# Patient Record
Sex: FEMALE | Race: WHITE | NOT HISPANIC OR LATINO
[De-identification: names, ages, dates, MRNs, and addresses within clinical notes are randomized per-mention and may not be internally consistent; named-entity substitution may affect disease eponyms.]

---

## 2015-12-22 RX ORDER — INSULIN LISPRO 100/ML
40 VIAL (ML) SUBCUTANEOUS
Qty: 0 | Refills: 0 | DISCHARGE
Start: 2015-12-22

## 2015-12-22 RX ORDER — INSULIN LISPRO 100/ML
60 VIAL (ML) SUBCUTANEOUS
Qty: 0 | Refills: 0 | DISCHARGE
Start: 2015-12-22

## 2015-12-22 RX ORDER — INSULIN LISPRO 100/ML
46 VIAL (ML) SUBCUTANEOUS
Qty: 0 | Refills: 0 | COMMUNITY
Start: 2015-12-22

## 2015-12-22 RX ORDER — ATORVASTATIN CALCIUM 80 MG/1
1 TABLET, FILM COATED ORAL
Qty: 0 | Refills: 0 | DISCHARGE
Start: 2015-12-22

## 2015-12-22 RX ORDER — INSULIN LISPRO 100/ML
30 VIAL (ML) SUBCUTANEOUS
Qty: 0 | Refills: 0 | DISCHARGE
Start: 2015-12-22

## 2015-12-22 RX ORDER — INSULIN LISPRO 100/ML
35 VIAL (ML) SUBCUTANEOUS
Qty: 0 | Refills: 0 | DISCHARGE
Start: 2015-12-22

## 2015-12-22 RX ORDER — FUROSEMIDE 40 MG
3 TABLET ORAL
Qty: 0 | Refills: 0 | DISCHARGE
Start: 2015-12-22

## 2017-01-31 ENCOUNTER — APPOINTMENT (OUTPATIENT)
Dept: SURGERY | Facility: CLINIC | Age: 62
End: 2017-01-31

## 2017-01-31 DIAGNOSIS — R19.8 OTHER SPECIFIED SYMPTOMS AND SIGNS INVOLVING THE DIGESTIVE SYSTEM AND ABDOMEN: ICD-10-CM

## 2017-11-03 ENCOUNTER — OUTPATIENT (OUTPATIENT)
Dept: OUTPATIENT SERVICES | Facility: HOSPITAL | Age: 62
LOS: 1 days | End: 2017-11-03
Payer: MEDICARE

## 2017-11-03 VITALS
SYSTOLIC BLOOD PRESSURE: 156 MMHG | WEIGHT: 293 LBS | OXYGEN SATURATION: 96 % | RESPIRATION RATE: 20 BRPM | DIASTOLIC BLOOD PRESSURE: 67 MMHG | TEMPERATURE: 99 F | HEART RATE: 76 BPM | HEIGHT: 63 IN

## 2017-11-03 DIAGNOSIS — I35.0 NONRHEUMATIC AORTIC (VALVE) STENOSIS: ICD-10-CM

## 2017-11-03 DIAGNOSIS — Z95.2 PRESENCE OF PROSTHETIC HEART VALVE: Chronic | ICD-10-CM

## 2017-11-03 LAB
ALBUMIN SERPL ELPH-MCNC: 4.1 G/DL — SIGNIFICANT CHANGE UP (ref 3.3–5)
ALP SERPL-CCNC: 133 U/L — HIGH (ref 40–120)
ALT FLD-CCNC: 18 U/L RC — SIGNIFICANT CHANGE UP (ref 10–45)
ANION GAP SERPL CALC-SCNC: 14 MMOL/L — SIGNIFICANT CHANGE UP (ref 5–17)
AST SERPL-CCNC: 22 U/L — SIGNIFICANT CHANGE UP (ref 10–40)
BILIRUB SERPL-MCNC: 0.4 MG/DL — SIGNIFICANT CHANGE UP (ref 0.2–1.2)
BUN SERPL-MCNC: 15 MG/DL — SIGNIFICANT CHANGE UP (ref 7–23)
CALCIUM SERPL-MCNC: 9.8 MG/DL — SIGNIFICANT CHANGE UP (ref 8.4–10.5)
CHLORIDE SERPL-SCNC: 101 MMOL/L — SIGNIFICANT CHANGE UP (ref 96–108)
CO2 SERPL-SCNC: 25 MMOL/L — SIGNIFICANT CHANGE UP (ref 22–31)
CREAT SERPL-MCNC: 0.79 MG/DL — SIGNIFICANT CHANGE UP (ref 0.5–1.3)
GLUCOSE BLDC GLUCOMTR-MCNC: 162 MG/DL — HIGH (ref 70–99)
GLUCOSE BLDC GLUCOMTR-MCNC: 197 MG/DL — HIGH (ref 70–99)
GLUCOSE SERPL-MCNC: 177 MG/DL — HIGH (ref 70–99)
HCT VFR BLD CALC: 41.6 % — SIGNIFICANT CHANGE UP (ref 34.5–45)
HGB BLD-MCNC: 13.4 G/DL — SIGNIFICANT CHANGE UP (ref 11.5–15.5)
MCHC RBC-ENTMCNC: 28.9 PG — SIGNIFICANT CHANGE UP (ref 27–34)
MCHC RBC-ENTMCNC: 32.2 GM/DL — SIGNIFICANT CHANGE UP (ref 32–36)
MCV RBC AUTO: 89.7 FL — SIGNIFICANT CHANGE UP (ref 80–100)
PLATELET # BLD AUTO: 270 K/UL — SIGNIFICANT CHANGE UP (ref 150–400)
POTASSIUM SERPL-MCNC: 4.5 MMOL/L — SIGNIFICANT CHANGE UP (ref 3.5–5.3)
POTASSIUM SERPL-SCNC: 4.5 MMOL/L — SIGNIFICANT CHANGE UP (ref 3.5–5.3)
PROT SERPL-MCNC: 7.5 G/DL — SIGNIFICANT CHANGE UP (ref 6–8.3)
RBC # BLD: 4.64 M/UL — SIGNIFICANT CHANGE UP (ref 3.8–5.2)
RBC # FLD: 13.2 % — SIGNIFICANT CHANGE UP (ref 10.3–14.5)
SODIUM SERPL-SCNC: 140 MMOL/L — SIGNIFICANT CHANGE UP (ref 135–145)
WBC # BLD: 12 K/UL — HIGH (ref 3.8–10.5)
WBC # FLD AUTO: 12 K/UL — HIGH (ref 3.8–10.5)

## 2017-11-03 PROCEDURE — 92978 ENDOLUMINL IVUS OCT C 1ST: CPT | Mod: LM

## 2017-11-03 PROCEDURE — 85027 COMPLETE CBC AUTOMATED: CPT

## 2017-11-03 PROCEDURE — C1769: CPT

## 2017-11-03 PROCEDURE — C1894: CPT

## 2017-11-03 PROCEDURE — 82962 GLUCOSE BLOOD TEST: CPT

## 2017-11-03 PROCEDURE — 93460 R&L HRT ART/VENTRICLE ANGIO: CPT

## 2017-11-03 PROCEDURE — 99152 MOD SED SAME PHYS/QHP 5/>YRS: CPT

## 2017-11-03 PROCEDURE — 99153 MOD SED SAME PHYS/QHP EA: CPT

## 2017-11-03 PROCEDURE — 93567 NJX CAR CTH SPRVLV AORTGRPHY: CPT

## 2017-11-03 PROCEDURE — 93010 ELECTROCARDIOGRAM REPORT: CPT

## 2017-11-03 PROCEDURE — 93005 ELECTROCARDIOGRAM TRACING: CPT

## 2017-11-03 PROCEDURE — C1753: CPT

## 2017-11-03 PROCEDURE — 80053 COMPREHEN METABOLIC PANEL: CPT

## 2017-11-03 PROCEDURE — 92978 ENDOLUMINL IVUS OCT C 1ST: CPT | Mod: 26,LM

## 2017-11-03 PROCEDURE — C1887: CPT

## 2017-11-03 PROCEDURE — 93460 R&L HRT ART/VENTRICLE ANGIO: CPT | Mod: 26

## 2017-11-03 RX ORDER — SODIUM CHLORIDE 9 MG/ML
3 INJECTION INTRAMUSCULAR; INTRAVENOUS; SUBCUTANEOUS EVERY 8 HOURS
Qty: 0 | Refills: 0 | Status: DISCONTINUED | OUTPATIENT
Start: 2017-11-03 | End: 2017-11-18

## 2017-11-03 NOTE — H&P CARDIOLOGY - PSH
Breast anomaly  Biopsy of both breast benign lesions  Cataract  B/L cataracts  Cervix abnormality  Ablation of cervix  right eye torn retina    S/P bariatric surgery  lap band surgery two times due to erosion  S/P D&C (status post dilation and curettage)  several D&C's  S/P laminectomy    S/P TAVR (transcatheter aortic valve replacement)    S/P tonsillectomy and adenoidectomy

## 2017-11-03 NOTE — H&P CARDIOLOGY - PMH
Cholesterol serum elevated    Depression    Diabetes type 2, uncontrolled    Gastroparesis due to DM    GERD (gastroesophageal reflux disease)    Herniated disc    Hypertension    Hypothyroid  not on medication  IBS (irritable bowel syndrome)    Murmur, cardiac  Has a "leaky valve" does not know which one  MVP (mitral valve prolapse)    Neuropathy  Feet and hands  Obesity    Spinal stenosis  chronic back pain

## 2019-02-20 ENCOUNTER — EMERGENCY (EMERGENCY)
Facility: HOSPITAL | Age: 64
LOS: 1 days | Discharge: HOME | End: 2019-02-20
Attending: EMERGENCY MEDICINE
Payer: MEDICARE

## 2019-02-20 VITALS
DIASTOLIC BLOOD PRESSURE: 102 MMHG | SYSTOLIC BLOOD PRESSURE: 192 MMHG | HEART RATE: 115 BPM | RESPIRATION RATE: 20 BRPM | OXYGEN SATURATION: 97 % | HEIGHT: 63 IN | TEMPERATURE: 98 F | WEIGHT: 293 LBS

## 2019-02-20 DIAGNOSIS — I10 ESSENTIAL (PRIMARY) HYPERTENSION: ICD-10-CM

## 2019-02-20 DIAGNOSIS — E11.65 TYPE 2 DIABETES MELLITUS WITH HYPERGLYCEMIA: ICD-10-CM

## 2019-02-20 DIAGNOSIS — E87.2 ACIDOSIS: ICD-10-CM

## 2019-02-20 DIAGNOSIS — Z95.2 PRESENCE OF PROSTHETIC HEART VALVE: Chronic | ICD-10-CM

## 2019-02-20 DIAGNOSIS — E03.9 HYPOTHYROIDISM, UNSPECIFIED: ICD-10-CM

## 2019-02-20 LAB
ALBUMIN SERPL ELPH-MCNC: 4.1 G/DL — SIGNIFICANT CHANGE UP (ref 3.3–5)
ALBUMIN SERPL ELPH-MCNC: 4.5 G/DL — SIGNIFICANT CHANGE UP (ref 3.3–5)
ALP SERPL-CCNC: 146 U/L — HIGH (ref 40–120)
ALP SERPL-CCNC: 148 U/L — HIGH (ref 40–120)
ALT FLD-CCNC: 14 U/L — SIGNIFICANT CHANGE UP (ref 10–45)
ALT FLD-CCNC: 16 U/L — SIGNIFICANT CHANGE UP (ref 10–45)
ANION GAP SERPL CALC-SCNC: 14 MMOL/L — SIGNIFICANT CHANGE UP (ref 5–17)
ANION GAP SERPL CALC-SCNC: 19 MMOL/L — HIGH (ref 5–17)
APPEARANCE UR: CLEAR — SIGNIFICANT CHANGE UP
AST SERPL-CCNC: 18 U/L — SIGNIFICANT CHANGE UP (ref 10–40)
AST SERPL-CCNC: 25 U/L — SIGNIFICANT CHANGE UP (ref 10–40)
BASE EXCESS BLDV CALC-SCNC: 2.2 MMOL/L — HIGH (ref -2–2)
BASE EXCESS BLDV CALC-SCNC: 3 MMOL/L — HIGH (ref -2–2)
BASE EXCESS BLDV CALC-SCNC: 3.1 MMOL/L — HIGH (ref -2–2)
BASOPHILS # BLD AUTO: 0.1 K/UL — SIGNIFICANT CHANGE UP (ref 0–0.2)
BASOPHILS NFR BLD AUTO: 0.4 % — SIGNIFICANT CHANGE UP (ref 0–2)
BILIRUB SERPL-MCNC: 0.3 MG/DL — SIGNIFICANT CHANGE UP (ref 0.2–1.2)
BILIRUB SERPL-MCNC: 0.4 MG/DL — SIGNIFICANT CHANGE UP (ref 0.2–1.2)
BILIRUB UR-MCNC: NEGATIVE — SIGNIFICANT CHANGE UP
BUN SERPL-MCNC: 12 MG/DL — SIGNIFICANT CHANGE UP (ref 7–23)
BUN SERPL-MCNC: 12 MG/DL — SIGNIFICANT CHANGE UP (ref 7–23)
CA-I SERPL-SCNC: 1.17 MMOL/L — SIGNIFICANT CHANGE UP (ref 1.12–1.3)
CA-I SERPL-SCNC: 1.2 MMOL/L — SIGNIFICANT CHANGE UP (ref 1.12–1.3)
CA-I SERPL-SCNC: 1.2 MMOL/L — SIGNIFICANT CHANGE UP (ref 1.12–1.3)
CALCIUM SERPL-MCNC: 10.5 MG/DL — SIGNIFICANT CHANGE UP (ref 8.4–10.5)
CALCIUM SERPL-MCNC: 9.8 MG/DL — SIGNIFICANT CHANGE UP (ref 8.4–10.5)
CHLORIDE BLDV-SCNC: 107 MMOL/L — SIGNIFICANT CHANGE UP (ref 96–108)
CHLORIDE BLDV-SCNC: 108 MMOL/L — SIGNIFICANT CHANGE UP (ref 96–108)
CHLORIDE BLDV-SCNC: 109 MMOL/L — HIGH (ref 96–108)
CHLORIDE SERPL-SCNC: 100 MMOL/L — SIGNIFICANT CHANGE UP (ref 96–108)
CHLORIDE SERPL-SCNC: 103 MMOL/L — SIGNIFICANT CHANGE UP (ref 96–108)
CO2 BLDV-SCNC: 27 MMOL/L — SIGNIFICANT CHANGE UP (ref 22–30)
CO2 BLDV-SCNC: 30 MMOL/L — SIGNIFICANT CHANGE UP (ref 22–30)
CO2 BLDV-SCNC: 31 MMOL/L — HIGH (ref 22–30)
CO2 SERPL-SCNC: 21 MMOL/L — LOW (ref 22–31)
CO2 SERPL-SCNC: 22 MMOL/L — SIGNIFICANT CHANGE UP (ref 22–31)
COLOR SPEC: COLORLESS — SIGNIFICANT CHANGE UP
CREAT SERPL-MCNC: 0.63 MG/DL — SIGNIFICANT CHANGE UP (ref 0.5–1.3)
CREAT SERPL-MCNC: 0.63 MG/DL — SIGNIFICANT CHANGE UP (ref 0.5–1.3)
DIFF PNL FLD: NEGATIVE — SIGNIFICANT CHANGE UP
EOSINOPHIL # BLD AUTO: 0.2 K/UL — SIGNIFICANT CHANGE UP (ref 0–0.5)
EOSINOPHIL NFR BLD AUTO: 1.3 % — SIGNIFICANT CHANGE UP (ref 0–6)
GAS PNL BLDV: 133 MMOL/L — LOW (ref 136–145)
GAS PNL BLDV: 137 MMOL/L — SIGNIFICANT CHANGE UP (ref 136–145)
GAS PNL BLDV: 137 MMOL/L — SIGNIFICANT CHANGE UP (ref 136–145)
GAS PNL BLDV: SIGNIFICANT CHANGE UP
GLUCOSE BLDC GLUCOMTR-MCNC: 202 MG/DL — HIGH (ref 70–99)
GLUCOSE BLDC GLUCOMTR-MCNC: 217 MG/DL — HIGH (ref 70–99)
GLUCOSE BLDC GLUCOMTR-MCNC: 241 MG/DL — HIGH (ref 70–99)
GLUCOSE BLDV-MCNC: 186 MG/DL — HIGH (ref 70–99)
GLUCOSE BLDV-MCNC: 190 MG/DL — HIGH (ref 70–99)
GLUCOSE BLDV-MCNC: 213 MG/DL — HIGH (ref 70–99)
GLUCOSE SERPL-MCNC: 184 MG/DL — HIGH (ref 70–99)
GLUCOSE SERPL-MCNC: 189 MG/DL — HIGH (ref 70–99)
GLUCOSE UR QL: NEGATIVE — SIGNIFICANT CHANGE UP
HCO3 BLDV-SCNC: 26 MMOL/L — SIGNIFICANT CHANGE UP (ref 21–29)
HCO3 BLDV-SCNC: 28 MMOL/L — SIGNIFICANT CHANGE UP (ref 21–29)
HCO3 BLDV-SCNC: 29 MMOL/L — SIGNIFICANT CHANGE UP (ref 21–29)
HCT VFR BLD CALC: 42.8 % — SIGNIFICANT CHANGE UP (ref 34.5–45)
HCT VFR BLDA CALC: 41 % — SIGNIFICANT CHANGE UP (ref 39–50)
HCT VFR BLDA CALC: 45 % — SIGNIFICANT CHANGE UP (ref 39–50)
HCT VFR BLDA CALC: 46 % — SIGNIFICANT CHANGE UP (ref 39–50)
HGB BLD CALC-MCNC: 13.3 G/DL — SIGNIFICANT CHANGE UP (ref 11.5–15.5)
HGB BLD CALC-MCNC: 14.7 G/DL — SIGNIFICANT CHANGE UP (ref 11.5–15.5)
HGB BLD CALC-MCNC: 14.9 G/DL — SIGNIFICANT CHANGE UP (ref 11.5–15.5)
HGB BLD-MCNC: 14.3 G/DL — SIGNIFICANT CHANGE UP (ref 11.5–15.5)
KETONES UR-MCNC: NEGATIVE — SIGNIFICANT CHANGE UP
LACTATE BLDV-MCNC: 2.1 MMOL/L — HIGH (ref 0.7–2)
LACTATE BLDV-MCNC: 2.1 MMOL/L — HIGH (ref 0.7–2)
LACTATE BLDV-MCNC: 2.7 MMOL/L — HIGH (ref 0.7–2)
LEUKOCYTE ESTERASE UR-ACNC: NEGATIVE — SIGNIFICANT CHANGE UP
LYMPHOCYTES # BLD AUTO: 17.3 % — SIGNIFICANT CHANGE UP (ref 13–44)
LYMPHOCYTES # BLD AUTO: 2.3 K/UL — SIGNIFICANT CHANGE UP (ref 1–3.3)
MCHC RBC-ENTMCNC: 28.5 PG — SIGNIFICANT CHANGE UP (ref 27–34)
MCHC RBC-ENTMCNC: 33.5 GM/DL — SIGNIFICANT CHANGE UP (ref 32–36)
MCV RBC AUTO: 85.2 FL — SIGNIFICANT CHANGE UP (ref 80–100)
MONOCYTES # BLD AUTO: 0.7 K/UL — SIGNIFICANT CHANGE UP (ref 0–0.9)
MONOCYTES NFR BLD AUTO: 5.5 % — SIGNIFICANT CHANGE UP (ref 2–14)
NEUTROPHILS # BLD AUTO: 10 K/UL — HIGH (ref 1.8–7.4)
NEUTROPHILS NFR BLD AUTO: 75.5 % — SIGNIFICANT CHANGE UP (ref 43–77)
NITRITE UR-MCNC: NEGATIVE — SIGNIFICANT CHANGE UP
OTHER CELLS CSF MANUAL: 10 ML/DL — LOW (ref 18–22)
OTHER CELLS CSF MANUAL: 11 ML/DL — LOW (ref 18–22)
OTHER CELLS CSF MANUAL: 15 ML/DL — LOW (ref 18–22)
PCO2 BLDV: 40 MMHG — SIGNIFICANT CHANGE UP (ref 35–50)
PCO2 BLDV: 48 MMHG — SIGNIFICANT CHANGE UP (ref 35–50)
PCO2 BLDV: 54 MMHG — HIGH (ref 35–50)
PH BLDV: 7.36 — SIGNIFICANT CHANGE UP (ref 7.35–7.45)
PH BLDV: 7.39 — SIGNIFICANT CHANGE UP (ref 7.35–7.45)
PH BLDV: 7.43 — SIGNIFICANT CHANGE UP (ref 7.35–7.45)
PH UR: 8 — SIGNIFICANT CHANGE UP (ref 5–8)
PLATELET # BLD AUTO: 241 K/UL — SIGNIFICANT CHANGE UP (ref 150–400)
PO2 BLDV: 30 MMHG — SIGNIFICANT CHANGE UP (ref 25–45)
PO2 BLDV: 32 MMHG — SIGNIFICANT CHANGE UP (ref 25–45)
PO2 BLDV: 48 MMHG — HIGH (ref 25–45)
POTASSIUM BLDV-SCNC: 4 MMOL/L — SIGNIFICANT CHANGE UP (ref 3.5–5.3)
POTASSIUM BLDV-SCNC: 4.1 MMOL/L — SIGNIFICANT CHANGE UP (ref 3.5–5.3)
POTASSIUM BLDV-SCNC: 4.3 MMOL/L — SIGNIFICANT CHANGE UP (ref 3.5–5.3)
POTASSIUM SERPL-MCNC: 4.3 MMOL/L — SIGNIFICANT CHANGE UP (ref 3.5–5.3)
POTASSIUM SERPL-MCNC: 4.4 MMOL/L — SIGNIFICANT CHANGE UP (ref 3.5–5.3)
POTASSIUM SERPL-SCNC: 4.3 MMOL/L — SIGNIFICANT CHANGE UP (ref 3.5–5.3)
POTASSIUM SERPL-SCNC: 4.4 MMOL/L — SIGNIFICANT CHANGE UP (ref 3.5–5.3)
PROT SERPL-MCNC: 7.5 G/DL — SIGNIFICANT CHANGE UP (ref 6–8.3)
PROT SERPL-MCNC: 7.8 G/DL — SIGNIFICANT CHANGE UP (ref 6–8.3)
PROT UR-MCNC: ABNORMAL
RAPID RVP RESULT: SIGNIFICANT CHANGE UP
RBC # BLD: 5.03 M/UL — SIGNIFICANT CHANGE UP (ref 3.8–5.2)
RBC # FLD: 13.6 % — SIGNIFICANT CHANGE UP (ref 10.3–14.5)
SAO2 % BLDV: 47 % — LOW (ref 67–88)
SAO2 % BLDV: 55 % — LOW (ref 67–88)
SAO2 % BLDV: 83 % — SIGNIFICANT CHANGE UP (ref 67–88)
SODIUM SERPL-SCNC: 139 MMOL/L — SIGNIFICANT CHANGE UP (ref 135–145)
SODIUM SERPL-SCNC: 140 MMOL/L — SIGNIFICANT CHANGE UP (ref 135–145)
SP GR SPEC: 1.01 — LOW (ref 1.01–1.02)
UROBILINOGEN FLD QL: NEGATIVE — SIGNIFICANT CHANGE UP
WBC # BLD: 13.2 K/UL — HIGH (ref 3.8–10.5)
WBC # FLD AUTO: 13.2 K/UL — HIGH (ref 3.8–10.5)

## 2019-02-20 PROCEDURE — 93010 ELECTROCARDIOGRAM REPORT: CPT

## 2019-02-20 PROCEDURE — 99285 EMERGENCY DEPT VISIT HI MDM: CPT

## 2019-02-20 PROCEDURE — 71046 X-RAY EXAM CHEST 2 VIEWS: CPT | Mod: 26

## 2019-02-20 PROCEDURE — 99218: CPT | Mod: GC

## 2019-02-20 RX ORDER — DEXTROSE 50 % IN WATER 50 %
25 SYRINGE (ML) INTRAVENOUS ONCE
Qty: 0 | Refills: 0 | Status: DISCONTINUED | OUTPATIENT
Start: 2019-02-20 | End: 2019-02-24

## 2019-02-20 RX ORDER — SODIUM CHLORIDE 9 MG/ML
1000 INJECTION, SOLUTION INTRAVENOUS
Qty: 0 | Refills: 0 | Status: DISCONTINUED | OUTPATIENT
Start: 2019-02-20 | End: 2019-02-24

## 2019-02-20 RX ORDER — PANTOPRAZOLE SODIUM 20 MG/1
40 TABLET, DELAYED RELEASE ORAL
Qty: 0 | Refills: 0 | Status: DISCONTINUED | OUTPATIENT
Start: 2019-02-20 | End: 2019-02-24

## 2019-02-20 RX ORDER — BUPROPION HYDROCHLORIDE 150 MG/1
200 TABLET, EXTENDED RELEASE ORAL DAILY
Qty: 0 | Refills: 0 | Status: DISCONTINUED | OUTPATIENT
Start: 2019-02-20 | End: 2019-02-24

## 2019-02-20 RX ORDER — LOSARTAN POTASSIUM 100 MG/1
25 TABLET, FILM COATED ORAL DAILY
Qty: 0 | Refills: 0 | Status: DISCONTINUED | OUTPATIENT
Start: 2019-02-20 | End: 2019-02-24

## 2019-02-20 RX ORDER — ACETAMINOPHEN 500 MG
650 TABLET ORAL ONCE
Qty: 0 | Refills: 0 | Status: COMPLETED | OUTPATIENT
Start: 2019-02-20 | End: 2019-02-20

## 2019-02-20 RX ORDER — DEXTROSE 50 % IN WATER 50 %
15 SYRINGE (ML) INTRAVENOUS ONCE
Qty: 0 | Refills: 0 | Status: DISCONTINUED | OUTPATIENT
Start: 2019-02-20 | End: 2019-02-24

## 2019-02-20 RX ORDER — DILTIAZEM HCL 120 MG
180 CAPSULE, EXT RELEASE 24 HR ORAL DAILY
Qty: 0 | Refills: 0 | Status: DISCONTINUED | OUTPATIENT
Start: 2019-02-20 | End: 2019-02-24

## 2019-02-20 RX ORDER — FLUOXETINE HCL 10 MG
20 CAPSULE ORAL DAILY
Qty: 0 | Refills: 0 | Status: DISCONTINUED | OUTPATIENT
Start: 2019-02-20 | End: 2019-02-24

## 2019-02-20 RX ORDER — CARVEDILOL PHOSPHATE 80 MG/1
25 CAPSULE, EXTENDED RELEASE ORAL EVERY 12 HOURS
Qty: 0 | Refills: 0 | Status: DISCONTINUED | OUTPATIENT
Start: 2019-02-20 | End: 2019-02-24

## 2019-02-20 RX ORDER — ATORVASTATIN CALCIUM 80 MG/1
80 TABLET, FILM COATED ORAL AT BEDTIME
Qty: 0 | Refills: 0 | Status: DISCONTINUED | OUTPATIENT
Start: 2019-02-20 | End: 2019-02-24

## 2019-02-20 RX ORDER — INSULIN LISPRO 100/ML
VIAL (ML) SUBCUTANEOUS AT BEDTIME
Qty: 0 | Refills: 0 | Status: DISCONTINUED | OUTPATIENT
Start: 2019-02-20 | End: 2019-02-24

## 2019-02-20 RX ORDER — ASPIRIN/CALCIUM CARB/MAGNESIUM 324 MG
325 TABLET ORAL DAILY
Qty: 0 | Refills: 0 | Status: DISCONTINUED | OUTPATIENT
Start: 2019-02-20 | End: 2019-02-24

## 2019-02-20 RX ORDER — NALTREXONE HYDROCHLORIDE 50 MG/1
25 TABLET, FILM COATED ORAL DAILY
Qty: 0 | Refills: 0 | Status: DISCONTINUED | OUTPATIENT
Start: 2019-02-20 | End: 2019-02-24

## 2019-02-20 RX ORDER — GLUCAGON INJECTION, SOLUTION 0.5 MG/.1ML
1 INJECTION, SOLUTION SUBCUTANEOUS ONCE
Qty: 0 | Refills: 0 | Status: DISCONTINUED | OUTPATIENT
Start: 2019-02-20 | End: 2019-02-24

## 2019-02-20 RX ORDER — SODIUM CHLORIDE 9 MG/ML
1000 INJECTION INTRAMUSCULAR; INTRAVENOUS; SUBCUTANEOUS
Qty: 0 | Refills: 0 | Status: DISCONTINUED | OUTPATIENT
Start: 2019-02-20 | End: 2019-02-24

## 2019-02-20 RX ORDER — SODIUM CHLORIDE 9 MG/ML
2000 INJECTION INTRAMUSCULAR; INTRAVENOUS; SUBCUTANEOUS ONCE
Qty: 0 | Refills: 0 | Status: COMPLETED | OUTPATIENT
Start: 2019-02-20 | End: 2019-02-20

## 2019-02-20 RX ORDER — DEXTROSE 50 % IN WATER 50 %
12.5 SYRINGE (ML) INTRAVENOUS ONCE
Qty: 0 | Refills: 0 | Status: DISCONTINUED | OUTPATIENT
Start: 2019-02-20 | End: 2019-02-24

## 2019-02-20 RX ORDER — INSULIN LISPRO 100/ML
VIAL (ML) SUBCUTANEOUS
Qty: 0 | Refills: 0 | Status: DISCONTINUED | OUTPATIENT
Start: 2019-02-20 | End: 2019-02-24

## 2019-02-20 RX ORDER — FUROSEMIDE 40 MG
40 TABLET ORAL DAILY
Qty: 0 | Refills: 0 | Status: DISCONTINUED | OUTPATIENT
Start: 2019-02-20 | End: 2019-02-24

## 2019-02-20 RX ADMIN — PANTOPRAZOLE SODIUM 40 MILLIGRAM(S): 20 TABLET, DELAYED RELEASE ORAL at 18:43

## 2019-02-20 RX ADMIN — Medication 650 MILLIGRAM(S): at 08:41

## 2019-02-20 RX ADMIN — SODIUM CHLORIDE 100 MILLILITER(S): 9 INJECTION INTRAMUSCULAR; INTRAVENOUS; SUBCUTANEOUS at 16:33

## 2019-02-20 RX ADMIN — Medication 325 MILLIGRAM(S): at 20:19

## 2019-02-20 RX ADMIN — SODIUM CHLORIDE 4000 MILLILITER(S): 9 INJECTION INTRAMUSCULAR; INTRAVENOUS; SUBCUTANEOUS at 12:08

## 2019-02-20 RX ADMIN — Medication 2: at 18:43

## 2019-02-20 RX ADMIN — CARVEDILOL PHOSPHATE 25 MILLIGRAM(S): 80 CAPSULE, EXTENDED RELEASE ORAL at 20:19

## 2019-02-20 NOTE — CONSULT NOTE ADULT - ASSESSMENT
63F w/PMH obese female s/p two failed lap bands, type 2 DM (erratic and high dose insulin regimen), phtn, CAD AV dz s/p TAVR, IBS, GERD, hypothyroid? (not on medicine), JOSR not on CPAP, hld, depression, htn, neuropathy p/w labile blood glucose at home from 500s to 100s and sweats, found to have elevated lactate that has now normalized and elevated wbc count.  Pt states she does not know what to to do with her blood glucose readings and needed to see a doctor sooner than April at which time she has a outpt visit with her endocrinologist.    We do not have a A1C and blood glucoses are in range currently without any insulin  she is on a high dose home insulin regimen   Toujeo 50 BID   Humalog 60 before meals   and metformin   she did not tolerate GLP-1 outpt     she is also on wellbutrin and naloxone for cravings

## 2019-02-20 NOTE — ED PROVIDER NOTE - CONSTITUTIONAL, MLM
normal... Well appearing, well nourished, awake, alert, oriented to person, place, time/situation and in no apparent distress. +obesity

## 2019-02-20 NOTE — ED CDU PROVIDER INITIAL DAY NOTE - ATTENDING CONTRIBUTION TO CARE
I have personally performed a face to face diagnostic evaluation on this patient.  I have reviewed the ACP note and agree with the history, exam, and plan of care, except as noted.  History and Exam by me shows  See ED provider note  Jason Bhagat MD, Facep

## 2019-02-20 NOTE — ED ADULT NURSE NOTE - OBJECTIVE STATEMENT
64y/o female presetned to the ED from home with complaint of hypoglycemia. A&Ox3, ambulatory with cane. PMH-DM2. Patient states that he blood sugars have been irratic the past 2 wweks,. Patient states that her normal blood sugar is in the 200's even with taking inuslin, howver the last few days she has had readings in the 100's. Patient states that during the day her sugar has been high and has been taking an extra 30 units of insulin on her own. 64y/o female presented to the ED from home with complaint of hypoglycemia. A&Ox3, ambulatory with cane. PMH-DM2. Patient states that he blood sugars have been erratic the past 2 weeks associated with chills,. Patient states that her normal blood sugar is in the 200's even with taking insulin, however the last few days she has had readings in the 100's. Patient states that during the day her sugar has been high and has been taking an extra 30 units of insulin on her own. Denies fever, chills, N/V/D, chest pain, SOB, palpations, numbness, tingling.

## 2019-02-20 NOTE — ED ADULT NURSE REASSESSMENT NOTE - NS ED NURSE REASSESS COMMENT FT1
17.00  Received the pt from main ED  For the management of Hypoglycemia & high lactic Acid Pt is oriented to the unit Comfort care & safety measures initiated  Pt denies N/V/D fever chills CP SOB Dizziness  now   comfort care & safety  measures initiated  Pt is advised to call for help call bell with in the reach  Iv site looks clean & dry   No signs of infiltration noted  Continue to monitor Due IV fluids started
Advised by Dr. Patel awaiting CDU admission.
Meal provided to pt after FS completed. Awaiting for RVP results.
Pt reports "I feel fine and if I don't have the flu then I would like to go home." Leola POE and Amanda POE made aware of the pt's comment. MD's think the pt should be admitted, repeat labs will be sent on this pt.
Report received from Priti العلي RN. Pt AAOx4, NAD, resp nonlabored, skin warm/dry, resting comfortably in bed. Pt states improvement in pain and symptoms s/p drinking apple juice. Pt denies headache, dizziness, chest pain, palpitations, SOB, abd pain, n/v/d, urinary symptoms, fevers, chills, weakness at this time. Pt awaiting MD evaluation and assessment. Safety maintained with call bell within reach.
Pt received from KATHY Li. Pt oriented to CDU & plan of care was discussed. Pt denies any symptoms at the moment. Pt states prior to arrival she was feeling lethargic, tired and sweaty from fluctuating FS but states it has resolved. Pt aware of S&S of hypo/hyperglycemia and will notify RN or PA of any symptoms. Safety & comfort measures maintained. Call bell in reach. Will continue to monitor.

## 2019-02-20 NOTE — ED CDU PROVIDER INITIAL DAY NOTE - PROGRESS NOTE DETAILS
CDU NOTE MADELEINE LUI: Pt resting comfortably, feeling well without complaint. NAD, VSS. . will continue monitoring.

## 2019-02-20 NOTE — ED PROVIDER NOTE - OBJECTIVE STATEMENT
63F w/PMH IDDM (noncompliant w/insulin dosing), phtn, CAD (on 325mg ASA), AV dz s/p TAVR, IBS, GERD, hypothyroid, JOSR not on CPAP, hld, depression, htn, neuropathy p/w intermittent hyperglycemia x2w. Patient's home readings have been intermittenly reading "high". Occurred 3x last night, took 25U or 30U humalog at each time, last at 2-3AM. Missed lantus dosing last night. Supposed to be on 60U humalog TID but has reportedly been taking BID for many months 2/2 insulin pricing, her endocrinologist is reportedly aware. When the readings are "high" she feels diaphoretic, generally weak and nausea. +mild gradual onset headache similar to prior headaches that has been occurring intermittently. +mild prod cough. Denies f/c, URI sx, neck pain/stiffness, cp/sob, abd pain, v/d/c, dysuria, frequency, hematuria, sick contacts/hospitalization, rash, travel. Remote former smoker. No ETOH/drug use. 3w ago zoloft changed to bupropion but no other recent med changes.    Endocrine: Dr. Pugh [phone]  Cards/PCP: Dr. Espinal [phone]

## 2019-02-20 NOTE — CONSULT NOTE ADULT - PROBLEM SELECTOR RECOMMENDATION 9
check A1C, based on A1C we can dose the lantus as currently blood glucose well controlled OFF any insulin and bc pt is not taking much PO intake   low dose ISS premeals and bedtime for now until we have further data with [facility]     if A1C elevated, and once pt starts to consistently eat and hyperglycemia >180 ensues can start lower than weight based insulin for now   Lantus 20 units at night (check 3am glucose for trend and to monitor for lows)  humalog 6-6-6 units before meals   low dose ISS before meals and bedtime     nutrition consult     DC: please contact pts endocrinologist prior to dc to communcate inpt admission and plan for dispo and need for close followup  Dc regimen to be determined, however as explained to the pt, her insulin regimen needs to be titrated by her doctor every two weeks based on her trends and PO intake  please assess if pt is eligible for home VNS to help with DM management

## 2019-02-20 NOTE — ED ADULT TRIAGE NOTE - CHIEF COMPLAINT QUOTE
hyperglycemia hyperglycemia at home, took extra units of insulin. FSBS 112mg/dl at triage. c/o diaphoresis. normal...

## 2019-02-20 NOTE — CONSULT NOTE ADULT - PROBLEM SELECTOR RECOMMENDATION 3
hold metformin at dc   can be assessed outpt if lactate remains normal    given wbc count, sweats at home, and feeling unwell, recommend infectious workup to rule out infectious etiology to elevated lactate

## 2019-02-20 NOTE — ED CDU PROVIDER INITIAL DAY NOTE - OBJECTIVE STATEMENT
63F w/PMH IDDM (noncompliant w/insulin dosing), phtn, CAD (on 325mg ASA), AV dz s/p TAVR, IBS, GERD, hypothyroid, JOSR not on CPAP, hld, depression, htn, neuropathy p/w intermittent hyperglycemia x2-3weeks. Patient's home readings have been intermittently reading "high". around 400-500.  Pt takes different doses of short and long acting insulin daily, cant see endocrinologist until april.  Pt reports when her glucose is elevated she feels fatigued, diaphoresis and nausea.  Pt also reports intermittant mild headaches.  Pt deneis any active symptoms. Denies f/c, URI sx, neck pain/stiffness, cp/sob, abd pain, v/d, dysuria, frequency, hematuria, sick contacts/hospitalization, rash, travel. Remote former smoker. No ETOH/drug use. 3w ago zoloft changed to bupropion but no other recent med changes.    Endocrine: Dr. Pugh [phone]  Cards/PCP: Dr. Espinal [phone]

## 2019-02-20 NOTE — ED PROVIDER NOTE - PROGRESS NOTE DETAILS
63y female with complains of "Not feeling well for past several week while attempting to adjust insulin dosing. PT glu was "High" at home but wnl in ed. Only abnormality was elevated lactate that is improving. Pt offered cdu. for obs/repeate lactate and endocrine consult  Jason Bhagat MD, Facep Endorsed to Dr Wilfredo Bhagat MD, Facep

## 2019-02-20 NOTE — ED PROVIDER NOTE - ATTENDING CONTRIBUTION TO CARE
PMD Stefany Aguirre  63FPMD HTN,AS SP TAVR, Pulmonary HN,DMT2, JOSR (noncomplaint on cpap "don't like it") IBS,Gerd,HLD, Depression. PT comes to ed co My blood testing machine in telling me "high" off on for past two weeks. Pt usually runs 200's. Pt has been treating same with coverage. Was "High" all night with sweats and nausea. In traige was normal. Still feels a little shakey. Referred to ed for eval of posssible infection. No fever, cough,nv,dysuria,hematuria,cough. PE Obese feamle nad ncat chest clear ap abd soft +bs no mass guardign. neuro no focal defects. cv no rmg  Jason Bhagat MD, Facep

## 2019-02-20 NOTE — CONSULT NOTE ADULT - SUBJECTIVE AND OBJECTIVE BOX
Reason For Consult: DM    HPI:    63F w/PMH IDDM (noncompliant w/insulin dosing), phtn, CAD (on 325mg ASA), AV dz s/p TAVR, IBS, GERD, hypothyroid, JOSR not on CPAP, hld, depression, htn, neuropathy p/w intermittent hyperglycemia x2w. Patient's home readings have been intermittenly reading "high". Occurred 3x last night, took 25U or 30U humalog at each time, last at 2-3AM. Missed lantus dosing last night. Supposed to be on 60U humalog TID but has reportedly been taking BID for many months 2/2 insulin pricing, her endocrinologist is reportedly aware. When the readings are "high" she feels diaphoretic, generally weak and nausea. +mild gradual onset headache similar to prior headaches that has been occurring intermittently. +mild prod cough. Denies f/c, URI sx, neck pain/stiffness, cp/sob, abd pain, v/d/c, dysuria, frequency, hematuria, sick contacts/hospitalization, rash, travel. Remote former smoker. No ETOH/drug use. 3w ago zoloft changed to bupropion but no other recent med changes.      endocrine called for further assistance    Currently Blood glcuose in control without insulin. No A1C.    Pt states she has a long standing hx of DM. She is morbidly obese, s/p 2 failed Lap bands  Currently follows with Dr. Childs at [place], [phone]  regimen: toujeo 50 or Lantus BID (uses samples of she has so interchanges)   humalog 60 units before BF and Dinner 60 before lunch only if high >300  also metformin     pt states she has been having high and low blood glcuose at home with sweats at home.  Pt states 160 is low for her.  She states she gets scared with the fluctuations in blood glucose and variations and does not know what to do.  She will take extra insulin if she is high and than gets low and will eat to compensate for the lows.  She states she has called her endocrine MD office for recommendations and was told to increase insulin and if still with high glucose to go to the ED for evaluation.  pt states she also presented to the ED bc she didnt want to wait till April for a followup with her doctor.    She reports retinopathy and neuropathy. She denies any fungal infections and follows with opthomogly and podiatry.  Pt reports high carb diet at home and reports eating cake and sweets. She states she is also on wellbutrin/nalonxe for cravings.      Labs here notable for increased AG than has normalized and found to have a elevated lactate that has normalized  Also found to have a elevated WBC.    PAST MEDICAL & SURGICAL HISTORY:  Diabetes type 2, uncontrolled  Murmur, cardiac: Has a &quot;leaky valve&quot; does not know which one  GERD (gastroesophageal reflux disease)  Neuropathy: Feet and hands  IBS (irritable bowel syndrome)  Obesity  Hypothyroid: not on medication  MVP (mitral valve prolapse)  Cholesterol serum elevated  Spinal stenosis: chronic back pain  Herniated disc  Depression  Gastroparesis due to DM  Hypertension  S/P TAVR (transcatheter aortic valve replacement)  right eye torn retina  Breast anomaly: Biopsy of both breast benign lesions  Cervix abnormality: Ablation of cervix  S/P D&C (status post dilation and curettage): several D&amp;C&#x27;s  S/P laminectomy  Cataract: B/L cataracts  S/P tonsillectomy and adenoidectomy  S/P bariatric surgery: lap band surgery two times due to erosion      FAMILY HISTORY:  Family history of aortic stenosis (Father)      Social History:  no illicits   lives by self     Outpatient Medications:  · 	atorvastatin 10 mg oral tablet: 1 tab(s) orally once a day (at bedtime)  · 	aspirin 325 mg oral delayed release tablet: 1 tab(s) orally once a day  · 	furosemide 40 mg oral tablet: 1 tab(s) orally once a day  · 	insulin lispro (concentrated) 200 units/mL subcutaneous solution: 60 unit(s) subcutaneous 2 times a day  · 	omeprazole: 20 milligram(s) orally 2 times a day  · 	Vitamin B-12: 1 tab(s) orally once a day  · 	Cozaar 25 mg oral tablet: 1 tab(s) orally once a day  · 	Iron Chews: 65 milligram(s) orally once a day  · 	Cartia  mg/24 hours oral capsule, extended release: 1 cap(s) orally once a day  · 	Aspirin Enteric Coated 81 mg oral delayed release tablet: 1 tab(s) orally once a day  · 	Coreg 25 mg oral tablet: 1 tab(s) orally 2 times a day  · 	Toujeo SoloStar 300 units/mL subcutaneous solution: 50 unit(s) subcutaneous 2 times a day  wellbutrin/naloxone         MEDICATIONS  (STANDING):  aspirin enteric coated 325 milliGRAM(s) Oral daily  atorvastatin 80 milliGRAM(s) Oral at bedtime  buPROPion  milliGRAM(s) Oral daily  carvedilol 25 milliGRAM(s) Oral every 12 hours  dextrose 5%. 1000 milliLiter(s) (50 mL/Hr) IV Continuous <Continuous>  dextrose 50% Injectable 12.5 Gram(s) IV Push once  dextrose 50% Injectable 25 Gram(s) IV Push once  dextrose 50% Injectable 25 Gram(s) IV Push once  diltiazem    milliGRAM(s) Oral daily  FLUoxetine 20 milliGRAM(s) Oral daily  furosemide    Tablet 40 milliGRAM(s) Oral daily  insulin lispro (HumaLOG) corrective regimen sliding scale   SubCutaneous three times a day before meals  insulin lispro (HumaLOG) corrective regimen sliding scale   SubCutaneous at bedtime  losartan 25 milliGRAM(s) Oral daily  naltrexone 25 milliGRAM(s) Oral daily  pantoprazole    Tablet 40 milliGRAM(s) Oral two times a day  sodium chloride 0.9%. 1000 milliLiter(s) (100 mL/Hr) IV Continuous <Continuous>    MEDICATIONS  (PRN):  dextrose 40% Gel 15 Gram(s) Oral once PRN Blood Glucose LESS THAN 70 milliGRAM(s)/deciliter  glucagon  Injectable 1 milliGRAM(s) IntraMuscular once PRN Glucose LESS THAN 70 milligrams/deciliter      Allergies    codeine (Vomiting)  fish (Vomiting)    Intolerances      Review of Systems:  Constitutional: No fever  Eyes: No blurry vision  Neuro: No tremors  HEENT: No pain  Cardiovascular: No chest pain, palpitations  Respiratory: No SOB, no cough  GI: No nausea, vomiting, abdominal pain  : No dysuria  Skin: no rash  Psych: no depression  Endocrine: no polyuria, polydipsia  Hem/lymph: no swelling    ALL OTHER SYSTEMS REVIEWED AND NEGATIVE        PHYSICAL EXAM:  VITALS: T(C): 37.2 (02-20-19 @ 16:51)  T(F): 99 (02-20-19 @ 16:51), Max: 99 (02-20-19 @ 16:51)  HR: 98 (02-20-19 @ 16:51) (97 - 115)  BP: 157/73 (02-20-19 @ 16:51) (141/67 - 192/102)  RR:  (18 - 24)  SpO2:  (97% - 100%)  Wt(kg): --  GENERAL: NAD, sitting in chair, obese   EYES: No proptosis, no lid lag, anicteric  HEENT:  Atraumatic, Normocephalic, moist mucous membranes  RESPIRATORY: Clear to auscultation bilaterally; No rales, rhonchi, wheezing, or rubs  CARDIOVASCULAR: Regular rate and rhythm; No murmurs; no peripheral edema  GI: Soft, nontender, non distended, normal bowel sounds  SKIN: Dry, intact,  NEURO: sensation intact, extraocular movements intact, no tremor, normal reflexes  PSYCH: Alert and oriented x 3, normal affect, normal mood  CUSHING'S SIGNS: no purple striae     POCT Blood Glucose.: 160 mg/dL (02-20-19 @ 12:30)  POCT Blood Glucose.: 161 mg/dL (02-20-19 @ 06:33)  POCT Blood Glucose.: 117 mg/dL (02-20-19 @ 05:44)  POCT Blood Glucose.: 112 mg/dL (02-20-19 @ 05:30)                            14.3   13.2  )-----------( 241      ( 20 Feb 2019 08:01 )             42.8       02-20    139  |  103  |  12  ----------------------------<  184<H>  4.3   |  22  |  0.63    EGFR if : 110  EGFR if non : 95    Ca    9.8      02-20    TPro  7.5  /  Alb  4.1  /  TBili  0.4  /  DBili  x   /  AST  18  /  ALT  14  /  AlkPhos  146<H>  02-20      Thyroid Function Tests:              Radiology:

## 2019-02-20 NOTE — ED PROVIDER NOTE - CLINICAL SUMMARY MEDICAL DECISION MAKING FREE TEXT BOX
Amanda: 63F w/PMH IDDM, htn, hld, CAD, s/p TAVR, JOSR, depression, phtn p/w hyperglycemia at home. Normoglycemic here. Likely in setting of insulin dosing noncompliance but will assess labs, urine, CXR for infx/metabolic dz.

## 2019-02-20 NOTE — ED CDU PROVIDER INITIAL DAY NOTE - DETAILS
63 y.o female c.o hyperglycemia    IVF, DM education, insulin management, endo consult  I D/W ED attending  Plan VSS symptomatic improvement, improved labs

## 2019-02-20 NOTE — CONSULT NOTE ADULT - ATTENDING COMMENTS
Leana Steward MD  Pager [phone] ([facility])/ [phone] ([facility]) [please provide 10 digit call back number]  Nights and weekends: [phone]  Please note that this patient may be followed by a different provider tomorrow. If no answer or after hours, please contact [phone].  For final dc reccomendations, please call [phone] or page the endocrine fellow on call.

## 2019-02-20 NOTE — ED CDU PROVIDER INITIAL DAY NOTE - MEDICAL DECISION MAKING DETAILS
Adult female with  dm pw complains of poorly controlled hyperglycemia and fatigue/not feeling well. In hyperglycemia not appreciated. With elevated but improving Lactic acidosis. Will admit to cdu to trend lactate to resolution and consult endocrine for dm management and diffenrental of acidosis  Jason Bhagat MD, Facep

## 2019-02-21 VITALS
RESPIRATION RATE: 18 BRPM | DIASTOLIC BLOOD PRESSURE: 75 MMHG | HEART RATE: 75 BPM | OXYGEN SATURATION: 98 % | TEMPERATURE: 98 F | SYSTOLIC BLOOD PRESSURE: 181 MMHG

## 2019-02-21 LAB
BASE EXCESS BLDV CALC-SCNC: 1.1 MMOL/L — SIGNIFICANT CHANGE UP (ref -2–2)
CA-I SERPL-SCNC: 1.18 MMOL/L — SIGNIFICANT CHANGE UP (ref 1.12–1.3)
CHLORIDE BLDV-SCNC: 106 MMOL/L — SIGNIFICANT CHANGE UP (ref 96–108)
CO2 BLDV-SCNC: 26 MMOL/L — SIGNIFICANT CHANGE UP (ref 22–30)
CULTURE RESULTS: SIGNIFICANT CHANGE UP
CULTURE RESULTS: SIGNIFICANT CHANGE UP
GAS PNL BLDV: 134 MMOL/L — LOW (ref 136–145)
GAS PNL BLDV: SIGNIFICANT CHANGE UP
GAS PNL BLDV: SIGNIFICANT CHANGE UP
GLUCOSE BLDC GLUCOMTR-MCNC: 231 MG/DL — HIGH (ref 70–99)
GLUCOSE BLDC GLUCOMTR-MCNC: 243 MG/DL — HIGH (ref 70–99)
GLUCOSE BLDC GLUCOMTR-MCNC: 269 MG/DL — HIGH (ref 70–99)
GLUCOSE BLDV-MCNC: 250 MG/DL — HIGH (ref 70–99)
HCO3 BLDV-SCNC: 25 MMOL/L — SIGNIFICANT CHANGE UP (ref 21–29)
HCT VFR BLDA CALC: 36 % — LOW (ref 39–50)
HGB BLD CALC-MCNC: 11.5 G/DL — SIGNIFICANT CHANGE UP (ref 11.5–15.5)
LACTATE BLDV-MCNC: 1.6 MMOL/L — SIGNIFICANT CHANGE UP (ref 0.7–2)
OTHER CELLS CSF MANUAL: 14 ML/DL — LOW (ref 18–22)
PCO2 BLDV: 40 MMHG — SIGNIFICANT CHANGE UP (ref 35–50)
PH BLDV: 7.41 — SIGNIFICANT CHANGE UP (ref 7.35–7.45)
PO2 BLDV: 62 MMHG — HIGH (ref 25–45)
POTASSIUM BLDV-SCNC: 4 MMOL/L — SIGNIFICANT CHANGE UP (ref 3.5–5.3)
SAO2 % BLDV: 90 % — HIGH (ref 67–88)
SPECIMEN SOURCE: SIGNIFICANT CHANGE UP
T3 SERPL-MCNC: 135 NG/DL — SIGNIFICANT CHANGE UP (ref 80–200)
T4 AB SER-ACNC: 6 UG/DL — SIGNIFICANT CHANGE UP (ref 4.6–12)
TSH SERPL-MCNC: 1.22 UIU/ML — SIGNIFICANT CHANGE UP (ref 0.27–4.2)

## 2019-02-21 PROCEDURE — G0378: CPT

## 2019-02-21 PROCEDURE — 85027 COMPLETE CBC AUTOMATED: CPT

## 2019-02-21 PROCEDURE — 82330 ASSAY OF CALCIUM: CPT

## 2019-02-21 PROCEDURE — 85014 HEMATOCRIT: CPT

## 2019-02-21 PROCEDURE — 96372 THER/PROPH/DIAG INJ SC/IM: CPT

## 2019-02-21 PROCEDURE — 81001 URINALYSIS AUTO W/SCOPE: CPT

## 2019-02-21 PROCEDURE — 80053 COMPREHEN METABOLIC PANEL: CPT

## 2019-02-21 PROCEDURE — 82947 ASSAY GLUCOSE BLOOD QUANT: CPT

## 2019-02-21 PROCEDURE — 82435 ASSAY OF BLOOD CHLORIDE: CPT

## 2019-02-21 PROCEDURE — 87086 URINE CULTURE/COLONY COUNT: CPT

## 2019-02-21 PROCEDURE — 99217: CPT

## 2019-02-21 PROCEDURE — 83036 HEMOGLOBIN GLYCOSYLATED A1C: CPT

## 2019-02-21 PROCEDURE — 87581 M.PNEUMON DNA AMP PROBE: CPT

## 2019-02-21 PROCEDURE — 84480 ASSAY TRIIODOTHYRONINE (T3): CPT

## 2019-02-21 PROCEDURE — 84295 ASSAY OF SERUM SODIUM: CPT

## 2019-02-21 PROCEDURE — 93005 ELECTROCARDIOGRAM TRACING: CPT

## 2019-02-21 PROCEDURE — 99283 EMERGENCY DEPT VISIT LOW MDM: CPT | Mod: 25

## 2019-02-21 PROCEDURE — 83605 ASSAY OF LACTIC ACID: CPT

## 2019-02-21 PROCEDURE — 87633 RESP VIRUS 12-25 TARGETS: CPT

## 2019-02-21 PROCEDURE — 71046 X-RAY EXAM CHEST 2 VIEWS: CPT

## 2019-02-21 PROCEDURE — 87486 CHLMYD PNEUM DNA AMP PROBE: CPT

## 2019-02-21 PROCEDURE — 84132 ASSAY OF SERUM POTASSIUM: CPT

## 2019-02-21 PROCEDURE — 84436 ASSAY OF TOTAL THYROXINE: CPT

## 2019-02-21 PROCEDURE — 87798 DETECT AGENT NOS DNA AMP: CPT

## 2019-02-21 PROCEDURE — 82803 BLOOD GASES ANY COMBINATION: CPT

## 2019-02-21 PROCEDURE — 84443 ASSAY THYROID STIM HORMONE: CPT

## 2019-02-21 PROCEDURE — 82962 GLUCOSE BLOOD TEST: CPT

## 2019-02-21 PROCEDURE — 82565 ASSAY OF CREATININE: CPT

## 2019-02-21 RX ORDER — DULAGLUTIDE 4.5 MG/.5ML
0 INJECTION, SOLUTION SUBCUTANEOUS
Qty: 0 | Refills: 0 | COMMUNITY

## 2019-02-21 RX ORDER — INSULIN LISPRO 100/ML
6 VIAL (ML) SUBCUTANEOUS
Qty: 0 | Refills: 0 | Status: DISCONTINUED | OUTPATIENT
Start: 2019-02-21 | End: 2019-02-24

## 2019-02-21 RX ORDER — INSULIN GLARGINE 100 [IU]/ML
50 INJECTION, SOLUTION SUBCUTANEOUS
Qty: 0 | Refills: 0 | COMMUNITY

## 2019-02-21 RX ORDER — DILTIAZEM HCL 120 MG
1 CAPSULE, EXT RELEASE 24 HR ORAL
Qty: 0 | Refills: 0 | COMMUNITY

## 2019-02-21 RX ORDER — IRON,CARBONYL 45 MG
65 TABLET ORAL
Qty: 0 | Refills: 0 | COMMUNITY

## 2019-02-21 RX ORDER — INSULIN GLARGINE 100 [IU]/ML
20 INJECTION, SOLUTION SUBCUTANEOUS ONCE
Qty: 0 | Refills: 0 | Status: COMPLETED | OUTPATIENT
Start: 2019-02-21 | End: 2019-02-21

## 2019-02-21 RX ORDER — ASPIRIN/CALCIUM CARB/MAGNESIUM 324 MG
1 TABLET ORAL
Qty: 0 | Refills: 0 | COMMUNITY

## 2019-02-21 RX ADMIN — CARVEDILOL PHOSPHATE 25 MILLIGRAM(S): 80 CAPSULE, EXTENDED RELEASE ORAL at 12:42

## 2019-02-21 RX ADMIN — Medication 2: at 09:22

## 2019-02-21 RX ADMIN — Medication 180 MILLIGRAM(S): at 13:10

## 2019-02-21 RX ADMIN — Medication 3: at 14:00

## 2019-02-21 RX ADMIN — PANTOPRAZOLE SODIUM 40 MILLIGRAM(S): 20 TABLET, DELAYED RELEASE ORAL at 08:32

## 2019-02-21 RX ADMIN — SODIUM CHLORIDE 100 MILLILITER(S): 9 INJECTION INTRAMUSCULAR; INTRAVENOUS; SUBCUTANEOUS at 04:02

## 2019-02-21 RX ADMIN — INSULIN GLARGINE 20 UNIT(S): 100 INJECTION, SOLUTION SUBCUTANEOUS at 10:58

## 2019-02-21 RX ADMIN — Medication 6 UNIT(S): at 14:00

## 2019-02-21 RX ADMIN — Medication 325 MILLIGRAM(S): at 12:43

## 2019-02-21 NOTE — ED CDU PROVIDER DISPOSITION NOTE - NSFOLLOWUPINSTRUCTIONS_ED_ALL_ED_FT
1. Stay hydrated. encourage low carb/low sugar diet and exercise.   2. Check finger sticks glucose levels before meals and at bedtime. Stop current insulin regimen and instead start: Lantus 20 units at bedtime, Humalog 6 units with meals and low dose sliding scale. You will need to follow a low carb/sugar diet in order for your sugars to improve.   3. Follow up with your PCP in 1-2days. Follow up with your Endocrinologist Dr. Pugh in 3-5 days. Bring Printed Results.  4. Return if symptoms worsen, fever, weakness, dizziness, and all other concerns.

## 2019-02-21 NOTE — PROGRESS NOTE ADULT - ATTENDING COMMENTS
Leana Steward MD  Pager [phone] ([facility])/ [phone] ([facility]) [please provide 10 digit call back number]  Nights and weekends: [phone]  Please note that this patient may be followed by a different provider tomorrow. If no answer or after hours, please contact [phone].  For final dc reccomendations, please call [phone] or page the endocrine fellow on call.

## 2019-02-21 NOTE — PROGRESS NOTE ADULT - PROBLEM SELECTOR PLAN 1
check [facility]SBG TID qac and hs  carb consistent diet   - Lantus  20u in the am   -Humalog 6-6-6 with meals   -low dose SS TIDAC  I have explained to the pt that it is not medically safe for me to empirically discharge her on her prior high dose home insulin regimen as she has not had enough data points inpt in response to insulin and only received Lantus and premeal insulin today and that no number has been over 300  she can be sent home in this regimen with plan to stay in close contact with her endocrinologist for medication titration as she monitors her diet.  pt states she will attempt to eat 45-60grams per meal.  She knows red flags of when to call MD.

## 2019-02-21 NOTE — PROGRESS NOTE ADULT - ASSESSMENT
63F w/PMH obese female s/p two failed lap bands, type 2 DM (erratic and high dose insulin regimen), phtn, CAD AV dz s/p TAVR, IBS, GERD, hypothyroid? (not on medicine), JOSR not on CPAP, hld, depression, htn, neuropathy p/w labile blood glucose at home from 500s to 100s and sweats, found to have elevated lactate that has now normalized and elevated wbc count.  Pt states she does not know what to to do with her blood glucose readings and needed to see a doctor sooner than April at which time she has a outpt visit with her endocrinologist.    she is on a high dose home insulin regimen   Toujeo 50 BID   Humalog 60 before meals   and metformin   she did not tolerate GLP-1 outpt     she is also on wellbutrin and naloxone for cravings       CDU discussion with primary endocrine revealed that pt in non compliant with diet and pt was recently seen in Jan.  pt has been seen by therapist as well at [place].    Dc plan d/w primary endocrine with concern that pt has needed far less insulin while inpt and given low insulin requirements will dc on current doses.  It is hard to get exact requirements for the pt as she only received lantus this morning and premeal lispro with breakfast.  Without lantus she woke up in the 200s today.    I had a long discussion with the pt to montior her diet as she is likely on such high requirements due to her high card and diets high in sugar and sweets.  Explained to her inpt her numbers were relatively well controlled compared to her home glucose of 500s per report.  In addition, pt with erratic and high insulin use at home likely with hypoglycemia as she endorses sweats and tremors at times at home.  she knows to call her MD if glucose >400 or <70 or consistently in the 200s. She knows that if her am fasting glucose is >200 she will increase Lantus to 25, and that it can be titrated daily to reach goal fasting glucose.

## 2019-02-21 NOTE — ED CDU PROVIDER DISPOSITION NOTE - PLAN OF CARE
1. Hydrate. Start low carb/glucose diet.  2. Take ___________ Continue your other home medications as prescribed.  3. Follow up with your PMD within 2-3 days. Follow up with your endocrinologist or our endocrine clinic [phone] within 1 week.   4. If your symptoms worsen or do not resolve or if you have any other concerning symptoms return to the ED. 1. Stay hydrated. encourage low carb/low sugar diet and exercise.   2. Check finger sticks glucose levels before meals and at bedtime. Stop current insulin regimen and instead start: Lantus 20 units at bedtime, Humalog 6 units with meals and low dose sliding scale. You will need to follow a low carb/sugar diet in order for your sugars to improve.   3. Follow up with your PCP in 1-2days. Follow up with your Endocrinologist Dr. Pugh in 3-5 days. Bring Printed Results.  4. Return if symptoms worsen, fever, weakness, dizziness, and all other concerns.

## 2019-02-21 NOTE — ED CDU PROVIDER DISPOSITION NOTE - CLINICAL COURSE
63F w/PMH IDDM, htn, CAD, AV dz s/p TAVR, IBS, GERD, hypothyroid, JOSR not on CPAP, hld, depression, htn, neuropathy p/w intermittent hyperglycemia x2-3weeks. Patient's home readings have been intermittently reading "high". around 400-500.  Pt takes different doses of short and long acting insulin daily, cant see endocrinologist until april.  Pt reports when her glucose is elevated she feels fatigued, diaphoretic, and nauseous.  Pt also reports intermittant mild headaches.  Pt deneis any active symptoms. Denies f/c, URI sx, neck pain/stiffness, cp/sob, abd pain, v/d, dysuria, frequency, hematuria, sick contacts/hospitalization, rash, travel. Remote former smoker. No active ETOH/drug use. 3w ago zoloft changed to bupropion but no other recent med changes.  In ED, WBC 13.2, lactate 5.1, other labs uremarkable, CXR negative, RVP negative, UA negative. pt sent to CDU for FS monitoring, endo c/s, IVFs and repeat labs.   In CDU, endo saw pt, recommended low dose sliding scale. __________ 63F w/PMH IDDM, htn, CAD, AV dz s/p TAVR, IBS, GERD, hypothyroid, JOSR not on CPAP, hld, depression, htn, neuropathy p/w intermittent hyperglycemia x2-3weeks. Patient's home readings have been intermittently reading "high". around 400-500.  Pt takes different doses of short and long acting insulin daily, cant see endocrinologist until april.  Pt reports when her glucose is elevated she feels fatigued, diaphoretic, and nauseous.  Pt also reports intermittant mild headaches.  Pt deneis any active symptoms. Denies f/c, URI sx, neck pain/stiffness, cp/sob, abd pain, v/d, dysuria, frequency, hematuria, sick contacts/hospitalization, rash, travel. Remote former smoker. No active ETOH/drug use. 3w ago zoloft changed to bupropion but no other recent med changes.  In ED, WBC 13.2, lactate 5.1, other labs uremarkable, CXR negative, RVP negative, UA negative. pt sent to CDU for FS monitoring, endo c/s, IVFs and repeat labs.   In CDU, endo saw pt, recommended low dose sliding scale. then basal and humalog ordered based on Endo recs. pt FS 200s. spoke to pt's private endocrinologist- pt is closely followed outpt and can f/up in office. recommend based off pt's FS in hospital while on diabetic diet, pt to go home on lantus 20 units bedtime and humalog 6 units with meals. pt's private endo recommends a low dose sliding scale as well. pt stable for d/c to f/up outpt

## 2019-02-21 NOTE — PROGRESS NOTE ADULT - SUBJECTIVE AND OBJECTIVE BOX
Chief Complaint/Follow-up on: DM    Subjective:  feels well  ready to go home   Yesterday no Lantus given and no premeal aspart with breakfast   in the 200s today   called primary endocrine (at [facility]) and left message as well  CDU team d/w case with primary endocrine as well as well as plan for dc     MEDICATIONS  (STANDING):  aspirin enteric coated 325 milliGRAM(s) Oral daily  atorvastatin 80 milliGRAM(s) Oral at bedtime  buPROPion  milliGRAM(s) Oral daily  carvedilol 25 milliGRAM(s) Oral every 12 hours  dextrose 5%. 1000 milliLiter(s) (50 mL/Hr) IV Continuous <Continuous>  dextrose 50% Injectable 12.5 Gram(s) IV Push once  dextrose 50% Injectable 25 Gram(s) IV Push once  dextrose 50% Injectable 25 Gram(s) IV Push once  diltiazem    milliGRAM(s) Oral daily  FLUoxetine 20 milliGRAM(s) Oral daily  furosemide    Tablet 40 milliGRAM(s) Oral daily  insulin lispro (HumaLOG) corrective regimen sliding scale   SubCutaneous three times a day before meals  insulin lispro (HumaLOG) corrective regimen sliding scale   SubCutaneous at bedtime  insulin lispro Injectable (HumaLOG) 6 Unit(s) SubCutaneous three times a day before meals  losartan 25 milliGRAM(s) Oral daily  naltrexone 25 milliGRAM(s) Oral daily  pantoprazole    Tablet 40 milliGRAM(s) Oral two times a day  sodium chloride 0.9%. 1000 milliLiter(s) (100 mL/Hr) IV Continuous <Continuous>    MEDICATIONS  (PRN):  dextrose 40% Gel 15 Gram(s) Oral once PRN Blood Glucose LESS THAN 70 milliGRAM(s)/deciliter  glucagon  Injectable 1 milliGRAM(s) IntraMuscular once PRN Glucose LESS THAN 70 milligrams/deciliter      PHYSICAL EXAM:  VITALS: T(C): 36.6 (02-21-19 @ 11:42)  T(F): 97.9 (02-21-19 @ 11:42), Max: 99 (02-20-19 @ 16:51)  HR: 75 (02-21-19 @ 11:42) (75 - 100)  BP: 181/75 (02-21-19 @ 11:42) (134/83 - 181/75)  RR:  (18 - 18)  SpO2:  (95% - 98%)  Wt(kg): --  GENERAL: NAD, well-groomed, well-developed  EYES: No proptosis, no injection  HEENT:  Atraumatic, Normocephalic, moist mucous membranes  normal gait   standing in NAD  seen ambulating in the CDU     POCT Blood Glucose.: 269 mg/dL (02-21-19 @ 13:07)  POCT Blood Glucose.: 243 mg/dL (02-21-19 @ 08:47)  POCT Blood Glucose.: 231 mg/dL (02-21-19 @ 04:12)  POCT Blood Glucose.: 217 mg/dL (02-20-19 @ 23:37)  POCT Blood Glucose.: 241 mg/dL (02-20-19 @ 20:05)  POCT Blood Glucose.: 202 mg/dL (02-20-19 @ 18:30)  POCT Blood Glucose.: 160 mg/dL (02-20-19 @ 12:30)  POCT Blood Glucose.: 161 mg/dL (02-20-19 @ 06:33)  POCT Blood Glucose.: 117 mg/dL (02-20-19 @ 05:44)  POCT Blood Glucose.: 112 mg/dL (02-20-19 @ 05:30)    02-20    139  |  103  |  12  ----------------------------<  184<H>  4.3   |  22  |  0.63    EGFR if : 110  EGFR if non : 95    Ca    9.8      02-20    TPro  7.5  /  Alb  4.1  /  TBili  0.4  /  DBili  x   /  AST  18  /  ALT  14  /  AlkPhos  146<H>  02-20          Thyroid Function Tests:      Hemoglobin A1C, Whole Blood: 10.7 % <H> [4.0 - 5.6] (02-21-19 @ 03:04)

## 2019-02-21 NOTE — ED CDU PROVIDER SUBSEQUENT DAY NOTE - PROGRESS NOTE DETAILS
CDU NOTE MADELEINE LUI: Pt resting comfortably, feeling well without complaint. NAD, VSS. FS: 231. HbA1c 10.7. pt states A1c is always around 10. will continue monitoring and f/u with endo in AM. CDU NOTE MADELEINE Mi: pt resting comfortably, feels well without complaint. NAD recent VSS. will discuss case with her Endocrinologist and our Endocrinologist. CDU NOTE MADELEINE Mi: as per our Endocrinologist, pt to get lantus 20 units and humalog 6 units with meals and to touch base at lunch time. CDU NOTE MADELEINE Mi: spoke to pt's Endocrinologist Dr. Pugh. pt was seen in her office 1 month ago and is closely followed by the office. pt over-eats and there has been difficulty in controlling her sugars because of her poor diet. here in hospital her levels are 200s on proper diabetic diet and for that reason, pt is encouraged to follow diabetic diet and to keep her on the insulin regimen weight based at lantus 20 units daily and 6 units with meals as well as sliding scale per our conversation. pt follows with a therapist in the office for help with her over-eating/eating habits. Discussed this with our Endocrinologist as well, who agrees that it would be inappropriate to increase patient's insulin, in the setting that when her diet is controlled then her sugars are better.   will see patient's FS at lunch and discuss with Endo. CDU NOTE MADELEINE Mi: pt resting comfortably, feels well without complaint. NAD recent VSS.   paged Endocrine. . pt covered with Insulin. CDU NOTE MADELEINE Mi: as per conversation with Endo earlier, will send pt home on Lantus 20 units at bedtime and Humalog 6 units with meals. additionally with low dose sliding scale per conversation with pt's private Endocrinologist.   as per Dr. Hernandez, pt stable for d/c. CDU NOTE MADELEINE iM: as per conversation with Endo earlier, will send pt home on Lantus 20 units at bedtime and Humalog 6 units with meals. additionally with low dose sliding scale per conversation with pt's private Endocrinologist. pt knows to stop Metformin and to discuss further with her Endocrinologist.   as per Dr. Hernandez, pt stable for d/c.

## 2019-02-21 NOTE — ED CDU PROVIDER SUBSEQUENT DAY NOTE - HISTORY
No interval changes since initial CDU provider note. Pt feels well without complaint. NAD VSS. . endocrine consulted and saw pt. pt on low dose sliding scale insulin. HbA1c pending. will continue monitoring. Genaro Teixeira

## 2019-04-10 ENCOUNTER — EMERGENCY (EMERGENCY)
Facility: HOSPITAL | Age: 64
LOS: 1 days | Discharge: HOME | End: 2019-04-10
Admitting: EMERGENCY MEDICINE
Payer: MEDICARE

## 2019-04-10 VITALS
OXYGEN SATURATION: 98 % | RESPIRATION RATE: 18 BRPM | SYSTOLIC BLOOD PRESSURE: 159 MMHG | DIASTOLIC BLOOD PRESSURE: 77 MMHG | TEMPERATURE: 98 F | HEART RATE: 74 BPM

## 2019-04-10 DIAGNOSIS — Z95.2 PRESENCE OF PROSTHETIC HEART VALVE: Chronic | ICD-10-CM

## 2019-04-10 DIAGNOSIS — H92.01 OTALGIA, RIGHT EAR: ICD-10-CM

## 2019-04-10 DIAGNOSIS — Z79.4 LONG TERM (CURRENT) USE OF INSULIN: ICD-10-CM

## 2019-04-10 DIAGNOSIS — H93.11 TINNITUS, RIGHT EAR: ICD-10-CM

## 2019-04-10 DIAGNOSIS — Z79.82 LONG TERM (CURRENT) USE OF ASPIRIN: ICD-10-CM

## 2019-04-10 DIAGNOSIS — Z91.013 ALLERGY TO SEAFOOD: ICD-10-CM

## 2019-04-10 DIAGNOSIS — E11.9 TYPE 2 DIABETES MELLITUS WITHOUT COMPLICATIONS: ICD-10-CM

## 2019-04-10 DIAGNOSIS — I10 ESSENTIAL (PRIMARY) HYPERTENSION: ICD-10-CM

## 2019-04-10 DIAGNOSIS — J45.909 UNSPECIFIED ASTHMA, UNCOMPLICATED: ICD-10-CM

## 2019-04-10 DIAGNOSIS — Z79.899 OTHER LONG TERM (CURRENT) DRUG THERAPY: ICD-10-CM

## 2019-04-10 DIAGNOSIS — Z88.5 ALLERGY STATUS TO NARCOTIC AGENT: ICD-10-CM

## 2019-04-10 PROCEDURE — 99284 EMERGENCY DEPT VISIT MOD MDM: CPT

## 2019-04-10 PROCEDURE — 70450 CT HEAD/BRAIN W/O DYE: CPT

## 2019-04-10 PROCEDURE — 99284 EMERGENCY DEPT VISIT MOD MDM: CPT | Mod: 25

## 2019-04-10 PROCEDURE — 70450 CT HEAD/BRAIN W/O DYE: CPT | Mod: 26

## 2019-04-10 RX ORDER — ALPRAZOLAM 0.25 MG
1 TABLET ORAL
Qty: 6 | Refills: 0
Start: 2019-04-10 | End: 2019-04-11

## 2019-04-10 NOTE — ED PROVIDER NOTE - CARE PROVIDERS DIRECT ADDRESSES
,[email or web address]

## 2019-04-10 NOTE — ED ADULT NURSE NOTE - NSIMPLEMENTINTERV_GEN_ALL_ED
Implemented All Universal Safety Interventions:  [place] to call system. Call bell, personal items and telephone within reach. Instruct patient to call for assistance. Room bathroom lighting operational. Non-slip footwear when patient is off stretcher. Physically safe environment: no spills, clutter or unnecessary equipment. Stretcher in lowest position, wheels locked, appropriate side rails in place.

## 2019-04-10 NOTE — ED PROVIDER NOTE - OBJECTIVE STATEMENT
64 yo F with pmh of vertigo, asthma, htn, dm, herniated discs, anxiety, depression, anemia, s/p TAVR 2015, s/p lap band x 2, breast ca s/p partial R mastectomy, cataract surgery 2 weeks ago c/o tinnitus x 3 years worse over the past 1.5 weeks. Pt went to her ENT yesterday who examined her and told her everything looked good. Pt also c/o double vision/blurry vision. Denies fever, chills, ha, numbness, tingling, neck pain, focal weakness, slurred speech, trauma. 64 yo F with pmh of vertigo, asthma, htn, dm, herniated discs, anxiety, depression, anemia, s/p TAVR 2015, s/p lap band x 2, breast ca s/p partial R mastectomy, cataract surgery 2 weeks ago c/o R ear tinnitus x 3 years worse over the past 1.5 weeks. Pt went to her ENT yesterday who examined her and told her everything looked good. Pt also c/o double vision intermittently for a few weeks. Pt states she notices it sometimes while staring  at the tv, lasts for a few seconds. Pt had laser surgery on her R eye for a cataract 2 weeks ago. Pt reports intermittent vertigo for years. Has a prescription for meclizine but does not take it because it makes her drowsy. Pt states the tinnitus has become unbearable.  Denies sudden hearing loss, fever, chills, ha, numbness, tingling, neck pain, focal weakness, slurred speech, trauma. New meds she started was wellbutrin 1.5 months ago.  Takes daily asa and NSAIDs as needed for back pain but unchanged for years. No overdose of asa or NSAIDs.

## 2019-04-10 NOTE — ED PROVIDER NOTE - NSFOLLOWUPINSTRUCTIONS_ED_ALL_ED_FT
Tinnitus    WHAT YOU NEED TO KNOW:    Tinnitus is when you hear ringing, clicking, buzzing, or hissing in one or both ears. You may also hear whistling, chirping, or pulsing. It may be soft or loud, and at a low or high pitch. Tinnitus that lasts for longer than 6 months is considered chronic.    DISCHARGE INSTRUCTIONS:    Call 911 if:     You feel like hurting yourself or others because of the constant noise.        Contact your healthcare provider if:     You have headaches.      You are tired and have trouble concentrating or remembering things.      You have more anxiety or stress than usual.      You have deep sadness or depression.      You have trouble falling asleep or staying asleep.      Your symptoms do not go away or they get worse.      You have questions or concerns about your condition or care.    Manage tinnitus:     Counseling can help you learn ways to relax, decrease stress, and make your tinnitus less noticeable.       Cognitive behavioral therapy helps you understand your condition. Your therapist will help you learn to cope with tinnitus. You may also learn new ways to relax and retrain your behavior to decrease your symptoms.      Sound therapy, such as white noise machines, may help cover your tinnitus with a pleasant sound. Sound therapy devices can help you fall asleep or help you relax. These devices can be worn in your ear or placed next to your bed at night.      Hearing aids or cochlear implants may help if you have hearing loss.      Do not smoke. Nicotine decreases blood flow to your ear and can make your tinnitus worse. Do not use e-cigarettes or smokeless tobacco in place of cigarettes or to help you quit. They still contain nicotine. Ask your healthcare provider for information if you currently smoke and need help quitting.      Decrease how much alcohol and caffeine you drink. Alcohol and caffeine can make your tinnitus worse.    Prevent tinnitus:     Avoid exposure to loud noise, such as loud music or power tools. Occasional exposure can still cause tinnitus. Move away from the noise or turn down the volume.      Wear ear protection when you are exposed to loud noises. Good ear protection includes ear plugs or headphones that reduce noise.    Follow up with your healthcare provider in 1 to 2 months: Your healthcare provider may refer you to an otolaryngologist, audiologist, or neurologist. You may need to return for regular follow-up visits. Write your questions down so you remember to ask them during your visits.       © Copyright [employer] 2019 All illustrations and images included in CareNotes are the copyrighted property of [employer] or [employer].      back to top                      © Copyright [employer] 2019

## 2019-04-10 NOTE — ED ADULT NURSE NOTE - CHPI ED NUR SYMPTOMS NEG
no bleeding gums/no numbness/no vomiting/no chills/no loss of consciousness/no nausea/no syncope/no weakness/no fever

## 2019-04-10 NOTE — ED ADULT TRIAGE NOTE - CHIEF COMPLAINT QUOTE
pt c/o ringing in bilateral ears for years, now having worsening "ringing and clicking sounds" in ears. denies fever, throat pain, dizziness, blurred vision.

## 2019-04-10 NOTE — ED PROVIDER NOTE - CLINICAL SUMMARY MEDICAL DECISION MAKING FREE TEXT BOX
64 yo F with pmh of vertigo, asthma, htn, dm, herniated discs, anxiety, depression, anemia, s/p TAVR 2015, s/p lap band x 2, breast ca s/p partial R mastectomy, cataract surgery 2 weeks ago c/o tinnitus x 3 years worse over the past 1.5 weeks. Pt went to her ENT yesterday who examined her and told her everything looked good. Pt also c/o double vision/blurry vision. 64 yo F with pmh of vertigo, asthma, htn, dm, herniated discs, anxiety, depression, anemia, s/p TAVR 2015, s/p lap band x 2, breast ca s/p partial R mastectomy, cataract surgery 2 weeks ago c/o tinnitus x 3 years worse over the past 1.5 weeks. Pt went to her ENT yesterday who examined her and told her everything looked good. Intermittent double vision and vertigo. New meds she started was wellbutrin 1.5 months ago. No f/c, sudden hearing loss, trauma, HA. VSS. Well appearing, obese female. TM pearly grey b/l, canals clear. PERRLA, EOMI. Neurologically intact. ?side effect from wellbutrin. Spoke with ENT resident, no acute treatment of intervention needed. Pt should have MRI as outpatient.

## 2019-04-10 NOTE — ED ADULT NURSE NOTE - OBJECTIVE STATEMENT
Patient presents AOX4 c/o of "ringing" and 'whoosh" noises from R ear--reports hx of tinnitus, but "it sounds much worse". No neuro deficits noted. Reports hx of vertigo--able to ambulate independently with steady gait. Denies injury/trauma.

## 2019-04-11 ENCOUNTER — INBOUND DOCUMENT (OUTPATIENT)
Age: 64
End: 2019-04-11

## 2019-04-22 NOTE — ED ADULT NURSE NOTE - CHIEF COMPLAINT QUOTE
pt c/o ringing in bilateral ears for years, now having worsening "ringing and clicking sounds" in ears. denies fever, throat pain, dizziness, blurred vision. Hospitalist- Osmani Hernandez MD  Pager: [phone]  If no response or off-hours, page [phone]  -------------------------------------  Patient is a 77y old  Male who presents with a chief complaint of dyspnea, abdominal swelling (22 Apr 2019 15:25)  Subjective: No acute events overnight. today, feels more energetic, eager to continue his care. Breathing comfortably, no cp/palpitations.    14 point ros otherwise negative    VITAL SIGNS:  Vital Signs Last 24 Hrs  T(C): 36.5 (22 Apr 2019 16:18), Max: 36.7 (22 Apr 2019 00:25)  T(F): 97.7 (22 Apr 2019 16:18), Max: 98 (22 Apr 2019 00:25)  HR: 92 (22 Apr 2019 16:18) (74 - 92)  BP: 114/75 (22 Apr 2019 13:03) (105/68 - 138/90)  BP(mean): --  RR: 18 (22 Apr 2019 16:18) (17 - 18)  SpO2: 99% (22 Apr 2019 16:18) (98% - 100%)    PHYSICAL EXAM:     GENERAL: no acute distress  HEENT: PERRLA, EOMI, moist oropharynx   RESPIRATORY: CTAB, no w/c   CARDIOVASCULAR: RRR, no murmurs, gallops, rubs  ABDOMINAL: soft, non-tender, non-distended, positive bowel sounds   EXTREMITIES: no clubbing, cyanosis, or edema  NEUROLOGICAL: alert and oriented x 3, non-focal  SKIN: no rashes or lesions   MUSCULOSKELETAL: no gross joint deformity                          12.6   9.79  )-----------( 251      ( 22 Apr 2019 08:28 )             38.0     04-22    125<L>  |  83<L>  |  92<H>  ----------------------------<  120<H>  4.0   |  25  |  2.01<H>    Ca    9.2      22 Apr 2019 07:06  Mg     2.6     04-22        CAPILLARY BLOOD GLUCOSE          MEDICATIONS  (STANDING):  apixaban 5 milliGRAM(s) Oral every 12 hours  buMETAnide 4 milliGRAM(s) Oral two times a day  docusate sodium 100 milliGRAM(s) Oral three times a day  ergocalciferol 06762 Unit(s) Oral every week  famotidine    Tablet 20 milliGRAM(s) Oral daily  hydrALAZINE 100 milliGRAM(s) Oral every 8 hours  isosorbide   dinitrate Tablet (ISORDIL) 20 milliGRAM(s) Oral three times a day  metoprolol tartrate 12.5 milliGRAM(s) Oral two times a day  multivitamin 1 Tablet(s) Oral daily  polyethylene glycol 3350 17 Gram(s) Oral daily  senna 2 Tablet(s) Oral at bedtime  spironolactone 25 milliGRAM(s) Oral daily    MEDICATIONS  (PRN):  acetaminophen   Tablet .. 650 milliGRAM(s) Oral every 6 hours PRN Mild Pain (1 - 3)  calcium carbonate    500 mG (Tums) Chewable 2 Tablet(s) Chew three times a day PRN Heartburn  ondansetron Injectable 4 milliGRAM(s) IV Push every 8 hours PRN Nausea and/or Vomiting  simethicone 80 milliGRAM(s) Chew three times a day PRN Dyspepsia

## 2019-12-15 ENCOUNTER — EMERGENCY (EMERGENCY)
Facility: HOSPITAL | Age: 64
LOS: 1 days | Discharge: HOME | End: 2019-12-15
Attending: EMERGENCY MEDICINE
Payer: MEDICARE

## 2019-12-15 VITALS
SYSTOLIC BLOOD PRESSURE: 111 MMHG | DIASTOLIC BLOOD PRESSURE: 78 MMHG | TEMPERATURE: 98 F | HEART RATE: 72 BPM | OXYGEN SATURATION: 99 % | RESPIRATION RATE: 18 BRPM

## 2019-12-15 VITALS
SYSTOLIC BLOOD PRESSURE: 126 MMHG | WEIGHT: 291.89 LBS | OXYGEN SATURATION: 98 % | RESPIRATION RATE: 18 BRPM | HEART RATE: 77 BPM | TEMPERATURE: 98 F | DIASTOLIC BLOOD PRESSURE: 59 MMHG | HEIGHT: 63 IN

## 2019-12-15 DIAGNOSIS — Z95.2 PRESENCE OF PROSTHETIC HEART VALVE: Chronic | ICD-10-CM

## 2019-12-15 LAB
ALBUMIN SERPL ELPH-MCNC: 3.9 G/DL — SIGNIFICANT CHANGE UP (ref 3.3–5)
ALP SERPL-CCNC: 350 U/L — HIGH (ref 40–120)
ALT FLD-CCNC: 47 U/L — HIGH (ref 10–45)
ANION GAP SERPL CALC-SCNC: 13 MMOL/L — SIGNIFICANT CHANGE UP (ref 5–17)
APTT BLD: 32.6 SEC — SIGNIFICANT CHANGE UP (ref 27.5–36.3)
AST SERPL-CCNC: 38 U/L — SIGNIFICANT CHANGE UP (ref 10–40)
BASOPHILS # BLD AUTO: 0.03 K/UL — SIGNIFICANT CHANGE UP (ref 0–0.2)
BASOPHILS NFR BLD AUTO: 0.2 % — SIGNIFICANT CHANGE UP (ref 0–2)
BILIRUB SERPL-MCNC: 0.9 MG/DL — SIGNIFICANT CHANGE UP (ref 0.2–1.2)
BUN SERPL-MCNC: 13 MG/DL — SIGNIFICANT CHANGE UP (ref 7–23)
CALCIUM SERPL-MCNC: 10.4 MG/DL — SIGNIFICANT CHANGE UP (ref 8.4–10.5)
CHLORIDE SERPL-SCNC: 94 MMOL/L — LOW (ref 96–108)
CO2 SERPL-SCNC: 26 MMOL/L — SIGNIFICANT CHANGE UP (ref 22–31)
CREAT SERPL-MCNC: 0.8 MG/DL — SIGNIFICANT CHANGE UP (ref 0.5–1.3)
EOSINOPHIL # BLD AUTO: 0.07 K/UL — SIGNIFICANT CHANGE UP (ref 0–0.5)
EOSINOPHIL NFR BLD AUTO: 0.5 % — SIGNIFICANT CHANGE UP (ref 0–6)
GLUCOSE SERPL-MCNC: 265 MG/DL — HIGH (ref 70–99)
HCT VFR BLD CALC: 42.5 % — SIGNIFICANT CHANGE UP (ref 34.5–45)
HGB BLD-MCNC: 13.5 G/DL — SIGNIFICANT CHANGE UP (ref 11.5–15.5)
IMM GRANULOCYTES NFR BLD AUTO: 0.4 % — SIGNIFICANT CHANGE UP (ref 0–1.5)
INR BLD: 1.08 RATIO — SIGNIFICANT CHANGE UP (ref 0.88–1.16)
LIDOCAIN IGE QN: 10 U/L — SIGNIFICANT CHANGE UP (ref 7–60)
LYMPHOCYTES # BLD AUTO: 19.6 % — SIGNIFICANT CHANGE UP (ref 13–44)
LYMPHOCYTES # BLD AUTO: 2.51 K/UL — SIGNIFICANT CHANGE UP (ref 1–3.3)
MCHC RBC-ENTMCNC: 27.3 PG — SIGNIFICANT CHANGE UP (ref 27–34)
MCHC RBC-ENTMCNC: 31.8 GM/DL — LOW (ref 32–36)
MCV RBC AUTO: 85.9 FL — SIGNIFICANT CHANGE UP (ref 80–100)
MONOCYTES # BLD AUTO: 0.93 K/UL — HIGH (ref 0–0.9)
MONOCYTES NFR BLD AUTO: 7.3 % — SIGNIFICANT CHANGE UP (ref 2–14)
NEUTROPHILS # BLD AUTO: 9.22 K/UL — HIGH (ref 1.8–7.4)
NEUTROPHILS NFR BLD AUTO: 72 % — SIGNIFICANT CHANGE UP (ref 43–77)
NRBC # BLD: 0 /100 WBCS — SIGNIFICANT CHANGE UP (ref 0–0)
PLATELET # BLD AUTO: 394 K/UL — SIGNIFICANT CHANGE UP (ref 150–400)
POTASSIUM SERPL-MCNC: 4.4 MMOL/L — SIGNIFICANT CHANGE UP (ref 3.5–5.3)
POTASSIUM SERPL-SCNC: 4.4 MMOL/L — SIGNIFICANT CHANGE UP (ref 3.5–5.3)
PROT SERPL-MCNC: 8.1 G/DL — SIGNIFICANT CHANGE UP (ref 6–8.3)
PROTHROM AB SERPL-ACNC: 12.4 SEC — SIGNIFICANT CHANGE UP (ref 10–12.9)
RBC # BLD: 4.95 M/UL — SIGNIFICANT CHANGE UP (ref 3.8–5.2)
RBC # FLD: 14.3 % — SIGNIFICANT CHANGE UP (ref 10.3–14.5)
SODIUM SERPL-SCNC: 133 MMOL/L — LOW (ref 135–145)
WBC # BLD: 12.81 K/UL — HIGH (ref 3.8–10.5)
WBC # FLD AUTO: 12.81 K/UL — HIGH (ref 3.8–10.5)

## 2019-12-15 PROCEDURE — 85027 COMPLETE CBC AUTOMATED: CPT

## 2019-12-15 PROCEDURE — 76705 ECHO EXAM OF ABDOMEN: CPT

## 2019-12-15 PROCEDURE — 85610 PROTHROMBIN TIME: CPT

## 2019-12-15 PROCEDURE — 85730 THROMBOPLASTIN TIME PARTIAL: CPT

## 2019-12-15 PROCEDURE — 99284 EMERGENCY DEPT VISIT MOD MDM: CPT | Mod: GC

## 2019-12-15 PROCEDURE — 83690 ASSAY OF LIPASE: CPT

## 2019-12-15 PROCEDURE — 76705 ECHO EXAM OF ABDOMEN: CPT | Mod: 26,RT

## 2019-12-15 PROCEDURE — 99284 EMERGENCY DEPT VISIT MOD MDM: CPT | Mod: 25

## 2019-12-15 PROCEDURE — 80053 COMPREHEN METABOLIC PANEL: CPT

## 2019-12-15 RX ORDER — MULTIVIT WITH MIN/MFOLATE/K2 340-15/3 G
1 POWDER (GRAM) ORAL ONCE
Refills: 0 | Status: COMPLETED | OUTPATIENT
Start: 2019-12-15 | End: 2019-12-15

## 2019-12-15 RX ORDER — MINERAL OIL
133 OIL (ML) MISCELLANEOUS ONCE
Refills: 0 | Status: COMPLETED | OUTPATIENT
Start: 2019-12-15 | End: 2019-12-15

## 2019-12-15 RX ADMIN — Medication 1 BOTTLE: at 17:12

## 2019-12-15 RX ADMIN — Medication 1 BOTTLE: at 23:43

## 2019-12-15 RX ADMIN — Medication 1 ENEMA: at 23:42

## 2019-12-15 RX ADMIN — Medication 133 MILLILITER(S): at 18:37

## 2019-12-15 NOTE — ED ADULT NURSE REASSESSMENT NOTE - NS ED NURSE REASSESS COMMENT FT1
patient resting comfortably in wheelchair. pending enema. patient denies pain/discomfort at this time. comode at bedside. patient and family aware of plan of care.

## 2019-12-15 NOTE — ED ADULT NURSE REASSESSMENT NOTE - NS ED NURSE REASSESS COMMENT FT1
patient currently on comode after enema. will continue to assess. patient comfort and safety maintained.

## 2019-12-15 NOTE — ED PROVIDER NOTE - OBJECTIVE STATEMENT
63yF h/o HTN, IBS, gastroparesis 2/2 DM, herniated discs, anemia, s/p TAVR 2015, s/p lap band x 2, breast ca s/p partial R mastectomy, 63yF h/o HTN, IBS, gastroparesis 2/2 DM, herniated discs, anemia, s/p TAVR 2015, s/p lap band x 2, breast ca s/p partial R mastectomy, presents with constipation of 6 days and RUQ pain of a few days. Evaluated by outpatient CT abd/pelvis non con on 12/13/19 demonstrated gallstones without acute choley. Able to pass flatus and denies chest pain, n/v, urinary irregularities. 63yF h/o HTN, IBS, gastroparesis 2/2 DM, herniated discs, anemia, s/p TAVR 2015, s/p lap band x 2 and removal of band at [facility] (2017), breast ca s/p partial R mastectomy, presents with constipation of 6 days and RUQ pain of a few days. Evaluated by outpatient CT abd/pelvis non con on 12/13/19 demonstrated gallstones without acute choley. Able to pass flatus and denies chest pain, n/v, urinary irregularities. 63yF h/o HTN, IBS, gastroparesis 2/2 DM, herniated discs, anemia, s/p TAVR 2015, s/p lap band x 2 and removal of band at [facility] (2017), breast ca s/p partial R mastectomy, presents with constipation of 6 days and RUQ pain of a few days. Evaluated by outpatient CT abd/pelvis non con on 12/13/19 demonstrated gallstones without acute choley. Able to pass flatus and denies chest pain, n/v, urinary irregularities.    Attending note (Carlos): 64 y/o F with h/o HTN IBS gastroparesis (2/2 DM) reports she has been having constipation for the past 6 days; evaluated with CT AP on 12/13/19 which showed no obstruction; has been using ducolax with only minimal improvement (having liquid stools but feels like she has to have a solid bowel movement).  Tried miralax, mineral oil (po), senna, ducolax; but no improvement.

## 2019-12-15 NOTE — ED PROVIDER NOTE - PATIENT PORTAL LINK FT
You can access the FollowMyHealth Patient Portal offered by [facility] by registering at the following website: [email or web address]. By joining [employer]’s FollowMyHealth portal, you will also be able to view your health information using other applications (apps) compatible with our system.

## 2019-12-15 NOTE — ED PROVIDER NOTE - PROGRESS NOTE DETAILS
Attending note (Carlos): US shows gallstones but no evidence of cholecystitis; labs with alk phos 350 but no elevation in bili; labs otherwise unremarkable; wbc not elevated; afebrile; on my exam, abdomen remains nontender (though patient states some pain in the right side of abdomen, indicates entire abdomen, but no reaction to palpation).  CT from 12/13 reviewed, distal CBD not dilated on that imaging study (unable to visualize on radiology US here but unlikely this represents biliary colic or choledocholithiasis with this current clinical presentation).  No relation of pain to food. Alfred Dos Santos MD, PGY2: Patient received at resident sign out. Pt feels better, now safe to go back to out pt follow up. I have given the pt strict return and follow up precautions. The patient has been provided with a copy of all pertinent results. The patient has been informed of all concerning signs and symptoms to return to Emergency Department, the necessity to follow up with PMD/Clinic/follow up provided within 2-3 days was explained, and the patient reports understanding of above with capacity and insight.

## 2019-12-15 NOTE — ED ADULT NURSE NOTE - OBJECTIVE STATEMENT
patient is a 64 year old female with a PMH of GERD and IBS who presents to the ED from home complaining of constipation. patient states she has been unable to pass a formed stool in the past 6 days. patient has seen her PMD who gave her miralax and mineral oil but patient has had no relief. patient states "I have sludge that comes out but no formed BM" patient states she is passing gas on occasion. patient RUQ is tender upon palpation. patient got a CT on Friday. patient is also stating "I have dark urine in the morning but then it normalizes throughout the day" patient denies any urinary symptoms, hematuria, dysuria. patient is aaox3, lungs CTA bilaterally, abd soft, nondistended, tender upon palpation, cap refill <3, radial pulses +2 bilaterally. patient denies chest pain, HA, dizziness, N/V/D, cough, fever, chills, weakness, numbness or tingling, abd pain, back pain, changes in bladder. patient safety and comfort maintained. will continue to monitor.

## 2019-12-15 NOTE — ED PROVIDER NOTE - PHYSICAL EXAMINATION
Gen: AAOx3, non-toxic  Head: NCAT  HEENT: EOMI, oral mucosa moist, normal conjunctiva  Lung: CTAB, no respiratory distress, speaking in full sentences  CV: RRR, no murmurs, rubs or gallops  Abd: Obese, RUQ, ttp  no guarding, no CVA tenderness  MSK: no visible deformities  Neuro: No focal sensory or motor deficits  Skin: Warm, well perfused, no rash  Psych: normal affect.   ~Tony Ruffin M.D. Resident

## 2019-12-15 NOTE — ED ADULT NURSE REASSESSMENT NOTE - NS ED NURSE REASSESS COMMENT FT1
report received from KATHY Leavitt. Patient a&ox3, no acute distress, resp nonlabored, resting comfortably in stretcher. Denies headache, dizziness, chest pain, palpitations, SOB, abd pain, N/V/D, urinary symptoms, fevers, chills, weakness at this time. Patient awaiting BM and than DC. Pt placed in position of comfort. Pt educated on call bell system and provided call bell. Non-skid socks on, bed in lowest position, wheels locked, appropriate side rails raised. Pt denies needs at this time.

## 2019-12-15 NOTE — ED PROVIDER NOTE - NS ED ROS FT
GENERAL: No fever or chills, EYES: no change in vision, HEENT: no trouble swallowing or speaking, CARDIAC: no chest pain, PULMONARY: no cough or SOB, GI: +abdominal pain, no nausea, no vomiting, no diarrhea, +constipation, : No changes in urination, SKIN: no rashes, NEURO: no headache,  MSK: No joint pain ~Tony Ruffin M.D. Resident

## 2019-12-15 NOTE — ED PROVIDER NOTE - NSFOLLOWUPINSTRUCTIONS_ED_ALL_ED_FT
You had a thorough evaluation including an exam, labs and imaging.  1. You were seen for Constipation. A copy of your resulted labs, imaging, and findings have been provided to you.  2. Read Follow-up Instructions that you have given.  3. Take Tylenol 500 mg (1 or 2 every 6 hours) and/or naproxen 500 mg (1 every 12 hours) for pain.   4. Treatment varies but may include dietary modifications (more fiber-rich foods such as fruits, vegetables, and whole grains), lifestyle modifications (regular exercise, water intake), and possible medications. Add a fiber supplement like Psyllium (aka Metamucil, Konsyl) three times per day. Can double the amount taken three times a day, if needed. You can try a stimulant like Senna (aka Senexon, Senekot) as needed if all the above haven't worked but don't take the stimulant regularly.  5. You should also establish care with Gastroenterology by calling  [phone] to find a doctor affiliated with [facility] in your neighborhood & network. You have given the information of affiliate doctors. Return if symptoms worse, particularly if vomiting or severe pain. Follow up with your primary care doctor within 48 hours. Please call [phone] to make an appointment or with any questions you may have.  or call [phone] to make an appointment with the clinic.  6. Return immediately to the emergency department for new, persistent, or worsening symptoms or signs. Return immediately to the emergency department if you have chest pain, shortness of breath, loss of consciousness, or any other new concerns.

## 2019-12-15 NOTE — ED PROVIDER NOTE - NSFOLLOWUPCLINICS_GEN_ALL_ED_FT
[facility]  Gastroenterology  [address] [place] [id number]  Phone: [phone]  Fax:   Follow Up Time: 7-10 Days

## 2019-12-15 NOTE — ED PROVIDER NOTE - CLINICAL SUMMARY MEDICAL DECISION MAKING FREE TEXT BOX
63yF with multiple co morbidities presents with constipation of 6 days and RUQ pain of a few days with recent demonstrating cholelithiasis without cholecystitis. Low index for sbo. WIll evaluate with labs, lipase, RUQ sono, mgcitrate and reassess

## 2019-12-15 NOTE — ED PROVIDER NOTE - ATTENDING CONTRIBUTION TO CARE
64 y/o F with h/o HTN IBS gastroparesis (2/2 DM) reports she has been having constipation for the past 6 days; evaluated with CT AP on 12/13/19 which showed no obstruction; has been using ducolax with only minimal improvement (having liquid stools but feels like she has to have a solid bowel movement).  Tried miralax, mineral oil (po), senna, ducolax; but no improvement.    On Physical Exam:  General: well appearing, in NAD, speaking clearly in full sentences and without difficulty; cooperative with exam  HEENT: PERRL, MMM  Neck: no neck tenderness, no nuchal rigidity  Cardiac: normal s1, s2; RRR; no MGR  Lungs: CTABL  Abdomen: soft nontender/nondistended (no significant tenderness on my exam)  : no bladder tenderness or distension  Skin: intact, no rash  Extremities: no peripheral edema, no gross deformities  Neuro: no gross neurologic deficits     AP: 63F c/o constipation for past 6 days; having mild intermittent right sided abdominal pain (CT performed for these symptoms 2 days ago shows no obstruction or other pathology except for gallstones), pain not associated with food.  Likely functional constipation; obtain US to eval for cholecystitis given right sided pain and gallstones (though history / clinical presentation less consistent with this).  Mg citrate po and enema, and if no acute findings on us, and improvement with medications, can be discharged with bowel regimen to treat functional constipation.

## 2020-03-04 ENCOUNTER — APPOINTMENT (OUTPATIENT)
Dept: GASTROENTEROLOGY | Facility: CLINIC | Age: 65
End: 2020-03-04

## 2020-06-09 ENCOUNTER — OUTPATIENT (OUTPATIENT)
Dept: OUTPATIENT SERVICES | Facility: HOSPITAL | Age: 65
LOS: 1 days | Discharge: HOME | End: 2020-06-09
Payer: MEDICARE

## 2020-06-09 ENCOUNTER — RESULT REVIEW (OUTPATIENT)
Age: 65
End: 2020-06-09

## 2020-06-09 ENCOUNTER — APPOINTMENT (OUTPATIENT)
Dept: WOUND CARE | Facility: HOSPITAL | Age: 65
End: 2020-06-09
Payer: MEDICARE

## 2020-06-09 VITALS
WEIGHT: 293 LBS | DIASTOLIC BLOOD PRESSURE: 57 MMHG | TEMPERATURE: 97.3 F | SYSTOLIC BLOOD PRESSURE: 113 MMHG | RESPIRATION RATE: 20 BRPM | OXYGEN SATURATION: 98 % | BODY MASS INDEX: 51.91 KG/M2 | HEIGHT: 63 IN | HEART RATE: 71 BPM

## 2020-06-09 DIAGNOSIS — Z79.82 LONG TERM (CURRENT) USE OF ASPIRIN: ICD-10-CM

## 2020-06-09 DIAGNOSIS — Z79.4 LONG TERM (CURRENT) USE OF INSULIN: ICD-10-CM

## 2020-06-09 DIAGNOSIS — I11.9 HYPERTENSIVE HEART DISEASE WITHOUT HEART FAILURE: ICD-10-CM

## 2020-06-09 DIAGNOSIS — Z95.4 PRESENCE OF OTHER HEART-VALVE REPLACEMENT: ICD-10-CM

## 2020-06-09 DIAGNOSIS — Z79.899 OTHER LONG TERM (CURRENT) DRUG THERAPY: ICD-10-CM

## 2020-06-09 DIAGNOSIS — Z82.49 FAMILY HISTORY OF ISCHEMIC HEART DISEASE AND OTHER DISEASES OF THE CIRCULATORY SYSTEM: ICD-10-CM

## 2020-06-09 DIAGNOSIS — I83.228 VARICOSE VEINS OF LEFT LOWER EXTREMITY WITH BOTH ULCER OF OTHER PART OF LOWER EXTREMITY AND INFLAMMATION: ICD-10-CM

## 2020-06-09 DIAGNOSIS — E66.01 MORBID (SEVERE) OBESITY DUE TO EXCESS CALORIES: ICD-10-CM

## 2020-06-09 DIAGNOSIS — Z95.2 PRESENCE OF PROSTHETIC HEART VALVE: Chronic | ICD-10-CM

## 2020-06-09 DIAGNOSIS — G47.33 OBSTRUCTIVE SLEEP APNEA (ADULT) (PEDIATRIC): ICD-10-CM

## 2020-06-09 DIAGNOSIS — Z88.5 ALLERGY STATUS TO NARCOTIC AGENT: ICD-10-CM

## 2020-06-09 DIAGNOSIS — M47.817 SPONDYLOSIS WITHOUT MYELOPATHY OR RADICULOPATHY, LUMBOSACRAL REGION: ICD-10-CM

## 2020-06-09 DIAGNOSIS — G47.10 HYPERSOMNIA, UNSPECIFIED: ICD-10-CM

## 2020-06-09 DIAGNOSIS — Z98.84 BARIATRIC SURGERY STATUS: ICD-10-CM

## 2020-06-09 DIAGNOSIS — I34.1 NONRHEUMATIC MITRAL (VALVE) PROLAPSE: ICD-10-CM

## 2020-06-09 DIAGNOSIS — E11.59 TYPE 2 DIABETES MELLITUS WITH OTHER CIRCULATORY COMPLICATIONS: ICD-10-CM

## 2020-06-09 DIAGNOSIS — E05.90 THYROTOXICOSIS, UNSPECIFIED WITHOUT THYROTOXIC CRISIS OR STORM: ICD-10-CM

## 2020-06-09 DIAGNOSIS — Z83.49 FAMILY HISTORY OF OTHER ENDOCRINE, NUTRITIONAL AND METABOLIC DISEASES: ICD-10-CM

## 2020-06-09 DIAGNOSIS — I83.218 VARICOSE VEINS OF RIGHT LOWER EXTREMITY WITH BOTH ULCER OF OTHER PART OF LOWER EXTREMITY AND INFLAMMATION: ICD-10-CM

## 2020-06-09 DIAGNOSIS — M43.16 SPONDYLOLISTHESIS, LUMBAR REGION: ICD-10-CM

## 2020-06-09 DIAGNOSIS — Z87.81 PERSONAL HISTORY OF (HEALED) TRAUMATIC FRACTURE: ICD-10-CM

## 2020-06-09 DIAGNOSIS — L97.812 NON-PRESSURE CHRONIC ULCER OF OTHER PART OF RIGHT LOWER LEG WITH FAT LAYER EXPOSED: ICD-10-CM

## 2020-06-09 DIAGNOSIS — E55.9 VITAMIN D DEFICIENCY, UNSPECIFIED: ICD-10-CM

## 2020-06-09 DIAGNOSIS — I47.1 SUPRAVENTRICULAR TACHYCARDIA: ICD-10-CM

## 2020-06-09 DIAGNOSIS — Z87.01 PERSONAL HISTORY OF PNEUMONIA (RECURRENT): ICD-10-CM

## 2020-06-09 DIAGNOSIS — Z87.891 PERSONAL HISTORY OF NICOTINE DEPENDENCE: ICD-10-CM

## 2020-06-09 DIAGNOSIS — M48.07 SPINAL STENOSIS, LUMBOSACRAL REGION: ICD-10-CM

## 2020-06-09 DIAGNOSIS — L97.801 NON-PRESSURE CHRONIC ULCER OF OTHER PART OF UNSPECIFIED LOWER LEG LIMITED TO BREAKDOWN OF SKIN: ICD-10-CM

## 2020-06-09 DIAGNOSIS — L97.822 NON-PRESSURE CHRONIC ULCER OF OTHER PART OF LEFT LOWER LEG WITH FAT LAYER EXPOSED: ICD-10-CM

## 2020-06-09 PROCEDURE — 73590 X-RAY EXAM OF LOWER LEG: CPT | Mod: 26,50

## 2020-06-09 PROCEDURE — 99203 OFFICE O/P NEW LOW 30 MIN: CPT

## 2020-06-09 PROCEDURE — 73590 X-RAY EXAM OF LOWER LEG: CPT

## 2020-06-09 PROCEDURE — G0463: CPT

## 2020-06-09 RX ORDER — IBUPROFEN 800 MG
800 TABLET ORAL 3 TIMES DAILY
Refills: 0 | Status: ACTIVE | COMMUNITY

## 2020-06-09 RX ORDER — GUAIFENESIN 600 MG/1
600 TABLET, EXTENDED RELEASE ORAL DAILY
Refills: 0 | Status: ACTIVE | COMMUNITY

## 2020-06-09 RX ORDER — MONTELUKAST 10 MG/1
10 TABLET, FILM COATED ORAL DAILY
Refills: 0 | Status: ACTIVE | COMMUNITY

## 2020-06-09 RX ORDER — INSULIN DEGLUDEC INJECTION 100 U/ML
100 INJECTION, SOLUTION SUBCUTANEOUS DAILY
Refills: 0 | Status: ACTIVE | COMMUNITY

## 2020-06-09 RX ORDER — ATORVASTATIN CALCIUM 80 MG/1
80 TABLET, FILM COATED ORAL DAILY
Refills: 0 | Status: ACTIVE | COMMUNITY

## 2020-06-09 RX ORDER — BACILLUS COAGULANS/INULIN 1B-250 MG
CAPSULE ORAL DAILY
Refills: 0 | Status: ACTIVE | COMMUNITY

## 2020-06-09 RX ORDER — POTASSIUM CHLORIDE 750 MG/1
10 CAPSULE, EXTENDED RELEASE ORAL TWICE DAILY
Refills: 0 | Status: ACTIVE | COMMUNITY

## 2020-06-09 RX ORDER — OMEGA-3/DHA/EPA/FISH OIL 300-1000MG
CAPSULE ORAL DAILY
Refills: 0 | Status: ACTIVE | COMMUNITY

## 2020-06-09 RX ORDER — ALBUTEROL SULFATE 2.5 MG/3ML
VIAL, NEBULIZER (ML) INHALATION
Refills: 0 | Status: ACTIVE | COMMUNITY

## 2020-06-09 RX ORDER — COLD-HOT PACK
125 MCG EACH MISCELLANEOUS DAILY
Refills: 0 | Status: ACTIVE | COMMUNITY

## 2020-06-09 NOTE — PHYSICAL EXAM
[4 x 4] : 4 x 4  [JVD] : no jugular venous distention  [0] : left 0 [Ankle Swelling (On Exam)] : present [Ankle Swelling Bilaterally] : bilaterally  [Varicose Veins Of Lower Extremities] : bilaterally [Ankle Swelling On The Left] : moderate [] : present [Skin Ulcer] : ulcer [de-identified] : calm [de-identified] : 4/5 strength in all quadrants bilaterally [de-identified] : venous stasis wounds are located on bilateral lower legs down to skin and subcutaneous tissue and fat with stasis dermatitis [FreeTextEntry2] : 7.5 [FreeTextEntry3] : 3.5 [FreeTextEntry4] : 0.1 - 0.2 [de-identified] : serosanguineous [de-identified] : superior intact blister [de-identified] : silver alginate [de-identified] : <20% [FreeTextEntry8] : 0.7 [FreeTextEntry7] : Left anterior leg -broken blister [de-identified] : 0.1 [FreeTextEntry9] : 0.9 [de-identified] : serosanguineous [de-identified] : intact [de-identified] : silver alginate [de-identified] : NSC [de-identified] : Left medial lower leg [de-identified] : 3.2 [de-identified] : 4.2 [de-identified] : 0.1 [de-identified] : inferior intact blister [de-identified] : serosanguineous [de-identified] : NSC [de-identified] : silver alginate [FreeTextEntry1] : Left plantar hallux - callus [de-identified] : DPM removed callus [de-identified] : NSC [TWNoteComboBox4] : Small [de-identified] : >75% [de-identified] : Ace wraps [de-identified] : Yes [de-identified] : 3x Weekly [de-identified] : 100% [de-identified] : Small [de-identified] : Ace wraps [de-identified] : 3x Weekly [de-identified] : Ace wraps [de-identified] : Small [de-identified] : 100% [de-identified] : 3x Weekly

## 2020-06-09 NOTE — REVIEW OF SYSTEMS
[Fever] : no fever [Eye Pain] : no eye pain [Earache] : no earache [Chest Pain] : no chest pain [Shortness Of Breath] : no shortness of breath [Abdominal Pain] : no abdominal pain [Skin Wound] : skin wound [de-identified] : venous stasis wounds are located on bilateral lower legs down to skin and subcutaneous tissue and fat with stasis dermatitis

## 2020-06-09 NOTE — ASSESSMENT
[Verbal] : Verbal [Demo] : Demo [Patient] : Patient [Handout] : Handout [Good - alert, interested, motivated] : Good - alert, interested, motivated [Dressing changes] : dressing changes [Verbalizes knowledge/Understanding] : Verbalizes knowledge/understanding [Skin Care] : skin care [Foot Care] : foot care [Signs and symptoms of infection] : sign and symptoms of infection [Venous Disease] : venous disease [How and When to Call] : how and when to call [Labs and Tests] : labs and tests [Off-loading] : off-loading [Compression Therapy] : compression therapy [Glycemic Control] : glycemic control [Patient responsibility to plan of care] : patient responsibility to plan of care [] : Yes [Stable] : stable [Home] : Home [Cane] : Cane [Not Applicable - Long Term Care/Home Health Agency] : Long Term Care/Home Health Agency: Not Applicable [FreeTextEntry2] : Promote optimal skin integrity, offloading, infection prevention, edema control\par  [FreeTextEntry4] : Circulation\par \par Dorsalis Pedis:  bilateral edema unable to palpate\par Posterior Tibialis:  bilateral edema unable to palpate\par Doppler Pulses:  bilateral  present \par Extremity Color:  bilateral pink \par Extremity Temperature:  bilateral warm \par Capillary Refill:  bilateral <3 sec \par MARQUIS:  Non-invasive vascular studies ordered - submitted for authorization\par \par Xray of bilateral lower legs done today at [facility]\par \par Patient to f/u Fri and Mon for dressing changes and 1 week for an assessment\par \par

## 2020-06-09 NOTE — PLAN
[FreeTextEntry1] : Patient examined and evaluated at this time.\par Continue local wound care and offloading.\par Will order radiographs of the right and left tibfib.\par Will auth for vascular studies arterial and vascular.\par Pt to return in 1 week for follow up.

## 2020-06-12 ENCOUNTER — APPOINTMENT (OUTPATIENT)
Dept: WOUND CARE | Facility: HOSPITAL | Age: 65
End: 2020-06-12
Payer: MEDICARE

## 2020-06-12 ENCOUNTER — OUTPATIENT (OUTPATIENT)
Dept: OUTPATIENT SERVICES | Facility: HOSPITAL | Age: 65
LOS: 1 days | Discharge: HOME | End: 2020-06-12
Payer: MEDICARE

## 2020-06-12 VITALS
DIASTOLIC BLOOD PRESSURE: 65 MMHG | OXYGEN SATURATION: 98 % | BODY MASS INDEX: 51.91 KG/M2 | WEIGHT: 293 LBS | HEIGHT: 63 IN | RESPIRATION RATE: 22 BRPM | HEART RATE: 72 BPM | SYSTOLIC BLOOD PRESSURE: 105 MMHG | TEMPERATURE: 98 F

## 2020-06-12 DIAGNOSIS — Z95.2 PRESENCE OF PROSTHETIC HEART VALVE: Chronic | ICD-10-CM

## 2020-06-12 DIAGNOSIS — I83.218 VARICOSE VEINS OF RIGHT LOWER EXTREMITY WITH BOTH ULCER OF OTHER PART OF LOWER EXTREMITY AND INFLAMMATION: ICD-10-CM

## 2020-06-12 PROCEDURE — G0463: CPT

## 2020-06-12 PROCEDURE — ZZZZZ: CPT

## 2020-06-16 ENCOUNTER — NON-APPOINTMENT (OUTPATIENT)
Age: 65
End: 2020-06-16

## 2020-06-16 ENCOUNTER — OUTPATIENT (OUTPATIENT)
Dept: OUTPATIENT SERVICES | Facility: HOSPITAL | Age: 65
LOS: 1 days | Discharge: HOME | End: 2020-06-16
Payer: MEDICARE

## 2020-06-16 ENCOUNTER — APPOINTMENT (OUTPATIENT)
Dept: WOUND CARE | Facility: HOSPITAL | Age: 65
End: 2020-06-16
Payer: MEDICARE

## 2020-06-16 VITALS
WEIGHT: 293 LBS | HEIGHT: 63 IN | RESPIRATION RATE: 20 BRPM | HEART RATE: 91 BPM | SYSTOLIC BLOOD PRESSURE: 122 MMHG | BODY MASS INDEX: 51.91 KG/M2 | DIASTOLIC BLOOD PRESSURE: 77 MMHG | TEMPERATURE: 98.6 F | OXYGEN SATURATION: 97 %

## 2020-06-16 DIAGNOSIS — I83.218 VARICOSE VEINS OF RIGHT LOWER EXTREMITY WITH BOTH ULCER OF OTHER PART OF LOWER EXTREMITY AND INFLAMMATION: ICD-10-CM

## 2020-06-16 DIAGNOSIS — Z95.2 PRESENCE OF PROSTHETIC HEART VALVE: Chronic | ICD-10-CM

## 2020-06-16 PROCEDURE — 99213 OFFICE O/P EST LOW 20 MIN: CPT

## 2020-06-16 PROCEDURE — G0463: CPT

## 2020-06-16 NOTE — REVIEW OF SYSTEMS
[Fever] : no fever [Eye Pain] : no eye pain [Shortness Of Breath] : no shortness of breath [Earache] : no earache [Chest Pain] : no chest pain [Abdominal Pain] : no abdominal pain [de-identified] : venous stasis wounds are located on bilateral lower legs down to skin and subcutaneous tissue and fat with stasis dermatitis [Skin Wound] : skin wound

## 2020-06-16 NOTE — PLAN
[FreeTextEntry1] : Patient examined and evaluated at this time.\par Continue local wound care and offloading.\par Pt to obtain vascular studies arterial and vascular.\par Pt to return in 1 week for follow up.

## 2020-06-16 NOTE — ASSESSMENT
[Verbal] : Verbal [Handout] : Handout [Demo] : Demo [Patient] : Patient [Verbalizes knowledge/Understanding] : Verbalizes knowledge/understanding [Good - alert, interested, motivated] : Good - alert, interested, motivated [Dressing changes] : dressing changes [Foot Care] : foot care [Signs and symptoms of infection] : sign and symptoms of infection [Skin Care] : skin care [Venous Disease] : venous disease [Labs and Tests] : labs and tests [How and When to Call] : how and when to call [Off-loading] : off-loading [Compression Therapy] : compression therapy [Glycemic Control] : glycemic control [Patient responsibility to plan of care] : patient responsibility to plan of care [Stable] : stable [Home] : Home [Cane] : Cane [] : Yes [FreeTextEntry2] : Restore Optimal Skin Integrity \par Localized Wound Care \par Infection Prevention \par  [FreeTextEntry4] : Pt has appointment Vasular Studies & Ultrasound performed in [place] on Thursday 6/18/20.\par Patient to f/u Fri and Mon for dressing changes and 1 week for an assessment\par \par

## 2020-06-16 NOTE — HISTORY OF PRESENT ILLNESS
[FreeTextEntry1] : Patient seen for venous stasis wounds bilaterally to lower legs down to skin and subcutaneous tissue and fat with stasis dermatitis.

## 2020-06-16 NOTE — PHYSICAL EXAM
[Please See PDF for [employer]] : Please See PDF for [employer]. [JVD] : no jugular venous distention  [0] : right 0 [Ankle Swelling Bilaterally] : bilaterally  [Ankle Swelling (On Exam)] : present [Varicose Veins Of Lower Extremities] : present [Skin Ulcer] : ulcer [] : bilaterally [Ankle Swelling On The Left] : moderate [de-identified] : calm [de-identified] : venous stasis wounds are located on bilateral lower legs down to skin and subcutaneous tissue and fat with stasis dermatitis [de-identified] : 4/5 strength in all quadrants bilaterally

## 2020-06-18 ENCOUNTER — RESULT REVIEW (OUTPATIENT)
Age: 65
End: 2020-06-18

## 2020-06-18 ENCOUNTER — OUTPATIENT (OUTPATIENT)
Dept: OUTPATIENT SERVICES | Facility: HOSPITAL | Age: 65
LOS: 1 days | End: 2020-06-18
Payer: MEDICARE

## 2020-06-18 DIAGNOSIS — Z95.2 PRESENCE OF PROSTHETIC HEART VALVE: Chronic | ICD-10-CM

## 2020-06-18 DIAGNOSIS — I83.218 VARICOSE VEINS OF RIGHT LOWER EXTREMITY WITH BOTH ULCER OF OTHER PART OF LOWER EXTREMITY AND INFLAMMATION: ICD-10-CM

## 2020-06-18 PROCEDURE — 93971 EXTREMITY STUDY: CPT | Mod: 26

## 2020-06-18 PROCEDURE — 93923 UPR/LXTR ART STDY 3+ LVLS: CPT

## 2020-06-18 PROCEDURE — 93970 EXTREMITY STUDY: CPT

## 2020-06-18 PROCEDURE — 93922 UPR/L XTREMITY ART 2 LEVELS: CPT | Mod: 26

## 2020-06-19 ENCOUNTER — APPOINTMENT (OUTPATIENT)
Dept: SURGERY | Facility: HOSPITAL | Age: 65
End: 2020-06-19
Payer: MEDICARE

## 2020-06-19 ENCOUNTER — OUTPATIENT (OUTPATIENT)
Dept: OUTPATIENT SERVICES | Facility: HOSPITAL | Age: 65
LOS: 1 days | Discharge: HOME | End: 2020-06-19
Payer: MEDICARE

## 2020-06-19 VITALS
HEIGHT: 63 IN | WEIGHT: 293 LBS | RESPIRATION RATE: 20 BRPM | HEART RATE: 89 BPM | DIASTOLIC BLOOD PRESSURE: 54 MMHG | BODY MASS INDEX: 51.91 KG/M2 | TEMPERATURE: 97.6 F | SYSTOLIC BLOOD PRESSURE: 107 MMHG | OXYGEN SATURATION: 95 %

## 2020-06-19 DIAGNOSIS — I83.218 VARICOSE VEINS OF RIGHT LOWER EXTREMITY WITH BOTH ULCER OF OTHER PART OF LOWER EXTREMITY AND INFLAMMATION: ICD-10-CM

## 2020-06-19 DIAGNOSIS — I83.228 VARICOSE VEINS OF LEFT LOWER EXTREMITY WITH BOTH ULCER OF OTHER PART OF LOWER EXTREMITY AND INFLAMMATION: ICD-10-CM

## 2020-06-19 DIAGNOSIS — Z95.2 PRESENCE OF PROSTHETIC HEART VALVE: Chronic | ICD-10-CM

## 2020-06-19 DIAGNOSIS — L97.822 NON-PRESSURE CHRONIC ULCER OF OTHER PART OF LEFT LOWER LEG WITH FAT LAYER EXPOSED: ICD-10-CM

## 2020-06-19 DIAGNOSIS — L97.812 NON-PRESSURE CHRONIC ULCER OF OTHER PART OF RIGHT LOWER LEG WITH FAT LAYER EXPOSED: ICD-10-CM

## 2020-06-19 PROCEDURE — ZZZZZ: CPT

## 2020-06-19 PROCEDURE — G0463: CPT

## 2020-06-23 ENCOUNTER — APPOINTMENT (OUTPATIENT)
Dept: OBGYN | Facility: HOSPITAL | Age: 65
End: 2020-06-23
Payer: MEDICARE

## 2020-06-23 ENCOUNTER — NON-APPOINTMENT (OUTPATIENT)
Age: 65
End: 2020-06-23

## 2020-06-23 ENCOUNTER — OUTPATIENT (OUTPATIENT)
Dept: OUTPATIENT SERVICES | Facility: HOSPITAL | Age: 65
LOS: 1 days | Discharge: HOME | End: 2020-06-23
Payer: MEDICARE

## 2020-06-23 ENCOUNTER — APPOINTMENT (OUTPATIENT)
Dept: WOUND CARE | Facility: HOSPITAL | Age: 65
End: 2020-06-23

## 2020-06-23 VITALS
BODY MASS INDEX: 51.91 KG/M2 | DIASTOLIC BLOOD PRESSURE: 50 MMHG | OXYGEN SATURATION: 96 % | SYSTOLIC BLOOD PRESSURE: 113 MMHG | HEART RATE: 71 BPM | HEIGHT: 63 IN | WEIGHT: 293 LBS | TEMPERATURE: 98.6 F | RESPIRATION RATE: 20 BRPM

## 2020-06-23 DIAGNOSIS — I83.218 VARICOSE VEINS OF RIGHT LOWER EXTREMITY WITH BOTH ULCER OF OTHER PART OF LOWER EXTREMITY AND INFLAMMATION: ICD-10-CM

## 2020-06-23 DIAGNOSIS — Z95.2 PRESENCE OF PROSTHETIC HEART VALVE: Chronic | ICD-10-CM

## 2020-06-23 PROCEDURE — 29581 APPL MULTLAYER CMPRN SYS LEG: CPT | Mod: 50

## 2020-06-23 PROCEDURE — 99213 OFFICE O/P EST LOW 20 MIN: CPT

## 2020-06-23 NOTE — HISTORY OF PRESENT ILLNESS
[FreeTextEntry1] : 65 yo WF, here for f/u of chronic VSU involving her Right anterior LE. A recent vascular study showed bilateral ABIs of 1.2+. The LLE VSU appears healed.

## 2020-06-23 NOTE — REVIEW OF SYSTEMS
[Skin Wound] : skin wound [Fever] : no fever [Chest Pain] : no chest pain [Eye Pain] : no eye pain [Earache] : no earache [Shortness Of Breath] : no shortness of breath [de-identified] : venous stasis wounds are located on bilateral lower legs down to skin and subcutaneous tissue and fat with stasis dermatitis [Abdominal Pain] : no abdominal pain

## 2020-06-23 NOTE — PHYSICAL EXAM
[Please See PDF for [employer]] : Please See PDF for [employer]. [Normal Thyroid] : the thyroid was normal [Normal Breath Sounds] : Normal breath sounds [Ankle Swelling (On Exam)] : present [Ankle Swelling Bilaterally] : bilaterally  [Normal Rate and Rhythm] : normal rate and rhythm [Normal Heart Sounds] : normal heart sounds [] : of the right leg [Ankle Swelling On The Left] : moderate [Oriented to Person] : oriented to person [Oriented to Place] : oriented to place [Alert] : alert [Oriented to Time] : oriented to time [Calm] : calm [JVD] : no jugular venous distention  [Tender] : nontender [Abdomen Tenderness] : ~T ~M No abdominal tenderness [Abdomen Masses] : No abdominal massess [Enlarged] : not enlarged [de-identified] : adult WF, NAD, WD, WN, alert, Ox3.

## 2020-06-25 DIAGNOSIS — L97.812 NON-PRESSURE CHRONIC ULCER OF OTHER PART OF RIGHT LOWER LEG WITH FAT LAYER EXPOSED: ICD-10-CM

## 2020-06-25 DIAGNOSIS — Z79.4 LONG TERM (CURRENT) USE OF INSULIN: ICD-10-CM

## 2020-06-25 DIAGNOSIS — I83.218 VARICOSE VEINS OF RIGHT LOWER EXTREMITY WITH BOTH ULCER OF OTHER PART OF LOWER EXTREMITY AND INFLAMMATION: ICD-10-CM

## 2020-06-25 DIAGNOSIS — Z88.5 ALLERGY STATUS TO NARCOTIC AGENT: ICD-10-CM

## 2020-06-25 DIAGNOSIS — G47.10 HYPERSOMNIA, UNSPECIFIED: ICD-10-CM

## 2020-06-25 DIAGNOSIS — Z87.81 PERSONAL HISTORY OF (HEALED) TRAUMATIC FRACTURE: ICD-10-CM

## 2020-06-25 DIAGNOSIS — Z83.49 FAMILY HISTORY OF OTHER ENDOCRINE, NUTRITIONAL AND METABOLIC DISEASES: ICD-10-CM

## 2020-06-25 DIAGNOSIS — I47.1 SUPRAVENTRICULAR TACHYCARDIA: ICD-10-CM

## 2020-06-25 DIAGNOSIS — I11.9 HYPERTENSIVE HEART DISEASE WITHOUT HEART FAILURE: ICD-10-CM

## 2020-06-25 DIAGNOSIS — Z87.01 PERSONAL HISTORY OF PNEUMONIA (RECURRENT): ICD-10-CM

## 2020-06-25 DIAGNOSIS — M47.817 SPONDYLOSIS WITHOUT MYELOPATHY OR RADICULOPATHY, LUMBOSACRAL REGION: ICD-10-CM

## 2020-06-25 DIAGNOSIS — I83.228 VARICOSE VEINS OF LEFT LOWER EXTREMITY WITH BOTH ULCER OF OTHER PART OF LOWER EXTREMITY AND INFLAMMATION: ICD-10-CM

## 2020-06-25 DIAGNOSIS — Z79.82 LONG TERM (CURRENT) USE OF ASPIRIN: ICD-10-CM

## 2020-06-25 DIAGNOSIS — E05.90 THYROTOXICOSIS, UNSPECIFIED WITHOUT THYROTOXIC CRISIS OR STORM: ICD-10-CM

## 2020-06-25 DIAGNOSIS — E11.59 TYPE 2 DIABETES MELLITUS WITH OTHER CIRCULATORY COMPLICATIONS: ICD-10-CM

## 2020-06-25 DIAGNOSIS — Z82.49 FAMILY HISTORY OF ISCHEMIC HEART DISEASE AND OTHER DISEASES OF THE CIRCULATORY SYSTEM: ICD-10-CM

## 2020-06-25 DIAGNOSIS — I34.1 NONRHEUMATIC MITRAL (VALVE) PROLAPSE: ICD-10-CM

## 2020-06-25 DIAGNOSIS — Z95.4 PRESENCE OF OTHER HEART-VALVE REPLACEMENT: ICD-10-CM

## 2020-06-25 DIAGNOSIS — Z98.84 BARIATRIC SURGERY STATUS: ICD-10-CM

## 2020-06-25 DIAGNOSIS — Z87.891 PERSONAL HISTORY OF NICOTINE DEPENDENCE: ICD-10-CM

## 2020-06-25 DIAGNOSIS — G47.33 OBSTRUCTIVE SLEEP APNEA (ADULT) (PEDIATRIC): ICD-10-CM

## 2020-06-25 DIAGNOSIS — E55.9 VITAMIN D DEFICIENCY, UNSPECIFIED: ICD-10-CM

## 2020-06-25 DIAGNOSIS — Z79.899 OTHER LONG TERM (CURRENT) DRUG THERAPY: ICD-10-CM

## 2020-06-25 DIAGNOSIS — M48.07 SPINAL STENOSIS, LUMBOSACRAL REGION: ICD-10-CM

## 2020-06-25 DIAGNOSIS — E66.01 MORBID (SEVERE) OBESITY DUE TO EXCESS CALORIES: ICD-10-CM

## 2020-06-25 DIAGNOSIS — M43.16 SPONDYLOLISTHESIS, LUMBAR REGION: ICD-10-CM

## 2020-06-25 DIAGNOSIS — L97.822 NON-PRESSURE CHRONIC ULCER OF OTHER PART OF LEFT LOWER LEG WITH FAT LAYER EXPOSED: ICD-10-CM

## 2020-06-26 ENCOUNTER — APPOINTMENT (OUTPATIENT)
Dept: WOUND CARE | Facility: HOSPITAL | Age: 65
End: 2020-06-26

## 2020-06-30 ENCOUNTER — OUTPATIENT (OUTPATIENT)
Dept: OUTPATIENT SERVICES | Facility: HOSPITAL | Age: 65
LOS: 1 days | Discharge: HOME | End: 2020-06-30
Payer: MEDICARE

## 2020-06-30 ENCOUNTER — APPOINTMENT (OUTPATIENT)
Dept: OBGYN | Facility: HOSPITAL | Age: 65
End: 2020-06-30
Payer: MEDICARE

## 2020-06-30 VITALS
HEIGHT: 63 IN | TEMPERATURE: 98.1 F | HEART RATE: 66 BPM | BODY MASS INDEX: 51.91 KG/M2 | WEIGHT: 293 LBS | SYSTOLIC BLOOD PRESSURE: 110 MMHG | OXYGEN SATURATION: 97 % | DIASTOLIC BLOOD PRESSURE: 61 MMHG | RESPIRATION RATE: 20 BRPM

## 2020-06-30 DIAGNOSIS — Z79.899 OTHER LONG TERM (CURRENT) DRUG THERAPY: ICD-10-CM

## 2020-06-30 DIAGNOSIS — G47.10 HYPERSOMNIA, UNSPECIFIED: ICD-10-CM

## 2020-06-30 DIAGNOSIS — M54.16 RADICULOPATHY, LUMBAR REGION: ICD-10-CM

## 2020-06-30 DIAGNOSIS — Z87.81 PERSONAL HISTORY OF (HEALED) TRAUMATIC FRACTURE: ICD-10-CM

## 2020-06-30 DIAGNOSIS — I47.1 SUPRAVENTRICULAR TACHYCARDIA: ICD-10-CM

## 2020-06-30 DIAGNOSIS — Z83.49 FAMILY HISTORY OF OTHER ENDOCRINE, NUTRITIONAL AND METABOLIC DISEASES: ICD-10-CM

## 2020-06-30 DIAGNOSIS — Z82.49 FAMILY HISTORY OF ISCHEMIC HEART DISEASE AND OTHER DISEASES OF THE CIRCULATORY SYSTEM: ICD-10-CM

## 2020-06-30 DIAGNOSIS — Z87.01 PERSONAL HISTORY OF PNEUMONIA (RECURRENT): ICD-10-CM

## 2020-06-30 DIAGNOSIS — Z88.5 ALLERGY STATUS TO NARCOTIC AGENT: ICD-10-CM

## 2020-06-30 DIAGNOSIS — E11.59 TYPE 2 DIABETES MELLITUS WITH OTHER CIRCULATORY COMPLICATIONS: ICD-10-CM

## 2020-06-30 DIAGNOSIS — Z95.2 PRESENCE OF PROSTHETIC HEART VALVE: Chronic | ICD-10-CM

## 2020-06-30 DIAGNOSIS — I83.228 VARICOSE VEINS OF LEFT LOWER EXTREMITY WITH BOTH ULCER OF OTHER PART OF LOWER EXTREMITY AND INFLAMMATION: ICD-10-CM

## 2020-06-30 DIAGNOSIS — Z98.84 BARIATRIC SURGERY STATUS: ICD-10-CM

## 2020-06-30 DIAGNOSIS — I34.1 NONRHEUMATIC MITRAL (VALVE) PROLAPSE: ICD-10-CM

## 2020-06-30 DIAGNOSIS — Z79.82 LONG TERM (CURRENT) USE OF ASPIRIN: ICD-10-CM

## 2020-06-30 DIAGNOSIS — G47.33 OBSTRUCTIVE SLEEP APNEA (ADULT) (PEDIATRIC): ICD-10-CM

## 2020-06-30 DIAGNOSIS — L97.812 NON-PRESSURE CHRONIC ULCER OF OTHER PART OF RIGHT LOWER LEG WITH FAT LAYER EXPOSED: ICD-10-CM

## 2020-06-30 DIAGNOSIS — Z87.891 PERSONAL HISTORY OF NICOTINE DEPENDENCE: ICD-10-CM

## 2020-06-30 DIAGNOSIS — M48.07 SPINAL STENOSIS, LUMBOSACRAL REGION: ICD-10-CM

## 2020-06-30 DIAGNOSIS — I11.9 HYPERTENSIVE HEART DISEASE WITHOUT HEART FAILURE: ICD-10-CM

## 2020-06-30 DIAGNOSIS — Z79.4 LONG TERM (CURRENT) USE OF INSULIN: ICD-10-CM

## 2020-06-30 DIAGNOSIS — E05.90 THYROTOXICOSIS, UNSPECIFIED WITHOUT THYROTOXIC CRISIS OR STORM: ICD-10-CM

## 2020-06-30 DIAGNOSIS — L97.822 NON-PRESSURE CHRONIC ULCER OF OTHER PART OF LEFT LOWER LEG WITH FAT LAYER EXPOSED: ICD-10-CM

## 2020-06-30 DIAGNOSIS — E55.9 VITAMIN D DEFICIENCY, UNSPECIFIED: ICD-10-CM

## 2020-06-30 DIAGNOSIS — M48.17: ICD-10-CM

## 2020-06-30 DIAGNOSIS — I83.218 VARICOSE VEINS OF RIGHT LOWER EXTREMITY WITH BOTH ULCER OF OTHER PART OF LOWER EXTREMITY AND INFLAMMATION: ICD-10-CM

## 2020-06-30 DIAGNOSIS — Z95.4 PRESENCE OF OTHER HEART-VALVE REPLACEMENT: ICD-10-CM

## 2020-06-30 DIAGNOSIS — E11.621 TYPE 2 DIABETES MELLITUS WITH FOOT ULCER: ICD-10-CM

## 2020-06-30 PROCEDURE — 99213 OFFICE O/P EST LOW 20 MIN: CPT

## 2020-06-30 PROCEDURE — G0463: CPT

## 2020-06-30 NOTE — ASSESSMENT
[Verbal] : Verbal [Handout] : Handout [Demo] : Demo [Patient] : Patient [Good - alert, interested, motivated] : Good - alert, interested, motivated [Dressing changes] : dressing changes [Foot Care] : foot care [Verbalizes knowledge/Understanding] : Verbalizes knowledge/understanding [Skin Care] : skin care [Signs and symptoms of infection] : sign and symptoms of infection [Venous Disease] : venous disease [Labs and Tests] : labs and tests [How and When to Call] : how and when to call [Compression Therapy] : compression therapy [Off-loading] : off-loading [Patient responsibility to plan of care] : patient responsibility to plan of care [] : Yes [Glycemic Control] : glycemic control [Stable] : stable [Home] : Home [Not Applicable - Long Term Care/Home Health Agency] : Long Term Care/Home Health Agency: Not Applicable [Cane] : Cane [FreeTextEntry2] : Promote optimal skin integrity, offloading, infection prevention, edema control\par  [FreeTextEntry4] : Circulation\par \par Dorsalis Pedis:  bilateral edema unable to palpate\par Posterior Tibialis:  bilateral edema unable to palpate\par Doppler Pulses:  bilateral  present \par Extremity Color:  bilateral pink \par Extremity Temperature:  bilateral warm \par Capillary Refill:  bilateral <3 sec \par MARQUIS:  Non-invasive vascular studies ordered - submitted for authorization\par \par Xray of bilateral lower legs done today at [facility]\par \par Patient to f/u Fri and Mon for dressing changes and 1 week for an assessment\par \par

## 2020-06-30 NOTE — REVIEW OF SYSTEMS
[Skin Wound] : skin wound [Fever] : no fever [Earache] : no earache [Eye Pain] : no eye pain [Chest Pain] : no chest pain [Shortness Of Breath] : no shortness of breath [de-identified] : venous stasis wounds are located on bilateral lower legs down to skin and subcutaneous tissue and fat with stasis dermatitis [Abdominal Pain] : no abdominal pain

## 2020-06-30 NOTE — PHYSICAL EXAM
[4 x 4] : 4 x 4  [Normal Thyroid] : the thyroid was normal [Normal Breath Sounds] : Normal breath sounds [Normal Heart Sounds] : normal heart sounds [Normal Rate and Rhythm] : normal rate and rhythm [Ankle Swelling (On Exam)] : present [Ankle Swelling Bilaterally] : bilaterally  [] : present [Ankle Swelling On The Right] : mild [Oriented to Person] : oriented to person [Oriented to Place] : oriented to place [Calm] : calm [Oriented to Time] : oriented to time [JVD] : no jugular venous distention  [Abdomen Masses] : No abdominal massess [Abdomen Tenderness] : ~T ~M No abdominal tenderness [Tender] : nontender [Enlarged] : not enlarged [Alert] : not alert [de-identified] : adult obese ,WF, alert, Ox 3. [FreeTextEntry7] : Left anterior leg -Closed [de-identified] : Cleansed with Normal Saline.  [de-identified] : No Treatment  [de-identified] : Left medial lower leg- Closed [de-identified] : Cleansed with Normal Saline.  [de-identified] : No Treatment  [de-identified] : DPM removed callus [FreeTextEntry1] : Right Medial Leg [FreeTextEntry2] : 1.0 [FreeTextEntry3] : 0.6 [FreeTextEntry4] : 0.1 [de-identified] : Scant Serosanguineous  [de-identified] : Intact  [de-identified] : Silver Alginate  [de-identified] : Cleansed with Normal Saline.  [TWNoteComboBox4] : Small [de-identified] : 100% [de-identified] : None [de-identified] : 2x Weekly [de-identified] : 100% [de-identified] : Ace wraps [de-identified] : None [de-identified] : 3x Weekly [de-identified] : None [de-identified] : 100% [de-identified] : 3x Weekly [de-identified] : Ace wraps [de-identified] : None [de-identified] : 100% [de-identified] : None [de-identified] : No [de-identified] : 2x Weekly [de-identified] : Ace wraps [de-identified] : No

## 2020-07-01 DIAGNOSIS — E66.01 MORBID (SEVERE) OBESITY DUE TO EXCESS CALORIES: ICD-10-CM

## 2020-07-01 DIAGNOSIS — Z79.4 LONG TERM (CURRENT) USE OF INSULIN: ICD-10-CM

## 2020-07-01 DIAGNOSIS — Z87.81 PERSONAL HISTORY OF (HEALED) TRAUMATIC FRACTURE: ICD-10-CM

## 2020-07-01 DIAGNOSIS — Z79.899 OTHER LONG TERM (CURRENT) DRUG THERAPY: ICD-10-CM

## 2020-07-01 DIAGNOSIS — I34.1 NONRHEUMATIC MITRAL (VALVE) PROLAPSE: ICD-10-CM

## 2020-07-01 DIAGNOSIS — E05.90 THYROTOXICOSIS, UNSPECIFIED WITHOUT THYROTOXIC CRISIS OR STORM: ICD-10-CM

## 2020-07-01 DIAGNOSIS — M47.817 SPONDYLOSIS WITHOUT MYELOPATHY OR RADICULOPATHY, LUMBOSACRAL REGION: ICD-10-CM

## 2020-07-01 DIAGNOSIS — M43.16 SPONDYLOLISTHESIS, LUMBAR REGION: ICD-10-CM

## 2020-07-01 DIAGNOSIS — Z95.4 PRESENCE OF OTHER HEART-VALVE REPLACEMENT: ICD-10-CM

## 2020-07-01 DIAGNOSIS — Z88.5 ALLERGY STATUS TO NARCOTIC AGENT: ICD-10-CM

## 2020-07-01 DIAGNOSIS — L97.812 NON-PRESSURE CHRONIC ULCER OF OTHER PART OF RIGHT LOWER LEG WITH FAT LAYER EXPOSED: ICD-10-CM

## 2020-07-01 DIAGNOSIS — L97.822 NON-PRESSURE CHRONIC ULCER OF OTHER PART OF LEFT LOWER LEG WITH FAT LAYER EXPOSED: ICD-10-CM

## 2020-07-01 DIAGNOSIS — I47.1 SUPRAVENTRICULAR TACHYCARDIA: ICD-10-CM

## 2020-07-01 DIAGNOSIS — I83.218 VARICOSE VEINS OF RIGHT LOWER EXTREMITY WITH BOTH ULCER OF OTHER PART OF LOWER EXTREMITY AND INFLAMMATION: ICD-10-CM

## 2020-07-01 DIAGNOSIS — G47.33 OBSTRUCTIVE SLEEP APNEA (ADULT) (PEDIATRIC): ICD-10-CM

## 2020-07-01 DIAGNOSIS — Z87.891 PERSONAL HISTORY OF NICOTINE DEPENDENCE: ICD-10-CM

## 2020-07-01 DIAGNOSIS — I83.228 VARICOSE VEINS OF LEFT LOWER EXTREMITY WITH BOTH ULCER OF OTHER PART OF LOWER EXTREMITY AND INFLAMMATION: ICD-10-CM

## 2020-07-01 DIAGNOSIS — Z79.82 LONG TERM (CURRENT) USE OF ASPIRIN: ICD-10-CM

## 2020-07-01 DIAGNOSIS — Z82.49 FAMILY HISTORY OF ISCHEMIC HEART DISEASE AND OTHER DISEASES OF THE CIRCULATORY SYSTEM: ICD-10-CM

## 2020-07-01 DIAGNOSIS — E55.9 VITAMIN D DEFICIENCY, UNSPECIFIED: ICD-10-CM

## 2020-07-01 DIAGNOSIS — Z98.84 BARIATRIC SURGERY STATUS: ICD-10-CM

## 2020-07-01 DIAGNOSIS — E11.59 TYPE 2 DIABETES MELLITUS WITH OTHER CIRCULATORY COMPLICATIONS: ICD-10-CM

## 2020-07-01 DIAGNOSIS — I11.9 HYPERTENSIVE HEART DISEASE WITHOUT HEART FAILURE: ICD-10-CM

## 2020-07-01 DIAGNOSIS — G47.10 HYPERSOMNIA, UNSPECIFIED: ICD-10-CM

## 2020-07-01 DIAGNOSIS — M48.07 SPINAL STENOSIS, LUMBOSACRAL REGION: ICD-10-CM

## 2020-07-01 DIAGNOSIS — Z83.49 FAMILY HISTORY OF OTHER ENDOCRINE, NUTRITIONAL AND METABOLIC DISEASES: ICD-10-CM

## 2020-07-01 DIAGNOSIS — Z87.01 PERSONAL HISTORY OF PNEUMONIA (RECURRENT): ICD-10-CM

## 2020-07-03 ENCOUNTER — OUTPATIENT (OUTPATIENT)
Dept: OUTPATIENT SERVICES | Facility: HOSPITAL | Age: 65
LOS: 1 days | Discharge: HOME | End: 2020-07-03
Payer: MEDICARE

## 2020-07-03 ENCOUNTER — APPOINTMENT (OUTPATIENT)
Dept: WOUND CARE | Facility: HOSPITAL | Age: 65
End: 2020-07-03
Payer: MEDICARE

## 2020-07-03 VITALS
RESPIRATION RATE: 20 BRPM | DIASTOLIC BLOOD PRESSURE: 63 MMHG | SYSTOLIC BLOOD PRESSURE: 105 MMHG | TEMPERATURE: 97.1 F | OXYGEN SATURATION: 95 % | WEIGHT: 293 LBS | HEART RATE: 93 BPM | BODY MASS INDEX: 51.91 KG/M2 | HEIGHT: 63 IN

## 2020-07-03 DIAGNOSIS — I83.218 VARICOSE VEINS OF RIGHT LOWER EXTREMITY WITH BOTH ULCER OF OTHER PART OF LOWER EXTREMITY AND INFLAMMATION: ICD-10-CM

## 2020-07-03 DIAGNOSIS — E11.621 TYPE 2 DIABETES MELLITUS WITH FOOT ULCER: ICD-10-CM

## 2020-07-03 DIAGNOSIS — L97.812 NON-PRESSURE CHRONIC ULCER OF OTHER PART OF RIGHT LOWER LEG WITH FAT LAYER EXPOSED: ICD-10-CM

## 2020-07-03 DIAGNOSIS — Z95.2 PRESENCE OF PROSTHETIC HEART VALVE: Chronic | ICD-10-CM

## 2020-07-03 PROCEDURE — ZZZZZ: CPT

## 2020-07-03 PROCEDURE — G0463: CPT

## 2020-07-07 ENCOUNTER — OUTPATIENT (OUTPATIENT)
Dept: OUTPATIENT SERVICES | Facility: HOSPITAL | Age: 65
LOS: 1 days | Discharge: HOME | End: 2020-07-07
Payer: MEDICARE

## 2020-07-07 ENCOUNTER — APPOINTMENT (OUTPATIENT)
Dept: WOUND CARE | Facility: HOSPITAL | Age: 65
End: 2020-07-07
Payer: MEDICARE

## 2020-07-07 VITALS
TEMPERATURE: 97.5 F | DIASTOLIC BLOOD PRESSURE: 56 MMHG | OXYGEN SATURATION: 96 % | SYSTOLIC BLOOD PRESSURE: 134 MMHG | BODY MASS INDEX: 51.91 KG/M2 | HEART RATE: 71 BPM | HEIGHT: 63 IN | RESPIRATION RATE: 20 BRPM | WEIGHT: 293 LBS

## 2020-07-07 DIAGNOSIS — Z95.2 PRESENCE OF PROSTHETIC HEART VALVE: Chronic | ICD-10-CM

## 2020-07-07 DIAGNOSIS — E11.621 TYPE 2 DIABETES MELLITUS WITH FOOT ULCER: ICD-10-CM

## 2020-07-07 PROCEDURE — 99213 OFFICE O/P EST LOW 20 MIN: CPT

## 2020-07-07 PROCEDURE — G0463: CPT

## 2020-07-07 NOTE — REVIEW OF SYSTEMS
[Fever] : no fever [Earache] : no earache [Eye Pain] : no eye pain [Chest Pain] : no chest pain [Shortness Of Breath] : no shortness of breath [Abdominal Pain] : no abdominal pain [Skin Wound] : skin wound [de-identified] : venous stasis wounds are located on right lower leg down to skin and subcutaneous tissue and fat, left is healed. bilateral stasis dermatitis

## 2020-07-07 NOTE — PHYSICAL EXAM
[4 x 4] : 4 x 4  [JVD] : no jugular venous distention  [0] : left 0 [Ankle Swelling (On Exam)] : present [Ankle Swelling Bilaterally] : bilaterally  [Varicose Veins Of Lower Extremities] : bilaterally [Skin Ulcer] : ulcer [Ankle Swelling On The Left] : moderate [de-identified] : calm [] : bilaterally [de-identified] : 4/5 strength in all quadrants bilaterally [de-identified] : venous stasis wounds are located on right lower leg down to skin and subcutaneous tissue and fat, left is healed. bilateral stasis dermatitis [de-identified] : Left medial lower leg- Closed [de-identified] : Cleansed with Normal Saline.  [FreeTextEntry7] : Left anterior leg - Closed [de-identified] : No Treatment  [de-identified] : Cleansed with Normal Saline.  [FreeTextEntry1] : Right Medial Leg [FreeTextEntry3] : 0.6 [FreeTextEntry2] : 1.0 [de-identified] : DPM removed callus [de-identified] : Scant Serosanguineous  [de-identified] : Intact  [FreeTextEntry4] : 0.1 [de-identified] : Cleansed with Normal Saline.  [de-identified] : Silver Alginate  [TWNoteComboBox4] : Small [de-identified] : 100% [de-identified] : None [de-identified] : 2x Weekly [de-identified] : None [de-identified] : 100% [de-identified] : None [de-identified] : Ace wraps [de-identified] : 2x Weekly [de-identified] : 100% [de-identified] : Ace wraps [de-identified] : 3x Weekly [de-identified] : None [de-identified] : No [de-identified] : None [de-identified] : Ace wraps [de-identified] : 100% [de-identified] : No [de-identified] : 2x Weekly

## 2020-07-07 NOTE — ASSESSMENT
[Verbal] : Verbal [Demo] : Demo [Handout] : Handout [Patient] : Patient [Good - alert, interested, motivated] : Good - alert, interested, motivated [Verbalizes knowledge/Understanding] : Verbalizes knowledge/understanding [Dressing changes] : dressing changes [Foot Care] : foot care [Skin Care] : skin care [Signs and symptoms of infection] : sign and symptoms of infection [Venous Disease] : venous disease [How and When to Call] : how and when to call [Labs and Tests] : labs and tests [Off-loading] : off-loading [Compression Therapy] : compression therapy [Patient responsibility to plan of care] : patient responsibility to plan of care [Glycemic Control] : glycemic control [Stable] : stable [Home] : Home [Cane] : Cane [Not Applicable - Long Term Care/Home Health Agency] : Long Term Care/Home Health Agency: Not Applicable [] : Yes [FreeTextEntry2] : Restore Optimal Skin Integrity\par Compression Therapy \par Edema Control  [FreeTextEntry4] : Patient to f/u Friday for dressing changes and 1 week for an assessment\par \par

## 2020-07-07 NOTE — PLAN
[FreeTextEntry1] : Patient examined and evaluated at this time.\par Continue local wound care and offloading.\par Pt to return in 1 week for follow up.

## 2020-07-08 DIAGNOSIS — Z79.899 OTHER LONG TERM (CURRENT) DRUG THERAPY: ICD-10-CM

## 2020-07-08 DIAGNOSIS — Z87.81 PERSONAL HISTORY OF (HEALED) TRAUMATIC FRACTURE: ICD-10-CM

## 2020-07-08 DIAGNOSIS — Z87.01 PERSONAL HISTORY OF PNEUMONIA (RECURRENT): ICD-10-CM

## 2020-07-08 DIAGNOSIS — G47.10 HYPERSOMNIA, UNSPECIFIED: ICD-10-CM

## 2020-07-08 DIAGNOSIS — L97.812 NON-PRESSURE CHRONIC ULCER OF OTHER PART OF RIGHT LOWER LEG WITH FAT LAYER EXPOSED: ICD-10-CM

## 2020-07-08 DIAGNOSIS — I11.9 HYPERTENSIVE HEART DISEASE WITHOUT HEART FAILURE: ICD-10-CM

## 2020-07-08 DIAGNOSIS — L84 CORNS AND CALLOSITIES: ICD-10-CM

## 2020-07-08 DIAGNOSIS — Z95.4 PRESENCE OF OTHER HEART-VALVE REPLACEMENT: ICD-10-CM

## 2020-07-08 DIAGNOSIS — G47.33 OBSTRUCTIVE SLEEP APNEA (ADULT) (PEDIATRIC): ICD-10-CM

## 2020-07-08 DIAGNOSIS — Z87.891 PERSONAL HISTORY OF NICOTINE DEPENDENCE: ICD-10-CM

## 2020-07-08 DIAGNOSIS — I83.218 VARICOSE VEINS OF RIGHT LOWER EXTREMITY WITH BOTH ULCER OF OTHER PART OF LOWER EXTREMITY AND INFLAMMATION: ICD-10-CM

## 2020-07-08 DIAGNOSIS — I34.1 NONRHEUMATIC MITRAL (VALVE) PROLAPSE: ICD-10-CM

## 2020-07-08 DIAGNOSIS — Z98.84 BARIATRIC SURGERY STATUS: ICD-10-CM

## 2020-07-08 DIAGNOSIS — M48.07 SPINAL STENOSIS, LUMBOSACRAL REGION: ICD-10-CM

## 2020-07-08 DIAGNOSIS — E05.90 THYROTOXICOSIS, UNSPECIFIED WITHOUT THYROTOXIC CRISIS OR STORM: ICD-10-CM

## 2020-07-08 DIAGNOSIS — Z83.49 FAMILY HISTORY OF OTHER ENDOCRINE, NUTRITIONAL AND METABOLIC DISEASES: ICD-10-CM

## 2020-07-08 DIAGNOSIS — Z79.82 LONG TERM (CURRENT) USE OF ASPIRIN: ICD-10-CM

## 2020-07-08 DIAGNOSIS — Z82.49 FAMILY HISTORY OF ISCHEMIC HEART DISEASE AND OTHER DISEASES OF THE CIRCULATORY SYSTEM: ICD-10-CM

## 2020-07-08 DIAGNOSIS — E55.9 VITAMIN D DEFICIENCY, UNSPECIFIED: ICD-10-CM

## 2020-07-08 DIAGNOSIS — E11.59 TYPE 2 DIABETES MELLITUS WITH OTHER CIRCULATORY COMPLICATIONS: ICD-10-CM

## 2020-07-08 DIAGNOSIS — Z79.4 LONG TERM (CURRENT) USE OF INSULIN: ICD-10-CM

## 2020-07-08 DIAGNOSIS — M43.16 SPONDYLOLISTHESIS, LUMBAR REGION: ICD-10-CM

## 2020-07-08 DIAGNOSIS — L97.822 NON-PRESSURE CHRONIC ULCER OF OTHER PART OF LEFT LOWER LEG WITH FAT LAYER EXPOSED: ICD-10-CM

## 2020-07-08 DIAGNOSIS — E66.01 MORBID (SEVERE) OBESITY DUE TO EXCESS CALORIES: ICD-10-CM

## 2020-07-08 DIAGNOSIS — I47.1 SUPRAVENTRICULAR TACHYCARDIA: ICD-10-CM

## 2020-07-08 DIAGNOSIS — Z88.5 ALLERGY STATUS TO NARCOTIC AGENT: ICD-10-CM

## 2020-07-08 DIAGNOSIS — I83.228 VARICOSE VEINS OF LEFT LOWER EXTREMITY WITH BOTH ULCER OF OTHER PART OF LOWER EXTREMITY AND INFLAMMATION: ICD-10-CM

## 2020-07-08 DIAGNOSIS — M47.817 SPONDYLOSIS WITHOUT MYELOPATHY OR RADICULOPATHY, LUMBOSACRAL REGION: ICD-10-CM

## 2020-07-10 ENCOUNTER — OUTPATIENT (OUTPATIENT)
Dept: OUTPATIENT SERVICES | Facility: HOSPITAL | Age: 65
LOS: 1 days | Discharge: HOME | End: 2020-07-10
Payer: MEDICARE

## 2020-07-10 ENCOUNTER — APPOINTMENT (OUTPATIENT)
Dept: OBGYN | Facility: HOSPITAL | Age: 65
End: 2020-07-10
Payer: MEDICARE

## 2020-07-10 VITALS
BODY MASS INDEX: 51.91 KG/M2 | OXYGEN SATURATION: 98 % | SYSTOLIC BLOOD PRESSURE: 120 MMHG | HEART RATE: 65 BPM | HEIGHT: 63 IN | DIASTOLIC BLOOD PRESSURE: 61 MMHG | TEMPERATURE: 97 F | WEIGHT: 293 LBS | RESPIRATION RATE: 20 BRPM

## 2020-07-10 DIAGNOSIS — E11.621 TYPE 2 DIABETES MELLITUS WITH FOOT ULCER: ICD-10-CM

## 2020-07-10 DIAGNOSIS — I83.218 VARICOSE VEINS OF RIGHT LOWER EXTREMITY WITH BOTH ULCER OF OTHER PART OF LOWER EXTREMITY AND INFLAMMATION: ICD-10-CM

## 2020-07-10 DIAGNOSIS — I83.228 VARICOSE VEINS OF LEFT LOWER EXTREMITY WITH BOTH ULCER OF OTHER PART OF LOWER EXTREMITY AND INFLAMMATION: ICD-10-CM

## 2020-07-10 DIAGNOSIS — Z95.2 PRESENCE OF PROSTHETIC HEART VALVE: Chronic | ICD-10-CM

## 2020-07-10 DIAGNOSIS — L97.812 NON-PRESSURE CHRONIC ULCER OF OTHER PART OF RIGHT LOWER LEG WITH FAT LAYER EXPOSED: ICD-10-CM

## 2020-07-10 DIAGNOSIS — L97.822 NON-PRESSURE CHRONIC ULCER OF OTHER PART OF LEFT LOWER LEG WITH FAT LAYER EXPOSED: ICD-10-CM

## 2020-07-10 PROCEDURE — G0463: CPT

## 2020-07-10 PROCEDURE — ZZZZZ: CPT

## 2020-07-13 ENCOUNTER — APPOINTMENT (OUTPATIENT)
Dept: WOUND CARE | Facility: HOSPITAL | Age: 65
End: 2020-07-13
Payer: MEDICARE

## 2020-07-13 ENCOUNTER — OUTPATIENT (OUTPATIENT)
Dept: OUTPATIENT SERVICES | Facility: HOSPITAL | Age: 65
LOS: 1 days | Discharge: HOME | End: 2020-07-13
Payer: MEDICARE

## 2020-07-13 VITALS
TEMPERATURE: 97.1 F | SYSTOLIC BLOOD PRESSURE: 127 MMHG | HEART RATE: 66 BPM | WEIGHT: 293 LBS | DIASTOLIC BLOOD PRESSURE: 63 MMHG | OXYGEN SATURATION: 96 % | BODY MASS INDEX: 51.91 KG/M2 | HEIGHT: 63 IN | RESPIRATION RATE: 20 BRPM

## 2020-07-13 DIAGNOSIS — I83.218 VARICOSE VEINS OF RIGHT LOWER EXTREMITY WITH BOTH ULCER OF OTHER PART OF LOWER EXTREMITY AND INFLAMMATION: ICD-10-CM

## 2020-07-13 DIAGNOSIS — Z95.2 PRESENCE OF PROSTHETIC HEART VALVE: Chronic | ICD-10-CM

## 2020-07-13 PROCEDURE — 99213 OFFICE O/P EST LOW 20 MIN: CPT

## 2020-07-13 PROCEDURE — G0463: CPT

## 2020-07-13 NOTE — HISTORY OF PRESENT ILLNESS
[FreeTextEntry1] : Patient seen for venous stasis wounds bilaterally to lower legs down to skin and subcutaneous tissue and fat with stasis dermatitis epithelialized at this time.

## 2020-07-13 NOTE — REVIEW OF SYSTEMS
[Eye Pain] : no eye pain [Earache] : no earache [Fever] : no fever [Shortness Of Breath] : no shortness of breath [Chest Pain] : no chest pain [Abdominal Pain] : no abdominal pain [Skin Wound] : skin wound [de-identified] : venous stasis wounds are located on left and right lower leg down to skin and subcutaneous tissue and fat with stasis dermatitis, healed

## 2020-07-13 NOTE — ASSESSMENT
[Demo] : Demo [Verbal] : Verbal [Written] : Written [Patient] : Patient [Verbalizes knowledge/Understanding] : Verbalizes knowledge/understanding [Skin Care] : skin care [Good - alert, interested, motivated] : Good - alert, interested, motivated [Signs and symptoms of infection] : sign and symptoms of infection [Venous Disease] : venous disease [Nutrition] : nutrition [Pain Management] : pain management [How and When to Call] : how and when to call [Patient responsibility to plan of care] : patient responsibility to plan of care [Compression Therapy] : compression therapy [Stable] : stable [Home] : Home [Cane] : Cane [Not Applicable - Long Term Care/Home Health Agency] : Long Term Care/Home Health Agency: Not Applicable [] : No [FreeTextEntry4] : Patient instructed to purchase over the counter compression stockings\par Follow up in 2 weeks  [FreeTextEntry2] : Infection prevention\par Localized wound care \par Compression therapy

## 2020-07-13 NOTE — PHYSICAL EXAM
Subjective:       Patient ID:  Ida Santana is a 76 y.o. female who presents for   Chief Complaint   Patient presents with    Rash     Rash  - Initial  Affected locations: left fingers and neck  Duration: 1 week (finger x 1 day)  Signs / symptoms: redness and itching  Relieving factors/Treatments tried: OTC moisturizer  Improvement on treatment: mild        Review of Systems   Skin: Positive for itching and rash.   Hematologic/Lymphatic: Does not bruise/bleed easily.   Allergic/Immunologic: Positive for environmental allergies.        Objective:    Physical Exam   Constitutional: She appears well-developed and well-nourished. No distress.   Neurological: She is alert and oriented to person, place, and time. She is not disoriented.   Psychiatric: She has a normal mood and affect.   Skin:   Areas Examined (abnormalities noted in diagram):   Neck Inspection Performed  Chest / Axilla Inspection Performed  Nails and Digits Inspection Performed                  Diagram Legend     Erythematous scaling macule/papule c/w actinic keratosis       Vascular papule c/w angioma      Pigmented verrucoid papule/plaque c/w seborrheic keratosis      Yellow umbilicated papule c/w sebaceous hyperplasia      Irregularly shaped tan macule c/w lentigo     1-2 mm smooth white papules consistent with Milia      Movable subcutaneous cyst with punctum c/w epidermal inclusion cyst      Subcutaneous movable cyst c/w pilar cyst      Firm pink to brown papule c/w dermatofibroma      Pedunculated fleshy papule(s) c/w skin tag(s)      Evenly pigmented macule c/w junctional nevus     Mildly variegated pigmented, slightly irregular-bordered macule c/w mildly atypical nevus      Flesh colored to evenly pigmented papule c/w intradermal nevus       Pink pearly papule/plaque c/w basal cell carcinoma      Erythematous hyperkeratotic cursted plaque c/w SCC      Surgical scar with no sign of skin cancer recurrence      Open and closed comedones       Inflammatory papules and pustules      Verrucoid papule consistent consistent with wart     Erythematous eczematous patches and plaques     Dystrophic onycholytic nail with subungual debris c/w onychomycosis     Umbilicated papule    Erythematous-base heme-crusted tan verrucoid plaque consistent with inflamed seborrheic keratosis     Erythematous Silvery Scaling Plaque c/w Psoriasis     See annotation      Assessment / Plan:        Arthropod bite, initial encounter  Mostly resolved  -     betamethasone dipropionate (DIPROLENE) 0.05 % cream; AAA bid  Dispense: 60 g; Refill: 0             Follow up if symptoms worsen or fail to improve.   [JVD] : no jugular venous distention  [Ankle Swelling (On Exam)] : present [0] : left 0 [Ankle Swelling Bilaterally] : bilaterally  [Varicose Veins Of Lower Extremities] : bilaterally [] : bilaterally [Ankle Swelling On The Left] : moderate [de-identified] : calm [Skin Ulcer] : ulcer [de-identified] : 4/5 strength in all quadrants bilaterally [de-identified] : venous stasis wounds are located on left and right lower leg down to skin and subcutaneous tissue and fat with stasis dermatitis, healed [FreeTextEntry1] : Right anterior leg - No open wounds.  [de-identified] : Cleansed with NS [de-identified] : Expressed comfort post ACE application. [de-identified] : Compression [de-identified] : Daily [de-identified] : Ace wraps [de-identified] : Compression [de-identified] : Daily

## 2020-07-13 NOTE — PLAN
[FreeTextEntry1] : Patient examined and evaluated at this time.\par Continue local wound care and offloading.\par Pt to return in 1 week for follow up.\par Pt to transition to compression socks.

## 2020-07-15 ENCOUNTER — APPOINTMENT (OUTPATIENT)
Dept: WOUND CARE | Facility: HOSPITAL | Age: 65
End: 2020-07-15

## 2020-07-17 ENCOUNTER — APPOINTMENT (OUTPATIENT)
Dept: PLASTIC SURGERY | Facility: HOSPITAL | Age: 65
End: 2020-07-17

## 2020-07-23 ENCOUNTER — APPOINTMENT (OUTPATIENT)
Dept: WOUND CARE | Facility: HOSPITAL | Age: 65
End: 2020-07-23
Payer: MEDICARE

## 2020-07-23 ENCOUNTER — OUTPATIENT (OUTPATIENT)
Dept: OUTPATIENT SERVICES | Facility: HOSPITAL | Age: 65
LOS: 1 days | Discharge: HOME | End: 2020-07-23
Payer: MEDICARE

## 2020-07-23 DIAGNOSIS — Z95.2 PRESENCE OF PROSTHETIC HEART VALVE: Chronic | ICD-10-CM

## 2020-07-23 DIAGNOSIS — I83.218 VARICOSE VEINS OF RIGHT LOWER EXTREMITY WITH BOTH ULCER OF OTHER PART OF LOWER EXTREMITY AND INFLAMMATION: ICD-10-CM

## 2020-07-23 PROCEDURE — G0463: CPT

## 2020-07-23 PROCEDURE — 99213 OFFICE O/P EST LOW 20 MIN: CPT

## 2020-07-25 DIAGNOSIS — Z79.899 OTHER LONG TERM (CURRENT) DRUG THERAPY: ICD-10-CM

## 2020-07-25 DIAGNOSIS — L84 CORNS AND CALLOSITIES: ICD-10-CM

## 2020-07-25 DIAGNOSIS — L97.812 NON-PRESSURE CHRONIC ULCER OF OTHER PART OF RIGHT LOWER LEG WITH FAT LAYER EXPOSED: ICD-10-CM

## 2020-07-25 DIAGNOSIS — Z95.4 PRESENCE OF OTHER HEART-VALVE REPLACEMENT: ICD-10-CM

## 2020-07-25 DIAGNOSIS — I34.1 NONRHEUMATIC MITRAL (VALVE) PROLAPSE: ICD-10-CM

## 2020-07-25 DIAGNOSIS — M48.07 SPINAL STENOSIS, LUMBOSACRAL REGION: ICD-10-CM

## 2020-07-25 DIAGNOSIS — G47.33 OBSTRUCTIVE SLEEP APNEA (ADULT) (PEDIATRIC): ICD-10-CM

## 2020-07-25 DIAGNOSIS — Z87.81 PERSONAL HISTORY OF (HEALED) TRAUMATIC FRACTURE: ICD-10-CM

## 2020-07-25 DIAGNOSIS — I47.1 SUPRAVENTRICULAR TACHYCARDIA: ICD-10-CM

## 2020-07-25 DIAGNOSIS — Z98.84 BARIATRIC SURGERY STATUS: ICD-10-CM

## 2020-07-25 DIAGNOSIS — E66.01 MORBID (SEVERE) OBESITY DUE TO EXCESS CALORIES: ICD-10-CM

## 2020-07-25 DIAGNOSIS — I83.893 VARICOSE VEINS OF BILATERAL LOWER EXTREMITIES WITH OTHER COMPLICATIONS: ICD-10-CM

## 2020-07-25 DIAGNOSIS — I83.218 VARICOSE VEINS OF RIGHT LOWER EXTREMITY WITH BOTH ULCER OF OTHER PART OF LOWER EXTREMITY AND INFLAMMATION: ICD-10-CM

## 2020-07-25 DIAGNOSIS — Z87.01 PERSONAL HISTORY OF PNEUMONIA (RECURRENT): ICD-10-CM

## 2020-07-25 DIAGNOSIS — Z79.4 LONG TERM (CURRENT) USE OF INSULIN: ICD-10-CM

## 2020-07-25 DIAGNOSIS — Z82.49 FAMILY HISTORY OF ISCHEMIC HEART DISEASE AND OTHER DISEASES OF THE CIRCULATORY SYSTEM: ICD-10-CM

## 2020-07-25 DIAGNOSIS — E11.59 TYPE 2 DIABETES MELLITUS WITH OTHER CIRCULATORY COMPLICATIONS: ICD-10-CM

## 2020-07-25 DIAGNOSIS — Z88.5 ALLERGY STATUS TO NARCOTIC AGENT: ICD-10-CM

## 2020-07-25 DIAGNOSIS — E05.90 THYROTOXICOSIS, UNSPECIFIED WITHOUT THYROTOXIC CRISIS OR STORM: ICD-10-CM

## 2020-07-25 DIAGNOSIS — Z79.82 LONG TERM (CURRENT) USE OF ASPIRIN: ICD-10-CM

## 2020-07-25 DIAGNOSIS — Z83.49 FAMILY HISTORY OF OTHER ENDOCRINE, NUTRITIONAL AND METABOLIC DISEASES: ICD-10-CM

## 2020-07-25 DIAGNOSIS — E55.9 VITAMIN D DEFICIENCY, UNSPECIFIED: ICD-10-CM

## 2020-07-25 DIAGNOSIS — G47.10 HYPERSOMNIA, UNSPECIFIED: ICD-10-CM

## 2020-07-25 DIAGNOSIS — I11.9 HYPERTENSIVE HEART DISEASE WITHOUT HEART FAILURE: ICD-10-CM

## 2020-07-25 DIAGNOSIS — M47.817 SPONDYLOSIS WITHOUT MYELOPATHY OR RADICULOPATHY, LUMBOSACRAL REGION: ICD-10-CM

## 2020-07-25 DIAGNOSIS — M43.16 SPONDYLOLISTHESIS, LUMBAR REGION: ICD-10-CM

## 2020-07-28 ENCOUNTER — APPOINTMENT (OUTPATIENT)
Dept: SURGERY | Facility: HOSPITAL | Age: 65
End: 2020-07-28

## 2020-07-28 VITALS
HEART RATE: 74 BPM | RESPIRATION RATE: 20 BRPM | OXYGEN SATURATION: 96 % | TEMPERATURE: 97.9 F | HEIGHT: 63 IN | WEIGHT: 293 LBS | BODY MASS INDEX: 51.91 KG/M2 | SYSTOLIC BLOOD PRESSURE: 130 MMHG | DIASTOLIC BLOOD PRESSURE: 70 MMHG

## 2020-07-28 NOTE — PLAN
[FreeTextEntry1] : Patient examined and evaluated at this time.\par Continue local wound care and offloading.\par Pt to return in 1 week for follow up.\par Orthotist consult for removable compression wraps.

## 2020-07-28 NOTE — HISTORY OF PRESENT ILLNESS
[FreeTextEntry1] : Patient seen for venous stasis wounds to right lower leg down to skin and subcutaneous tissue and fat with stasis dermatitis.

## 2020-07-28 NOTE — REVIEW OF SYSTEMS
[Skin Wound] : skin wound [Fever] : no fever [Eye Pain] : no eye pain [Earache] : no earache [Chest Pain] : no chest pain [Shortness Of Breath] : no shortness of breath [Abdominal Pain] : no abdominal pain [de-identified] : venous stasis wounds are located on right lower leg down to skin and subcutaneous tissue and fat, left is healed. bilateral stasis dermatitis

## 2020-07-28 NOTE — ASSESSMENT
[Verbal] : Verbal [Written] : Written [Patient] : Patient [Demo] : Demo [Good - alert, interested, motivated] : Good - alert, interested, motivated [Verbalizes knowledge/Understanding] : Verbalizes knowledge/understanding [Skin Care] : skin care [Signs and symptoms of infection] : sign and symptoms of infection [Venous Disease] : venous disease [Nutrition] : nutrition [Pain Management] : pain management [How and When to Call] : how and when to call [Compression Therapy] : compression therapy [Patient responsibility to plan of care] : patient responsibility to plan of care [Stable] : stable [Home] : Home [Cane] : Cane [Not Applicable - Long Term Care/Home Health Agency] : Long Term Care/Home Health Agency: Not Applicable [] : No [FreeTextEntry2] : Infection prevention\par Localized wound care \par Compression therapy  [FreeTextEntry4] : Patient instructed to purchase over the counter compression stockings, Pt purchased Compression Stockings & was unable to take them off. Pt had to cut 1 stocking off. \par Submitted for Alberto MILLER) to consult with Pt for a Circaid at Pt's next visit.  \par Follow up in 1 week & Dressing Changes.

## 2020-07-28 NOTE — PHYSICAL EXAM
[0] : left 0 [Ankle Swelling (On Exam)] : present [Ankle Swelling Bilaterally] : bilaterally  [Varicose Veins Of Lower Extremities] : bilaterally [] : bilaterally [Ankle Swelling On The Left] : moderate [Skin Ulcer] : ulcer [JVD] : no jugular venous distention  [de-identified] : calm [de-identified] : 4/5 strength in all quadrants bilaterally [de-identified] : venous stasis wounds are located on right lower leg down to skin and subcutaneous tissue and fat, left is healed. bilateral stasis dermatitis [FreeTextEntry2] : 1.6 [FreeTextEntry3] : 2.5 [FreeTextEntry4] : 0.1 [de-identified] : Serosanguineous  [de-identified] : Intact  [de-identified] : Silver Alginate  [de-identified] : Cleansed with Normal Saline.  [FreeTextEntry1] : Right medial leg - Small areas of scattered eschar - No open wound.  [de-identified] : Expressed comfort post ACE application. [TWNoteComboBox4] : Small [de-identified] : other [de-identified] : None [de-identified] : No [de-identified] : None [de-identified] : 100% [de-identified] : 3x Weekly [de-identified] : Compression [de-identified] : Ace wraps [de-identified] : Daily [de-identified] : Compression

## 2020-07-30 ENCOUNTER — APPOINTMENT (OUTPATIENT)
Dept: WOUND CARE | Facility: HOSPITAL | Age: 65
End: 2020-07-30
Payer: MEDICARE

## 2020-07-30 ENCOUNTER — OUTPATIENT (OUTPATIENT)
Dept: OUTPATIENT SERVICES | Facility: HOSPITAL | Age: 65
LOS: 1 days | Discharge: HOME | End: 2020-07-30
Payer: MEDICARE

## 2020-07-30 VITALS
RESPIRATION RATE: 20 BRPM | WEIGHT: 293 LBS | BODY MASS INDEX: 51.91 KG/M2 | DIASTOLIC BLOOD PRESSURE: 53 MMHG | OXYGEN SATURATION: 95 % | HEIGHT: 63 IN | SYSTOLIC BLOOD PRESSURE: 129 MMHG | TEMPERATURE: 97.3 F | HEART RATE: 74 BPM

## 2020-07-30 DIAGNOSIS — I83.218 VARICOSE VEINS OF RIGHT LOWER EXTREMITY WITH BOTH ULCER OF OTHER PART OF LOWER EXTREMITY AND INFLAMMATION: ICD-10-CM

## 2020-07-30 DIAGNOSIS — Z95.2 PRESENCE OF PROSTHETIC HEART VALVE: Chronic | ICD-10-CM

## 2020-07-30 PROCEDURE — G0463: CPT

## 2020-07-30 PROCEDURE — 99214 OFFICE O/P EST MOD 30 MIN: CPT

## 2020-07-31 DIAGNOSIS — Z98.84 BARIATRIC SURGERY STATUS: ICD-10-CM

## 2020-07-31 DIAGNOSIS — Z87.891 PERSONAL HISTORY OF NICOTINE DEPENDENCE: ICD-10-CM

## 2020-07-31 DIAGNOSIS — L97.812 NON-PRESSURE CHRONIC ULCER OF OTHER PART OF RIGHT LOWER LEG WITH FAT LAYER EXPOSED: ICD-10-CM

## 2020-07-31 DIAGNOSIS — M48.07 SPINAL STENOSIS, LUMBOSACRAL REGION: ICD-10-CM

## 2020-07-31 DIAGNOSIS — M47.817 SPONDYLOSIS WITHOUT MYELOPATHY OR RADICULOPATHY, LUMBOSACRAL REGION: ICD-10-CM

## 2020-07-31 DIAGNOSIS — E66.01 MORBID (SEVERE) OBESITY DUE TO EXCESS CALORIES: ICD-10-CM

## 2020-07-31 DIAGNOSIS — I83.893 VARICOSE VEINS OF BILATERAL LOWER EXTREMITIES WITH OTHER COMPLICATIONS: ICD-10-CM

## 2020-07-31 DIAGNOSIS — I34.1 NONRHEUMATIC MITRAL (VALVE) PROLAPSE: ICD-10-CM

## 2020-07-31 DIAGNOSIS — I47.1 SUPRAVENTRICULAR TACHYCARDIA: ICD-10-CM

## 2020-07-31 DIAGNOSIS — Z79.82 LONG TERM (CURRENT) USE OF ASPIRIN: ICD-10-CM

## 2020-07-31 DIAGNOSIS — M43.16 SPONDYLOLISTHESIS, LUMBAR REGION: ICD-10-CM

## 2020-07-31 DIAGNOSIS — Z79.899 OTHER LONG TERM (CURRENT) DRUG THERAPY: ICD-10-CM

## 2020-07-31 DIAGNOSIS — E55.9 VITAMIN D DEFICIENCY, UNSPECIFIED: ICD-10-CM

## 2020-07-31 DIAGNOSIS — E05.90 THYROTOXICOSIS, UNSPECIFIED WITHOUT THYROTOXIC CRISIS OR STORM: ICD-10-CM

## 2020-07-31 DIAGNOSIS — Z95.4 PRESENCE OF OTHER HEART-VALVE REPLACEMENT: ICD-10-CM

## 2020-07-31 DIAGNOSIS — E11.59 TYPE 2 DIABETES MELLITUS WITH OTHER CIRCULATORY COMPLICATIONS: ICD-10-CM

## 2020-07-31 DIAGNOSIS — G47.33 OBSTRUCTIVE SLEEP APNEA (ADULT) (PEDIATRIC): ICD-10-CM

## 2020-07-31 DIAGNOSIS — Z87.01 PERSONAL HISTORY OF PNEUMONIA (RECURRENT): ICD-10-CM

## 2020-07-31 DIAGNOSIS — I83.218 VARICOSE VEINS OF RIGHT LOWER EXTREMITY WITH BOTH ULCER OF OTHER PART OF LOWER EXTREMITY AND INFLAMMATION: ICD-10-CM

## 2020-07-31 DIAGNOSIS — Z87.81 PERSONAL HISTORY OF (HEALED) TRAUMATIC FRACTURE: ICD-10-CM

## 2020-07-31 DIAGNOSIS — Z82.49 FAMILY HISTORY OF ISCHEMIC HEART DISEASE AND OTHER DISEASES OF THE CIRCULATORY SYSTEM: ICD-10-CM

## 2020-07-31 DIAGNOSIS — G47.10 HYPERSOMNIA, UNSPECIFIED: ICD-10-CM

## 2020-07-31 DIAGNOSIS — Z83.49 FAMILY HISTORY OF OTHER ENDOCRINE, NUTRITIONAL AND METABOLIC DISEASES: ICD-10-CM

## 2020-07-31 DIAGNOSIS — Z88.5 ALLERGY STATUS TO NARCOTIC AGENT: ICD-10-CM

## 2020-07-31 DIAGNOSIS — I11.9 HYPERTENSIVE HEART DISEASE WITHOUT HEART FAILURE: ICD-10-CM

## 2020-07-31 DIAGNOSIS — Z79.4 LONG TERM (CURRENT) USE OF INSULIN: ICD-10-CM

## 2020-07-31 NOTE — REVIEW OF SYSTEMS
[Skin Wound] : skin wound [Fever] : no fever [Eye Pain] : no eye pain [Earache] : no earache [Chest Pain] : no chest pain [Shortness Of Breath] : no shortness of breath [Abdominal Pain] : no abdominal pain [de-identified] : venous stasis wounds are located on right lower leg down to skin and subcutaneous tissue and fat. bilateral stasis dermatitis. new left hallux diabetic foot ulcer down to skin, subcutaneous tissue, and fat.

## 2020-07-31 NOTE — HISTORY OF PRESENT ILLNESS
[FreeTextEntry1] : Patient seen for venous stasis wounds to right lower leg down to skin and subcutaneous tissue and fat with stasis dermatitis. Pt also seen for new left hallux diabetic foot ulcer down to skin, subcutaneous tissue, fat which patient does not recall how she ulcerated. Pt relates that she noticed the new ulceration when her dog began to lick her foot.

## 2020-07-31 NOTE — ASSESSMENT
[Verbal] : Verbal [Patient] : Patient [Demo] : Demo [Written] : Written [Verbalizes knowledge/Understanding] : Verbalizes knowledge/understanding [Good - alert, interested, motivated] : Good - alert, interested, motivated [Foot Care] : foot care [Dressing changes] : dressing changes [Skin Care] : skin care [How and When to Call] : how and when to call [Signs and symptoms of infection] : sign and symptoms of infection [Venous Disease] : venous disease [Compression Therapy] : compression therapy [Patient responsibility to plan of care] : patient responsibility to plan of care [Home] : Home [Stable] : stable [Cane] : Cane [Not Applicable - Long Term Care/Home Health Agency] : Long Term Care/Home Health Agency: Not Applicable [] : No [FreeTextEntry2] : Infection prevention\par Localized wound care \par Goal of remaining pain free regarding wounds.\par Offloading\par Compression therapy  [FreeTextEntry4] : Alberto to meet patient to have Circaid next visit. Asked patient to make earlier appointment. \par Follow up in 1 week  [FreeTextEntry3] : New wound

## 2020-07-31 NOTE — PHYSICAL EXAM
[0] : right 0 [Ankle Swelling (On Exam)] : present [Ankle Swelling Bilaterally] : bilaterally  [Varicose Veins Of Lower Extremities] : bilaterally [] : bilaterally [Ankle Swelling On The Left] : moderate [Skin Ulcer] : ulcer [JVD] : no jugular venous distention  [de-identified] : calm [de-identified] : 4/5 strength in all quadrants bilaterally [de-identified] : venous stasis wounds are located on right lower leg down to skin and subcutaneous tissue and fat. bilateral stasis dermatitis. new left hallux diabetic foot ulcer down to skin, subcutaneous tissue, and fat. [FreeTextEntry3] : 1 [FreeTextEntry2] : 0.8 [de-identified] : Mild  [de-identified] : Scant Serous/sanguinous [FreeTextEntry4] : 0.1 [FreeTextEntry1] : Right medial leg - Small areas of scattered eschar  [de-identified] : Silver alginate [de-identified] : Cleansed with NS\par No other treatment  [FreeTextEntry7] : Left medial hallux (New)  [de-identified] : Expressed comfort post ACE application. [de-identified] : 0.1 [de-identified] : Scant Serous/sanguinous [FreeTextEntry9] : 0.8 [FreeTextEntry8] : 0.4 [de-identified] : Expressed comfort post ACE application. [de-identified] : Silver alginate [de-identified] : Cleansed with NS\par Toe sock\par Cloth tape  [de-identified] : Erythema [de-identified] : None [de-identified] : 100% [de-identified] : None [de-identified] : No [de-identified] : 3x Weekly [de-identified] : Primary Dressing [de-identified] : Ace wraps [de-identified] : Compression [de-identified] : None [de-identified] : Normal [de-identified] : None [de-identified] : Ace wraps [de-identified] : 100% [de-identified] : No [de-identified] : 3x Weekly [de-identified] : Primary Dressing

## 2020-08-04 ENCOUNTER — APPOINTMENT (OUTPATIENT)
Dept: WOUND CARE | Facility: HOSPITAL | Age: 65
End: 2020-08-04

## 2020-08-06 ENCOUNTER — APPOINTMENT (OUTPATIENT)
Dept: WOUND CARE | Facility: HOSPITAL | Age: 65
End: 2020-08-06

## 2020-08-12 ENCOUNTER — OUTPATIENT (OUTPATIENT)
Dept: OUTPATIENT SERVICES | Facility: HOSPITAL | Age: 65
LOS: 1 days | Discharge: HOME | End: 2020-08-12
Payer: MEDICARE

## 2020-08-12 ENCOUNTER — APPOINTMENT (OUTPATIENT)
Dept: WOUND CARE | Facility: HOSPITAL | Age: 65
End: 2020-08-12
Payer: MEDICARE

## 2020-08-12 VITALS
BODY MASS INDEX: 51.91 KG/M2 | RESPIRATION RATE: 20 BRPM | WEIGHT: 293 LBS | HEIGHT: 63 IN | OXYGEN SATURATION: 97 % | DIASTOLIC BLOOD PRESSURE: 62 MMHG | SYSTOLIC BLOOD PRESSURE: 130 MMHG | HEART RATE: 67 BPM | TEMPERATURE: 97.9 F

## 2020-08-12 DIAGNOSIS — I83.218 VARICOSE VEINS OF RIGHT LOWER EXTREMITY WITH BOTH ULCER OF OTHER PART OF LOWER EXTREMITY AND INFLAMMATION: ICD-10-CM

## 2020-08-12 DIAGNOSIS — Z95.2 PRESENCE OF PROSTHETIC HEART VALVE: Chronic | ICD-10-CM

## 2020-08-12 PROCEDURE — 29581 APPL MULTLAYER CMPRN SYS LEG: CPT | Mod: 50

## 2020-08-12 PROCEDURE — 99214 OFFICE O/P EST MOD 30 MIN: CPT

## 2020-08-12 NOTE — PHYSICAL EXAM
[4 x 4] : 4 x 4  [JVD] : no jugular venous distention  [0] : left 0 [Ankle Swelling (On Exam)] : present [Ankle Swelling Bilaterally] : bilaterally  [Varicose Veins Of Lower Extremities] : bilaterally [] : bilaterally [Ankle Swelling On The Left] : moderate [Skin Ulcer] : ulcer [de-identified] : calm [de-identified] : 4/5 strength in all quadrants bilaterally [de-identified] : venous stasis wounds are located on right and left lower leg down to skin and subcutaneous tissue and fat. bilateral stasis dermatitis. left hallux diabetic foot ulcer down to skin, subcutaneous tissue, and fat epithelialized [de-identified] : No neurovascular deficit noted  [FreeTextEntry8] : 0.7 [FreeTextEntry9] : 1.5 [de-identified] : 0.1 [de-identified] : Serosanguineous  [de-identified] : Pt denies any pain or discomfort post Coban application  [de-identified] : Ag Alginate  [de-identified] : Cleansed with Normal Saline \par Kerlix [de-identified] : No neurovascular deficit noted  [FreeTextEntry1] : Medial Leg - Scattered open areas within Measurement  [FreeTextEntry2] : 4.5 [FreeTextEntry3] : 2.3 [FreeTextEntry4] : 0.1 [de-identified] : Serosanguineous [de-identified] : Pt denies any pain or discomfort post Coban application  [de-identified] : Ag Alginate  [de-identified] : Cleansed with Normal Saline \par Kerlix [FreeTextEntry7] : Medial Hallux - CLOSED  [de-identified] : Foot Digit #2 - NEW [de-identified] : 0.2 [de-identified] : 0.1 [de-identified] : 0.1 [de-identified] : Scant Serosanguineous  [de-identified] : Ag Alginate [de-identified] : Cleansed with Normal Saline\par Bandaid   [de-identified] : Foot Digit #4 - NEW [de-identified] : 0.3 [de-identified] : 0.2 [de-identified] : 0.1 [de-identified] : Scant Serosanguineous  [de-identified] : Ag Alginate  [de-identified] : Cleansed with Normal Saline \par Bandaid  [de-identified] : No [de-identified] : Small [de-identified] : No [de-identified] : No [de-identified] : Normal [de-identified] : None [de-identified] : No [de-identified] : Multilayer other compression wrap [de-identified] : 2x Weekly [TWNoteComboBox1] : Right [TWNoteComboBox4] : Small [TWNoteComboBox5] : No [de-identified] : No [de-identified] : Normal [de-identified] : None [de-identified] : None [de-identified] : 100% [de-identified] : No [de-identified] : Multilayer other compression wrap [de-identified] : 2x Daily [TWNoteComboBox9] : Left [de-identified] : Right [de-identified] : No [de-identified] : Traumatic [de-identified] : No [de-identified] : Normal [de-identified] : None [de-identified] : None [de-identified] : 100% [de-identified] : No [de-identified] : 3x Weekly [de-identified] : Left [de-identified] : No [de-identified] : No [de-identified] : Normal [de-identified] : None [de-identified] : None [de-identified] : 100% [de-identified] : No [de-identified] : 3x Weekly

## 2020-08-12 NOTE — HISTORY OF PRESENT ILLNESS
[FreeTextEntry1] : Patient seen for venous stasis wounds to right lower leg down to skin and subcutaneous tissue and fat with stasis dermatitis. Pt also seen for left hallux diabetic foot ulcer down to skin, subcutaneous tissue. Patient seen for new venous stasis wounds to left lower leg down to skin and subcutaneous tissue and fat with stasis dermatitis.

## 2020-08-12 NOTE — REVIEW OF SYSTEMS
[Fever] : no fever [Eye Pain] : no eye pain [Earache] : no earache [Chest Pain] : no chest pain [Shortness Of Breath] : no shortness of breath [Abdominal Pain] : no abdominal pain [Skin Wound] : skin wound [de-identified] : venous stasis wounds are located on right and left lower leg down to skin and subcutaneous tissue and fat. bilateral stasis dermatitis. left hallux diabetic foot ulcer down to skin, subcutaneous tissue, and fat epithelialized

## 2020-08-12 NOTE — ASSESSMENT
[Verbal] : Verbal [Written] : Written [Demo] : Demo [Patient] : Patient [Verbalizes knowledge/Understanding] : Verbalizes knowledge/understanding [Good - alert, interested, motivated] : Good - alert, interested, motivated [Foot Care] : foot care [Dressing changes] : dressing changes [Skin Care] : skin care [Pressure relief] : pressure relief [Signs and symptoms of infection] : sign and symptoms of infection [How and When to Call] : how and when to call [Off-loading] : off-loading [Compression Therapy] : compression therapy [Patient responsibility to plan of care] : patient responsibility to plan of care [Home] : Home [Stable] : stable [Not Applicable - Long Term Care/Home Health Agency] : Long Term Care/Home Health Agency: Not Applicable [Walker] : Walker [] : No [FreeTextEntry2] : Infection Prevention \par Localized wound care \par Compression Therapy \par Promote optimal skin integrity  [FreeTextEntry4] : Pt to return to [facility] for bandage change\par F/U for assessment in one week

## 2020-08-13 DIAGNOSIS — Z83.49 FAMILY HISTORY OF OTHER ENDOCRINE, NUTRITIONAL AND METABOLIC DISEASES: ICD-10-CM

## 2020-08-13 DIAGNOSIS — M43.16 SPONDYLOLISTHESIS, LUMBAR REGION: ICD-10-CM

## 2020-08-13 DIAGNOSIS — M47.817 SPONDYLOSIS WITHOUT MYELOPATHY OR RADICULOPATHY, LUMBOSACRAL REGION: ICD-10-CM

## 2020-08-13 DIAGNOSIS — L97.812 NON-PRESSURE CHRONIC ULCER OF OTHER PART OF RIGHT LOWER LEG WITH FAT LAYER EXPOSED: ICD-10-CM

## 2020-08-13 DIAGNOSIS — M48.07 SPINAL STENOSIS, LUMBOSACRAL REGION: ICD-10-CM

## 2020-08-13 DIAGNOSIS — I11.0 HYPERTENSIVE HEART DISEASE WITH HEART FAILURE: ICD-10-CM

## 2020-08-13 DIAGNOSIS — Z87.81 PERSONAL HISTORY OF (HEALED) TRAUMATIC FRACTURE: ICD-10-CM

## 2020-08-13 DIAGNOSIS — Z95.4 PRESENCE OF OTHER HEART-VALVE REPLACEMENT: ICD-10-CM

## 2020-08-13 DIAGNOSIS — Z87.01 PERSONAL HISTORY OF PNEUMONIA (RECURRENT): ICD-10-CM

## 2020-08-13 DIAGNOSIS — Z79.899 OTHER LONG TERM (CURRENT) DRUG THERAPY: ICD-10-CM

## 2020-08-13 DIAGNOSIS — G47.10 HYPERSOMNIA, UNSPECIFIED: ICD-10-CM

## 2020-08-13 DIAGNOSIS — G47.33 OBSTRUCTIVE SLEEP APNEA (ADULT) (PEDIATRIC): ICD-10-CM

## 2020-08-13 DIAGNOSIS — E11.59 TYPE 2 DIABETES MELLITUS WITH OTHER CIRCULATORY COMPLICATIONS: ICD-10-CM

## 2020-08-13 DIAGNOSIS — Z79.4 LONG TERM (CURRENT) USE OF INSULIN: ICD-10-CM

## 2020-08-13 DIAGNOSIS — Z88.5 ALLERGY STATUS TO NARCOTIC AGENT: ICD-10-CM

## 2020-08-13 DIAGNOSIS — Z79.82 LONG TERM (CURRENT) USE OF ASPIRIN: ICD-10-CM

## 2020-08-13 DIAGNOSIS — E55.9 VITAMIN D DEFICIENCY, UNSPECIFIED: ICD-10-CM

## 2020-08-13 DIAGNOSIS — Z98.84 BARIATRIC SURGERY STATUS: ICD-10-CM

## 2020-08-13 DIAGNOSIS — I47.1 SUPRAVENTRICULAR TACHYCARDIA: ICD-10-CM

## 2020-08-13 DIAGNOSIS — E05.90 THYROTOXICOSIS, UNSPECIFIED WITHOUT THYROTOXIC CRISIS OR STORM: ICD-10-CM

## 2020-08-13 DIAGNOSIS — L84 CORNS AND CALLOSITIES: ICD-10-CM

## 2020-08-13 DIAGNOSIS — I83.218 VARICOSE VEINS OF RIGHT LOWER EXTREMITY WITH BOTH ULCER OF OTHER PART OF LOWER EXTREMITY AND INFLAMMATION: ICD-10-CM

## 2020-08-13 DIAGNOSIS — E66.01 MORBID (SEVERE) OBESITY DUE TO EXCESS CALORIES: ICD-10-CM

## 2020-08-13 DIAGNOSIS — I83.893 VARICOSE VEINS OF BILATERAL LOWER EXTREMITIES WITH OTHER COMPLICATIONS: ICD-10-CM

## 2020-08-13 DIAGNOSIS — Z82.49 FAMILY HISTORY OF ISCHEMIC HEART DISEASE AND OTHER DISEASES OF THE CIRCULATORY SYSTEM: ICD-10-CM

## 2020-08-13 DIAGNOSIS — I34.1 NONRHEUMATIC MITRAL (VALVE) PROLAPSE: ICD-10-CM

## 2020-08-13 DIAGNOSIS — Z87.891 PERSONAL HISTORY OF NICOTINE DEPENDENCE: ICD-10-CM

## 2020-08-17 ENCOUNTER — APPOINTMENT (OUTPATIENT)
Dept: WOUND CARE | Facility: HOSPITAL | Age: 65
End: 2020-08-17

## 2020-08-20 ENCOUNTER — OUTPATIENT (OUTPATIENT)
Dept: OUTPATIENT SERVICES | Facility: HOSPITAL | Age: 65
LOS: 1 days | Discharge: HOME | End: 2020-08-20
Payer: MEDICARE

## 2020-08-20 ENCOUNTER — APPOINTMENT (OUTPATIENT)
Dept: WOUND CARE | Facility: HOSPITAL | Age: 65
End: 2020-08-20
Payer: MEDICARE

## 2020-08-20 VITALS
RESPIRATION RATE: 20 BRPM | DIASTOLIC BLOOD PRESSURE: 82 MMHG | OXYGEN SATURATION: 95 % | BODY MASS INDEX: 51.91 KG/M2 | WEIGHT: 293 LBS | HEIGHT: 63 IN | SYSTOLIC BLOOD PRESSURE: 140 MMHG | TEMPERATURE: 98.3 F | HEART RATE: 67 BPM

## 2020-08-20 DIAGNOSIS — Z95.2 PRESENCE OF PROSTHETIC HEART VALVE: Chronic | ICD-10-CM

## 2020-08-20 DIAGNOSIS — I83.218 VARICOSE VEINS OF RIGHT LOWER EXTREMITY WITH BOTH ULCER OF OTHER PART OF LOWER EXTREMITY AND INFLAMMATION: ICD-10-CM

## 2020-08-20 PROCEDURE — 29581 APPL MULTLAYER CMPRN SYS LEG: CPT | Mod: 50

## 2020-08-20 PROCEDURE — 99213 OFFICE O/P EST LOW 20 MIN: CPT

## 2020-08-20 NOTE — PLAN
[FreeTextEntry1] : Patient examined and evaluated at this time.\par Continue local wound care and offloading.\par Pt to obtain compression sleeves for lower legs.\par Pt to return in 1 week for follow up.

## 2020-08-20 NOTE — HISTORY OF PRESENT ILLNESS
[FreeTextEntry1] : Patient seen for venous stasis wounds to right and left lower leg down to skin and subcutaneous tissue and fat with stasis dermatitis. Denies any other complaints at this time.

## 2020-08-20 NOTE — ASSESSMENT
[Verbal] : Verbal [Patient] : Patient [Written] : Written [Demo] : Demo [Good - alert, interested, motivated] : Good - alert, interested, motivated [Dressing changes] : dressing changes [Verbalizes knowledge/Understanding] : Verbalizes knowledge/understanding [Foot Care] : foot care [Skin Care] : skin care [Pressure relief] : pressure relief [Signs and symptoms of infection] : sign and symptoms of infection [How and When to Call] : how and when to call [Compression Therapy] : compression therapy [Off-loading] : off-loading [Patient responsibility to plan of care] : patient responsibility to plan of care [Stable] : stable [Home] : Home [Not Applicable - Long Term Care/Home Health Agency] : Long Term Care/Home Health Agency: Not Applicable [Cane] : Cane [] : No [FreeTextEntry2] : Infection Prevention \par Localized wound care \par Compression Therapy \par Promote optimal skin integrity  [FreeTextEntry4] : Alberto ([facility]) measured Pt for Circaids at today's visit to [facility].\par Pt to F/U to [facility] in 1 Week

## 2020-08-20 NOTE — REVIEW OF SYSTEMS
[Skin Wound] : skin wound [Fever] : no fever [Eye Pain] : no eye pain [Chest Pain] : no chest pain [Earache] : no earache [Shortness Of Breath] : no shortness of breath [Abdominal Pain] : no abdominal pain [de-identified] : venous stasis wounds are located on right and left lower leg down to skin and subcutaneous tissue and fat. bilateral stasis dermatitis.

## 2020-08-20 NOTE — PHYSICAL EXAM
[4 x 4] : 4 x 4  [Ankle Swelling (On Exam)] : present [0] : right 0 [Ankle Swelling Bilaterally] : bilaterally  [Varicose Veins Of Lower Extremities] : bilaterally [] : bilaterally [Ankle Swelling On The Left] : moderate [Skin Ulcer] : ulcer [JVD] : no jugular venous distention  [de-identified] : calm [de-identified] : 4/5 strength in all quadrants bilaterally [de-identified] : venous stasis wounds are located on right and left lower leg down to skin and subcutaneous tissue and fat. bilateral stasis dermatitis. [FreeTextEntry9] : 1.2 [FreeTextEntry8] : 0.7 [de-identified] : 0.1 [de-identified] : Scant Serosanguineous  [de-identified] : Pt denies any pain or discomfort post Coban application  [de-identified] : Silver Alginate  [de-identified] : Cleansed with Normal Saline \par Kerlix [FreeTextEntry1] : Medial Leg - Scattered open areas within Measurement  [FreeTextEntry2] : 4.9 [FreeTextEntry3] : 2.6 [de-identified] : Pt denies any pain or discomfort post Coban application  [de-identified] : Serosanguineous [FreeTextEntry4] : 0.1 [de-identified] : Silver Alginate  [de-identified] : Cleansed with Normal Saline \par Kerlix [FreeTextEntry7] : Medial Hallux - CLOSED  [de-identified] : Foot 2nd Digit- Resolved [de-identified] : Cleansed with Normal Saline\par  [de-identified] : Foot 4th Digit - Resolved [de-identified] : Cleansed with Normal Saline \par  [de-identified] : No [de-identified] : Small [de-identified] : No [de-identified] : No [de-identified] : Normal [de-identified] : None [de-identified] : No [de-identified] : Ace wraps [de-identified] : 3x Weekly [TWNoteComboBox1] : Right [TWNoteComboBox4] : Small [TWNoteComboBox5] : No [de-identified] : No [de-identified] : Normal [de-identified] : None [de-identified] : None [de-identified] : 100% [de-identified] : No [de-identified] : Multilayer other compression wrap [de-identified] : 3x Weekly [TWNoteComboBox9] : Left [de-identified] : Right [de-identified] : No [de-identified] : Traumatic [de-identified] : No [de-identified] : Normal [de-identified] : None [de-identified] : None [de-identified] : 100% [de-identified] : No [de-identified] : 3x Weekly [de-identified] : No [de-identified] : Left [de-identified] : No [de-identified] : Normal [de-identified] : None [de-identified] : None [de-identified] : 100% [de-identified] : No [de-identified] : 3x Weekly

## 2020-08-23 DIAGNOSIS — Z82.49 FAMILY HISTORY OF ISCHEMIC HEART DISEASE AND OTHER DISEASES OF THE CIRCULATORY SYSTEM: ICD-10-CM

## 2020-08-23 DIAGNOSIS — Z79.4 LONG TERM (CURRENT) USE OF INSULIN: ICD-10-CM

## 2020-08-23 DIAGNOSIS — L97.812 NON-PRESSURE CHRONIC ULCER OF OTHER PART OF RIGHT LOWER LEG WITH FAT LAYER EXPOSED: ICD-10-CM

## 2020-08-23 DIAGNOSIS — Z79.899 OTHER LONG TERM (CURRENT) DRUG THERAPY: ICD-10-CM

## 2020-08-23 DIAGNOSIS — I47.1 SUPRAVENTRICULAR TACHYCARDIA: ICD-10-CM

## 2020-08-23 DIAGNOSIS — Z95.4 PRESENCE OF OTHER HEART-VALVE REPLACEMENT: ICD-10-CM

## 2020-08-23 DIAGNOSIS — G47.33 OBSTRUCTIVE SLEEP APNEA (ADULT) (PEDIATRIC): ICD-10-CM

## 2020-08-23 DIAGNOSIS — E11.59 TYPE 2 DIABETES MELLITUS WITH OTHER CIRCULATORY COMPLICATIONS: ICD-10-CM

## 2020-08-23 DIAGNOSIS — Z98.84 BARIATRIC SURGERY STATUS: ICD-10-CM

## 2020-08-23 DIAGNOSIS — I83.893 VARICOSE VEINS OF BILATERAL LOWER EXTREMITIES WITH OTHER COMPLICATIONS: ICD-10-CM

## 2020-08-23 DIAGNOSIS — Z87.891 PERSONAL HISTORY OF NICOTINE DEPENDENCE: ICD-10-CM

## 2020-08-23 DIAGNOSIS — I11.9 HYPERTENSIVE HEART DISEASE WITHOUT HEART FAILURE: ICD-10-CM

## 2020-08-23 DIAGNOSIS — E66.01 MORBID (SEVERE) OBESITY DUE TO EXCESS CALORIES: ICD-10-CM

## 2020-08-23 DIAGNOSIS — Z87.01 PERSONAL HISTORY OF PNEUMONIA (RECURRENT): ICD-10-CM

## 2020-08-23 DIAGNOSIS — Z79.82 LONG TERM (CURRENT) USE OF ASPIRIN: ICD-10-CM

## 2020-08-23 DIAGNOSIS — M43.16 SPONDYLOLISTHESIS, LUMBAR REGION: ICD-10-CM

## 2020-08-23 DIAGNOSIS — G47.10 HYPERSOMNIA, UNSPECIFIED: ICD-10-CM

## 2020-08-23 DIAGNOSIS — Z87.81 PERSONAL HISTORY OF (HEALED) TRAUMATIC FRACTURE: ICD-10-CM

## 2020-08-23 DIAGNOSIS — L97.822 NON-PRESSURE CHRONIC ULCER OF OTHER PART OF LEFT LOWER LEG WITH FAT LAYER EXPOSED: ICD-10-CM

## 2020-08-23 DIAGNOSIS — E05.90 THYROTOXICOSIS, UNSPECIFIED WITHOUT THYROTOXIC CRISIS OR STORM: ICD-10-CM

## 2020-08-23 DIAGNOSIS — M48.07 SPINAL STENOSIS, LUMBOSACRAL REGION: ICD-10-CM

## 2020-08-23 DIAGNOSIS — M47.817 SPONDYLOSIS WITHOUT MYELOPATHY OR RADICULOPATHY, LUMBOSACRAL REGION: ICD-10-CM

## 2020-08-23 DIAGNOSIS — L84 CORNS AND CALLOSITIES: ICD-10-CM

## 2020-08-23 DIAGNOSIS — I83.228 VARICOSE VEINS OF LEFT LOWER EXTREMITY WITH BOTH ULCER OF OTHER PART OF LOWER EXTREMITY AND INFLAMMATION: ICD-10-CM

## 2020-08-23 DIAGNOSIS — I34.1 NONRHEUMATIC MITRAL (VALVE) PROLAPSE: ICD-10-CM

## 2020-08-23 DIAGNOSIS — Z83.49 FAMILY HISTORY OF OTHER ENDOCRINE, NUTRITIONAL AND METABOLIC DISEASES: ICD-10-CM

## 2020-08-23 DIAGNOSIS — Z88.5 ALLERGY STATUS TO NARCOTIC AGENT: ICD-10-CM

## 2020-08-23 DIAGNOSIS — E55.9 VITAMIN D DEFICIENCY, UNSPECIFIED: ICD-10-CM

## 2020-08-23 DIAGNOSIS — I83.218 VARICOSE VEINS OF RIGHT LOWER EXTREMITY WITH BOTH ULCER OF OTHER PART OF LOWER EXTREMITY AND INFLAMMATION: ICD-10-CM

## 2020-08-24 ENCOUNTER — APPOINTMENT (OUTPATIENT)
Dept: WOUND CARE | Facility: HOSPITAL | Age: 65
End: 2020-08-24

## 2020-08-27 ENCOUNTER — INPATIENT (INPATIENT)
Facility: HOSPITAL | Age: 65
LOS: 4 days | Discharge: HOME | DRG: 176 | End: 2020-09-01
Attending: HOSPITALIST | Admitting: HOSPITALIST
Payer: MEDICARE

## 2020-08-27 VITALS
RESPIRATION RATE: 22 BRPM | SYSTOLIC BLOOD PRESSURE: 114 MMHG | TEMPERATURE: 98 F | HEART RATE: 84 BPM | WEIGHT: 289.91 LBS | HEIGHT: 63 IN | DIASTOLIC BLOOD PRESSURE: 55 MMHG | OXYGEN SATURATION: 93 %

## 2020-08-27 DIAGNOSIS — I26.99 OTHER PULMONARY EMBOLISM WITHOUT ACUTE COR PULMONALE: ICD-10-CM

## 2020-08-27 DIAGNOSIS — Z95.2 PRESENCE OF PROSTHETIC HEART VALVE: Chronic | ICD-10-CM

## 2020-08-27 LAB
ALBUMIN SERPL ELPH-MCNC: 4.1 G/DL — SIGNIFICANT CHANGE UP (ref 3.3–5)
ALP SERPL-CCNC: 188 U/L — HIGH (ref 40–120)
ALT FLD-CCNC: 19 U/L — SIGNIFICANT CHANGE UP (ref 10–45)
ANION GAP SERPL CALC-SCNC: 15 MMOL/L — SIGNIFICANT CHANGE UP (ref 5–17)
AST SERPL-CCNC: 19 U/L — SIGNIFICANT CHANGE UP (ref 10–40)
BASOPHILS # BLD AUTO: 0.02 K/UL — SIGNIFICANT CHANGE UP (ref 0–0.2)
BASOPHILS NFR BLD AUTO: 0.2 % — SIGNIFICANT CHANGE UP (ref 0–2)
BILIRUB SERPL-MCNC: 0.7 MG/DL — SIGNIFICANT CHANGE UP (ref 0.2–1.2)
BUN SERPL-MCNC: 18 MG/DL — SIGNIFICANT CHANGE UP (ref 7–23)
CALCIUM SERPL-MCNC: 10.5 MG/DL — SIGNIFICANT CHANGE UP (ref 8.4–10.5)
CHLORIDE SERPL-SCNC: 98 MMOL/L — SIGNIFICANT CHANGE UP (ref 96–108)
CO2 SERPL-SCNC: 26 MMOL/L — SIGNIFICANT CHANGE UP (ref 22–31)
CREAT SERPL-MCNC: 0.8 MG/DL — SIGNIFICANT CHANGE UP (ref 0.5–1.3)
D DIMER BLD IA.RAPID-MCNC: 1186 NG/ML DDU — HIGH
EOSINOPHIL # BLD AUTO: 0.08 K/UL — SIGNIFICANT CHANGE UP (ref 0–0.5)
EOSINOPHIL NFR BLD AUTO: 0.7 % — SIGNIFICANT CHANGE UP (ref 0–6)
GAS PNL BLDV: SIGNIFICANT CHANGE UP
GLUCOSE SERPL-MCNC: 231 MG/DL — HIGH (ref 70–99)
HCT VFR BLD CALC: 41.5 % — SIGNIFICANT CHANGE UP (ref 34.5–45)
HGB BLD-MCNC: 13.4 G/DL — SIGNIFICANT CHANGE UP (ref 11.5–15.5)
IMM GRANULOCYTES NFR BLD AUTO: 0.4 % — SIGNIFICANT CHANGE UP (ref 0–1.5)
LYMPHOCYTES # BLD AUTO: 1.59 K/UL — SIGNIFICANT CHANGE UP (ref 1–3.3)
LYMPHOCYTES # BLD AUTO: 13.5 % — SIGNIFICANT CHANGE UP (ref 13–44)
MCHC RBC-ENTMCNC: 27.2 PG — SIGNIFICANT CHANGE UP (ref 27–34)
MCHC RBC-ENTMCNC: 32.3 GM/DL — SIGNIFICANT CHANGE UP (ref 32–36)
MCV RBC AUTO: 84.3 FL — SIGNIFICANT CHANGE UP (ref 80–100)
MONOCYTES # BLD AUTO: 0.76 K/UL — SIGNIFICANT CHANGE UP (ref 0–0.9)
MONOCYTES NFR BLD AUTO: 6.5 % — SIGNIFICANT CHANGE UP (ref 2–14)
NEUTROPHILS # BLD AUTO: 9.27 K/UL — HIGH (ref 1.8–7.4)
NEUTROPHILS NFR BLD AUTO: 78.7 % — HIGH (ref 43–77)
NRBC # BLD: 0 /100 WBCS — SIGNIFICANT CHANGE UP (ref 0–0)
NT-PROBNP SERPL-SCNC: 382 PG/ML — HIGH (ref 0–300)
PLATELET # BLD AUTO: 294 K/UL — SIGNIFICANT CHANGE UP (ref 150–400)
POTASSIUM SERPL-MCNC: 3.5 MMOL/L — SIGNIFICANT CHANGE UP (ref 3.5–5.3)
POTASSIUM SERPL-SCNC: 3.5 MMOL/L — SIGNIFICANT CHANGE UP (ref 3.5–5.3)
PROT SERPL-MCNC: 7.8 G/DL — SIGNIFICANT CHANGE UP (ref 6–8.3)
RBC # BLD: 4.92 M/UL — SIGNIFICANT CHANGE UP (ref 3.8–5.2)
RBC # FLD: 16 % — HIGH (ref 10.3–14.5)
SARS-COV-2 RNA SPEC QL NAA+PROBE: SIGNIFICANT CHANGE UP
SODIUM SERPL-SCNC: 139 MMOL/L — SIGNIFICANT CHANGE UP (ref 135–145)
TROPONIN T, HIGH SENSITIVITY RESULT: 14 NG/L — SIGNIFICANT CHANGE UP (ref 0–51)
TROPONIN T, HIGH SENSITIVITY RESULT: 15 NG/L — SIGNIFICANT CHANGE UP (ref 0–51)
WBC # BLD: 11.77 K/UL — HIGH (ref 3.8–10.5)
WBC # FLD AUTO: 11.77 K/UL — HIGH (ref 3.8–10.5)

## 2020-08-27 PROCEDURE — 93010 ELECTROCARDIOGRAM REPORT: CPT | Mod: GC

## 2020-08-27 PROCEDURE — 99223 1ST HOSP IP/OBS HIGH 75: CPT | Mod: GC

## 2020-08-27 PROCEDURE — 93308 TTE F-UP OR LMTD: CPT | Mod: 26

## 2020-08-27 PROCEDURE — 99223 1ST HOSP IP/OBS HIGH 75: CPT

## 2020-08-27 PROCEDURE — 71045 X-RAY EXAM CHEST 1 VIEW: CPT | Mod: 26

## 2020-08-27 PROCEDURE — 99291 CRITICAL CARE FIRST HOUR: CPT | Mod: CS,GC

## 2020-08-27 PROCEDURE — 71275 CT ANGIOGRAPHY CHEST: CPT | Mod: 26

## 2020-08-27 RX ORDER — HEPARIN SODIUM 5000 [USP'U]/ML
5000 INJECTION INTRAVENOUS; SUBCUTANEOUS EVERY 6 HOURS
Refills: 0 | Status: DISCONTINUED | OUTPATIENT
Start: 2020-08-27 | End: 2020-08-31

## 2020-08-27 RX ORDER — HEPARIN SODIUM 5000 [USP'U]/ML
INJECTION INTRAVENOUS; SUBCUTANEOUS
Qty: 25000 | Refills: 0 | Status: DISCONTINUED | OUTPATIENT
Start: 2020-08-27 | End: 2020-08-31

## 2020-08-27 RX ORDER — INSULIN LISPRO 100/ML
VIAL (ML) SUBCUTANEOUS AT BEDTIME
Refills: 0 | Status: DISCONTINUED | OUTPATIENT
Start: 2020-08-27 | End: 2020-08-28

## 2020-08-27 RX ORDER — DEXTROSE 50 % IN WATER 50 %
25 SYRINGE (ML) INTRAVENOUS ONCE
Refills: 0 | Status: DISCONTINUED | OUTPATIENT
Start: 2020-08-27 | End: 2020-08-28

## 2020-08-27 RX ORDER — DEXTROSE 50 % IN WATER 50 %
15 SYRINGE (ML) INTRAVENOUS ONCE
Refills: 0 | Status: DISCONTINUED | OUTPATIENT
Start: 2020-08-27 | End: 2020-08-28

## 2020-08-27 RX ORDER — HEPARIN SODIUM 5000 [USP'U]/ML
10000 INJECTION INTRAVENOUS; SUBCUTANEOUS EVERY 6 HOURS
Refills: 0 | Status: DISCONTINUED | OUTPATIENT
Start: 2020-08-27 | End: 2020-08-31

## 2020-08-27 RX ORDER — INSULIN LISPRO 100/ML
VIAL (ML) SUBCUTANEOUS
Refills: 0 | Status: DISCONTINUED | OUTPATIENT
Start: 2020-08-27 | End: 2020-08-28

## 2020-08-27 RX ORDER — DEXTROSE 50 % IN WATER 50 %
12.5 SYRINGE (ML) INTRAVENOUS ONCE
Refills: 0 | Status: DISCONTINUED | OUTPATIENT
Start: 2020-08-27 | End: 2020-08-28

## 2020-08-27 RX ORDER — SODIUM CHLORIDE 9 MG/ML
1000 INJECTION, SOLUTION INTRAVENOUS
Refills: 0 | Status: DISCONTINUED | OUTPATIENT
Start: 2020-08-27 | End: 2020-08-28

## 2020-08-27 RX ORDER — GLUCAGON INJECTION, SOLUTION 0.5 MG/.1ML
1 INJECTION, SOLUTION SUBCUTANEOUS ONCE
Refills: 0 | Status: DISCONTINUED | OUTPATIENT
Start: 2020-08-27 | End: 2020-08-28

## 2020-08-27 RX ORDER — HEPARIN SODIUM 5000 [USP'U]/ML
10000 INJECTION INTRAVENOUS; SUBCUTANEOUS ONCE
Refills: 0 | Status: COMPLETED | OUTPATIENT
Start: 2020-08-27 | End: 2020-08-27

## 2020-08-27 RX ADMIN — HEPARIN SODIUM 2400 UNIT(S)/HR: 5000 INJECTION INTRAVENOUS; SUBCUTANEOUS at 17:57

## 2020-08-27 RX ADMIN — HEPARIN SODIUM 10000 UNIT(S): 5000 INJECTION INTRAVENOUS; SUBCUTANEOUS at 17:55

## 2020-08-27 RX ADMIN — Medication 1: at 22:25

## 2020-08-27 NOTE — H&P ADULT - PROBLEM SELECTOR PLAN 2
extensive bilateral interlobar and lobar pulmonary embolism.  Plan:  - on heparin drip  - f/u outpt echo s/p TAVR in 2015, cardiology note states echo suggestive of prosthesis stenosis (peak grad 3.8 mm/s through AV)  - f/u TTE - ordered  - f/u echo with Dr. Chow   - if AS, f/u with structural cardiologists to see about TAVR

## 2020-08-27 NOTE — H&P ADULT - ATTENDING COMMENTS
Pt seen and examined, here with worsening RASMUSSEN/SOB likely multifactorial including numerous and extensive PEs as well as likely worsened HOCM gradients due to increased diuresis. Patient also with worsening Aortic Valve function on outpatient TTE. Will decrease lasix dose. Patient also with relatively low BPs this AM so losartan held and coreg dose now 25mg bid (had recently been uptitrated to 50mg bid x 2 weeks).  Will attempt to resume losartan if BPs ok given worsened aortic gradients

## 2020-08-27 NOTE — H&P ADULT - NSICDXPASTMEDICALHX_GEN_ALL_CORE_FT
PAST MEDICAL HISTORY:  Cholesterol serum elevated     Depression     Diabetes type 2, uncontrolled     Gastroparesis due to DM     GERD (gastroesophageal reflux disease)     Herniated disc     Hypertension     Hypothyroid not on medication    IBS (irritable bowel syndrome)     Murmur, cardiac Has a "leaky valve" does not know which one    MVP (mitral valve prolapse)     Neuropathy Feet and hands    Obesity     Spinal stenosis chronic back pain PAST MEDICAL HISTORY:  Cholesterol serum elevated     Depression     Diabetes type 2, uncontrolled     Gastroparesis due to DM     GERD (gastroesophageal reflux disease)     Herniated disc     History of diabetic retinopathy     Hypertension     Hypothyroid not on medication    IBS (irritable bowel syndrome)     MVP (mitral valve prolapse)     Obesity     Spinal stenosis chronic back pain

## 2020-08-27 NOTE — H&P ADULT - ASSESSMENT
64yo woman with PMHx AS s/p TAVR 2015, HOCM, pAFib, JOSR (does not tolerate CPAP, on 2L O2 at home), ?asthma, anxiety, morbid obesity, p/w 6 months of progressively worsening SOB on exertion, found to have extensive bilateral interlobar and lobar pulmonary embolism. Now stable on tele floor.

## 2020-08-27 NOTE — ED ADULT NURSE REASSESSMENT NOTE - NS ED NURSE REASSESS COMMENT FT1
food served, pt eating, no sings of acute distress noted at this moment, will continue to monitor closely.

## 2020-08-27 NOTE — ED PROVIDER NOTE - OBJECTIVE STATEMENT
65F HTN, IBS, gastroparesis 2/2 DM, herniated discs, anemia, s/p TAVR 2015, s/p lap band x 2 and removal of band at [facility] (2017), breast ca s/p partial R mastectomy p/w sob x 5 days, worse with exertion. Also notes midsternal chest tightness on exertion, not presently having this pain. has not had recent sick contacts, cough, abd pain, dysuria, diarrhea or n/v 65F HTN, IBS, gastroparesis 2/2 DM, herniated discs, anemia, s/p TAVR 2015, s/p lap band x 2 and removal of band at [facility] (2017), breast ca s/p partial R mastectomy p/w sob x 5 days, worse with exertion. Had an echo done yesterday by her primary cardiologist Dr Espinal was told her aortic valve is "narrow" and may need new TAVR. Also notes midsternal chest tightness on exertion, not presently having this pain. has not had recent sick contacts, cough, abd pain, dysuria, diarrhea or n/v

## 2020-08-27 NOTE — CONSULT NOTE ADULT - ASSESSMENT
64 yo female PMHx of AS s/p TAVR 2015, HOCM, pAfib not on AC at home, JOSR not on CPAP at home, and morbid obesity who presented to the [facility] ED with 1 week hx of progressively worsening dyspnea, found to have b/l PE, hemodynamically stable and without RV strain noted on the POCUS done in the ED    #B/l PE  -- Hemodynamically stable and not hypoxemic, will continue with oxygenating the patient and monitor the patient's hemodynamics closely on telemetry floor  -- Start anticoagulation   -- Discussed with Dr. Vlilar, no urgent indication for thrombolysis or embolectomy for the patient    Rest of management per general cardiology consult recommendations    Attending of record: Dr. Abdi Villar    Signed by:  Sandeep Garsia MD   PGY4 Cardiology

## 2020-08-27 NOTE — H&P ADULT - HISTORY OF PRESENT ILLNESS
64yo woman with PMHx AS s/p TAVR 2015, HOCM, pAFib, JOSR (does not tolerate CPAP, on 2L O2 at home), ?asthma, anxiety, morbid obesity, p/w 6 months of progressively worsening SOB on exertion. Per patient, last time she was able to walk to the bathroom and back without dyspnea was 6 months ago and she has progressively become more sedentary. +orthopnea, she states she has been sleeping upright for >6 months, and before that was using multiple pillow. per patient, hx of chronic venous stasis, her legs were swollen with "water blisters" 2 months ago, Doppler U/S of the legs was negative for DVT at the time.     Per cardiology note, she was diagnosed with HOCM in Feb by Dr. Chow. TRACEE was more apical, with significant SHAILESH, rest LVOT gradient 14mmHg and provoked LVOT gradient 34mmHg. She was tried on BB and varying doses of diuretics. In the past month, she came to dose of Lasix 120mg PO daily, with good diuresis. However, the RASMUSSEN persisted. Therefore, she came to ED for further evaluation. 66yo woman with PMHx AS s/p TAVR 2015, HOCM, pAFib, JOSR (does not tolerate CPAP, on 2L O2 at home), ?asthma, anxiety, morbid obesity, p/w 6 months of progressively worsening SOB on exertion. Per patient, last time she was able to walk to the bathroom and back without dyspnea was 6 months ago and she has progressively become more sedentary. +orthopnea, she states she has been sleeping upright for >6 months, and before that was using multiple pillow. per patient, hx of chronic venous stasis, her legs were swollen with "water blisters" 2 months ago, Doppler U/S of the legs was negative for DVT at the time.     Per cardiology note, she was diagnosed with HOCM in Feb by Dr. Chow. TRACEE was more apical, with significant SHAILESH, rest LVOT gradient 14mmHg and provoked LVOT gradient 34mmHg. She was tried on BB and varying doses of diuretics. In the past month, she came to dose of Lasix 120mg PO daily, with good diuresis. However, the RASMUSSEN persisted. Therefore, she came to ED for further evaluation.    ED course: mildly tachypneic to 22, satted well on 5L NC, normotensive, normal HR, got heparin 64yo woman with PMHx AS s/p TAVR 2015, HOCM, pAFib, JOSR (does not tolerate CPAP, on 2L O2 at home), ?asthma, anxiety, morbid obesity, p/w 6 months of progressively worsening SOB on exertion. Per patient, last time she was able to walk to the bathroom and back without dyspnea was 6 months ago and she has progressively become more sedentary. +orthopnea, she states she has been sleeping upright for >6 months, and before that was using multiple pillow. Per patient, hx of chronic venous stasis, her legs were swollen with "water blisters" 2 months ago, now improved, Doppler U/S of the legs was negative for DVT at the time. Her diuretic dose was slowly increased to Lasix 120mg PO daily, with good diuresis and resolution of the LE edema. However, the SOB on exertion persisted. Therefore, she came to ED for further evaluation.    Per cardiology note, she was diagnosed with HOCM in Feb by Dr. Chow with echo was performed. rest LVOT gradient 14mmhg and provoked LVOT gradient 34 mmHg.       ED course: mildly tachypneic to 22, satted well on 5L NC, normotensive, normal HR. 64yo woman with PMHx AS s/p TAVR 2015, HOCM, pAFib, JOSR (does not tolerate CPAP, on 2L O2 at home), ?asthma, anxiety, morbid obesity, p/w 6 months of progressively worsening SOB on exertion. Per patient, last time she was able to walk to the bathroom and back without dyspnea was 6 months ago and she has progressively become more sedentary. +orthopnea, she states she has been sleeping upright for >6 months, and before that was using multiple pillows. Per patient, hx of chronic venous stasis, her legs were swollen with water blisters 2 months ago, now improved, Doppler U/S of the legs was negative for DVT at the time. Her diuretic dose was slowly increased to Lasix 120mg PO daily, with good diuresis and resolution of the LE edema. However, the SOB on exertion persisted. Therefore, she came to ED for further evaluation.    Per cardiology note, she was diagnosed with HOCM in Feb by Dr. Chow with echo last month and another in Feb 2020. Per chart, rest LVOT gradient was 14mmhg and provoked LVOT gradient was 34 mmHg.       ED course: mildly tachypneic to 22, satted well on 5L NC, normotensive, normal HR. 66yo woman with PMHx AS s/p TAVR 2015, HOCM, pAFib, JOSR (does not tolerate CPAP, on 2L O2 at home), ?asthma, anxiety, morbid obesity, p/w 6 months of progressively worsening SOB on exertion. Per patient, last time she was able to walk to the bathroom and back without dyspnea was 6 months ago and she has progressively become more sedentary. +orthopnea, she states she has been sleeping upright for >6 months, and before that was using multiple pillows. Per patient, hx of chronic venous stasis, her legs were swollen with water blisters 2 months ago, now improved, Doppler U/S of the legs was negative for DVT at the time. Her diuretic dose was slowly increased to Lasix 120mg PO daily, with good diuresis and resolution of the LE edema. However, the SOB on exertion persisted. Therefore, she came to ED for further evaluation.     Per cardiology note, she was diagnosed with HOCM in Feb by Dr. Chow with echo last month and another in Feb 2020. Per chart, rest LVOT gradient was 14mmhg and provoked LVOT gradient was 34 mmHg.       ED course: mildly tachypneic to 22, satted well on 5L NC, normotensive, normal HR.

## 2020-08-27 NOTE — ED ADULT NURSE NOTE - HOW OFTEN DO YOU HAVE SIX OR MORE DRINKS ON ONE OCCASION?
TRANSFER - OUT REPORT:    Verbal report given to Juan C Martinez RN(name) on Se Reza.  being transferred to 5th floor(unit) for routine progression of care       Report consisted of patients Situation, Background, Assessment and   Recommendations(SBAR). Information from the following report(s) SBAR, Kardex, ED Summary, [facility] and Recent Results was reviewed with the receiving nurse. Lines:   Peripheral IV 01/06/17 Left Antecubital (Active)   Site Assessment Clean, dry, & intact 1/6/2017  5:07 PM   Phlebitis Assessment 0 1/6/2017  5:07 PM   Infiltration Assessment 0 1/6/2017  5:07 PM   Dressing Status Clean, dry, & intact 1/6/2017  5:07 PM   Dressing Type Transparent 1/6/2017  5:07 PM   Hub Color/Line Status Flushed;Patent 1/6/2017  5:07 PM   Alcohol Cap Used Yes 1/6/2017  5:07 PM        Opportunity for questions and clarification was provided. All of pt's belongings, valuables, and medications were sent with patient. Patient transported with:   Monitor  Tech      RN will wait for hospitalist to see pt and put in orders before taking pt to the floor. Never

## 2020-08-27 NOTE — H&P ADULT - NSICDXPASTSURGICALHX_GEN_ALL_CORE_FT
PAST SURGICAL HISTORY:  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    Cataract B/L cataracts    Cervix abnormality Ablation of cervix    right eye torn retina     S/P bariatric surgery lap band surgery two times due to erosion    S/P D&C (status post dilation and curettage) several D&C's    S/P laminectomy     S/P TAVR (transcatheter aortic valve replacement)     S/P tonsillectomy and adenoidectomy PAST SURGICAL HISTORY:  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    Cataract B/L cataracts    Cervix abnormality Ablation of cervix    right eye torn retina     S/P bariatric surgery lap band surgery two times due to erosion, now no more    S/P D&C (status post dilation and curettage) D&C when she was 18    S/P laminectomy Pt denied this?    S/P TAVR (transcatheter aortic valve replacement)     S/P tonsillectomy and adenoidectomy

## 2020-08-27 NOTE — H&P ADULT - NSHPREVIEWOFSYSTEMS_GEN_ALL_CORE
CONSTITUTIONAL: No fever, no chills, no weight loss, no night sweats  EYES: No eye pain, reports hx of diabetic retinopathy, sees ophthalmologist  Mouth: no pain in mouth, no cuts/ulcers/thrush  RESPIRATORY: No cough, + sob  CARDIOVASCULAR: No CP, no palpitations  GASTROINTESTINAL: no abdominal pain, no n/v/d  GENITOURINARY: No dysuria, no hematuria  NEUROLOGICAL: No headaches, no weakness  SKIN: No itching, +reports rash under breasts that she is using nystatin powder for.  MUSCULOSKELETAL: No joint pain, no joint swelling  PSYCHIATRY: no insomnia, no irritability CONSTITUTIONAL: No fever, no chills, no weight loss, no night sweats  EYES: No eye pain, reports hx of diabetic retinopathy, sees ophthalmologist  Mouth: no pain in mouth, no cuts/ulcers/thrush  RESPIRATORY: No cough, +hx of inc. secretions, reports resolved with mucinex, + sob on exertion  CARDIOVASCULAR: No CP, occasional palpitations  GASTROINTESTINAL: no abdominal pain, no n/v/d, occasional constipation, not currently  GENITOURINARY: No dysuria, no hematuria  NEUROLOGICAL: No headaches, no weakness  SKIN: No itching, +reports rash under breasts that she is using nystatin powder for.  MUSCULOSKELETAL: No joint pain, no joint swelling. +chronic back pain 2/2 herniated disc with associated sciatica in both legs.  PSYCHIATRY: +anxiety 2/2 SOB CONSTITUTIONAL: No fever, no chills, no weight loss, no night sweats  EYES: No eye pain, reports hx of diabetic retinopathy, sees ophthalmologist  Mouth: no pain in mouth, no cuts/ulcers/thrush  RESPIRATORY: No cough, +hx of inc. secretions, reports resolved with mucinex, + sob on exertion  CARDIOVASCULAR: No CP, occasional palpitations  GASTROINTESTINAL: no abdominal pain, no n/v/d, occasional constipation, not currently  GENITOURINARY: No dysuria, no hematuria  NEUROLOGICAL: No headaches, no weakness  SKIN: No itching, +reports rash under breasts that she is using nystatin powder for.  MUSCULOSKELETAL: No joint pain, no joint swelling. +chronic back pain 2/2 herniated disc with associated occasional sciatica in both legs.  PSYCHIATRY: +anxiety 2/2 SOB

## 2020-08-27 NOTE — ED PROVIDER NOTE - CLINICAL SUMMARY MEDICAL DECISION MAKING FREE TEXT BOX
65 F multiple medical problems presenting w/ worsening SoB a/w exertion, on exam appears dyspneic but clear lungs. C/f worsening of AS vs PE vs less likely infectious. Plan: labs, ekg, cxr, cards c/s, likely admission.

## 2020-08-27 NOTE — ED ADULT NURSE NOTE - OBJECTIVE STATEMENT
64 yo female complaining of SOB "please I do not want to lay on stretcher, I refuse, I have been sick for a long time and feeling shortness of breath for weeks, I am a bad diabetic, I have ulcers on my feet, I am a cardiac patient and I never thought I would need to do another TARV, I did not think someone could have it twice but my doctor told me I might need it" Pt alerted and oriented x3, obese, presenting with SOB coming from home, lungs clear, heart murmur herd. Pt has dressing on R and L Foot, states she will not uncover it because she is afraid of infection and she goes for checks on wound care on [place]. IV placed on right hand 20 G, blood drawn, sent to lab. MD at the bedside, pt placed on cardiac and respiratory monitoring, Pulse ox 96%. Pt walks with assistance and refuses to leave whellchair. Will continue to monitor closely.

## 2020-08-27 NOTE — ED ADULT NURSE REASSESSMENT NOTE - NS ED NURSE REASSESS COMMENT FT1
patient resting on wheelchair in room at this time. heparin infusing as per MD order. patient denies pain/discomfort at this time. patient admitted to telemetry at this time. patient comfort and safety provided.

## 2020-08-27 NOTE — CONSULT NOTE ADULT - SUBJECTIVE AND OBJECTIVE BOX
Patient seen and evaluated at bedside    Chief Complaint: SOB    HPI:    64 yo female PMHx of AS s/p TAVR 2015, HOCM, pAfib not on AC at home, JOSR not on CPAP at home, and morbid obesity who presented to the [facility] ED with 1 week hx of progressively worsening dyspnea. The patient reports that since 1 week ago, she has noticed significant dyspnea on exertion (prior to 1 week ago, she was able to ambulate few blocks outside with the assistance of cane but now she gets dyspneic with just few steps at home). She was also getting dyspneic at rest, prompting her to come to the ED.    She follows with Dr. Chow, her outpatient cardiologist. Of note, the patient also has recently diagnosed HOCM (Feb/2020 TTE with significant SHAILESH, rest LVOT gradient 14 mmHg, and provoked LVOT gradient 34 mmHg). Her home medications are: ASA 81mg daily, lasix 120mg daily, coreg 25 BID, verapamil 240mg daily, losartan 25 mg daily, and lipitor 80mg daily. She denies being on AC for pAfib.    In the ED, initial vitals - 98.2F, HR 70, /71, and SpO2 99% on 2L NC. ED proceeded with PE workup as well - b/l LE duplex negative but CT PE with right upper and interlobar PE extending into R upper lobe segmental and R middle and lower lobar and segmental pulmonary arteries. Left upper lobar and lower lobar PE extending into the segmental branches as well. Vascular cardiology was consulted.     PMHx:   Diabetes type 2, uncontrolled  Murmur, cardiac  GERD (gastroesophageal reflux disease)  Neuropathy  IBS (irritable bowel syndrome)  Obesity  Hypothyroid  MVP (mitral valve prolapse)  Cholesterol serum elevated  Spinal stenosis  Herniated disc  Depression  Gastroparesis due to DM  Hypertension  Diabetes mellitus      PSHx:   S/P TAVR (transcatheter aortic valve replacement)  right eye torn retina  Breast anomaly  Cervix abnormality  S/P D&C (status post dilation and curettage)  S/P laminectomy  Cataract  S/P tonsillectomy and adenoidectomy  S/P bariatric surgery      Allergies:  codeine (Vomiting)  fish (Vomiting)      Home Meds:    Current Medications:   heparin   Injectable 55894 Unit(s) IV Push every 6 hours PRN  heparin   Injectable 5000 Unit(s) IV Push every 6 hours PRN  heparin  Infusion.  Unit(s)/Hr IV Continuous <Continuous>      FAMILY HISTORY:  Family history of aortic stenosis      Social History:  No smoking, alcohol, or illegal drug use  Lives at home, independent with ADLs, uses cane    REVIEW OF SYSTEMS:  CONSTITUTIONAL: No weakness, fevers or chills  EYES/ENT: No visual changes;  No dysphagia  NECK: No pain or stiffness  RESPIRATORY: No cough, wheezing, hemoptysis; No shortness of breath  CARDIOVASCULAR: No chest pain or palpitations; No lower extremity edema  GASTROINTESTINAL: No abdominal or epigastric pain. No nausea, vomiting, or hematemesis; No diarrhea or constipation. No melena or hematochezia.  BACK: No back pain  GENITOURINARY: No dysuria, frequency or hematuria  NEUROLOGICAL: No numbness or weakness  SKIN: No itching, burning, rashes, or lesions   All other review of systems is negative unless indicated above.    Physical Exam:  T(F): 98.4 (08-27), Max: 98.5 (08-27)  HR: 73 (08-27) (70 - 85)  BP: 130/57 (08-27) (107/70 - 130/57)  RR: 19 (08-27)  SpO2: 100% (08-27)  GENERAL: Morbidly obese, resting on chair, with no acute distress  HEAD:  Atraumatic, Normocephalic  ENT: EOMI, PERRLA, conjunctiva and sclera clear, Neck supple, large neck girth (unable to assess JVP)  CHEST/LUNG: Clear to auscultation bilaterally; No wheeze, equal breath sounds bilaterally   HEAT: Regular rate and rhythm, S1, S2 grade 3/6 holosystolic murmur at RUSB, no gallops or rubs  ABDOMEN: Soft, Nontender, Nondistended; Bowel sounds present  EXTREMITIES:  No clubbing, cyanosis, or edema  PSYCH: Nl behavior, nl affect  NEUROLOGY: AAOx3, non-focal, cranial nerves intact  SKIN: Normal color, No rashes or lesions    Cardiovascular Diagnostic Testing:    ECG: Personally reviewed    Echo: Personally reviewed    Imaging: reviewed                        13.4 11.77 )-----------( 294      ( 27 Aug 2020 13:35 )             41.5     08-27    139  |  98  |  18  ----------------------------<  231<H>  3.5   |  26  |  0.80    Ca    10.5      27 Aug 2020 13:35    TPro  7.8  /  Alb  4.1  /  TBili  0.7  /  DBili  x   /  AST  19  /  ALT  19  /  AlkPhos  188<H>  08-27    PT/INR - ( 27 Aug 2020 13:35 )   PT: 12.9 sec;   INR: 1.09 ratio         PTT - ( 27 Aug 2020 13:35 )  PTT:29.1 sec    CARDIAC MARKERS ( 27 Aug 2020 15:40 )  14 ng/L / x     / x     / x     / x     / x      CARDIAC MARKERS ( 27 Aug 2020 13:35 )  15 ng/L / x     / x     / x     / x     / x            Serum Pro-Brain Natriuretic Peptide: 382 pg/mL (08-27 @ 13:35)

## 2020-08-27 NOTE — H&P ADULT - NSICDXFAMILYHX_GEN_ALL_CORE_FT
FAMILY HISTORY:  Family history of aortic stenosis FAMILY HISTORY:  Family history of aortic stenosis, Family hx of structural heart dx

## 2020-08-27 NOTE — ED PROVIDER NOTE - PROGRESS NOTE DETAILS
Per pt's cardiologist Dr Espinal pt had recent echo showing possible restonesis of aortic valve, peak veolicty 58m/sec, outflow tract 1.8cm, and notes pt has been difficult to control symptom-wise due to difficulty tolerating diuretics, then started CCBs causing LE edema, so had to restart diuretics, and has pHTN but has not tolerated cpap at night. pert team consulted for b/l extensive PE; pt is HD stable at this time in NAD. Attending MD Recinos: heparin gtt ordered for central PE, PERT team to evaluate patient given clot burden. cards fellow as PERT team has evaluated pt; will talk to attg, and requesting comprehensive echo at this time before disposition. will call echo tech to expedite. received sign out from Dr Recinos. 65yr morbidly obese w RASMUSSEN, now diagnosed with PE on CT as well as evidence of Rt side hypertension. plan for heparin drip, PERT consult and admission. VS otherwise stable DJ D/w cardiology fellow/PERT team, pt cleared to go to floor on tele. Have called echo lab multiple times  and no response. Will continue to try to reach echo lab.

## 2020-08-27 NOTE — ED PROVIDER NOTE - ATTENDING CONTRIBUTION TO CARE
Attending MD Recinos:  I personally have seen and examined this patient.  Resident note reviewed and agree on plan of care and except where noted.  See HPI, PE, and MDM for details.    65F with pulm HTN, morbid obesity, AS sp TAVR, DM presenting wit progressive RASMUSSEN. Patient is quite dyspneic at rest, hypoxic to low 90s. Ddx includes CHF, progressive aortic valve failure as patient reportedly had TTE 1 day prior with "worsened aortic stenosis", PE given morbid obesity and sedentary lifestyle, ACS. Plan for ECG, CXR, labs, d-dimer, d/w cardiology, admission

## 2020-08-27 NOTE — H&P ADULT - PROBLEM SELECTOR PLAN 8
JOSR not on CPAP, on home O2 2L at baseline. Occasional palpitations, not currently.  Plan:  c/w heparin  c/w Carvedilol - 50? JOSR not on CPAP, on home O2 2L at baseline. Occasional palpitations, not currently.  Plan:  c/w heparin

## 2020-08-27 NOTE — H&P ADULT - PROBLEM SELECTOR PLAN 1
multifactorial d/t AS, HOCM, possible asthma, and the pulmonary embolism.   Plan:  - f/u outpatient echo  - c/w home lasix 120  - on heparin drip extensive bilateral interlobar and lobar pulmonary embolism.  Plan:  - on heparin drip  - f/u TTE (ordered already)  - check b/l LE U/S  - consider reducing lasix as RASMUSSEN less likely to be from HF, can f/u with cardiology to see if it can be downtitrated\  - monitor on tele extensive bilateral interlobar and lobar pulmonary embolism.  Plan:  - on heparin drip  - f/u TTE (ordered already)  - check b/l LE U/S  - lasix reduced - 120->80mg RASMUSSEN less likely to be from HF, can f/u with cardiology to see if it can be downtitrated  - monitor on tele  Cardiology recs appreciated

## 2020-08-27 NOTE — ED ADULT NURSE NOTE - NSIMPLEMENTINTERV_GEN_ALL_ED
Implemented All Fall with Harm Risk Interventions:  [place] to call system. Call bell, personal items and telephone within reach. Instruct patient to call for assistance. Room bathroom lighting operational. Non-slip footwear when patient is off stretcher. Physically safe environment: no spills, clutter or unnecessary equipment. Stretcher in lowest position, wheels locked, appropriate side rails in place. Provide visual cue, wrist band, yellow gown, etc. Monitor gait and stability. Monitor for mental status changes and reorient to person, place, and time. Review medications for side effects contributing to fall risk. Reinforce activity limits and safety measures with patient and family. Provide visual clues: red socks.

## 2020-08-27 NOTE — H&P ADULT - PROBLEM SELECTOR PLAN 9
- c/w home verapamil 240mg PO daily  - c/w home Carvedilol????? 50mg PO BID. see above b/l LE erythema with flaking - no warmth  Plan:  continue to monitor, evaluate for cellulitis

## 2020-08-27 NOTE — ED ADULT TRIAGE NOTE - WEIGHT IN KG
131.5 PA NOTE: PT made aware of results will be given CT angio, will revaluate after, PT verbalizes he understands all results.

## 2020-08-27 NOTE — H&P ADULT - NSHPPHYSICALEXAM_GEN_ALL_CORE
Vital Signs Last 24 Hrs  T(C): 36.9 (27 Aug 2020 20:00), Max: 36.9 (27 Aug 2020 12:55)  T(F): 98.4 (27 Aug 2020 20:00), Max: 98.5 (27 Aug 2020 12:55)  HR: 73 (27 Aug 2020 20:00) (70 - 85)  BP: 130/57 (27 Aug 2020 20:00) (107/70 - 130/57)  BP(mean): --  RR: 19 (27 Aug 2020 20:00) (19 - 22)  SpO2: 100% (27 Aug 2020 20:00) (93% - 100%)    PHYSICAL EXAM:  GENERAL: NAD, lying in bed comfortably  HEAD:  Atraumatic, Normocephalic  ENT: Moist mucous membranes, no cuts or ulcers  NECK: Supple, No JVD  CHEST/LUNG: Clear to auscultation bilaterally; No rales, rhonchi, wheezing, or rubs. Unlabored respirations  HEART: Regular rate and rhythm; murmur+ no rubs, or gallops  ABDOMEN: Bowel sounds present; Soft, Nontender, Nondistended. Obese.  EXTREMITIES:  well-perfused brisk capillary refill. No clubbing, +redness and venous stasis of b/l LE, +nonpitting edema below the calf.  NERVOUS SYSTEM:  Alert & Oriented X3, speech clear. No deficits   MSK: moving all extremities  SKIN: +red round rash below breasts. Vital Signs Last 24 Hrs  T(C): 36.9 (27 Aug 2020 20:00), Max: 36.9 (27 Aug 2020 12:55)  T(F): 98.4 (27 Aug 2020 20:00), Max: 98.5 (27 Aug 2020 12:55)  HR: 73 (27 Aug 2020 20:00) (70 - 85)  BP: 130/57 (27 Aug 2020 20:00) (107/70 - 130/57)  BP(mean): --  RR: 19 (27 Aug 2020 20:00) (19 - 22)  SpO2: 100% (27 Aug 2020 20:00) (93% - 100%)    PHYSICAL EXAM:  GENERAL: NAD, lying in bed comfortably  HEAD:  Atraumatic, Normocephalic  ENT: Moist mucous membranes, no cuts or ulcers  NECK: Supple, No JVD  CHEST/LUNG: Clear to auscultation bilaterally; No rales, rhonchi, wheezing, or rubs. Unlabored respirations. 4-5LNC  HEART: Regular rate and rhythm; murmur+ no rubs, or gallops  ABDOMEN: Bowel sounds present; Soft, Nontender, Nondistended. Obese.  EXTREMITIES:  well-perfused brisk capillary refill. No clubbing, +redness and venous stasis of b/l LE, +nonpitting edema below the calf.  NERVOUS SYSTEM:  Alert & Oriented X3, speech clear. No deficits   MSK: moving all extremities  SKIN: +red round rash below breasts.

## 2020-08-27 NOTE — H&P ADULT - PROBLEM SELECTOR PLAN 5
Cardiology recs appreciated, Echo 2/2020 and last month showed LVOT obstruction.  Plan:  - c/w home verapamil 240mg PO daily  - c/w home Coreg????? 50mg PO BID.  - F/u echo with Dr. Chow multifactorial d/t AS, HOCM, possible asthma, and the pulmonary embolism.   Plan:  - f/u outpatient echo  - c/w home lasix 120  - on heparin drip multifactorial d/t AS, HOCM, possible asthma, and the pulmonary embolism.   Plan:  - f/u outpatient echo  - reduce home lasix 120->80mg  - on heparin drip

## 2020-08-27 NOTE — H&P ADULT - PROBLEM SELECTOR PLAN 4
Patient uses a sliding scale for premeal at home. Due to low PO intake in the past few days she has been taking 40 BID Togeo Solostar and Humulin 30 tid premeal.  Plan:  Lantus 35 BID  Humalog 30? TID Cardiology recs appreciated, Echo 2/2020 and last month showed LVOT obstruction.  Plan:  - c/w home verapamil 240mg PO daily  - reduce home Coreg 50mg PO BID to 25mg PO BID.  - F/u TTE - ordered  - f/u echo and find out more about the HOCM diagnosis from Dr. Chow Cardiology recs appreciated, Per chart, Patient diagnosed with Hypertrophic Cardiomyopathy in 2/20, Echo 2/2020 and last month showed LVOT obstruction.  Plan:  - c/w home verapamil 240mg PO daily  - reduce home Coreg 50mg PO BID to 25mg PO BID.  - F/u TTE - ordered  - f/u echo and find out more about the HOCM diagnosis from Dr. Chow

## 2020-08-27 NOTE — ED ADULT NURSE REASSESSMENT NOTE - NS ED NURSE REASSESS COMMENT FT1
Second line placed, heparin started at 1800. Vitals WNL, pt alerted and oriented x3, no signs of acute distress noted at this moment,. will continue to monitor closely.

## 2020-08-27 NOTE — H&P ADULT - PROBLEM SELECTOR PLAN 3
s/p TAVR in 2015, cardiology note states echo suggestive of prosthesis stenosis (peak grad 3.8 mm/s through AV)  - c/w Lasix 120mg PO daily  - f/u echo with Dr. Chow   - if AS, f/u with structural cardiologists to see about TAVR Patient uses a sliding scale for premeal at home. Due to low PO intake in the past few days she has been taking 40 BID Togeo Solostar and Humulin 30 tid premeal.  Plan:  Lantus 32 BID  Humalog 24 TID  MDSS also

## 2020-08-27 NOTE — H&P ADULT - NSHPLABSRESULTS_GEN_ALL_CORE
Personally reviewed available labs, imaging and ekg     Labs  CBC Full  -  ( 28 Aug 2020 00:05 )  WBC Count : 15.24 K/uL  RBC Count : 4.27 M/uL  Hemoglobin : 11.5 g/dL  Hematocrit : 36.2 %  Platelet Count - Automated : 274 K/uL  Mean Cell Volume : 84.8 fl  Mean Cell Hemoglobin : 26.9 pg  Mean Cell Hemoglobin Concentration : 31.8 gm/dL    08-27    139  |  98  |  18  ----------------------------<  231<H>  3.5   |  26  |  0.80    Ca    10.5      27 Aug 2020 13:35    TPro  7.8  /  Alb  4.1  /  TBili  0.7  /  DBili  x   /  AST  19  /  ALT  19  /  AlkPhos  188<H>  08-27    PT/INR - ( 28 Aug 2020 00:05 )   PT: 15.4 sec;   INR: 1.31 ratio         PTT - ( 28 Aug 2020 00:05 )  PTT:>200.0 sec    CAPILLARY BLOOD GLUCOSE      POCT Blood Glucose.: 265 mg/dL (27 Aug 2020 22:18)  POCT Blood Glucose.: 211 mg/dL (27 Aug 2020 13:16)    Imaging    < from: CT Angio Chest w/ IV Cont (08.27.20 @ 17:30) >    FINDINGS:    LUNGS AND AIRWAYS: Patent central airways.  5 mm right upper lobe nodule series 5 image 35, unchanged. 4 mm left lower lobe nodule series 5 image 102, unchanged.. 4 mm left upper lobe nodule series 5 image 30, new.  PLEURA: No pleural effusion.  MEDIASTINUM AND MIKE: No lymphadenopathy.  VESSELS: Right upper and interlobar pulmonary embolism extending into right upper lobe segmental and right middle and lower lobar and segmental pulmonary arteries. Left upper lobar and lower lobar pulmonary embolism extending into the segmental branches.  HEART: Heart size is normal. No pericardial effusion. Status post transcatheter aortic valve replacement. Coronary artery calcifications and/or stents.  CHEST WALL AND LOWER NECK: Right thyroid calcification, unchanged from 9/3/2015  VISUALIZED UPPER ABDOMEN: Within normal limits.  BONES: Degenerative changes.    IMPRESSION:  Extensive bilateral interlobar and lobar pulmonary embolism, as above.    < end of copied text >    EKG

## 2020-08-27 NOTE — H&P ADULT - NSHPSOCIALHISTORY_GEN_ALL_CORE
Nonsmoker, social drinker, 7 pack year smoking hx (quit in ~80s), lives at home with pets, needs assistance due to increased SOB, spends a lot of time in recliner.

## 2020-08-27 NOTE — CONSULT NOTE ADULT - SUBJECTIVE AND OBJECTIVE BOX
In-House Cardiology Consult Note  ---------------------------------------------    HPI:  64yo woman with PMHx AS s/p TAVR 2015, HOCM, pAFib, JOSR (does not tolerate CPAP), morbid obesity who presents with 1 month of progressively worsening RASMUSSEN. Patient has experienced significant functional limitations as a result of this progressive RASMUSSEN, and she is now unable to walk a few feet without endorsing symptoms. Denies palpitations, CP or lightheadedness. She follows with Dr. Chow, outpatient cardiologist.     She seen recently by Dr. Chow, who discovered her HOCM in 2/2020. TRACEE was more apical, with significant SHAILESH, rest LVOT gradient 14mmHg and provoked LVOT gradient 34mmHg. She was tried on BB and varying doses of diuretics. In the past month, she came to dose of Lasix 120mg PO daily, with good diuresis. However, the RASMUSSEN persisted. Therefore, she came to ED for further evaluation.    Upon ED presentation, HDS. EKG revealed NSR with no ischemic features. Cardiology consulted for further recommendations.    Telemetry:  -NSR    PMHx:   IBS (irritable bowel syndrome)  Obesity  Hypothyroid  MVP (mitral valve prolapse)  Depression  Gastroparesis due to DM  Hypertension  Diabetes mellitus    PSHx:   S/P TAVR (transcatheter aortic valve replacement)  S/P D&C (status post dilation and curettage)  S/P laminectomy  S/P tonsillectomy and adenoidectomy  S/P bariatric surgery    Allergies:  codeine (Vomiting)  fish (Vomiting)    Home Meds:  Coreg 25mg PO BID  Verapamil 240mg PO daily  Lasix 120mg PO daily  Losartan 25mg PO daily  Lipitor 80mg PO daily  ASA 81mg PO daily  Fluoxetine    FAMILY HISTORY:  Family history of aortic stenosis (Father)    Social History:  Smoking History: Quit in 1985  Alcohol Use: Denies  Drug Use: Denies    REVIEW OF SYSTEMS:  CONSTITUTIONAL: No weakness, fevers or chills  EYES/ENT: No visual changes;  No dysphagia  NECK: No pain or stiffness  RESPIRATORY: SOB  CARDIOVASCULAR: No chest pain or palpitations; No lower extremity edema  GASTROINTESTINAL: No abdominal or epigastric pain. No nausea, vomiting, or hematemesis; No diarrhea or constipation. No melena or hematochezia.  BACK: No back pain  GENITOURINARY: No dysuria, frequency or hematuria  NEUROLOGICAL: No numbness or weakness  SKIN: No itching, burning, rashes, or lesions   All other review of systems is negative unless indicated above.    Physical Exam:  T(F): 98.2 (08-27), Max: 98.5 (08-27)  HR: 85 (08-27) (84 - 85)  BP: 110/52 (08-27) (110/52 - 114/55)  RR: 22 (08-27)  SpO2: 96% (08-27)  GENERAL: No acute distress, well-developed, morbidly obese woman  HEAD:  Atraumatic, Normocephalic  ENT: EOMI, PERRLA, conjunctiva and sclera clear, Neck supple, large neck girth (unable to assess JVP)  CHEST/LUNG: Clear to auscultation bilaterally; No wheeze, equal breath sounds bilaterally   HEAT: Regular rate and rhythm, S1, S2 grade 3/6 holosystolic murmur at RUSB with radiation to carotids, no gallops or rubs  ABDOMEN: Soft, Nontender, Nondistended; Bowel sounds present  EXTREMITIES:  No clubbing, cyanosis, or edema  PSYCH: Nl behavior, nl affect  NEUROLOGY: AAOx3, non-focal, cranial nerves intact  SKIN: Normal color, No rashes or lesions    CXR: Personally reviewed    Labs: Personally reviewed                        13.4   11.77 )-----------( 294      ( 27 Aug 2020 13:35 )             41.5     08-27    139  |  98  |  18  ----------------------------<  231<H>  3.5   |  26  |  0.80    Ca    10.5      27 Aug 2020 13:35    TPro  7.8  /  Alb  4.1  /  TBili  0.7  /  DBili  x   /  AST  19  /  ALT  19  /  AlkPhos  188<H>  08-27    CARDIAC MARKERS ( 27 Aug 2020 13:35 )  15 ng/L / x     / x     / x     / x     / x        Serum Pro-Brain Natriuretic Peptide: 382 pg/mL (08-27 @ 13:35)

## 2020-08-27 NOTE — PATIENT PROFILE ADULT - NSPROPASSIVESMOKEEXPOSURE_GEN_A_NUR
September 14, 2017    WORK      RE:   No John  [address]  [place] [address]    This is to certify that No John is under my medical care and should be off work 9/8/2017 through 9/18/2017. She may return to work 9/19/2017.          SIGNATURE: __________________________________________                                           DO LETA [place] [address] [place] [address] [phone]  
No

## 2020-08-28 ENCOUNTER — APPOINTMENT (OUTPATIENT)
Dept: WOUND CARE | Facility: HOSPITAL | Age: 65
End: 2020-08-28

## 2020-08-28 DIAGNOSIS — R09.89 OTHER SPECIFIED SYMPTOMS AND SIGNS INVOLVING THE CIRCULATORY AND RESPIRATORY SYSTEMS: ICD-10-CM

## 2020-08-28 DIAGNOSIS — R06.00 DYSPNEA, UNSPECIFIED: ICD-10-CM

## 2020-08-28 DIAGNOSIS — I48.91 UNSPECIFIED ATRIAL FIBRILLATION: ICD-10-CM

## 2020-08-28 DIAGNOSIS — E11.65 TYPE 2 DIABETES MELLITUS WITH HYPERGLYCEMIA: ICD-10-CM

## 2020-08-28 DIAGNOSIS — I26.99 OTHER PULMONARY EMBOLISM WITHOUT ACUTE COR PULMONALE: ICD-10-CM

## 2020-08-28 DIAGNOSIS — Z29.9 ENCOUNTER FOR PROPHYLACTIC MEASURES, UNSPECIFIED: ICD-10-CM

## 2020-08-28 DIAGNOSIS — F41.9 ANXIETY DISORDER, UNSPECIFIED: ICD-10-CM

## 2020-08-28 DIAGNOSIS — I35.0 NONRHEUMATIC AORTIC (VALVE) STENOSIS: ICD-10-CM

## 2020-08-28 DIAGNOSIS — I42.1 OBSTRUCTIVE HYPERTROPHIC CARDIOMYOPATHY: ICD-10-CM

## 2020-08-28 DIAGNOSIS — L53.9 ERYTHEMATOUS CONDITION, UNSPECIFIED: ICD-10-CM

## 2020-08-28 DIAGNOSIS — I25.10 ATHEROSCLEROTIC HEART DISEASE OF NATIVE CORONARY ARTERY WITHOUT ANGINA PECTORIS: ICD-10-CM

## 2020-08-28 DIAGNOSIS — I10 ESSENTIAL (PRIMARY) HYPERTENSION: ICD-10-CM

## 2020-08-28 LAB
A1C WITH ESTIMATED AVERAGE GLUCOSE RESULT: 8.8 % — HIGH (ref 4–5.6)
ANION GAP SERPL CALC-SCNC: 11 MMOL/L — SIGNIFICANT CHANGE UP (ref 5–17)
APTT BLD: 160 SEC — CRITICAL HIGH (ref 27.5–35.5)
APTT BLD: 89.4 SEC — HIGH (ref 27.5–35.5)
APTT BLD: >200 SEC — CRITICAL HIGH (ref 27.5–35.5)
BUN SERPL-MCNC: 18 MG/DL — SIGNIFICANT CHANGE UP (ref 7–23)
CALCIUM SERPL-MCNC: 9.8 MG/DL — SIGNIFICANT CHANGE UP (ref 8.4–10.5)
CHLORIDE SERPL-SCNC: 96 MMOL/L — SIGNIFICANT CHANGE UP (ref 96–108)
CO2 SERPL-SCNC: 30 MMOL/L — SIGNIFICANT CHANGE UP (ref 22–31)
CREAT SERPL-MCNC: 0.77 MG/DL — SIGNIFICANT CHANGE UP (ref 0.5–1.3)
ESTIMATED AVERAGE GLUCOSE: 206 MG/DL — HIGH (ref 68–114)
GLUCOSE SERPL-MCNC: 269 MG/DL — HIGH (ref 70–99)
HCT VFR BLD CALC: 36.2 % — SIGNIFICANT CHANGE UP (ref 34.5–45)
HCT VFR BLD CALC: 37.4 % — SIGNIFICANT CHANGE UP (ref 34.5–45)
HGB BLD-MCNC: 11.5 G/DL — SIGNIFICANT CHANGE UP (ref 11.5–15.5)
HGB BLD-MCNC: 11.8 G/DL — SIGNIFICANT CHANGE UP (ref 11.5–15.5)
INR BLD: 1.31 RATIO — HIGH (ref 0.88–1.16)
MAGNESIUM SERPL-MCNC: 2 MG/DL — SIGNIFICANT CHANGE UP (ref 1.6–2.6)
MCHC RBC-ENTMCNC: 26.9 PG — LOW (ref 27–34)
MCHC RBC-ENTMCNC: 26.9 PG — LOW (ref 27–34)
MCHC RBC-ENTMCNC: 31.6 GM/DL — LOW (ref 32–36)
MCHC RBC-ENTMCNC: 31.8 GM/DL — LOW (ref 32–36)
MCV RBC AUTO: 84.8 FL — SIGNIFICANT CHANGE UP (ref 80–100)
MCV RBC AUTO: 85.4 FL — SIGNIFICANT CHANGE UP (ref 80–100)
NRBC # BLD: 0 /100 WBCS — SIGNIFICANT CHANGE UP (ref 0–0)
NRBC # BLD: 0 /100 WBCS — SIGNIFICANT CHANGE UP (ref 0–0)
PHOSPHATE SERPL-MCNC: 3.4 MG/DL — SIGNIFICANT CHANGE UP (ref 2.5–4.5)
PLATELET # BLD AUTO: 270 K/UL — SIGNIFICANT CHANGE UP (ref 150–400)
PLATELET # BLD AUTO: 274 K/UL — SIGNIFICANT CHANGE UP (ref 150–400)
POTASSIUM SERPL-MCNC: 3.5 MMOL/L — SIGNIFICANT CHANGE UP (ref 3.5–5.3)
POTASSIUM SERPL-SCNC: 3.5 MMOL/L — SIGNIFICANT CHANGE UP (ref 3.5–5.3)
PROTHROM AB SERPL-ACNC: 15.4 SEC — HIGH (ref 10.6–13.6)
RBC # BLD: 4.27 M/UL — SIGNIFICANT CHANGE UP (ref 3.8–5.2)
RBC # BLD: 4.38 M/UL — SIGNIFICANT CHANGE UP (ref 3.8–5.2)
RBC # FLD: 16 % — HIGH (ref 10.3–14.5)
RBC # FLD: 16.2 % — HIGH (ref 10.3–14.5)
SARS-COV-2 IGG SERPL QL IA: NEGATIVE — SIGNIFICANT CHANGE UP
SARS-COV-2 IGM SERPL IA-ACNC: <0.2 RATIO — SIGNIFICANT CHANGE UP
SODIUM SERPL-SCNC: 137 MMOL/L — SIGNIFICANT CHANGE UP (ref 135–145)
WBC # BLD: 11.51 K/UL — HIGH (ref 3.8–10.5)
WBC # BLD: 15.24 K/UL — HIGH (ref 3.8–10.5)
WBC # FLD AUTO: 11.51 K/UL — HIGH (ref 3.8–10.5)
WBC # FLD AUTO: 15.24 K/UL — HIGH (ref 3.8–10.5)

## 2020-08-28 PROCEDURE — 93970 EXTREMITY STUDY: CPT | Mod: 26

## 2020-08-28 PROCEDURE — 99233 SBSQ HOSP IP/OBS HIGH 50: CPT | Mod: GC

## 2020-08-28 PROCEDURE — 99233 SBSQ HOSP IP/OBS HIGH 50: CPT

## 2020-08-28 PROCEDURE — 93306 TTE W/DOPPLER COMPLETE: CPT | Mod: 26

## 2020-08-28 RX ORDER — ATORVASTATIN CALCIUM 80 MG/1
80 TABLET, FILM COATED ORAL AT BEDTIME
Refills: 0 | Status: DISCONTINUED | OUTPATIENT
Start: 2020-08-28 | End: 2020-09-01

## 2020-08-28 RX ORDER — DEXTROSE 50 % IN WATER 50 %
12.5 SYRINGE (ML) INTRAVENOUS ONCE
Refills: 0 | Status: DISCONTINUED | OUTPATIENT
Start: 2020-08-28 | End: 2020-09-01

## 2020-08-28 RX ORDER — INSULIN LISPRO 100/ML
24 VIAL (ML) SUBCUTANEOUS
Refills: 0 | Status: DISCONTINUED | OUTPATIENT
Start: 2020-08-28 | End: 2020-08-30

## 2020-08-28 RX ORDER — DEXTROSE 50 % IN WATER 50 %
25 SYRINGE (ML) INTRAVENOUS ONCE
Refills: 0 | Status: DISCONTINUED | OUTPATIENT
Start: 2020-08-28 | End: 2020-09-01

## 2020-08-28 RX ORDER — POTASSIUM CHLORIDE 20 MEQ
40 PACKET (EA) ORAL ONCE
Refills: 0 | Status: COMPLETED | OUTPATIENT
Start: 2020-08-28 | End: 2020-08-28

## 2020-08-28 RX ORDER — DILTIAZEM HCL 120 MG
1 CAPSULE, EXT RELEASE 24 HR ORAL
Qty: 0 | Refills: 0 | DISCHARGE

## 2020-08-28 RX ORDER — CARVEDILOL PHOSPHATE 80 MG/1
25 CAPSULE, EXTENDED RELEASE ORAL EVERY 12 HOURS
Refills: 0 | Status: DISCONTINUED | OUTPATIENT
Start: 2020-08-28 | End: 2020-08-31

## 2020-08-28 RX ORDER — NALTREXONE HYDROCHLORIDE 50 MG/1
1 TABLET, FILM COATED ORAL
Qty: 0 | Refills: 0 | DISCHARGE

## 2020-08-28 RX ORDER — LOSARTAN POTASSIUM 100 MG/1
25 TABLET, FILM COATED ORAL DAILY
Refills: 0 | Status: DISCONTINUED | OUTPATIENT
Start: 2020-08-28 | End: 2020-09-01

## 2020-08-28 RX ORDER — FUROSEMIDE 40 MG
120 TABLET ORAL DAILY
Refills: 0 | Status: DISCONTINUED | OUTPATIENT
Start: 2020-08-28 | End: 2020-08-28

## 2020-08-28 RX ORDER — SODIUM CHLORIDE 9 MG/ML
1000 INJECTION, SOLUTION INTRAVENOUS
Refills: 0 | Status: DISCONTINUED | OUTPATIENT
Start: 2020-08-28 | End: 2020-09-01

## 2020-08-28 RX ORDER — PANTOPRAZOLE SODIUM 20 MG/1
40 TABLET, DELAYED RELEASE ORAL
Refills: 0 | Status: DISCONTINUED | OUTPATIENT
Start: 2020-08-28 | End: 2020-09-01

## 2020-08-28 RX ORDER — FUROSEMIDE 40 MG
80 TABLET ORAL DAILY
Refills: 0 | Status: DISCONTINUED | OUTPATIENT
Start: 2020-08-28 | End: 2020-09-01

## 2020-08-28 RX ORDER — GLUCAGON INJECTION, SOLUTION 0.5 MG/.1ML
1 INJECTION, SOLUTION SUBCUTANEOUS ONCE
Refills: 0 | Status: DISCONTINUED | OUTPATIENT
Start: 2020-08-28 | End: 2020-09-01

## 2020-08-28 RX ORDER — INSULIN LISPRO 100/ML
VIAL (ML) SUBCUTANEOUS
Refills: 0 | Status: DISCONTINUED | OUTPATIENT
Start: 2020-08-28 | End: 2020-09-01

## 2020-08-28 RX ORDER — CHOLECALCIFEROL (VITAMIN D3) 125 MCG
1000 CAPSULE ORAL DAILY
Refills: 0 | Status: DISCONTINUED | OUTPATIENT
Start: 2020-08-28 | End: 2020-09-01

## 2020-08-28 RX ORDER — INSULIN GLARGINE 100 [IU]/ML
54 INJECTION, SOLUTION SUBCUTANEOUS
Qty: 0 | Refills: 0 | DISCHARGE

## 2020-08-28 RX ORDER — CARVEDILOL PHOSPHATE 80 MG/1
1 CAPSULE, EXTENDED RELEASE ORAL
Qty: 0 | Refills: 0 | DISCHARGE

## 2020-08-28 RX ORDER — INSULIN GLARGINE 100 [IU]/ML
32 INJECTION, SOLUTION SUBCUTANEOUS EVERY MORNING
Refills: 0 | Status: DISCONTINUED | OUTPATIENT
Start: 2020-08-28 | End: 2020-08-30

## 2020-08-28 RX ORDER — ALBUTEROL 90 UG/1
0 AEROSOL, METERED ORAL
Qty: 0 | Refills: 0 | DISCHARGE

## 2020-08-28 RX ORDER — DEXTROSE 50 % IN WATER 50 %
15 SYRINGE (ML) INTRAVENOUS ONCE
Refills: 0 | Status: DISCONTINUED | OUTPATIENT
Start: 2020-08-28 | End: 2020-09-01

## 2020-08-28 RX ORDER — CARVEDILOL PHOSPHATE 80 MG/1
2 CAPSULE, EXTENDED RELEASE ORAL
Qty: 0 | Refills: 0 | DISCHARGE

## 2020-08-28 RX ORDER — PHENYLEPHRINE HCL 0.25 %
1 AEROSOL, SPRAY WITH PUMP (ML) NASAL
Refills: 0 | Status: DISCONTINUED | OUTPATIENT
Start: 2020-08-28 | End: 2020-09-01

## 2020-08-28 RX ORDER — INSULIN GLARGINE 100 [IU]/ML
32 INJECTION, SOLUTION SUBCUTANEOUS AT BEDTIME
Refills: 0 | Status: DISCONTINUED | OUTPATIENT
Start: 2020-08-28 | End: 2020-08-30

## 2020-08-28 RX ORDER — MONTELUKAST 4 MG/1
10 TABLET, CHEWABLE ORAL DAILY
Refills: 0 | Status: DISCONTINUED | OUTPATIENT
Start: 2020-08-28 | End: 2020-09-01

## 2020-08-28 RX ADMIN — MONTELUKAST 10 MILLIGRAM(S): 4 TABLET, CHEWABLE ORAL at 13:16

## 2020-08-28 RX ADMIN — Medication 6: at 09:56

## 2020-08-28 RX ADMIN — Medication 2: at 17:50

## 2020-08-28 RX ADMIN — Medication 40 MILLIEQUIVALENT(S): at 13:14

## 2020-08-28 RX ADMIN — HEPARIN SODIUM 0 UNIT(S)/HR: 5000 INJECTION INTRAVENOUS; SUBCUTANEOUS at 09:55

## 2020-08-28 RX ADMIN — PANTOPRAZOLE SODIUM 40 MILLIGRAM(S): 20 TABLET, DELAYED RELEASE ORAL at 05:30

## 2020-08-28 RX ADMIN — HEPARIN SODIUM 1600 UNIT(S)/HR: 5000 INJECTION INTRAVENOUS; SUBCUTANEOUS at 10:54

## 2020-08-28 RX ADMIN — INSULIN GLARGINE 32 UNIT(S): 100 INJECTION, SOLUTION SUBCUTANEOUS at 21:53

## 2020-08-28 RX ADMIN — Medication 2: at 13:15

## 2020-08-28 RX ADMIN — ATORVASTATIN CALCIUM 80 MILLIGRAM(S): 80 TABLET, FILM COATED ORAL at 21:48

## 2020-08-28 RX ADMIN — HEPARIN SODIUM 2000 UNIT(S)/HR: 5000 INJECTION INTRAVENOUS; SUBCUTANEOUS at 01:48

## 2020-08-28 RX ADMIN — INSULIN GLARGINE 32 UNIT(S): 100 INJECTION, SOLUTION SUBCUTANEOUS at 09:56

## 2020-08-28 RX ADMIN — HEPARIN SODIUM 0 UNIT(S)/HR: 5000 INJECTION INTRAVENOUS; SUBCUTANEOUS at 00:44

## 2020-08-28 RX ADMIN — CARVEDILOL PHOSPHATE 25 MILLIGRAM(S): 80 CAPSULE, EXTENDED RELEASE ORAL at 17:50

## 2020-08-28 RX ADMIN — Medication 24 UNIT(S): at 13:15

## 2020-08-28 RX ADMIN — Medication 80 MILLIGRAM(S): at 05:30

## 2020-08-28 RX ADMIN — HEPARIN SODIUM 1600 UNIT(S)/HR: 5000 INJECTION INTRAVENOUS; SUBCUTANEOUS at 17:52

## 2020-08-28 RX ADMIN — Medication 1000 UNIT(S): at 13:15

## 2020-08-28 RX ADMIN — Medication 24 UNIT(S): at 17:51

## 2020-08-28 RX ADMIN — CARVEDILOL PHOSPHATE 25 MILLIGRAM(S): 80 CAPSULE, EXTENDED RELEASE ORAL at 05:30

## 2020-08-28 RX ADMIN — Medication 24 UNIT(S): at 09:57

## 2020-08-28 NOTE — PROGRESS NOTE ADULT - PROBLEM SELECTOR PLAN 1
extensive bilateral interlobar and lobar pulmonary embolism.  Plan:  - on heparin drip  - f/u TTE   - LE U/S significant for DVT R gastrocnemius and soleal veins   - monitor on tele

## 2020-08-28 NOTE — PROGRESS NOTE ADULT - SUBJECTIVE AND OBJECTIVE BOX
Daily In-House Cardiology Progress Note  -------------------------------------------------------    24-Hour Events/Subjective:      -SOB improved significantly.    Telemetry:  -NSR 70-80s, PVCs in bigeminy.    ROS:   Constitutional: No Fatigue, weakness, fever, chills  HEENT: No sore throat, dryness, hoarseness, congestion  GI: No nausea, vomiting, diarrhea, poor PO tolerance, dyspepsia, dysphagia  Musculoskeletal: No myalgias, arthralgias, joint swelling, back pain  Extremities: No swelling, coldness  Skin: No rashes, lesions, pruritis   Neuro: No weakness, confusion, blurry vision  Psych: No anxiety, depression    Current Meds:  atorvastatin 80 milliGRAM(s) Oral at bedtime  carvedilol 25 milliGRAM(s) Oral every 12 hours  cholecalciferol 1000 Unit(s) Oral daily  dextrose 40% Gel 15 Gram(s) Oral once PRN  dextrose 5%. 1000 milliLiter(s) IV Continuous <Continuous>  dextrose 50% Injectable 12.5 Gram(s) IV Push once  dextrose 50% Injectable 25 Gram(s) IV Push once  dextrose 50% Injectable 25 Gram(s) IV Push once  furosemide    Tablet 80 milliGRAM(s) Oral daily  glucagon  Injectable 1 milliGRAM(s) IntraMuscular once PRN  heparin   Injectable 36783 Unit(s) IV Push every 6 hours PRN  heparin   Injectable 5000 Unit(s) IV Push every 6 hours PRN  heparin  Infusion.  Unit(s)/Hr IV Continuous <Continuous>  insulin glargine Injectable (LANTUS) 32 Unit(s) SubCutaneous every morning  insulin glargine Injectable (LANTUS) 32 Unit(s) SubCutaneous at bedtime  insulin lispro (HumaLOG) corrective regimen sliding scale   SubCutaneous three times a day before meals  insulin lispro Injectable (HumaLOG) 24 Unit(s) SubCutaneous three times a day before meals  losartan 25 milliGRAM(s) Oral daily  montelukast 10 milliGRAM(s) Oral daily  pantoprazole    Tablet 40 milliGRAM(s) Oral before breakfast  phenylephrine 0.5% Nasal 1 Spray(s) Nasal two times a day PRN    Vitals:  T(F): 98.7 (08-28), Max: 98.7 (08-28)  HR: 70 (08-28) (70 - 85)  BP: 111/70 (08-28) (107/70 - 130/57)  RR: 18 (08-28)  SpO2: 95% (08-28)    Physical Exam:  GENERAL: No acute distress, well-developed, morbidly obese woman  HEAD:  Atraumatic, Normocephalic  ENT: EOMI, PERRLA, conjunctiva and sclera clear, Neck supple, large neck girth (unable to assess JVP)  CHEST/LUNG: Clear to auscultation bilaterally; No wheeze, equal breath sounds bilaterally   HEAT: Regular rate and rhythm, S1, S2 grade 3/6 holosystolic murmur at RUSB with radiation to carotids, no gallops or rubs  ABDOMEN: Soft, Nontender, Nondistended; Bowel sounds present  EXTREMITIES:  No clubbing, cyanosis, or edema  PSYCH: Nl behavior, nl affect  NEUROLOGY: AAOx3, non-focal, cranial nerves intact  SKIN: Normal color, No rashes or lesions               11.8   11.51 )-----------( 270      ( 28 Aug 2020 08:55 )             37.4     08-28    137  |  96  |  18  ----------------------------<  269<H>  3.5   |  30  |  0.77    Ca    9.8      28 Aug 2020 08:55  Phos  3.4     08-28  Mg     2.0     08-28    TPro  7.8  /  Alb  4.1  /  TBili  0.7  /  DBili  x   /  AST  19  /  ALT  19  /  AlkPhos  188<H>  08-27    PT/INR - ( 28 Aug 2020 00:05 )   PT: 15.4 sec;   INR: 1.31 ratio         PTT - ( 28 Aug 2020 08:55 )  PTT:160.0 sec  CARDIAC MARKERS ( 27 Aug 2020 15:40 )  14 ng/L / x     / x     / x     / x     / x      CARDIAC MARKERS ( 27 Aug 2020 13:35 )  15 ng/L / x     / x     / x     / x     / x          Serum Pro-Brain Natriuretic Peptide: 382 pg/mL (08-27 @ 13:35)

## 2020-08-28 NOTE — PROGRESS NOTE ADULT - PROBLEM SELECTOR PLAN 4
Cardiology recs appreciated, Per chart, Patient diagnosed with Hypertrophic Cardiomyopathy in 2/20, Echo 2/2020 and last month showed LVOT obstruction.  Plan:  - c/w home verapamil 240mg PO daily  - reduce home Coreg 50mg PO BID to 25mg PO BID.  - F/u TTE

## 2020-08-28 NOTE — PROGRESS NOTE ADULT - SUBJECTIVE AND OBJECTIVE BOX
Patient is a 65y old  Female who presents with a chief complaint of Dyspnea on exertion (28 Aug 2020 09:50)    SUBJECTIVE / OVERNIGHT EVENTS: no acute events overnight, pt still endorsing dyspnea     MEDICATIONS  (STANDING):  atorvastatin 80 milliGRAM(s) Oral at bedtime  carvedilol 25 milliGRAM(s) Oral every 12 hours  cholecalciferol 1000 Unit(s) Oral daily  dextrose 5%. 1000 milliLiter(s) (50 mL/Hr) IV Continuous <Continuous>  dextrose 50% Injectable 12.5 Gram(s) IV Push once  dextrose 50% Injectable 25 Gram(s) IV Push once  dextrose 50% Injectable 25 Gram(s) IV Push once  furosemide    Tablet 80 milliGRAM(s) Oral daily  heparin  Infusion.  Unit(s)/Hr (24 mL/Hr) IV Continuous <Continuous>  insulin glargine Injectable (LANTUS) 32 Unit(s) SubCutaneous every morning  insulin glargine Injectable (LANTUS) 32 Unit(s) SubCutaneous at bedtime  insulin lispro (HumaLOG) corrective regimen sliding scale   SubCutaneous three times a day before meals  insulin lispro Injectable (HumaLOG) 24 Unit(s) SubCutaneous three times a day before meals  losartan 25 milliGRAM(s) Oral daily  montelukast 10 milliGRAM(s) Oral daily  pantoprazole    Tablet 40 milliGRAM(s) Oral before breakfast    MEDICATIONS  (PRN):  dextrose 40% Gel 15 Gram(s) Oral once PRN Blood Glucose LESS THAN 70 milliGRAM(s)/deciliter  glucagon  Injectable 1 milliGRAM(s) IntraMuscular once PRN Glucose LESS THAN 70 milligrams/deciliter  heparin   Injectable 30994 Unit(s) IV Push every 6 hours PRN For aPTT less than 40  heparin   Injectable 5000 Unit(s) IV Push every 6 hours PRN For aPTT between 40 - 57  phenylephrine 0.5% Nasal 1 Spray(s) Nasal two times a day PRN Congestion      Vital Signs Last 24 Hrs  T(C): 36.4 (28 Aug 2020 08:30), Max: 37.1 (28 Aug 2020 04:41)  T(F): 97.6 (28 Aug 2020 08:30), Max: 98.7 (28 Aug 2020 04:41)  HR: 61 (28 Aug 2020 08:30) (61 - 73)  BP: 138/83 (28 Aug 2020 08:30) (107/70 - 138/83)  BP(mean): --  RR: 18 (28 Aug 2020 08:30) (18 - 20)  SpO2: 98% (28 Aug 2020 08:30) (95% - 100%)  CAPILLARY BLOOD GLUCOSE      POCT Blood Glucose.: 183 mg/dL (28 Aug 2020 12:52)  POCT Blood Glucose.: 290 mg/dL (28 Aug 2020 09:51)  POCT Blood Glucose.: 265 mg/dL (27 Aug 2020 22:18)  POCT Blood Glucose.: 302 mg/dL (27 Aug 2020 21:28)    I&O's Summary    28 Aug 2020 07:01  -  28 Aug 2020 17:23  --------------------------------------------------------  IN: 240 mL / OUT: 0 mL / NET: 240 mL        PHYSICAL EXAM:  GENERAL: NAD  EYES: conjunctiva and sclera clear  CHEST/LUNG: Clear to auscultation bilaterally; No wheeze  HEART: +s1/S2, reg   ABDOMEN: Soft, Nontender, Nondistended  EXTREMITIES: +LE edema   PSYCH: AAOx3      LABS:                        11.8   11.51 )-----------( 270      ( 28 Aug 2020 08:55 )             37.4     08-28    137  |  96  |  18  ----------------------------<  269<H>  3.5   |  30  |  0.77    Ca    9.8      28 Aug 2020 08:55  Phos  3.4     08-28  Mg     2.0     08-28    TPro  7.8  /  Alb  4.1  /  TBili  0.7  /  DBili  x   /  AST  19  /  ALT  19  /  AlkPhos  188<H>  08-27    PT/INR - ( 28 Aug 2020 00:05 )   PT: 15.4 sec;   INR: 1.31 ratio         PTT - ( 28 Aug 2020 08:55 )  PTT:160.0 sec

## 2020-08-28 NOTE — CHART NOTE - NSCHARTNOTEFT_GEN_A_CORE
Notified by vascular lab of R leg gastrocnemius and soleal DVT. Pt currently on heparin drip for PE. Discussed finding with attending.

## 2020-08-28 NOTE — PROGRESS NOTE ADULT - PROBLEM SELECTOR PLAN 5
multifactorial d/t AS, HOCM, possible asthma, and the pulmonary embolism.   Plan:  - reduce home lasix 120->80mg  - on heparin drip  -echo and workup as above

## 2020-08-28 NOTE — PROVIDER CONTACT NOTE (CRITICAL VALUE NOTIFICATION) - ASSESSMENT
Pt on Heparin Gtt @ 24ml/hr as per nomogram stop for 1 hour, restart @ 20ml/hr after hour
Heparin gtts .continued, no s/s of bleeding noted.

## 2020-08-28 NOTE — CONSULT NOTE ADULT - SUBJECTIVE AND OBJECTIVE BOX
Vascular Cardiology Consult Note    SERVICE CONSULT: [phone]              EMAIL [email or web address]   OFFICE [phone]    CC:  RASMUSSEN    HPI:  66 y/o F w/ PMHX AS s/p TAVR 2015, HOCM, h/o Paroxysmal Afib (reports last episode before 2015, no longer on anticoagulation), HTN, Venous Insufficiency h/o B/L LE Venous Ulcerations for 2 months (follows with wound care), JOSR (does not tolerate CPAP, on 2L O2 at home), possible Asthma/COPD, who presents with RASMUSSEN for the past 2 months, as outpatient Lasix was increased with improved of LE edema. CTA chest PA showed RUL and R interlobar PE extending into RUL segmental and RML, RLL and segmental pulmonary arteries. DAKSHA and LLL pulmonary embolism extending into the segmental branches. LE Venous Doppler showed R gastrocnemius and soleal vein DVT. BP and HR stable. O2 98% on 4 L NC. Troponin negative. . Vascular Cardiology called for consultation.     Allergies  codeine (Vomiting)  shellfish (Stomach Upset)    Intolerances  fish (Stomach Upset)  	  MEDICATIONS:  carvedilol 25 milliGRAM(s) Oral every 12 hours  furosemide    Tablet 80 milliGRAM(s) Oral daily  heparin   Injectable 95503 Unit(s) IV Push every 6 hours PRN  heparin   Injectable 5000 Unit(s) IV Push every 6 hours PRN  heparin  Infusion.  Unit(s)/Hr IV Continuous <Continuous>  losartan 25 milliGRAM(s) Oral daily  montelukast 10 milliGRAM(s) Oral daily  pantoprazole    Tablet 40 milliGRAM(s) Oral before breakfast  atorvastatin 80 milliGRAM(s) Oral at bedtime  glucagon  Injectable 1 milliGRAM(s) IntraMuscular once PRN  insulin glargine Injectable (LANTUS) 32 Unit(s) SubCutaneous every morning  insulin glargine Injectable (LANTUS) 32 Unit(s) SubCutaneous at bedtime  insulin lispro (HumaLOG) corrective regimen sliding scale   SubCutaneous three times a day before meals  insulin lispro Injectable (HumaLOG) 24 Unit(s) SubCutaneous three times a day before meals  cholecalciferol 1000 Unit(s) Oral daily  dextrose 5%. 1000 milliLiter(s) IV Continuous <Continuous>  phenylephrine 0.5% Nasal 1 Spray(s) Nasal two times a day PRN    PAST MEDICAL & SURGICAL HISTORY:  History of diabetic retinopathy  Diabetes type 2, uncontrolled  GERD (gastroesophageal reflux disease)  IBS (irritable bowel syndrome)  Obesity  Hypothyroid: not on medication  MVP (mitral valve prolapse)  Cholesterol serum elevated  Spinal stenosis: chronic back pain  Herniated disc  Depression  Gastroparesis due to DM  Hypertension  S/P TAVR (transcatheter aortic valve replacement)  right eye torn retina  Breast anomaly: Biopsy of both breast benign lesions  Cervix abnormality: Ablation of cervix  S/P D&C (status post dilation and curettage): D&amp;C when she was 18  S/P laminectomy: Pt denied this?  Cataract: B/L cataracts  S/P tonsillectomy and adenoidectomy  S/P bariatric surgery: lap band surgery two times due to erosion, now no more    FAMILY HISTORY:  Family history of aortic stenosis: Family hx of structural heart dx    SOCIAL HISTORY:  unchanged    REVIEW OF SYSTEMS:  CONSTITUTIONAL: No fever  EYES: No eye pain,  ENT:  No sinu pain  NECK: No pain  RESPIRATORY: No SOB  CARDIOVASCULAR: No C/P  GASTROINTESTINAL: No abdominal pain. No melena or hematochezia.  GENITOURINARY: No hematuria  NEUROLOGICAL: No headaches, memory loss  SKIN: LE Venous Ulcers  LYMPH Nodes: No enlarged glands noted  ENDOCRINE: No heat or cold intolerance noted  MUSCULOSKELETAL: LE swelling  PSYCHIATRIC: No depression, anxiety noted  HEME/LYMPH: No  bleeding gums  ALLERGY AND IMMUNOLOGIC: No hives	    [ x] All others negative	    PHYSICAL EXAM:  T(C): 36.4 (08-28-20 @ 08:30), Max: 37.1 (08-28-20 @ 04:41)  HR: 61 (08-28-20 @ 08:30) (61 - 73)  BP: 138/83 (08-28-20 @ 08:30) (107/70 - 138/83)  RR: 18 (08-28-20 @ 08:30) (18 - 20)  SpO2: 98% (08-28-20 @ 08:30) (95% - 100%)  I&O's Summary    28 Aug 2020 07:01  -  28 Aug 2020 17:32  --------------------------------------------------------  IN: 240 mL / OUT: 0 mL / NET: 240 mL    Appearance: NAD 	  HEENT: NC/AT  Cardiovascular: RRR, S1 and S2    Respiratory: CTA B/L 	  Psychiatry:  AAO x 3  Gastrointestinal:  Soft, Non-tender, + BS	  Skin: No rashes, No cyanosis	  Neurologic: no focal deficit noted   Extremities:  B/L trace to mild LE edema.  Lateral and shin venous ulcer R LE  Shin ulcer L LE    Vascular Pulse Exam:  Right DP: palpable Left DP: palpable   Right PT: audible   Left PT:  audible       LABS:	 	    CBC Full  -  ( 28 Aug 2020 08:55 )  WBC Count : 11.51 K/uL  Hemoglobin : 11.8 g/dL  Hematocrit : 37.4 %  Platelet Count - Automated : 270 K/uL  Mean Cell Volume : 85.4 fl  Mean Cell Hemoglobin : 26.9 pg  Mean Cell Hemoglobin Concentration : 31.6 gm/dL  Auto Neutrophil # : x  Auto Lymphocyte # : x  Auto Monocyte # : x  Auto Eosinophil # : x  Auto Basophil # : x  Auto Neutrophil % : x  Auto Lymphocyte % : x  Auto Monocyte % : x  Auto Eosinophil % : x  Auto Basophil % : x    08-28    137  |  96  |  18  ----------------------------<  269<H>  3.5   |  30  |  0.77  08-27    139  |  98  |  18  ----------------------------<  231<H>  3.5   |  26  |  0.80    Ca    9.8      28 Aug 2020 08:55  Ca    10.5      27 Aug 2020 13:35  Phos  3.4     08-28  Mg     2.0     08-28    TPro  7.8  /  Alb  4.1  /  TBili  0.7  /  DBili  x   /  AST  19  /  ALT  19  /  AlkPhos  188<H>  08-27    HPI:  66 y/o F w/ PMHX AS s/p TAVR 2015, HOCM, h/o Paroxysmal Afib (reports last episode before 2015, no longer on anticoagulation), HTNVenous Insufficiency h/o B/L LE Venous Ulcerations for 2 months (follows with wound care), JOSR (does not tolerate CPAP, on 2L O2 at home), possible Asthma/COPD, who presents with RASMUSSEN for the past 2 months, as outpatient Lasix was increased with improved of LE edema. CTA chest PA showed RUL and R interlobar PE extending into RUL segmental and RML, RLL and segmental pulmonary arteries. DAKSHA and LLL pulmonary embolism extending into the segmental branches. LE Venous Doppler showed R gastrocnemius and soleal vein DVT. BP and HR stable. O2 98% on 4 L NC. Troponin negative. . Vascular Cardiology called for consultation.     Assessment:  1. PE      O2 98% on 4 L NC      Troponin negative      BNP elevated   2. R Below Knee DVT  4. HTN  5. Venous Insufficiency / LE Venous Ulcerations  6. HOCM     Plan:  1. Continue heparin gtt while undergoing evaluation by structural heart team.  2. Official Echo pending.  3. When structural heart evaluation complete,      will give further recommendations regarding transition to oral anticoagulant.  4. Continue wound care for LE venous ulcerations.  5. Check Factor V Leiden and Prothrombin Gene Mutation.     Thank you  MADELEINE Jordan, MPAS  Vascular Cardiology Service    Please call with any questions:   Service Line: [phone]  Office [phone]  email:  [email or web address]

## 2020-08-28 NOTE — PROGRESS NOTE ADULT - ASSESSMENT
66yo woman with PMHx AS s/p TAVR 2015, HOCM, pAFib, JOSR (does not tolerate CPAP), morbid obesity who presents with 1 month of progressively worsening RASMUSSEN.    #B/l PE   -Continue heparin gtt.  -Vascular cardiology following.    #AS s/p TAVR 2015  -Most recent echo with Dr. Chow suggestive of prosthesis stenosis, with peak gradient of 3.8mm/s through AV; physical exam also suspicious with AS murmur.  -Could be etiology for SOB. Continue Lasix 120mg PO daily.  -F/u formal complete echo for review; if confirmed, will need to involve structural cardiology for SYLVIA TAVR evaluation.     #HOCM  -Echo 2/2020 with ?apical HCM with TRACEE, SHAILESH and rest LVOT gradient 14mmHg and provoked LVOT gradient 34mmHg.  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   -Continue verapamil 240mg PO daily and Coreg 25mg PO BID.  -F/u echo    #HTN  -Verapamil and Coreg.  -Continue losartan 25mg PO daily.    #pAFib  -Has JOSR.  -Continue Coreg for rate control strategy.  -NOAC for stroke PPx.  -Continue telemetry monitoring.    #ASCVD risk reduction  -Continue ASA 81mg PO daily.  -Continue Lipitor 80mg PO daily.    Case discussed with Dr. Owens.    Carla Perry MD PGY-5  Cardiology Fellow  All Cardiology service information can be found 24/7 on [email or web address], password: [email or web address]  Note is preliminary until signed by the attending.

## 2020-08-28 NOTE — PROGRESS NOTE ADULT - PROBLEM SELECTOR PLAN 8
JOSR not on CPAP, on home O2 2L at baseline. Occasional palpitations, not currently.  Plan:  c/w heparin

## 2020-08-28 NOTE — PROGRESS NOTE ADULT - PROBLEM SELECTOR PLAN 2
s/p TAVR in 2015, cardiology note states echo suggestive of prosthesis stenosis (peak grad 3.8 mm/s through AV)  - f/u TTE - ordered  - if AS, f/u with structural cardiologists to see about TAVR  -continuing with lasix 120mg PO daily

## 2020-08-28 NOTE — PROGRESS NOTE ADULT - PROBLEM SELECTOR PLAN 3
Patient uses a sliding scale for premeal at home. Due to low PO intake in the past few days she has been taking 40 BID Togeo Solostar and Humulin 30 tid premeal.  Plan:  Lantus 32 BID  Humalog 24 TID  MDSS also

## 2020-08-29 LAB
ANION GAP SERPL CALC-SCNC: 13 MMOL/L — SIGNIFICANT CHANGE UP (ref 5–17)
APTT BLD: 111.9 SEC — HIGH (ref 27.5–35.5)
APTT BLD: 83.1 SEC — HIGH (ref 27.5–35.5)
BUN SERPL-MCNC: 18 MG/DL — SIGNIFICANT CHANGE UP (ref 7–23)
CALCIUM SERPL-MCNC: 9.9 MG/DL — SIGNIFICANT CHANGE UP (ref 8.4–10.5)
CHLORIDE SERPL-SCNC: 100 MMOL/L — SIGNIFICANT CHANGE UP (ref 96–108)
CO2 SERPL-SCNC: 29 MMOL/L — SIGNIFICANT CHANGE UP (ref 22–31)
CREAT SERPL-MCNC: 0.79 MG/DL — SIGNIFICANT CHANGE UP (ref 0.5–1.3)
GLUCOSE SERPL-MCNC: 208 MG/DL — HIGH (ref 70–99)
HCT VFR BLD CALC: 37.9 % — SIGNIFICANT CHANGE UP (ref 34.5–45)
HCV AB S/CO SERPL IA: 0.11 S/CO — SIGNIFICANT CHANGE UP (ref 0–0.99)
HCV AB SERPL-IMP: SIGNIFICANT CHANGE UP
HGB BLD-MCNC: 11.6 G/DL — SIGNIFICANT CHANGE UP (ref 11.5–15.5)
MCHC RBC-ENTMCNC: 26.5 PG — LOW (ref 27–34)
MCHC RBC-ENTMCNC: 30.6 GM/DL — LOW (ref 32–36)
MCV RBC AUTO: 86.7 FL — SIGNIFICANT CHANGE UP (ref 80–100)
NRBC # BLD: 0 /100 WBCS — SIGNIFICANT CHANGE UP (ref 0–0)
PLATELET # BLD AUTO: 266 K/UL — SIGNIFICANT CHANGE UP (ref 150–400)
POTASSIUM SERPL-MCNC: 3.5 MMOL/L — SIGNIFICANT CHANGE UP (ref 3.5–5.3)
POTASSIUM SERPL-SCNC: 3.5 MMOL/L — SIGNIFICANT CHANGE UP (ref 3.5–5.3)
RBC # BLD: 4.37 M/UL — SIGNIFICANT CHANGE UP (ref 3.8–5.2)
RBC # FLD: 16 % — HIGH (ref 10.3–14.5)
SODIUM SERPL-SCNC: 142 MMOL/L — SIGNIFICANT CHANGE UP (ref 135–145)
WBC # BLD: 11.33 K/UL — HIGH (ref 3.8–10.5)
WBC # FLD AUTO: 11.33 K/UL — HIGH (ref 3.8–10.5)

## 2020-08-29 PROCEDURE — 99233 SBSQ HOSP IP/OBS HIGH 50: CPT | Mod: GC

## 2020-08-29 PROCEDURE — 99233 SBSQ HOSP IP/OBS HIGH 50: CPT

## 2020-08-29 PROCEDURE — G0452: CPT | Mod: 26

## 2020-08-29 RX ADMIN — Medication 24 UNIT(S): at 17:38

## 2020-08-29 RX ADMIN — INSULIN GLARGINE 32 UNIT(S): 100 INJECTION, SOLUTION SUBCUTANEOUS at 08:57

## 2020-08-29 RX ADMIN — MONTELUKAST 10 MILLIGRAM(S): 4 TABLET, CHEWABLE ORAL at 12:56

## 2020-08-29 RX ADMIN — Medication 80 MILLIGRAM(S): at 06:32

## 2020-08-29 RX ADMIN — Medication 24 UNIT(S): at 08:58

## 2020-08-29 RX ADMIN — HEPARIN SODIUM 1300 UNIT(S)/HR: 5000 INJECTION INTRAVENOUS; SUBCUTANEOUS at 19:17

## 2020-08-29 RX ADMIN — HEPARIN SODIUM 1600 UNIT(S)/HR: 5000 INJECTION INTRAVENOUS; SUBCUTANEOUS at 00:49

## 2020-08-29 RX ADMIN — ATORVASTATIN CALCIUM 80 MILLIGRAM(S): 80 TABLET, FILM COATED ORAL at 21:36

## 2020-08-29 RX ADMIN — INSULIN GLARGINE 32 UNIT(S): 100 INJECTION, SOLUTION SUBCUTANEOUS at 22:41

## 2020-08-29 RX ADMIN — Medication 24 UNIT(S): at 12:56

## 2020-08-29 RX ADMIN — PANTOPRAZOLE SODIUM 40 MILLIGRAM(S): 20 TABLET, DELAYED RELEASE ORAL at 06:32

## 2020-08-29 RX ADMIN — Medication 4: at 17:38

## 2020-08-29 RX ADMIN — Medication 1000 UNIT(S): at 12:55

## 2020-08-29 RX ADMIN — LOSARTAN POTASSIUM 25 MILLIGRAM(S): 100 TABLET, FILM COATED ORAL at 06:32

## 2020-08-29 RX ADMIN — Medication 2: at 12:55

## 2020-08-29 RX ADMIN — CARVEDILOL PHOSPHATE 25 MILLIGRAM(S): 80 CAPSULE, EXTENDED RELEASE ORAL at 17:38

## 2020-08-29 RX ADMIN — Medication 4: at 08:58

## 2020-08-29 RX ADMIN — CARVEDILOL PHOSPHATE 25 MILLIGRAM(S): 80 CAPSULE, EXTENDED RELEASE ORAL at 06:32

## 2020-08-29 NOTE — PROGRESS NOTE ADULT - ASSESSMENT
Assessment and Recommendations:  66yo woman with PMHx AS s/p TAVR 2015, HOCM, pAFib, JOSR (does not tolerate CPAP), morbid obesity who presents with 1 month of progressively worsening RASMUSSEN.    #B/l PE   -Continue heparin gtt.  -f/u vascular cardiology recs    #AS s/p TAVR 2015  -Most recent echo with Dr. Chow suggestive of prosthesis stenosis, with peak gradient of 3.8mm/s through AV; physical exam also suspicious with AS murmur.  -on lasix 80 PO daily  -f/u structural heart team recs    #HOCM  -Echo 2/2020 with ?apical HCM with TRACEE, SHAILESH and rest LVOT gradient 14mmHg and provoked LVOT gradient 34mmHg.  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   -Continue Coreg 25mg PO BID.  -pt has been off verapamil 240 PO daily    #HTN  -Coreg  -off verapamil  -Continue losartan 25mg PO daily.    #pAFib  -Has JOSR.  -Continue Coreg for rate control strategy.  -NOAC for stroke PPx.  -Continue telemetry monitoring.    #ASCVD risk reduction  -Continue ASA 81mg PO daily.  -Continue Lipitor 80mg PO daily.    Duarte Millan MD  Cardiology Fellow  All Cardiology service information can be found 24/7 on [email or web address], password: [email or web address]    Patient seen and examined at bedside. Note is preliminary until signed by attending. Assessment and Recommendations:  66yo woman with PMHx AS s/p TAVR 2015, HOCM, pAFib, JOSR (does not tolerate CPAP), morbid obesity who presents with 1 month of progressively worsening RASMUSSEN.    #B/l PE   -Continue heparin gtt.  -f/u vascular cardiology recs    #AS s/p TAVR 2015  -Most recent echo with Dr. Chow suggestive of prosthesis stenosis, with peak gradient of 3.8mm/s through AV; physical exam also suspicious with AS murmur.  -on lasix 80 PO daily  -f/u structural heart team recs    #HOCM  -Echo 2/2020 with ?apical HCM with TRACEE, SHAILESH and rest LVOT gradient 14mmHg and provoked LVOT gradient 34mmHg.  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   -Continue Coreg 25mg PO BID.  -pt has been off verapamil 240 PO daily    #HTN  -Coreg  -off verapamil  -Continue losartan 25mg PO daily.    #pAFib  -Has JOSR.  -Continue Coreg for rate control strategy.  -On heparin drip.  -Continue telemetry monitoring.    #ASCVD risk reduction  -Continue ASA 81mg PO daily.  -Continue Lipitor 80mg PO daily.    Duarte Millan MD  Cardiology Fellow  All Cardiology service information can be found 24/7 on [email or web address], password: [email or web address]    Patient seen and examined at bedside. Note is preliminary until signed by attending.

## 2020-08-29 NOTE — PROGRESS NOTE ADULT - PROBLEM SELECTOR PLAN 9
-no warmth, likely chronic skin changes due to LE edema  -low suspicion for cellulitis   -elevate LE   -wound care

## 2020-08-29 NOTE — PROGRESS NOTE ADULT - SUBJECTIVE AND OBJECTIVE BOX
Overnight Events: NAEON, feels better    Telemetry:NSR 70s, PVCs    Review Of Systems: No chest pain, shortness of breath, or palpitations  CONSTITUTIONAL: no fevers or chills  EYES/ENT: No visual changes;  No vertigo or throat pain   NECK: No pain or stiffness  RESPIRATORY: No cough, wheezing. No shortness of breath  CARDIOVASCULAR: No chest pain or palpitations  GASTROINTESTINAL: No abdominal or epigastric pain. No nausea, vomiting. No diarrhea. No melena.  GENITOURINARY: No dysuria, frequency or hematuria  NEUROLOGICAL: No numbness or weakness  SKIN: No itching, burning, rashes, or lesions   All other review of systems is negative unless indicated above.     Current Meds:  atorvastatin 80 milliGRAM(s) Oral at bedtime  carvedilol 25 milliGRAM(s) Oral every 12 hours  cholecalciferol 1000 Unit(s) Oral daily  dextrose 40% Gel 15 Gram(s) Oral once PRN  dextrose 5%. 1000 milliLiter(s) IV Continuous <Continuous>  dextrose 50% Injectable 12.5 Gram(s) IV Push once  dextrose 50% Injectable 25 Gram(s) IV Push once  dextrose 50% Injectable 25 Gram(s) IV Push once  furosemide    Tablet 80 milliGRAM(s) Oral daily  glucagon  Injectable 1 milliGRAM(s) IntraMuscular once PRN  heparin   Injectable 06549 Unit(s) IV Push every 6 hours PRN  heparin   Injectable 5000 Unit(s) IV Push every 6 hours PRN  heparin  Infusion.  Unit(s)/Hr IV Continuous <Continuous>  insulin glargine Injectable (LANTUS) 32 Unit(s) SubCutaneous every morning  insulin glargine Injectable (LANTUS) 32 Unit(s) SubCutaneous at bedtime  insulin lispro (HumaLOG) corrective regimen sliding scale   SubCutaneous three times a day before meals  insulin lispro Injectable (HumaLOG) 24 Unit(s) SubCutaneous three times a day before meals  losartan 25 milliGRAM(s) Oral daily  montelukast 10 milliGRAM(s) Oral daily  pantoprazole    Tablet 40 milliGRAM(s) Oral before breakfast  phenylephrine 0.5% Nasal 1 Spray(s) Nasal two times a day PRN      Vitals:  T(F): 98 (08-29), Max: 98.6 (08-28)  HR: 71 (08-29) (71 - 77)  BP: 110/62 (08-29) (110/62 - 122/50)  RR: 18 (08-29)  SpO2: 99% (08-29)  I&O's Summary    28 Aug 2020 07:01  -  29 Aug 2020 07:00  --------------------------------------------------------  IN: 666 mL / OUT: 200 mL / NET: 466 mL        Physical Exam:  Appearance: Obese woman in NAD  Cardiovascular: Regular rate and rhythm, S1, S2 grade 3/6 holosystolic murmur at RUSB with radiation to carotids, no gallops or rubs. Hard to appreciate JVD  Respiratory: Clear to auscultation bilaterally  Gastrointestinal: soft, non-tender, non-distended with normal bowel sounds  Extremities: trace edema  Musculoskeletal: No clubbing; no joint deformity   Neurologic: Non-focal  Lymphatic: No lymphadenopathy  Psychiatry: AAOx3, mood & affect appropriate  Skin: No rashes, ecchymoses, or cyanosis                          11.8   11.51 )-----------( 270      ( 28 Aug 2020 08:55 )             37.4     08-28    137  |  96  |  18  ----------------------------<  269<H>  3.5   |  30  |  0.77    Ca    9.8      28 Aug 2020 08:55  Phos  3.4     08-28  Mg     2.0     08-28    TPro  7.8  /  Alb  4.1  /  TBili  0.7  /  DBili  x   /  AST  19  /  ALT  19  /  AlkPhos  188<H>  08-27    PT/INR - ( 28 Aug 2020 00:05 )   PT: 15.4 sec;   INR: 1.31 ratio         PTT - ( 28 Aug 2020 23:58 )  PTT:83.1 sec  CARDIAC MARKERS ( 27 Aug 2020 15:40 )  14 ng/L / x     / x     / x     / x     / x      CARDIAC MARKERS ( 27 Aug 2020 13:35 )  15 ng/L / x     / x     / x     / x     / x          Serum Pro-Brain Natriuretic Peptide: 382 pg/mL (08-27 @ 13:35)    Echo: Conclusions:  1. Severe posterior mitral annular calcification, otherwise  normal mitral valve. Mild mitral regurgitation. Mean  transmitral valve gradient equals 6-7 mm Hg. (HRabout  90s-100 bpm)  2. Transcatheter aortic valve replacement is not well  visualized. Peak transaortic valve velocity equals 4.0  m/sec, mean transaortic valve gradient equals 39 mm Hg,  which is signficantly elevated even in the presence of a  transcatheter aortic valve replacement. DVI=0.37.  Calculated EOA=0.9 cmsq. Acceleration timeabout 110 msec.  Findings suggest prosthetic valve dysfunction. No aortic  valve regurgitation seen.  3. Hyperdynamic left ventricular systolic function.  Intracavitary gradient with valsalva maneuver approximately  59 mmHg.  4. Normal right ventricular size and function.  Consider additional imaging of the aortic valve such as  with LAMBERT if clinically indicated.  *** Compared with echocardiogram of 1/27/2016, AV gradients  are higher on today's study. TAVR gradients were similarly  elevated on TTE 12/14/2015.  ------------------------------------------------------------------------ Overnight Events: NAEON, feels better    Telemetry:NSR 70s, PVCs    Review Of Systems: No chest pain, shortness of breath, or palpitations  CONSTITUTIONAL: no fevers or chills  EYES/ENT: No visual changes;  No vertigo or throat pain   NECK: No pain or stiffness  RESPIRATORY: No cough, wheezing. No shortness of breath  CARDIOVASCULAR: No chest pain or palpitations  GASTROINTESTINAL: No abdominal or epigastric pain. No nausea, vomiting. No diarrhea. No melena.  GENITOURINARY: No dysuria, frequency or hematuria  NEUROLOGICAL: No numbness or weakness  SKIN: No itching, burning, rashes, or lesions   All other review of systems is negative unless indicated above.     Current Meds:  atorvastatin 80 milliGRAM(s) Oral at bedtime  carvedilol 25 milliGRAM(s) Oral every 12 hours  cholecalciferol 1000 Unit(s) Oral daily  dextrose 40% Gel 15 Gram(s) Oral once PRN  dextrose 5%. 1000 milliLiter(s) IV Continuous <Continuous>  dextrose 50% Injectable 12.5 Gram(s) IV Push once  dextrose 50% Injectable 25 Gram(s) IV Push once  dextrose 50% Injectable 25 Gram(s) IV Push once  furosemide    Tablet 80 milliGRAM(s) Oral daily  glucagon  Injectable 1 milliGRAM(s) IntraMuscular once PRN  heparin   Injectable 52982 Unit(s) IV Push every 6 hours PRN  heparin   Injectable 5000 Unit(s) IV Push every 6 hours PRN  heparin  Infusion.  Unit(s)/Hr IV Continuous <Continuous>  insulin glargine Injectable (LANTUS) 32 Unit(s) SubCutaneous every morning  insulin glargine Injectable (LANTUS) 32 Unit(s) SubCutaneous at bedtime  insulin lispro (HumaLOG) corrective regimen sliding scale   SubCutaneous three times a day before meals  insulin lispro Injectable (HumaLOG) 24 Unit(s) SubCutaneous three times a day before meals  losartan 25 milliGRAM(s) Oral daily  montelukast 10 milliGRAM(s) Oral daily  pantoprazole    Tablet 40 milliGRAM(s) Oral before breakfast  phenylephrine 0.5% Nasal 1 Spray(s) Nasal two times a day PRN      Vitals:  T(F): 98 (08-29), Max: 98.6 (08-28)  HR: 71 (08-29) (71 - 77)  BP: 110/62 (08-29) (110/62 - 122/50)  RR: 18 (08-29)  SpO2: 99% (08-29)  I&O's Summary    28 Aug 2020 07:01  -  29 Aug 2020 07:00  --------------------------------------------------------  IN: 666 mL / OUT: 200 mL / NET: 466 mL        Physical Exam:  Appearance: Obese woman in NAD  Cardiovascular: Regular rate and rhythm, S1, S2 grade 3/6 holosystolic murmur at RUSB with radiation to carotids, no gallops or rubs. Hard to appreciate JVD  Respiratory: Clear to auscultation bilaterally  Gastrointestinal: soft, non-tender, non-distended with normal bowel sounds  Extremities: trace edema  Musculoskeletal: No clubbing; no joint deformity   Neurologic: Non-focal  Lymphatic: No lymphadenopathy  Psychiatry: AAOx3, mood & affect appropriate  Skin: No rashes, ecchymoses, or cyanosis                          11.8   11.51 )-----------( 270      ( 28 Aug 2020 08:55 )             37.4     08-28    137  |  96  |  18  ----------------------------<  269<H>  3.5   |  30  |  0.77    Ca    9.8      28 Aug 2020 08:55  Phos  3.4     08-28  Mg     2.0     08-28    TPro  7.8  /  Alb  4.1  /  TBili  0.7  /  DBili  x   /  AST  19  /  ALT  19  /  AlkPhos  188<H>  08-27    PT/INR - ( 28 Aug 2020 00:05 )   PT: 15.4 sec;   INR: 1.31 ratio         PTT - ( 28 Aug 2020 23:58 )  PTT:83.1 sec  CARDIAC MARKERS ( 27 Aug 2020 15:40 )  14 ng/L / x     / x     / x     / x     / x      CARDIAC MARKERS ( 27 Aug 2020 13:35 )  15 ng/L / x     / x     / x     / x     / x          Serum Pro-Brain Natriuretic Peptide: 382 pg/mL (08-27 @ 13:35)    Echo: Conclusions:  1. Severe posterior mitral annular calcification, otherwise  normal mitral valve. Mild mitral regurgitation. Mean  transmitral valve gradient equals 6-7 mm Hg. (HRabout  90s-100 bpm)  2. Transcatheter aortic valve replacement is not well  visualized. Peak transaortic valve velocity equals 4.0  m/sec, mean transaortic valve gradient equals 39 mm Hg,  which is signficantly elevated even in the presence of a  transcatheter aortic valve replacement. DVI=0.37.  Calculated EOA=0.9 cmsq. Acceleration timeabout 110 msec.  Findings suggest prosthetic valve dysfunction. No aortic  valve regurgitation seen.  3. Hyperdynamic left ventricular systolic function.  Intracavitary gradient with valsalva maneuver approximately  59 mmHg.  4. Normal right ventricular size and function.  Consider additional imaging of the aortic valve such as  with LAMBERT if clinically indicated.  *** Compared with echocardiogram of 1/27/2016, AV gradients  are higher on today's study. TAVR gradients were similarly  elevated on TTE 12/14/2015.  ------------------------------------------------------------------------

## 2020-08-29 NOTE — PROGRESS NOTE ADULT - SUBJECTIVE AND OBJECTIVE BOX
Patient is a 65y old  Female who presents with a chief complaint of Dyspnea on exertion (29 Aug 2020 09:17)    SUBJECTIVE / OVERNIGHT EVENTS: no acute events overnight     MEDICATIONS  (STANDING):  atorvastatin 80 milliGRAM(s) Oral at bedtime  carvedilol 25 milliGRAM(s) Oral every 12 hours  cholecalciferol 1000 Unit(s) Oral daily  dextrose 5%. 1000 milliLiter(s) (50 mL/Hr) IV Continuous <Continuous>  dextrose 50% Injectable 12.5 Gram(s) IV Push once  dextrose 50% Injectable 25 Gram(s) IV Push once  dextrose 50% Injectable 25 Gram(s) IV Push once  furosemide    Tablet 80 milliGRAM(s) Oral daily  heparin  Infusion.  Unit(s)/Hr (24 mL/Hr) IV Continuous <Continuous>  insulin glargine Injectable (LANTUS) 32 Unit(s) SubCutaneous every morning  insulin glargine Injectable (LANTUS) 32 Unit(s) SubCutaneous at bedtime  insulin lispro (HumaLOG) corrective regimen sliding scale   SubCutaneous three times a day before meals  insulin lispro Injectable (HumaLOG) 24 Unit(s) SubCutaneous three times a day before meals  losartan 25 milliGRAM(s) Oral daily  montelukast 10 milliGRAM(s) Oral daily  pantoprazole    Tablet 40 milliGRAM(s) Oral before breakfast    MEDICATIONS  (PRN):  dextrose 40% Gel 15 Gram(s) Oral once PRN Blood Glucose LESS THAN 70 milliGRAM(s)/deciliter  glucagon  Injectable 1 milliGRAM(s) IntraMuscular once PRN Glucose LESS THAN 70 milligrams/deciliter  heparin   Injectable 02500 Unit(s) IV Push every 6 hours PRN For aPTT less than 40  heparin   Injectable 5000 Unit(s) IV Push every 6 hours PRN For aPTT between 40 - 57  phenylephrine 0.5% Nasal 1 Spray(s) Nasal two times a day PRN Congestion      Vital Signs Last 24 Hrs  T(C): 36.7 (29 Aug 2020 12:28), Max: 37 (28 Aug 2020 21:38)  T(F): 98 (29 Aug 2020 12:28), Max: 98.6 (28 Aug 2020 21:38)  HR: 72 (29 Aug 2020 12:28) (71 - 77)  BP: 110/66 (29 Aug 2020 12:28) (106/68 - 122/50)  BP(mean): --  RR: 19 (29 Aug 2020 12:28) (18 - 19)  SpO2: 97% (29 Aug 2020 12:28) (95% - 99%)  CAPILLARY BLOOD GLUCOSE      POCT Blood Glucose.: 179 mg/dL (29 Aug 2020 12:45)  POCT Blood Glucose.: 214 mg/dL (29 Aug 2020 08:46)  POCT Blood Glucose.: 158 mg/dL (28 Aug 2020 21:52)  POCT Blood Glucose.: 165 mg/dL (28 Aug 2020 17:38)    I&O's Summary    28 Aug 2020 07:01  -  29 Aug 2020 07:00  --------------------------------------------------------  IN: 666 mL / OUT: 200 mL / NET: 466 mL    29 Aug 2020 07:01  -  29 Aug 2020 15:33  --------------------------------------------------------  IN: 480 mL / OUT: 300 mL / NET: 180 mL    PHYSICAL EXAM:  GENERAL: NAD  EYES:  conjunctiva and sclera clear  CHEST/LUNG: diminished lung sounds, no wheezing   HEART: +S1/S2, reg   ABDOMEN: Soft, Nontender, Nondistended  EXTREMITIES: +LE edema   PSYCH: AAOx3      LABS:                        11.6   11.33 )-----------( 266      ( 29 Aug 2020 11:10 )             37.9     08-29    142  |  100  |  18  ----------------------------<  208<H>  3.5   |  29  |  0.79    Ca    9.9      29 Aug 2020 11:10  Phos  3.4     08-28  Mg     2.0     08-28      PT/INR - ( 28 Aug 2020 00:05 )   PT: 15.4 sec;   INR: 1.31 ratio         PTT - ( 29 Aug 2020 11:10 )  PTT:111.9 sec

## 2020-08-29 NOTE — PROGRESS NOTE ADULT - PROBLEM SELECTOR PLAN 5
multifactorial d/t AS, HOCM, possible asthma, and the pulmonary embolism.   Plan:  - reduce home lasix 120->80mg  - on heparin drip  - echo and workup as above

## 2020-08-29 NOTE — PROGRESS NOTE ADULT - PROBLEM SELECTOR PLAN 1
extensive bilateral interlobar and lobar pulmonary embolism.  Plan:  - on heparin drip, will continue for now as pt is awaiting structural heart eval   - LE U/S significant for DVT R gastrocnemius and soleal veins  -wound care for LE ulcers  -f/u FV leiden and prothrombin gene mutation studies

## 2020-08-29 NOTE — PROGRESS NOTE ADULT - PROBLEM SELECTOR PLAN 2
s/p TAVR in 2015, cardiology note states echo suggestive of prosthesis stenosis (peak grad 3.8 mm/s through AV)  -echo this admission shows high gradient, and higher gradient compared to previous echo   -will get structural heart evaluation (may have to wait until Monday as team may not be in house over the weekend)  -may ultimately need LAMBERT   -continuing with lasix 80mg PO daily

## 2020-08-29 NOTE — PROGRESS NOTE ADULT - PROBLEM SELECTOR PLAN 4
Cardiology recs appreciated, Per chart, Patient diagnosed with Hypertrophic Cardiomyopathy in 2/20, Echo 2/2020 and last month showed LVOT obstruction.  Plan:  -continue with coreg 25mg PO BID

## 2020-08-30 LAB
APTT BLD: 58.2 SEC — HIGH (ref 27.5–35.5)
APTT BLD: 59.4 SEC — HIGH (ref 27.5–35.5)
HCT VFR BLD CALC: 37.9 % — SIGNIFICANT CHANGE UP (ref 34.5–45)
HGB BLD-MCNC: 12 G/DL — SIGNIFICANT CHANGE UP (ref 11.5–15.5)
MCHC RBC-ENTMCNC: 27.3 PG — SIGNIFICANT CHANGE UP (ref 27–34)
MCHC RBC-ENTMCNC: 31.7 GM/DL — LOW (ref 32–36)
MCV RBC AUTO: 86.3 FL — SIGNIFICANT CHANGE UP (ref 80–100)
NRBC # BLD: 0 /100 WBCS — SIGNIFICANT CHANGE UP (ref 0–0)
PLATELET # BLD AUTO: 256 K/UL — SIGNIFICANT CHANGE UP (ref 150–400)
RBC # BLD: 4.39 M/UL — SIGNIFICANT CHANGE UP (ref 3.8–5.2)
RBC # FLD: 15.9 % — HIGH (ref 10.3–14.5)
WBC # BLD: 12.23 K/UL — HIGH (ref 3.8–10.5)
WBC # FLD AUTO: 12.23 K/UL — HIGH (ref 3.8–10.5)

## 2020-08-30 PROCEDURE — 99233 SBSQ HOSP IP/OBS HIGH 50: CPT

## 2020-08-30 RX ORDER — NYSTATIN CREAM 100000 [USP'U]/G
1 CREAM TOPICAL
Refills: 0 | Status: DISCONTINUED | OUTPATIENT
Start: 2020-08-30 | End: 2020-09-01

## 2020-08-30 RX ORDER — INSULIN GLARGINE 100 [IU]/ML
35 INJECTION, SOLUTION SUBCUTANEOUS AT BEDTIME
Refills: 0 | Status: DISCONTINUED | OUTPATIENT
Start: 2020-08-30 | End: 2020-08-31

## 2020-08-30 RX ORDER — INSULIN GLARGINE 100 [IU]/ML
35 INJECTION, SOLUTION SUBCUTANEOUS EVERY MORNING
Refills: 0 | Status: DISCONTINUED | OUTPATIENT
Start: 2020-08-30 | End: 2020-08-31

## 2020-08-30 RX ORDER — INSULIN LISPRO 100/ML
27 VIAL (ML) SUBCUTANEOUS
Refills: 0 | Status: DISCONTINUED | OUTPATIENT
Start: 2020-08-30 | End: 2020-08-31

## 2020-08-30 RX ADMIN — CARVEDILOL PHOSPHATE 25 MILLIGRAM(S): 80 CAPSULE, EXTENDED RELEASE ORAL at 17:43

## 2020-08-30 RX ADMIN — NYSTATIN CREAM 1 APPLICATION(S): 100000 CREAM TOPICAL at 21:16

## 2020-08-30 RX ADMIN — CARVEDILOL PHOSPHATE 25 MILLIGRAM(S): 80 CAPSULE, EXTENDED RELEASE ORAL at 05:33

## 2020-08-30 RX ADMIN — Medication 24 UNIT(S): at 12:46

## 2020-08-30 RX ADMIN — Medication 1000 UNIT(S): at 12:47

## 2020-08-30 RX ADMIN — INSULIN GLARGINE 35 UNIT(S): 100 INJECTION, SOLUTION SUBCUTANEOUS at 22:04

## 2020-08-30 RX ADMIN — Medication 80 MILLIGRAM(S): at 05:33

## 2020-08-30 RX ADMIN — MONTELUKAST 10 MILLIGRAM(S): 4 TABLET, CHEWABLE ORAL at 12:47

## 2020-08-30 RX ADMIN — ATORVASTATIN CALCIUM 80 MILLIGRAM(S): 80 TABLET, FILM COATED ORAL at 21:16

## 2020-08-30 RX ADMIN — Medication 4: at 12:46

## 2020-08-30 RX ADMIN — HEPARIN SODIUM 1300 UNIT(S)/HR: 5000 INJECTION INTRAVENOUS; SUBCUTANEOUS at 09:02

## 2020-08-30 RX ADMIN — Medication 27 UNIT(S): at 17:42

## 2020-08-30 RX ADMIN — NYSTATIN CREAM 1 APPLICATION(S): 100000 CREAM TOPICAL at 09:03

## 2020-08-30 RX ADMIN — INSULIN GLARGINE 32 UNIT(S): 100 INJECTION, SOLUTION SUBCUTANEOUS at 09:01

## 2020-08-30 RX ADMIN — Medication 24 UNIT(S): at 09:02

## 2020-08-30 RX ADMIN — Medication 4: at 09:02

## 2020-08-30 RX ADMIN — PANTOPRAZOLE SODIUM 40 MILLIGRAM(S): 20 TABLET, DELAYED RELEASE ORAL at 05:33

## 2020-08-30 RX ADMIN — HEPARIN SODIUM 1300 UNIT(S)/HR: 5000 INJECTION INTRAVENOUS; SUBCUTANEOUS at 01:22

## 2020-08-30 RX ADMIN — LOSARTAN POTASSIUM 25 MILLIGRAM(S): 100 TABLET, FILM COATED ORAL at 05:33

## 2020-08-30 NOTE — PROGRESS NOTE ADULT - PROBLEM SELECTOR PLAN 1
extensive bilateral interlobar and lobar pulmonary embolism.  Plan:  - on heparin drip, will continue for now as pt is awaiting structural heart eval   - LE U/S significant for DVT R gastrocnemius and soleal veins  - f/u FV leiden and prothrombin gene mutation studies

## 2020-08-30 NOTE — PROGRESS NOTE ADULT - SUBJECTIVE AND OBJECTIVE BOX
Patient is a 65y old  Female who presents with a chief complaint of Dyspnea on exertion (29 Aug 2020 15:31)    SUBJECTIVE / OVERNIGHT EVENTS: patient reports on and off dyspnea, especially when ambulating to bathroom. No dyspnea at rest.     MEDICATIONS  (STANDING):  atorvastatin 80 milliGRAM(s) Oral at bedtime  carvedilol 25 milliGRAM(s) Oral every 12 hours  cholecalciferol 1000 Unit(s) Oral daily  dextrose 5%. 1000 milliLiter(s) (50 mL/Hr) IV Continuous <Continuous>  dextrose 50% Injectable 12.5 Gram(s) IV Push once  dextrose 50% Injectable 25 Gram(s) IV Push once  dextrose 50% Injectable 25 Gram(s) IV Push once  furosemide    Tablet 80 milliGRAM(s) Oral daily  heparin  Infusion.  Unit(s)/Hr (24 mL/Hr) IV Continuous <Continuous>  insulin glargine Injectable (LANTUS) 32 Unit(s) SubCutaneous every morning  insulin glargine Injectable (LANTUS) 32 Unit(s) SubCutaneous at bedtime  insulin lispro (HumaLOG) corrective regimen sliding scale   SubCutaneous three times a day before meals  insulin lispro Injectable (HumaLOG) 24 Unit(s) SubCutaneous three times a day before meals  losartan 25 milliGRAM(s) Oral daily  montelukast 10 milliGRAM(s) Oral daily  nystatin Powder 1 Application(s) Topical two times a day  pantoprazole    Tablet 40 milliGRAM(s) Oral before breakfast    MEDICATIONS  (PRN):  dextrose 40% Gel 15 Gram(s) Oral once PRN Blood Glucose LESS THAN 70 milliGRAM(s)/deciliter  glucagon  Injectable 1 milliGRAM(s) IntraMuscular once PRN Glucose LESS THAN 70 milligrams/deciliter  heparin   Injectable 93863 Unit(s) IV Push every 6 hours PRN For aPTT less than 40  heparin   Injectable 5000 Unit(s) IV Push every 6 hours PRN For aPTT between 40 - 57  phenylephrine 0.5% Nasal 1 Spray(s) Nasal two times a day PRN Congestion      Vital Signs Last 24 Hrs  T(C): 37 (30 Aug 2020 12:18), Max: 37 (30 Aug 2020 12:18)  T(F): 98.6 (30 Aug 2020 12:18), Max: 98.6 (30 Aug 2020 12:18)  HR: 74 (30 Aug 2020 12:18) (74 - 78)  BP: 117/66 (30 Aug 2020 12:18) (105/56 - 123/65)  BP(mean): --  RR: 19 (30 Aug 2020 12:18) (18 - 19)  SpO2: 97% (30 Aug 2020 12:18) (97% - 99%)  CAPILLARY BLOOD GLUCOSE      POCT Blood Glucose.: 201 mg/dL (30 Aug 2020 12:43)  POCT Blood Glucose.: 231 mg/dL (30 Aug 2020 08:56)  POCT Blood Glucose.: 122 mg/dL (29 Aug 2020 22:39)  POCT Blood Glucose.: 101 mg/dL (29 Aug 2020 21:39)  POCT Blood Glucose.: 228 mg/dL (29 Aug 2020 17:30)    I&O's Summary    29 Aug 2020 07:01  -  30 Aug 2020 07:00  --------------------------------------------------------  IN: 1308 mL / OUT: 300 mL / NET: 1008 mL        PHYSICAL EXAM:  GENERAL: NAD  EYES:  conjunctiva and sclera clear  CHEST/LUNG: clear b/l, no wheezing   HEART: +S1/S2, reg   ABDOMEN: Soft, Nontender, Nondistended  EXTREMITIES: +BL LE edema with wounds, mildly tender   PSYCH: AAOx3      LABS:                        12.0   12.23 )-----------( 256      ( 30 Aug 2020 06:35 )             37.9     08-29    142  |  100  |  18  ----------------------------<  208<H>  3.5   |  29  |  0.79    Ca    9.9      29 Aug 2020 11:10      PTT - ( 30 Aug 2020 06:35 )  PTT:59.4 sec

## 2020-08-30 NOTE — PROGRESS NOTE ADULT - PROBLEM SELECTOR PLAN 3
-wounds that are healing with crusting present on the LE b/l   -elevate LE   -wound care  -Uptrending WBC, pt remains afebrile. Would monitor off antibiotics for now. Low threshold to start abx if clinical exam changes or if pt develops fever.

## 2020-08-30 NOTE — PROGRESS NOTE ADULT - ASSESSMENT
64yo woman with PMHx AS s/p TAVR 2015, HOCM, pAFib, JOSR (does not tolerate CPAP, on 2L O2 at home), ?asthma, anxiety, morbid obesity, p/w 6 months of progressively worsening SOB on exertion, found to have extensive bilateral interlobar and lobar pulmonary embolism, now awaiting structural heart eval:

## 2020-08-31 DIAGNOSIS — I35.0 NONRHEUMATIC AORTIC (VALVE) STENOSIS: ICD-10-CM

## 2020-08-31 LAB
ANION GAP SERPL CALC-SCNC: 12 MMOL/L — SIGNIFICANT CHANGE UP (ref 5–17)
APTT BLD: 59.1 SEC — HIGH (ref 27.5–35.5)
BASOPHILS # BLD AUTO: 0.02 K/UL — SIGNIFICANT CHANGE UP (ref 0–0.2)
BASOPHILS NFR BLD AUTO: 0.2 % — SIGNIFICANT CHANGE UP (ref 0–2)
BUN SERPL-MCNC: 24 MG/DL — HIGH (ref 7–23)
CALCIUM SERPL-MCNC: 9.9 MG/DL — SIGNIFICANT CHANGE UP (ref 8.4–10.5)
CHLORIDE SERPL-SCNC: 100 MMOL/L — SIGNIFICANT CHANGE UP (ref 96–108)
CO2 SERPL-SCNC: 26 MMOL/L — SIGNIFICANT CHANGE UP (ref 22–31)
CREAT SERPL-MCNC: 0.88 MG/DL — SIGNIFICANT CHANGE UP (ref 0.5–1.3)
EOSINOPHIL # BLD AUTO: 0.29 K/UL — SIGNIFICANT CHANGE UP (ref 0–0.5)
EOSINOPHIL NFR BLD AUTO: 2.5 % — SIGNIFICANT CHANGE UP (ref 0–6)
GLUCOSE BLDC GLUCOMTR-MCNC: 132 MG/DL — HIGH (ref 70–99)
GLUCOSE BLDC GLUCOMTR-MCNC: 182 MG/DL — HIGH (ref 70–99)
GLUCOSE SERPL-MCNC: 232 MG/DL — HIGH (ref 70–99)
HCT VFR BLD CALC: 38.2 % — SIGNIFICANT CHANGE UP (ref 34.5–45)
HGB BLD-MCNC: 11.8 G/DL — SIGNIFICANT CHANGE UP (ref 11.5–15.5)
IMM GRANULOCYTES NFR BLD AUTO: 0.5 % — SIGNIFICANT CHANGE UP (ref 0–1.5)
LYMPHOCYTES # BLD AUTO: 2.6 K/UL — SIGNIFICANT CHANGE UP (ref 1–3.3)
LYMPHOCYTES # BLD AUTO: 22.5 % — SIGNIFICANT CHANGE UP (ref 13–44)
MCHC RBC-ENTMCNC: 26.9 PG — LOW (ref 27–34)
MCHC RBC-ENTMCNC: 30.9 GM/DL — LOW (ref 32–36)
MCV RBC AUTO: 87 FL — SIGNIFICANT CHANGE UP (ref 80–100)
MONOCYTES # BLD AUTO: 0.81 K/UL — SIGNIFICANT CHANGE UP (ref 0–0.9)
MONOCYTES NFR BLD AUTO: 7 % — SIGNIFICANT CHANGE UP (ref 2–14)
NEUTROPHILS # BLD AUTO: 7.78 K/UL — HIGH (ref 1.8–7.4)
NEUTROPHILS NFR BLD AUTO: 67.3 % — SIGNIFICANT CHANGE UP (ref 43–77)
NRBC # BLD: 0 /100 WBCS — SIGNIFICANT CHANGE UP (ref 0–0)
PLATELET # BLD AUTO: 284 K/UL — SIGNIFICANT CHANGE UP (ref 150–400)
POTASSIUM SERPL-MCNC: 3.2 MMOL/L — LOW (ref 3.5–5.3)
POTASSIUM SERPL-SCNC: 3.2 MMOL/L — LOW (ref 3.5–5.3)
RBC # BLD: 4.39 M/UL — SIGNIFICANT CHANGE UP (ref 3.8–5.2)
RBC # FLD: 15.9 % — HIGH (ref 10.3–14.5)
SODIUM SERPL-SCNC: 138 MMOL/L — SIGNIFICANT CHANGE UP (ref 135–145)
WBC # BLD: 11.56 K/UL — HIGH (ref 3.8–10.5)
WBC # FLD AUTO: 11.56 K/UL — HIGH (ref 3.8–10.5)

## 2020-08-31 PROCEDURE — 99233 SBSQ HOSP IP/OBS HIGH 50: CPT

## 2020-08-31 PROCEDURE — 99232 SBSQ HOSP IP/OBS MODERATE 35: CPT

## 2020-08-31 PROCEDURE — 99223 1ST HOSP IP/OBS HIGH 75: CPT

## 2020-08-31 RX ORDER — FUROSEMIDE 40 MG
80 TABLET ORAL ONCE
Refills: 0 | Status: COMPLETED | OUTPATIENT
Start: 2020-08-31 | End: 2020-08-31

## 2020-08-31 RX ORDER — INSULIN LISPRO 100/ML
27 VIAL (ML) SUBCUTANEOUS
Refills: 0 | Status: DISCONTINUED | OUTPATIENT
Start: 2020-08-31 | End: 2020-09-01

## 2020-08-31 RX ORDER — PETROLATUM,WHITE
1 JELLY (GRAM) TOPICAL
Refills: 0 | Status: DISCONTINUED | OUTPATIENT
Start: 2020-08-31 | End: 2020-09-01

## 2020-08-31 RX ORDER — RIVAROXABAN 15 MG-20MG
15 KIT ORAL
Refills: 0 | Status: DISCONTINUED | OUTPATIENT
Start: 2020-08-31 | End: 2020-09-01

## 2020-08-31 RX ORDER — INSULIN GLARGINE 100 [IU]/ML
40 INJECTION, SOLUTION SUBCUTANEOUS AT BEDTIME
Refills: 0 | Status: DISCONTINUED | OUTPATIENT
Start: 2020-08-31 | End: 2020-09-01

## 2020-08-31 RX ORDER — METOPROLOL TARTRATE 50 MG
50 TABLET ORAL
Refills: 0 | Status: DISCONTINUED | OUTPATIENT
Start: 2020-08-31 | End: 2020-09-01

## 2020-08-31 RX ADMIN — RIVAROXABAN 15 MILLIGRAM(S): KIT at 19:48

## 2020-08-31 RX ADMIN — Medication 80 MILLIGRAM(S): at 05:41

## 2020-08-31 RX ADMIN — Medication 50 MILLIGRAM(S): at 18:14

## 2020-08-31 RX ADMIN — PANTOPRAZOLE SODIUM 40 MILLIGRAM(S): 20 TABLET, DELAYED RELEASE ORAL at 05:41

## 2020-08-31 RX ADMIN — Medication 2: at 18:04

## 2020-08-31 RX ADMIN — Medication 27 UNIT(S): at 13:15

## 2020-08-31 RX ADMIN — Medication 6: at 13:15

## 2020-08-31 RX ADMIN — LOSARTAN POTASSIUM 25 MILLIGRAM(S): 100 TABLET, FILM COATED ORAL at 05:41

## 2020-08-31 RX ADMIN — ATORVASTATIN CALCIUM 80 MILLIGRAM(S): 80 TABLET, FILM COATED ORAL at 21:23

## 2020-08-31 RX ADMIN — MONTELUKAST 10 MILLIGRAM(S): 4 TABLET, CHEWABLE ORAL at 13:15

## 2020-08-31 RX ADMIN — Medication 27 UNIT(S): at 09:04

## 2020-08-31 RX ADMIN — INSULIN GLARGINE 40 UNIT(S): 100 INJECTION, SOLUTION SUBCUTANEOUS at 21:45

## 2020-08-31 RX ADMIN — Medication 4: at 09:03

## 2020-08-31 RX ADMIN — Medication 1 APPLICATION(S): at 21:25

## 2020-08-31 RX ADMIN — CARVEDILOL PHOSPHATE 25 MILLIGRAM(S): 80 CAPSULE, EXTENDED RELEASE ORAL at 05:41

## 2020-08-31 RX ADMIN — NYSTATIN CREAM 1 APPLICATION(S): 100000 CREAM TOPICAL at 10:10

## 2020-08-31 RX ADMIN — HEPARIN SODIUM 1300 UNIT(S)/HR: 5000 INJECTION INTRAVENOUS; SUBCUTANEOUS at 06:29

## 2020-08-31 RX ADMIN — NYSTATIN CREAM 1 APPLICATION(S): 100000 CREAM TOPICAL at 21:24

## 2020-08-31 RX ADMIN — Medication 1000 UNIT(S): at 13:14

## 2020-08-31 RX ADMIN — Medication 27 UNIT(S): at 18:04

## 2020-08-31 RX ADMIN — Medication 80 MILLIGRAM(S): at 18:14

## 2020-08-31 NOTE — CONSULT NOTE ADULT - SUBJECTIVE AND OBJECTIVE BOX
Structural Heart Team    HPI:  64yo woman with PMHx AS s/p TAVR 2015, HOCM, pAFib, JOSR (does not tolerate CPAP, on 2L O2 at home), ?asthma, anxiety, morbid obesity, p/w 6 months of progressively worsening SOB on exertion. Per patient, last time she was able to walk to the bathroom and back without dyspnea was 6 months ago and she has progressively become more sedentary. +orthopnea, she states she has been sleeping upright for >6 months, and before that was using multiple pillows. Per patient, hx of chronic venous stasis, her legs were swollen with water blisters 2 months ago, now improved, Doppler U/S of the legs was negative for DVT at the time. Her diuretic dose was slowly increased to Lasix 120mg PO daily, with good diuresis and resolution of the LE edema. However, the SOB on exertion persisted. Therefore, she came to ED for further evaluation.       Ms Olivo is a 66y/o female known to the Structural Heart Team as she underwent TAVR with a #26 S3 bioprosthetic valve in 2015.  She is morbidly obese and very anxious.  Her PMHx is listed in the HPI above.  She reports to us that she has experienced worsening SOB over the past 6 months as well as orthopnea such that she sleeps in a recliner.  In regards to her obesity she reports that she has tried dieting as well as lap banding (which was removed bc of vomiting and device erosion into her stomach).  On admission a CT angio was done and revealed extensive bilateral pulmonary emboli for which she was started on heparin.  TTE was done and Structural Heart Team was consulted to evaluate bioprosthetic AS.  On exam, she is sitting up in a chair comfortably.  She reports that she cannot sleep in the bed.  She has ambulated only as far as the portable commode (3-4ft from her chair).  She says she did attempt to use the bathroom proper but that she become diaphoretic and sob.       Allergies    codeine (Vomiting)  shellfish (Stomach Upset)    Intolerances    fish (Stomach Upset)    PAST MEDICAL & SURGICAL HISTORY:  History of diabetic retinopathy  Diabetes type 2, uncontrolled  GERD (gastroesophageal reflux disease)  IBS (irritable bowel syndrome)  Obesity  Hypothyroid: not on medication  MVP (mitral valve prolapse)  Cholesterol serum elevated  Spinal stenosis: chronic back pain  Herniated disc  Depression  Gastroparesis due to DM  Hypertension  S/P TAVR (transcatheter aortic valve replacement)  right eye torn retina  Breast anomaly: Biopsy of both breast benign lesions  Cervix abnormality: Ablation of cervix  S/P D&C (status post dilation and curettage): D&amp;C when she was 18  S/P laminectomy: Pt denied this?  Cataract: B/L cataracts  S/P tonsillectomy and adenoidectomy  S/P bariatric surgery: lap band surgery two times due to erosion, now no more    Family History:  Mother and Father= heart disease  Brother= pHTN    MEDICATIONS  (STANDING):  atorvastatin 80 milliGRAM(s) Oral at bedtime  carvedilol 25 milliGRAM(s) Oral every 12 hours  cholecalciferol 1000 Unit(s) Oral daily  dextrose 5%. 1000 milliLiter(s) (50 mL/Hr) IV Continuous <Continuous>  dextrose 50% Injectable 12.5 Gram(s) IV Push once  dextrose 50% Injectable 25 Gram(s) IV Push once  dextrose 50% Injectable 25 Gram(s) IV Push once  furosemide    Tablet 80 milliGRAM(s) Oral daily  heparin  Infusion.  Unit(s)/Hr (24 mL/Hr) IV Continuous <Continuous>  insulin glargine Injectable (LANTUS) 35 Unit(s) SubCutaneous at bedtime  insulin glargine Injectable (LANTUS) 35 Unit(s) SubCutaneous every morning  insulin lispro (HumaLOG) corrective regimen sliding scale   SubCutaneous three times a day before meals  insulin lispro Injectable (HumaLOG) 27 Unit(s) SubCutaneous three times a day before meals  losartan 25 milliGRAM(s) Oral daily  montelukast 10 milliGRAM(s) Oral daily  nystatin Powder 1 Application(s) Topical two times a day  pantoprazole    Tablet 40 milliGRAM(s) Oral before breakfast      REVIEW OF SYSTEMS:    CONSTITUTIONAL: No weakness, fevers or chills  EYES/ENT: No visual changes;  No vertigo or throat pain   NECK: No pain or stiffness  RESPIRATORY: No cough, wheezing, hemoptysis; No shortness of breath  CARDIOVASCULAR: No chest pain or palpitations  GASTROINTESTINAL: No abdominal or epigastric pain. No nausea, vomiting, or hematemesis; No diarrhea or constipation. No melena or hematochezia.  GENITOURINARY: No dysuria, frequency or hematuria  NEUROLOGICAL: No numbness or weakness  SKIN: No itching, rashes      Vital Signs Last 24 Hrs  T(C): 36.7 (31 Aug 2020 08:32), Max: 37 (30 Aug 2020 12:18)  T(F): 98.1 (31 Aug 2020 08:32), Max: 98.6 (30 Aug 2020 12:18)  HR: 72 (31 Aug 2020 08:32) (69 - 88)  BP: 107/57 (31 Aug 2020 08:32) (99/64 - 143/73)  BP(mean): --  RR: 18 (31 Aug 2020 08:32) (18 - 19)  SpO2: 98% (31 Aug 2020 08:32) (94% - 99%)                          11.8   11.56 )-----------( 284      ( 31 Aug 2020 06:05 )             38.2   08-31    138  |  100  |  24<H>  ----------------------------<  232<H>  3.2<L>   |  26  |  0.88    Ca    9.9      31 Aug 2020 06:05      PTT - ( 31 Aug 2020 06:05 )  PTT:59.1 sec    I&O's Summary    30 Aug 2020 07:01  -  31 Aug 2020 07:00  --------------------------------------------------------  IN: 1032 mL / OUT: 400 mL / NET: 632 mL    31 Aug 2020 07:01  -  31 Aug 2020 12:10  --------------------------------------------------------  IN: 240 mL / OUT: 300 mL / NET: -60 mL          Physical Exam  General: obese, NAD  Cardiac: s1s2, RRR,  II-III/VI systolic murmur  Pulmonary: CTA b/l, no wheezes, rales, or rhonchi  Gastrointestinal: soft abdomen, nontender, nondistended, + bowel sounds throughout  Extremities: 2+ pitting edema  Neuro: A&Ox3, nonfocal, moves all extremities  EKG: SR      < from: CT Angio Chest w/ IV Cont (08.27.20 @ 17:30) >  PROCEDURE:  CT Angiography of the Chest.  90 ml of Omnipaque 350 was injected intravenously. 10 ml were discarded.  Sagittal and coronal reformats were performed as well as 3D (MIP) reconstructions.    FINDINGS:    LUNGS AND AIRWAYS: Patent central airways.  5 mm right upper lobe nodule series 5 image 35, unchanged. 4 mm left lower lobe nodule series 5 image 102, unchanged.. 4 mm left upper lobe nodule series 5 image 30, new.  PLEURA: No pleural effusion.  MEDIASTINUM AND MIKE: No lymphadenopathy.  VESSELS: Right upper and interlobar pulmonary embolism extending into right upper lobe segmental and right middle and lower lobar and segmental pulmonary arteries. Left upper lobar and lower lobar pulmonary embolism extending into the segmental branches.  HEART: Heart size is normal. No pericardial effusion. Status post transcatheter aortic valve replacement. Coronary artery calcifications and/or stents.  CHEST WALL AND LOWER NECK: Right thyroid calcification, unchanged from 9/3/2015  VISUALIZED UPPER ABDOMEN: Within normal limits.  BONES: Degenerative changes.    IMPRESSION:  Extensive bilateral interlobar and lobar pulmonary embolism, as above.    < end of copied text >    < from: Transthoracic Echocardiogram (08.28.20 @ 09:24) >  Dimensions:    Normal Values:  LA:     5.0    2.0 - 4.0 cm  Ao:            2.0 - 3.8 cm  SEPTUM: 1.3    0.6 - 1.2 cm  PWT:    1.2    0.6 - 1.1 cm  LVIDd:  4.3    3.0 - 5.6 cm  LVIDs:  2.6    1.8 - 4.0 cm  Derived variables:  LVMI: 86 g/m2  RWT: 0.55  Fractional short: 40 %  EF (Visual Estimate): >75 %  Doppler Peak Velocity (m/sec): AoV=4.0  ------------------------------------------------------------------------  Observations:  Mitral Valve: Severe posterior mitral annular  calcification, otherwise normal mitral valve. Mild mitral  regurgitation. Mean transmitral valve gradient equals 6-7  mm Hg. (HRabout 90s-100 bpm)  Aortic Valve/Aorta: Transcatheter aortic valve replacement  is not well visualized. Peak transaortic valve velocity  equals 4.0 m/sec, mean transaortic valve gradient equals 39  mm Hg, which is signficantly elevated even in the presence  of a transcatheter aortic valve replacement. DVI=0.37.  Calculated EOA=0.9 cmsq. Acceleration timeabout 110 msec.  Findings suggest prosthetic valve dysfunction. No aortic  valve regurgitation seen. Peak left ventricular outflow  tract gradient equals 7 mm Hg, mean gradient is equal to 4  mm Hg, LVOT velocity time integral equals 30 cm.  LVOT diameter: 1.8 cm.  Left Atrium: Normal left atrium.  LA volume index = 34  cc/m2.  Left Ventricle: Hyperdynamic left ventricular systolic  function. Intracavitary gradient with valsalva maneuver  approximately 59 mmHg. Mild concentric left ventricular  hypertrophy.  Right Heart: Normal right atrium. Normal right ventricular  size and function. Normal tricuspid valve. Mild tricuspid  regurgitation. Normal pulmonic valve. Minimal pulmonic  regurgitation.  Pericardium/Pleura: Normal pericardium with no pericardial  effusion.  Hemodynamic: Estimated right atrial pressure is 8 mm Hg.  Estimated right ventricular systolic pressure equals 46 mm  Hg, assuming right atrial pressure equals 8 mm Hg,  consistent with mild pulmonary hypertension.  ------------------------------------------------------------------------  Conclusions:  1. Severe posterior mitral annular calcification, otherwise  normal mitral valve. Mild mitral regurgitation. Mean  transmitral valve gradient equals 6-7 mm Hg. (HRabout  90s-100 bpm)  2. Transcatheter aortic valve replacement is not well  visualized. Peak transaortic valve velocity equals 4.0  m/sec, mean transaortic valve gradient equals 39 mm Hg,  which is signficantly elevated even in the presence of a  transcatheter aortic valve replacement. DVI=0.37.  Calculated EOA=0.9 cmsq. Acceleration timeabout 110 msec.  Findings suggest prosthetic valve dysfunction. No aortic  valve regurgitation seen.  3. Hyperdynamic left ventricular systolic function.  Intracavitary gradient with valsalva maneuver approximately  59 mmHg.  4. Normal right ventricular size and function.  Consider additional imaging of the aortic valve such as  with LAMBERT if clinically indicated.  *** Compared with echocardiogram of 1/27/2016, AV gradients  are higher on today's study. TAVR gradients were similarly  elevated on TTE 12/14/2015.    < end of copied text >

## 2020-08-31 NOTE — PROGRESS NOTE ADULT - PROBLEM SELECTOR PLAN 9
-in the setting of JOSR  -continue with coreg for rate control  -on heparin gtt recently for AC DVT Prophylaxis: starting xarelto

## 2020-08-31 NOTE — PROGRESS NOTE ADULT - PROBLEM SELECTOR PLAN 2
s/p TAVR in 2015, cardiology note states echo suggestive of prosthesis stenosis (peak grad 3.8 mm/s through AV)  -echo this admission shows high gradient, and higher gradient compared to previous echo   -will get structural heart evaluation (may have to wait until Monday as team may not be in house over the weekend)  -may ultimately need LAMBERT   -continuing with lasix 80mg PO daily s/p TAVR in 2015, cardiology note states echo suggestive of prosthesis stenosis (peak grad 3.8 mm/s through AV)  -echo this admission shows high gradient, and higher gradient compared to previous echo   structural heart disease consult appreciated - no acute intervention planned - f/u TTE in ?6 weeks   cards recommending additional lasix 80mg iv x1

## 2020-08-31 NOTE — PROGRESS NOTE ADULT - SUBJECTIVE AND OBJECTIVE BOX
Patient is a 65y old  Female who presents with a chief complaint of Dyspnea on exertion (31 Aug 2020 15:45)        SUBJECTIVE / OVERNIGHT EVENTS:      MEDICATIONS  (STANDING):  atorvastatin 80 milliGRAM(s) Oral at bedtime  carvedilol 25 milliGRAM(s) Oral every 12 hours  cholecalciferol 1000 Unit(s) Oral daily  dextrose 5%. 1000 milliLiter(s) (50 mL/Hr) IV Continuous <Continuous>  dextrose 50% Injectable 12.5 Gram(s) IV Push once  dextrose 50% Injectable 25 Gram(s) IV Push once  dextrose 50% Injectable 25 Gram(s) IV Push once  furosemide    Tablet 80 milliGRAM(s) Oral daily  heparin  Infusion.  Unit(s)/Hr (24 mL/Hr) IV Continuous <Continuous>  insulin glargine Injectable (LANTUS) 40 Unit(s) SubCutaneous at bedtime  insulin lispro (HumaLOG) corrective regimen sliding scale   SubCutaneous three times a day before meals  insulin lispro Injectable (HumaLOG) 27 Unit(s) SubCutaneous three times a day before meals  losartan 25 milliGRAM(s) Oral daily  montelukast 10 milliGRAM(s) Oral daily  nystatin Powder 1 Application(s) Topical two times a day  pantoprazole    Tablet 40 milliGRAM(s) Oral before breakfast    MEDICATIONS  (PRN):  dextrose 40% Gel 15 Gram(s) Oral once PRN Blood Glucose LESS THAN 70 milliGRAM(s)/deciliter  glucagon  Injectable 1 milliGRAM(s) IntraMuscular once PRN Glucose LESS THAN 70 milligrams/deciliter  heparin   Injectable 70374 Unit(s) IV Push every 6 hours PRN For aPTT less than 40  heparin   Injectable 5000 Unit(s) IV Push every 6 hours PRN For aPTT between 40 - 57  phenylephrine 0.5% Nasal 1 Spray(s) Nasal two times a day PRN Congestion      Vital Signs Last 24 Hrs  T(C): 36.7 (31 Aug 2020 13:02), Max: 36.8 (30 Aug 2020 21:30)  T(F): 98 (31 Aug 2020 13:02), Max: 98.2 (30 Aug 2020 21:30)  HR: 74 (31 Aug 2020 13:02) (69 - 88)  BP: 122/71 (31 Aug 2020 13:02) (99/64 - 143/73)  BP(mean): --  RR: 18 (31 Aug 2020 13:02) (18 - 18)  SpO2: 95% (31 Aug 2020 13:02) (94% - 99%)  CAPILLARY BLOOD GLUCOSE      POCT Blood Glucose.: 264 mg/dL (31 Aug 2020 12:55)  POCT Blood Glucose.: 242 mg/dL (31 Aug 2020 08:59)  POCT Blood Glucose.: 111 mg/dL (30 Aug 2020 22:02)  POCT Blood Glucose.: 124 mg/dL (30 Aug 2020 16:53)    I&O's Summary    30 Aug 2020 07:01  -  31 Aug 2020 07:00  --------------------------------------------------------  IN: 1032 mL / OUT: 400 mL / NET: 632 mL    31 Aug 2020 07:01  -  31 Aug 2020 16:26  --------------------------------------------------------  IN: 480 mL / OUT: 300 mL / NET: 180 mL        PHYSICAL EXAM:  GENERAL: NAD, breathing normal  HEAD:  Atraumatic, Normocephalic  EYES: conjunctiva and sclera clear  NECK: supple, No JVD  CHEST/LUNG: CTA b/l  HEART: S1 S2 RRR  ABDOMEN: +BS Soft, NT/ND  EXTREMITIES:  2+ DP Pulses, No c/c/e  NEUROLOGY: AAOx3, no facial droop, no focal deficits   SKIN: No rashes or lesions    LABS:                        11.8   11.56 )-----------( 284      ( 31 Aug 2020 06:05 )             38.2     08-31    138  |  100  |  24<H>  ----------------------------<  232<H>  3.2<L>   |  26  |  0.88    Ca    9.9      31 Aug 2020 06:05      PTT - ( 31 Aug 2020 06:05 )  PTT:59.1 sec          RADIOLOGY & ADDITIONAL TESTS:    Imaging Personally Reviewed:  Consultant(s) Notes Reviewed:    Care Discussed with Consultants/Other Providers: Patient is a 65y old  Female who presents with a chief complaint of Dyspnea on exertion (31 Aug 2020 15:45)        SUBJECTIVE / OVERNIGHT EVENTS: no acute complaints - states breathing slightly improved       MEDICATIONS  (STANDING):  atorvastatin 80 milliGRAM(s) Oral at bedtime  carvedilol 25 milliGRAM(s) Oral every 12 hours  cholecalciferol 1000 Unit(s) Oral daily  dextrose 5%. 1000 milliLiter(s) (50 mL/Hr) IV Continuous <Continuous>  dextrose 50% Injectable 12.5 Gram(s) IV Push once  dextrose 50% Injectable 25 Gram(s) IV Push once  dextrose 50% Injectable 25 Gram(s) IV Push once  furosemide    Tablet 80 milliGRAM(s) Oral daily  heparin  Infusion.  Unit(s)/Hr (24 mL/Hr) IV Continuous <Continuous>  insulin glargine Injectable (LANTUS) 40 Unit(s) SubCutaneous at bedtime  insulin lispro (HumaLOG) corrective regimen sliding scale   SubCutaneous three times a day before meals  insulin lispro Injectable (HumaLOG) 27 Unit(s) SubCutaneous three times a day before meals  losartan 25 milliGRAM(s) Oral daily  montelukast 10 milliGRAM(s) Oral daily  nystatin Powder 1 Application(s) Topical two times a day  pantoprazole    Tablet 40 milliGRAM(s) Oral before breakfast    MEDICATIONS  (PRN):  dextrose 40% Gel 15 Gram(s) Oral once PRN Blood Glucose LESS THAN 70 milliGRAM(s)/deciliter  glucagon  Injectable 1 milliGRAM(s) IntraMuscular once PRN Glucose LESS THAN 70 milligrams/deciliter  heparin   Injectable 29428 Unit(s) IV Push every 6 hours PRN For aPTT less than 40  heparin   Injectable 5000 Unit(s) IV Push every 6 hours PRN For aPTT between 40 - 57  phenylephrine 0.5% Nasal 1 Spray(s) Nasal two times a day PRN Congestion      Vital Signs Last 24 Hrs  T(C): 36.7 (31 Aug 2020 13:02), Max: 36.8 (30 Aug 2020 21:30)  T(F): 98 (31 Aug 2020 13:02), Max: 98.2 (30 Aug 2020 21:30)  HR: 74 (31 Aug 2020 13:02) (69 - 88)  BP: 122/71 (31 Aug 2020 13:02) (99/64 - 143/73)  BP(mean): --  RR: 18 (31 Aug 2020 13:02) (18 - 18)  SpO2: 95% (31 Aug 2020 13:02) (94% - 99%)  CAPILLARY BLOOD GLUCOSE      POCT Blood Glucose.: 264 mg/dL (31 Aug 2020 12:55)  POCT Blood Glucose.: 242 mg/dL (31 Aug 2020 08:59)  POCT Blood Glucose.: 111 mg/dL (30 Aug 2020 22:02)  POCT Blood Glucose.: 124 mg/dL (30 Aug 2020 16:53)    I&O's Summary    30 Aug 2020 07:01  -  31 Aug 2020 07:00  --------------------------------------------------------  IN: 1032 mL / OUT: 400 mL / NET: 632 mL    31 Aug 2020 07:01  -  31 Aug 2020 16:26  --------------------------------------------------------  IN: 480 mL / OUT: 300 mL / NET: 180 mL        PHYSICAL EXAM:  GENERAL: NAD, breathing normal  HEAD:  Atraumatic, Normocephalic  EYES: conjunctiva and sclera clear  NECK: supple, No JVD  CHEST/LUNG: CTA b/l  HEART: S1 S2 RRR  ABDOMEN: +BS Soft, NT/ND  EXTREMITIES:  2+ DP Pulses, No c/c. trace to 1+b/l LE edema,  NEUROLOGY: AAOx3, no facial droop, no focal deficits   SKIN: multiple LE healing ulcers     LABS:                        11.8   11.56 )-----------( 284      ( 31 Aug 2020 06:05 )             38.2     08-31    138  |  100  |  24<H>  ----------------------------<  232<H>  3.2<L>   |  26  |  0.88    Ca    9.9      31 Aug 2020 06:05      PTT - ( 31 Aug 2020 06:05 )  PTT:59.1 sec          RADIOLOGY & ADDITIONAL TESTS:    Imaging Personally Reviewed:  Consultant(s) Notes Reviewed:    Care Discussed with Consultants/Other Providers:

## 2020-08-31 NOTE — CONSULT NOTE ADULT - SUBJECTIVE AND OBJECTIVE BOX
Wound Surgery Consult Note:    HPI:  66yo woman with PMHx AS s/p TAVR 2015, HOCM, pAFib, JOSR (does not tolerate CPAP, on 2L O2 at home), ?asthma, anxiety, morbid obesity, p/w 6 months of progressively worsening SOB on exertion. Per patient, last time she was able to walk to the bathroom and back without dyspnea was 6 months ago and she has progressively become more sedentary. +orthopnea, she states she has been sleeping upright for >6 months, and before that was using multiple pillows. Per patient, hx of chronic venous stasis, her legs were swollen with water blisters 2 months ago, now improved, Doppler U/S of the legs was negative for DVT at the time. Her diuretic dose was slowly increased to Lasix 120mg PO daily, with good diuresis and resolution of the LE edema. However, the SOB on exertion persisted. Therefore, she came to ED for further evaluation.     Per cardiology note, she was diagnosed with HOCM in Feb by Dr. Chow with echo last month and another in Feb 2020. Per chart, rest LVOT gradient was 14mmhg and provoked LVOT gradient was 34 mmHg.       ED course: mildly tachypneic to 22, satted well on 5L NC, normotensive, normal HR. (27 Aug 2020 23:44)    Request for wound care consult for BLE wounds received. Patient is followed by a wound care specialist at a wound care center in [place]. She was encountered sitting up in a recliner chair. She was seen with Dr. Francisco.    PAST MEDICAL & SURGICAL HISTORY:  History of diabetic retinopathy  Diabetes type 2, uncontrolled  GERD (gastroesophageal reflux disease)  IBS (irritable bowel syndrome)  Obesity  Hypothyroid: not on medication  MVP (mitral valve prolapse)  Cholesterol serum elevated  Spinal stenosis: chronic back pain  Herniated disc  Depression  Gastroparesis due to DM  Hypertension  S/P TAVR (transcatheter aortic valve replacement)  right eye torn retina  Breast anomaly: Biopsy of both breast benign lesions  Cervix abnormality: Ablation of cervix  S/P D&C (status post dilation and curettage): D&amp;C when she was 18  S/P laminectomy: Pt denied this?  Cataract: B/L cataracts  S/P tonsillectomy and adenoidectomy  S/P bariatric surgery: lap band surgery two times due to erosion, now no more    REVIEW OF SYSTEMS  General:	no fever or chills  Respiratory and Thorax: increasing RASMUSSEN  Cardiovascular:	no CP  Gastrointestinal:	no n/v, h/o 2 bariatric surgeries  Genitourinary:	no dysuria  Musculoskeletal:	 ambulatory  Neurological:	no lOC  Vascular: BLE edema - chronic	  Endocrine:	DM  Skin: BLE wounds improving    MEDICATIONS  (STANDING):  atorvastatin 80 milliGRAM(s) Oral at bedtime  carvedilol 25 milliGRAM(s) Oral every 12 hours  cholecalciferol 1000 Unit(s) Oral daily  dextrose 5%. 1000 milliLiter(s) (50 mL/Hr) IV Continuous <Continuous>  dextrose 50% Injectable 12.5 Gram(s) IV Push once  dextrose 50% Injectable 25 Gram(s) IV Push once  dextrose 50% Injectable 25 Gram(s) IV Push once  furosemide    Tablet 80 milliGRAM(s) Oral daily  heparin  Infusion.  Unit(s)/Hr (24 mL/Hr) IV Continuous <Continuous>  insulin glargine Injectable (LANTUS) 35 Unit(s) SubCutaneous at bedtime  insulin lispro (HumaLOG) corrective regimen sliding scale   SubCutaneous three times a day before meals  insulin lispro Injectable (HumaLOG) 27 Unit(s) SubCutaneous three times a day before meals  losartan 25 milliGRAM(s) Oral daily  montelukast 10 milliGRAM(s) Oral daily  nystatin Powder 1 Application(s) Topical two times a day  pantoprazole    Tablet 40 milliGRAM(s) Oral before breakfast    MEDICATIONS  (PRN):  dextrose 40% Gel 15 Gram(s) Oral once PRN Blood Glucose LESS THAN 70 milliGRAM(s)/deciliter  glucagon  Injectable 1 milliGRAM(s) IntraMuscular once PRN Glucose LESS THAN 70 milligrams/deciliter  heparin   Injectable 91418 Unit(s) IV Push every 6 hours PRN For aPTT less than 40  heparin   Injectable 5000 Unit(s) IV Push every 6 hours PRN For aPTT between 40 - 57  phenylephrine 0.5% Nasal 1 Spray(s) Nasal two times a day PRN Congestion    Allergies    codeine (Vomiting)  shellfish (Stomach Upset)    Intolerances    fish (Stomach Upset)    SOCIAL HISTORY:  Former smoker, Denies smoking, ETOH, drugs    FAMILY HISTORY:  Family history of aortic stenosis: Family hx of structural heart dx    Vital Signs Last 24 Hrs  T(C): 36.7 (31 Aug 2020 13:02), Max: 36.8 (30 Aug 2020 21:30)  T(F): 98 (31 Aug 2020 13:02), Max: 98.2 (30 Aug 2020 21:30)  HR: 74 (31 Aug 2020 13:02) (69 - 88)  BP: 122/71 (31 Aug 2020 13:02) (99/64 - 143/73)  BP(mean): --  RR: 18 (31 Aug 2020 13:02) (18 - 18)  SpO2: 95% (31 Aug 2020 13:02) (94% - 99%)    Physical Exam:  General: A&Ox3, MO  Respiratory: no SOB on room air  Gastrointestinal: soft NT/ND   Neurology: weakened strength & sensation grossly intact  Musculoskeletal: no contractures or deformities, ROM limited by body habitus  Vascular: BLE edema equal, DP/PT pulses palpable, BLE equally warm  Skin:  RIght lateral lower leg wound - L 2.5cm x W 2.5cm x D unknown - dry, firm, fixed eschar, no drainage,   Right anterior lower leg wound - L0.5cm W 0.5cm x D unknown - dry, fish dressing in place, not removed  No odor, erythema, increased warmth, tenderness, induration, fluctuance associated for any of the above    LABS:  08-31    138  |  100  |  24<H>  ----------------------------<  232<H>  3.2<L>   |  26  |  0.88    Ca    9.9      31 Aug 2020 06:05                            11.8   11.56 )-----------( 284      ( 31 Aug 2020 06:05 )             38.2     PTT - ( 31 Aug 2020 06:05 )  PTT:59.1 sec      RADIOLOGY & ADDITIONAL STUDIES:      EXAM:  DUPLEX SCAN EXT VEINS LOWER BI                        PROCEDURE DATE:  08/28/2020    INTERPRETATION:  CLINICAL INFORMATION: Pulmonary embolism, lower extremity swelling, limited mobility, calf tenderness.  COMPARISON: Bilateral lower extremity venous duplex 6/18/2020.  TECHNIQUE: Duplex sonography of the BILATERAL LOWER extremity veins with color and spectral Doppler, with and without compression.  FINDINGS:  The examination is limited secondary to patient's body habitus.    There is normal compressibility of the bilateral common femoral, femoral and popliteal veins.  Doppler examination shows normal spontaneous and phasic flow.    The right gastrocnemius and soleal veins are not compressible and demonstrate hypoechoic thrombus.    The visualized segments of the left calf veins are patent..    IMPRESSION:  Acute deep venous thrombosis in the right gastrocnemius and soleal veins, below the knee.    No evidence of deep venous thrombosis in the visualized venous segments of the left lower extremity.

## 2020-08-31 NOTE — CONSULT NOTE ADULT - PROBLEM SELECTOR RECOMMENDATION 9
- bioprosthetic AS  - in 2015, 4 days post op, her AoV was 3.8 with a mGr of 35 and her heart was hyperdynamic  -- AS measurements could be due to patient prosthetic mismatch vs. subvalvular thrombus   - issue at hand is extensive b/l pulmonary emboli for which she should continue to be treated with anticoagulation which, incidentally, would be treatment for subvalvular thrombus  - no intervention from structural team at this time, would follow up as outpatient in 6 weeks  - ?Ca+ channel blocker for HCM

## 2020-08-31 NOTE — PROGRESS NOTE ADULT - PROBLEM SELECTOR PLAN 5
Cardiology recs appreciated, Per chart, Patient diagnosed with Hypertrophic Cardiomyopathy in 2/20, Echo 2/2020 and last month showed LVOT obstruction.  Plan:  -continue with coreg 25mg PO BID Cardiology recs appreciated, Per chart, Patient diagnosed with Hypertrophic Cardiomyopathy in 2/20, Echo 2/2020 and last month showed LVOT obstruction.  stop coreg and start metoprolol 50mg bid per cardiology   monitor bp - possibly start CCB

## 2020-08-31 NOTE — PROGRESS NOTE ADULT - ASSESSMENT
66yo woman with PMHx AS s/p TAVR 2015, HOCM, pAFib, JOSR (does not tolerate CPAP), morbid obesity who presents with 1 month of progressively worsening RASMUSSEN.    #B/l PE   -Continue heparin gtt.  -f/u vascular cardiology recs    #AS s/p TAVR 2015  -Most recent echo with Dr. Chow suggestive of prosthesis stenosis, with peak gradient of 3.8mm/s through AV; physical exam also suspicious with AS murmur.  -on lasix 80 PO daily  -f/u structural heart team recs    #HOCM  -Echo 2/2020 with ?apical HCM with TRACEE, SHAILESH and rest LVOT gradient 14mmHg and provoked LVOT gradient 34mmHg.  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   -Continue Coreg 25mg PO BID.  -pt has been off verapamil 240 PO daily    #HTN  -Coreg  -off verapamil  -Continue losartan 25mg PO daily.    #pAFib  -Has JOSR.  -Continue Coreg for rate control strategy.  -On heparin drip.  -Continue telemetry monitoring.    #ASCVD risk reduction  -Continue ASA 81mg PO daily.  -Continue Lipitor 80mg PO daily.    For all cardiology related questions, please call the current cardiology fellow on service at this time.  ** Please go to [email or web address] ; login: [email or web address] (case-sensitive) **    Huan Serna MD  Department of Cardiovascular Disease 64yo woman with PMHx AS s/p TAVR 2015, HOCM, pAFib, JOSR (does not tolerate CPAP), morbid obesity who presents with 1 month of progressively worsening RASMUSSEN.    #B/l PE   -Continue heparin gtt.  -f/u vascular cardiology recs    #AS s/p TAVR 2015  -Most recent echo with Dr. Chow suggestive of prosthesis stenosis, with peak gradient of 3.8mm/s through AV; physical exam also suspicious with AS murmur.  -Please give 80 IV lasix x1 t karey.  -f/u structural heart team recs (Prelim: likely no intervention at this time on AoV and MR, optimize medically)    #HOCM  -Echo 2/2020 with ?apical HCM with TRACEE, SHAILESH and rest LVOT gradient 14mmHg and provoked LVOT gradient 34mmHg.  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   -Continue Coreg 25mg PO BID.   -pt has been off verapamil 240 PO daily    #HTN  -Coreg  -off verapamil  -Continue losartan 25mg PO daily.    #pAFib  -Has JOSR.  -Continue Coreg for rate control strategy.  -On heparin drip.  -Continue telemetry monitoring.    #ASCVD risk reduction  -Continue ASA 81mg PO daily.  -Continue Lipitor 80mg PO daily.    For all cardiology related questions, please call the current cardiology fellow on service at this time.  ** Please go to [email or web address] ; login: [email or web address] (case-sensitive) **    Huan Serna MD  Department of Cardiovascular Disease

## 2020-08-31 NOTE — PROGRESS NOTE ADULT - PROBLEM SELECTOR PROBLEM 9
Outpatient Progress Note      CHIEF COMPLAINT:    Chief Complaint   Patient presents with   • Depression        HISTORY OF PRESENT ILLNESS:  The patient is a 34 year old female who presents for follow-up GELY.     GELY: Onset was chronic. Patient is currently taking Ativan (lorazepam) 1 mg two times daily as needed and Alprazolam 2 mg three times daily as needed. Patient reports good compliance with prescribed regimen. Denies any medication side effects. Experiencing symptoms of significantly worsened anxiety and depressed mood over the past several weeks surrounding her job and recent relationship issues. Patient denies Alcohol/Drug abuse. No current suicidal/homicidal ideations.    Past medical history/other concerns to discuss at this visit: ADHD     Attention deficit disorder: Onset was chronic. Currently taking Adderall 30 mg twice daily. Patient reports compliance with directed regimen. Negative for side effects with medication regimen. Currently is asymptomatic. Patient has no other concerns at this time.            Past Medical History:   Past Medical History:   Diagnosis Date   • Adult ADHD (attention deficit hyperactivity disorder)    • Anxiety 10/11/2012   • Condyloma acuminatum 2002    genital warts   • Depression    • Drug addiction (CMS/[facility])     Clean since March 2017    • Dysplasia of cervix (uteri) 7/25/03    Dysplasia - LSIL   • GERD (gastroesophageal reflux disease)    • Kidney stone     passed on own   • Polycystic ovaries        Past Surgical History:   Past Surgical History:   Procedure Laterality Date   • Hysterectomy     • Lipoma resection Bilateral 10/17/2017    Excision of multiple lower extremity lipomas (Dr. Mc)       Current Medications:    Current Outpatient Medications   Medication Sig Dispense Refill   • amphetamine-dextroamphetamine (ADDERALL) 30 MG tablet Take 1 tablet by mouth 2 times daily. ICD-10: F90.0 60 tablet 0   • ALPRAZolam (XANAX) 1 MG tablet Take 2 tablets by mouth 3  times daily as needed for Anxiety. 180 tablet 1     No current facility-administered medications for this visit.        Allergies:    ALLERGIES:   Allergen Reactions   • Codeine HIVES   • Effexor [Venlafaxine] NAUSEA and Other (See Comments)     Racing heart   • Penicillins Dermatitis     Pt states she is allergic       SOCIAL HISTORY:  Social History     Tobacco Use   • Smoking status: Current Every Day Smoker     Packs/day: 0.50     Years: 6.00     Pack years: 3.00     Types: Cigarettes   • Smokeless tobacco: Never Used   • Tobacco comment: smoked a few cigarettes in school   Substance Use Topics   • Alcohol use: No     Frequency: Never     Drinks per session: 1 or 2     Binge frequency: Never     Comment: for 9 months    • Drug use: No     Comment: marijuana and cocaine last year. none for 9 months          Drug Use:    No                 Comment: marijuana and cocaine last year. none for                9 months       FAMILY HISTORY:  Family History   Problem Relation Age of Onset   • Dementia/Alzheimers Paternal Grandmother    • Genitourinary Neg Hx         neg gu cancer       REVIEW OF SYSTEMS:   CONSTITUTIONAL: No Fevers/Changes in oral intake/Unintended weight loss.  EYES: No Visual Changes/Eye redness/Eye discharge.  HEAD/EARS/NOSE/THROAT/MOUTH: No Headaches/Ear Pain/Ear discharge/Nasal congestion/Nasal discharge/Sore Throat/Sinus Pain.  NECK: No Swelling/Masses.  RESPIRATORY: No Cough/Sputum/Wheezing/Shortness of breath.  CARDIOVASCULAR: No Chest pain/Palpitations/Syncope.  GASTROINTESTINAL: No Abdominal Pain/Nausea/Vomiting/Diarrhea/Constipation.  INTEGUMENTARY: No Rashes/Pruritus/Lumps.  ENDOCRINE: No Heat or Cold intolerance/Hair or Nail changes.  HEMATOLOGIC/LYMPHATIC: No Enlarged lymph nodes.  ALLERGY/IMMUNOLOGIC: No Asthma/Eczema/Hives/Rhinitis.  MUSCULOSKELETAL: No Joint pain/Joint swelling.  NEUROLOGICAL: No Numbness/Tingling/Seizures.  PSYCHIATRIC: See above for history.      PHYSICAL EXAM:   Visit  Vitals  /80 (BP Location: LUE - Left upper extremity, Patient Position: Sitting, Cuff Size: Large Adult)   Pulse 90   Wt 84.7 kg   LMP 11/15/2009   SpO2 97%   BMI 31.07 kg/m²       GENERAL APPEARANCE: Appears stated age, is in no apparent distress, is well developed and well nourished.  SKIN: Normal color for ethnicity, normal texture, good turgor, no skin rashes, no atypical-appearing skin lesions, no bruises.  LYMPH NODES: No cervical, supraclavicular adenopathy.   HEAD: Normocephalic.  EYES: Pupils are equal and reactive to light and accommodation, extraocular movements are full, sclerae and conjunctivae are normal, lids and lashes are normal.  NECK: Neck is supple, no thyromegaly, trachea is midline.  CHEST: Configuration normal, respiratory effort is not labored.  EXTREMITIES: No clubbing, no cyanosis, no edema, normal muscle tone and development bilaterally.  NEUROLOGIC:  Alert, appropriate, oriented x 3.  Speech fluent. Motor strength intact and symmetric, no apraxia or ataxia observed, no tremor noted.  PSYCHIATRIC:  Affect anxious, insight fair, mood good. Patient has frequent tearful and angry throughout encounter, especially when discussing the fluctuating trends of her probationary period. The patient is well-nourished and well-groomed. Able to articulate well with normal speech/language, rate, volume and coherence. Fund of knowledge is appropriate. Associations are intact. Judgment and insight are appropriate concerning matters relevant to self.      ASSESSMENT AND PLAN:   34 year old female presents for worsening anxiety.    1. Anxiety/MDD: Poorly controlled. Will start Zoloft 50 mg once daily.  Patient provided counseling on modifying lifestyle factors to improve outcomes.  Discussed concerning signs/symptoms requiring urgent evaluation and indications for ER visit.  Patient expressed understanding of plan. Low threshold for increase in Zoloft dose if ineffective in 4 weeks. Follow-up : 6 weeks.      2. ADHD: Well-controlled. Will continue current regimen without changes.  Patient provided counseling on modifying lifestyle factors to improve outcomes.  Discussed concerning signs/symptoms requiring urgent evaluation and indications for ER visit.  Patient expressed understanding of plan. Follow-up : 3 months.       More than 50% of the 25 min office visit was spend face-to-face today counseling regarding: the above course of action. All questions answered.       Aaron Bueno MD  02/05/20     Atrial fibrillation Prophylactic measure

## 2020-08-31 NOTE — PROGRESS NOTE ADULT - PROBLEM SELECTOR PLAN 1
extensive bilateral interlobar and lobar pulmonary embolism.  Plan:  - on heparin drip, will continue for now as pt is awaiting structural heart eval   - LE U/S significant for DVT R gastrocnemius and soleal veins  - f/u [facility] leiden and prothrombin gene mutation studies extensive bilateral interlobar and lobar pulmonary embolism.  - on heparin drip change to xarelto per Vascular cardiology- no planned intervention per structural heart disease   - LE U/S significant for DVT R gastrocnemius and soleal veins   - f/u [facility] leiden and prothrombin gene mutation studies

## 2020-08-31 NOTE — PROGRESS NOTE ADULT - PROBLEM SELECTOR PLAN 4
-Insulin regimen uptitrated   Lantus 35 BID  Humalog 27 TID  MDSS also a1c 8.8  increased Lantus 40 BID  c/w Humalog 27 TID  monitor Fs - ISS for coverage

## 2020-08-31 NOTE — PROGRESS NOTE ADULT - PROBLEM SELECTOR PLAN 8
c/w home statin and baby aspirin -in the setting of JOSR  -change coreg to metoprolol per cardiology   -c/w a/c as above

## 2020-08-31 NOTE — PROGRESS NOTE ADULT - SUBJECTIVE AND OBJECTIVE BOX
Patient seen and examined at bedside.    Overnight Events:   No major overnight events  Feels well    REVIEW OF SYSTEMS:  CONSTITUTIONAL: No weakness, fevers or chills  EYES/ENT: No visual changes;  No dysphagia  NECK: No pain or stiffness  RESPIRATORY: No cough, wheezing, hemoptysis; No shortness of breath  CARDIOVASCULAR: No chest pain or palpitations; No lower extremity edema  GASTROINTESTINAL: No abdominal or epigastric pain. No nausea, vomiting, or hematemesis; No diarrhea or constipation. No melena or hematochezia.  BACK: No back pain  GENITOURINARY: No dysuria, frequency or hematuria  NEUROLOGICAL: No numbness or weakness  SKIN: No itching, burning, rashes, or lesions   All other review of systems is negative unless indicated above.            Current Meds:  atorvastatin 80 milliGRAM(s) Oral at bedtime  carvedilol 25 milliGRAM(s) Oral every 12 hours  cholecalciferol 1000 Unit(s) Oral daily  dextrose 40% Gel 15 Gram(s) Oral once PRN  dextrose 5%. 1000 milliLiter(s) IV Continuous <Continuous>  dextrose 50% Injectable 12.5 Gram(s) IV Push once  dextrose 50% Injectable 25 Gram(s) IV Push once  dextrose 50% Injectable 25 Gram(s) IV Push once  furosemide    Tablet 80 milliGRAM(s) Oral daily  glucagon  Injectable 1 milliGRAM(s) IntraMuscular once PRN  heparin   Injectable 09242 Unit(s) IV Push every 6 hours PRN  heparin   Injectable 5000 Unit(s) IV Push every 6 hours PRN  heparin  Infusion.  Unit(s)/Hr IV Continuous <Continuous>  insulin glargine Injectable (LANTUS) 35 Unit(s) SubCutaneous at bedtime  insulin glargine Injectable (LANTUS) 35 Unit(s) SubCutaneous every morning  insulin lispro (HumaLOG) corrective regimen sliding scale   SubCutaneous three times a day before meals  insulin lispro Injectable (HumaLOG) 27 Unit(s) SubCutaneous three times a day before meals  losartan 25 milliGRAM(s) Oral daily  montelukast 10 milliGRAM(s) Oral daily  nystatin Powder 1 Application(s) Topical two times a day  pantoprazole    Tablet 40 milliGRAM(s) Oral before breakfast  phenylephrine 0.5% Nasal 1 Spray(s) Nasal two times a day PRN      Vitals:  T(F): 98.1 (08-31), Max: 98.6 (08-30)  HR: 72 (08-31) (69 - 88)  BP: 107/57 (08-31) (99/64 - 143/73)  RR: 18 (08-31)  SpO2: 98% (08-31)  I&O's Summary    30 Aug 2020 07:01  -  31 Aug 2020 07:00  --------------------------------------------------------  IN: 1032 mL / OUT: 400 mL / NET: 632 mL        Physical Exam:  Appearance: Obese woman in NAD  Cardiovascular: Regular rate and rhythm, S1, S2 grade 3/6 holosystolic murmur at RUSB with radiation to carotids, no gallops or rubs. Hard to appreciate JVD  Respiratory: Clear to auscultation bilaterally  Gastrointestinal: soft, non-tender, non-distended with normal bowel sounds  Extremities: trace edema  Musculoskeletal: No clubbing; no joint deformity   Neurologic: Non-focal  Lymphatic: No lymphadenopathy  Psychiatry: AAOx3, mood & affect appropriate  Skin: No rashes, ecchymoses, or cyanosis                        11.8   11.56 )-----------( 284      ( 31 Aug 2020 06:05 )             38.2     08-31    138  |  100  |  24<H>  ----------------------------<  232<H>  3.2<L>   |  26  |  0.88    Ca    9.9      31 Aug 2020 06:05      PTT - ( 31 Aug 2020 06:05 )  PTT:59.1 sec      Serum Pro-Brain Natriuretic Peptide: 382 pg/mL (08-27 @ 13:35)

## 2020-08-31 NOTE — PROGRESS NOTE ADULT - SUBJECTIVE AND OBJECTIVE BOX
Vascular Cardiology Consult Note    SERVICE CONSULT: [phone]              EMAIL [email or web address]   OFFICE [phone]    CC:  RASMUSSEN    Interval Events:  RASMUSSEN improving.   No C/P or discomfort this AM.   TTE noted with increased intracavitary gradient.  Remains on Heparin gtt.     Allergies  codeine (Vomiting)  shellfish (Stomach Upset)    Intolerances  fish (Stomach Upset)  	  MEDICATIONS  (STANDING):  atorvastatin 80 milliGRAM(s) Oral at bedtime  carvedilol 25 milliGRAM(s) Oral every 12 hours  cholecalciferol 1000 Unit(s) Oral daily  furosemide    Tablet 80 milliGRAM(s) Oral daily  heparin  Infusion.  Unit(s)/Hr (24 mL/Hr) IV Continuous <Continuous>  insulin glargine Injectable (LANTUS) 35 Unit(s) SubCutaneous at bedtime  insulin glargine Injectable (LANTUS) 35 Unit(s) SubCutaneous every morning  insulin lispro (HumaLOG) corrective regimen sliding scale   SubCutaneous three times a day before meals  insulin lispro Injectable (HumaLOG) 27 Unit(s) SubCutaneous three times a day before meals  losartan 25 milliGRAM(s) Oral daily  montelukast 10 milliGRAM(s) Oral daily  nystatin Powder 1 Application(s) Topical two times a day  pantoprazole    Tablet 40 milliGRAM(s) Oral before breakfast    PAST MEDICAL & SURGICAL HISTORY:  History of diabetic retinopathy  Diabetes type 2, uncontrolled  GERD (gastroesophageal reflux disease)  IBS (irritable bowel syndrome)  Obesity  Hypothyroid: not on medication  MVP (mitral valve prolapse)  Cholesterol serum elevated  Spinal stenosis: chronic back pain  Herniated disc  Depression  Gastroparesis due to DM  Hypertension  S/P TAVR (transcatheter aortic valve replacement)  right eye torn retina  Breast anomaly: Biopsy of both breast benign lesions  Cervix abnormality: Ablation of cervix  S/P D&C (status post dilation and curettage): D&amp;C when she was 18  S/P laminectomy: Pt denied this?  Cataract: B/L cataracts  S/P tonsillectomy and adenoidectomy  S/P bariatric surgery: lap band surgery two times due to erosion, now no more    FAMILY HISTORY:  Family history of aortic stenosis: Family hx of structural heart dx    SOCIAL HISTORY:  unchanged    REVIEW OF SYSTEMS:  CONSTITUTIONAL: No fever  EYES: No eye pain,  ENT:  No sinu pain  NECK: No pain  RESPIRATORY: No SOB  CARDIOVASCULAR: No C/P  GASTROINTESTINAL: No abdominal pain. No melena or hematochezia.  GENITOURINARY: No hematuria  NEUROLOGICAL: No headaches, memory loss  SKIN: LE Venous Ulcers  LYMPH Nodes: No enlarged glands noted  ENDOCRINE: No heat or cold intolerance noted  MUSCULOSKELETAL: LE swelling  PSYCHIATRIC: No depression, anxiety noted  HEME/LYMPH: No  bleeding gums  ALLERGY AND IMMUNOLOGIC: No hives	    [ x] All others negative	    PHYSICAL EXAM:  Vital Signs Last 24 Hrs  T(C): 36.7 (31 Aug 2020 08:32), Max: 37 (30 Aug 2020 12:18)  T(F): 98.1 (31 Aug 2020 08:32), Max: 98.6 (30 Aug 2020 12:18)  HR: 72 (31 Aug 2020 08:32) (69 - 88)  BP: 107/57 (31 Aug 2020 08:32) (99/64 - 143/73)  RR: 18 (31 Aug 2020 08:32) (18 - 19)  SpO2: 98% (31 Aug 2020 08:32) (94% - 99%)    Appearance: NAD 	  HEENT: NC/AT  Cardiovascular: RRR, S1 and S2    Respiratory: CTA B/L 	  Psychiatry:  AAO x 3  Gastrointestinal:  Soft, Non-tender, + BS	  Skin: No rashes, No cyanosis	  Neurologic: no focal deficit noted   Extremities:  B/L trace LE edema.  Lateral and shin venous ulcer R LE  Shin ulcer L LE    Vascular Pulse Exam:  Right DP: palpable Left DP: palpable   Right PT: audible   Left PT:  audible       LABS:	 	                        11.8   11.56 )-----------( 284      ( 31 Aug 2020 06:05 )             38.2     08-31    138  |  100  |  24<H>  ----------------------------<  232<H>  3.2<L>   |  26  |  0.88    Ca    9.9      31 Aug 2020 06:05    PTT - ( 31 Aug 2020 06:05 )  PTT:59.1 sec      Assessment:  1. PE      O2 98% on 4 L NC      Troponin negative      BNP elevated   2. R Below Knee DVT  4. HTN  5. Venous Insufficiency / LE Venous Ulcerations  6. HOCM   7. Non-obstructive CAD      Distal LM 40-45% by cath in 2017    Plan:  1. Continue heparin gtt while undergoing evaluation by structural heart team.  2. When structural heart evaluation complete,      will give further recommendations regarding transition to oral anticoagulant.  3. Continue wound care for LE venous ulcerations.  4. Follow-up Factor V Leiden and Prothrombin Gene Mutation.  5. Patient requesting PT.      Thank you  MADELEINE Jordan, MPAS  Vascular Cardiology Service    Please call with any questions:   Service Line: [phone]  Office [phone]  email:  [email or web address] Vascular Cardiology Consult Note    SERVICE CONSULT: [phone]              EMAIL [email or web address]   OFFICE [phone]    CC:  RASMUSSEN    Interval Events:  RASMUSSEN improving.   No C/P or discomfort this AM.   TTE noted with increased intracavitary gradient.  Remains on Heparin gtt.   O2 requirements at baseline, 98% on 2L NC.     Allergies  codeine (Vomiting)  shellfish (Stomach Upset)    Intolerances  fish (Stomach Upset)  	  MEDICATIONS  (STANDING):  atorvastatin 80 milliGRAM(s) Oral at bedtime  carvedilol 25 milliGRAM(s) Oral every 12 hours  cholecalciferol 1000 Unit(s) Oral daily  furosemide    Tablet 80 milliGRAM(s) Oral daily  heparin  Infusion.  Unit(s)/Hr (24 mL/Hr) IV Continuous <Continuous>  insulin glargine Injectable (LANTUS) 35 Unit(s) SubCutaneous at bedtime  insulin glargine Injectable (LANTUS) 35 Unit(s) SubCutaneous every morning  insulin lispro (HumaLOG) corrective regimen sliding scale   SubCutaneous three times a day before meals  insulin lispro Injectable (HumaLOG) 27 Unit(s) SubCutaneous three times a day before meals  losartan 25 milliGRAM(s) Oral daily  montelukast 10 milliGRAM(s) Oral daily  nystatin Powder 1 Application(s) Topical two times a day  pantoprazole    Tablet 40 milliGRAM(s) Oral before breakfast    PAST MEDICAL & SURGICAL HISTORY:  History of diabetic retinopathy  Diabetes type 2, uncontrolled  GERD (gastroesophageal reflux disease)  IBS (irritable bowel syndrome)  Obesity  Hypothyroid: not on medication  MVP (mitral valve prolapse)  Cholesterol serum elevated  Spinal stenosis: chronic back pain  Herniated disc  Depression  Gastroparesis due to DM  Hypertension  S/P TAVR (transcatheter aortic valve replacement)  right eye torn retina  Breast anomaly: Biopsy of both breast benign lesions  Cervix abnormality: Ablation of cervix  S/P D&C (status post dilation and curettage): D&amp;C when she was 18  S/P laminectomy: Pt denied this?  Cataract: B/L cataracts  S/P tonsillectomy and adenoidectomy  S/P bariatric surgery: lap band surgery two times due to erosion, now no more    FAMILY HISTORY:  Family history of aortic stenosis: Family hx of structural heart dx    SOCIAL HISTORY:  unchanged    REVIEW OF SYSTEMS:  CONSTITUTIONAL: No fever  EYES: No eye pain,  ENT:  No sinu pain  NECK: No pain  RESPIRATORY: No SOB  CARDIOVASCULAR: No C/P  GASTROINTESTINAL: No abdominal pain. No melena or hematochezia.  GENITOURINARY: No hematuria  NEUROLOGICAL: No headaches, memory loss  SKIN: LE Venous Ulcers  LYMPH Nodes: No enlarged glands noted  ENDOCRINE: No heat or cold intolerance noted  MUSCULOSKELETAL: LE swelling  PSYCHIATRIC: No depression, anxiety noted  HEME/LYMPH: No  bleeding gums  ALLERGY AND IMMUNOLOGIC: No hives	    [ x] All others negative	    PHYSICAL EXAM:  Vital Signs Last 24 Hrs  T(C): 36.7 (31 Aug 2020 08:32), Max: 37 (30 Aug 2020 12:18)  T(F): 98.1 (31 Aug 2020 08:32), Max: 98.6 (30 Aug 2020 12:18)  HR: 72 (31 Aug 2020 08:32) (69 - 88)  BP: 107/57 (31 Aug 2020 08:32) (99/64 - 143/73)  RR: 18 (31 Aug 2020 08:32) (18 - 19)  SpO2: 98% (31 Aug 2020 08:32) (94% - 99%)    Appearance: NAD 	  HEENT: NC/AT  Cardiovascular: RRR, S1 and S2    Respiratory: CTA B/L 	  Psychiatry:  AAO x 3  Gastrointestinal:  Soft, Non-tender, + BS	  Skin: No rashes, No cyanosis	  Neurologic: no focal deficit noted   Extremities:  B/L trace LE edema.  Lateral and shin venous ulcer R LE  Shin ulcer L LE    Vascular Pulse Exam:  Right DP: palpable Left DP: palpable   Right PT: audible   Left PT:  audible       LABS:	 	                        11.8   11.56 )-----------( 284      ( 31 Aug 2020 06:05 )             38.2     08-31    138  |  100  |  24<H>  ----------------------------<  232<H>  3.2<L>   |  26  |  0.88    Ca    9.9      31 Aug 2020 06:05    PTT - ( 31 Aug 2020 06:05 )  PTT:59.1 sec      Assessment:  1. PE      O2 98% on 4 L NC      Troponin negative      BNP elevated   2. R Below Knee DVT  4. HTN  5. Venous Insufficiency / LE Venous Ulcerations  6. HOCM   7. Non-obstructive CAD      Distal LM 40-45% by cath in 2017    Plan:  1. Continue heparin gtt while undergoing evaluation by structural heart team.  2. When structural heart evaluation complete,      will give further recommendations regarding transition to oral anticoagulant.  3. Continue wound care for LE venous ulcerations.  4. Follow-up Factor V Leiden and Prothrombin Gene Mutation.  5. Patient requesting PT.      Thank you  MADELEINE Jordan, MPAS  Vascular Cardiology Service    Please call with any questions:   Service Line: [phone]  Office [phone]  email:  [email or web address] Vascular Cardiology Consult Note    SERVICE CONSULT: [phone]              EMAIL [email or web address]   OFFICE [phone]    CC:  RASMUSSEN    Interval Events:  RASMUSSEN improving.   No C/P or discomfort this AM.   TTE noted with increased intracavitary gradient.  Remains on Heparin gtt.   O2 requirements at baseline, 98% on 2L NC.     Allergies  codeine (Vomiting)  shellfish (Stomach Upset)    Intolerances  fish (Stomach Upset)  	  MEDICATIONS  (STANDING):  atorvastatin 80 milliGRAM(s) Oral at bedtime  carvedilol 25 milliGRAM(s) Oral every 12 hours  cholecalciferol 1000 Unit(s) Oral daily  furosemide    Tablet 80 milliGRAM(s) Oral daily  heparin  Infusion.  Unit(s)/Hr (24 mL/Hr) IV Continuous <Continuous>  insulin glargine Injectable (LANTUS) 35 Unit(s) SubCutaneous at bedtime  insulin glargine Injectable (LANTUS) 35 Unit(s) SubCutaneous every morning  insulin lispro (HumaLOG) corrective regimen sliding scale   SubCutaneous three times a day before meals  insulin lispro Injectable (HumaLOG) 27 Unit(s) SubCutaneous three times a day before meals  losartan 25 milliGRAM(s) Oral daily  montelukast 10 milliGRAM(s) Oral daily  nystatin Powder 1 Application(s) Topical two times a day  pantoprazole    Tablet 40 milliGRAM(s) Oral before breakfast    PAST MEDICAL & SURGICAL HISTORY:  History of diabetic retinopathy  Diabetes type 2, uncontrolled  GERD (gastroesophageal reflux disease)  IBS (irritable bowel syndrome)  Obesity  Hypothyroid: not on medication  MVP (mitral valve prolapse)  Cholesterol serum elevated  Spinal stenosis: chronic back pain  Herniated disc  Depression  Gastroparesis due to DM  Hypertension  S/P TAVR (transcatheter aortic valve replacement)  right eye torn retina  Breast anomaly: Biopsy of both breast benign lesions  Cervix abnormality: Ablation of cervix  S/P D&C (status post dilation and curettage): D&amp;C when she was 18  S/P laminectomy: Pt denied this?  Cataract: B/L cataracts  S/P tonsillectomy and adenoidectomy  S/P bariatric surgery: lap band surgery two times due to erosion, now no more    FAMILY HISTORY:  Family history of aortic stenosis: Family hx of structural heart dx    SOCIAL HISTORY:  unchanged    REVIEW OF SYSTEMS:  CONSTITUTIONAL: No fever  EYES: No eye pain,  ENT:  No sinu pain  NECK: No pain  RESPIRATORY: No SOB  CARDIOVASCULAR: No C/P  GASTROINTESTINAL: No abdominal pain. No melena or hematochezia.  GENITOURINARY: No hematuria  NEUROLOGICAL: No headaches, memory loss  SKIN: LE Venous Ulcers  LYMPH Nodes: No enlarged glands noted  ENDOCRINE: No heat or cold intolerance noted  MUSCULOSKELETAL: LE swelling  PSYCHIATRIC: No depression, anxiety noted  HEME/LYMPH: No  bleeding gums  ALLERGY AND IMMUNOLOGIC: No hives	    [ x] All others negative	    PHYSICAL EXAM:  Vital Signs Last 24 Hrs  T(C): 36.7 (31 Aug 2020 08:32), Max: 37 (30 Aug 2020 12:18)  T(F): 98.1 (31 Aug 2020 08:32), Max: 98.6 (30 Aug 2020 12:18)  HR: 72 (31 Aug 2020 08:32) (69 - 88)  BP: 107/57 (31 Aug 2020 08:32) (99/64 - 143/73)  RR: 18 (31 Aug 2020 08:32) (18 - 19)  SpO2: 98% (31 Aug 2020 08:32) (94% - 99%)    Appearance: NAD 	  HEENT: NC/AT  Cardiovascular: RRR, S1 and S2    Respiratory: CTA B/L 	  Psychiatry:  AAO x 3  Gastrointestinal:  Soft, Non-tender, + BS	  Skin: No rashes, No cyanosis	  Neurologic: no focal deficit noted   Extremities:  B/L trace LE edema.  Lateral and shin venous ulcer R LE  Shin ulcer L LE    Vascular Pulse Exam:  Right DP: palpable Left DP: palpable   Right PT: audible   Left PT:  audible       LABS:	 	                        11.8   11.56 )-----------( 284      ( 31 Aug 2020 06:05 )             38.2     08-31    138  |  100  |  24<H>  ----------------------------<  232<H>  3.2<L>   |  26  |  0.88    Ca    9.9      31 Aug 2020 06:05    PTT - ( 31 Aug 2020 06:05 )  PTT:59.1 sec      Assessment:  1. PE      O2 98% on 2 L NC      Troponin negative      BNP elevated   2. R Below Knee DVT  4. HTN  5. Venous Insufficiency / LE Venous Ulcerations  6. HOCM   7. Non-obstructive CAD      Distal LM 40-45% by cath in 2017    Plan:  1. Currently on heparin gtt.  2. Spoke to Structural Heart Team, no further work-up.      Recommend transition to Xarelto 15mg BID,      then Xarelto 20mg daily with evening meal.  3. Continue wound care for LE venous ulcerations.  4. Follow-up Factor V Leiden and Prothrombin Gene Mutation.  5. Patient requesting PT.   6. Monitor on Xarelto for 24 hours.     Thank you  MADELEINE Jordan, MPAS  Vascular Cardiology Service    Please call with any questions:   Service Line: [phone]  Office [phone]  email:  [email or web address] Vascular Cardiology Consult Note    SERVICE CONSULT: [phone]              EMAIL [email or web address]   OFFICE [phone]    CC:  RASMUSSEN    Interval Events:  RASMUSSEN improving.   No C/P or discomfort this AM.   TTE noted with increased intracavitary gradient.  Remains on Heparin gtt.   O2 requirements at baseline, 98% on 2L NC.     Allergies  codeine (Vomiting)  shellfish (Stomach Upset)    Intolerances  fish (Stomach Upset)  	  MEDICATIONS  (STANDING):  atorvastatin 80 milliGRAM(s) Oral at bedtime  carvedilol 25 milliGRAM(s) Oral every 12 hours  cholecalciferol 1000 Unit(s) Oral daily  furosemide    Tablet 80 milliGRAM(s) Oral daily  heparin  Infusion.  Unit(s)/Hr (24 mL/Hr) IV Continuous <Continuous>  insulin glargine Injectable (LANTUS) 35 Unit(s) SubCutaneous at bedtime  insulin glargine Injectable (LANTUS) 35 Unit(s) SubCutaneous every morning  insulin lispro (HumaLOG) corrective regimen sliding scale   SubCutaneous three times a day before meals  insulin lispro Injectable (HumaLOG) 27 Unit(s) SubCutaneous three times a day before meals  losartan 25 milliGRAM(s) Oral daily  montelukast 10 milliGRAM(s) Oral daily  nystatin Powder 1 Application(s) Topical two times a day  pantoprazole    Tablet 40 milliGRAM(s) Oral before breakfast    PAST MEDICAL & SURGICAL HISTORY:  History of diabetic retinopathy  Diabetes type 2, uncontrolled  GERD (gastroesophageal reflux disease)  IBS (irritable bowel syndrome)  Obesity  Hypothyroid: not on medication  MVP (mitral valve prolapse)  Cholesterol serum elevated  Spinal stenosis: chronic back pain  Herniated disc  Depression  Gastroparesis due to DM  Hypertension  S/P TAVR (transcatheter aortic valve replacement)  right eye torn retina  Breast anomaly: Biopsy of both breast benign lesions  Cervix abnormality: Ablation of cervix  S/P D&C (status post dilation and curettage): D&amp;C when she was 18  S/P laminectomy: Pt denied this?  Cataract: B/L cataracts  S/P tonsillectomy and adenoidectomy  S/P bariatric surgery: lap band surgery two times due to erosion, now no more    FAMILY HISTORY:  Family history of aortic stenosis: Family hx of structural heart dx    SOCIAL HISTORY:  unchanged    REVIEW OF SYSTEMS:  CONSTITUTIONAL: No fever  EYES: No eye pain,  ENT:  No sinu pain  NECK: No pain  RESPIRATORY: No SOB  CARDIOVASCULAR: No C/P  GASTROINTESTINAL: No abdominal pain. No melena or hematochezia.  GENITOURINARY: No hematuria  NEUROLOGICAL: No headaches, memory loss  SKIN: LE Venous Ulcers  LYMPH Nodes: No enlarged glands noted  ENDOCRINE: No heat or cold intolerance noted  MUSCULOSKELETAL: LE swelling  PSYCHIATRIC: No depression, anxiety noted  HEME/LYMPH: No  bleeding gums  ALLERGY AND IMMUNOLOGIC: No hives	    [ x] All others negative	    PHYSICAL EXAM:  Vital Signs Last 24 Hrs  T(C): 36.7 (31 Aug 2020 08:32), Max: 37 (30 Aug 2020 12:18)  T(F): 98.1 (31 Aug 2020 08:32), Max: 98.6 (30 Aug 2020 12:18)  HR: 72 (31 Aug 2020 08:32) (69 - 88)  BP: 107/57 (31 Aug 2020 08:32) (99/64 - 143/73)  RR: 18 (31 Aug 2020 08:32) (18 - 19)  SpO2: 98% (31 Aug 2020 08:32) (94% - 99%)    Appearance: NAD 	  HEENT: NC/AT  Cardiovascular: RRR, S1 and S2    Respiratory: CTA B/L 	  Psychiatry:  AAO x 3  Gastrointestinal:  Soft, Non-tender, + BS	  Skin: No rashes, No cyanosis	  Neurologic: no focal deficit noted   Extremities:  B/L trace LE edema.  Lateral and shin venous ulcer R LE  Shin ulcer L LE    Vascular Pulse Exam:  Right DP: palpable Left DP: palpable   Right PT: audible   Left PT:  audible       LABS:	 	                        11.8   11.56 )-----------( 284      ( 31 Aug 2020 06:05 )             38.2     08-31    138  |  100  |  24<H>  ----------------------------<  232<H>  3.2<L>   |  26  |  0.88    Ca    9.9      31 Aug 2020 06:05    PTT - ( 31 Aug 2020 06:05 )  PTT:59.1 sec      Assessment:  1. PE      O2 98% on 2 L NC      Troponin negative      BNP elevated   2. R Below Knee DVT  4. HTN  5. Venous Insufficiency / LE Venous Ulcerations  6. HOCM   7. Non-obstructive CAD      Distal LM 40-45% by cath in 2017    Plan:  1. Currently on heparin gtt.  2. Spoke to Structural Heart Team, no further work-up.      Recommend transition to Xarelto 15mg BID,      then Xarelto 20mg daily with evening meal.  3. Continue wound care for LE venous ulcerations.  4. Follow-up Factor V Leiden and Prothrombin Gene Mutation.  5. Patient requesting PT.   6. Monitor on Xarelto for 24 hours.  7. Recommend ASA 81mg daily for h/o CAD.     Thank you  MADELEINE Jordan, MPAS  Vascular Cardiology Service    Please call with any questions:   Service Line: [phone]  Office [phone]  email:  [email or web address] Vascular Cardiology Consult Note    SERVICE CONSULT: [phone]              EMAIL [email or web address]   OFFICE [phone]    CC:  RASMUSSEN    Interval Events:  RASMUSSEN improving.   No C/P or discomfort this AM.   TTE noted with increased intracavitary gradient.  Remains on Heparin gtt.   O2 requirements at baseline, 98% on 2L NC.     Allergies  codeine (Vomiting)  shellfish (Stomach Upset)    Intolerances  fish (Stomach Upset)  	  MEDICATIONS  (STANDING):  atorvastatin 80 milliGRAM(s) Oral at bedtime  carvedilol 25 milliGRAM(s) Oral every 12 hours  cholecalciferol 1000 Unit(s) Oral daily  furosemide    Tablet 80 milliGRAM(s) Oral daily  heparin  Infusion.  Unit(s)/Hr (24 mL/Hr) IV Continuous <Continuous>  insulin glargine Injectable (LANTUS) 35 Unit(s) SubCutaneous at bedtime  insulin glargine Injectable (LANTUS) 35 Unit(s) SubCutaneous every morning  insulin lispro (HumaLOG) corrective regimen sliding scale   SubCutaneous three times a day before meals  insulin lispro Injectable (HumaLOG) 27 Unit(s) SubCutaneous three times a day before meals  losartan 25 milliGRAM(s) Oral daily  montelukast 10 milliGRAM(s) Oral daily  nystatin Powder 1 Application(s) Topical two times a day  pantoprazole    Tablet 40 milliGRAM(s) Oral before breakfast    PAST MEDICAL & SURGICAL HISTORY:  History of diabetic retinopathy  Diabetes type 2, uncontrolled  GERD (gastroesophageal reflux disease)  IBS (irritable bowel syndrome)  Obesity  Hypothyroid: not on medication  MVP (mitral valve prolapse)  Cholesterol serum elevated  Spinal stenosis: chronic back pain  Herniated disc  Depression  Gastroparesis due to DM  Hypertension  S/P TAVR (transcatheter aortic valve replacement)  right eye torn retina  Breast anomaly: Biopsy of both breast benign lesions  Cervix abnormality: Ablation of cervix  S/P D&C (status post dilation and curettage): D&amp;C when she was 18  S/P laminectomy: Pt denied this?  Cataract: B/L cataracts  S/P tonsillectomy and adenoidectomy  S/P bariatric surgery: lap band surgery two times due to erosion, now no more    FAMILY HISTORY:  Family history of aortic stenosis: Family hx of structural heart dx    SOCIAL HISTORY:  unchanged    REVIEW OF SYSTEMS:  CONSTITUTIONAL: No fever  EYES: No eye pain,  ENT:  No sinu pain  NECK: No pain  RESPIRATORY: No SOB  CARDIOVASCULAR: No C/P  GASTROINTESTINAL: No abdominal pain. No melena or hematochezia.  GENITOURINARY: No hematuria  NEUROLOGICAL: No headaches, memory loss  SKIN: LE Venous Ulcers  LYMPH Nodes: No enlarged glands noted  ENDOCRINE: No heat or cold intolerance noted  MUSCULOSKELETAL: LE swelling  PSYCHIATRIC: No depression, anxiety noted  HEME/LYMPH: No  bleeding gums  ALLERGY AND IMMUNOLOGIC: No hives	    [ x] All others negative	    PHYSICAL EXAM:  Vital Signs Last 24 Hrs  T(C): 36.7 (31 Aug 2020 08:32), Max: 37 (30 Aug 2020 12:18)  T(F): 98.1 (31 Aug 2020 08:32), Max: 98.6 (30 Aug 2020 12:18)  HR: 72 (31 Aug 2020 08:32) (69 - 88)  BP: 107/57 (31 Aug 2020 08:32) (99/64 - 143/73)  RR: 18 (31 Aug 2020 08:32) (18 - 19)  SpO2: 98% (31 Aug 2020 08:32) (94% - 99%)    Appearance: NAD 	  HEENT: NC/AT  Cardiovascular: RRR, S1 and S2    Respiratory: CTA B/L 	  Psychiatry:  AAO x 3  Gastrointestinal:  Soft, Non-tender, + BS	  Skin: No rashes, No cyanosis	  Neurologic: no focal deficit noted   Extremities:  B/L trace LE edema.  Lateral and shin venous ulcer R LE  Shin ulcer L LE    Vascular Pulse Exam:  Right DP: palpable Left DP: palpable   Right PT: audible   Left PT:  audible       LABS:	 	                        11.8   11.56 )-----------( 284      ( 31 Aug 2020 06:05 )             38.2     08-31    138  |  100  |  24<H>  ----------------------------<  232<H>  3.2<L>   |  26  |  0.88    Ca    9.9      31 Aug 2020 06:05    PTT - ( 31 Aug 2020 06:05 )  PTT:59.1 sec      Assessment:  1. PE      O2 98% on 2 L NC      Troponin negative      BNP elevated   2. R Below Knee DVT  4. HTN  5. Venous Insufficiency / LE Venous Ulcerations  6. HOCM   7. Non-obstructive CAD      Distal LM 40-45% by cath in 2017    Plan:  1. Currently on heparin gtt.  2. Spoke to Structural Heart Team, no further work-up this admission.      Recommend transition to Xarelto 15mg BID,      then Xarelto 20mg daily with evening meal.  3. Continue wound care for LE venous ulcerations.  4. Follow-up Factor V Leiden and Prothrombin Gene Mutation.  5. Patient requesting PT.   6. Monitor on Xarelto for 24 hours.  7. Recommend ASA 81mg daily for h/o CAD.     Thank you  MADELEINE Jordan, MPAS  Vascular Cardiology Service    Please call with any questions:   Service Line: [phone]  Office [phone]  email:  [email or web address] Vascular Cardiology Consult Note    SERVICE CONSULT: [phone]              EMAIL [email or web address]   OFFICE [phone]    CC:  RASMUSSEN    Interval Events:  RASMUSSEN improving.   No C/P or discomfort this AM.   TTE noted with increased intracavitary gradient.  Remains on Heparin gtt.   O2 requirements at baseline, 98% on 2L NC.     Allergies  codeine (Vomiting)  shellfish (Stomach Upset)    Intolerances  fish (Stomach Upset)  	  MEDICATIONS  (STANDING):  atorvastatin 80 milliGRAM(s) Oral at bedtime  carvedilol 25 milliGRAM(s) Oral every 12 hours  cholecalciferol 1000 Unit(s) Oral daily  furosemide    Tablet 80 milliGRAM(s) Oral daily  heparin  Infusion.  Unit(s)/Hr (24 mL/Hr) IV Continuous <Continuous>  insulin glargine Injectable (LANTUS) 35 Unit(s) SubCutaneous at bedtime  insulin glargine Injectable (LANTUS) 35 Unit(s) SubCutaneous every morning  insulin lispro (HumaLOG) corrective regimen sliding scale   SubCutaneous three times a day before meals  insulin lispro Injectable (HumaLOG) 27 Unit(s) SubCutaneous three times a day before meals  losartan 25 milliGRAM(s) Oral daily  montelukast 10 milliGRAM(s) Oral daily  nystatin Powder 1 Application(s) Topical two times a day  pantoprazole    Tablet 40 milliGRAM(s) Oral before breakfast    PAST MEDICAL & SURGICAL HISTORY:  History of diabetic retinopathy  Diabetes type 2, uncontrolled  GERD (gastroesophageal reflux disease)  IBS (irritable bowel syndrome)  Obesity  Hypothyroid: not on medication  MVP (mitral valve prolapse)  Cholesterol serum elevated  Spinal stenosis: chronic back pain  Herniated disc  Depression  Gastroparesis due to DM  Hypertension  S/P TAVR (transcatheter aortic valve replacement)  right eye torn retina  Breast anomaly: Biopsy of both breast benign lesions  Cervix abnormality: Ablation of cervix  S/P D&C (status post dilation and curettage): D&amp;C when she was 18  S/P laminectomy: Pt denied this?  Cataract: B/L cataracts  S/P tonsillectomy and adenoidectomy  S/P bariatric surgery: lap band surgery two times due to erosion, now no more    FAMILY HISTORY:  Family history of aortic stenosis: Family hx of structural heart dx    SOCIAL HISTORY:  unchanged    REVIEW OF SYSTEMS:  CONSTITUTIONAL: No fever  EYES: No eye pain,  ENT:  No sinu pain  NECK: No pain  RESPIRATORY: RASMUSSEN  CARDIOVASCULAR: No C/P  GASTROINTESTINAL: No abdominal pain. No melena or hematochezia.  GENITOURINARY: No hematuria  NEUROLOGICAL: No headaches, memory loss  SKIN: LE Venous Ulcers  LYMPH Nodes: No enlarged glands noted  ENDOCRINE: No heat or cold intolerance noted  MUSCULOSKELETAL: LE swelling  PSYCHIATRIC: No depression, anxiety noted  HEME/LYMPH: No  bleeding gums  ALLERGY AND IMMUNOLOGIC: No hives	    [ x] All others negative	    PHYSICAL EXAM:  Vital Signs Last 24 Hrs  T(C): 36.7 (31 Aug 2020 08:32), Max: 37 (30 Aug 2020 12:18)  T(F): 98.1 (31 Aug 2020 08:32), Max: 98.6 (30 Aug 2020 12:18)  HR: 72 (31 Aug 2020 08:32) (69 - 88)  BP: 107/57 (31 Aug 2020 08:32) (99/64 - 143/73)  RR: 18 (31 Aug 2020 08:32) (18 - 19)  SpO2: 98% (31 Aug 2020 08:32) (94% - 99%)    Appearance: NAD 	  HEENT: NC/AT  Cardiovascular: RRR, S1 and S2    Respiratory: CTA B/L 	  Psychiatry:  AAO x 3  Gastrointestinal:  Soft, Non-tender, + BS	  Skin: No rashes, No cyanosis	  Neurologic: no focal deficit noted   Extremities:  B/L trace LE edema.  Lateral and shin venous ulcer R LE  Shin ulcer L LE    Vascular Pulse Exam:  Right DP: palpable Left DP: palpable   Right PT: audible   Left PT:  audible       LABS:	 	                        11.8   11.56 )-----------( 284      ( 31 Aug 2020 06:05 )             38.2     08-31    138  |  100  |  24<H>  ----------------------------<  232<H>  3.2<L>   |  26  |  0.88    Ca    9.9      31 Aug 2020 06:05    PTT - ( 31 Aug 2020 06:05 )  PTT:59.1 sec      Assessment:  1. PE      O2 98% on 2 L NC      Troponin negative      BNP elevated   2. R Below Knee DVT  4. HTN  5. Venous Insufficiency / LE Venous Ulcerations  6. HOCM   7. Non-obstructive CAD      Distal LM 40-45% by cath in 2017    Plan:  1. Currently on heparin gtt.  2. Spoke to Structural Heart Team, no further work-up this admission.      Recommend transition to Xarelto 15mg BID x 21 days,      then Xarelto 20mg daily with evening meal.  3. Continue wound care for LE venous ulcerations.  4. Follow-up Factor V Leiden and Prothrombin Gene Mutation.  5. Patient requesting PT.   6. Monitor on Xarelto for 24 hours.  7. Recommend ASA 81mg daily for h/o CAD.     Thank you  MADELEINE Jordan, MPAS  Vascular Cardiology Service    Please call with any questions:   Service Line: [phone]  Office [phone]  email:  [email or web address]

## 2020-08-31 NOTE — PROGRESS NOTE ADULT - PROBLEM SELECTOR PLAN 3
-wounds that are healing with crusting present on the LE b/l   -elevate LE   -wound care  -Uptrending WBC, pt remains afebrile. Would monitor off antibiotics for now. Low threshold to start abx if clinical exam changes or if pt develops fever. -wounds that are healing with crusting present on the LE b/l - does not appear infected - leukocytosis likely reactive  -elevate LE   -wound care consult appreciated  -Uptrending WBC, pt remains afebrile. Would monitor off antibiotics for now. Low threshold to start abx if clinical exam changes or if pt develops fever.

## 2020-08-31 NOTE — CONSULT NOTE ADULT - ASSESSMENT
Ms Olivo is a 64y/o morbidly obese female who had a TAVR in 2015 and is now admitted with progressive sob and orthopnea.  She was found to have extensive b/l pulmonary emboli, for which she is on heparin, as well as bioprosthetic AS.      NYHA III  STS risk for AVR: Ms Olivo is a 64y/o morbidly obese female who had a TAVR in 2015 and is now admitted with progressive sob and orthopnea.  She was found to have extensive b/l pulmonary emboli, for which she is on heparin, as well as bioprosthetic AS.      NYHA III  STS risk for AVR: 5%

## 2020-08-31 NOTE — PROGRESS NOTE ADULT - PROBLEM SELECTOR PLAN 6
multifactorial d/t AS, HOCM, possible asthma, and the pulmonary embolism.   Plan:  - reduce home lasix 120->80mg  - on heparin drip  - echo and workup as above Reports that anxiety is 2/2 SOB  c/w home fluoxetine 40 daily.

## 2020-08-31 NOTE — CONSULT NOTE ADULT - ATTENDING COMMENTS
65 year old woman with multiple co-morbidities including morbid obesity, hypertrophic cardiomyopathy and 5 years ago TAVR. Recently progressive shortness of breath again and echo in outpatient office today demonstrates high gradient again TAVR suggesting prosthetic valve dysfunction. Plan to diurese in effort to improve shortness of breath and have Structural Heart evaluation.
The patient presents with worsening shortness of breath, dyspnea upon exertion, fatigue and bilateral lower extremity swelling.  She was found to have elevated gradients across her aortic valve with DVI of 0.37.  IN 2015, 4 days post TAVR, her AoV was 3.8 with a mGr of 35 and her heart was hyperdynamic.  During her current hospitalization she was found to have bilateral pulmonary emboli and deep venous thromboses.    --The patient in December 2015 had a Ramon 3 23mm valve implanted (#[id number], confirmed with [facility]).  She was noted to have elevated gradients following the procedure.  This may be secondary to patient prosthesis mismatch, hyperdynamic left ventricle (EF >70%) and hypertrophic cardiomyopathy.  It is not clear that the patient has subclinical leaflet thrombosis ?HALT  --Discussion was had with the patent concerning the multiple conditions that may be contributing to her shortness of breath which includes her morbid obesity, pulmonary embolism, patient prosthesis mismatch of TAVR valve and hypertrophic cardiomyopathy.    --At this time recommend that the patient be transitioned from IV heparin to coumadin.  In 2-3 months recommend repeat TTE to review gradients across the aortic valve.  --The patient was noted to hypertrophic cardiomyopathy prior to and at time of her TAVR.  Would recommend transition to metoprolol for improved heart rate control and consideration of starting the patient on a non-dihydropyridine calclium channel blocker.  At time of repeat TTE the midcavitary gradient can be reassessed.  --Based upon how the patient does over the ensuing months consideration could be made for performing a LAMBERT or CTA to assess for HALT.  If she does have HALT there is concern that it can alter the performance of the valve/early valve deterioration and in some patients can lead to clinical events.    All questions and concerns of the patient were addressed.    EKG, laboratory studies and imaging studies were personally reviewed.

## 2020-08-31 NOTE — PROGRESS NOTE ADULT - PROBLEM SELECTOR PROBLEM 5
Dyspnea on exertion
Dyspnea on exertion
HOCM (hypertrophic obstructive cardiomyopathy)
HOCM (hypertrophic obstructive cardiomyopathy)

## 2020-08-31 NOTE — CONSULT NOTE ADULT - ASSESSMENT
Impression:    Bilateral lower extremity wounds    Recommend:  1.) BLE elevation  2.) patient has compression stockings on order, she should follow up with these  3.) BLE wounds - keep clean and dry, follow up with [place] wound care MD  4.) Nutrition optimization    Care as per medicine. Will not follow. Please recall for new issues.  Seen with Dr. Francisco and discussed with clinical nurse  Thank you for this consult  Dominique Hernandez, NP-C, CWOCN [id number]

## 2020-08-31 NOTE — PROGRESS NOTE ADULT - ASSESSMENT
64yo woman with PMHx AS s/p TAVR 2015, HOCM, pAFib, JOSR (does not tolerate CPAP, on 2L O2 at home), ?asthma, anxiety, morbid obesity, p/w 6 months of progressively worsening SOB on exertion, found to have extensive bilateral interlobar and lobar pulmonary embolism, now awaiting structural heart eval: 66yo woman with PMHx AS s/p TAVR 2015, HOCM, pAFib, JOSR (does not tolerate CPAP, on 2L O2 at home), ?asthma, anxiety, morbid obesity, p/w 6 months of progressively worsening SOB on exertion, found to have extensive bilateral interlobar and lobar pulmonary embolism.

## 2020-08-31 NOTE — CONSULT NOTE ADULT - NSHPATTENDINGPLANDISCUSS_GEN_ALL_CORE
Call Cardiology Fellow or Attending as listed on [email or web address] password: [email or web address].
cardiology team/Dr. Addison, vascular surgery team/Dr. Villar and structural heart team

## 2020-09-01 ENCOUNTER — TRANSCRIPTION ENCOUNTER (OUTPATIENT)
Age: 65
End: 2020-09-01

## 2020-09-01 VITALS
SYSTOLIC BLOOD PRESSURE: 126 MMHG | TEMPERATURE: 98 F | RESPIRATION RATE: 18 BRPM | DIASTOLIC BLOOD PRESSURE: 70 MMHG | HEART RATE: 62 BPM | OXYGEN SATURATION: 96 %

## 2020-09-01 LAB
ANION GAP SERPL CALC-SCNC: 13 MMOL/L — SIGNIFICANT CHANGE UP (ref 5–17)
APTT BLD: 35.5 SEC — SIGNIFICANT CHANGE UP (ref 27.5–35.5)
BLD GP AB SCN SERPL QL: NEGATIVE — SIGNIFICANT CHANGE UP
BUN SERPL-MCNC: 24 MG/DL — HIGH (ref 7–23)
CALCIUM SERPL-MCNC: 9.7 MG/DL — SIGNIFICANT CHANGE UP (ref 8.4–10.5)
CHLORIDE SERPL-SCNC: 100 MMOL/L — SIGNIFICANT CHANGE UP (ref 96–108)
CO2 SERPL-SCNC: 26 MMOL/L — SIGNIFICANT CHANGE UP (ref 22–31)
CREAT SERPL-MCNC: 0.9 MG/DL — SIGNIFICANT CHANGE UP (ref 0.5–1.3)
GLUCOSE BLDC GLUCOMTR-MCNC: 190 MG/DL — HIGH (ref 70–99)
GLUCOSE BLDC GLUCOMTR-MCNC: 233 MG/DL — HIGH (ref 70–99)
GLUCOSE BLDC GLUCOMTR-MCNC: 240 MG/DL — HIGH (ref 70–99)
GLUCOSE SERPL-MCNC: 201 MG/DL — HIGH (ref 70–99)
HCT VFR BLD CALC: 39.3 % — SIGNIFICANT CHANGE UP (ref 34.5–45)
HGB BLD-MCNC: 12.3 G/DL — SIGNIFICANT CHANGE UP (ref 11.5–15.5)
MAGNESIUM SERPL-MCNC: 1.9 MG/DL — SIGNIFICANT CHANGE UP (ref 1.6–2.6)
MCHC RBC-ENTMCNC: 27.2 PG — SIGNIFICANT CHANGE UP (ref 27–34)
MCHC RBC-ENTMCNC: 31.3 GM/DL — LOW (ref 32–36)
MCV RBC AUTO: 86.9 FL — SIGNIFICANT CHANGE UP (ref 80–100)
NRBC # BLD: 0 /100 WBCS — SIGNIFICANT CHANGE UP (ref 0–0)
PLATELET # BLD AUTO: 260 K/UL — SIGNIFICANT CHANGE UP (ref 150–400)
POTASSIUM SERPL-MCNC: 3.2 MMOL/L — LOW (ref 3.5–5.3)
POTASSIUM SERPL-SCNC: 3.2 MMOL/L — LOW (ref 3.5–5.3)
RBC # BLD: 4.52 M/UL — SIGNIFICANT CHANGE UP (ref 3.8–5.2)
RBC # FLD: 15.9 % — HIGH (ref 10.3–14.5)
RH IG SCN BLD-IMP: POSITIVE — SIGNIFICANT CHANGE UP
SODIUM SERPL-SCNC: 139 MMOL/L — SIGNIFICANT CHANGE UP (ref 135–145)
WBC # BLD: 11.85 K/UL — HIGH (ref 3.8–10.5)
WBC # FLD AUTO: 11.85 K/UL — HIGH (ref 3.8–10.5)

## 2020-09-01 PROCEDURE — 99239 HOSP IP/OBS DSCHRG MGMT >30: CPT

## 2020-09-01 PROCEDURE — 99232 SBSQ HOSP IP/OBS MODERATE 35: CPT

## 2020-09-01 PROCEDURE — 99233 SBSQ HOSP IP/OBS HIGH 50: CPT

## 2020-09-01 RX ORDER — PETROLATUM,WHITE
1 JELLY (GRAM) TOPICAL
Qty: 0 | Refills: 0 | DISCHARGE
Start: 2020-09-01

## 2020-09-01 RX ORDER — ATORVASTATIN CALCIUM 80 MG/1
1 TABLET, FILM COATED ORAL
Qty: 0 | Refills: 0 | DISCHARGE
Start: 2020-09-01

## 2020-09-01 RX ORDER — METOPROLOL TARTRATE 50 MG
1 TABLET ORAL
Qty: 60 | Refills: 0
Start: 2020-09-01 | End: 2020-09-30

## 2020-09-01 RX ORDER — NYSTATIN CREAM 100000 [USP'U]/G
1 CREAM TOPICAL
Qty: 1 | Refills: 0
Start: 2020-09-01 | End: 2020-09-07

## 2020-09-01 RX ORDER — ASPIRIN/CALCIUM CARB/MAGNESIUM 324 MG
81 TABLET ORAL DAILY
Refills: 0 | Status: DISCONTINUED | OUTPATIENT
Start: 2020-09-01 | End: 2020-09-01

## 2020-09-01 RX ORDER — POTASSIUM CHLORIDE 20 MEQ
40 PACKET (EA) ORAL ONCE
Refills: 0 | Status: COMPLETED | OUTPATIENT
Start: 2020-09-01 | End: 2020-09-01

## 2020-09-01 RX ORDER — RIVAROXABAN 15 MG-20MG
1 KIT ORAL
Qty: 42 | Refills: 0
Start: 2020-09-01 | End: 2020-09-21

## 2020-09-01 RX ORDER — FUROSEMIDE 40 MG
1 TABLET ORAL
Qty: 30 | Refills: 0
Start: 2020-09-01 | End: 2020-09-30

## 2020-09-01 RX ADMIN — MONTELUKAST 10 MILLIGRAM(S): 4 TABLET, CHEWABLE ORAL at 13:08

## 2020-09-01 RX ADMIN — Medication 27 UNIT(S): at 13:07

## 2020-09-01 RX ADMIN — PANTOPRAZOLE SODIUM 40 MILLIGRAM(S): 20 TABLET, DELAYED RELEASE ORAL at 05:22

## 2020-09-01 RX ADMIN — Medication 40 MILLIEQUIVALENT(S): at 17:50

## 2020-09-01 RX ADMIN — Medication 27 UNIT(S): at 17:51

## 2020-09-01 RX ADMIN — LOSARTAN POTASSIUM 25 MILLIGRAM(S): 100 TABLET, FILM COATED ORAL at 05:22

## 2020-09-01 RX ADMIN — Medication 1000 UNIT(S): at 13:07

## 2020-09-01 RX ADMIN — Medication 80 MILLIGRAM(S): at 05:22

## 2020-09-01 RX ADMIN — RIVAROXABAN 15 MILLIGRAM(S): KIT at 09:08

## 2020-09-01 RX ADMIN — Medication 50 MILLIGRAM(S): at 05:22

## 2020-09-01 RX ADMIN — Medication 4: at 09:08

## 2020-09-01 RX ADMIN — Medication 1 APPLICATION(S): at 05:22

## 2020-09-01 RX ADMIN — Medication 1 APPLICATION(S): at 17:51

## 2020-09-01 RX ADMIN — Medication 2: at 17:51

## 2020-09-01 RX ADMIN — Medication 27 UNIT(S): at 09:08

## 2020-09-01 RX ADMIN — Medication 4: at 13:06

## 2020-09-01 RX ADMIN — RIVAROXABAN 15 MILLIGRAM(S): KIT at 17:51

## 2020-09-01 RX ADMIN — Medication 50 MILLIGRAM(S): at 17:51

## 2020-09-01 RX ADMIN — NYSTATIN CREAM 1 APPLICATION(S): 100000 CREAM TOPICAL at 09:09

## 2020-09-01 NOTE — DISCHARGE NOTE PROVIDER - HOSPITAL COURSE
64yo woman with PMHx AS s/p TAVR 2015, HOCM, pAFib, JOSR (does not tolerate CPAP, on 2L O2 at home), ?asthma, anxiety, morbid obesity, p/w 6 months of progressively worsening SOB on exertion, found to have extensive bilateral interlobar and lobar pulmonary embolism. 66yo woman with PMHx AS s/p TAVR 2015, HOCM, pAFib, JOSR (does not tolerate CPAP, on 2L O2 at home), ?asthma, anxiety, morbid obesity, p/w 6 months of progressively worsening SOB on exertion, found to have extensive bilateral interlobar and lobar pulmonary embolism.  and RLE DVT  echo with increased  AV gradients Compared with echocardiogram of 1/27/2016, structural heart  consulted recommended to  change Beta-blockers to Metoprolol and repeating ECHO in  2months     Pt remained HD stable asymptomatic , PT recommended  Home PT however Pt refused wants OP PT

## 2020-09-01 NOTE — DIETITIAN INITIAL EVALUATION ADULT. - PROBLEM SELECTOR PLAN 4
Cardiology recs appreciated, Per chart, Patient diagnosed with Hypertrophic Cardiomyopathy in 2/20, Echo 2/2020 and last month showed LVOT obstruction.  Plan:  - c/w home verapamil 240mg PO daily  - reduce home Coreg 50mg PO BID to 25mg PO BID.  - F/u TTE - ordered  - f/u echo and find out more about the HOCM diagnosis from Dr. Chow

## 2020-09-01 NOTE — DISCHARGE NOTE NURSING/CASE MANAGEMENT/SOCIAL WORK - PATIENT PORTAL LINK FT
You can access the FollowMyHealth Patient Portal offered by [facility] by registering at the following website: [email or web address]. By joining [facility]’s FollowMyHealth portal, you will also be able to view your health information using other applications (apps) compatible with our system.

## 2020-09-01 NOTE — PHYSICAL THERAPY INITIAL EVALUATION ADULT - ADDITIONAL COMMENTS
Pt lives in rental home, no steps to negotiate. Lives alone. Prior to admission pt was independent with all functional mobility, used a straight cane to ambulate.

## 2020-09-01 NOTE — PROGRESS NOTE ADULT - REASON FOR ADMISSION
Dyspnea on exertion

## 2020-09-01 NOTE — DISCHARGE NOTE PROVIDER - NSDCMRMEDTOKEN_GEN_ALL_CORE_FT
albuterol: dose(s) inhaled , As Needed  aspirin 81 mg oral delayed release tablet: 1 tab(s) orally once a day  atorvastatin 80 mg oral tablet: 1 tab(s) orally once a day  Coreg 25 mg oral tablet: 2 tab(s) orally 2 times a day  Cozaar 25 mg oral tablet: 1 tab(s) orally once a day  furosemide 40 mg oral tablet: 3 tab(s) orally once a day  insulin lispro (concentrated) 200 units/mL subcutaneous solution: 30 unit(s) subcutaneous 3 times a day (before meals)  omeprazole: 40 milligram(s) orally 2 times a day  phenylephrine 1% nasal spray: 1 spray(s) nasal 2 times a day  potassium chloride 10 mEq oral tablet, extended release: 2 tab(s) orally once a day  Singulair 10 mg oral tablet: 1 tab(s) orally once a day  Toujeo SoloStar 300 units/mL subcutaneous solution: 40 unit(s) subcutaneous 2 times a day  Vitamin D3: 5000 international unit(s) orally once a day albuterol: dose(s) inhaled , As Needed  aspirin 81 mg oral delayed release tablet: 1 tab(s) orally once a day  atorvastatin 80 mg oral tablet: 1 tab(s) orally once a day (at bedtime)  Cozaar 25 mg oral tablet: 1 tab(s) orally once a day  furosemide 80 mg oral tablet: 1 tab(s) orally once a day  insulin lispro (concentrated) 200 units/mL subcutaneous solution: 30 unit(s) subcutaneous 3 times a day (before meals)  metoprolol tartrate 50 mg oral tablet: 1 tab(s) orally 2 times a day  nystatin 100,000 units/g topical powder: 1 application topically 2 times a day  omeprazole: 40 milligram(s) orally 2 times a day  petrolatum topical ointment: 1 application topically 2 times a day  phenylephrine 1% nasal spray: 1 spray(s) nasal 2 times a day  potassium chloride 10 mEq oral tablet, extended release: 2 tab(s) orally once a day  rivaroxaban 15 mg oral tablet: 1 tab(s) orally 2 times a day (with meals)  Singulair 10 mg oral tablet: 1 tab(s) orally once a day  Toujeo SoloStar 300 units/mL subcutaneous solution: 40 unit(s) subcutaneous 2 times a day  Vitamin D3: 5000 international unit(s) orally once a day albuterol: dose(s) inhaled , As Needed  aspirin 81 mg oral delayed release tablet: 1 tab(s) orally once a day  atorvastatin 80 mg oral tablet: 1 tab(s) orally once a day (at bedtime)  Cozaar 25 mg oral tablet: 1 tab(s) orally once a day  furosemide 80 mg oral tablet: 1 tab(s) orally once a day  insulin lispro (concentrated) 200 units/mL subcutaneous solution: 30 unit(s) subcutaneous 3 times a day (before meals)  metoprolol tartrate 50 mg oral tablet: 1 tab(s) orally 2 times a day  nystatin 100,000 units/g topical powder: 1 application topically 2 times a day  omeprazole: 40 milligram(s) orally 2 times a day  petrolatum topical ointment: 1 application topically 2 times a day  phenylephrine 1% nasal spray: 1 spray(s) nasal 2 times a day  physical therapy eval and treat :   potassium chloride 10 mEq oral tablet, extended release: 2 tab(s) orally once a day  rivaroxaban 15 mg oral tablet: 1 tab(s) orally 2 times a day (with meals)  Singulair 10 mg oral tablet: 1 tab(s) orally once a day  Toujeo SoloStar 300 units/mL subcutaneous solution: 40 unit(s) subcutaneous 2 times a day  Vitamin D3: 5000 international unit(s) orally once a day

## 2020-09-01 NOTE — PROGRESS NOTE ADULT - SUBJECTIVE AND OBJECTIVE BOX
Vascular Cardiology Consult Note    SERVICE CONSULT: [phone]              EMAIL [email or web address]   OFFICE [phone]    CC:  RASMUSSEN    Interval Events:  Comfortable.  RASMUSSEN continues to improve.   No C/P.  Tolerating Xarelto 15mg BID.  O2 requirements at baseline. 95% on RA.     Allergies  codeine (Vomiting)  shellfish (Stomach Upset)    Intolerances  fish (Stomach Upset)  	  MEDICATIONS  (STANDING):  AQUAPHOR (petrolatum Ointment) 1 Application(s) Topical two times a day  atorvastatin 80 milliGRAM(s) Oral at bedtime  cholecalciferol 1000 Unit(s) Oral daily  furosemide    Tablet 80 milliGRAM(s) Oral daily  insulin glargine Injectable (LANTUS) 40 Unit(s) SubCutaneous at bedtime  insulin lispro (HumaLOG) corrective regimen sliding scale   SubCutaneous three times a day before meals  insulin lispro Injectable (HumaLOG) 27 Unit(s) SubCutaneous three times a day before meals  losartan 25 milliGRAM(s) Oral daily  metoprolol tartrate 50 milliGRAM(s) Oral two times a day  montelukast 10 milliGRAM(s) Oral daily  nystatin Powder 1 Application(s) Topical two times a day  pantoprazole    Tablet 40 milliGRAM(s) Oral before breakfast  rivaroxaban 15 milliGRAM(s) Oral two times a day with meals    PAST MEDICAL & SURGICAL HISTORY:  History of diabetic retinopathy  Diabetes type 2, uncontrolled  GERD (gastroesophageal reflux disease)  IBS (irritable bowel syndrome)  Obesity  Hypothyroid: not on medication  MVP (mitral valve prolapse)  Cholesterol serum elevated  Spinal stenosis: chronic back pain  Herniated disc  Depression  Gastroparesis due to DM  Hypertension  S/P TAVR (transcatheter aortic valve replacement)  right eye torn retina  Breast anomaly: Biopsy of both breast benign lesions  Cervix abnormality: Ablation of cervix  S/P D&C (status post dilation and curettage): D& C when she was 18  S/P laminectomy: Pt denied this?  Cataract: B/L cataracts  S/P tonsillectomy and adenoidectomy  S/P bariatric surgery: lap band surgery two times due to erosion, now no more    FAMILY HISTORY:  Family history of aortic stenosis: Family hx of structural heart dx    SOCIAL HISTORY:  unchanged    REVIEW OF SYSTEMS:  CONSTITUTIONAL: No fever  EYES: No eye pain,  ENT:  No sinus pain  NECK: No pain  RESPIRATORY: RASMUSSEN improving   CARDIOVASCULAR: No C/P  GASTROINTESTINAL: No abdominal pain. No melena or hematochezia.  GENITOURINARY: No hematuria  NEUROLOGICAL: No headaches, memory loss  SKIN: LE Venous Ulcers  LYMPH Nodes: No enlarged glands noted  ENDOCRINE: No heat or cold intolerance noted  MUSCULOSKELETAL: LE swelling  PSYCHIATRIC: No depression, anxiety noted  HEME/LYMPH: No  bleeding gums  ALLERGY AND IMMUNOLOGIC: No hives	    [ x] All others negative	    PHYSICAL EXAM:  Vital Signs Last 24 Hrs  T(C): 36.8 (01 Sep 2020 03:48), Max: 36.8 (31 Aug 2020 21:25)  T(F): 98.3 (01 Sep 2020 03:48), Max: 98.3 (01 Sep 2020 03:48)  HR: 65 (01 Sep 2020 03:48) (65 - 74)  BP: 109/67 (01 Sep 2020 03:48) (109/67 - 122/71)  RR: 18 (01 Sep 2020 03:48) (18 - 18)  SpO2: 95% (01 Sep 2020 03:48) (95% - 96%)    Appearance: NAD 	  HEENT: NC/AT  Cardiovascular: RRR, S1 and S2    Respiratory: CTA B/L 	  Psychiatry:  AAO x 3  Gastrointestinal:  Soft, Non-tender, + BS	  Skin: No rashes, No cyanosis	  Neurologic: no focal deficit noted   Extremities:  B/L trace LE edema.  Lateral and shin venous ulcer R LE  Shin ulcer L LE    Vascular Pulse Exam:  Right DP: palpable Left DP: palpable   Right PT: audible   Left PT:  audible       LABS:	 	                        12.3   11.85 )-----------( 260      ( 01 Sep 2020 07:05 )             39.3     09-01    139  |  100  |  24<H>  ----------------------------<  201<H>  3.2<L>   |  26  |  0.90    Ca    9.7      01 Sep 2020 07:05  Mg     1.9     09-01    PTT - ( 01 Sep 2020 07:05 )  PTT:35.5 sec    Assessment:  1. PE      O2 98% on 2 L NC / 95% on RA      Troponin negative      BNP elevated   2. R Below Knee DVT  4. HTN  5. Venous Insufficiency / LE Venous Ulcerations  6. HOCM   7. Non-obstructive CAD      Distal LM 40-45% by cath in 2017    Plan:  1. Continue Xarelto 15mg BID x 21 days,      then Xarelto 20mg daily with evening meal.  3. Continue wound care for LE venous ulcerations.  4. Follow-up Factor V Leiden and Prothrombin Gene Mutation.  5. Continue PT.   6. Recommend ASA 81mg daily for h/o CAD.  7. Follow-up with Dr. Villar on discharge.   8. Discussed with Primary Team.      Thank you  MADELEINE Jordan, MPAS  Vascular Cardiology Service    Please call with any questions:   Service Line: [phone]  Office [phone]  email:  [email or web address]

## 2020-09-01 NOTE — PROGRESS NOTE ADULT - SUBJECTIVE AND OBJECTIVE BOX
Structural Heart Team      Ms Olivo was seen and examined sitting up in a chair at the bedside.  She was in good spirits and had no complaints.  She denied chest pain and pressure as well as sob.  She reported  that she was able to ambulate as far as the nurses station today.  There were no acute events overnight.      REVIEW OF SYSTEMS:    CONSTITUTIONAL: No weakness, fevers or chills  EYES/ENT: No visual changes;  No vertigo or throat pain   NECK: No pain or stiffness  RESPIRATORY: No cough, wheezing, hemoptysis; No shortness of breath  CARDIOVASCULAR: No chest pain or palpitations  GASTROINTESTINAL: No abdominal or epigastric pain. No nausea, vomiting, or hematemesis; No diarrhea or constipation. No melena or hematochezia.  GENITOURINARY: No dysuria, frequency or hematuria  NEUROLOGICAL: No numbness or weakness  SKIN: No itching, rashes      Allergies    codeine (Vomiting)  shellfish (Stomach Upset)    Intolerances    fish (Stomach Upset)    Vital Signs Last 24 Hrs  T(C): 36.9 (01 Sep 2020 12:44), Max: 36.9 (01 Sep 2020 12:44)  T(F): 98.5 (01 Sep 2020 12:44), Max: 98.5 (01 Sep 2020 12:44)  HR: 62 (01 Sep 2020 12:44) (62 - 74)  BP: 126/70 (01 Sep 2020 12:44) (109/67 - 126/70)  BP(mean): --  RR: 18 (01 Sep 2020 12:44) (18 - 18)  SpO2: 96% (01 Sep 2020 12:44) (95% - 96%)    MEDICATIONS  (STANDING):  AQUAPHOR (petrolatum Ointment) 1 Application(s) Topical two times a day  aspirin  chewable 81 milliGRAM(s) Oral daily  atorvastatin 80 milliGRAM(s) Oral at bedtime  cholecalciferol 1000 Unit(s) Oral daily  dextrose 5%. 1000 milliLiter(s) (50 mL/Hr) IV Continuous <Continuous>  dextrose 50% Injectable 12.5 Gram(s) IV Push once  dextrose 50% Injectable 25 Gram(s) IV Push once  dextrose 50% Injectable 25 Gram(s) IV Push once  furosemide    Tablet 80 milliGRAM(s) Oral daily  insulin glargine Injectable (LANTUS) 40 Unit(s) SubCutaneous at bedtime  insulin lispro (HumaLOG) corrective regimen sliding scale   SubCutaneous three times a day before meals  insulin lispro Injectable (HumaLOG) 27 Unit(s) SubCutaneous three times a day before meals  losartan 25 milliGRAM(s) Oral daily  metoprolol tartrate 50 milliGRAM(s) Oral two times a day  montelukast 10 milliGRAM(s) Oral daily  nystatin Powder 1 Application(s) Topical two times a day  pantoprazole    Tablet 40 milliGRAM(s) Oral before breakfast  rivaroxaban 15 milliGRAM(s) Oral two times a day with meals      Exam-  General: obese, NAD  Cardiac: s1s2, RRR,  II-III/VI systolic murmur  Pulmonary: CTA b/l, no wheezes, rales, or rhonchi  Gastrointestinal: soft abdomen, nontender, nondistended, + bowel sounds throughout  Extremities: 2+ pitting edema  Neuro: A&Ox3, nonfocal, moves all extremities  EKG: SR                          12.3   11.85 )-----------( 260      ( 01 Sep 2020 07:05 )             39.3   09-01    139  |  100  |  24<H>  ----------------------------<  201<H>  3.2<L>   |  26  |  0.90    Ca    9.7      01 Sep 2020 07:05  Mg     1.9     09-01    PTT - ( 01 Sep 2020 07:05 )  PTT:35.5 sec  I&O's Summary    31 Aug 2020 07:01  -  01 Sep 2020 07:00  --------------------------------------------------------  IN: 913 mL / OUT: 1000 mL / NET: -87 mL    01 Sep 2020 07:01  -  01 Sep 2020 15:01  --------------------------------------------------------  IN: 600 mL / OUT: 900 mL / NET: -300 mL              Assessment/Plan:  Ms Olivo is a 64y/o morbidly obese female who had a TAVR in 2015 and is now admitted with progressive sob and orthopnea.  She was found to have extensive b/l pulmonary emboli, for which she is on heparin, as well as bioprosthetic AS.  - transitioned to Xarelto  - plan to follow up with Dr. Meraz and repeat echo in valve clinic in about 2 months  -- will reassess symptoms and AV gradients  - discussed plan with Ms Olivo and she understood and agreed       MADELEINE Banks  [phone]

## 2020-09-01 NOTE — DIETITIAN INITIAL EVALUATION ADULT. - PROBLEM SELECTOR PLAN 2
s/p TAVR in 2015, cardiology note states echo suggestive of prosthesis stenosis (peak grad 3.8 mm/s through AV)  - f/u TTE - ordered  - f/u echo with Dr. Chow   - if AS, f/u with structural cardiologists to see about TAVR

## 2020-09-01 NOTE — DIETITIAN INITIAL EVALUATION ADULT. - PROBLEM SELECTOR PLAN 1
extensive bilateral interlobar and lobar pulmonary embolism.  Plan:  - on heparin drip  - f/u TTE (ordered already)  - check b/l LE U/S  - lasix reduced - 120->80mg RASMUSSEN less likely to be from HF, can f/u with cardiology to see if it can be downtitrated  - monitor on tele  Cardiology recs appreciated

## 2020-09-01 NOTE — DISCHARGE NOTE PROVIDER - PROVIDER TOKENS
PROVIDER:[TOKEN:[[id number]]],FREE:[LAST:[DR Pierce],FIRST:[Jamal],PHONE:[(   )    -],FAX:[(   )    -],ADDRESS:[pcp]],PROVIDER:[TOKEN:[[id number]]] PROVIDER:[TOKEN:[[id number]]],PROVIDER:[TOKEN:[[id number]]],FREE:[LAST:[DR Pierce],FIRST:[Jamal],PHONE:[(   )    -],FAX:[(   )    -],ADDRESS:[pcp]],PROVIDER:[TOKEN:[[id number]]]

## 2020-09-01 NOTE — DIETITIAN INITIAL EVALUATION ADULT. - ADD RECOMMEND
continue Consistent Carbohydrate with snack, DASH. provided diet ed -monitor need for reinforcement. continue Consistent Carbohydrate with snack, DASH. provided diet ed -monitor need for reinforcement. BMI>40 sticker placed.

## 2020-09-01 NOTE — CHART NOTE - TREATMENT: THE FOLLOWING DIET HAS BEEN RECOMMENDED
continue Consistent Carbohydrate with snack, DASH. provided diet ed on Heart Healthy Consistent Carbohydrate Nutrition Therapy and Weight Loss Tips -monitor need for reinforcement.

## 2020-09-01 NOTE — PROGRESS NOTE ADULT - ATTENDING COMMENTS
65 year old woman with multiple co-morbidities including morbid obesity, hypertrophic cardiomyopathy and 5 years ago TAVR. Recently progressive shortness of breath again and echo in outpatient office today demonstrates high gradient again TAVR suggesting prosthetic valve dysfunction. however, CT angio demonstrates pulmonary emboli explaining acute symptoms and on heparin infusion. Will also repeat echo regarding HOCM and prosthetic valve and consider merits of adjusting management for hypertrophic cardiomyopathy and merits of Structural Heart evaluation.
Agree with Cardiology Fellow assessment and plan.  --Patient feels improved from breathing perspective.  --Multiple possible contributors to dyspnea including extensive bilateral interlobar/lobar pulmonary emboli (patient on heparin now) and elevated gradients across AoV (TAVR 2015) and HOCM with intracavitary gradient.  --TTE yesterday revealed peak transaortic valve velocity equals 4.0 with MG of 39 mm Hg which is significantly elevated (EOA~0.9)--need Strucural Heart Team evaluation; may benefit from eventual LAMBERT to better evaluate valve.  --TTE also with with a valsalva intracavitary gradient of 59 mmHg--on Coreg with HR generally 60s-70s.  --Vascular Cardiology follow-up for PE.  --Structural Heart Team to evaluate for TAVR management.  --Monitor closely on Telemetry.  --Will follow.
Agree with plan as outlined above.
Agree with plan as outlined above.
The patient is clinically doing much better.  Notes less dyspnea upon exertion and fatigue.  Able to walk to the nurses station.  Otherwise she denies any chest pain/tightness/discomfort, fevers, chills, sweats, light-headed sensation, dizziness or palpitations.  No events on telemetry.    The patient presents with worsening shortness of breath, dyspnea upon exertion, fatigue and bilateral lower extremity swelling.  She was found to have elevated gradients across her aortic valve with DVI of 0.37.  IN 2015, 4 days post TAVR, her AoV was 3.8 with a mGr of 35 and her heart was hyperdynamic.  During her current hospitalization she was found to have bilateral pulmonary emboli and deep venous thromboses.    --Discussion was had with the patent concerning the multiple conditions that may be contributing to her shortness of breath which includes her morbid obesity, pulmonary embolism, patient prosthesis mismatch of TAVR valve and hypertrophic cardiomyopathy.    --The patient in December 2015 had a Ramon 3 23mm valve implanted (#[id number], confirmed with [facility]).  She was noted to have elevated gradients following the procedure.  This may be secondary to patient prosthesis mismatch, hyperdynamic left ventricle (EF >70%) and hypertrophic cardiomyopathy.  It is not clear that the patient has subclinical leaflet thrombosis ?HALT  --At this time recommend that the patient has been transitioned to Xarelto.  In 2-3 months recommend repeat TTE to review gradients across the aortic valve.  --The patient was noted to hypertrophic cardiomyopathy prior to and at time of her TAVR.  She has been transitioned to metoprolol for improved heart rate control and consideration of starting the patient on a non-dihydropyridine calcium channel blocker.  Uptitrate as tolerated.  At time of repeat TTE the midcavitary gradient can be reassessed.  --Based upon how the patient does over the ensuing months consideration could be made for performing a LAMBERT or CTA to assess for HALT.  If she does have HALT there is concern that it can alter the performance of the valve/early valve deterioration and in some patients can lead to clinical events.    All questions and concerns of the patient were addressed.
Dr. REX GaleanaAdams County Regional Medical Centerist  [phone]

## 2020-09-01 NOTE — DIETITIAN INITIAL EVALUATION ADULT. - PROBLEM SELECTOR PLAN 5
multifactorial d/t AS, HOCM, possible asthma, and the pulmonary embolism.   Plan:  - f/u outpatient echo  - reduce home lasix 120->80mg  - on heparin drip

## 2020-09-01 NOTE — DIETITIAN INITIAL EVALUATION ADULT. - PERTINENT MEDS FT
AQUAPHOR (petrolatum Ointment) 1  atorvastatin 80  cholecalciferol 1000  dextrose 40% Gel 15 PRN  dextrose 5%. 1000  dextrose 50% Injectable 12.5  dextrose 50% Injectable 25  dextrose 50% Injectable 25  furosemide    Tablet 80  glucagon  Injectable 1 PRN  insulin glargine Injectable (LANTUS) 40  insulin lispro (HumaLOG) corrective regimen sliding scale   insulin lispro Injectable (HumaLOG) 27  losartan 25  metoprolol tartrate 50  montelukast 10  nystatin Powder 1  pantoprazole    Tablet 40  phenylephrine 0.5% Nasal 1 PRN  rivaroxaban 15

## 2020-09-01 NOTE — DIETITIAN INITIAL EVALUATION ADULT. - PERTINENT LABORATORY DATA
Na 139, K+ 3.2, BUN 24, Cr 0.90, , Mg 1.9, Ca 9.7,     CAPILLARY BLOOD GLUCOSE  POCT Blood Glucose.: 233 mg/dL (01 Sep 2020 09:00)  POCT Blood Glucose.: 132 mg/dL (31 Aug 2020 21:43)  POCT Blood Glucose.: 182 mg/dL (31 Aug 2020 17:50)  POCT Blood Glucose.: 264 mg/dL (31 Aug 2020 12:55)

## 2020-09-01 NOTE — DISCHARGE NOTE PROVIDER - CARE PROVIDER_API CALL
Juan Meraz  CARDIOLOGY  [address] [place] [address]  Phone: [phone]  Fax: [phone]  Follow Up Time:     Jamal Lamar  pcp  Phone: (   )    -  Fax: (   )    -  Follow Up Time:     Abdi Villar  CARDIOVASCULAR DISEASE  [address] [place] [address]  Phone: [phone]  Fax: [phone]  Follow Up Time: Juan Meraz  CARDIOLOGY  [address] [place] [address]  Phone: [phone]  Fax: [phone]  Follow Up Time:     Abdi Villar  CARDIOVASCULAR DISEASE  [address] [place] [address]  Phone: [phone]  Fax: [phone]  Follow Up Time:     Jamal Lamar  pcp  Phone: (   )    -  Fax: (   )    -  Follow Up Time:     Christian Addison  CARDIOVASCULAR DISEASE  [address] [facility], [address] [place] [id number]  Phone: [phone]  Fax: [phone]  Follow Up Time:

## 2020-09-01 NOTE — DISCHARGE NOTE PROVIDER - NSDCCPCAREPLAN_GEN_ALL_CORE_FT
PRINCIPAL DISCHARGE DIAGNOSIS  Diagnosis: Acute pulmonary embolism  Assessment and Plan of Treatment: Take your anticoagulation (lovenox, coumadin) as directed.  Follow up with your health care provider within one week. Call for appointment.  If you develop shortness of breath or if your shortness of breath worsens call your Health Care Provider or go to the Emergency Department.        SECONDARY DISCHARGE DIAGNOSES  Diagnosis: DVT, lower extremity  Assessment and Plan of Treatment: Take your "blood thinners" as prescribed.  Walking is encouraged, increase activity as tolerated.  If you develop new leg pain, swelling, and/or redness contact your healthcare provider.  If you develop new chest pain with difficulty breathing, a rapid heart rate and/or a feeling of passing out call emergency medical services [phone].      Diagnosis: Atrial fibrillation  Assessment and Plan of Treatment: Atrial fibrillation is the most common heart rhythm problem & has the risk of stroke & heart attack  It helps if you control your blood pressure, not drink more than 1-2 alcohol drinks per day, cut down on caffeine, getting treatment for over active thyroid gland, & getting exercise  Call your doctor if you feel your heart racing or beating unusually, chest tightness or pain, lightheaded, faint, shortness of breath especially with exercise  It is important to take your heart medication as prescribed  You may be on anticoagulation which is very important to take as directed       Diagnosis: Diabetes type 2, uncontrolled  Assessment and Plan of Treatment: HgA1C this admission 8.8  Make sure you get your HgA1c checked every three months.  If you take oral diabetes medications, check your blood glucose two times a day.  If you take insulin, check your blood glucose before meals and at bedtime.  It's important not to skip any meals.  Keep a log of your blood glucose results and always take it with you to your doctor appointments.  Keep a list of your current medications including injectables and over the counter medications and bring this medication list with you to all your doctor appointments.  If you have not seen your opthalmologist this year call for appointment.  Check your feet daily for redness, sores, or openings. Do not self treat. If no improvement in two days call your primary care physician for an appointment.  Low blood sugar (hypoglycemia) is a blood sugar below 70mg/dl. Check your blood sugar if you feel signs/symptoms of hypoglycemia. If your blood sugar is below 70 take 15 grams of carbohydrates (ex 4 oz of apple juice, 3-4 glucosr tablets, or 4-6 oz of regular soda) wait 15 minutes and repeat blood sugar to make sure it comes up above 70.  If your blood sugar is above 70 and you are due for a meal, have a meal.  If you are not due for a meal have a snack.  This snack helps keeps your blood sugar at a safe range.      Diagnosis: Hypertension  Assessment and Plan of Treatment: Follow up with your medical doctor to establish long term blood pressure treatment goals.

## 2020-09-01 NOTE — PHYSICAL THERAPY INITIAL EVALUATION ADULT - PRECAUTIONS/LIMITATIONS, REHAB EVAL
oxygen therapy device and L/min/CONT FROM ABOVE: Per pt, hx of chronic venous stasis, her legs were swollen with water blisters 2 months ago, now improved, Doppler U/S of the legs was negative for DVT at the time. Her diuretic dose was slowly increased to Lasix 120mg PO daily, with good diuresis and resolution of the LE edema. However, the SOB on exertion persisted. Therefore, she came to ED for further evaluation. CTAngio: Extensive bilateral interlobar and lobar pulmonary embolism. CXR: (-) VADuplexBLE: Acute deep venous thrombosis in the right gastrocnemius and soleal veins, below the knee. No evidence of deep venous thrombosis in the visualized venous segments of the left lower extremity./cardiac precautions

## 2020-09-01 NOTE — DIETITIAN INITIAL EVALUATION ADULT. - OTHER INFO
Reason for admission : Dyspnea on exertion  Diet PTA : egg for breakfast, frozen veg, salad, Mrs. DASH, oil, vinegar for lunch, steak, chicken, eggs, veg stew for dinner. she snacks on diet jello, a cookie. she lives alone and prepare her own meals.  Intake : >75%  Denies nausea/vomit :  Denies difficulty chewing /swallow :  Denies diarrhea/constipation:  Last BM : yesterday  NKFA  IBW +/- 10%= 115pounds  Ht: 63"  Ht taken from pt  Usual Weight PTA: 300pounds  BMI: 50.8  BMI calculated using wt from flow sheet  BMI calculated using ht from pt  Education Provided : Heart Healthy Consistent Carbohydrate Nutrition Therapy, Weight Loss TIps  pressure injury: none  edema: +3 left leg, right leg, left ankle, right ankle

## 2020-09-01 NOTE — CHART NOTE - NSCHARTNOTEFT_GEN_A_CORE
patient seen and examined. no acute complaints - breathing improved.     extensive b/l PE - c/w xarelto  d/w cardiology - patient to f/u outpatient patient seen and examined. no acute complaints - breathing improved.     Lungs CTA b/l   LE wounds healing     extensive b/l PE - c/w xarelto  leukocytosis improving - likely reactive   supplement potassium  seen by nutrition  outpatient PT     patient to follow up with pmd and cardiology - if patient prefers, number given for follow up with Dr. Addison.   discharge time - 36 minutes  Dr. M. Luke  Medicine Hospitalist  [phone]

## 2020-09-01 NOTE — PROGRESS NOTE ADULT - SUBJECTIVE AND OBJECTIVE BOX
Patient seen and evaluated at bedside    Chief Complaint: RASMUSSEN    Interval Events: Pt sitting out of bed in chair, remains on 2 L NC overnight however states she feels well on room air. Denies chest pain. Endorses improved LE swelling.       PMHx:   History of diabetic retinopathy  Diabetes type 2, uncontrolled  Murmur, cardiac  GERD (gastroesophageal reflux disease)  Neuropathy  IBS (irritable bowel syndrome)  Obesity  Hypothyroid  MVP (mitral valve prolapse)  Cholesterol serum elevated  Spinal stenosis  Herniated disc  Depression  Gastroparesis due to DM  Hypertension  Diabetes mellitus      PSHx:   S/P TAVR (transcatheter aortic valve replacement)  right eye torn retina  Breast anomaly  Cervix abnormality  S/P D&C (status post dilation and curettage)  S/P laminectomy  Cataract  S/P tonsillectomy and adenoidectomy  S/P bariatric surgery      Allergies:  codeine (Vomiting)  fish (Stomach Upset)  shellfish (Stomach Upset)    Current Medications:   AQUAPHOR (petrolatum Ointment) 1 Application(s) Topical two times a day  atorvastatin 80 milliGRAM(s) Oral at bedtime  cholecalciferol 1000 Unit(s) Oral daily  dextrose 40% Gel 15 Gram(s) Oral once PRN  dextrose 5%. 1000 milliLiter(s) IV Continuous <Continuous>  dextrose 50% Injectable 12.5 Gram(s) IV Push once  dextrose 50% Injectable 25 Gram(s) IV Push once  dextrose 50% Injectable 25 Gram(s) IV Push once  furosemide    Tablet 80 milliGRAM(s) Oral daily  glucagon  Injectable 1 milliGRAM(s) IntraMuscular once PRN  insulin glargine Injectable (LANTUS) 40 Unit(s) SubCutaneous at bedtime  insulin lispro (HumaLOG) corrective regimen sliding scale   SubCutaneous three times a day before meals  insulin lispro Injectable (HumaLOG) 27 Unit(s) SubCutaneous three times a day before meals  losartan 25 milliGRAM(s) Oral daily  metoprolol tartrate 50 milliGRAM(s) Oral two times a day  montelukast 10 milliGRAM(s) Oral daily  nystatin Powder 1 Application(s) Topical two times a day  pantoprazole    Tablet 40 milliGRAM(s) Oral before breakfast  phenylephrine 0.5% Nasal 1 Spray(s) Nasal two times a day PRN  rivaroxaban 15 milliGRAM(s) Oral two times a day with meals      FAMILY HISTORY:  Family history of aortic stenosis: Family hx of structural heart dx      Review of Systems:  All other review of systems is negative unless indicated above.    Physical Exam:  T(F): 98.3 (09-01), Max: 98.3 (09-01)  HR: 65 (09-01) (65 - 74)  BP: 109/67 (09-01) (107/57 - 122/71)  RR: 18 (09-01)  SpO2: 95% (09-01)    GENERAL: Obese woman in NAD   ENT: Neck supple, Unable to assess JVD given girth of neck   CHEST/LUNG: Clear to auscultation bilaterally; No wheeze, equal breath sounds bilaterally   HEART: Regular rate and rhythm; No murmurs, rubs, or gallops  ABDOMEN: Soft, Nontender  EXTREMITIES:  Edema to mid-shin b/l  PSYCH: Nl behavior, nl affect  NEUROLOGY: AAOx3, non-focal, cranial nerves intact        Cardiovascular Diagnostic Testing:    ECG: Personally reviewed:    Echo: Personally reviewed:    Stress Testing:    Cath:    Imaging:    CXR: Personally reviewed    Labs: Personally reviewed                        12.3   11.85 )-----------( 260      ( 01 Sep 2020 07:05 )             39.3     09-01    139  |  100  |  24<H>  ----------------------------<  201<H>  3.2<L>   |  26  |  0.90    Ca    9.7      01 Sep 2020 07:05  Mg     1.9     09-01      PTT - ( 01 Sep 2020 07:05 )  PTT:35.5 sec      Serum Pro-Brain Natriuretic Peptide: 382 pg/mL (08-27 @ 13:35) Patient seen and evaluated at bedside    Chief Complaint: RASMUSSEN    Interval Events: Pt sitting out of bed in chair, remains on 2 L NC overnight however states she feels well on room air. Denies chest pain. Endorses improved LE swelling.       PMHx:   History of diabetic retinopathy  Diabetes type 2, uncontrolled  Murmur, cardiac  GERD (gastroesophageal reflux disease)  Neuropathy  IBS (irritable bowel syndrome)  Obesity  Hypothyroid  MVP (mitral valve prolapse)  Cholesterol serum elevated  Spinal stenosis  Herniated disc  Depression  Gastroparesis due to DM  Hypertension  Diabetes mellitus      PSHx:   S/P TAVR (transcatheter aortic valve replacement)  right eye torn retina  Breast anomaly  Cervix abnormality  S/P D&C (status post dilation and curettage)  S/P laminectomy  Cataract  S/P tonsillectomy and adenoidectomy  S/P bariatric surgery      Allergies:  codeine (Vomiting)  fish (Stomach Upset)  shellfish (Stomach Upset)    Current Medications:   AQUAPHOR (petrolatum Ointment) 1 Application(s) Topical two times a day  atorvastatin 80 milliGRAM(s) Oral at bedtime  cholecalciferol 1000 Unit(s) Oral daily  dextrose 40% Gel 15 Gram(s) Oral once PRN  dextrose 5%. 1000 milliLiter(s) IV Continuous <Continuous>  dextrose 50% Injectable 12.5 Gram(s) IV Push once  dextrose 50% Injectable 25 Gram(s) IV Push once  dextrose 50% Injectable 25 Gram(s) IV Push once  furosemide    Tablet 80 milliGRAM(s) Oral daily  glucagon  Injectable 1 milliGRAM(s) IntraMuscular once PRN  insulin glargine Injectable (LANTUS) 40 Unit(s) SubCutaneous at bedtime  insulin lispro (HumaLOG) corrective regimen sliding scale   SubCutaneous three times a day before meals  insulin lispro Injectable (HumaLOG) 27 Unit(s) SubCutaneous three times a day before meals  losartan 25 milliGRAM(s) Oral daily  metoprolol tartrate 50 milliGRAM(s) Oral two times a day  montelukast 10 milliGRAM(s) Oral daily  nystatin Powder 1 Application(s) Topical two times a day  pantoprazole    Tablet 40 milliGRAM(s) Oral before breakfast  phenylephrine 0.5% Nasal 1 Spray(s) Nasal two times a day PRN  rivaroxaban 15 milliGRAM(s) Oral two times a day with meals      FAMILY HISTORY:  Family history of aortic stenosis: Family hx of structural heart dx      Review of Systems:  All other review of systems is negative unless indicated above.    Physical Exam:  T(F): 98.3 (09-01), Max: 98.3 (09-01)  HR: 65 (09-01) (65 - 74)  BP: 109/67 (09-01) (107/57 - 122/71)  RR: 18 (09-01)  SpO2: 95% (09-01)    GENERAL: Obese woman in NAD   ENT: Neck supple, Unable to assess JVD given girth of neck   CHEST/LUNG: Clear to auscultation bilaterally; No wheeze, equal breath sounds bilaterally   HEART: Regular rate and rhythm; No murmurs, rubs, or gallops  ABDOMEN: Soft, Nontender  EXTREMITIES:  Edema to mid-shin b/l  PSYCH: Nl behavior, nl affect  NEUROLOGY: AAOx3, non-focal, cranial nerves intact        Cardiovascular Diagnostic Testing:    ECG: Personally reviewed    Echo:  < from: Transthoracic Echocardiogram (08.28.20 @ 09:24) >  Dimensions:    Normal Values:  LA:     5.0    2.0 - 4.0 cm  Ao:            2.0 - 3.8 cm  SEPTUM: 1.3    0.6 - 1.2 cm  PWT:    1.2    0.6 - 1.1 cm  LVIDd:  4.3    3.0 - 5.6 cm  LVIDs:  2.6    1.8 - 4.0 cm  Derived variables:  LVMI: 86 g/m2  RWT: 0.55  Fractional short: 40 %  EF (Visual Estimate): >75 %  Doppler Peak Velocity (m/sec): AoV=4.0  ------------------------------------------------------------------------  Observations:  Mitral Valve: Severe posterior mitral annular  calcification, otherwise normal mitral valve. Mild mitral  regurgitation. Mean transmitral valve gradient equals 6-7  mm Hg. (HRabout 90s-100 bpm)  Aortic Valve/Aorta: Transcatheter aortic valve replacement  is not well visualized. Peak transaortic valve velocity  equals 4.0 m/sec, mean transaortic valve gradient equals 39  mm Hg, which is signficantly elevated even in the presence  of a transcatheter aortic valve replacement. DVI=0.37.  Calculated EOA=0.9 cmsq. Acceleration timeabout 110 msec.  Findings suggest prosthetic valve dysfunction. No aortic  valve regurgitation seen. Peak left ventricular outflow  tract gradient equals 7 mm Hg, mean gradient is equal to 4  mm Hg, LVOT velocity time integral equals 30 cm.  LVOT diameter: 1.8 cm.  Left Atrium: Normal left atrium.  LA volume index = 34  cc/m2.  Left Ventricle: Hyperdynamic left ventricular systolic  function. Intracavitary gradient with valsalva maneuver  approximately 59 mmHg. Mild concentric left ventricular  hypertrophy.  Right Heart: Normal right atrium. Normal right ventricular  size and function. Normal tricuspid valve. Mild tricuspid  regurgitation. Normal pulmonic valve. Minimal pulmonic  regurgitation.  Pericardium/Pleura: Normal pericardium with no pericardial  effusion.  Hemodynamic: Estimated right atrial pressure is 8 mm Hg.  Estimated right ventricular systolic pressure equals 46 mm  Hg, assuming right atrial pressure equals 8 mm Hg,  consistent with mild pulmonary hypertension.  ------------------------------------------------------------------------  Conclusions:  1. Severe posterior mitral annular calcification, otherwise  normal mitral valve. Mild mitral regurgitation. Mean  transmitral valve gradient equals 6-7 mm Hg. (HRabout  90s-100 bpm)  2. Transcatheter aortic valve replacement is not well  visualized. Peak transaortic valve velocity equals 4.0  m/sec, mean transaortic valve gradient equals 39 mm Hg,  which is signficantly elevated even in the presence of a  transcatheter aortic valve replacement. DVI=0.37.  Calculated EOA=0.9 cmsq. Acceleration timeabout 110 msec.  Findings suggest prosthetic valve dysfunction. No aortic  valve regurgitation seen.  3. Hyperdynamic left ventricular systolic function.  Intracavitary gradient with valsalva maneuver approximately  59 mmHg.  4. Normal right ventricular size and function.  Consider additional imaging of the aortic valve such as  with LAMBERT if clinically indicated.  *** Compared with echocardiogram of 1/27/2016, AV gradients  are higher on today's study. TAVR gradients were similarly  elevated on TTE 12/14/2015.    < end of copied text >          Imaging:    CXR: Personally reviewed    Labs: Personally reviewed                        12.3   11.85 )-----------( 260      ( 01 Sep 2020 07:05 )             39.3     09-01    139  |  100  |  24<H>  ----------------------------<  201<H>  3.2<L>   |  26  |  0.90    Ca    9.7      01 Sep 2020 07:05  Mg     1.9     09-01      PTT - ( 01 Sep 2020 07:05 )  PTT:35.5 sec      Serum Pro-Brain Natriuretic Peptide: 382 pg/mL (08-27 @ 13:35)

## 2020-09-01 NOTE — DISCHARGE NOTE PROVIDER - NSDCFUADDAPPT_GEN_ALL_CORE_FT
please follow up with DR Meraz for repeat ECHO in 2 months please follow up with DR Meraz for repeat ECHO in 2 months.   follow up with your cardiologist for continued management. if prefer, can follow up with Dr. Addison who saw you in the hospital. please follow up with DR Meraz for repeat ECHO in 2 months.   follow up with your cardiologist for continued management. if prefer, can follow up with Dr. Addison who saw you in the hospital.  please have your PCP/Cardiologist give you scripts for Xarelto 20 mg daily starting form 09/22/2020 please follow up with DR Meraz for repeat ECHO in 2 months.   follow up with your cardiologist for continued management. if prefer, can follow up with Dr. Addison who saw you in the hospital.  please have your PCP/Cardiologist give you scripts for Xarelto 20 mg daily starting from 09/22/2020

## 2020-09-01 NOTE — DISCHARGE NOTE PROVIDER - CARE PROVIDERS DIRECT ADDRESSES
,DirectAddress_Unknown,DirectAddress_Unknown,[email or web address] ,DirectAddress_Unknown,[email or web address],[email or web address],[email or web address]

## 2020-09-01 NOTE — PROGRESS NOTE ADULT - ASSESSMENT
64yo woman with PMHx AS s/p TAVR 2015, HOCM, pAFib, JOSR (does not tolerate CPAP), morbid obesity who presents with 1 month of progressively worsening RASMUSSEN.    #B/l PE   -Started on Xarelto   -f/u vascular cardiology recs    #AS s/p TAVR 2015  -Most recent echo with Dr. Chow suggestive of prosthesis stenosis, with peak gradient of 3.8mm/s through AV; physical exam also suspicious with AS murmur.  -Cont Lasix 80 mg qd   -f/u structural heart team recs (No intervention at this time on AoV and MR, optimize medically)    #HOCM  -Echo 2/2020 with ?apical HCM with TRACEE, SHAILESH and rest LVOT gradient 14mmHg and provoked LVOT gradient 34mmHg.  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   -Transitioned to Metoprolol 50 mg BID, Cont      #HTN  Cont Metoprolol   -Continue losartan 25mg PO daily.    #pAFib  -Has JOSR.  -Continue Metoprolol for rate control strategy.  -On  Xarelto  -Continue telemetry monitoring.    #ASCVD risk reduction  -Continue ASA 81mg PO daily.  -Continue Lipitor 80mg PO daily.    Plan to be discussed with Attending     Chester Gill MD  Cardiology Fellow  [phone]    Please check [email or web address]: [email or web address] for cardiology service schedule and contact information.

## 2020-09-01 NOTE — PHYSICAL THERAPY INITIAL EVALUATION ADULT - PERTINENT HX OF CURRENT PROBLEM, REHAB EVAL
66yo F with PMHx AS s/p TAVR 2015, HOCM, pAFib, JOSR (does not tolerate CPAP, on 2L O2 at home), ?asthma, anxiety, morbid obesity, p/w 6 months of progressively worsening SOB on exertion. Per pt, last time she was able to walk to the bathroom and back without dyspnea was 6 months ago and she has progressively become more sedentary. +orthopnea, she states she has been sleeping upright for >6 months, and before that was using multiple pillows. CONT BELOW

## 2020-09-04 LAB — PTR INTERPRETATION: SIGNIFICANT CHANGE UP

## 2020-09-04 PROCEDURE — 82962 GLUCOSE BLOOD TEST: CPT

## 2020-09-04 PROCEDURE — 84100 ASSAY OF PHOSPHORUS: CPT

## 2020-09-04 PROCEDURE — 85730 THROMBOPLASTIN TIME PARTIAL: CPT

## 2020-09-04 PROCEDURE — 85610 PROTHROMBIN TIME: CPT

## 2020-09-04 PROCEDURE — 85014 HEMATOCRIT: CPT

## 2020-09-04 PROCEDURE — 80053 COMPREHEN METABOLIC PANEL: CPT

## 2020-09-04 PROCEDURE — 93970 EXTREMITY STUDY: CPT

## 2020-09-04 PROCEDURE — 99285 EMERGENCY DEPT VISIT HI MDM: CPT | Mod: 25

## 2020-09-04 PROCEDURE — 85379 FIBRIN DEGRADATION QUANT: CPT

## 2020-09-04 PROCEDURE — 84132 ASSAY OF SERUM POTASSIUM: CPT

## 2020-09-04 PROCEDURE — 97162 PT EVAL MOD COMPLEX 30 MIN: CPT

## 2020-09-04 PROCEDURE — 81240 F2 GENE: CPT

## 2020-09-04 PROCEDURE — 86850 RBC ANTIBODY SCREEN: CPT

## 2020-09-04 PROCEDURE — 82435 ASSAY OF BLOOD CHLORIDE: CPT

## 2020-09-04 PROCEDURE — 82803 BLOOD GASES ANY COMBINATION: CPT

## 2020-09-04 PROCEDURE — 96374 THER/PROPH/DIAG INJ IV PUSH: CPT | Mod: XU

## 2020-09-04 PROCEDURE — 83605 ASSAY OF LACTIC ACID: CPT

## 2020-09-04 PROCEDURE — 93308 TTE F-UP OR LMTD: CPT

## 2020-09-04 PROCEDURE — 86901 BLOOD TYPING SEROLOGIC RH(D): CPT

## 2020-09-04 PROCEDURE — 71045 X-RAY EXAM CHEST 1 VIEW: CPT

## 2020-09-04 PROCEDURE — 84484 ASSAY OF TROPONIN QUANT: CPT

## 2020-09-04 PROCEDURE — 93005 ELECTROCARDIOGRAM TRACING: CPT

## 2020-09-04 PROCEDURE — 83880 ASSAY OF NATRIURETIC PEPTIDE: CPT

## 2020-09-04 PROCEDURE — 86900 BLOOD TYPING SEROLOGIC ABO: CPT

## 2020-09-04 PROCEDURE — 82947 ASSAY GLUCOSE BLOOD QUANT: CPT

## 2020-09-04 PROCEDURE — 85027 COMPLETE CBC AUTOMATED: CPT

## 2020-09-04 PROCEDURE — 86769 SARS-COV-2 COVID-19 ANTIBODY: CPT

## 2020-09-04 PROCEDURE — 83036 HEMOGLOBIN GLYCOSYLATED A1C: CPT

## 2020-09-04 PROCEDURE — 81241 F5 GENE: CPT

## 2020-09-04 PROCEDURE — G0452: CPT | Mod: 26

## 2020-09-04 PROCEDURE — 80048 BASIC METABOLIC PNL TOTAL CA: CPT

## 2020-09-04 PROCEDURE — U0003: CPT

## 2020-09-04 PROCEDURE — 82330 ASSAY OF CALCIUM: CPT

## 2020-09-04 PROCEDURE — 86803 HEPATITIS C AB TEST: CPT

## 2020-09-04 PROCEDURE — 84295 ASSAY OF SERUM SODIUM: CPT

## 2020-09-04 PROCEDURE — 71275 CT ANGIOGRAPHY CHEST: CPT

## 2020-09-04 PROCEDURE — 83735 ASSAY OF MAGNESIUM: CPT

## 2020-09-04 PROCEDURE — 85018 HEMOGLOBIN: CPT

## 2020-09-04 PROCEDURE — 93306 TTE W/DOPPLER COMPLETE: CPT

## 2020-09-08 ENCOUNTER — OUTPATIENT (OUTPATIENT)
Dept: OUTPATIENT SERVICES | Facility: HOSPITAL | Age: 65
LOS: 1 days | Discharge: HOME | End: 2020-09-08
Payer: MEDICARE

## 2020-09-08 ENCOUNTER — APPOINTMENT (OUTPATIENT)
Dept: WOUND CARE | Facility: HOSPITAL | Age: 65
End: 2020-09-08
Payer: MEDICARE

## 2020-09-08 ENCOUNTER — RESULT REVIEW (OUTPATIENT)
Age: 65
End: 2020-09-08

## 2020-09-08 VITALS
WEIGHT: 293 LBS | SYSTOLIC BLOOD PRESSURE: 115 MMHG | HEIGHT: 63 IN | HEART RATE: 67 BPM | TEMPERATURE: 97.7 F | RESPIRATION RATE: 20 BRPM | BODY MASS INDEX: 51.91 KG/M2 | DIASTOLIC BLOOD PRESSURE: 55 MMHG | OXYGEN SATURATION: 96 %

## 2020-09-08 DIAGNOSIS — I83.218 VARICOSE VEINS OF RIGHT LOWER EXTREMITY WITH BOTH ULCER OF OTHER PART OF LOWER EXTREMITY AND INFLAMMATION: ICD-10-CM

## 2020-09-08 DIAGNOSIS — Z95.2 PRESENCE OF PROSTHETIC HEART VALVE: Chronic | ICD-10-CM

## 2020-09-08 DIAGNOSIS — S80.822A BLISTER (NONTHERMAL), LEFT LOWER LEG, INITIAL ENCOUNTER: ICD-10-CM

## 2020-09-08 PROBLEM — Z86.39 PERSONAL HISTORY OF OTHER ENDOCRINE, NUTRITIONAL AND METABOLIC DISEASE: Chronic | Status: ACTIVE | Noted: 2020-08-28

## 2020-09-08 PROCEDURE — 88304 TISSUE EXAM BY PATHOLOGIST: CPT

## 2020-09-08 PROCEDURE — 10140 I&D HMTMA SEROMA/FLUID COLLJ: CPT

## 2020-09-08 PROCEDURE — G0463: CPT | Mod: 25

## 2020-09-08 PROCEDURE — 88312 SPECIAL STAINS GROUP 1: CPT

## 2020-09-08 PROCEDURE — 99214 OFFICE O/P EST MOD 30 MIN: CPT | Mod: 25

## 2020-09-09 PROBLEM — S80.822A BLISTER OF LEFT LEG WITHOUT INFECTION, INITIAL ENCOUNTER: Status: ACTIVE | Noted: 2020-09-09

## 2020-09-09 NOTE — REASON FOR VISIT
[FreeTextEntry4] : Patient seen for venous stasis wounds to right and left lower leg down to skin and subcutaneous tissue and fat with stasis dermatitis. Pt previously discharged from hospital for pulmonary embolism. Pt currently on blood thinners. Pt relates she now has a new left lower leg blister.

## 2020-09-09 NOTE — PHYSICAL EXAM
[4 x 4] : 4 x 4  [0] : left 0 [Ankle Swelling (On Exam)] : present [Ankle Swelling Bilaterally] : bilaterally  [Varicose Veins Of Lower Extremities] : bilaterally [] : bilaterally [Ankle Swelling On The Left] : moderate [Skin Ulcer] : ulcer [de-identified] : 4/5 strength in all quadrants bilaterally [JVD] : no jugular venous distention  [de-identified] : calm [de-identified] : new left lower leg blister. venous stasis wounds are located on right and left lower leg down to skin and subcutaneous tissue and fat. bilateral stasis dermatitis. [de-identified] : 50% [de-identified] : Scant Serosanguineous  [de-identified] : Erythema [de-identified] : 50% [FreeTextEntry1] : Medial Leg [FreeTextEntry2] : 0.6 [FreeTextEntry3] : 2.0 [FreeTextEntry4] : 0.1 [de-identified] : Serosanguineous [de-identified] : Silver Alginate  [de-identified] : Cleansed with Normal Saline \par Kerlix [de-identified] : DPM performed I&D on blister. Post D&I measurements 3.5 x 4.2 x 0.1. Patient tolerated procedure well. [FreeTextEntry8] : 3.5 [FreeTextEntry7] : Left Anterior Lower Leg - NEW [FreeTextEntry9] : 3.5 [de-identified] : Blister [de-identified] : Silver Alginate [de-identified] : \par  [de-identified] : NSC [de-identified] : No [de-identified] : None [de-identified] : 50% [de-identified] : 2x Weekly [de-identified] : No [de-identified] : Small [de-identified] : Normal [de-identified] : No [de-identified] : Yes [de-identified] : 50% [de-identified] : None [de-identified] : None [de-identified] : 2x Weekly [TWNoteComboBox1] : Right [de-identified] : False [TWNoteComboBox4] : Small [de-identified] : No [TWNoteComboBox5] : No [de-identified] : Normal [de-identified] : No [de-identified] : 100% [de-identified] : None [de-identified] : None [de-identified] : 2x Weekly [de-identified] : Ace wraps [de-identified] : Ace wraps [TWNoteComboBox9] : Left [de-identified] : 2x Weekly

## 2020-09-09 NOTE — ASSESSMENT
[Demo] : Demo [Written] : Written [Verbal] : Verbal [Patient] : Patient [Verbalizes knowledge/Understanding] : Verbalizes knowledge/understanding [Good - alert, interested, motivated] : Good - alert, interested, motivated [Dressing changes] : dressing changes [Skin Care] : skin care [Foot Care] : foot care [Signs and symptoms of infection] : sign and symptoms of infection [Pressure relief] : pressure relief [How and When to Call] : how and when to call [Patient responsibility to plan of care] : patient responsibility to plan of care [Compression Therapy] : compression therapy [Off-loading] : off-loading [] : Yes [Home] : Home [Stable] : stable [Not Applicable - Long Term Care/Home Health Agency] : Long Term Care/Home Health Agency: Not Applicable [Cane] : Cane [FreeTextEntry2] : Infection Prevention \par Localized wound care \par Compression Therapy \par Promote optimal skin integrity  [FreeTextEntry4] : Photos Taken\par Patient went to [facility] for 6 days starting 8/27/20. Patient had a DVT in right leg and a pulmonary embolism. Patient is now taking Xarelto 15mg BID for 15 days, and then will be taking Xarelto 15mg daily.\par DPM performed I&D on Right Anterior Leg Blister\par Pathology obtained and sent to lab.\par Pt to F/U to [facility] Friday 9/11/20 for dressing change and 1 week for assessment

## 2020-09-09 NOTE — PROCEDURE
[FreeTextEntry9] : 1820 [de-identified] : ora [de-identified] : left lower leg blister [FreeTextEntry7] : left lower leg blister [FreeTextEntry6] : left lower leg blister [de-identified] : 0 [de-identified] : hegarty [de-identified] : skin

## 2020-09-09 NOTE — PROCEDURE
[de-identified] : left lower leg blister [de-identified] : ora [FreeTextEntry9] : 1820 [de-identified] : hegarty [de-identified] : 0 [FreeTextEntry6] : left lower leg blister [FreeTextEntry7] : left lower leg blister [de-identified] : skin

## 2020-09-09 NOTE — ASSESSMENT
[Written] : Written [Demo] : Demo [Verbal] : Verbal [Patient] : Patient [Good - alert, interested, motivated] : Good - alert, interested, motivated [Verbalizes knowledge/Understanding] : Verbalizes knowledge/understanding [Foot Care] : foot care [Skin Care] : skin care [Dressing changes] : dressing changes [Signs and symptoms of infection] : sign and symptoms of infection [How and When to Call] : how and when to call [Pressure relief] : pressure relief [Patient responsibility to plan of care] : patient responsibility to plan of care [Off-loading] : off-loading [Compression Therapy] : compression therapy [] : Yes [Stable] : stable [Home] : Home [Not Applicable - Long Term Care/Home Health Agency] : Long Term Care/Home Health Agency: Not Applicable [Cane] : Cane [FreeTextEntry2] : Infection Prevention \par Localized wound care \par Compression Therapy \par Promote optimal skin integrity  [FreeTextEntry4] : Photos Taken\par Patient went to [facility] for 6 days starting 8/27/20. Patient had a DVT in right leg and a pulmonary embolism. Patient is now taking Xarelto 15mg BID for 15 days, and then will be taking Xarelto 15mg daily.\par DPM performed I&D on Right Anterior Leg Blister\par Pathology obtained and sent to lab.\par Pt to F/U to [facility] Friday 9/11/20 for dressing change and 1 week for assessment

## 2020-09-09 NOTE — REVIEW OF SYSTEMS
[Skin Wound] : skin wound [FreeTextEntry1] : 0298 [Fever] : no fever [Eye Pain] : no eye pain [Shortness Of Breath] : no shortness of breath [Earache] : no earache [Chest Pain] : no chest pain [de-identified] : new left lower leg blister. venous stasis wounds are located on right and left lower leg down to skin and subcutaneous tissue and fat. bilateral stasis dermatitis. [Abdominal Pain] : no abdominal pain

## 2020-09-09 NOTE — PHYSICAL EXAM
[4 x 4] : 4 x 4  [0] : left 0 [Ankle Swelling Bilaterally] : bilaterally  [Ankle Swelling (On Exam)] : present [Varicose Veins Of Lower Extremities] : bilaterally [] : bilaterally [Ankle Swelling On The Left] : moderate [Skin Ulcer] : ulcer [JVD] : no jugular venous distention  [de-identified] : 4/5 strength in all quadrants bilaterally [de-identified] : calm [de-identified] : new left lower leg blister. venous stasis wounds are located on right and left lower leg down to skin and subcutaneous tissue and fat. bilateral stasis dermatitis. [de-identified] : 50% [de-identified] : Scant Serosanguineous  [de-identified] : Erythema [de-identified] : 50% [FreeTextEntry1] : Medial Leg [FreeTextEntry2] : 0.6 [FreeTextEntry4] : 0.1 [FreeTextEntry3] : 2.0 [de-identified] : Serosanguineous [de-identified] : Silver Alginate  [de-identified] : Cleansed with Normal Saline \par Kerlix [de-identified] : DPM performed I&D on blister. Post D&I measurements 3.5 x 4.2 x 0.1. Patient tolerated procedure well. [FreeTextEntry7] : Left Anterior Lower Leg - NEW [FreeTextEntry8] : 3.5 [FreeTextEntry9] : 3.5 [de-identified] : Silver Alginate [de-identified] : Blister [de-identified] : \par  [de-identified] : NSC [de-identified] : No [de-identified] : 50% [de-identified] : None [de-identified] : 2x Weekly [de-identified] : No [de-identified] : No [de-identified] : Normal [de-identified] : Small [de-identified] : None [de-identified] : 50% [de-identified] : Yes [de-identified] : None [de-identified] : 2x Weekly [TWNoteComboBox1] : Right [de-identified] : False [TWNoteComboBox4] : Small [de-identified] : Normal [TWNoteComboBox5] : No [de-identified] : No [de-identified] : 100% [de-identified] : No [de-identified] : None [de-identified] : None [de-identified] : Ace wraps [de-identified] : 2x Weekly [de-identified] : Ace wraps [TWNoteComboBox9] : Left [de-identified] : 2x Weekly

## 2020-09-09 NOTE — VITALS
[FreeTextEntry5] : Xarelto 15mg [de-identified] : Pain is 0/10. Patient denies any pain or discomfort at this time. [] : No

## 2020-09-11 ENCOUNTER — APPOINTMENT (OUTPATIENT)
Dept: WOUND CARE | Facility: HOSPITAL | Age: 65
End: 2020-09-11
Payer: MEDICARE

## 2020-09-11 ENCOUNTER — OUTPATIENT (OUTPATIENT)
Dept: OUTPATIENT SERVICES | Facility: HOSPITAL | Age: 65
LOS: 1 days | Discharge: HOME | End: 2020-09-11
Payer: MEDICARE

## 2020-09-11 VITALS
RESPIRATION RATE: 20 BRPM | HEART RATE: 87 BPM | SYSTOLIC BLOOD PRESSURE: 120 MMHG | OXYGEN SATURATION: 97 % | HEIGHT: 63 IN | WEIGHT: 293 LBS | DIASTOLIC BLOOD PRESSURE: 72 MMHG | BODY MASS INDEX: 51.91 KG/M2 | TEMPERATURE: 97.9 F

## 2020-09-11 DIAGNOSIS — Z95.2 PRESENCE OF PROSTHETIC HEART VALVE: Chronic | ICD-10-CM

## 2020-09-11 DIAGNOSIS — I83.218 VARICOSE VEINS OF RIGHT LOWER EXTREMITY WITH BOTH ULCER OF OTHER PART OF LOWER EXTREMITY AND INFLAMMATION: ICD-10-CM

## 2020-09-11 PROCEDURE — ZZZZZ: CPT

## 2020-09-11 PROCEDURE — G0463: CPT

## 2020-09-12 DIAGNOSIS — I83.228 VARICOSE VEINS OF LEFT LOWER EXTREMITY WITH BOTH ULCER OF OTHER PART OF LOWER EXTREMITY AND INFLAMMATION: ICD-10-CM

## 2020-09-12 DIAGNOSIS — L97.822 NON-PRESSURE CHRONIC ULCER OF OTHER PART OF LEFT LOWER LEG WITH FAT LAYER EXPOSED: ICD-10-CM

## 2020-09-12 DIAGNOSIS — I83.218 VARICOSE VEINS OF RIGHT LOWER EXTREMITY WITH BOTH ULCER OF OTHER PART OF LOWER EXTREMITY AND INFLAMMATION: ICD-10-CM

## 2020-09-12 DIAGNOSIS — L97.812 NON-PRESSURE CHRONIC ULCER OF OTHER PART OF RIGHT LOWER LEG WITH FAT LAYER EXPOSED: ICD-10-CM

## 2020-09-14 ENCOUNTER — APPOINTMENT (OUTPATIENT)
Dept: WOUND CARE | Facility: HOSPITAL | Age: 65
End: 2020-09-14
Payer: MEDICARE

## 2020-09-14 ENCOUNTER — OUTPATIENT (OUTPATIENT)
Dept: OUTPATIENT SERVICES | Facility: HOSPITAL | Age: 65
LOS: 1 days | Discharge: HOME | End: 2020-09-14
Payer: MEDICARE

## 2020-09-14 VITALS
OXYGEN SATURATION: 99 % | HEART RATE: 68 BPM | WEIGHT: 293 LBS | TEMPERATURE: 98.2 F | RESPIRATION RATE: 20 BRPM | BODY MASS INDEX: 51.91 KG/M2 | SYSTOLIC BLOOD PRESSURE: 117 MMHG | HEIGHT: 63 IN | DIASTOLIC BLOOD PRESSURE: 54 MMHG

## 2020-09-14 DIAGNOSIS — I11.9 HYPERTENSIVE HEART DISEASE WITHOUT HEART FAILURE: ICD-10-CM

## 2020-09-14 DIAGNOSIS — G47.10 HYPERSOMNIA, UNSPECIFIED: ICD-10-CM

## 2020-09-14 DIAGNOSIS — Z82.49 FAMILY HISTORY OF ISCHEMIC HEART DISEASE AND OTHER DISEASES OF THE CIRCULATORY SYSTEM: ICD-10-CM

## 2020-09-14 DIAGNOSIS — I83.228 VARICOSE VEINS OF LEFT LOWER EXTREMITY WITH BOTH ULCER OF OTHER PART OF LOWER EXTREMITY AND INFLAMMATION: ICD-10-CM

## 2020-09-14 DIAGNOSIS — Z95.4 PRESENCE OF OTHER HEART-VALVE REPLACEMENT: ICD-10-CM

## 2020-09-14 DIAGNOSIS — L84 CORNS AND CALLOSITIES: ICD-10-CM

## 2020-09-14 DIAGNOSIS — E55.9 VITAMIN D DEFICIENCY, UNSPECIFIED: ICD-10-CM

## 2020-09-14 DIAGNOSIS — M48.07 SPINAL STENOSIS, LUMBOSACRAL REGION: ICD-10-CM

## 2020-09-14 DIAGNOSIS — Z98.84 BARIATRIC SURGERY STATUS: ICD-10-CM

## 2020-09-14 DIAGNOSIS — S80.822A BLISTER (NONTHERMAL), LEFT LOWER LEG, INITIAL ENCOUNTER: ICD-10-CM

## 2020-09-14 DIAGNOSIS — M43.16 SPONDYLOLISTHESIS, LUMBAR REGION: ICD-10-CM

## 2020-09-14 DIAGNOSIS — Z88.5 ALLERGY STATUS TO NARCOTIC AGENT: ICD-10-CM
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
## 2020-09-14 DIAGNOSIS — M47.817 SPONDYLOSIS WITHOUT MYELOPATHY OR RADICULOPATHY, LUMBOSACRAL REGION: ICD-10-CM

## 2020-09-14 DIAGNOSIS — L97.812 NON-PRESSURE CHRONIC ULCER OF OTHER PART OF RIGHT LOWER LEG WITH FAT LAYER EXPOSED: ICD-10-CM

## 2020-09-14 DIAGNOSIS — E66.01 MORBID (SEVERE) OBESITY DUE TO EXCESS CALORIES: ICD-10-CM

## 2020-09-14 DIAGNOSIS — S80.822D: ICD-10-CM
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
## 2020-09-14 DIAGNOSIS — Z87.01 PERSONAL HISTORY OF PNEUMONIA (RECURRENT): ICD-10-CM

## 2020-09-14 DIAGNOSIS — G47.33 OBSTRUCTIVE SLEEP APNEA (ADULT) (PEDIATRIC): ICD-10-CM

## 2020-09-14 DIAGNOSIS — Z79.82 LONG TERM (CURRENT) USE OF ASPIRIN: ICD-10-CM

## 2020-09-14 DIAGNOSIS — Z95.2 PRESENCE OF PROSTHETIC HEART VALVE: Chronic | ICD-10-CM

## 2020-09-14 DIAGNOSIS — Z87.891 PERSONAL HISTORY OF NICOTINE DEPENDENCE: ICD-10-CM

## 2020-09-14 DIAGNOSIS — I83.218 VARICOSE VEINS OF RIGHT LOWER EXTREMITY WITH BOTH ULCER OF OTHER PART OF LOWER EXTREMITY AND INFLAMMATION: ICD-10-CM

## 2020-09-14 DIAGNOSIS — Z87.81 PERSONAL HISTORY OF (HEALED) TRAUMATIC FRACTURE: ICD-10-CM

## 2020-09-14 DIAGNOSIS — I47.1 SUPRAVENTRICULAR TACHYCARDIA: ICD-10-CM
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
## 2020-09-14 DIAGNOSIS — Z79.899 OTHER LONG TERM (CURRENT) DRUG THERAPY: ICD-10-CM

## 2020-09-14 DIAGNOSIS — Z79.4 LONG TERM (CURRENT) USE OF INSULIN: ICD-10-CM

## 2020-09-14 DIAGNOSIS — I34.1 NONRHEUMATIC MITRAL (VALVE) PROLAPSE: ICD-10-CM

## 2020-09-14 DIAGNOSIS — Z83.49 FAMILY HISTORY OF OTHER ENDOCRINE, NUTRITIONAL AND METABOLIC DISEASES: ICD-10-CM

## 2020-09-14 DIAGNOSIS — L97.822 NON-PRESSURE CHRONIC ULCER OF OTHER PART OF LEFT LOWER LEG WITH FAT LAYER EXPOSED: ICD-10-CM

## 2020-09-14 DIAGNOSIS — I83.893 VARICOSE VEINS OF BILATERAL LOWER EXTREMITIES WITH OTHER COMPLICATIONS: ICD-10-CM
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
## 2020-09-14 DIAGNOSIS — L03.116 CELLULITIS OF LEFT LOWER LIMB: ICD-10-CM

## 2020-09-14 DIAGNOSIS — E05.90 THYROTOXICOSIS, UNSPECIFIED WITHOUT THYROTOXIC CRISIS OR STORM: ICD-10-CM

## 2020-09-14 LAB — DNA PLOIDY SPEC FC-IMP: SIGNIFICANT CHANGE UP

## 2020-09-14 PROCEDURE — 99214 OFFICE O/P EST MOD 30 MIN: CPT

## 2020-09-14 PROCEDURE — G0463: CPT

## 2020-09-14 RX ORDER — CEPHALEXIN 250 MG/1
250 CAPSULE ORAL
Qty: 14 | Refills: 0 | Status: COMPLETED | COMMUNITY
Start: 2020-09-14 | End: 2020-09-21

## 2020-09-14 NOTE — REVIEW OF SYSTEMS
[Skin Wound] : skin wound [Fever] : no fever [Chest Pain] : no chest pain [Earache] : no earache [Eye Pain] : no eye pain [Shortness Of Breath] : no shortness of breath [Abdominal Pain] : no abdominal pain [de-identified] : venous stasis wounds are located on right and left lower leg down to skin and subcutaneous tissue and fat. bilateral stasis dermatitis.

## 2020-09-14 NOTE — PHYSICAL EXAM
[4 x 4] : 4 x 4  [Abdominal Pad] : Abdominal Pad [Ankle Swelling (On Exam)] : present [Ankle Swelling Bilaterally] : bilaterally  [0] : left 0 [Varicose Veins Of Lower Extremities] : present [Skin Ulcer] : ulcer [Ankle Swelling On The Left] : moderate [] : bilaterally [de-identified] : calm [JVD] : no jugular venous distention  [de-identified] : 4/5 strength in all quadrants bilaterally [de-identified] : venous stasis wounds are located on right and left lower leg down to skin and subcutaneous tissue and fat. bilateral stasis dermatitis. Mild erythema noted to periwounds bilaterally [de-identified] : Erythema [de-identified] : Scant Serosanguineous  [FreeTextEntry2] : 1.0 [FreeTextEntry1] : Lateral Leg [de-identified] : Serosanguineous [FreeTextEntry3] : 2.0 [FreeTextEntry4] : 0.1 [de-identified] : Cleansed with Normal Saline \par Kerlix [de-identified] : Hydrofera Blue  [FreeTextEntry7] : Left Anterior Lower Leg  [FreeTextEntry9] : 3.5 [de-identified] : 0.1 [FreeTextEntry8] : 3.5 [de-identified] : >75% [de-identified] : Mild erythema [de-identified] : Hydrofera Blue [de-identified] : <25% [de-identified] : \par  [de-identified] : Cleansed with Normal Saline\par Kerlix [de-identified] : No [de-identified] : 100% [de-identified] : None [de-identified] : 3x Weekly [de-identified] : Small [de-identified] : No [de-identified] : No [de-identified] : Normal [de-identified] : None [de-identified] : 100% [de-identified] : 3x Weekly [TWNoteComboBox1] : Right [TWNoteComboBox4] : Small [de-identified] : No [TWNoteComboBox5] : No [de-identified] : Normal [de-identified] : Mild [de-identified] : None [de-identified] : 100% [de-identified] : Ace wraps [de-identified] : No [de-identified] : 3x Weekly [de-identified] : Mild [TWNoteComboBox9] : Left [de-identified] : >75% [de-identified] : None [de-identified] : Yes [de-identified] : 3x Weekly [de-identified] : Ace wraps

## 2020-09-14 NOTE — ASSESSMENT
[Verbal] : Verbal [Written] : Written [Demo] : Demo [Patient] : Patient [Good - alert, interested, motivated] : Good - alert, interested, motivated [Verbalizes knowledge/Understanding] : Verbalizes knowledge/understanding [Dressing changes] : dressing changes [Foot Care] : foot care [Skin Care] : skin care [Pressure relief] : pressure relief [Signs and symptoms of infection] : sign and symptoms of infection [Off-loading] : off-loading [How and When to Call] : how and when to call [Patient responsibility to plan of care] : patient responsibility to plan of care [Compression Therapy] : compression therapy [] : Yes [Stable] : stable [Cane] : Cane [Home] : Home [Not Applicable - Long Term Care/Home Health Agency] : Long Term Care/Home Health Agency: Not Applicable [FreeTextEntry4] : Keflex escribed\par Pt to F/U to [facility] Wednesday and Friday for dressing changes and 1 week for assessment. [FreeTextEntry2] : Infection Prevention \par Localized wound care \par Compression Therapy \par Promote optimal skin integrity

## 2020-09-15 DIAGNOSIS — M47.817 SPONDYLOSIS WITHOUT MYELOPATHY OR RADICULOPATHY, LUMBOSACRAL REGION: ICD-10-CM

## 2020-09-15 DIAGNOSIS — M48.07 SPINAL STENOSIS, LUMBOSACRAL REGION: ICD-10-CM

## 2020-09-15 DIAGNOSIS — L97.812 NON-PRESSURE CHRONIC ULCER OF OTHER PART OF RIGHT LOWER LEG WITH FAT LAYER EXPOSED: ICD-10-CM

## 2020-09-15 DIAGNOSIS — Z87.01 PERSONAL HISTORY OF PNEUMONIA (RECURRENT): ICD-10-CM

## 2020-09-15 DIAGNOSIS — L03.115 CELLULITIS OF RIGHT LOWER LIMB: ICD-10-CM

## 2020-09-15 DIAGNOSIS — I83.228 VARICOSE VEINS OF LEFT LOWER EXTREMITY WITH BOTH ULCER OF OTHER PART OF LOWER EXTREMITY AND INFLAMMATION: ICD-10-CM

## 2020-09-15 DIAGNOSIS — G47.33 OBSTRUCTIVE SLEEP APNEA (ADULT) (PEDIATRIC): ICD-10-CM

## 2020-09-15 DIAGNOSIS — I11.9 HYPERTENSIVE HEART DISEASE WITHOUT HEART FAILURE: ICD-10-CM

## 2020-09-15 DIAGNOSIS — I34.1 NONRHEUMATIC MITRAL (VALVE) PROLAPSE: ICD-10-CM

## 2020-09-15 DIAGNOSIS — Z95.4 PRESENCE OF OTHER HEART-VALVE REPLACEMENT: ICD-10-CM

## 2020-09-15 DIAGNOSIS — Z79.899 OTHER LONG TERM (CURRENT) DRUG THERAPY: ICD-10-CM

## 2020-09-15 DIAGNOSIS — E66.01 MORBID (SEVERE) OBESITY DUE TO EXCESS CALORIES: ICD-10-CM

## 2020-09-15 DIAGNOSIS — Z87.81 PERSONAL HISTORY OF (HEALED) TRAUMATIC FRACTURE: ICD-10-CM

## 2020-09-15 DIAGNOSIS — M43.16 SPONDYLOLISTHESIS, LUMBAR REGION: ICD-10-CM
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
## 2020-09-15 DIAGNOSIS — Z87.891 PERSONAL HISTORY OF NICOTINE DEPENDENCE: ICD-10-CM

## 2020-09-15 DIAGNOSIS — Z88.5 ALLERGY STATUS TO NARCOTIC AGENT: ICD-10-CM

## 2020-09-15 DIAGNOSIS — Z83.49 FAMILY HISTORY OF OTHER ENDOCRINE, NUTRITIONAL AND METABOLIC DISEASES: ICD-10-CM

## 2020-09-15 DIAGNOSIS — Z98.84 BARIATRIC SURGERY STATUS: ICD-10-CM
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
## 2020-09-15 DIAGNOSIS — I47.1 SUPRAVENTRICULAR TACHYCARDIA: ICD-10-CM

## 2020-09-15 DIAGNOSIS — E55.9 VITAMIN D DEFICIENCY, UNSPECIFIED: ICD-10-CM

## 2020-09-15 DIAGNOSIS — Z79.4 LONG TERM (CURRENT) USE OF INSULIN: ICD-10-CM

## 2020-09-15 DIAGNOSIS — L03.116 CELLULITIS OF LEFT LOWER LIMB: ICD-10-CM

## 2020-09-15 DIAGNOSIS — Z82.49 FAMILY HISTORY OF ISCHEMIC HEART DISEASE AND OTHER DISEASES OF THE CIRCULATORY SYSTEM: ICD-10-CM
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
## 2020-09-15 DIAGNOSIS — L97.822 NON-PRESSURE CHRONIC ULCER OF OTHER PART OF LEFT LOWER LEG WITH FAT LAYER EXPOSED: ICD-10-CM

## 2020-09-15 DIAGNOSIS — S80.822D BLISTER (NONTHERMAL), LEFT LOWER LEG, SUBSEQUENT ENCOUNTER: ICD-10-CM

## 2020-09-15 DIAGNOSIS — Z79.82 LONG TERM (CURRENT) USE OF ASPIRIN: ICD-10-CM

## 2020-09-15 DIAGNOSIS — I83.893 VARICOSE VEINS OF BILATERAL LOWER EXTREMITIES WITH OTHER COMPLICATIONS: ICD-10-CM

## 2020-09-15 DIAGNOSIS — E05.90 THYROTOXICOSIS, UNSPECIFIED WITHOUT THYROTOXIC CRISIS OR STORM: ICD-10-CM

## 2020-09-15 DIAGNOSIS — G47.10 HYPERSOMNIA, UNSPECIFIED: ICD-10-CM
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
## 2020-09-15 DIAGNOSIS — E11.59 TYPE 2 DIABETES MELLITUS WITH OTHER CIRCULATORY COMPLICATIONS: ICD-10-CM

## 2020-09-15 DIAGNOSIS — I83.218 VARICOSE VEINS OF RIGHT LOWER EXTREMITY WITH BOTH ULCER OF OTHER PART OF LOWER EXTREMITY AND INFLAMMATION: ICD-10-CM

## 2020-09-16 ENCOUNTER — OUTPATIENT (OUTPATIENT)
Dept: OUTPATIENT SERVICES | Facility: HOSPITAL | Age: 65
LOS: 1 days | Discharge: HOME | End: 2020-09-16
Payer: MEDICARE

## 2020-09-16 ENCOUNTER — APPOINTMENT (OUTPATIENT)
Dept: WOUND CARE | Facility: HOSPITAL | Age: 65
End: 2020-09-16
Payer: MEDICARE

## 2020-09-16 VITALS
TEMPERATURE: 97.6 F | OXYGEN SATURATION: 98 % | HEIGHT: 63 IN | BODY MASS INDEX: 51.91 KG/M2 | DIASTOLIC BLOOD PRESSURE: 70 MMHG | WEIGHT: 293 LBS | RESPIRATION RATE: 20 BRPM | HEART RATE: 68 BPM | SYSTOLIC BLOOD PRESSURE: 126 MMHG

## 2020-09-16 DIAGNOSIS — I83.228 VARICOSE VEINS OF LEFT LOWER EXTREMITY WITH BOTH ULCER OF OTHER PART OF LOWER EXTREMITY AND INFLAMMATION: ICD-10-CM

## 2020-09-16 DIAGNOSIS — L97.822 NON-PRESSURE CHRONIC ULCER OF OTHER PART OF LEFT LOWER LEG WITH FAT LAYER EXPOSED: ICD-10-CM

## 2020-09-16 DIAGNOSIS — I83.218 VARICOSE VEINS OF RIGHT LOWER EXTREMITY WITH BOTH ULCER OF OTHER PART OF LOWER EXTREMITY AND INFLAMMATION: ICD-10-CM

## 2020-09-16 DIAGNOSIS — L97.812 NON-PRESSURE CHRONIC ULCER OF OTHER PART OF RIGHT LOWER LEG WITH FAT LAYER EXPOSED: ICD-10-CM

## 2020-09-16 DIAGNOSIS — Z95.2 PRESENCE OF PROSTHETIC HEART VALVE: Chronic | ICD-10-CM

## 2020-09-16 PROCEDURE — ZZZZZ: CPT

## 2020-09-16 PROCEDURE — G0463: CPT

## 2020-09-18 ENCOUNTER — OUTPATIENT (OUTPATIENT)
Dept: OUTPATIENT SERVICES | Facility: HOSPITAL | Age: 65
LOS: 1 days | Discharge: HOME | End: 2020-09-18
Payer: MEDICARE

## 2020-09-18 ENCOUNTER — APPOINTMENT (OUTPATIENT)
Dept: WOUND CARE | Facility: HOSPITAL | Age: 65
End: 2020-09-18
Payer: MEDICARE

## 2020-09-18 VITALS
RESPIRATION RATE: 20 BRPM | DIASTOLIC BLOOD PRESSURE: 55 MMHG | WEIGHT: 293 LBS | HEART RATE: 66 BPM | TEMPERATURE: 97.7 F | HEIGHT: 63 IN | SYSTOLIC BLOOD PRESSURE: 112 MMHG | BODY MASS INDEX: 51.91 KG/M2 | OXYGEN SATURATION: 97 %

## 2020-09-18 DIAGNOSIS — I83.218 VARICOSE VEINS OF RIGHT LOWER EXTREMITY WITH BOTH ULCER OF OTHER PART OF LOWER EXTREMITY AND INFLAMMATION: ICD-10-CM

## 2020-09-18 DIAGNOSIS — Z95.2 PRESENCE OF PROSTHETIC HEART VALVE: Chronic | ICD-10-CM

## 2020-09-18 PROCEDURE — ZZZZZ: CPT

## 2020-09-18 PROCEDURE — G0463: CPT

## 2020-09-19 DIAGNOSIS — L97.822 NON-PRESSURE CHRONIC ULCER OF OTHER PART OF LEFT LOWER LEG WITH FAT LAYER EXPOSED: ICD-10-CM

## 2020-09-19 DIAGNOSIS — I83.228 VARICOSE VEINS OF LEFT LOWER EXTREMITY WITH BOTH ULCER OF OTHER PART OF LOWER EXTREMITY AND INFLAMMATION: ICD-10-CM

## 2020-09-19 DIAGNOSIS — I83.218 VARICOSE VEINS OF RIGHT LOWER EXTREMITY WITH BOTH ULCER OF OTHER PART OF LOWER EXTREMITY AND INFLAMMATION: ICD-10-CM

## 2020-09-19 DIAGNOSIS — L97.812 NON-PRESSURE CHRONIC ULCER OF OTHER PART OF RIGHT LOWER LEG WITH FAT LAYER EXPOSED: ICD-10-CM

## 2020-09-21 ENCOUNTER — OUTPATIENT (OUTPATIENT)
Dept: OUTPATIENT SERVICES | Facility: HOSPITAL | Age: 65
LOS: 1 days | Discharge: HOME | End: 2020-09-21
Payer: MEDICARE

## 2020-09-21 ENCOUNTER — APPOINTMENT (OUTPATIENT)
Dept: WOUND CARE | Facility: HOSPITAL | Age: 65
End: 2020-09-21
Payer: MEDICARE

## 2020-09-21 VITALS
HEART RATE: 64 BPM | OXYGEN SATURATION: 99 % | TEMPERATURE: 97.4 F | DIASTOLIC BLOOD PRESSURE: 58 MMHG | RESPIRATION RATE: 20 BRPM | SYSTOLIC BLOOD PRESSURE: 126 MMHG

## 2020-09-21 DIAGNOSIS — I83.218 VARICOSE VEINS OF RIGHT LOWER EXTREMITY WITH BOTH ULCER OF OTHER PART OF LOWER EXTREMITY AND INFLAMMATION: ICD-10-CM

## 2020-09-21 DIAGNOSIS — Z95.2 PRESENCE OF PROSTHETIC HEART VALVE: Chronic | ICD-10-CM

## 2020-09-21 PROCEDURE — G0463: CPT

## 2020-09-21 PROCEDURE — 99213 OFFICE O/P EST LOW 20 MIN: CPT

## 2020-09-22 DIAGNOSIS — I83.228 VARICOSE VEINS OF LEFT LOWER EXTREMITY WITH BOTH ULCER OF OTHER PART OF LOWER EXTREMITY AND INFLAMMATION: ICD-10-CM

## 2020-09-22 DIAGNOSIS — Z98.84 BARIATRIC SURGERY STATUS: ICD-10-CM

## 2020-09-22 DIAGNOSIS — E11.59 TYPE 2 DIABETES MELLITUS WITH OTHER CIRCULATORY COMPLICATIONS: ICD-10-CM

## 2020-09-22 DIAGNOSIS — G47.33 OBSTRUCTIVE SLEEP APNEA (ADULT) (PEDIATRIC): ICD-10-CM

## 2020-09-22 DIAGNOSIS — L97.822 NON-PRESSURE CHRONIC ULCER OF OTHER PART OF LEFT LOWER LEG WITH FAT LAYER EXPOSED: ICD-10-CM

## 2020-09-22 DIAGNOSIS — M47.817 SPONDYLOSIS WITHOUT MYELOPATHY OR RADICULOPATHY, LUMBOSACRAL REGION: ICD-10-CM

## 2020-09-22 DIAGNOSIS — Z79.4 LONG TERM (CURRENT) USE OF INSULIN: ICD-10-CM

## 2020-09-22 DIAGNOSIS — M48.07 SPINAL STENOSIS, LUMBOSACRAL REGION: ICD-10-CM

## 2020-09-22 DIAGNOSIS — Z87.81 PERSONAL HISTORY OF (HEALED) TRAUMATIC FRACTURE: ICD-10-CM

## 2020-09-22 DIAGNOSIS — I47.1 SUPRAVENTRICULAR TACHYCARDIA: ICD-10-CM

## 2020-09-22 DIAGNOSIS — E55.9 VITAMIN D DEFICIENCY, UNSPECIFIED: ICD-10-CM

## 2020-09-22 DIAGNOSIS — Z79.82 LONG TERM (CURRENT) USE OF ASPIRIN: ICD-10-CM

## 2020-09-22 DIAGNOSIS — L97.812 NON-PRESSURE CHRONIC ULCER OF OTHER PART OF RIGHT LOWER LEG WITH FAT LAYER EXPOSED: ICD-10-CM

## 2020-09-22 DIAGNOSIS — M43.16 SPONDYLOLISTHESIS, LUMBAR REGION: ICD-10-CM

## 2020-09-22 DIAGNOSIS — G47.10 HYPERSOMNIA, UNSPECIFIED: ICD-10-CM

## 2020-09-22 DIAGNOSIS — Z87.891 PERSONAL HISTORY OF NICOTINE DEPENDENCE: ICD-10-CM

## 2020-09-22 DIAGNOSIS — E05.90 THYROTOXICOSIS, UNSPECIFIED WITHOUT THYROTOXIC CRISIS OR STORM: ICD-10-CM

## 2020-09-22 DIAGNOSIS — Z88.5 ALLERGY STATUS TO NARCOTIC AGENT: ICD-10-CM

## 2020-09-22 DIAGNOSIS — I83.893 VARICOSE VEINS OF BILATERAL LOWER EXTREMITIES WITH OTHER COMPLICATIONS: ICD-10-CM

## 2020-09-22 DIAGNOSIS — Z95.4 PRESENCE OF OTHER HEART-VALVE REPLACEMENT: ICD-10-CM

## 2020-09-22 DIAGNOSIS — Z79.899 OTHER LONG TERM (CURRENT) DRUG THERAPY: ICD-10-CM

## 2020-09-22 DIAGNOSIS — I34.1 NONRHEUMATIC MITRAL (VALVE) PROLAPSE: ICD-10-CM

## 2020-09-22 DIAGNOSIS — I11.9 HYPERTENSIVE HEART DISEASE WITHOUT HEART FAILURE: ICD-10-CM

## 2020-09-22 DIAGNOSIS — Z83.49 FAMILY HISTORY OF OTHER ENDOCRINE, NUTRITIONAL AND METABOLIC DISEASES: ICD-10-CM

## 2020-09-22 DIAGNOSIS — Z82.49 FAMILY HISTORY OF ISCHEMIC HEART DISEASE AND OTHER DISEASES OF THE CIRCULATORY SYSTEM: ICD-10-CM

## 2020-09-22 DIAGNOSIS — I83.218 VARICOSE VEINS OF RIGHT LOWER EXTREMITY WITH BOTH ULCER OF OTHER PART OF LOWER EXTREMITY AND INFLAMMATION: ICD-10-CM

## 2020-09-22 DIAGNOSIS — Z87.01 PERSONAL HISTORY OF PNEUMONIA (RECURRENT): ICD-10-CM

## 2020-09-22 NOTE — REVIEW OF SYSTEMS
[Skin Wound] : skin wound [Fever] : no fever [Eye Pain] : no eye pain [Earache] : no earache [Chest Pain] : no chest pain [Shortness Of Breath] : no shortness of breath [Abdominal Pain] : no abdominal pain [de-identified] : venous stasis wound on right lower leg down to skin and subcutaneous tissue and fat epithelialized. venous stasis wounds on left lower leg down to skin and subcutaneous tissue and fat. bilateral stasis dermatitis.

## 2020-09-22 NOTE — PHYSICAL EXAM
[0] : left 0 [Ankle Swelling (On Exam)] : present [Ankle Swelling Bilaterally] : bilaterally  [Varicose Veins Of Lower Extremities] : bilaterally [] : bilaterally [Ankle Swelling On The Left] : moderate [Skin Ulcer] : ulcer [4 x 4] : 4 x 4  [JVD] : no jugular venous distention  [de-identified] : calm [de-identified] : 4/5 strength in all quadrants bilaterally [de-identified] : venous stasis wound on right lower leg down to skin and subcutaneous tissue and fat epithelialized. venous stasis wounds on left lower leg down to skin and subcutaneous tissue and fat. bilateral stasis dermatitis. [de-identified] : No treatment  [FreeTextEntry1] : Right lateral leg  [FreeTextEntry2] : 1.4 [FreeTextEntry3] : 1.2 [FreeTextEntry4] : 0.1 [de-identified] : Serous/sanguinous [de-identified] : Expressed comfort post ACE application. [de-identified] : Silver alginate [de-identified] : Cleansed with NS\par Kerlix  [FreeTextEntry7] : Left anterior lower leg - Closed - No open wound. [de-identified] : Expressed comfort post ACE application. [TWNoteComboBox4] : Small [de-identified] : Normal [de-identified] : None [de-identified] : None [de-identified] : 100% [de-identified] : No [de-identified] : Ace wraps [de-identified] : 3x Weekly [de-identified] : Primary Dressing [de-identified] : Ace wraps [de-identified] : Daily [de-identified] : Compression

## 2020-09-22 NOTE — ASSESSMENT
[Verbal] : Verbal [Written] : Written [Demo] : Demo [Patient] : Patient [Good - alert, interested, motivated] : Good - alert, interested, motivated [Verbalizes knowledge/Understanding] : Verbalizes knowledge/understanding [Dressing changes] : dressing changes [Skin Care] : skin care [Signs and symptoms of infection] : sign and symptoms of infection [Venous Disease] : venous disease [How and When to Call] : how and when to call [Pain Management] : pain management [Compression Therapy] : compression therapy [Patient responsibility to plan of care] : patient responsibility to plan of care [] : Yes [Stable] : stable [Home] : Home [Cane] : Cane [Not Applicable - Long Term Care/Home Health Agency] : Long Term Care/Home Health Agency: Not Applicable [FreeTextEntry2] : Infection prevention\par Localized wound care \par Goal of remaining pain free regarding wounds.\par Compression therapy \par  [FreeTextEntry4] : Dressing changes 9/23/20, 9/25/20. \par Follow up in 1 week

## 2020-09-22 NOTE — HISTORY OF PRESENT ILLNESS
[FreeTextEntry1] : Patient seen for venous stasis wounds to right and left lower leg down to skin and subcutaneous tissue and fat with stasis dermatitis.

## 2020-09-23 ENCOUNTER — OUTPATIENT (OUTPATIENT)
Dept: OUTPATIENT SERVICES | Facility: HOSPITAL | Age: 65
LOS: 1 days | Discharge: HOME | End: 2020-09-23
Payer: MEDICARE

## 2020-09-23 ENCOUNTER — APPOINTMENT (OUTPATIENT)
Dept: WOUND CARE | Facility: HOSPITAL | Age: 65
End: 2020-09-23
Payer: MEDICARE

## 2020-09-23 VITALS
SYSTOLIC BLOOD PRESSURE: 120 MMHG | RESPIRATION RATE: 20 BRPM | HEIGHT: 63 IN | DIASTOLIC BLOOD PRESSURE: 59 MMHG | BODY MASS INDEX: 51.91 KG/M2 | WEIGHT: 293 LBS | OXYGEN SATURATION: 96 % | TEMPERATURE: 97.8 F | HEART RATE: 67 BPM

## 2020-09-23 DIAGNOSIS — I83.228 VARICOSE VEINS OF LEFT LOWER EXTREMITY WITH BOTH ULCER OF OTHER PART OF LOWER EXTREMITY AND INFLAMMATION: ICD-10-CM

## 2020-09-23 DIAGNOSIS — L97.822 NON-PRESSURE CHRONIC ULCER OF OTHER PART OF LEFT LOWER LEG WITH FAT LAYER EXPOSED: ICD-10-CM

## 2020-09-23 DIAGNOSIS — I83.218 VARICOSE VEINS OF RIGHT LOWER EXTREMITY WITH BOTH ULCER OF OTHER PART OF LOWER EXTREMITY AND INFLAMMATION: ICD-10-CM

## 2020-09-23 DIAGNOSIS — Z95.2 PRESENCE OF PROSTHETIC HEART VALVE: Chronic | ICD-10-CM

## 2020-09-23 DIAGNOSIS — L97.812 NON-PRESSURE CHRONIC ULCER OF OTHER PART OF RIGHT LOWER LEG WITH FAT LAYER EXPOSED: ICD-10-CM

## 2020-09-23 PROCEDURE — G0463: CPT

## 2020-09-23 PROCEDURE — ZZZZZ: CPT

## 2020-09-25 ENCOUNTER — APPOINTMENT (OUTPATIENT)
Dept: WOUND CARE | Facility: HOSPITAL | Age: 65
End: 2020-09-25
Payer: MEDICARE

## 2020-09-25 ENCOUNTER — OUTPATIENT (OUTPATIENT)
Dept: OUTPATIENT SERVICES | Facility: HOSPITAL | Age: 65
LOS: 1 days | Discharge: HOME | End: 2020-09-25
Payer: MEDICARE

## 2020-09-25 VITALS
BODY MASS INDEX: 51.91 KG/M2 | HEIGHT: 63 IN | WEIGHT: 293 LBS | TEMPERATURE: 97.8 F | SYSTOLIC BLOOD PRESSURE: 120 MMHG | RESPIRATION RATE: 20 BRPM | HEART RATE: 67 BPM | OXYGEN SATURATION: 96 % | DIASTOLIC BLOOD PRESSURE: 59 MMHG

## 2020-09-25 DIAGNOSIS — Z95.2 PRESENCE OF PROSTHETIC HEART VALVE: Chronic | ICD-10-CM

## 2020-09-25 DIAGNOSIS — I83.218 VARICOSE VEINS OF RIGHT LOWER EXTREMITY WITH BOTH ULCER OF OTHER PART OF LOWER EXTREMITY AND INFLAMMATION: ICD-10-CM

## 2020-09-25 PROCEDURE — G0463: CPT

## 2020-09-25 PROCEDURE — ZZZZZ: CPT

## 2020-09-26 DIAGNOSIS — I83.228 VARICOSE VEINS OF LEFT LOWER EXTREMITY WITH BOTH ULCER OF OTHER PART OF LOWER EXTREMITY AND INFLAMMATION: ICD-10-CM

## 2020-09-26 DIAGNOSIS — L97.822 NON-PRESSURE CHRONIC ULCER OF OTHER PART OF LEFT LOWER LEG WITH FAT LAYER EXPOSED: ICD-10-CM

## 2020-09-26 DIAGNOSIS — I83.218 VARICOSE VEINS OF RIGHT LOWER EXTREMITY WITH BOTH ULCER OF OTHER PART OF LOWER EXTREMITY AND INFLAMMATION: ICD-10-CM

## 2020-09-26 DIAGNOSIS — L97.812 NON-PRESSURE CHRONIC ULCER OF OTHER PART OF RIGHT LOWER LEG WITH FAT LAYER EXPOSED: ICD-10-CM

## 2020-09-28 ENCOUNTER — APPOINTMENT (OUTPATIENT)
Dept: WOUND CARE | Facility: HOSPITAL | Age: 65
End: 2020-09-28
Payer: MEDICARE

## 2020-09-28 ENCOUNTER — OUTPATIENT (OUTPATIENT)
Dept: OUTPATIENT SERVICES | Facility: HOSPITAL | Age: 65
LOS: 1 days | Discharge: HOME | End: 2020-09-28
Payer: MEDICARE

## 2020-09-28 VITALS
RESPIRATION RATE: 20 BRPM | BODY MASS INDEX: 51.91 KG/M2 | WEIGHT: 293 LBS | TEMPERATURE: 97.6 F | HEIGHT: 63 IN | HEART RATE: 94 BPM | SYSTOLIC BLOOD PRESSURE: 110 MMHG | OXYGEN SATURATION: 96 % | DIASTOLIC BLOOD PRESSURE: 73 MMHG

## 2020-09-28 DIAGNOSIS — Z95.2 PRESENCE OF PROSTHETIC HEART VALVE: Chronic | ICD-10-CM

## 2020-09-28 DIAGNOSIS — I83.218 VARICOSE VEINS OF RIGHT LOWER EXTREMITY WITH BOTH ULCER OF OTHER PART OF LOWER EXTREMITY AND INFLAMMATION: ICD-10-CM

## 2020-09-28 PROCEDURE — 99213 OFFICE O/P EST LOW 20 MIN: CPT

## 2020-09-28 PROCEDURE — G0463: CPT

## 2020-09-28 NOTE — REVIEW OF SYSTEMS
Detail Level: Detailed [Fever] : no fever [Eye Pain] : no eye pain [Earache] : no earache [Chest Pain] : no chest pain [Shortness Of Breath] : no shortness of breath [Abdominal Pain] : no abdominal pain [Skin Wound] : skin wound [de-identified] : venous stasis wound on right lower leg down to skin and subcutaneous tissue and fat epithelialized. venous stasis wounds on left lower leg down to skin and subcutaneous tissue and fat. bilateral stasis dermatitis.

## 2020-09-28 NOTE — PHYSICAL EXAM
[4 x 4] : 4 x 4  [JVD] : no jugular venous distention  [0] : left 0 [Ankle Swelling (On Exam)] : present [Ankle Swelling Bilaterally] : bilaterally  [Varicose Veins Of Lower Extremities] : bilaterally [] : bilaterally [Ankle Swelling On The Left] : moderate [Skin Ulcer] : ulcer [de-identified] : calm [de-identified] : 4/5 strength in all quadrants bilaterally [de-identified] : venous stasis wound on right lower leg down to skin and subcutaneous tissue and fat epithelialized. venous stasis wounds on left lower leg down to skin and subcutaneous tissue and fat. bilateral stasis dermatitis. [de-identified] : No treatment  [FreeTextEntry1] : Right lateral leg  [FreeTextEntry2] : 3.0 [FreeTextEntry3] : 3.0 [FreeTextEntry4] : 0.1 [de-identified] : serosanguineous [de-identified] : Wound Veil, Silver Alginate [de-identified] : Cleansed with NS\par Kerlix  [FreeTextEntry7] : Left anterior lower leg - Closed - No open wound. [TWNoteComboBox4] : Small [de-identified] : Normal [de-identified] : None [de-identified] : None [de-identified] : 100% [de-identified] : No [de-identified] : Ace wraps [de-identified] : 3x Weekly [de-identified] : Ace wraps [de-identified] : Daily [de-identified] : Compression

## 2020-09-28 NOTE — ASSESSMENT
[Verbal] : Verbal [Written] : Written [Demo] : Demo [Patient] : Patient [Good - alert, interested, motivated] : Good - alert, interested, motivated [Verbalizes knowledge/Understanding] : Verbalizes knowledge/understanding [Dressing changes] : dressing changes [Skin Care] : skin care [Signs and symptoms of infection] : sign and symptoms of infection [Venous Disease] : venous disease [How and When to Call] : how and when to call [Pain Management] : pain management [Compression Therapy] : compression therapy [Patient responsibility to plan of care] : patient responsibility to plan of care [Stable] : stable [Home] : Home [Cane] : Cane [Not Applicable - Long Term Care/Home Health Agency] : Long Term Care/Home Health Agency: Not Applicable [] : No [FreeTextEntry2] : Infection prevention\par Localized wound care \par Goal of remaining pain free regarding wounds.\par Compression therapy \par  [FreeTextEntry4] : Dressing changes Wednesday and Friday\par Follow up in 1 week

## 2020-09-28 NOTE — VITALS
[Burning] : burning [] : No [de-identified] : Patient reports pain is 5/10 [FreeTextEntry3] : Right Leg [FreeTextEntry1] : Elevation  [FreeTextEntry2] : Too much activity [FreeTextEntry4] : Elevation and rest at home

## 2020-09-29 DIAGNOSIS — Z98.84 BARIATRIC SURGERY STATUS: ICD-10-CM

## 2020-09-29 DIAGNOSIS — M48.07 SPINAL STENOSIS, LUMBOSACRAL REGION: ICD-10-CM

## 2020-09-29 DIAGNOSIS — Z83.49 FAMILY HISTORY OF OTHER ENDOCRINE, NUTRITIONAL AND METABOLIC DISEASES: ICD-10-CM

## 2020-09-29 DIAGNOSIS — E55.9 VITAMIN D DEFICIENCY, UNSPECIFIED: ICD-10-CM

## 2020-09-29 DIAGNOSIS — Z79.899 OTHER LONG TERM (CURRENT) DRUG THERAPY: ICD-10-CM

## 2020-09-29 DIAGNOSIS — E05.90 THYROTOXICOSIS, UNSPECIFIED WITHOUT THYROTOXIC CRISIS OR STORM: ICD-10-CM

## 2020-09-29 DIAGNOSIS — Z79.4 LONG TERM (CURRENT) USE OF INSULIN: ICD-10-CM

## 2020-09-29 DIAGNOSIS — Z82.49 FAMILY HISTORY OF ISCHEMIC HEART DISEASE AND OTHER DISEASES OF THE CIRCULATORY SYSTEM: ICD-10-CM

## 2020-09-29 DIAGNOSIS — I83.893 VARICOSE VEINS OF BILATERAL LOWER EXTREMITIES WITH OTHER COMPLICATIONS: ICD-10-CM

## 2020-09-29 DIAGNOSIS — I11.9 HYPERTENSIVE HEART DISEASE WITHOUT HEART FAILURE: ICD-10-CM

## 2020-09-29 DIAGNOSIS — Z87.891 PERSONAL HISTORY OF NICOTINE DEPENDENCE: ICD-10-CM

## 2020-09-29 DIAGNOSIS — I83.228 VARICOSE VEINS OF LEFT LOWER EXTREMITY WITH BOTH ULCER OF OTHER PART OF LOWER EXTREMITY AND INFLAMMATION: ICD-10-CM

## 2020-09-29 DIAGNOSIS — Z87.01 PERSONAL HISTORY OF PNEUMONIA (RECURRENT): ICD-10-CM

## 2020-09-29 DIAGNOSIS — M47.817 SPONDYLOSIS WITHOUT MYELOPATHY OR RADICULOPATHY, LUMBOSACRAL REGION: ICD-10-CM

## 2020-09-29 DIAGNOSIS — L97.822 NON-PRESSURE CHRONIC ULCER OF OTHER PART OF LEFT LOWER LEG WITH FAT LAYER EXPOSED: ICD-10-CM

## 2020-09-29 DIAGNOSIS — L97.812 NON-PRESSURE CHRONIC ULCER OF OTHER PART OF RIGHT LOWER LEG WITH FAT LAYER EXPOSED: ICD-10-CM

## 2020-09-29 DIAGNOSIS — Z95.4 PRESENCE OF OTHER HEART-VALVE REPLACEMENT: ICD-10-CM

## 2020-09-29 DIAGNOSIS — E11.59 TYPE 2 DIABETES MELLITUS WITH OTHER CIRCULATORY COMPLICATIONS: ICD-10-CM

## 2020-09-29 DIAGNOSIS — I34.1 NONRHEUMATIC MITRAL (VALVE) PROLAPSE: ICD-10-CM

## 2020-09-29 DIAGNOSIS — G47.10 HYPERSOMNIA, UNSPECIFIED: ICD-10-CM

## 2020-09-29 DIAGNOSIS — Z88.5 ALLERGY STATUS TO NARCOTIC AGENT: ICD-10-CM

## 2020-09-29 DIAGNOSIS — I83.218 VARICOSE VEINS OF RIGHT LOWER EXTREMITY WITH BOTH ULCER OF OTHER PART OF LOWER EXTREMITY AND INFLAMMATION: ICD-10-CM

## 2020-09-29 DIAGNOSIS — Z87.81 PERSONAL HISTORY OF (HEALED) TRAUMATIC FRACTURE: ICD-10-CM

## 2020-09-29 DIAGNOSIS — G47.33 OBSTRUCTIVE SLEEP APNEA (ADULT) (PEDIATRIC): ICD-10-CM

## 2020-09-29 DIAGNOSIS — Z79.82 LONG TERM (CURRENT) USE OF ASPIRIN: ICD-10-CM

## 2020-09-29 DIAGNOSIS — I47.1 SUPRAVENTRICULAR TACHYCARDIA: ICD-10-CM

## 2020-09-30 ENCOUNTER — APPOINTMENT (OUTPATIENT)
Dept: WOUND CARE | Facility: HOSPITAL | Age: 65
End: 2020-09-30
Payer: MEDICARE

## 2020-09-30 ENCOUNTER — OUTPATIENT (OUTPATIENT)
Dept: OUTPATIENT SERVICES | Facility: HOSPITAL | Age: 65
LOS: 1 days | Discharge: HOME | End: 2020-09-30
Payer: MEDICARE

## 2020-09-30 VITALS
BODY MASS INDEX: 46.95 KG/M2 | SYSTOLIC BLOOD PRESSURE: 136 MMHG | HEART RATE: 90 BPM | HEIGHT: 63 IN | OXYGEN SATURATION: 99 % | DIASTOLIC BLOOD PRESSURE: 65 MMHG | WEIGHT: 265 LBS | TEMPERATURE: 98 F | RESPIRATION RATE: 16 BRPM

## 2020-09-30 DIAGNOSIS — Z95.2 PRESENCE OF PROSTHETIC HEART VALVE: Chronic | ICD-10-CM

## 2020-09-30 DIAGNOSIS — I83.218 VARICOSE VEINS OF RIGHT LOWER EXTREMITY WITH BOTH ULCER OF OTHER PART OF LOWER EXTREMITY AND INFLAMMATION: ICD-10-CM

## 2020-09-30 PROCEDURE — G0463: CPT

## 2020-09-30 PROCEDURE — ZZZZZ: CPT

## 2020-10-02 ENCOUNTER — APPOINTMENT (OUTPATIENT)
Dept: OBGYN | Facility: HOSPITAL | Age: 65
End: 2020-10-02
Payer: MEDICARE

## 2020-10-02 ENCOUNTER — APPOINTMENT (OUTPATIENT)
Dept: WOUND CARE | Facility: HOSPITAL | Age: 65
End: 2020-10-02

## 2020-10-02 ENCOUNTER — OUTPATIENT (OUTPATIENT)
Dept: OUTPATIENT SERVICES | Facility: HOSPITAL | Age: 65
LOS: 1 days | Discharge: HOME | End: 2020-10-02
Payer: MEDICARE

## 2020-10-02 VITALS
RESPIRATION RATE: 18 BRPM | DIASTOLIC BLOOD PRESSURE: 64 MMHG | OXYGEN SATURATION: 99 % | BODY MASS INDEX: 46.95 KG/M2 | WEIGHT: 265 LBS | HEART RATE: 69 BPM | SYSTOLIC BLOOD PRESSURE: 124 MMHG | HEIGHT: 63 IN | TEMPERATURE: 98.1 F

## 2020-10-02 DIAGNOSIS — Z95.2 PRESENCE OF PROSTHETIC HEART VALVE: Chronic | ICD-10-CM

## 2020-10-02 DIAGNOSIS — I83.218 VARICOSE VEINS OF RIGHT LOWER EXTREMITY WITH BOTH ULCER OF OTHER PART OF LOWER EXTREMITY AND INFLAMMATION: ICD-10-CM

## 2020-10-02 PROCEDURE — G0463: CPT

## 2020-10-02 PROCEDURE — ZZZZZ: CPT

## 2020-10-05 ENCOUNTER — OUTPATIENT (OUTPATIENT)
Dept: OUTPATIENT SERVICES | Facility: HOSPITAL | Age: 65
LOS: 1 days | Discharge: HOME | End: 2020-10-05
Payer: MEDICARE

## 2020-10-05 ENCOUNTER — APPOINTMENT (OUTPATIENT)
Dept: WOUND CARE | Facility: HOSPITAL | Age: 65
End: 2020-10-05
Payer: MEDICARE

## 2020-10-05 DIAGNOSIS — I83.218 VARICOSE VEINS OF RIGHT LOWER EXTREMITY WITH BOTH ULCER OF OTHER PART OF LOWER EXTREMITY AND INFLAMMATION: ICD-10-CM

## 2020-10-05 DIAGNOSIS — Z95.2 PRESENCE OF PROSTHETIC HEART VALVE: Chronic | ICD-10-CM

## 2020-10-05 PROCEDURE — G0463: CPT

## 2020-10-05 PROCEDURE — 99214 OFFICE O/P EST MOD 30 MIN: CPT

## 2020-10-06 DIAGNOSIS — L97.312 NON-PRESSURE CHRONIC ULCER OF RIGHT ANKLE WITH FAT LAYER EXPOSED: ICD-10-CM

## 2020-10-06 DIAGNOSIS — I34.1 NONRHEUMATIC MITRAL (VALVE) PROLAPSE: ICD-10-CM

## 2020-10-06 DIAGNOSIS — Z79.4 LONG TERM (CURRENT) USE OF INSULIN: ICD-10-CM

## 2020-10-06 DIAGNOSIS — E55.9 VITAMIN D DEFICIENCY, UNSPECIFIED: ICD-10-CM

## 2020-10-06 DIAGNOSIS — L97.822 NON-PRESSURE CHRONIC ULCER OF OTHER PART OF LEFT LOWER LEG WITH FAT LAYER EXPOSED: ICD-10-CM

## 2020-10-06 DIAGNOSIS — E11.622 TYPE 2 DIABETES MELLITUS WITH OTHER SKIN ULCER: ICD-10-CM

## 2020-10-06 DIAGNOSIS — Z79.899 OTHER LONG TERM (CURRENT) DRUG THERAPY: ICD-10-CM

## 2020-10-06 DIAGNOSIS — Z87.891 PERSONAL HISTORY OF NICOTINE DEPENDENCE: ICD-10-CM

## 2020-10-06 DIAGNOSIS — I83.218 VARICOSE VEINS OF RIGHT LOWER EXTREMITY WITH BOTH ULCER OF OTHER PART OF LOWER EXTREMITY AND INFLAMMATION: ICD-10-CM

## 2020-10-06 DIAGNOSIS — M43.16 SPONDYLOLISTHESIS, LUMBAR REGION: ICD-10-CM

## 2020-10-06 DIAGNOSIS — Z87.81 PERSONAL HISTORY OF (HEALED) TRAUMATIC FRACTURE: ICD-10-CM

## 2020-10-06 DIAGNOSIS — E11.59 TYPE 2 DIABETES MELLITUS WITH OTHER CIRCULATORY COMPLICATIONS: ICD-10-CM

## 2020-10-06 DIAGNOSIS — I83.228 VARICOSE VEINS OF LEFT LOWER EXTREMITY WITH BOTH ULCER OF OTHER PART OF LOWER EXTREMITY AND INFLAMMATION: ICD-10-CM

## 2020-10-06 DIAGNOSIS — E05.90 THYROTOXICOSIS, UNSPECIFIED WITHOUT THYROTOXIC CRISIS OR STORM: ICD-10-CM

## 2020-10-06 DIAGNOSIS — Z87.01 PERSONAL HISTORY OF PNEUMONIA (RECURRENT): ICD-10-CM

## 2020-10-06 DIAGNOSIS — Z88.5 ALLERGY STATUS TO NARCOTIC AGENT: ICD-10-CM

## 2020-10-06 DIAGNOSIS — Z79.82 LONG TERM (CURRENT) USE OF ASPIRIN: ICD-10-CM

## 2020-10-06 DIAGNOSIS — M47.817 SPONDYLOSIS WITHOUT MYELOPATHY OR RADICULOPATHY, LUMBOSACRAL REGION: ICD-10-CM

## 2020-10-06 DIAGNOSIS — L97.812 NON-PRESSURE CHRONIC ULCER OF OTHER PART OF RIGHT LOWER LEG WITH FAT LAYER EXPOSED: ICD-10-CM

## 2020-10-06 DIAGNOSIS — Z98.84 BARIATRIC SURGERY STATUS: ICD-10-CM

## 2020-10-06 DIAGNOSIS — I11.9 HYPERTENSIVE HEART DISEASE WITHOUT HEART FAILURE: ICD-10-CM

## 2020-10-06 DIAGNOSIS — G47.10 HYPERSOMNIA, UNSPECIFIED: ICD-10-CM

## 2020-10-06 DIAGNOSIS — I47.1 SUPRAVENTRICULAR TACHYCARDIA: ICD-10-CM

## 2020-10-06 DIAGNOSIS — M48.07 SPINAL STENOSIS, LUMBOSACRAL REGION: ICD-10-CM

## 2020-10-06 DIAGNOSIS — Z95.4 PRESENCE OF OTHER HEART-VALVE REPLACEMENT: ICD-10-CM

## 2020-10-06 DIAGNOSIS — G47.33 OBSTRUCTIVE SLEEP APNEA (ADULT) (PEDIATRIC): ICD-10-CM

## 2020-10-06 DIAGNOSIS — I83.893 VARICOSE VEINS OF BILATERAL LOWER EXTREMITIES WITH OTHER COMPLICATIONS: ICD-10-CM

## 2020-10-06 DIAGNOSIS — Z83.49 FAMILY HISTORY OF OTHER ENDOCRINE, NUTRITIONAL AND METABOLIC DISEASES: ICD-10-CM

## 2020-10-06 DIAGNOSIS — Z82.49 FAMILY HISTORY OF ISCHEMIC HEART DISEASE AND OTHER DISEASES OF THE CIRCULATORY SYSTEM: ICD-10-CM

## 2020-10-07 ENCOUNTER — OUTPATIENT (OUTPATIENT)
Dept: OUTPATIENT SERVICES | Facility: HOSPITAL | Age: 65
LOS: 1 days | Discharge: HOME | End: 2020-10-07
Payer: MEDICARE

## 2020-10-07 ENCOUNTER — APPOINTMENT (OUTPATIENT)
Dept: WOUND CARE | Facility: HOSPITAL | Age: 65
End: 2020-10-07
Payer: MEDICARE

## 2020-10-07 VITALS
SYSTOLIC BLOOD PRESSURE: 143 MMHG | DIASTOLIC BLOOD PRESSURE: 65 MMHG | WEIGHT: 265 LBS | OXYGEN SATURATION: 98 % | TEMPERATURE: 97.1 F | HEIGHT: 63 IN | HEART RATE: 69 BPM | RESPIRATION RATE: 20 BRPM | BODY MASS INDEX: 46.95 KG/M2

## 2020-10-07 DIAGNOSIS — L97.312 NON-PRESSURE CHRONIC ULCER OF RIGHT ANKLE WITH FAT LAYER EXPOSED: ICD-10-CM

## 2020-10-07 DIAGNOSIS — I83.228 VARICOSE VEINS OF LEFT LOWER EXTREMITY WITH BOTH ULCER OF OTHER PART OF LOWER EXTREMITY AND INFLAMMATION: ICD-10-CM

## 2020-10-07 DIAGNOSIS — I83.218 VARICOSE VEINS OF RIGHT LOWER EXTREMITY WITH BOTH ULCER OF OTHER PART OF LOWER EXTREMITY AND INFLAMMATION: ICD-10-CM

## 2020-10-07 DIAGNOSIS — E11.622 TYPE 2 DIABETES MELLITUS WITH OTHER SKIN ULCER: ICD-10-CM

## 2020-10-07 DIAGNOSIS — L97.822 NON-PRESSURE CHRONIC ULCER OF OTHER PART OF LEFT LOWER LEG WITH FAT LAYER EXPOSED: ICD-10-CM

## 2020-10-07 DIAGNOSIS — Z95.2 PRESENCE OF PROSTHETIC HEART VALVE: Chronic | ICD-10-CM

## 2020-10-07 DIAGNOSIS — L97.812 NON-PRESSURE CHRONIC ULCER OF OTHER PART OF RIGHT LOWER LEG WITH FAT LAYER EXPOSED: ICD-10-CM

## 2020-10-07 PROCEDURE — ZZZZZ: CPT

## 2020-10-07 PROCEDURE — G0463: CPT

## 2020-10-09 ENCOUNTER — OUTPATIENT (OUTPATIENT)
Dept: OUTPATIENT SERVICES | Facility: HOSPITAL | Age: 65
LOS: 1 days | Discharge: HOME | End: 2020-10-09
Payer: MEDICARE

## 2020-10-09 ENCOUNTER — APPOINTMENT (OUTPATIENT)
Dept: WOUND CARE | Facility: HOSPITAL | Age: 65
End: 2020-10-09
Payer: MEDICARE

## 2020-10-09 VITALS
HEART RATE: 67 BPM | SYSTOLIC BLOOD PRESSURE: 122 MMHG | WEIGHT: 265 LBS | BODY MASS INDEX: 46.95 KG/M2 | TEMPERATURE: 98.6 F | OXYGEN SATURATION: 99 % | HEIGHT: 63 IN | DIASTOLIC BLOOD PRESSURE: 71 MMHG | RESPIRATION RATE: 20 BRPM

## 2020-10-09 DIAGNOSIS — I83.218 VARICOSE VEINS OF RIGHT LOWER EXTREMITY WITH BOTH ULCER OF OTHER PART OF LOWER EXTREMITY AND INFLAMMATION: ICD-10-CM

## 2020-10-09 DIAGNOSIS — L97.812 NON-PRESSURE CHRONIC ULCER OF OTHER PART OF RIGHT LOWER LEG WITH FAT LAYER EXPOSED: ICD-10-CM

## 2020-10-09 DIAGNOSIS — L97.822 NON-PRESSURE CHRONIC ULCER OF OTHER PART OF LEFT LOWER LEG WITH FAT LAYER EXPOSED: ICD-10-CM

## 2020-10-09 DIAGNOSIS — E11.622 TYPE 2 DIABETES MELLITUS WITH OTHER SKIN ULCER: ICD-10-CM

## 2020-10-09 DIAGNOSIS — Z95.2 PRESENCE OF PROSTHETIC HEART VALVE: Chronic | ICD-10-CM

## 2020-10-09 DIAGNOSIS — I83.228 VARICOSE VEINS OF LEFT LOWER EXTREMITY WITH BOTH ULCER OF OTHER PART OF LOWER EXTREMITY AND INFLAMMATION: ICD-10-CM

## 2020-10-09 DIAGNOSIS — L97.312 NON-PRESSURE CHRONIC ULCER OF RIGHT ANKLE WITH FAT LAYER EXPOSED: ICD-10-CM

## 2020-10-09 PROCEDURE — ZZZZZ: CPT

## 2020-10-09 PROCEDURE — G0463: CPT

## 2020-10-12 ENCOUNTER — APPOINTMENT (OUTPATIENT)
Dept: WOUND CARE | Facility: HOSPITAL | Age: 65
End: 2020-10-12
Payer: MEDICARE

## 2020-10-12 ENCOUNTER — APPOINTMENT (OUTPATIENT)
Dept: PLASTIC SURGERY | Facility: HOSPITAL | Age: 65
End: 2020-10-12

## 2020-10-12 ENCOUNTER — OUTPATIENT (OUTPATIENT)
Dept: OUTPATIENT SERVICES | Facility: HOSPITAL | Age: 65
LOS: 1 days | Discharge: HOME | End: 2020-10-12
Payer: MEDICARE

## 2020-10-12 VITALS
RESPIRATION RATE: 20 BRPM | SYSTOLIC BLOOD PRESSURE: 127 MMHG | OXYGEN SATURATION: 98 % | BODY MASS INDEX: 46.95 KG/M2 | HEIGHT: 63 IN | DIASTOLIC BLOOD PRESSURE: 64 MMHG | TEMPERATURE: 97.2 F | HEART RATE: 77 BPM | WEIGHT: 265 LBS

## 2020-10-12 DIAGNOSIS — Z95.2 PRESENCE OF PROSTHETIC HEART VALVE: Chronic | ICD-10-CM

## 2020-10-12 DIAGNOSIS — I83.218 VARICOSE VEINS OF RIGHT LOWER EXTREMITY WITH BOTH ULCER OF OTHER PART OF LOWER EXTREMITY AND INFLAMMATION: ICD-10-CM

## 2020-10-12 PROCEDURE — 99213 OFFICE O/P EST LOW 20 MIN: CPT

## 2020-10-12 PROCEDURE — G0463: CPT

## 2020-10-12 NOTE — HISTORY OF PRESENT ILLNESS
[FreeTextEntry1] : Patient seen for venous stasis wounds to right and left lower leg down to skin and subcutaneous tissue and fat with stasis dermatitis. Pt also seen for right posterior ankle diabetic ulcer down to skin, subcutaneous tissue, and fat.

## 2020-10-12 NOTE — PHYSICAL EXAM
[JVD] : no jugular venous distention  [0] : left 0 [Ankle Swelling (On Exam)] : present [Ankle Swelling Bilaterally] : bilaterally  [Varicose Veins Of Lower Extremities] : bilaterally [] : bilaterally [Ankle Swelling On The Left] : moderate [Skin Ulcer] : ulcer [de-identified] : calm [de-identified] : 4/5 strength in all quadrants bilaterally [de-identified] : Right posterior ankle ulceration down to skin, subcutaneous tissue, and fat. venous stasis wound on right lower leg down to skin and subcutaneous tissue and fat epithelialized. venous stasis wounds on left lower leg down to skin and subcutaneous tissue and fat. bilateral stasis dermatitis. [FreeTextEntry1] : Right anterior leg - Closed no open wound. [de-identified] : No treatment required as per DPM  [FreeTextEntry7] : Left anterior leg - Closed - No open wound.  [de-identified] : No treatment  [de-identified] : Posterior Ankle [de-identified] : 1.0 [de-identified] : 1.0 [de-identified] : 0.1 [de-identified] : serosanguineous [de-identified] : 1-5% [de-identified] : Medihoney [de-identified] : NSC [de-identified] : \par  [de-identified] : 3x Weekly [TWNoteComboBox4] : Small [de-identified] : Normal [de-identified] : None [de-identified] : None [de-identified] : 100% [de-identified] : No [de-identified] : Ace wraps [de-identified] : Ace wraps [de-identified] : Daily [de-identified] : Compression [de-identified] : Right [de-identified] : Small [de-identified] : No [de-identified] : Other [de-identified] : No [de-identified] : Normal [de-identified] : None [de-identified] : None [de-identified] : None [de-identified] : Yes [de-identified] : Ace wraps [de-identified] : 3x Weekly

## 2020-10-12 NOTE — PLAN
[FreeTextEntry1] : Patient examined and evaluated at this time.\par Continue local wound care and offloading.\par Pt to obtain compression sock jocelyne as surgical supply.\par Pt to return in 1 week for follow up.

## 2020-10-12 NOTE — ASSESSMENT
[FreeTextEntry2] : Infection prevention\par Localized wound care \par Goal of remaining pain free regarding wounds.\par Compression therapy [FreeTextEntry4] : Photos Taken\par Pt to f/u to [facility] Wednesday and Friday for dressing changes and 1 week for assessment

## 2020-10-12 NOTE — REVIEW OF SYSTEMS
[Fever] : no fever [Eye Pain] : no eye pain [Earache] : no earache [Chest Pain] : no chest pain [Shortness Of Breath] : no shortness of breath [Abdominal Pain] : no abdominal pain [Skin Wound] : skin wound [de-identified] : Right posterior ankle ulceration down to skin, subcutaneous tissue, and fat. venous stasis wound on right lower leg down to skin and subcutaneous tissue and fat epithelialized. venous stasis wounds on left lower leg down to skin and subcutaneous tissue and fat epithelialized. bilateral stasis dermatitis.

## 2020-10-13 DIAGNOSIS — E05.90 THYROTOXICOSIS, UNSPECIFIED WITHOUT THYROTOXIC CRISIS OR STORM: ICD-10-CM

## 2020-10-13 DIAGNOSIS — Z87.891 PERSONAL HISTORY OF NICOTINE DEPENDENCE: ICD-10-CM

## 2020-10-13 DIAGNOSIS — I83.228 VARICOSE VEINS OF LEFT LOWER EXTREMITY WITH BOTH ULCER OF OTHER PART OF LOWER EXTREMITY AND INFLAMMATION: ICD-10-CM

## 2020-10-13 DIAGNOSIS — M48.07 SPINAL STENOSIS, LUMBOSACRAL REGION: ICD-10-CM

## 2020-10-13 DIAGNOSIS — Z98.84 BARIATRIC SURGERY STATUS: ICD-10-CM

## 2020-10-13 DIAGNOSIS — E11.622 TYPE 2 DIABETES MELLITUS WITH OTHER SKIN ULCER: ICD-10-CM

## 2020-10-13 DIAGNOSIS — G47.33 OBSTRUCTIVE SLEEP APNEA (ADULT) (PEDIATRIC): ICD-10-CM

## 2020-10-13 DIAGNOSIS — Z87.81 PERSONAL HISTORY OF (HEALED) TRAUMATIC FRACTURE: ICD-10-CM

## 2020-10-13 DIAGNOSIS — Z87.01 PERSONAL HISTORY OF PNEUMONIA (RECURRENT): ICD-10-CM

## 2020-10-13 DIAGNOSIS — I11.9 HYPERTENSIVE HEART DISEASE WITHOUT HEART FAILURE: ICD-10-CM

## 2020-10-13 DIAGNOSIS — E55.9 VITAMIN D DEFICIENCY, UNSPECIFIED: ICD-10-CM

## 2020-10-13 DIAGNOSIS — Z79.82 LONG TERM (CURRENT) USE OF ASPIRIN: ICD-10-CM

## 2020-10-13 DIAGNOSIS — Z88.5 ALLERGY STATUS TO NARCOTIC AGENT: ICD-10-CM

## 2020-10-13 DIAGNOSIS — M47.817 SPONDYLOSIS WITHOUT MYELOPATHY OR RADICULOPATHY, LUMBOSACRAL REGION: ICD-10-CM

## 2020-10-13 DIAGNOSIS — I34.1 NONRHEUMATIC MITRAL (VALVE) PROLAPSE: ICD-10-CM

## 2020-10-13 DIAGNOSIS — I83.218 VARICOSE VEINS OF RIGHT LOWER EXTREMITY WITH BOTH ULCER OF OTHER PART OF LOWER EXTREMITY AND INFLAMMATION: ICD-10-CM

## 2020-10-13 DIAGNOSIS — I83.893 VARICOSE VEINS OF BILATERAL LOWER EXTREMITIES WITH OTHER COMPLICATIONS: ICD-10-CM

## 2020-10-13 DIAGNOSIS — L97.812 NON-PRESSURE CHRONIC ULCER OF OTHER PART OF RIGHT LOWER LEG WITH FAT LAYER EXPOSED: ICD-10-CM

## 2020-10-13 DIAGNOSIS — Z79.899 OTHER LONG TERM (CURRENT) DRUG THERAPY: ICD-10-CM

## 2020-10-13 DIAGNOSIS — M43.16 SPONDYLOLISTHESIS, LUMBAR REGION: ICD-10-CM

## 2020-10-13 DIAGNOSIS — Z79.4 LONG TERM (CURRENT) USE OF INSULIN: ICD-10-CM

## 2020-10-13 DIAGNOSIS — E11.59 TYPE 2 DIABETES MELLITUS WITH OTHER CIRCULATORY COMPLICATIONS: ICD-10-CM

## 2020-10-13 DIAGNOSIS — Z83.49 FAMILY HISTORY OF OTHER ENDOCRINE, NUTRITIONAL AND METABOLIC DISEASES: ICD-10-CM

## 2020-10-13 DIAGNOSIS — L97.822 NON-PRESSURE CHRONIC ULCER OF OTHER PART OF LEFT LOWER LEG WITH FAT LAYER EXPOSED: ICD-10-CM

## 2020-10-13 DIAGNOSIS — L97.312 NON-PRESSURE CHRONIC ULCER OF RIGHT ANKLE WITH FAT LAYER EXPOSED: ICD-10-CM

## 2020-10-13 DIAGNOSIS — Z95.4 PRESENCE OF OTHER HEART-VALVE REPLACEMENT: ICD-10-CM

## 2020-10-13 DIAGNOSIS — Z82.49 FAMILY HISTORY OF ISCHEMIC HEART DISEASE AND OTHER DISEASES OF THE CIRCULATORY SYSTEM: ICD-10-CM

## 2020-10-13 DIAGNOSIS — G47.10 HYPERSOMNIA, UNSPECIFIED: ICD-10-CM

## 2020-10-13 DIAGNOSIS — I47.1 SUPRAVENTRICULAR TACHYCARDIA: ICD-10-CM

## 2020-10-13 NOTE — ASSESSMENT
[Verbal] : Verbal [Written] : Written [Demo] : Demo [Patient] : Patient [Good - alert, interested, motivated] : Good - alert, interested, motivated [Verbalizes knowledge/Understanding] : Verbalizes knowledge/understanding [Dressing changes] : dressing changes [Skin Care] : skin care [Signs and symptoms of infection] : sign and symptoms of infection [Venous Disease] : venous disease [How and When to Call] : how and when to call [Pain Management] : pain management [Compression Therapy] : compression therapy [Patient responsibility to plan of care] : patient responsibility to plan of care [Stable] : stable [Home] : Home [Cane] : Cane [Not Applicable - Long Term Care/Home Health Agency] : Long Term Care/Home Health Agency: Not Applicable [] : No [FreeTextEntry2] : Infection prevention\par Localized wound care \par Goal of remaining pain free regarding wounds.\par Compression therapy\par F/U 10/7 and 10/9 for a dressing change and in 1 week for an assessment\par  [FreeTextEntry4] : F/U 10/7 and 10/9 for a dressing change and in 1 week for an assessment\par \par **Pain scale omitted in error, patient reported no pain at the time of the visit.**

## 2020-10-13 NOTE — HISTORY OF PRESENT ILLNESS
[FreeTextEntry1] : Patient seen for venous stasis wounds to right and left lower leg down to skin and subcutaneous tissue and fat with stasis dermatitis. Pt also seen for new right posterior ankle diabetic ulcer down to skin, subcutaneous tissue, and fat. Pt does not recall any trauma or any illiciting events at this time.

## 2020-10-13 NOTE — PHYSICAL EXAM
[4 x 4] : 4 x 4  [0] : left 0 [Ankle Swelling (On Exam)] : present [Ankle Swelling Bilaterally] : bilaterally  [Varicose Veins Of Lower Extremities] : bilaterally [] : bilaterally [Ankle Swelling On The Left] : moderate [Skin Ulcer] : ulcer [JVD] : no jugular venous distention  [de-identified] : calm [de-identified] : 4/5 strength in all quadrants bilaterally [de-identified] : Right posterior ankle ulceration down to skin, subcutaneous tissue, and fat. venous stasis wound on right lower leg down to skin and subcutaneous tissue and fat epithelialized. venous stasis wounds on left lower leg down to skin and subcutaneous tissue and fat. bilateral stasis dermatitis. [FreeTextEntry1] : Right lateral leg  [FreeTextEntry2] : 3.0 [FreeTextEntry3] : 3.0 [FreeTextEntry4] : 0.1 [de-identified] : serosanguineous [de-identified] : No treatment required [de-identified] : Cleansed with NS\par  [FreeTextEntry7] : Left anterior lower leg - Closed - No open wound. [de-identified] : No treatment  [de-identified] : Dressing change performed by DPM post assessment [de-identified] : Posterior Ankle- black eschar  [de-identified] : 0.8 [de-identified] : 0.6 [de-identified] : 0.1 [de-identified] : 100% [de-identified] : Medihoney  [de-identified] : Cleansed with Normal saline\par  [TWNoteComboBox4] : Small [de-identified] : Normal [de-identified] : None [de-identified] : None [de-identified] : 100% [de-identified] : No [de-identified] : Ace wraps [de-identified] : 3x Weekly [de-identified] : Ace wraps [de-identified] : Daily [de-identified] : Compression [de-identified] : Right [de-identified] : Small [de-identified] : No [de-identified] : Other [de-identified] : No [de-identified] : Normal [de-identified] : None [de-identified] : None [de-identified] : None [de-identified] : Yes [de-identified] : Ace wraps [de-identified] : 3x Weekly [de-identified] : Primary Dressing

## 2020-10-13 NOTE — REVIEW OF SYSTEMS
[Skin Wound] : skin wound [Fever] : no fever [Eye Pain] : no eye pain [Earache] : no earache [Chest Pain] : no chest pain [Shortness Of Breath] : no shortness of breath [Abdominal Pain] : no abdominal pain [de-identified] : Right posterior ankle ulceration down to skin, subcutaneous tissue, and fat. venous stasis wound on right lower leg down to skin and subcutaneous tissue and fat epithelialized. venous stasis wounds on left lower leg down to skin and subcutaneous tissue and fat epithelialized. bilateral stasis dermatitis.

## 2020-10-14 ENCOUNTER — OUTPATIENT (OUTPATIENT)
Dept: OUTPATIENT SERVICES | Facility: HOSPITAL | Age: 65
LOS: 1 days | Discharge: HOME | End: 2020-10-14
Payer: MEDICARE

## 2020-10-14 ENCOUNTER — APPOINTMENT (OUTPATIENT)
Dept: PLASTIC SURGERY | Facility: HOSPITAL | Age: 65
End: 2020-10-14

## 2020-10-14 ENCOUNTER — APPOINTMENT (OUTPATIENT)
Dept: WOUND CARE | Facility: HOSPITAL | Age: 65
End: 2020-10-14
Payer: MEDICARE

## 2020-10-14 VITALS
RESPIRATION RATE: 20 BRPM | HEIGHT: 63 IN | DIASTOLIC BLOOD PRESSURE: 68 MMHG | WEIGHT: 265 LBS | SYSTOLIC BLOOD PRESSURE: 163 MMHG | BODY MASS INDEX: 46.95 KG/M2 | HEART RATE: 76 BPM | OXYGEN SATURATION: 99 % | TEMPERATURE: 98.7 F

## 2020-10-14 DIAGNOSIS — I83.228 VARICOSE VEINS OF LEFT LOWER EXTREMITY WITH BOTH ULCER OF OTHER PART OF LOWER EXTREMITY AND INFLAMMATION: ICD-10-CM

## 2020-10-14 DIAGNOSIS — L97.312 NON-PRESSURE CHRONIC ULCER OF RIGHT ANKLE WITH FAT LAYER EXPOSED: ICD-10-CM

## 2020-10-14 DIAGNOSIS — I83.218 VARICOSE VEINS OF RIGHT LOWER EXTREMITY WITH BOTH ULCER OF OTHER PART OF LOWER EXTREMITY AND INFLAMMATION: ICD-10-CM

## 2020-10-14 DIAGNOSIS — Z95.2 PRESENCE OF PROSTHETIC HEART VALVE: Chronic | ICD-10-CM

## 2020-10-14 DIAGNOSIS — E11.622 TYPE 2 DIABETES MELLITUS WITH OTHER SKIN ULCER: ICD-10-CM

## 2020-10-14 DIAGNOSIS — L97.822 NON-PRESSURE CHRONIC ULCER OF OTHER PART OF LEFT LOWER LEG WITH FAT LAYER EXPOSED: ICD-10-CM

## 2020-10-14 DIAGNOSIS — L97.812 NON-PRESSURE CHRONIC ULCER OF OTHER PART OF RIGHT LOWER LEG WITH FAT LAYER EXPOSED: ICD-10-CM

## 2020-10-14 PROCEDURE — G0463: CPT

## 2020-10-14 PROCEDURE — ZZZZZ: CPT

## 2020-10-16 ENCOUNTER — APPOINTMENT (OUTPATIENT)
Dept: OBGYN | Facility: HOSPITAL | Age: 65
End: 2020-10-16

## 2020-10-16 ENCOUNTER — APPOINTMENT (OUTPATIENT)
Dept: WOUND CARE | Facility: HOSPITAL | Age: 65
End: 2020-10-16

## 2020-10-19 ENCOUNTER — APPOINTMENT (OUTPATIENT)
Dept: WOUND CARE | Facility: HOSPITAL | Age: 65
End: 2020-10-19
Payer: MEDICARE

## 2020-10-19 ENCOUNTER — OUTPATIENT (OUTPATIENT)
Dept: OUTPATIENT SERVICES | Facility: HOSPITAL | Age: 65
LOS: 1 days | Discharge: HOME | End: 2020-10-19
Payer: MEDICARE

## 2020-10-19 ENCOUNTER — APPOINTMENT (OUTPATIENT)
Dept: PLASTIC SURGERY | Facility: HOSPITAL | Age: 65
End: 2020-10-19

## 2020-10-19 VITALS
DIASTOLIC BLOOD PRESSURE: 65 MMHG | HEART RATE: 68 BPM | RESPIRATION RATE: 20 BRPM | WEIGHT: 265 LBS | HEIGHT: 63 IN | OXYGEN SATURATION: 96 % | BODY MASS INDEX: 46.95 KG/M2 | SYSTOLIC BLOOD PRESSURE: 137 MMHG | TEMPERATURE: 98.1 F

## 2020-10-19 DIAGNOSIS — Z95.2 PRESENCE OF PROSTHETIC HEART VALVE: Chronic | ICD-10-CM

## 2020-10-19 DIAGNOSIS — I83.218 VARICOSE VEINS OF RIGHT LOWER EXTREMITY WITH BOTH ULCER OF OTHER PART OF LOWER EXTREMITY AND INFLAMMATION: ICD-10-CM

## 2020-10-19 DIAGNOSIS — E11.59 TYPE 2 DIABETES MELLITUS WITH OTHER CIRCULATORY COMPLICATIONS: ICD-10-CM

## 2020-10-19 PROCEDURE — G0463: CPT

## 2020-10-19 PROCEDURE — 99213 OFFICE O/P EST LOW 20 MIN: CPT

## 2020-10-19 NOTE — PHYSICAL EXAM
[4 x 4] : 4 x 4  [JVD] : no jugular venous distention  [0] : left 0 [Ankle Swelling (On Exam)] : present [Ankle Swelling Bilaterally] : bilaterally  [Varicose Veins Of Lower Extremities] : bilaterally [Skin Ulcer] : ulcer [Ankle Swelling On The Left] : moderate [] : bilaterally [de-identified] : 4/5 strength in all quadrants bilaterally [de-identified] : calm [de-identified] : Right posterior ankle ulceration down to skin, subcutaneous tissue, and fat. venous stasis wound on right lower leg down to skin and subcutaneous tissue and fat epithelialized. venous stasis wounds on left lower leg down to skin and subcutaneous tissue and fat. bilateral stasis dermatitis. [de-identified] : No treatment required as per DPM  [de-identified] : Cleansed with NS\par \par \par  [FreeTextEntry1] : Right anterior leg - Closed no open wound. [de-identified] : Posterior Ankle [FreeTextEntry7] : Left anterior leg - Closed - No open wound.  [de-identified] : No treatment  [de-identified] : 1.0 [de-identified] : 1.8 [de-identified] : 0.1 [de-identified] : serosanguineous [de-identified] : <25% [de-identified] : Medihoney [de-identified] : NSC [de-identified] : Ace wraps [de-identified] : Small [de-identified] : Ace wraps [de-identified] : Normal [de-identified] : Right [de-identified] : None [de-identified] : None [de-identified] : Yes [de-identified] : Ace wraps [de-identified] : >75% [de-identified] : 3x Weekly

## 2020-10-19 NOTE — HISTORY OF PRESENT ILLNESS
[FreeTextEntry1] : Patient seen for venous stasis wounds to right and left lower leg down to skin and subcutaneous tissue and fat with stasis dermatitis which have healed. Pt also seen for right posterior ankle diabetic ulcer down to skin, subcutaneous tissue, and fat.

## 2020-10-19 NOTE — REVIEW OF SYSTEMS
[Fever] : no fever [Eye Pain] : no eye pain [Earache] : no earache [Chest Pain] : no chest pain [Shortness Of Breath] : no shortness of breath [Abdominal Pain] : no abdominal pain [Skin Wound] : skin wound [de-identified] : Right posterior ankle ulceration down to skin, subcutaneous tissue, and fat. venous stasis wound on right lower leg down to skin and subcutaneous tissue and fat epithelialized. venous stasis wounds on left lower leg down to skin and subcutaneous tissue and fat epithelialized. bilateral stasis dermatitis.

## 2020-10-19 NOTE — ASSESSMENT
[Verbal] : Verbal [Written] : Written [Demo] : Demo [Patient] : Patient [Good - alert, interested, motivated] : Good - alert, interested, motivated [Verbalizes knowledge/Understanding] : Verbalizes knowledge/understanding [Skin Care] : skin care [Dressing changes] : dressing changes [Signs and symptoms of infection] : sign and symptoms of infection [Venous Disease] : venous disease [How and When to Call] : how and when to call [Pain Management] : pain management [Compression Therapy] : compression therapy [Patient responsibility to plan of care] : patient responsibility to plan of care [Stable] : stable [Home] : Home [Cane] : Cane [Not Applicable - Long Term Care/Home Health Agency] : Long Term Care/Home Health Agency: Not Applicable [] : No [FreeTextEntry2] : Infection prevention\par Localized wound care \par Goal of remaining pain free regarding wounds.\par Compression therapy [FreeTextEntry4] : Pt to f/u to [facility] Wednesday and Friday for dressing changes and 1 week for assessment

## 2020-10-21 ENCOUNTER — APPOINTMENT (OUTPATIENT)
Dept: OBGYN | Facility: HOSPITAL | Age: 65
End: 2020-10-21

## 2020-10-21 ENCOUNTER — OUTPATIENT (OUTPATIENT)
Dept: OUTPATIENT SERVICES | Facility: HOSPITAL | Age: 65
LOS: 1 days | Discharge: HOME | End: 2020-10-21
Payer: MEDICARE

## 2020-10-21 ENCOUNTER — APPOINTMENT (OUTPATIENT)
Dept: WOUND CARE | Facility: HOSPITAL | Age: 65
End: 2020-10-21
Payer: MEDICARE

## 2020-10-21 VITALS
RESPIRATION RATE: 20 BRPM | SYSTOLIC BLOOD PRESSURE: 126 MMHG | DIASTOLIC BLOOD PRESSURE: 63 MMHG | OXYGEN SATURATION: 95 % | HEART RATE: 90 BPM | BODY MASS INDEX: 46.95 KG/M2 | WEIGHT: 265 LBS | HEIGHT: 63 IN | TEMPERATURE: 98.8 F

## 2020-10-21 DIAGNOSIS — I83.218 VARICOSE VEINS OF RIGHT LOWER EXTREMITY WITH BOTH ULCER OF OTHER PART OF LOWER EXTREMITY AND INFLAMMATION: ICD-10-CM

## 2020-10-21 DIAGNOSIS — Z95.2 PRESENCE OF PROSTHETIC HEART VALVE: Chronic | ICD-10-CM

## 2020-10-21 PROCEDURE — G0463: CPT

## 2020-10-21 PROCEDURE — ZZZZZ: CPT

## 2020-10-23 ENCOUNTER — OUTPATIENT (OUTPATIENT)
Dept: OUTPATIENT SERVICES | Facility: HOSPITAL | Age: 65
LOS: 1 days | Discharge: HOME | End: 2020-10-23
Payer: MEDICARE

## 2020-10-23 ENCOUNTER — APPOINTMENT (OUTPATIENT)
Dept: PLASTIC SURGERY | Facility: HOSPITAL | Age: 65
End: 2020-10-23

## 2020-10-23 ENCOUNTER — APPOINTMENT (OUTPATIENT)
Dept: WOUND CARE | Facility: HOSPITAL | Age: 65
End: 2020-10-23
Payer: MEDICARE

## 2020-10-23 VITALS
BODY MASS INDEX: 46.95 KG/M2 | OXYGEN SATURATION: 96 % | RESPIRATION RATE: 20 BRPM | DIASTOLIC BLOOD PRESSURE: 47 MMHG | SYSTOLIC BLOOD PRESSURE: 114 MMHG | WEIGHT: 265 LBS | HEIGHT: 63 IN | HEART RATE: 69 BPM

## 2020-10-23 DIAGNOSIS — I83.218 VARICOSE VEINS OF RIGHT LOWER EXTREMITY WITH BOTH ULCER OF OTHER PART OF LOWER EXTREMITY AND INFLAMMATION: ICD-10-CM

## 2020-10-23 DIAGNOSIS — Z95.2 PRESENCE OF PROSTHETIC HEART VALVE: Chronic | ICD-10-CM

## 2020-10-23 PROCEDURE — G0463: CPT

## 2020-10-23 PROCEDURE — ZZZZZ: CPT

## 2020-10-24 DIAGNOSIS — L97.312 NON-PRESSURE CHRONIC ULCER OF RIGHT ANKLE WITH FAT LAYER EXPOSED: ICD-10-CM

## 2020-10-24 DIAGNOSIS — E11.622 TYPE 2 DIABETES MELLITUS WITH OTHER SKIN ULCER: ICD-10-CM

## 2020-10-26 ENCOUNTER — APPOINTMENT (OUTPATIENT)
Dept: WOUND CARE | Facility: HOSPITAL | Age: 65
End: 2020-10-26
Payer: MEDICARE

## 2020-10-26 ENCOUNTER — OUTPATIENT (OUTPATIENT)
Dept: OUTPATIENT SERVICES | Facility: HOSPITAL | Age: 65
LOS: 1 days | Discharge: HOME | End: 2020-10-26
Payer: MEDICARE

## 2020-10-26 VITALS
DIASTOLIC BLOOD PRESSURE: 64 MMHG | WEIGHT: 265 LBS | OXYGEN SATURATION: 98 % | HEART RATE: 73 BPM | RESPIRATION RATE: 20 BRPM | BODY MASS INDEX: 46.95 KG/M2 | TEMPERATURE: 97.7 F | SYSTOLIC BLOOD PRESSURE: 143 MMHG | HEIGHT: 63 IN

## 2020-10-26 DIAGNOSIS — Z95.2 PRESENCE OF PROSTHETIC HEART VALVE: Chronic | ICD-10-CM

## 2020-10-26 DIAGNOSIS — I83.218 VARICOSE VEINS OF RIGHT LOWER EXTREMITY WITH BOTH ULCER OF OTHER PART OF LOWER EXTREMITY AND INFLAMMATION: ICD-10-CM

## 2020-10-26 PROCEDURE — 99213 OFFICE O/P EST LOW 20 MIN: CPT

## 2020-10-26 PROCEDURE — G0463: CPT

## 2020-10-26 NOTE — PHYSICAL EXAM
[4 x 4] : 4 x 4  [JVD] : no jugular venous distention  [0] : left 0 [Ankle Swelling (On Exam)] : present [Ankle Swelling Bilaterally] : bilaterally  [Varicose Veins Of Lower Extremities] : bilaterally [] : bilaterally [Ankle Swelling On The Left] : moderate [Skin Ulcer] : ulcer [de-identified] : calm [de-identified] : 4/5 strength in all quadrants bilaterally [de-identified] : Right posterior ankle ulceration down to skin, subcutaneous tissue, and fat. venous stasis wound on right lower leg down to skin and subcutaneous tissue and fat epithelialized. venous stasis wounds on left lower leg down to skin and subcutaneous tissue and fat. bilateral stasis dermatitis. [FreeTextEntry1] : Right anterior leg - Closed no open wound. [de-identified] : No treatment required as per DPM  [de-identified] : Cleansed with NS\par \par \par  [FreeTextEntry7] : Left anterior leg - Closed - No open wound.  [de-identified] : No treatment  [de-identified] : Posterior Ankle [de-identified] : 1.0 [de-identified] : 1.0 [de-identified] : 0.1 [de-identified] : serosanguineous [de-identified] : Silver Alginate [de-identified] : NSC [de-identified] : Ace wraps [de-identified] : Ace wraps [de-identified] : Right [de-identified] : Small [de-identified] : Normal [de-identified] : None [de-identified] : None [de-identified] : 100% [de-identified] : No [de-identified] : Ace wraps [de-identified] : 3x Weekly

## 2020-10-26 NOTE — REVIEW OF SYSTEMS
[Fever] : no fever [Eye Pain] : no eye pain [Earache] : no earache [Chest Pain] : no chest pain [Shortness Of Breath] : no shortness of breath [Abdominal Pain] : no abdominal pain [Skin Wound] : skin wound [de-identified] : Right posterior ankle ulceration down to skin, subcutaneous tissue, and fat. venous stasis wound on right lower leg down to skin and subcutaneous tissue and fat epithelialized. venous stasis wounds on left lower leg down to skin and subcutaneous tissue and fat epithelialized. bilateral stasis dermatitis.

## 2020-10-26 NOTE — ASSESSMENT
[Verbal] : Verbal [Written] : Written [Demo] : Demo [Patient] : Patient [Good - alert, interested, motivated] : Good - alert, interested, motivated [Verbalizes knowledge/Understanding] : Verbalizes knowledge/understanding [Dressing changes] : dressing changes [Skin Care] : skin care [Signs and symptoms of infection] : sign and symptoms of infection [Venous Disease] : venous disease [How and When to Call] : how and when to call [Pain Management] : pain management [Compression Therapy] : compression therapy [Patient responsibility to plan of care] : patient responsibility to plan of care [] : Yes [Stable] : stable [Home] : Home [Cane] : Cane [Not Applicable - Long Term Care/Home Health Agency] : Long Term Care/Home Health Agency: Not Applicable [FreeTextEntry2] : Infection prevention\par Localized wound care \par Goal of remaining pain free regarding wounds.\par Compression therapy [FreeTextEntry4] : Photos Taken\par Pt to f/u to [facility] Wednesday and Friday for dressing changes and 1 week for assessment

## 2020-10-27 DIAGNOSIS — I83.218 VARICOSE VEINS OF RIGHT LOWER EXTREMITY WITH BOTH ULCER OF OTHER PART OF LOWER EXTREMITY AND INFLAMMATION: ICD-10-CM

## 2020-10-27 DIAGNOSIS — G47.33 OBSTRUCTIVE SLEEP APNEA (ADULT) (PEDIATRIC): ICD-10-CM

## 2020-10-27 DIAGNOSIS — Z79.899 OTHER LONG TERM (CURRENT) DRUG THERAPY: ICD-10-CM

## 2020-10-27 DIAGNOSIS — Z87.01 PERSONAL HISTORY OF PNEUMONIA (RECURRENT): ICD-10-CM

## 2020-10-27 DIAGNOSIS — I11.9 HYPERTENSIVE HEART DISEASE WITHOUT HEART FAILURE: ICD-10-CM

## 2020-10-27 DIAGNOSIS — Z82.49 FAMILY HISTORY OF ISCHEMIC HEART DISEASE AND OTHER DISEASES OF THE CIRCULATORY SYSTEM: ICD-10-CM

## 2020-10-27 DIAGNOSIS — Z79.4 LONG TERM (CURRENT) USE OF INSULIN: ICD-10-CM

## 2020-10-27 DIAGNOSIS — E66.01 MORBID (SEVERE) OBESITY DUE TO EXCESS CALORIES: ICD-10-CM

## 2020-10-27 DIAGNOSIS — Z83.49 FAMILY HISTORY OF OTHER ENDOCRINE, NUTRITIONAL AND METABOLIC DISEASES: ICD-10-CM

## 2020-10-27 DIAGNOSIS — E11.59 TYPE 2 DIABETES MELLITUS WITH OTHER CIRCULATORY COMPLICATIONS: ICD-10-CM

## 2020-10-27 DIAGNOSIS — Z87.81 PERSONAL HISTORY OF (HEALED) TRAUMATIC FRACTURE: ICD-10-CM

## 2020-10-27 DIAGNOSIS — E11.622 TYPE 2 DIABETES MELLITUS WITH OTHER SKIN ULCER: ICD-10-CM

## 2020-10-27 DIAGNOSIS — I47.1 SUPRAVENTRICULAR TACHYCARDIA: ICD-10-CM

## 2020-10-27 DIAGNOSIS — Z98.84 BARIATRIC SURGERY STATUS: ICD-10-CM

## 2020-10-27 DIAGNOSIS — M47.817 SPONDYLOSIS WITHOUT MYELOPATHY OR RADICULOPATHY, LUMBOSACRAL REGION: ICD-10-CM

## 2020-10-27 DIAGNOSIS — I34.1 NONRHEUMATIC MITRAL (VALVE) PROLAPSE: ICD-10-CM

## 2020-10-27 DIAGNOSIS — Z79.82 LONG TERM (CURRENT) USE OF ASPIRIN: ICD-10-CM

## 2020-10-27 DIAGNOSIS — E05.90 THYROTOXICOSIS, UNSPECIFIED WITHOUT THYROTOXIC CRISIS OR STORM: ICD-10-CM

## 2020-10-27 DIAGNOSIS — M48.07 SPINAL STENOSIS, LUMBOSACRAL REGION: ICD-10-CM

## 2020-10-27 DIAGNOSIS — M43.16 SPONDYLOLISTHESIS, LUMBAR REGION: ICD-10-CM

## 2020-10-27 DIAGNOSIS — I83.893 VARICOSE VEINS OF BILATERAL LOWER EXTREMITIES WITH OTHER COMPLICATIONS: ICD-10-CM

## 2020-10-27 DIAGNOSIS — G47.10 HYPERSOMNIA, UNSPECIFIED: ICD-10-CM

## 2020-10-27 DIAGNOSIS — Z95.4 PRESENCE OF OTHER HEART-VALVE REPLACEMENT: ICD-10-CM

## 2020-10-27 DIAGNOSIS — Z88.5 ALLERGY STATUS TO NARCOTIC AGENT: ICD-10-CM

## 2020-10-27 DIAGNOSIS — Z87.891 PERSONAL HISTORY OF NICOTINE DEPENDENCE: ICD-10-CM

## 2020-10-27 DIAGNOSIS — E55.9 VITAMIN D DEFICIENCY, UNSPECIFIED: ICD-10-CM

## 2020-10-27 DIAGNOSIS — L97.812 NON-PRESSURE CHRONIC ULCER OF OTHER PART OF RIGHT LOWER LEG WITH FAT LAYER EXPOSED: ICD-10-CM

## 2020-10-27 DIAGNOSIS — L97.312 NON-PRESSURE CHRONIC ULCER OF RIGHT ANKLE WITH FAT LAYER EXPOSED: ICD-10-CM

## 2020-10-27 DIAGNOSIS — I83.228 VARICOSE VEINS OF LEFT LOWER EXTREMITY WITH BOTH ULCER OF OTHER PART OF LOWER EXTREMITY AND INFLAMMATION: ICD-10-CM

## 2020-10-27 DIAGNOSIS — L97.822 NON-PRESSURE CHRONIC ULCER OF OTHER PART OF LEFT LOWER LEG WITH FAT LAYER EXPOSED: ICD-10-CM

## 2020-10-28 ENCOUNTER — APPOINTMENT (OUTPATIENT)
Dept: WOUND CARE | Facility: HOSPITAL | Age: 65
End: 2020-10-28
Payer: MEDICARE

## 2020-10-28 ENCOUNTER — OUTPATIENT (OUTPATIENT)
Dept: OUTPATIENT SERVICES | Facility: HOSPITAL | Age: 65
LOS: 1 days | Discharge: HOME | End: 2020-10-28
Payer: MEDICARE

## 2020-10-28 VITALS
TEMPERATURE: 98.1 F | OXYGEN SATURATION: 98 % | SYSTOLIC BLOOD PRESSURE: 114 MMHG | HEIGHT: 63 IN | HEART RATE: 70 BPM | WEIGHT: 265 LBS | BODY MASS INDEX: 46.95 KG/M2 | RESPIRATION RATE: 20 BRPM | DIASTOLIC BLOOD PRESSURE: 55 MMHG

## 2020-10-28 DIAGNOSIS — E11.622 TYPE 2 DIABETES MELLITUS WITH OTHER SKIN ULCER: ICD-10-CM

## 2020-10-28 DIAGNOSIS — L97.312 NON-PRESSURE CHRONIC ULCER OF RIGHT ANKLE WITH FAT LAYER EXPOSED: ICD-10-CM

## 2020-10-28 DIAGNOSIS — Z95.2 PRESENCE OF PROSTHETIC HEART VALVE: Chronic | ICD-10-CM

## 2020-10-28 DIAGNOSIS — I83.218 VARICOSE VEINS OF RIGHT LOWER EXTREMITY WITH BOTH ULCER OF OTHER PART OF LOWER EXTREMITY AND INFLAMMATION: ICD-10-CM

## 2020-10-28 PROCEDURE — G0463: CPT

## 2020-10-28 PROCEDURE — ZZZZZ: CPT

## 2020-10-30 ENCOUNTER — APPOINTMENT (OUTPATIENT)
Dept: WOUND CARE | Facility: HOSPITAL | Age: 65
End: 2020-10-30

## 2020-11-02 ENCOUNTER — OUTPATIENT (OUTPATIENT)
Dept: OUTPATIENT SERVICES | Facility: HOSPITAL | Age: 65
LOS: 1 days | Discharge: HOME | End: 2020-11-02
Payer: MEDICARE

## 2020-11-02 ENCOUNTER — APPOINTMENT (OUTPATIENT)
Dept: WOUND CARE | Facility: HOSPITAL | Age: 65
End: 2020-11-02
Payer: MEDICARE

## 2020-11-02 VITALS
DIASTOLIC BLOOD PRESSURE: 74 MMHG | WEIGHT: 265 LBS | SYSTOLIC BLOOD PRESSURE: 105 MMHG | TEMPERATURE: 98.6 F | HEIGHT: 63 IN | RESPIRATION RATE: 20 BRPM | OXYGEN SATURATION: 97 % | BODY MASS INDEX: 46.95 KG/M2 | HEART RATE: 96 BPM

## 2020-11-02 DIAGNOSIS — I83.218 VARICOSE VEINS OF RIGHT LOWER EXTREMITY WITH BOTH ULCER OF OTHER PART OF LOWER EXTREMITY AND INFLAMMATION: ICD-10-CM

## 2020-11-02 DIAGNOSIS — Z95.2 PRESENCE OF PROSTHETIC HEART VALVE: Chronic | ICD-10-CM

## 2020-11-02 PROCEDURE — G0463: CPT

## 2020-11-02 PROCEDURE — 99213 OFFICE O/P EST LOW 20 MIN: CPT

## 2020-11-03 DIAGNOSIS — Z87.81 PERSONAL HISTORY OF (HEALED) TRAUMATIC FRACTURE: ICD-10-CM

## 2020-11-03 DIAGNOSIS — L97.822 NON-PRESSURE CHRONIC ULCER OF OTHER PART OF LEFT LOWER LEG WITH FAT LAYER EXPOSED: ICD-10-CM

## 2020-11-03 DIAGNOSIS — I11.9 HYPERTENSIVE HEART DISEASE WITHOUT HEART FAILURE: ICD-10-CM

## 2020-11-03 DIAGNOSIS — E55.9 VITAMIN D DEFICIENCY, UNSPECIFIED: ICD-10-CM

## 2020-11-03 DIAGNOSIS — M48.07 SPINAL STENOSIS, LUMBOSACRAL REGION: ICD-10-CM

## 2020-11-03 DIAGNOSIS — Z83.49 FAMILY HISTORY OF OTHER ENDOCRINE, NUTRITIONAL AND METABOLIC DISEASES: ICD-10-CM

## 2020-11-03 DIAGNOSIS — Z95.4 PRESENCE OF OTHER HEART-VALVE REPLACEMENT: ICD-10-CM

## 2020-11-03 DIAGNOSIS — I34.1 NONRHEUMATIC MITRAL (VALVE) PROLAPSE: ICD-10-CM

## 2020-11-03 DIAGNOSIS — E11.622 TYPE 2 DIABETES MELLITUS WITH OTHER SKIN ULCER: ICD-10-CM

## 2020-11-03 DIAGNOSIS — L97.312 NON-PRESSURE CHRONIC ULCER OF RIGHT ANKLE WITH FAT LAYER EXPOSED: ICD-10-CM

## 2020-11-03 DIAGNOSIS — Z87.891 PERSONAL HISTORY OF NICOTINE DEPENDENCE: ICD-10-CM

## 2020-11-03 DIAGNOSIS — M47.817 SPONDYLOSIS WITHOUT MYELOPATHY OR RADICULOPATHY, LUMBOSACRAL REGION: ICD-10-CM

## 2020-11-03 DIAGNOSIS — E11.59 TYPE 2 DIABETES MELLITUS WITH OTHER CIRCULATORY COMPLICATIONS: ICD-10-CM

## 2020-11-03 DIAGNOSIS — E66.01 MORBID (SEVERE) OBESITY DUE TO EXCESS CALORIES: ICD-10-CM

## 2020-11-03 DIAGNOSIS — Z87.01 PERSONAL HISTORY OF PNEUMONIA (RECURRENT): ICD-10-CM

## 2020-11-03 DIAGNOSIS — I47.1 SUPRAVENTRICULAR TACHYCARDIA: ICD-10-CM

## 2020-11-03 DIAGNOSIS — G47.10 HYPERSOMNIA, UNSPECIFIED: ICD-10-CM

## 2020-11-03 DIAGNOSIS — Z79.82 LONG TERM (CURRENT) USE OF ASPIRIN: ICD-10-CM

## 2020-11-03 DIAGNOSIS — L97.812 NON-PRESSURE CHRONIC ULCER OF OTHER PART OF RIGHT LOWER LEG WITH FAT LAYER EXPOSED: ICD-10-CM

## 2020-11-03 DIAGNOSIS — Z79.899 OTHER LONG TERM (CURRENT) DRUG THERAPY: ICD-10-CM

## 2020-11-03 DIAGNOSIS — Z82.49 FAMILY HISTORY OF ISCHEMIC HEART DISEASE AND OTHER DISEASES OF THE CIRCULATORY SYSTEM: ICD-10-CM

## 2020-11-03 DIAGNOSIS — I83.228 VARICOSE VEINS OF LEFT LOWER EXTREMITY WITH BOTH ULCER OF OTHER PART OF LOWER EXTREMITY AND INFLAMMATION: ICD-10-CM

## 2020-11-03 DIAGNOSIS — G47.33 OBSTRUCTIVE SLEEP APNEA (ADULT) (PEDIATRIC): ICD-10-CM

## 2020-11-03 DIAGNOSIS — Z79.4 LONG TERM (CURRENT) USE OF INSULIN: ICD-10-CM

## 2020-11-03 DIAGNOSIS — I83.893 VARICOSE VEINS OF BILATERAL LOWER EXTREMITIES WITH OTHER COMPLICATIONS: ICD-10-CM

## 2020-11-03 DIAGNOSIS — Z98.84 BARIATRIC SURGERY STATUS: ICD-10-CM

## 2020-11-03 DIAGNOSIS — E05.90 THYROTOXICOSIS, UNSPECIFIED WITHOUT THYROTOXIC CRISIS OR STORM: ICD-10-CM

## 2020-11-03 DIAGNOSIS — M43.16 SPONDYLOLISTHESIS, LUMBAR REGION: ICD-10-CM

## 2020-11-03 DIAGNOSIS — I83.218 VARICOSE VEINS OF RIGHT LOWER EXTREMITY WITH BOTH ULCER OF OTHER PART OF LOWER EXTREMITY AND INFLAMMATION: ICD-10-CM

## 2020-11-03 DIAGNOSIS — Z88.5 ALLERGY STATUS TO NARCOTIC AGENT: ICD-10-CM

## 2020-11-04 ENCOUNTER — APPOINTMENT (OUTPATIENT)
Dept: WOUND CARE | Facility: HOSPITAL | Age: 65
End: 2020-11-04
Payer: MEDICARE

## 2020-11-04 ENCOUNTER — OUTPATIENT (OUTPATIENT)
Dept: OUTPATIENT SERVICES | Facility: HOSPITAL | Age: 65
LOS: 1 days | Discharge: HOME | End: 2020-11-04
Payer: MEDICARE

## 2020-11-04 VITALS
TEMPERATURE: 97.5 F | DIASTOLIC BLOOD PRESSURE: 61 MMHG | HEIGHT: 63 IN | OXYGEN SATURATION: 97 % | RESPIRATION RATE: 18 BRPM | SYSTOLIC BLOOD PRESSURE: 126 MMHG | BODY MASS INDEX: 46.95 KG/M2 | WEIGHT: 265 LBS | HEART RATE: 74 BPM

## 2020-11-04 DIAGNOSIS — L97.312 NON-PRESSURE CHRONIC ULCER OF RIGHT ANKLE WITH FAT LAYER EXPOSED: ICD-10-CM

## 2020-11-04 DIAGNOSIS — Z95.2 PRESENCE OF PROSTHETIC HEART VALVE: Chronic | ICD-10-CM

## 2020-11-04 DIAGNOSIS — E11.622 TYPE 2 DIABETES MELLITUS WITH OTHER SKIN ULCER: ICD-10-CM

## 2020-11-04 DIAGNOSIS — I83.218 VARICOSE VEINS OF RIGHT LOWER EXTREMITY WITH BOTH ULCER OF OTHER PART OF LOWER EXTREMITY AND INFLAMMATION: ICD-10-CM

## 2020-11-04 PROCEDURE — G0463: CPT

## 2020-11-04 PROCEDURE — ZZZZZ: CPT

## 2020-11-06 ENCOUNTER — NON-APPOINTMENT (OUTPATIENT)
Age: 65
End: 2020-11-06

## 2020-11-06 ENCOUNTER — OUTPATIENT (OUTPATIENT)
Dept: OUTPATIENT SERVICES | Facility: HOSPITAL | Age: 65
LOS: 1 days | Discharge: HOME | End: 2020-11-06
Payer: MEDICARE

## 2020-11-06 ENCOUNTER — APPOINTMENT (OUTPATIENT)
Dept: WOUND CARE | Facility: HOSPITAL | Age: 65
End: 2020-11-06
Payer: MEDICARE

## 2020-11-06 VITALS
RESPIRATION RATE: 19 BRPM | DIASTOLIC BLOOD PRESSURE: 70 MMHG | TEMPERATURE: 97.6 F | SYSTOLIC BLOOD PRESSURE: 120 MMHG | OXYGEN SATURATION: 97 % | HEART RATE: 75 BPM

## 2020-11-06 DIAGNOSIS — I83.218 VARICOSE VEINS OF RIGHT LOWER EXTREMITY WITH BOTH ULCER OF OTHER PART OF LOWER EXTREMITY AND INFLAMMATION: ICD-10-CM

## 2020-11-06 DIAGNOSIS — Z95.2 PRESENCE OF PROSTHETIC HEART VALVE: Chronic | ICD-10-CM

## 2020-11-06 PROCEDURE — ZZZZZ: CPT

## 2020-11-06 PROCEDURE — G0463: CPT

## 2020-11-06 PROCEDURE — G0277: CPT

## 2020-11-07 DIAGNOSIS — L97.312 NON-PRESSURE CHRONIC ULCER OF RIGHT ANKLE WITH FAT LAYER EXPOSED: ICD-10-CM

## 2020-11-07 DIAGNOSIS — E11.622 TYPE 2 DIABETES MELLITUS WITH OTHER SKIN ULCER: ICD-10-CM

## 2020-11-09 ENCOUNTER — OUTPATIENT (OUTPATIENT)
Dept: OUTPATIENT SERVICES | Facility: HOSPITAL | Age: 65
LOS: 1 days | Discharge: HOME | End: 2020-11-09
Payer: MEDICARE

## 2020-11-09 ENCOUNTER — APPOINTMENT (OUTPATIENT)
Dept: WOUND CARE | Facility: HOSPITAL | Age: 65
End: 2020-11-09
Payer: MEDICARE

## 2020-11-09 VITALS
HEIGHT: 63 IN | WEIGHT: 265 LBS | HEART RATE: 88 BPM | DIASTOLIC BLOOD PRESSURE: 74 MMHG | RESPIRATION RATE: 22 BRPM | OXYGEN SATURATION: 98 % | TEMPERATURE: 97.9 F | SYSTOLIC BLOOD PRESSURE: 137 MMHG | BODY MASS INDEX: 46.95 KG/M2

## 2020-11-09 DIAGNOSIS — Z95.2 PRESENCE OF PROSTHETIC HEART VALVE: Chronic | ICD-10-CM

## 2020-11-09 DIAGNOSIS — E11.622 TYPE 2 DIABETES MELLITUS WITH OTHER SKIN ULCER: ICD-10-CM

## 2020-11-09 PROCEDURE — G0463: CPT

## 2020-11-09 PROCEDURE — 99213 OFFICE O/P EST LOW 20 MIN: CPT

## 2020-11-10 DIAGNOSIS — Z87.01 PERSONAL HISTORY OF PNEUMONIA (RECURRENT): ICD-10-CM

## 2020-11-10 DIAGNOSIS — M43.16 SPONDYLOLISTHESIS, LUMBAR REGION: ICD-10-CM

## 2020-11-10 DIAGNOSIS — E66.01 MORBID (SEVERE) OBESITY DUE TO EXCESS CALORIES: ICD-10-CM

## 2020-11-10 DIAGNOSIS — L97.312 NON-PRESSURE CHRONIC ULCER OF RIGHT ANKLE WITH FAT LAYER EXPOSED: ICD-10-CM

## 2020-11-10 DIAGNOSIS — M47.817 SPONDYLOSIS WITHOUT MYELOPATHY OR RADICULOPATHY, LUMBOSACRAL REGION: ICD-10-CM

## 2020-11-10 DIAGNOSIS — L97.812 NON-PRESSURE CHRONIC ULCER OF OTHER PART OF RIGHT LOWER LEG WITH FAT LAYER EXPOSED: ICD-10-CM

## 2020-11-10 DIAGNOSIS — I83.228 VARICOSE VEINS OF LEFT LOWER EXTREMITY WITH BOTH ULCER OF OTHER PART OF LOWER EXTREMITY AND INFLAMMATION: ICD-10-CM

## 2020-11-10 DIAGNOSIS — G47.10 HYPERSOMNIA, UNSPECIFIED: ICD-10-CM

## 2020-11-10 DIAGNOSIS — I83.218 VARICOSE VEINS OF RIGHT LOWER EXTREMITY WITH BOTH ULCER OF OTHER PART OF LOWER EXTREMITY AND INFLAMMATION: ICD-10-CM

## 2020-11-10 DIAGNOSIS — E05.90 THYROTOXICOSIS, UNSPECIFIED WITHOUT THYROTOXIC CRISIS OR STORM: ICD-10-CM

## 2020-11-10 DIAGNOSIS — Z98.84 BARIATRIC SURGERY STATUS: ICD-10-CM

## 2020-11-10 DIAGNOSIS — Z95.4 PRESENCE OF OTHER HEART-VALVE REPLACEMENT: ICD-10-CM

## 2020-11-10 DIAGNOSIS — M48.07 SPINAL STENOSIS, LUMBOSACRAL REGION: ICD-10-CM

## 2020-11-10 DIAGNOSIS — E55.9 VITAMIN D DEFICIENCY, UNSPECIFIED: ICD-10-CM

## 2020-11-10 DIAGNOSIS — I11.9 HYPERTENSIVE HEART DISEASE WITHOUT HEART FAILURE: ICD-10-CM

## 2020-11-10 DIAGNOSIS — Z79.4 LONG TERM (CURRENT) USE OF INSULIN: ICD-10-CM

## 2020-11-10 DIAGNOSIS — I83.893 VARICOSE VEINS OF BILATERAL LOWER EXTREMITIES WITH OTHER COMPLICATIONS: ICD-10-CM

## 2020-11-10 DIAGNOSIS — I47.1 SUPRAVENTRICULAR TACHYCARDIA: ICD-10-CM

## 2020-11-10 DIAGNOSIS — L97.822 NON-PRESSURE CHRONIC ULCER OF OTHER PART OF LEFT LOWER LEG WITH FAT LAYER EXPOSED: ICD-10-CM

## 2020-11-10 DIAGNOSIS — Z88.5 ALLERGY STATUS TO NARCOTIC AGENT: ICD-10-CM

## 2020-11-10 DIAGNOSIS — Z79.82 LONG TERM (CURRENT) USE OF ASPIRIN: ICD-10-CM

## 2020-11-10 DIAGNOSIS — G47.33 OBSTRUCTIVE SLEEP APNEA (ADULT) (PEDIATRIC): ICD-10-CM

## 2020-11-10 DIAGNOSIS — Z83.49 FAMILY HISTORY OF OTHER ENDOCRINE, NUTRITIONAL AND METABOLIC DISEASES: ICD-10-CM

## 2020-11-10 DIAGNOSIS — E11.59 TYPE 2 DIABETES MELLITUS WITH OTHER CIRCULATORY COMPLICATIONS: ICD-10-CM

## 2020-11-10 DIAGNOSIS — Z82.49 FAMILY HISTORY OF ISCHEMIC HEART DISEASE AND OTHER DISEASES OF THE CIRCULATORY SYSTEM: ICD-10-CM

## 2020-11-10 DIAGNOSIS — Z79.899 OTHER LONG TERM (CURRENT) DRUG THERAPY: ICD-10-CM

## 2020-11-10 DIAGNOSIS — Z87.891 PERSONAL HISTORY OF NICOTINE DEPENDENCE: ICD-10-CM

## 2020-11-10 DIAGNOSIS — Z87.81 PERSONAL HISTORY OF (HEALED) TRAUMATIC FRACTURE: ICD-10-CM

## 2020-11-10 DIAGNOSIS — I34.1 NONRHEUMATIC MITRAL (VALVE) PROLAPSE: ICD-10-CM

## 2020-11-10 DIAGNOSIS — E11.622 TYPE 2 DIABETES MELLITUS WITH OTHER SKIN ULCER: ICD-10-CM

## 2020-11-10 NOTE — REVIEW OF SYSTEMS
[Fever] : no fever [Eye Pain] : no eye pain [Earache] : no earache [Chest Pain] : no chest pain [Shortness Of Breath] : no shortness of breath [Abdominal Pain] : no abdominal pain [Skin Wound] : skin wound [de-identified] : Right posterior ankle ulceration down to skin, subcutaneous tissue, and fat. venous stasis wound on right lower leg down to skin and subcutaneous tissue and fat epithelialized. venous stasis wounds on left lower leg down to skin and subcutaneous tissue and fat epithelialized. bilateral stasis dermatitis.

## 2020-11-10 NOTE — ASSESSMENT
[Verbal] : Verbal [Written] : Written [Demo] : Demo [Patient] : Patient [Good - alert, interested, motivated] : Good - alert, interested, motivated [Verbalizes knowledge/Understanding] : Verbalizes knowledge/understanding [Dressing changes] : dressing changes [Skin Care] : skin care [Signs and symptoms of infection] : sign and symptoms of infection [Venous Disease] : venous disease [How and When to Call] : how and when to call [Pain Management] : pain management [Compression Therapy] : compression therapy [Patient responsibility to plan of care] : patient responsibility to plan of care [Stable] : stable [Home] : Home [Cane] : Cane [Not Applicable - Long Term Care/Home Health Agency] : Long Term Care/Home Health Agency: Not Applicable [] : Yes [FreeTextEntry2] : Infection prevention\par Localized wound care \par Goal of remaining pain free regarding wounds.\par Compression therapy [FreeTextEntry4] : Photos Taken\par Pt to f/u to [facility] Wednesday and Friday for dressing changes and 1 week for assessment

## 2020-11-10 NOTE — PLAN
[FreeTextEntry1] : Patient examined and evaluated at this time.\par Continue local wound care and offloading.\par Pt to have venous vascular study and to see Dr. Mac for vascular consult.\par Pt to return in 1 week for follow up.

## 2020-11-10 NOTE — PHYSICAL EXAM
[4 x 4] : 4 x 4  [JVD] : no jugular venous distention  [0] : left 0 [Ankle Swelling (On Exam)] : present [Ankle Swelling Bilaterally] : bilaterally  [Varicose Veins Of Lower Extremities] : bilaterally [] : bilaterally [Ankle Swelling On The Left] : moderate [Skin Ulcer] : ulcer [de-identified] : calm [de-identified] : 4/5 strength in all quadrants bilaterally [de-identified] : Right posterior ankle ulceration down to skin, subcutaneous tissue, and fat. venous stasis wound on right lower leg down to skin and subcutaneous tissue and fat epithelialized. venous stasis wounds on left lower leg down to skin and subcutaneous tissue and fat. bilateral stasis dermatitis. [FreeTextEntry1] : Right anterior leg - Closed no open wound. [de-identified] : No treatment required as per DPM  [de-identified] : Cleansed with Normal Saline\par \par \par  [FreeTextEntry7] : Left anterior leg - Closed - No open wound.  [de-identified] : No treatment  [de-identified] : Posterior Ankle [de-identified] : 1.0 [de-identified] : 1.0 [de-identified] : 0.1 [de-identified] : serosanguineous [de-identified] : Scattered excoriations [de-identified] : Silver Alginate [de-identified] : Cleansed with Normal Saline [de-identified] : Ace wraps [de-identified] : Ace wraps [de-identified] : Right [de-identified] : Small [de-identified] : None [de-identified] : None [de-identified] : 100% [de-identified] : No [de-identified] : Ace wraps [de-identified] : 3x Weekly

## 2020-11-11 ENCOUNTER — OUTPATIENT (OUTPATIENT)
Dept: OUTPATIENT SERVICES | Facility: HOSPITAL | Age: 65
LOS: 1 days | Discharge: HOME | End: 2020-11-11
Payer: MEDICARE

## 2020-11-11 ENCOUNTER — APPOINTMENT (OUTPATIENT)
Dept: WOUND CARE | Facility: HOSPITAL | Age: 65
End: 2020-11-11
Payer: MEDICARE

## 2020-11-11 VITALS
RESPIRATION RATE: 20 BRPM | SYSTOLIC BLOOD PRESSURE: 101 MMHG | DIASTOLIC BLOOD PRESSURE: 49 MMHG | TEMPERATURE: 98.6 F | HEIGHT: 63 IN | BODY MASS INDEX: 46.95 KG/M2 | WEIGHT: 265 LBS | OXYGEN SATURATION: 98 % | HEART RATE: 70 BPM

## 2020-11-11 DIAGNOSIS — E11.622 TYPE 2 DIABETES MELLITUS WITH OTHER SKIN ULCER: ICD-10-CM

## 2020-11-11 DIAGNOSIS — Z95.2 PRESENCE OF PROSTHETIC HEART VALVE: Chronic | ICD-10-CM

## 2020-11-11 DIAGNOSIS — L97.312 NON-PRESSURE CHRONIC ULCER OF RIGHT ANKLE WITH FAT LAYER EXPOSED: ICD-10-CM

## 2020-11-11 DIAGNOSIS — E11.621 TYPE 2 DIABETES MELLITUS WITH FOOT ULCER: ICD-10-CM

## 2020-11-11 PROCEDURE — G0463: CPT

## 2020-11-11 PROCEDURE — ZZZZZ: CPT

## 2020-11-13 ENCOUNTER — RESULT REVIEW (OUTPATIENT)
Age: 65
End: 2020-11-13

## 2020-11-13 ENCOUNTER — APPOINTMENT (OUTPATIENT)
Dept: WOUND CARE | Facility: HOSPITAL | Age: 65
End: 2020-11-13
Payer: MEDICARE

## 2020-11-13 ENCOUNTER — OUTPATIENT (OUTPATIENT)
Dept: OUTPATIENT SERVICES | Facility: HOSPITAL | Age: 65
LOS: 1 days | Discharge: HOME | End: 2020-11-13
Payer: MEDICARE

## 2020-11-13 VITALS
TEMPERATURE: 97.4 F | SYSTOLIC BLOOD PRESSURE: 100 MMHG | OXYGEN SATURATION: 97 % | DIASTOLIC BLOOD PRESSURE: 53 MMHG | BODY MASS INDEX: 46.95 KG/M2 | HEIGHT: 63 IN | RESPIRATION RATE: 20 BRPM | WEIGHT: 265 LBS | HEART RATE: 74 BPM

## 2020-11-13 DIAGNOSIS — L60.1 ONYCHOLYSIS: ICD-10-CM

## 2020-11-13 DIAGNOSIS — E11.622 TYPE 2 DIABETES MELLITUS WITH OTHER SKIN ULCER: ICD-10-CM

## 2020-11-13 DIAGNOSIS — Z95.2 PRESENCE OF PROSTHETIC HEART VALVE: Chronic | ICD-10-CM

## 2020-11-13 PROCEDURE — 88311 DECALCIFY TISSUE: CPT | Mod: 26

## 2020-11-13 PROCEDURE — 11730 AVULSION NAIL PLATE SIMPLE 1: CPT | Mod: T6

## 2020-11-13 PROCEDURE — 88312 SPECIAL STAINS GROUP 1: CPT

## 2020-11-13 PROCEDURE — 99214 OFFICE O/P EST MOD 30 MIN: CPT | Mod: 25

## 2020-11-13 PROCEDURE — 88311 DECALCIFY TISSUE: CPT

## 2020-11-13 PROCEDURE — 88304 TISSUE EXAM BY PATHOLOGIST: CPT | Mod: 26

## 2020-11-13 PROCEDURE — G0463: CPT | Mod: 25

## 2020-11-13 PROCEDURE — 88312 SPECIAL STAINS GROUP 1: CPT | Mod: 26

## 2020-11-13 PROCEDURE — 88304 TISSUE EXAM BY PATHOLOGIST: CPT

## 2020-11-13 NOTE — ASSESSMENT
[Verbal] : Verbal [Written] : Written [Demo] : Demo [Patient] : Patient [Good - alert, interested, motivated] : Good - alert, interested, motivated [Verbalizes knowledge/Understanding] : Verbalizes knowledge/understanding [Dressing changes] : dressing changes [Skin Care] : skin care [Signs and symptoms of infection] : sign and symptoms of infection [Venous Disease] : venous disease [How and When to Call] : how and when to call [Pain Management] : pain management [Compression Therapy] : compression therapy [Patient responsibility to plan of care] : patient responsibility to plan of care [Stable] : stable [Home] : Home [Cane] : Cane [Not Applicable - Long Term Care/Home Health Agency] : Long Term Care/Home Health Agency: Not Applicable [] : No [FreeTextEntry2] : Infection prevention\par Localized wound care \par Goal of remaining pain free regarding wounds.\par Compression therapy [FreeTextEntry4] : Pt to f/u to [facility] Wednesday and Friday for dressing changes and 1 week for assessment

## 2020-11-13 NOTE — REVIEW OF SYSTEMS
[Skin Wound] : skin wound [Fever] : no fever [Eye Pain] : no eye pain [Earache] : no earache [Chest Pain] : no chest pain [Shortness Of Breath] : no shortness of breath [Abdominal Pain] : no abdominal pain [de-identified] : Right posterior ankle ulceration down to skin, subcutaneous tissue, and fat. venous stasis wound on right lower leg down to skin and subcutaneous tissue and fat epithelialized. venous stasis wounds on left lower leg down to skin and subcutaneous tissue and fat epithelialized. bilateral stasis dermatitis.

## 2020-11-13 NOTE — PHYSICAL EXAM
[4 x 4] : 4 x 4  [0] : left 0 [Ankle Swelling (On Exam)] : present [Ankle Swelling Bilaterally] : bilaterally  [Varicose Veins Of Lower Extremities] : bilaterally [] : bilaterally [Ankle Swelling On The Left] : moderate [Skin Ulcer] : ulcer [JVD] : no jugular venous distention  [de-identified] : calm [de-identified] : 4/5 strength in all quadrants bilaterally [de-identified] : Right posterior ankle ulceration down to skin, subcutaneous tissue, and fat. venous stasis wound on right lower leg down to skin and subcutaneous tissue and fat epithelialized. venous stasis wounds on left lower leg down to skin and subcutaneous tissue and fat. bilateral stasis dermatitis. [FreeTextEntry1] : Right anterior leg - Closed no open wound. [de-identified] : No treatment required as per DPM  [de-identified] : Cleansed with Normal Saline\par \par \par  [FreeTextEntry7] : Left anterior leg - Closed - No open wound.  [de-identified] : No treatment  [de-identified] : Posterior Ankle [de-identified] : 1.0 [de-identified] : 1.0 [de-identified] : 0.1 [de-identified] : serosanguineous [de-identified] : Scattered excoriations [de-identified] : Medihoney [de-identified] : Cleansed with Normal Saline [de-identified] : Ace wraps [de-identified] : Ace wraps [de-identified] : Right [de-identified] : Small [de-identified] : Normal [de-identified] : None [de-identified] : None [de-identified] : 100% [de-identified] : No [de-identified] : Ace wraps [de-identified] : 3x Weekly

## 2020-11-14 DIAGNOSIS — L97.312 NON-PRESSURE CHRONIC ULCER OF RIGHT ANKLE WITH FAT LAYER EXPOSED: ICD-10-CM

## 2020-11-14 DIAGNOSIS — E11.59 TYPE 2 DIABETES MELLITUS WITH OTHER CIRCULATORY COMPLICATIONS: ICD-10-CM

## 2020-11-14 DIAGNOSIS — Z82.49 FAMILY HISTORY OF ISCHEMIC HEART DISEASE AND OTHER DISEASES OF THE CIRCULATORY SYSTEM: ICD-10-CM

## 2020-11-14 DIAGNOSIS — I11.9 HYPERTENSIVE HEART DISEASE WITHOUT HEART FAILURE: ICD-10-CM

## 2020-11-14 DIAGNOSIS — L97.822 NON-PRESSURE CHRONIC ULCER OF OTHER PART OF LEFT LOWER LEG WITH FAT LAYER EXPOSED: ICD-10-CM

## 2020-11-14 DIAGNOSIS — Z83.49 FAMILY HISTORY OF OTHER ENDOCRINE, NUTRITIONAL AND METABOLIC DISEASES: ICD-10-CM

## 2020-11-14 DIAGNOSIS — Z79.4 LONG TERM (CURRENT) USE OF INSULIN: ICD-10-CM

## 2020-11-14 DIAGNOSIS — Z98.84 BARIATRIC SURGERY STATUS: ICD-10-CM

## 2020-11-14 DIAGNOSIS — G47.33 OBSTRUCTIVE SLEEP APNEA (ADULT) (PEDIATRIC): ICD-10-CM

## 2020-11-14 DIAGNOSIS — E55.9 VITAMIN D DEFICIENCY, UNSPECIFIED: ICD-10-CM

## 2020-11-14 DIAGNOSIS — Z79.82 LONG TERM (CURRENT) USE OF ASPIRIN: ICD-10-CM

## 2020-11-14 DIAGNOSIS — M48.07 SPINAL STENOSIS, LUMBOSACRAL REGION: ICD-10-CM

## 2020-11-14 DIAGNOSIS — I83.218 VARICOSE VEINS OF RIGHT LOWER EXTREMITY WITH BOTH ULCER OF OTHER PART OF LOWER EXTREMITY AND INFLAMMATION: ICD-10-CM

## 2020-11-14 DIAGNOSIS — I83.893 VARICOSE VEINS OF BILATERAL LOWER EXTREMITIES WITH OTHER COMPLICATIONS: ICD-10-CM

## 2020-11-14 DIAGNOSIS — Z95.4 PRESENCE OF OTHER HEART-VALVE REPLACEMENT: ICD-10-CM

## 2020-11-14 DIAGNOSIS — I83.228 VARICOSE VEINS OF LEFT LOWER EXTREMITY WITH BOTH ULCER OF OTHER PART OF LOWER EXTREMITY AND INFLAMMATION: ICD-10-CM

## 2020-11-14 DIAGNOSIS — G47.10 HYPERSOMNIA, UNSPECIFIED: ICD-10-CM

## 2020-11-14 DIAGNOSIS — E11.622 TYPE 2 DIABETES MELLITUS WITH OTHER SKIN ULCER: ICD-10-CM

## 2020-11-14 DIAGNOSIS — L60.1 ONYCHOLYSIS: ICD-10-CM

## 2020-11-14 DIAGNOSIS — Z79.899 OTHER LONG TERM (CURRENT) DRUG THERAPY: ICD-10-CM

## 2020-11-14 DIAGNOSIS — Z87.81 PERSONAL HISTORY OF (HEALED) TRAUMATIC FRACTURE: ICD-10-CM

## 2020-11-14 DIAGNOSIS — Z87.01 PERSONAL HISTORY OF PNEUMONIA (RECURRENT): ICD-10-CM

## 2020-11-14 DIAGNOSIS — M47.817 SPONDYLOSIS WITHOUT MYELOPATHY OR RADICULOPATHY, LUMBOSACRAL REGION: ICD-10-CM

## 2020-11-14 DIAGNOSIS — I47.1 SUPRAVENTRICULAR TACHYCARDIA: ICD-10-CM

## 2020-11-14 DIAGNOSIS — Z88.5 ALLERGY STATUS TO NARCOTIC AGENT: ICD-10-CM

## 2020-11-14 DIAGNOSIS — L97.812 NON-PRESSURE CHRONIC ULCER OF OTHER PART OF RIGHT LOWER LEG WITH FAT LAYER EXPOSED: ICD-10-CM

## 2020-11-14 DIAGNOSIS — E05.90 THYROTOXICOSIS, UNSPECIFIED WITHOUT THYROTOXIC CRISIS OR STORM: ICD-10-CM

## 2020-11-14 DIAGNOSIS — M43.16 SPONDYLOLISTHESIS, LUMBAR REGION: ICD-10-CM

## 2020-11-14 DIAGNOSIS — Z87.891 PERSONAL HISTORY OF NICOTINE DEPENDENCE: ICD-10-CM

## 2020-11-14 DIAGNOSIS — I34.1 NONRHEUMATIC MITRAL (VALVE) PROLAPSE: ICD-10-CM

## 2020-11-14 NOTE — ASSESSMENT
[Verbal] : Verbal [Patient] : Patient [Good - alert, interested, motivated] : Good - alert, interested, motivated [Verbalizes knowledge/Understanding] : Verbalizes knowledge/understanding [Dressing changes] : dressing changes [Foot Care] : foot care [Skin Care] : skin care [Pressure relief] : pressure relief [Signs and symptoms of infection] : sign and symptoms of infection [Nutrition] : nutrition [How and When to Call] : how and when to call [Off-loading] : off-loading [Compression Therapy] : compression therapy [Patient responsibility to plan of care] : patient responsibility to plan of care [Glycemic Control] : glycemic control [Stable] : stable [Home] : Home [Cane] : Cane [Not Applicable - Long Term Care/Home Health Agency] : Long Term Care/Home Health Agency: Not Applicable [] : No [FreeTextEntry2] : Infection Prevention\par Localized Wound Care\par Promote Optimal Skin Integrity\par Compression Therapy\par Daily Foot Care\par Offloading / Pressure Relief\par \par  [FreeTextEntry3] : Improvement with previous wound but toe nail of the Right foot 2nd digit was injured by unknown causes  [FreeTextEntry4] : F/U to [facility] in one week for assessment \par Pt was given script for X-ray of the right foot for foot pain \par Pathology submitted for toe nail of the right foot, 2nd digit

## 2020-11-14 NOTE — VITALS
[] : No [de-identified] : Pt rates pain 0/10.  Patient denies any pain or discomfort at the present

## 2020-11-14 NOTE — REASON FOR VISIT
[Total] : total [2nd digit] : second digit [Right] : right [FreeTextEntry4] : Patient seen for right 2nd digit nail loosening. Pt does not recall any trauma or any illiciting events to her right 2nd toenail loosening. Pt also seen for venous stasis wounds to right and left lower leg down to skin and subcutaneous tissue and fat with stasis dermatitis which have healed. Pt also seen for right posterior ankle diabetic ulcer down to skin, subcutaneous tissue, and fat.

## 2020-11-14 NOTE — REVIEW OF SYSTEMS
[Skin Wound] : skin wound [FreeTextEntry1] : 5277 [Fever] : no fever [Eye Pain] : no eye pain [Earache] : no earache [Chest Pain] : no chest pain [Shortness Of Breath] : no shortness of breath [Abdominal Pain] : no abdominal pain [de-identified] : Right posterior ankle ulceration down to skin, subcutaneous tissue, and fat. venous stasis wound on right lower leg down to skin and subcutaneous tissue and fat epithelialized. venous stasis wounds on left lower leg down to skin and subcutaneous tissue and fat epithelialized. bilateral stasis dermatitis.

## 2020-11-14 NOTE — PROCEDURE
[Right Foot] : right foot was [Total] : total nail. The nail was grasped with a hemostat and removed in total.  The exposed nail bed was cleaned and flushed with sterile saline.  The nail bed was free of any purulence, debris and/or necrotic tissue. [Right foot, 2nd digit] : right foot, second digit [Bactroban] : bactroban [FreeTextEntry9] : 6916 [de-identified] : right 2nd digit nail lifting off the nail bed [de-identified] : ora [de-identified] : christiano [FreeTextEntry6] : onycholysis [FreeTextEntry7] : onycholysis [de-identified] : 0mL [de-identified] : right 2nd digit nail plate

## 2020-11-14 NOTE — PHYSICAL EXAM
[4 x 4] : 4 x 4  [0] : left 0 [Ankle Swelling (On Exam)] : present [Ankle Swelling Bilaterally] : bilaterally  [Varicose Veins Of Lower Extremities] : bilaterally [] : bilaterally [Ankle Swelling On The Left] : moderate [Skin Ulcer] : ulcer [JVD] : no jugular venous distention  [de-identified] : calm [de-identified] : 4/5 strength in all quadrants bilaterally [de-identified] : right 2nd digit nail lifting off the nail bed, no purulence, no erythema, no malodor, no proximal streaking, no ecchymosis. Right posterior ankle ulceration down to skin, subcutaneous tissue, and fat. venous stasis wound on right lower leg down to skin and subcutaneous tissue and fat epithelialized. venous stasis wounds on left lower leg down to skin and subcutaneous tissue and fat. bilateral stasis dermatitis. [de-identified] : No neurovascular deficit noted [de-identified] : Pt denies any pain or discomfort post ACE application  [FreeTextEntry7] : Left Anterior Leg - Closed  [de-identified] : Pt denies any pain or discomfort post ACE application  [de-identified] : No neurovascular deficit noted  [de-identified] : Right Posterior Ankle  [de-identified] : 1.0 [de-identified] : 1.0 [de-identified] : 0.1 [de-identified] : Serosanguineous  [de-identified] : Pt denies any pain or discomfort post ACE application  [de-identified] : Silver Alginate  [de-identified] : Cleansed with Normal Saline\par Cloth Tape \par No neurovascular deficit noted   [FreeTextEntry1] : Right Foot 2nd Digit Nail Bed  [de-identified] : Nail Avulsion  [de-identified] : Bactroban (in office) [de-identified] : Cleansed with Normal Saline \par Band Aid [de-identified] : Ace wraps [de-identified] : Compression [de-identified] : Ace wraps [de-identified] : Compression [de-identified] : Small [de-identified] : No [de-identified] : No [de-identified] : Normal [de-identified] : None [de-identified] : None [de-identified] : 100% [de-identified] : No [de-identified] : Ace wraps [de-identified] : 3x Weekly [TWNoteComboBox4] : None [TWNoteComboBox5] : No [de-identified] : No [de-identified] : Normal [de-identified] : None [de-identified] : 100% [de-identified] : Other [de-identified] : 3x Weekly

## 2020-11-16 ENCOUNTER — APPOINTMENT (OUTPATIENT)
Dept: WOUND CARE | Facility: HOSPITAL | Age: 65
End: 2020-11-16
Payer: MEDICARE

## 2020-11-16 ENCOUNTER — OUTPATIENT (OUTPATIENT)
Dept: OUTPATIENT SERVICES | Facility: HOSPITAL | Age: 65
LOS: 1 days | Discharge: HOME | End: 2020-11-16
Payer: MEDICARE

## 2020-11-16 ENCOUNTER — APPOINTMENT (OUTPATIENT)
Dept: PLASTIC SURGERY | Facility: HOSPITAL | Age: 65
End: 2020-11-16

## 2020-11-16 VITALS
BODY MASS INDEX: 46.95 KG/M2 | TEMPERATURE: 97.7 F | SYSTOLIC BLOOD PRESSURE: 136 MMHG | HEIGHT: 63 IN | DIASTOLIC BLOOD PRESSURE: 55 MMHG | WEIGHT: 265 LBS | RESPIRATION RATE: 20 BRPM | OXYGEN SATURATION: 98 % | HEART RATE: 74 BPM

## 2020-11-16 DIAGNOSIS — I83.218 VARICOSE VEINS OF RIGHT LOWER EXTREMITY WITH BOTH ULCER OF OTHER PART OF LOWER EXTREMITY AND INFLAMMATION: ICD-10-CM

## 2020-11-16 DIAGNOSIS — Z95.2 PRESENCE OF PROSTHETIC HEART VALVE: Chronic | ICD-10-CM

## 2020-11-16 PROCEDURE — 99213 OFFICE O/P EST LOW 20 MIN: CPT

## 2020-11-16 PROCEDURE — G0463: CPT

## 2020-11-16 NOTE — ASSESSMENT
[Verbal] : Verbal [Demo] : Demo [Patient] : Patient [Fair - mild discomfort, physical impairment, low acceptance] : Fair - mild discomfort, physical impairment, low acceptance [Needs reinforcement] : needs reinforcement [Dressing changes] : dressing changes [Foot Care] : foot care [Skin Care] : skin care [Pressure relief] : pressure relief [Signs and symptoms of infection] : sign and symptoms of infection [Venous Disease] : venous disease [Nutrition] : nutrition [How and When to Call] : how and when to call [Off-loading] : off-loading [Compression Therapy] : compression therapy [Patient responsibility to plan of care] : patient responsibility to plan of care [Stable] : stable [Home] : Home [Cane] : Cane [Not Applicable - Long Term Care/Home Health Agency] : Long Term Care/Home Health Agency: Not Applicable [] : No [FreeTextEntry2] : Infection Prevention\par Promote Skin Integrity\par Localized Wound Care\par Elevation/Offloading\par Pressure Relief\par Compression Compliance\par Low Na+ Diet\par Weight Reduction Strategies \par  [FreeTextEntry4] : F/U 1 week

## 2020-11-16 NOTE — PHYSICAL EXAM
[2 x 2] : 2 x 2  [JVD] : no jugular venous distention  [0] : left 0 [Ankle Swelling (On Exam)] : present [Ankle Swelling Bilaterally] : bilaterally  [Varicose Veins Of Lower Extremities] : bilaterally [] : bilaterally [Ankle Swelling On The Left] : moderate [Skin Ulcer] : ulcer [de-identified] : calm [de-identified] : 4/5 strength in all quadrants bilaterally [de-identified] : Right 2nd digit s/p nail avulsion healing well. Right posterior ankle ulceration down to skin, subcutaneous tissue, and fat. venous stasis wound on right lower leg down to skin and subcutaneous tissue and fat epithelialized. venous stasis wounds on left lower leg down to skin and subcutaneous tissue and fat epithelialized. bilateral stasis dermatitis. [de-identified] : Circ neurovascular function WNL post Ace application. Pt expressed comfort [de-identified] : Circ neurovascular function WNL post Ace application. Pt expressed comfort [FreeTextEntry7] : Anterior Leg-closed  [de-identified] : no treatment [de-identified] : Cleansed with NS [de-identified] : Circ neurovascular function WNL post Ace application. Pt expressed comfort [de-identified] : Posterior Ankle [de-identified] : 0.7 [de-identified] : 0.8 [de-identified] : 0.1 [de-identified] : Scant serosanguineous  [de-identified] : intact  [de-identified] : Silver Alginate [de-identified] : Cleansed with NS\par Cloth Tape [FreeTextEntry1] : Foot 2nd Digit Nail Bed [de-identified] : Scant serous [de-identified] : Bactroban [de-identified] : Cleansed with NS\par Band-Aid [de-identified] : No [de-identified] : None [de-identified] : 100% [de-identified] : Ace wraps [de-identified] : 3x Weekly [de-identified] : Primary Dressing [TWNoteComboBox9] : Left [de-identified] : Small [de-identified] : No [de-identified] : False [de-identified] : No [de-identified] : Normal [de-identified] : None [de-identified] : None [de-identified] : 100% [de-identified] : No [de-identified] : Ace wraps [de-identified] : 3x Weekly [de-identified] : Primary Dressing [de-identified] : Right [de-identified] : No [de-identified] : No [de-identified] : Normal [de-identified] : None [de-identified] : None [de-identified] : 100% [de-identified] : No [de-identified] : Ace wraps [de-identified] : 3x Weekly [de-identified] : Primary Dressing [TWNoteComboBox1] : Right [TWNoteComboBox5] : No [de-identified] : No [de-identified] : Normal [de-identified] : None [de-identified] : None [de-identified] : 100% [de-identified] : No [de-identified] : 3x Weekly [de-identified] : Primary Dressing

## 2020-11-16 NOTE — REVIEW OF SYSTEMS
[Fever] : no fever [Eye Pain] : no eye pain [Earache] : no earache [Chest Pain] : no chest pain [Shortness Of Breath] : no shortness of breath [Abdominal Pain] : no abdominal pain [Skin Wound] : skin wound [de-identified] : Right 2nd digit s/p nail avulsion healing well. Right posterior ankle ulceration down to skin, subcutaneous tissue, and fat. venous stasis wound on right lower leg down to skin and subcutaneous tissue and fat epithelialized. venous stasis wounds on left lower leg down to skin and subcutaneous tissue and fat epithelialized. bilateral stasis dermatitis.

## 2020-11-17 DIAGNOSIS — E11.59 TYPE 2 DIABETES MELLITUS WITH OTHER CIRCULATORY COMPLICATIONS: ICD-10-CM

## 2020-11-17 DIAGNOSIS — Z98.84 BARIATRIC SURGERY STATUS: ICD-10-CM

## 2020-11-17 DIAGNOSIS — E05.90 THYROTOXICOSIS, UNSPECIFIED WITHOUT THYROTOXIC CRISIS OR STORM: ICD-10-CM

## 2020-11-17 DIAGNOSIS — E55.9 VITAMIN D DEFICIENCY, UNSPECIFIED: ICD-10-CM

## 2020-11-17 DIAGNOSIS — L97.812 NON-PRESSURE CHRONIC ULCER OF OTHER PART OF RIGHT LOWER LEG WITH FAT LAYER EXPOSED: ICD-10-CM

## 2020-11-17 DIAGNOSIS — Z87.891 PERSONAL HISTORY OF NICOTINE DEPENDENCE: ICD-10-CM

## 2020-11-17 DIAGNOSIS — G47.10 HYPERSOMNIA, UNSPECIFIED: ICD-10-CM

## 2020-11-17 DIAGNOSIS — I83.218 VARICOSE VEINS OF RIGHT LOWER EXTREMITY WITH BOTH ULCER OF OTHER PART OF LOWER EXTREMITY AND INFLAMMATION: ICD-10-CM

## 2020-11-17 DIAGNOSIS — M48.07 SPINAL STENOSIS, LUMBOSACRAL REGION: ICD-10-CM

## 2020-11-17 DIAGNOSIS — L97.312 NON-PRESSURE CHRONIC ULCER OF RIGHT ANKLE WITH FAT LAYER EXPOSED: ICD-10-CM

## 2020-11-17 DIAGNOSIS — Z79.899 OTHER LONG TERM (CURRENT) DRUG THERAPY: ICD-10-CM

## 2020-11-17 DIAGNOSIS — Z82.49 FAMILY HISTORY OF ISCHEMIC HEART DISEASE AND OTHER DISEASES OF THE CIRCULATORY SYSTEM: ICD-10-CM

## 2020-11-17 DIAGNOSIS — Z79.4 LONG TERM (CURRENT) USE OF INSULIN: ICD-10-CM

## 2020-11-17 DIAGNOSIS — Z88.5 ALLERGY STATUS TO NARCOTIC AGENT: ICD-10-CM

## 2020-11-17 DIAGNOSIS — Z83.49 FAMILY HISTORY OF OTHER ENDOCRINE, NUTRITIONAL AND METABOLIC DISEASES: ICD-10-CM

## 2020-11-17 DIAGNOSIS — I83.228 VARICOSE VEINS OF LEFT LOWER EXTREMITY WITH BOTH ULCER OF OTHER PART OF LOWER EXTREMITY AND INFLAMMATION: ICD-10-CM

## 2020-11-17 DIAGNOSIS — E66.01 MORBID (SEVERE) OBESITY DUE TO EXCESS CALORIES: ICD-10-CM

## 2020-11-17 DIAGNOSIS — Z87.81 PERSONAL HISTORY OF (HEALED) TRAUMATIC FRACTURE: ICD-10-CM

## 2020-11-17 DIAGNOSIS — I47.1 SUPRAVENTRICULAR TACHYCARDIA: ICD-10-CM

## 2020-11-17 DIAGNOSIS — L97.822 NON-PRESSURE CHRONIC ULCER OF OTHER PART OF LEFT LOWER LEG WITH FAT LAYER EXPOSED: ICD-10-CM

## 2020-11-17 DIAGNOSIS — I83.893 VARICOSE VEINS OF BILATERAL LOWER EXTREMITIES WITH OTHER COMPLICATIONS: ICD-10-CM

## 2020-11-17 DIAGNOSIS — Z79.82 LONG TERM (CURRENT) USE OF ASPIRIN: ICD-10-CM

## 2020-11-17 DIAGNOSIS — I11.9 HYPERTENSIVE HEART DISEASE WITHOUT HEART FAILURE: ICD-10-CM

## 2020-11-17 DIAGNOSIS — E11.622 TYPE 2 DIABETES MELLITUS WITH OTHER SKIN ULCER: ICD-10-CM

## 2020-11-17 DIAGNOSIS — Z98.890 OTHER SPECIFIED POSTPROCEDURAL STATES: ICD-10-CM

## 2020-11-17 DIAGNOSIS — Z95.4 PRESENCE OF OTHER HEART-VALVE REPLACEMENT: ICD-10-CM

## 2020-11-17 DIAGNOSIS — Z87.01 PERSONAL HISTORY OF PNEUMONIA (RECURRENT): ICD-10-CM

## 2020-11-17 DIAGNOSIS — I34.1 NONRHEUMATIC MITRAL (VALVE) PROLAPSE: ICD-10-CM

## 2020-11-17 DIAGNOSIS — M47.817 SPONDYLOSIS WITHOUT MYELOPATHY OR RADICULOPATHY, LUMBOSACRAL REGION: ICD-10-CM

## 2020-11-17 DIAGNOSIS — G47.33 OBSTRUCTIVE SLEEP APNEA (ADULT) (PEDIATRIC): ICD-10-CM

## 2020-11-17 DIAGNOSIS — M43.16 SPONDYLOLISTHESIS, LUMBAR REGION: ICD-10-CM

## 2020-11-18 ENCOUNTER — APPOINTMENT (OUTPATIENT)
Dept: WOUND CARE | Facility: HOSPITAL | Age: 65
End: 2020-11-18
Payer: MEDICARE

## 2020-11-18 ENCOUNTER — OUTPATIENT (OUTPATIENT)
Dept: OUTPATIENT SERVICES | Facility: HOSPITAL | Age: 65
LOS: 1 days | Discharge: HOME | End: 2020-11-18
Payer: MEDICARE

## 2020-11-18 ENCOUNTER — APPOINTMENT (OUTPATIENT)
Dept: OBGYN | Facility: HOSPITAL | Age: 65
End: 2020-11-18

## 2020-11-18 VITALS
RESPIRATION RATE: 20 BRPM | HEIGHT: 63 IN | DIASTOLIC BLOOD PRESSURE: 54 MMHG | BODY MASS INDEX: 46.95 KG/M2 | HEART RATE: 66 BPM | TEMPERATURE: 97.7 F | SYSTOLIC BLOOD PRESSURE: 113 MMHG | WEIGHT: 265 LBS | OXYGEN SATURATION: 96 %

## 2020-11-18 DIAGNOSIS — E11.622 TYPE 2 DIABETES MELLITUS WITH OTHER SKIN ULCER: ICD-10-CM

## 2020-11-18 DIAGNOSIS — L97.812 NON-PRESSURE CHRONIC ULCER OF OTHER PART OF RIGHT LOWER LEG WITH FAT LAYER EXPOSED: ICD-10-CM

## 2020-11-18 DIAGNOSIS — I83.218 VARICOSE VEINS OF RIGHT LOWER EXTREMITY WITH BOTH ULCER OF OTHER PART OF LOWER EXTREMITY AND INFLAMMATION: ICD-10-CM

## 2020-11-18 DIAGNOSIS — L97.312 NON-PRESSURE CHRONIC ULCER OF RIGHT ANKLE WITH FAT LAYER EXPOSED: ICD-10-CM

## 2020-11-18 DIAGNOSIS — Z95.2 PRESENCE OF PROSTHETIC HEART VALVE: Chronic | ICD-10-CM

## 2020-11-18 PROCEDURE — G0463: CPT

## 2020-11-18 PROCEDURE — ZZZZZ: CPT

## 2020-11-20 ENCOUNTER — OUTPATIENT (OUTPATIENT)
Dept: OUTPATIENT SERVICES | Facility: HOSPITAL | Age: 65
LOS: 1 days | Discharge: HOME | End: 2020-11-20
Payer: MEDICARE

## 2020-11-20 ENCOUNTER — APPOINTMENT (OUTPATIENT)
Dept: PLASTIC SURGERY | Facility: HOSPITAL | Age: 65
End: 2020-11-20

## 2020-11-20 ENCOUNTER — APPOINTMENT (OUTPATIENT)
Dept: WOUND CARE | Facility: HOSPITAL | Age: 65
End: 2020-11-20
Payer: MEDICARE

## 2020-11-20 VITALS
SYSTOLIC BLOOD PRESSURE: 107 MMHG | BODY MASS INDEX: 46.95 KG/M2 | DIASTOLIC BLOOD PRESSURE: 45 MMHG | HEART RATE: 65 BPM | TEMPERATURE: 98 F | RESPIRATION RATE: 20 BRPM | WEIGHT: 265 LBS | OXYGEN SATURATION: 98 % | HEIGHT: 63 IN

## 2020-11-20 DIAGNOSIS — I83.218 VARICOSE VEINS OF RIGHT LOWER EXTREMITY WITH BOTH ULCER OF OTHER PART OF LOWER EXTREMITY AND INFLAMMATION: ICD-10-CM

## 2020-11-20 DIAGNOSIS — Z95.2 PRESENCE OF PROSTHETIC HEART VALVE: Chronic | ICD-10-CM

## 2020-11-20 PROCEDURE — ZZZZZ: CPT

## 2020-11-20 PROCEDURE — G0463: CPT

## 2020-11-21 DIAGNOSIS — E11.622 TYPE 2 DIABETES MELLITUS WITH OTHER SKIN ULCER: ICD-10-CM

## 2020-11-21 DIAGNOSIS — I83.218 VARICOSE VEINS OF RIGHT LOWER EXTREMITY WITH BOTH ULCER OF OTHER PART OF LOWER EXTREMITY AND INFLAMMATION: ICD-10-CM

## 2020-11-21 DIAGNOSIS — L97.812 NON-PRESSURE CHRONIC ULCER OF OTHER PART OF RIGHT LOWER LEG WITH FAT LAYER EXPOSED: ICD-10-CM

## 2020-11-21 DIAGNOSIS — L97.312 NON-PRESSURE CHRONIC ULCER OF RIGHT ANKLE WITH FAT LAYER EXPOSED: ICD-10-CM

## 2020-11-23 ENCOUNTER — OUTPATIENT (OUTPATIENT)
Dept: OUTPATIENT SERVICES | Facility: HOSPITAL | Age: 65
LOS: 1 days | Discharge: HOME | End: 2020-11-23
Payer: MEDICARE

## 2020-11-23 ENCOUNTER — APPOINTMENT (OUTPATIENT)
Dept: WOUND CARE | Facility: HOSPITAL | Age: 65
End: 2020-11-23
Payer: MEDICARE

## 2020-11-23 VITALS
BODY MASS INDEX: 46.95 KG/M2 | DIASTOLIC BLOOD PRESSURE: 61 MMHG | OXYGEN SATURATION: 100 % | SYSTOLIC BLOOD PRESSURE: 97 MMHG | TEMPERATURE: 98.5 F | RESPIRATION RATE: 20 BRPM | HEIGHT: 63 IN | WEIGHT: 265 LBS | HEART RATE: 69 BPM

## 2020-11-23 DIAGNOSIS — E55.9 VITAMIN D DEFICIENCY, UNSPECIFIED: ICD-10-CM

## 2020-11-23 DIAGNOSIS — Z88.5 ALLERGY STATUS TO NARCOTIC AGENT: ICD-10-CM

## 2020-11-23 DIAGNOSIS — I83.218 VARICOSE VEINS OF RIGHT LOWER EXTREMITY WITH BOTH ULCER OF OTHER PART OF LOWER EXTREMITY AND INFLAMMATION: ICD-10-CM

## 2020-11-23 DIAGNOSIS — L97.822 NON-PRESSURE CHRONIC ULCER OF OTHER PART OF LEFT LOWER LEG WITH FAT LAYER EXPOSED: ICD-10-CM

## 2020-11-23 DIAGNOSIS — L97.812 NON-PRESSURE CHRONIC ULCER OF OTHER PART OF RIGHT LOWER LEG WITH FAT LAYER EXPOSED: ICD-10-CM

## 2020-11-23 DIAGNOSIS — Z79.4 LONG TERM (CURRENT) USE OF INSULIN: ICD-10-CM

## 2020-11-23 DIAGNOSIS — I11.9 HYPERTENSIVE HEART DISEASE WITHOUT HEART FAILURE: ICD-10-CM

## 2020-11-23 DIAGNOSIS — I34.1 NONRHEUMATIC MITRAL (VALVE) PROLAPSE: ICD-10-CM

## 2020-11-23 DIAGNOSIS — M43.16 SPONDYLOLISTHESIS, LUMBAR REGION: ICD-10-CM

## 2020-11-23 DIAGNOSIS — E05.90 THYROTOXICOSIS, UNSPECIFIED WITHOUT THYROTOXIC CRISIS OR STORM: ICD-10-CM

## 2020-11-23 DIAGNOSIS — Z87.891 PERSONAL HISTORY OF NICOTINE DEPENDENCE: ICD-10-CM

## 2020-11-23 DIAGNOSIS — Z95.2 PRESENCE OF PROSTHETIC HEART VALVE: Chronic | ICD-10-CM

## 2020-11-23 DIAGNOSIS — I83.893 VARICOSE VEINS OF BILATERAL LOWER EXTREMITIES WITH OTHER COMPLICATIONS: ICD-10-CM

## 2020-11-23 DIAGNOSIS — G47.10 HYPERSOMNIA, UNSPECIFIED: ICD-10-CM

## 2020-11-23 DIAGNOSIS — E66.01 MORBID (SEVERE) OBESITY DUE TO EXCESS CALORIES: ICD-10-CM

## 2020-11-23 DIAGNOSIS — Z83.49 FAMILY HISTORY OF OTHER ENDOCRINE, NUTRITIONAL AND METABOLIC DISEASES: ICD-10-CM

## 2020-11-23 DIAGNOSIS — M48.07 SPINAL STENOSIS, LUMBOSACRAL REGION: ICD-10-CM

## 2020-11-23 DIAGNOSIS — Z98.890 OTHER SPECIFIED POSTPROCEDURAL STATES: ICD-10-CM

## 2020-11-23 DIAGNOSIS — Z95.4 PRESENCE OF OTHER HEART-VALVE REPLACEMENT: ICD-10-CM

## 2020-11-23 DIAGNOSIS — E11.59 TYPE 2 DIABETES MELLITUS WITH OTHER CIRCULATORY COMPLICATIONS: ICD-10-CM

## 2020-11-23 DIAGNOSIS — I47.1 SUPRAVENTRICULAR TACHYCARDIA: ICD-10-CM

## 2020-11-23 DIAGNOSIS — G47.33 OBSTRUCTIVE SLEEP APNEA (ADULT) (PEDIATRIC): ICD-10-CM

## 2020-11-23 DIAGNOSIS — E11.622 TYPE 2 DIABETES MELLITUS WITH OTHER SKIN ULCER: ICD-10-CM

## 2020-11-23 DIAGNOSIS — Z87.01 PERSONAL HISTORY OF PNEUMONIA (RECURRENT): ICD-10-CM

## 2020-11-23 DIAGNOSIS — L97.312 NON-PRESSURE CHRONIC ULCER OF RIGHT ANKLE WITH FAT LAYER EXPOSED: ICD-10-CM

## 2020-11-23 DIAGNOSIS — Z82.49 FAMILY HISTORY OF ISCHEMIC HEART DISEASE AND OTHER DISEASES OF THE CIRCULATORY SYSTEM: ICD-10-CM

## 2020-11-23 DIAGNOSIS — Z98.84 BARIATRIC SURGERY STATUS: ICD-10-CM

## 2020-11-23 DIAGNOSIS — Z87.81 PERSONAL HISTORY OF (HEALED) TRAUMATIC FRACTURE: ICD-10-CM

## 2020-11-23 DIAGNOSIS — Z79.82 LONG TERM (CURRENT) USE OF ASPIRIN: ICD-10-CM

## 2020-11-23 DIAGNOSIS — M47.817 SPONDYLOSIS WITHOUT MYELOPATHY OR RADICULOPATHY, LUMBOSACRAL REGION: ICD-10-CM

## 2020-11-23 DIAGNOSIS — I83.228 VARICOSE VEINS OF LEFT LOWER EXTREMITY WITH BOTH ULCER OF OTHER PART OF LOWER EXTREMITY AND INFLAMMATION: ICD-10-CM

## 2020-11-23 DIAGNOSIS — Z79.899 OTHER LONG TERM (CURRENT) DRUG THERAPY: ICD-10-CM

## 2020-11-23 PROCEDURE — G0463: CPT

## 2020-11-23 PROCEDURE — 99213 OFFICE O/P EST LOW 20 MIN: CPT

## 2020-11-23 NOTE — ASSESSMENT
[Verbal] : Verbal [Demo] : Demo [Patient] : Patient [Fair - mild discomfort, physical impairment, low acceptance] : Fair - mild discomfort, physical impairment, low acceptance [Needs reinforcement] : needs reinforcement [Dressing changes] : dressing changes [Foot Care] : foot care [Skin Care] : skin care [Pressure relief] : pressure relief [Signs and symptoms of infection] : sign and symptoms of infection [Venous Disease] : venous disease [Nutrition] : nutrition [How and When to Call] : how and when to call [Off-loading] : off-loading [Compression Therapy] : compression therapy [Patient responsibility to plan of care] : patient responsibility to plan of care [] : Yes [Stable] : stable [Home] : Home [Cane] : Cane [Not Applicable - Long Term Care/Home Health Agency] : Long Term Care/Home Health Agency: Not Applicable [FreeTextEntry2] : Infection Prevention\par Promote Skin Integrity\par Localized Wound Care\par Elevation/Offloading\par Pressure Relief\par Compression Compliance\par Low Na+ Diet\par Weight Reduction Strategies \par  [FreeTextEntry4] : Photos Taken\par F/U Wednesday for consult with Dr. Long, Friday for dressing change and 1 week for assessment.

## 2020-11-23 NOTE — REVIEW OF SYSTEMS
[Fever] : no fever [Eye Pain] : no eye pain [Earache] : no earache [Chest Pain] : no chest pain [Shortness Of Breath] : no shortness of breath [Abdominal Pain] : no abdominal pain [Skin Wound] : skin wound [de-identified] : Right posterior ankle ulceration down to skin, subcutaneous tissue, and fat. venous stasis wound on right lower leg down to skin and subcutaneous tissue and fat epithelialized. venous stasis wounds on left lower leg down to skin and subcutaneous tissue and fat epithelialized. bilateral stasis dermatitis.

## 2020-11-23 NOTE — PHYSICAL EXAM
[2 x 2] : 2 x 2  [JVD] : no jugular venous distention  [0] : left 0 [Ankle Swelling (On Exam)] : present [Ankle Swelling Bilaterally] : bilaterally  [Varicose Veins Of Lower Extremities] : bilaterally [] : bilaterally [Ankle Swelling On The Left] : moderate [Skin Ulcer] : ulcer [de-identified] : calm [de-identified] : 4/5 strength in all quadrants bilaterally [de-identified] : Right posterior ankle ulceration down to skin, subcutaneous tissue, and fat. venous stasis wound on right lower leg down to skin and subcutaneous tissue and fat epithelialized. venous stasis wounds on left lower leg down to skin and subcutaneous tissue and fat epithelialized. bilateral stasis dermatitis. [de-identified] : Cleansed with NS [FreeTextEntry7] : Anterior Leg-closed  [de-identified] : no treatment [de-identified] : Cleansed with NS [de-identified] : Posterior Ankle [de-identified] : 0.7 [de-identified] : 0.8 [de-identified] : 0.1 [de-identified] : Scant serosanguineous  [de-identified] : Intact  [de-identified] : Alginate [de-identified] : Cleansed with NS\par Cloth Tape [FreeTextEntry1] : Foot 2nd Digit Nail Bed [de-identified] : No product necessary [de-identified] : Ace wraps [TWNoteComboBox9] : Left [de-identified] : Ace wraps [de-identified] : Right [de-identified] : No [de-identified] : No [de-identified] : Normal [de-identified] : None [de-identified] : 100% [de-identified] : None [de-identified] : No [de-identified] : Ace wraps [de-identified] : 3x Weekly [TWNoteComboBox1] : Right [TWNoteComboBox5] : No [de-identified] : No [de-identified] : Normal [de-identified] : None [de-identified] : None [de-identified] : None [de-identified] : No [de-identified] : 3x Weekly

## 2020-11-25 ENCOUNTER — APPOINTMENT (OUTPATIENT)
Dept: WOUND CARE | Facility: HOSPITAL | Age: 65
End: 2020-11-25
Payer: MEDICARE

## 2020-11-25 ENCOUNTER — OUTPATIENT (OUTPATIENT)
Dept: OUTPATIENT SERVICES | Facility: HOSPITAL | Age: 65
LOS: 1 days | Discharge: HOME | End: 2020-11-25
Payer: MEDICARE

## 2020-11-25 ENCOUNTER — APPOINTMENT (OUTPATIENT)
Dept: VASCULAR SURGERY | Facility: CLINIC | Age: 65
End: 2020-11-25
Payer: MEDICARE

## 2020-11-25 VITALS
BODY MASS INDEX: 46.95 KG/M2 | WEIGHT: 265 LBS | HEIGHT: 63 IN | OXYGEN SATURATION: 96 % | SYSTOLIC BLOOD PRESSURE: 116 MMHG | DIASTOLIC BLOOD PRESSURE: 62 MMHG | RESPIRATION RATE: 20 BRPM | HEART RATE: 77 BPM | TEMPERATURE: 97 F

## 2020-11-25 DIAGNOSIS — E78.5 HYPERLIPIDEMIA, UNSPECIFIED: ICD-10-CM

## 2020-11-25 DIAGNOSIS — Z86.718 PERSONAL HISTORY OF OTHER VENOUS THROMBOSIS AND EMBOLISM: ICD-10-CM

## 2020-11-25 DIAGNOSIS — Z95.4 PRESENCE OF OTHER HEART-VALVE REPLACEMENT: ICD-10-CM

## 2020-11-25 DIAGNOSIS — E66.01 MORBID (SEVERE) OBESITY DUE TO EXCESS CALORIES: ICD-10-CM

## 2020-11-25 DIAGNOSIS — R60.9 EDEMA, UNSPECIFIED: ICD-10-CM

## 2020-11-25 DIAGNOSIS — Z98.84 BARIATRIC SURGERY STATUS: ICD-10-CM

## 2020-11-25 DIAGNOSIS — Z86.711 PERSONAL HISTORY OF PULMONARY EMBOLISM: ICD-10-CM

## 2020-11-25 DIAGNOSIS — Z87.81 PERSONAL HISTORY OF (HEALED) TRAUMATIC FRACTURE: ICD-10-CM

## 2020-11-25 DIAGNOSIS — Z79.4 LONG TERM (CURRENT) USE OF INSULIN: ICD-10-CM

## 2020-11-25 DIAGNOSIS — Z83.49 FAMILY HISTORY OF OTHER ENDOCRINE, NUTRITIONAL AND METABOLIC DISEASES: ICD-10-CM

## 2020-11-25 DIAGNOSIS — L97.312 NON-PRESSURE CHRONIC ULCER OF RIGHT ANKLE WITH FAT LAYER EXPOSED: ICD-10-CM

## 2020-11-25 DIAGNOSIS — I48.91 UNSPECIFIED ATRIAL FIBRILLATION: ICD-10-CM

## 2020-11-25 DIAGNOSIS — Z79.899 OTHER LONG TERM (CURRENT) DRUG THERAPY: ICD-10-CM

## 2020-11-25 DIAGNOSIS — L97.522 NON-PRESSURE CHRONIC ULCER OF OTHER PART OF LEFT FOOT WITH FAT LAYER EXPOSED: ICD-10-CM

## 2020-11-25 DIAGNOSIS — E07.9 DISORDER OF THYROID, UNSPECIFIED: ICD-10-CM

## 2020-11-25 DIAGNOSIS — I35.0 NONRHEUMATIC AORTIC (VALVE) STENOSIS: ICD-10-CM

## 2020-11-25 DIAGNOSIS — I11.9 HYPERTENSIVE HEART DISEASE WITHOUT HEART FAILURE: ICD-10-CM

## 2020-11-25 DIAGNOSIS — G47.33 OBSTRUCTIVE SLEEP APNEA (ADULT) (PEDIATRIC): ICD-10-CM

## 2020-11-25 DIAGNOSIS — Z88.5 ALLERGY STATUS TO NARCOTIC AGENT: ICD-10-CM

## 2020-11-25 DIAGNOSIS — I83.228 VARICOSE VEINS OF LEFT LOWER EXTREMITY WITH BOTH ULCER OF OTHER PART OF LOWER EXTREMITY AND INFLAMMATION: ICD-10-CM

## 2020-11-25 DIAGNOSIS — Z87.891 PERSONAL HISTORY OF NICOTINE DEPENDENCE: ICD-10-CM

## 2020-11-25 DIAGNOSIS — Z82.49 FAMILY HISTORY OF ISCHEMIC HEART DISEASE AND OTHER DISEASES OF THE CIRCULATORY SYSTEM: ICD-10-CM

## 2020-11-25 DIAGNOSIS — Z95.2 PRESENCE OF PROSTHETIC HEART VALVE: Chronic | ICD-10-CM

## 2020-11-25 DIAGNOSIS — Z79.82 LONG TERM (CURRENT) USE OF ASPIRIN: ICD-10-CM

## 2020-11-25 PROCEDURE — 99203 OFFICE O/P NEW LOW 30 MIN: CPT

## 2020-11-25 PROCEDURE — ZZZZZ: CPT

## 2020-11-25 PROCEDURE — G0463: CPT

## 2020-11-25 NOTE — ASSESSMENT
[FreeTextEntry1] : 66 yo F with multiple medical problems. NO evidence of PVD or venous insuf. She developed wounds secondary to swelling and pressure from pressure while using recliner. She has not been able to use a bed in months. Her legs are usually down.  She has pulmonary HTN and large abdominal pannus that add to LE swelling.

## 2020-11-25 NOTE — PHYSICAL EXAM
[2+] : left 2+ [Ankle Swelling (On Exam)] : present [Ankle Swelling Bilaterally] : bilaterally  [Ankle Swelling On The Left] : moderate [Varicose Veins Of Lower Extremities] : not present [] : not present [Purpura] : no purpura  [Petechiae] : no petechiae [Skin Induration] : no induration [Alert] : alert [Oriented to Person] : oriented to person [Oriented to Place] : oriented to place [Oriented to Time] : oriented to time [Calm] : calm [de-identified] : obese [de-identified] : B toe cyanosis posterior ankle flat ecchymotic blister, b shin superficial-helaed blisters.

## 2020-11-25 NOTE — HISTORY OF PRESENT ILLNESS
[FreeTextEntry1] : 64 yo F with BLe swelling-blistering and B posterior heel pressure wounds. Patient is morbidly obese and has been worked up for pulmonary-cardiac issues lately. She was found to have a PE and chronic LE DVT (small). Almost at the same time she started noticing BLE swelling. Due to lung problems she started to sleep in a recliner. Uses O2 at thigh. her legs are never elevated. She has been coming to  and wounds have improved. She has been regular compressed. She has had issues not being able to remove compression stockings at home.

## 2020-11-27 ENCOUNTER — APPOINTMENT (OUTPATIENT)
Dept: WOUND CARE | Facility: HOSPITAL | Age: 65
End: 2020-11-27
Payer: MEDICARE

## 2020-11-27 ENCOUNTER — OUTPATIENT (OUTPATIENT)
Dept: OUTPATIENT SERVICES | Facility: HOSPITAL | Age: 65
LOS: 1 days | Discharge: HOME | End: 2020-11-27
Payer: MEDICARE

## 2020-11-27 VITALS
BODY MASS INDEX: 46.95 KG/M2 | DIASTOLIC BLOOD PRESSURE: 56 MMHG | RESPIRATION RATE: 20 BRPM | TEMPERATURE: 97.6 F | SYSTOLIC BLOOD PRESSURE: 114 MMHG | OXYGEN SATURATION: 98 % | WEIGHT: 265 LBS | HEIGHT: 63 IN | HEART RATE: 66 BPM

## 2020-11-27 DIAGNOSIS — I83.218 VARICOSE VEINS OF RIGHT LOWER EXTREMITY WITH BOTH ULCER OF OTHER PART OF LOWER EXTREMITY AND INFLAMMATION: ICD-10-CM

## 2020-11-27 DIAGNOSIS — Z95.2 PRESENCE OF PROSTHETIC HEART VALVE: Chronic | ICD-10-CM

## 2020-11-27 PROCEDURE — ZZZZZ: CPT

## 2020-11-27 PROCEDURE — G0463: CPT

## 2020-11-28 DIAGNOSIS — L97.312 NON-PRESSURE CHRONIC ULCER OF RIGHT ANKLE WITH FAT LAYER EXPOSED: ICD-10-CM

## 2020-11-28 DIAGNOSIS — E11.622 TYPE 2 DIABETES MELLITUS WITH OTHER SKIN ULCER: ICD-10-CM

## 2020-11-30 ENCOUNTER — OUTPATIENT (OUTPATIENT)
Dept: OUTPATIENT SERVICES | Facility: HOSPITAL | Age: 65
LOS: 1 days | Discharge: HOME | End: 2020-11-30
Payer: MEDICARE

## 2020-11-30 ENCOUNTER — APPOINTMENT (OUTPATIENT)
Dept: PLASTIC SURGERY | Facility: HOSPITAL | Age: 65
End: 2020-11-30
Payer: MEDICARE

## 2020-11-30 VITALS
RESPIRATION RATE: 20 BRPM | WEIGHT: 265 LBS | OXYGEN SATURATION: 96 % | BODY MASS INDEX: 46.95 KG/M2 | TEMPERATURE: 97.1 F | HEART RATE: 83 BPM | DIASTOLIC BLOOD PRESSURE: 68 MMHG | HEIGHT: 63 IN | SYSTOLIC BLOOD PRESSURE: 150 MMHG

## 2020-11-30 DIAGNOSIS — Z95.2 PRESENCE OF PROSTHETIC HEART VALVE: Chronic | ICD-10-CM

## 2020-11-30 DIAGNOSIS — I83.218 VARICOSE VEINS OF RIGHT LOWER EXTREMITY WITH BOTH ULCER OF OTHER PART OF LOWER EXTREMITY AND INFLAMMATION: ICD-10-CM

## 2020-11-30 PROCEDURE — 99213 OFFICE O/P EST LOW 20 MIN: CPT

## 2020-11-30 PROCEDURE — G0463: CPT

## 2020-11-30 NOTE — ASSESSMENT
[] : No [FreeTextEntry2] : Infection Prevention\par Promote Skin Integrity\par Localized Wound Care\par Elevation/Offloading\par Pressure Relief\par Compression Compliance\par Low Na+ Diet\par Weight Reduction Strategies \par  [FreeTextEntry4] : F/U Wednesday and Friday for dressing change and in 1 week for assessment

## 2020-11-30 NOTE — PHYSICAL EXAM
[0] : left 0 [Ankle Swelling (On Exam)] : present [Ankle Swelling Bilaterally] : bilaterally  [Varicose Veins Of Lower Extremities] : bilaterally [] : bilaterally [Ankle Swelling On The Left] : moderate [Skin Ulcer] : ulcer [JVD] : no jugular venous distention  [de-identified] : calm [de-identified] : 4/5 strength in all quadrants bilaterally [de-identified] : Right posterior ankle ulceration down to skin, subcutaneous tissue, and fat. venous stasis wound on right lower leg down to skin and subcutaneous tissue and fat epithelialized. venous stasis wounds on left lower leg down to skin and subcutaneous tissue and fat epithelialized. bilateral stasis dermatitis. [de-identified] : Cleansed with NS [FreeTextEntry7] : Anterior Leg-closed  [de-identified] : no treatment [de-identified] : Cleansed with NS [de-identified] : Achilles [de-identified] : 0.7 [de-identified] : 0.8 [de-identified] : 0.1 [de-identified] : Scant serosanguineous  [de-identified] : Intact  [de-identified] : Silver Alginate [de-identified] : Cleansed with NS\par Cloth Tape [FreeTextEntry1] : Foot 2nd Digit Nail Bed [de-identified] : No product necessary [de-identified] : Ace wraps [TWNoteComboBox9] : Left [de-identified] : Ace wraps [de-identified] : Right [de-identified] : No [de-identified] : No [de-identified] : Normal [de-identified] : None [de-identified] : 100% [de-identified] : None [de-identified] : No [de-identified] : Ace wraps [de-identified] : 3x Weekly [TWNoteComboBox1] : Right [TWNoteComboBox5] : No [de-identified] : No [de-identified] : Normal [de-identified] : None [de-identified] : None [de-identified] : None [de-identified] : No

## 2020-11-30 NOTE — HISTORY OF PRESENT ILLNESS
[FreeTextEntry1] : Patient seen for venous stasis wounds to right and left lower leg down to skin and subcutaneous tissue and fat with stasis dermatitis which have healed. Pt also seen for right posterior ankle diabetic ulcer down to skin, subcutaneous tissue, and fat.\par 11/30/20 area appears scabbed over with little drainage

## 2020-11-30 NOTE — REVIEW OF SYSTEMS
[Skin Wound] : skin wound [Fever] : no fever [Eye Pain] : no eye pain [Earache] : no earache [Chest Pain] : no chest pain [Shortness Of Breath] : no shortness of breath [Abdominal Pain] : no abdominal pain [de-identified] : Right posterior ankle ulceration down to skin, subcutaneous tissue, and fat. venous stasis wound on right lower leg down to skin and subcutaneous tissue and fat epithelialized. venous stasis wounds on left lower leg down to skin and subcutaneous tissue and fat epithelialized. bilateral stasis dermatitis.

## 2020-11-30 NOTE — PLAN
[FreeTextEntry1] : Patient examined and evaluated at this time.\par Continue local wound care and offloading.\par AgAlginate,DD,ace wrap 3x/week\par Pt to return in 1 week for follow up.

## 2020-12-01 ENCOUNTER — APPOINTMENT (OUTPATIENT)
Dept: OBGYN | Facility: HOSPITAL | Age: 65
End: 2020-12-01

## 2020-12-01 DIAGNOSIS — Z79.82 LONG TERM (CURRENT) USE OF ASPIRIN: ICD-10-CM

## 2020-12-01 DIAGNOSIS — L97.312 NON-PRESSURE CHRONIC ULCER OF RIGHT ANKLE WITH FAT LAYER EXPOSED: ICD-10-CM

## 2020-12-01 DIAGNOSIS — E55.9 VITAMIN D DEFICIENCY, UNSPECIFIED: ICD-10-CM

## 2020-12-01 DIAGNOSIS — Z87.891 PERSONAL HISTORY OF NICOTINE DEPENDENCE: ICD-10-CM

## 2020-12-01 DIAGNOSIS — Z87.81 PERSONAL HISTORY OF (HEALED) TRAUMATIC FRACTURE: ICD-10-CM

## 2020-12-01 DIAGNOSIS — Z98.84 BARIATRIC SURGERY STATUS: ICD-10-CM

## 2020-12-01 DIAGNOSIS — I47.1 SUPRAVENTRICULAR TACHYCARDIA: ICD-10-CM

## 2020-12-01 DIAGNOSIS — Z88.5 ALLERGY STATUS TO NARCOTIC AGENT: ICD-10-CM

## 2020-12-01 DIAGNOSIS — E11.622 TYPE 2 DIABETES MELLITUS WITH OTHER SKIN ULCER: ICD-10-CM

## 2020-12-01 DIAGNOSIS — M43.16 SPONDYLOLISTHESIS, LUMBAR REGION: ICD-10-CM

## 2020-12-01 DIAGNOSIS — Z98.890 OTHER SPECIFIED POSTPROCEDURAL STATES: ICD-10-CM

## 2020-12-01 DIAGNOSIS — Z82.49 FAMILY HISTORY OF ISCHEMIC HEART DISEASE AND OTHER DISEASES OF THE CIRCULATORY SYSTEM: ICD-10-CM

## 2020-12-01 DIAGNOSIS — G47.33 OBSTRUCTIVE SLEEP APNEA (ADULT) (PEDIATRIC): ICD-10-CM

## 2020-12-01 DIAGNOSIS — Z87.01 PERSONAL HISTORY OF PNEUMONIA (RECURRENT): ICD-10-CM

## 2020-12-01 DIAGNOSIS — Z95.4 PRESENCE OF OTHER HEART-VALVE REPLACEMENT: ICD-10-CM

## 2020-12-01 DIAGNOSIS — I83.893 VARICOSE VEINS OF BILATERAL LOWER EXTREMITIES WITH OTHER COMPLICATIONS: ICD-10-CM

## 2020-12-01 DIAGNOSIS — Z79.4 LONG TERM (CURRENT) USE OF INSULIN: ICD-10-CM

## 2020-12-01 DIAGNOSIS — M47.817 SPONDYLOSIS WITHOUT MYELOPATHY OR RADICULOPATHY, LUMBOSACRAL REGION: ICD-10-CM

## 2020-12-01 DIAGNOSIS — Z83.49 FAMILY HISTORY OF OTHER ENDOCRINE, NUTRITIONAL AND METABOLIC DISEASES: ICD-10-CM

## 2020-12-01 DIAGNOSIS — G47.10 HYPERSOMNIA, UNSPECIFIED: ICD-10-CM

## 2020-12-01 DIAGNOSIS — E05.90 THYROTOXICOSIS, UNSPECIFIED WITHOUT THYROTOXIC CRISIS OR STORM: ICD-10-CM

## 2020-12-01 DIAGNOSIS — M48.07 SPINAL STENOSIS, LUMBOSACRAL REGION: ICD-10-CM

## 2020-12-01 DIAGNOSIS — E66.01 MORBID (SEVERE) OBESITY DUE TO EXCESS CALORIES: ICD-10-CM

## 2020-12-01 DIAGNOSIS — Z79.899 OTHER LONG TERM (CURRENT) DRUG THERAPY: ICD-10-CM

## 2020-12-01 DIAGNOSIS — E11.59 TYPE 2 DIABETES MELLITUS WITH OTHER CIRCULATORY COMPLICATIONS: ICD-10-CM

## 2020-12-01 DIAGNOSIS — I11.9 HYPERTENSIVE HEART DISEASE WITHOUT HEART FAILURE: ICD-10-CM

## 2020-12-01 DIAGNOSIS — I34.1 NONRHEUMATIC MITRAL (VALVE) PROLAPSE: ICD-10-CM

## 2020-12-02 ENCOUNTER — APPOINTMENT (OUTPATIENT)
Dept: WOUND CARE | Facility: HOSPITAL | Age: 65
End: 2020-12-02

## 2020-12-03 ENCOUNTER — APPOINTMENT (OUTPATIENT)
Dept: CARDIOLOGY | Facility: CLINIC | Age: 65
End: 2020-12-03

## 2020-12-04 ENCOUNTER — OUTPATIENT (OUTPATIENT)
Dept: OUTPATIENT SERVICES | Facility: HOSPITAL | Age: 65
LOS: 1 days | Discharge: HOME | End: 2020-12-04
Payer: MEDICARE

## 2020-12-04 ENCOUNTER — APPOINTMENT (OUTPATIENT)
Dept: WOUND CARE | Facility: HOSPITAL | Age: 65
End: 2020-12-04
Payer: MEDICARE

## 2020-12-04 VITALS
HEART RATE: 90 BPM | OXYGEN SATURATION: 96 % | BODY MASS INDEX: 46.95 KG/M2 | RESPIRATION RATE: 20 BRPM | SYSTOLIC BLOOD PRESSURE: 109 MMHG | WEIGHT: 265 LBS | TEMPERATURE: 98 F | DIASTOLIC BLOOD PRESSURE: 60 MMHG | HEIGHT: 63 IN

## 2020-12-04 DIAGNOSIS — I83.893 VARICOSE VEINS OF BILATERAL LOWER EXTREMITIES WITH OTHER COMPLICATIONS: ICD-10-CM

## 2020-12-04 DIAGNOSIS — Z95.2 PRESENCE OF PROSTHETIC HEART VALVE: Chronic | ICD-10-CM

## 2020-12-04 DIAGNOSIS — E11.622 TYPE 2 DIABETES MELLITUS WITH OTHER SKIN ULCER: ICD-10-CM

## 2020-12-04 DIAGNOSIS — L97.312 NON-PRESSURE CHRONIC ULCER OF RIGHT ANKLE WITH FAT LAYER EXPOSED: ICD-10-CM

## 2020-12-04 PROCEDURE — 29581 APPL MULTLAYER CMPRN SYS LEG: CPT | Mod: 50

## 2020-12-04 PROCEDURE — ZZZZZ: CPT

## 2020-12-07 ENCOUNTER — APPOINTMENT (OUTPATIENT)
Dept: WOUND CARE | Facility: HOSPITAL | Age: 65
End: 2020-12-07
Payer: MEDICARE

## 2020-12-07 ENCOUNTER — APPOINTMENT (OUTPATIENT)
Dept: PLASTIC SURGERY | Facility: HOSPITAL | Age: 65
End: 2020-12-07

## 2020-12-07 ENCOUNTER — OUTPATIENT (OUTPATIENT)
Dept: OUTPATIENT SERVICES | Facility: HOSPITAL | Age: 65
LOS: 1 days | Discharge: HOME | End: 2020-12-07
Payer: MEDICARE

## 2020-12-07 VITALS
HEART RATE: 71 BPM | TEMPERATURE: 97.1 F | DIASTOLIC BLOOD PRESSURE: 60 MMHG | SYSTOLIC BLOOD PRESSURE: 123 MMHG | WEIGHT: 265 LBS | BODY MASS INDEX: 46.95 KG/M2 | HEIGHT: 63 IN | OXYGEN SATURATION: 98 % | RESPIRATION RATE: 20 BRPM

## 2020-12-07 DIAGNOSIS — I83.218 VARICOSE VEINS OF RIGHT LOWER EXTREMITY WITH BOTH ULCER OF OTHER PART OF LOWER EXTREMITY AND INFLAMMATION: ICD-10-CM

## 2020-12-07 DIAGNOSIS — Z95.2 PRESENCE OF PROSTHETIC HEART VALVE: Chronic | ICD-10-CM

## 2020-12-07 PROCEDURE — 99213 OFFICE O/P EST LOW 20 MIN: CPT

## 2020-12-07 PROCEDURE — G0463: CPT

## 2020-12-07 NOTE — ASSESSMENT
[Verbal] : Verbal [Demo] : Demo [Patient] : Patient [Fair - mild discomfort, physical impairment, low acceptance] : Fair - mild discomfort, physical impairment, low acceptance [Needs reinforcement] : needs reinforcement [Dressing changes] : dressing changes [Foot Care] : foot care [Skin Care] : skin care [Pressure relief] : pressure relief [Signs and symptoms of infection] : sign and symptoms of infection [Venous Disease] : venous disease [Nutrition] : nutrition [How and When to Call] : how and when to call [Off-loading] : off-loading [Compression Therapy] : compression therapy [Patient responsibility to plan of care] : patient responsibility to plan of care [Stable] : stable [Home] : Home [Cane] : Cane [Not Applicable - Long Term Care/Home Health Agency] : Long Term Care/Home Health Agency: Not Applicable [] : Yes [FreeTextEntry2] : Infection Prevention\par Promote Skin Integrity\par Localized Wound Care\par Elevation/Offloading\par Pressure Relief\par Compression Compliance\par Low Na+ Diet\par Weight Reduction Strategies \par  [FreeTextEntry4] : Photos Taken\par Submitted for Apligraf next assessment as per DPM\par Offloading techniques discussed with patient. Patient verbalized understanding.\par F/U Wednesday and Friday for dressing change and in 1 week for assessment

## 2020-12-07 NOTE — PLAN
[FreeTextEntry1] : Patient examined and evaluated at this time.\par Continue local wound care and offloading.\par Will auth for apligraf.\par Pt to return in 1 week for follow up.

## 2020-12-07 NOTE — REVIEW OF SYSTEMS
[Fever] : no fever [Eye Pain] : no eye pain [Earache] : no earache [Chest Pain] : no chest pain [Shortness Of Breath] : no shortness of breath [Abdominal Pain] : no abdominal pain [Skin Wound] : skin wound [de-identified] : Right posterior ankle ulceration down to skin, subcutaneous tissue, and fat. venous stasis wound on right lower leg down to skin and subcutaneous tissue and fat epithelialized. venous stasis wounds on left lower leg down to skin and subcutaneous tissue and fat epithelialized. bilateral stasis dermatitis.

## 2020-12-07 NOTE — PHYSICAL EXAM
[4 x 4] : 4 x 4  [Abdominal Pad] : Abdominal Pad [JVD] : no jugular venous distention  [0] : left 0 [Ankle Swelling (On Exam)] : present [Ankle Swelling Bilaterally] : bilaterally  [Varicose Veins Of Lower Extremities] : bilaterally [] : bilaterally [Ankle Swelling On The Left] : moderate [Skin Ulcer] : ulcer [de-identified] : calm [de-identified] : 4/5 strength in all quadrants bilaterally [de-identified] : Right posterior ankle ulceration down to skin, subcutaneous tissue, and fat. venous stasis wound on right lower leg down to skin and subcutaneous tissue and fat epithelialized. venous stasis wounds on left lower leg down to skin and subcutaneous tissue and fat epithelialized. bilateral stasis dermatitis. [de-identified] : Cleansed with NS [FreeTextEntry7] : Anterior Leg-closed  [de-identified] : no treatment [de-identified] : Cleansed with NS [de-identified] : Achilles [de-identified] : 1.1 [de-identified] : 1.3 [de-identified] : 0.1 [de-identified] : blood tinged  [de-identified] : Intact  [de-identified] : Thrombi-Pad [de-identified] : Cleansed with NS\par Cloth Tape\par \par Thrombi-Pad applied this visit by DPM. Silver alginate for dressings to follow if bleeding from wound has decreased. [FreeTextEntry1] : Foot 2nd Digit Nail Bed [de-identified] : No product necessary [de-identified] : Ace wraps [TWNoteComboBox9] : Left [de-identified] : Ace wraps [de-identified] : Right [de-identified] : Moderate [de-identified] : No [de-identified] : No [de-identified] : Normal [de-identified] : None [de-identified] : None [de-identified] : 100% [de-identified] : No [de-identified] : Ace wraps [de-identified] : 3x Weekly [TWNoteComboBox1] : Right [TWNoteComboBox5] : No [de-identified] : No [de-identified] : Normal [de-identified] : None [de-identified] : None [de-identified] : None [de-identified] : No

## 2020-12-08 DIAGNOSIS — Z87.891 PERSONAL HISTORY OF NICOTINE DEPENDENCE: ICD-10-CM

## 2020-12-08 DIAGNOSIS — Z79.4 LONG TERM (CURRENT) USE OF INSULIN: ICD-10-CM

## 2020-12-08 DIAGNOSIS — G47.10 HYPERSOMNIA, UNSPECIFIED: ICD-10-CM

## 2020-12-08 DIAGNOSIS — Z98.890 OTHER SPECIFIED POSTPROCEDURAL STATES: ICD-10-CM

## 2020-12-08 DIAGNOSIS — I83.228 VARICOSE VEINS OF LEFT LOWER EXTREMITY WITH BOTH ULCER OF OTHER PART OF LOWER EXTREMITY AND INFLAMMATION: ICD-10-CM

## 2020-12-08 DIAGNOSIS — Z79.82 LONG TERM (CURRENT) USE OF ASPIRIN: ICD-10-CM

## 2020-12-08 DIAGNOSIS — Z98.84 BARIATRIC SURGERY STATUS: ICD-10-CM

## 2020-12-08 DIAGNOSIS — E66.01 MORBID (SEVERE) OBESITY DUE TO EXCESS CALORIES: ICD-10-CM

## 2020-12-08 DIAGNOSIS — Z88.5 ALLERGY STATUS TO NARCOTIC AGENT: ICD-10-CM

## 2020-12-08 DIAGNOSIS — I47.1 SUPRAVENTRICULAR TACHYCARDIA: ICD-10-CM

## 2020-12-08 DIAGNOSIS — Z79.899 OTHER LONG TERM (CURRENT) DRUG THERAPY: ICD-10-CM

## 2020-12-08 DIAGNOSIS — L97.822 NON-PRESSURE CHRONIC ULCER OF OTHER PART OF LEFT LOWER LEG WITH FAT LAYER EXPOSED: ICD-10-CM

## 2020-12-08 DIAGNOSIS — M43.16 SPONDYLOLISTHESIS, LUMBAR REGION: ICD-10-CM

## 2020-12-08 DIAGNOSIS — Z87.01 PERSONAL HISTORY OF PNEUMONIA (RECURRENT): ICD-10-CM

## 2020-12-08 DIAGNOSIS — E11.622 TYPE 2 DIABETES MELLITUS WITH OTHER SKIN ULCER: ICD-10-CM

## 2020-12-08 DIAGNOSIS — E05.90 THYROTOXICOSIS, UNSPECIFIED WITHOUT THYROTOXIC CRISIS OR STORM: ICD-10-CM

## 2020-12-08 DIAGNOSIS — M47.817 SPONDYLOSIS WITHOUT MYELOPATHY OR RADICULOPATHY, LUMBOSACRAL REGION: ICD-10-CM

## 2020-12-08 DIAGNOSIS — G47.33 OBSTRUCTIVE SLEEP APNEA (ADULT) (PEDIATRIC): ICD-10-CM

## 2020-12-08 DIAGNOSIS — E11.59 TYPE 2 DIABETES MELLITUS WITH OTHER CIRCULATORY COMPLICATIONS: ICD-10-CM

## 2020-12-08 DIAGNOSIS — I34.1 NONRHEUMATIC MITRAL (VALVE) PROLAPSE: ICD-10-CM

## 2020-12-08 DIAGNOSIS — E55.9 VITAMIN D DEFICIENCY, UNSPECIFIED: ICD-10-CM

## 2020-12-08 DIAGNOSIS — Z83.49 FAMILY HISTORY OF OTHER ENDOCRINE, NUTRITIONAL AND METABOLIC DISEASES: ICD-10-CM

## 2020-12-08 DIAGNOSIS — Z95.4 PRESENCE OF OTHER HEART-VALVE REPLACEMENT: ICD-10-CM

## 2020-12-08 DIAGNOSIS — Z82.49 FAMILY HISTORY OF ISCHEMIC HEART DISEASE AND OTHER DISEASES OF THE CIRCULATORY SYSTEM: ICD-10-CM

## 2020-12-08 DIAGNOSIS — I83.893 VARICOSE VEINS OF BILATERAL LOWER EXTREMITIES WITH OTHER COMPLICATIONS: ICD-10-CM

## 2020-12-08 DIAGNOSIS — M48.07 SPINAL STENOSIS, LUMBOSACRAL REGION: ICD-10-CM

## 2020-12-08 DIAGNOSIS — L97.312 NON-PRESSURE CHRONIC ULCER OF RIGHT ANKLE WITH FAT LAYER EXPOSED: ICD-10-CM

## 2020-12-08 DIAGNOSIS — I11.9 HYPERTENSIVE HEART DISEASE WITHOUT HEART FAILURE: ICD-10-CM

## 2020-12-09 ENCOUNTER — OUTPATIENT (OUTPATIENT)
Dept: OUTPATIENT SERVICES | Facility: HOSPITAL | Age: 65
LOS: 1 days | Discharge: HOME | End: 2020-12-09
Payer: MEDICARE

## 2020-12-09 ENCOUNTER — APPOINTMENT (OUTPATIENT)
Dept: PLASTIC SURGERY | Facility: HOSPITAL | Age: 65
End: 2020-12-09
Payer: MEDICARE

## 2020-12-09 VITALS
OXYGEN SATURATION: 97 % | HEART RATE: 75 BPM | RESPIRATION RATE: 20 BRPM | BODY MASS INDEX: 46.95 KG/M2 | DIASTOLIC BLOOD PRESSURE: 51 MMHG | SYSTOLIC BLOOD PRESSURE: 107 MMHG | WEIGHT: 265 LBS | TEMPERATURE: 97.5 F | HEIGHT: 63 IN

## 2020-12-09 DIAGNOSIS — E11.622 TYPE 2 DIABETES MELLITUS WITH OTHER SKIN ULCER: ICD-10-CM

## 2020-12-09 DIAGNOSIS — Z95.2 PRESENCE OF PROSTHETIC HEART VALVE: Chronic | ICD-10-CM

## 2020-12-09 DIAGNOSIS — L97.312 NON-PRESSURE CHRONIC ULCER OF RIGHT ANKLE WITH FAT LAYER EXPOSED: ICD-10-CM

## 2020-12-09 DIAGNOSIS — I83.218 VARICOSE VEINS OF RIGHT LOWER EXTREMITY WITH BOTH ULCER OF OTHER PART OF LOWER EXTREMITY AND INFLAMMATION: ICD-10-CM

## 2020-12-09 PROCEDURE — ZZZZZ: CPT

## 2020-12-09 PROCEDURE — G0463: CPT

## 2020-12-10 ENCOUNTER — APPOINTMENT (OUTPATIENT)
Dept: CARDIOLOGY | Facility: CLINIC | Age: 65
End: 2020-12-10

## 2020-12-11 ENCOUNTER — APPOINTMENT (OUTPATIENT)
Dept: WOUND CARE | Facility: HOSPITAL | Age: 65
End: 2020-12-11
Payer: MEDICARE

## 2020-12-11 ENCOUNTER — OUTPATIENT (OUTPATIENT)
Dept: OUTPATIENT SERVICES | Facility: HOSPITAL | Age: 65
LOS: 1 days | Discharge: HOME | End: 2020-12-11
Payer: MEDICARE

## 2020-12-11 VITALS
DIASTOLIC BLOOD PRESSURE: 55 MMHG | WEIGHT: 265 LBS | SYSTOLIC BLOOD PRESSURE: 116 MMHG | RESPIRATION RATE: 20 BRPM | BODY MASS INDEX: 46.95 KG/M2 | OXYGEN SATURATION: 97 % | TEMPERATURE: 98.2 F | HEIGHT: 63 IN | HEART RATE: 71 BPM

## 2020-12-11 DIAGNOSIS — I83.218 VARICOSE VEINS OF RIGHT LOWER EXTREMITY WITH BOTH ULCER OF OTHER PART OF LOWER EXTREMITY AND INFLAMMATION: ICD-10-CM

## 2020-12-11 DIAGNOSIS — Z95.2 PRESENCE OF PROSTHETIC HEART VALVE: Chronic | ICD-10-CM

## 2020-12-11 PROCEDURE — G0463: CPT

## 2020-12-11 PROCEDURE — ZZZZZ: CPT

## 2020-12-12 DIAGNOSIS — L97.312 NON-PRESSURE CHRONIC ULCER OF RIGHT ANKLE WITH FAT LAYER EXPOSED: ICD-10-CM

## 2020-12-12 DIAGNOSIS — E11.622 TYPE 2 DIABETES MELLITUS WITH OTHER SKIN ULCER: ICD-10-CM

## 2020-12-14 ENCOUNTER — OUTPATIENT (OUTPATIENT)
Dept: OUTPATIENT SERVICES | Facility: HOSPITAL | Age: 65
LOS: 1 days | Discharge: HOME | End: 2020-12-14
Payer: MEDICARE

## 2020-12-14 ENCOUNTER — APPOINTMENT (OUTPATIENT)
Dept: WOUND CARE | Facility: HOSPITAL | Age: 65
End: 2020-12-14
Payer: MEDICARE

## 2020-12-14 VITALS
TEMPERATURE: 97.5 F | BODY MASS INDEX: 46.95 KG/M2 | DIASTOLIC BLOOD PRESSURE: 78 MMHG | HEIGHT: 63 IN | OXYGEN SATURATION: 99 % | HEART RATE: 71 BPM | RESPIRATION RATE: 20 BRPM | SYSTOLIC BLOOD PRESSURE: 114 MMHG | WEIGHT: 265 LBS

## 2020-12-14 DIAGNOSIS — E11.622 TYPE 2 DIABETES MELLITUS WITH OTHER SKIN ULCER: ICD-10-CM

## 2020-12-14 DIAGNOSIS — Z82.49 FAMILY HISTORY OF ISCHEMIC HEART DISEASE AND OTHER DISEASES OF THE CIRCULATORY SYSTEM: ICD-10-CM

## 2020-12-14 DIAGNOSIS — Z79.82 LONG TERM (CURRENT) USE OF ASPIRIN: ICD-10-CM

## 2020-12-14 DIAGNOSIS — Z95.4 PRESENCE OF OTHER HEART-VALVE REPLACEMENT: ICD-10-CM

## 2020-12-14 DIAGNOSIS — Z98.84 BARIATRIC SURGERY STATUS: ICD-10-CM

## 2020-12-14 DIAGNOSIS — Z79.899 OTHER LONG TERM (CURRENT) DRUG THERAPY: ICD-10-CM

## 2020-12-14 DIAGNOSIS — I34.1 NONRHEUMATIC MITRAL (VALVE) PROLAPSE: ICD-10-CM

## 2020-12-14 DIAGNOSIS — Z87.891 PERSONAL HISTORY OF NICOTINE DEPENDENCE: ICD-10-CM

## 2020-12-14 DIAGNOSIS — I83.218 VARICOSE VEINS OF RIGHT LOWER EXTREMITY WITH BOTH ULCER OF OTHER PART OF LOWER EXTREMITY AND INFLAMMATION: ICD-10-CM

## 2020-12-14 DIAGNOSIS — M48.07 SPINAL STENOSIS, LUMBOSACRAL REGION: ICD-10-CM

## 2020-12-14 DIAGNOSIS — Z83.49 FAMILY HISTORY OF OTHER ENDOCRINE, NUTRITIONAL AND METABOLIC DISEASES: ICD-10-CM

## 2020-12-14 DIAGNOSIS — E05.90 THYROTOXICOSIS, UNSPECIFIED WITHOUT THYROTOXIC CRISIS OR STORM: ICD-10-CM

## 2020-12-14 DIAGNOSIS — E55.9 VITAMIN D DEFICIENCY, UNSPECIFIED: ICD-10-CM

## 2020-12-14 DIAGNOSIS — G47.33 OBSTRUCTIVE SLEEP APNEA (ADULT) (PEDIATRIC): ICD-10-CM

## 2020-12-14 DIAGNOSIS — Z98.890 OTHER SPECIFIED POSTPROCEDURAL STATES: ICD-10-CM

## 2020-12-14 DIAGNOSIS — I47.1 SUPRAVENTRICULAR TACHYCARDIA: ICD-10-CM

## 2020-12-14 DIAGNOSIS — M43.16 SPONDYLOLISTHESIS, LUMBAR REGION: ICD-10-CM

## 2020-12-14 DIAGNOSIS — L97.312 NON-PRESSURE CHRONIC ULCER OF RIGHT ANKLE WITH FAT LAYER EXPOSED: ICD-10-CM

## 2020-12-14 DIAGNOSIS — I83.893 VARICOSE VEINS OF BILATERAL LOWER EXTREMITIES WITH OTHER COMPLICATIONS: ICD-10-CM

## 2020-12-14 DIAGNOSIS — L97.822 NON-PRESSURE CHRONIC ULCER OF OTHER PART OF LEFT LOWER LEG WITH FAT LAYER EXPOSED: ICD-10-CM

## 2020-12-14 DIAGNOSIS — Z87.81 PERSONAL HISTORY OF (HEALED) TRAUMATIC FRACTURE: ICD-10-CM

## 2020-12-14 DIAGNOSIS — Z87.01 PERSONAL HISTORY OF PNEUMONIA (RECURRENT): ICD-10-CM

## 2020-12-14 DIAGNOSIS — G47.10 HYPERSOMNIA, UNSPECIFIED: ICD-10-CM

## 2020-12-14 DIAGNOSIS — I83.228 VARICOSE VEINS OF LEFT LOWER EXTREMITY WITH BOTH ULCER OF OTHER PART OF LOWER EXTREMITY AND INFLAMMATION: ICD-10-CM

## 2020-12-14 DIAGNOSIS — M47.817 SPONDYLOSIS WITHOUT MYELOPATHY OR RADICULOPATHY, LUMBOSACRAL REGION: ICD-10-CM

## 2020-12-14 DIAGNOSIS — E11.59 TYPE 2 DIABETES MELLITUS WITH OTHER CIRCULATORY COMPLICATIONS: ICD-10-CM

## 2020-12-14 DIAGNOSIS — Z95.2 PRESENCE OF PROSTHETIC HEART VALVE: Chronic | ICD-10-CM

## 2020-12-14 DIAGNOSIS — E66.01 MORBID (SEVERE) OBESITY DUE TO EXCESS CALORIES: ICD-10-CM

## 2020-12-14 DIAGNOSIS — Z79.4 LONG TERM (CURRENT) USE OF INSULIN: ICD-10-CM

## 2020-12-14 DIAGNOSIS — Z88.5 ALLERGY STATUS TO NARCOTIC AGENT: ICD-10-CM

## 2020-12-14 DIAGNOSIS — I11.9 HYPERTENSIVE HEART DISEASE WITHOUT HEART FAILURE: ICD-10-CM

## 2020-12-14 PROCEDURE — 15271 SKIN SUB GRAFT TRNK/ARM/LEG: CPT

## 2020-12-14 NOTE — REVIEW OF SYSTEMS
[FreeTextEntry1] : 9969 [Fever] : no fever [Eye Pain] : no eye pain [Earache] : no earache [Chest Pain] : no chest pain [Shortness Of Breath] : no shortness of breath [Abdominal Pain] : no abdominal pain [Skin Wound] : skin wound [de-identified] : Right posterior ankle ulceration down to skin, subcutaneous tissue, and fat. venous stasis wound on right lower leg down to skin and subcutaneous tissue and fat epithelialized. venous stasis wounds on left lower leg down to skin and subcutaneous tissue and fat epithelialized. bilateral stasis dermatitis.

## 2020-12-14 NOTE — ASSESSMENT
[Verbal] : Verbal [Patient] : Patient [Good - alert, interested, motivated] : Good - alert, interested, motivated [Verbalizes knowledge/Understanding] : Verbalizes knowledge/understanding [Dressing changes] : dressing changes [Foot Care] : foot care [Skin Care] : skin care [Pressure relief] : pressure relief [Signs and symptoms of infection] : sign and symptoms of infection [Nutrition] : nutrition [How and When to Call] : how and when to call [Off-loading] : off-loading [Patient responsibility to plan of care] : patient responsibility to plan of care [Glycemic Control] : glycemic control [Stable] : stable [Home] : Home [Cane] : Cane [Not Applicable - Long Term Care/Home Health Agency] : Long Term Care/Home Health Agency: Not Applicable [] : No [FreeTextEntry2] : Infection Prevention\par Localized Wound Care\par Skin Graft\par Promote Optimal Skin Integrity\par Offloading / Pressure Relief\par  [FreeTextEntry4] : F/U to [facility] in One Week for Assessment\par Auth for Apligraf #2 submitted for next week pending pt response to today's treatment

## 2020-12-14 NOTE — PHYSICAL EXAM
[Abdominal Pad] : Abdominal Pad [4 x 4] : 4 x 4  [JVD] : no jugular venous distention  [0] : left 0 [Ankle Swelling (On Exam)] : present [Ankle Swelling Bilaterally] : bilaterally  [Varicose Veins Of Lower Extremities] : bilaterally [] : bilaterally [Ankle Swelling On The Left] : moderate [Skin Ulcer] : ulcer [de-identified] : A&Ox3, NAD [de-identified] : 4/5 strength in all quadrants bilaterally [de-identified] : Right posterior ankle ulceration down to skin, subcutaneous tissue, and fat. venous stasis wound on right lower leg down to skin and subcutaneous tissue and fat epithelialized. venous stasis wounds on left lower leg down to skin and subcutaneous tissue and fat epithelialized. bilateral stasis dermatitis. [FreeTextEntry7] : Left Achilles - Closed Fragile Epithelium  [de-identified] : Allevyn Boarder  [de-identified] : Cleansed with Normal Saline  [de-identified] : Apligraf #1 \par 1 unit of Apligraf 44 (Sqcm)\par Lot # [id number]\par Item # [id number]\par Exp: 2020-12-18\par pH: 7.3 - 7.5\par Reconstituted with NS: Lot #: [id number]           Exp: [id number]\par 25% Implanted / 75% Discarded\par Bandage applied by physician \par No neurovascular deficit noted  [de-identified] : Right Achilles  [de-identified] : 1.0 [de-identified] : 1.0 [de-identified] : 0.1 [de-identified] : Serosanguineous  [de-identified] : Pt denies any pain or discomfort post ACE application  [de-identified] : Apligraf, Adaptic Touch and Calcium Alginate  [de-identified] : Cleansed with Normal Saline \par Kerlix\par Post Debridement Measurement:\par 1.0 x 1.1 x 0.1 [de-identified] : None [de-identified] : Every other day [de-identified] : Small [de-identified] : No [de-identified] : No [de-identified] : Normal [de-identified] : None [de-identified] : None [de-identified] : 100% [de-identified] : No [de-identified] : Naseem [de-identified] : Ace wraps [de-identified] : Weekly

## 2020-12-14 NOTE — PROCEDURE
[Saline] : saline [Scalpel] : scalpel [Fenestrated] : fenestrated [Hydrated with saline] : hydrated with saline [Apligraf] : apligraf [____ % was used] : and [unfilled] % was used [____ % was discarded] : and [unfilled] % was discarded [FreeTextEntry1] : adaptic, calcium alginate, dry dressing, ACE [FreeTextEntry9] : 4056 [de-identified] : Right posterior ankle ulceration down to skin, subcutaneous tissue, and fat [de-identified] : ora [de-identified] : christiano [FreeTextEntry6] : Right posterior ankle ulceration down to skin, subcutaneous tissue, and fat [FreeTextEntry7] : Right posterior ankle ulceration down to skin, subcutaneous tissue, and fat [de-identified] : 1mL

## 2020-12-14 NOTE — VITALS
[] : No [de-identified] : Pt rates pain 0/10.  Patient denies any pain or discomfort at the present

## 2020-12-14 NOTE — REVIEW OF SYSTEMS
[FreeTextEntry1] : 5701 [Fever] : no fever [Eye Pain] : no eye pain [Earache] : no earache [Chest Pain] : no chest pain [Shortness Of Breath] : no shortness of breath [Abdominal Pain] : no abdominal pain [Skin Wound] : skin wound [de-identified] : Right posterior ankle ulceration down to skin, subcutaneous tissue, and fat. venous stasis wound on right lower leg down to skin and subcutaneous tissue and fat epithelialized. venous stasis wounds on left lower leg down to skin and subcutaneous tissue and fat epithelialized. bilateral stasis dermatitis.

## 2020-12-14 NOTE — PROCEDURE
[Saline] : saline [Scalpel] : scalpel [Fenestrated] : fenestrated [Hydrated with saline] : hydrated with saline [Apligraf] : apligraf [____ % was used] : and [unfilled] % was used [____ % was discarded] : and [unfilled] % was discarded [FreeTextEntry1] : adaptic, calcium alginate, dry dressing, ACE [FreeTextEntry9] : 0927 [de-identified] : Right posterior ankle ulceration down to skin, subcutaneous tissue, and fat [de-identified] : ora [de-identified] : christiano [FreeTextEntry6] : Right posterior ankle ulceration down to skin, subcutaneous tissue, and fat [FreeTextEntry7] : Right posterior ankle ulceration down to skin, subcutaneous tissue, and fat [de-identified] : 1mL

## 2020-12-14 NOTE — ASSESSMENT
[Verbal] : Verbal [Patient] : Patient [Good - alert, interested, motivated] : Good - alert, interested, motivated [Verbalizes knowledge/Understanding] : Verbalizes knowledge/understanding [Dressing changes] : dressing changes [Foot Care] : foot care [Skin Care] : skin care [Pressure relief] : pressure relief [Signs and symptoms of infection] : sign and symptoms of infection [Nutrition] : nutrition [How and When to Call] : how and when to call [Off-loading] : off-loading [Patient responsibility to plan of care] : patient responsibility to plan of care [Glycemic Control] : glycemic control [Stable] : stable [Home] : Home [Cane] : Cane [Not Applicable - Long Term Care/Home Health Agency] : Long Term Care/Home Health Agency: Not Applicable [] : No [FreeTextEntry2] : Infection Prevention\par Localized Wound Care\par Skin Graft\par Promote Optimal Skin Integrity\par Offloading / Pressure Relief\par  [FreeTextEntry4] : F/U to [facility] in One Week for Assessment\par Auth for Apligraf #2 submitted for next week pending pt response to today's treatment

## 2020-12-14 NOTE — VITALS
[] : No [de-identified] : Pt rates pain 0/10.  Patient denies any pain or discomfort at the present

## 2020-12-14 NOTE — PHYSICAL EXAM
[Abdominal Pad] : Abdominal Pad [4 x 4] : 4 x 4  [JVD] : no jugular venous distention  [0] : left 0 [Ankle Swelling (On Exam)] : present [Ankle Swelling Bilaterally] : bilaterally  [Varicose Veins Of Lower Extremities] : bilaterally [] : bilaterally [Ankle Swelling On The Left] : moderate [Skin Ulcer] : ulcer [de-identified] : A&Ox3, NAD [de-identified] : 4/5 strength in all quadrants bilaterally [de-identified] : Right posterior ankle ulceration down to skin, subcutaneous tissue, and fat. venous stasis wound on right lower leg down to skin and subcutaneous tissue and fat epithelialized. venous stasis wounds on left lower leg down to skin and subcutaneous tissue and fat epithelialized. bilateral stasis dermatitis. [FreeTextEntry7] : Left Achilles - Closed Fragile Epithelium  [de-identified] : Allevyn Boarder  [de-identified] : Cleansed with Normal Saline  [de-identified] : Apligraf #1 \par 1 unit of Apligraf 44 (Sqcm)\par Lot # [id number]\par Item # [id number]\par Exp: 2020-12-18\par pH: 7.3 - 7.5\par Reconstituted with NS: Lot #: [id number]           Exp: [id number]\par 25% Implanted / 75% Discarded\par Bandage applied by physician \par No neurovascular deficit noted  [de-identified] : Right Achilles  [de-identified] : 1.0 [de-identified] : 1.0 [de-identified] : 0.1 [de-identified] : Serosanguineous  [de-identified] : Pt denies any pain or discomfort post ACE application  [de-identified] : Apligraf, Adaptic Touch and Calcium Alginate  [de-identified] : Cleansed with Normal Saline \par Kerlix\par Post Debridement Measurement:\par 1.0 x 1.1 x 0.1 [de-identified] : None [de-identified] : Every other day [de-identified] : Small [de-identified] : No [de-identified] : No [de-identified] : Normal [de-identified] : None [de-identified] : None [de-identified] : 100% [de-identified] : No [de-identified] : Naseem [de-identified] : Ace wraps [de-identified] : Weekly

## 2020-12-16 ENCOUNTER — APPOINTMENT (OUTPATIENT)
Dept: WOUND CARE | Facility: HOSPITAL | Age: 65
End: 2020-12-16

## 2020-12-18 ENCOUNTER — APPOINTMENT (OUTPATIENT)
Dept: WOUND CARE | Facility: HOSPITAL | Age: 65
End: 2020-12-18

## 2020-12-21 ENCOUNTER — APPOINTMENT (OUTPATIENT)
Dept: WOUND CARE | Facility: HOSPITAL | Age: 65
End: 2020-12-21
Payer: MEDICARE

## 2020-12-21 ENCOUNTER — OUTPATIENT (OUTPATIENT)
Dept: OUTPATIENT SERVICES | Facility: HOSPITAL | Age: 65
LOS: 1 days | Discharge: HOME | End: 2020-12-21
Payer: MEDICARE

## 2020-12-21 VITALS
TEMPERATURE: 98 F | BODY MASS INDEX: 46.95 KG/M2 | WEIGHT: 265 LBS | OXYGEN SATURATION: 97 % | SYSTOLIC BLOOD PRESSURE: 149 MMHG | RESPIRATION RATE: 20 BRPM | HEIGHT: 63 IN | DIASTOLIC BLOOD PRESSURE: 68 MMHG | HEART RATE: 89 BPM

## 2020-12-21 VITALS
BODY MASS INDEX: 46.95 KG/M2 | WEIGHT: 265 LBS | OXYGEN SATURATION: 97 % | HEART RATE: 89 BPM | HEIGHT: 63 IN | RESPIRATION RATE: 20 BRPM | DIASTOLIC BLOOD PRESSURE: 68 MMHG | TEMPERATURE: 98 F | SYSTOLIC BLOOD PRESSURE: 149 MMHG

## 2020-12-21 DIAGNOSIS — I83.218 VARICOSE VEINS OF RIGHT LOWER EXTREMITY WITH BOTH ULCER OF OTHER PART OF LOWER EXTREMITY AND INFLAMMATION: ICD-10-CM

## 2020-12-21 DIAGNOSIS — Z95.2 PRESENCE OF PROSTHETIC HEART VALVE: Chronic | ICD-10-CM

## 2020-12-21 PROCEDURE — 99213 OFFICE O/P EST LOW 20 MIN: CPT

## 2020-12-21 PROCEDURE — 29581 APPL MULTLAYER CMPRN SYS LEG: CPT | Mod: RT

## 2020-12-21 NOTE — PHYSICAL EXAM
[4 x 4] : 4 x 4  [Abdominal Pad] : Abdominal Pad [0] : left 0 [Ankle Swelling (On Exam)] : present [Ankle Swelling Bilaterally] : bilaterally  [Varicose Veins Of Lower Extremities] : bilaterally [] : bilaterally [Ankle Swelling On The Left] : moderate [Skin Ulcer] : ulcer [JVD] : no jugular venous distention  [de-identified] : calm [de-identified] : 4/5 strength in all quadrants bilaterally [de-identified] : Right posterior ankle ulceration down to skin, subcutaneous tissue, and fat. venous stasis wound on right lower leg down to skin and subcutaneous tissue and fat epithelialized. venous stasis wounds on left lower leg down to skin and subcutaneous tissue and fat epithelialized. bilateral stasis dermatitis. [FreeTextEntry7] : Left Achilles - Closed [de-identified] : Allevyn Boarder  [de-identified] : Cleansed with Normal Saline  [de-identified] : Right Achilles  [de-identified] : 0.5 [de-identified] : 0.8 [de-identified] : 0.1 [de-identified] : Serosanguineous  [de-identified] : Coban compression [de-identified] : Calcium Alginate  [de-identified] : Cleansed with Normal Saline \par Kerlix\par  [de-identified] : None [de-identified] : 3x Weekly [de-identified] : Small [de-identified] : No [de-identified] : No [de-identified] : Normal [de-identified] : None [de-identified] : None [de-identified] : 100% [de-identified] : No [de-identified] : Naseem [de-identified] : Multilayer other compression wrap [de-identified] : 3x Weekly

## 2020-12-21 NOTE — ASSESSMENT
[Verbal] : Verbal [Patient] : Patient [Good - alert, interested, motivated] : Good - alert, interested, motivated [Verbalizes knowledge/Understanding] : Verbalizes knowledge/understanding [Dressing changes] : dressing changes [Foot Care] : foot care [Skin Care] : skin care [Pressure relief] : pressure relief [Signs and symptoms of infection] : sign and symptoms of infection [Nutrition] : nutrition [How and When to Call] : how and when to call [Off-loading] : off-loading [Patient responsibility to plan of care] : patient responsibility to plan of care [Glycemic Control] : glycemic control [] : Yes [Stable] : stable [Home] : Home [Cane] : Cane [Not Applicable - Long Term Care/Home Health Agency] : Long Term Care/Home Health Agency: Not Applicable [FreeTextEntry2] : Infection Prevention\par Localized Wound Care\par Skin Graft\par Promote Optimal Skin Integrity\par Offloading / Pressure Relief\par  [FreeTextEntry4] : Photos Taken\par Pt not to have additional Apligraf application due to the wound being too small.\par F/U to [facility] in One Week for Assessment

## 2020-12-21 NOTE — PLAN
[FreeTextEntry1] : Patient examined and evaluated at this time.\par Continue local wound care and offloading.\par Coban multilayer compression for the right lower leg, compression socks for the left lower leg.\par Pt to return in 1 week for follow up.

## 2020-12-22 DIAGNOSIS — E55.9 VITAMIN D DEFICIENCY, UNSPECIFIED: ICD-10-CM

## 2020-12-22 DIAGNOSIS — M43.16 SPONDYLOLISTHESIS, LUMBAR REGION: ICD-10-CM

## 2020-12-22 DIAGNOSIS — E11.59 TYPE 2 DIABETES MELLITUS WITH OTHER CIRCULATORY COMPLICATIONS: ICD-10-CM

## 2020-12-22 DIAGNOSIS — Z95.4 PRESENCE OF OTHER HEART-VALVE REPLACEMENT: ICD-10-CM

## 2020-12-22 DIAGNOSIS — I47.1 SUPRAVENTRICULAR TACHYCARDIA: ICD-10-CM

## 2020-12-22 DIAGNOSIS — I34.1 NONRHEUMATIC MITRAL (VALVE) PROLAPSE: ICD-10-CM

## 2020-12-22 DIAGNOSIS — I83.893 VARICOSE VEINS OF BILATERAL LOWER EXTREMITIES WITH OTHER COMPLICATIONS: ICD-10-CM

## 2020-12-22 DIAGNOSIS — G47.33 OBSTRUCTIVE SLEEP APNEA (ADULT) (PEDIATRIC): ICD-10-CM

## 2020-12-22 DIAGNOSIS — Z98.890 OTHER SPECIFIED POSTPROCEDURAL STATES: ICD-10-CM

## 2020-12-22 DIAGNOSIS — M47.817 SPONDYLOSIS WITHOUT MYELOPATHY OR RADICULOPATHY, LUMBOSACRAL REGION: ICD-10-CM

## 2020-12-22 DIAGNOSIS — Z87.891 PERSONAL HISTORY OF NICOTINE DEPENDENCE: ICD-10-CM

## 2020-12-22 DIAGNOSIS — G47.10 HYPERSOMNIA, UNSPECIFIED: ICD-10-CM

## 2020-12-22 DIAGNOSIS — E11.622 TYPE 2 DIABETES MELLITUS WITH OTHER SKIN ULCER: ICD-10-CM

## 2020-12-22 DIAGNOSIS — Z82.49 FAMILY HISTORY OF ISCHEMIC HEART DISEASE AND OTHER DISEASES OF THE CIRCULATORY SYSTEM: ICD-10-CM

## 2020-12-22 DIAGNOSIS — M48.07 SPINAL STENOSIS, LUMBOSACRAL REGION: ICD-10-CM

## 2020-12-22 DIAGNOSIS — Z87.81 PERSONAL HISTORY OF (HEALED) TRAUMATIC FRACTURE: ICD-10-CM

## 2020-12-22 DIAGNOSIS — Z79.4 LONG TERM (CURRENT) USE OF INSULIN: ICD-10-CM

## 2020-12-22 DIAGNOSIS — E66.01 MORBID (SEVERE) OBESITY DUE TO EXCESS CALORIES: ICD-10-CM

## 2020-12-22 DIAGNOSIS — I11.9 HYPERTENSIVE HEART DISEASE WITHOUT HEART FAILURE: ICD-10-CM

## 2020-12-22 DIAGNOSIS — Z83.49 FAMILY HISTORY OF OTHER ENDOCRINE, NUTRITIONAL AND METABOLIC DISEASES: ICD-10-CM

## 2020-12-22 DIAGNOSIS — Z88.5 ALLERGY STATUS TO NARCOTIC AGENT: ICD-10-CM

## 2020-12-22 DIAGNOSIS — Z79.82 LONG TERM (CURRENT) USE OF ASPIRIN: ICD-10-CM

## 2020-12-22 DIAGNOSIS — L97.312 NON-PRESSURE CHRONIC ULCER OF RIGHT ANKLE WITH FAT LAYER EXPOSED: ICD-10-CM

## 2020-12-22 DIAGNOSIS — L97.822 NON-PRESSURE CHRONIC ULCER OF OTHER PART OF LEFT LOWER LEG WITH FAT LAYER EXPOSED: ICD-10-CM

## 2020-12-22 DIAGNOSIS — E05.90 THYROTOXICOSIS, UNSPECIFIED WITHOUT THYROTOXIC CRISIS OR STORM: ICD-10-CM

## 2020-12-22 DIAGNOSIS — Z79.899 OTHER LONG TERM (CURRENT) DRUG THERAPY: ICD-10-CM

## 2020-12-22 DIAGNOSIS — Z87.01 PERSONAL HISTORY OF PNEUMONIA (RECURRENT): ICD-10-CM

## 2020-12-22 DIAGNOSIS — I83.228 VARICOSE VEINS OF LEFT LOWER EXTREMITY WITH BOTH ULCER OF OTHER PART OF LOWER EXTREMITY AND INFLAMMATION: ICD-10-CM

## 2020-12-22 DIAGNOSIS — Z98.84 BARIATRIC SURGERY STATUS: ICD-10-CM

## 2020-12-23 ENCOUNTER — OUTPATIENT (OUTPATIENT)
Dept: OUTPATIENT SERVICES | Facility: HOSPITAL | Age: 65
LOS: 1 days | Discharge: HOME | End: 2020-12-23
Payer: MEDICARE

## 2020-12-23 ENCOUNTER — APPOINTMENT (OUTPATIENT)
Dept: WOUND CARE | Facility: HOSPITAL | Age: 65
End: 2020-12-23
Payer: MEDICARE

## 2020-12-23 VITALS
DIASTOLIC BLOOD PRESSURE: 58 MMHG | RESPIRATION RATE: 20 BRPM | SYSTOLIC BLOOD PRESSURE: 121 MMHG | OXYGEN SATURATION: 98 % | TEMPERATURE: 97.8 F | BODY MASS INDEX: 46.95 KG/M2 | HEART RATE: 68 BPM | HEIGHT: 63 IN | WEIGHT: 265 LBS

## 2020-12-23 DIAGNOSIS — L97.312 NON-PRESSURE CHRONIC ULCER OF RIGHT ANKLE WITH FAT LAYER EXPOSED: ICD-10-CM

## 2020-12-23 DIAGNOSIS — E11.622 TYPE 2 DIABETES MELLITUS WITH OTHER SKIN ULCER: ICD-10-CM

## 2020-12-23 DIAGNOSIS — I83.218 VARICOSE VEINS OF RIGHT LOWER EXTREMITY WITH BOTH ULCER OF OTHER PART OF LOWER EXTREMITY AND INFLAMMATION: ICD-10-CM

## 2020-12-23 DIAGNOSIS — Z95.2 PRESENCE OF PROSTHETIC HEART VALVE: Chronic | ICD-10-CM

## 2020-12-23 PROCEDURE — 29581 APPL MULTLAYER CMPRN SYS LEG: CPT | Mod: RT

## 2020-12-23 PROCEDURE — ZZZZZ: CPT

## 2020-12-24 NOTE — ASSESSMENT
Encounter Date: 2020       History     Chief Complaint   Patient presents with    COVID POS     DX ON MONDAY, NOT GETTING BETTER    Cough    Chills     43-year-old female presented emergency department with generalized malaise and weakness and cough and shortness of breath and nausea and vomiting and body aches.  Patient was diagnosed of having COVID-19 infection about 1 week ago and since then gradually getting worse and now has generalized malaise and body aches and generalized weakness also complains of shortness of breath and chest wall pain with coughing and breathing.        Review of patient's allergies indicates:   Allergen Reactions    Augmentin [amoxicillin-pot clavulanate]     Flagyl [metronidazole hcl]     Promethazine-dm Other (See Comments)     Confusion, restless legs     Past Medical History:   Diagnosis Date    Anxiety     COVID-19     Depression     History of kidney stones      Past Surgical History:   Procedure Laterality Date    CHOLECYSTECTOMY      GALLBLADDER SURGERY      KIDNEY STONE SURGERY      TUBAL LIGATION      UTERINE FIBROID EMBOLIZATION       Family History   Problem Relation Age of Onset    Diabetes Father     Prostate cancer Father     Hypertension Mother      Social History     Tobacco Use    Smoking status: Former Smoker     Quit date: 3/1/2011     Years since quittin.8    Smokeless tobacco: Never Used    Tobacco comment: electronic cigarettes   Substance Use Topics    Alcohol use: Yes     Comment: occasionally    Drug use: No     Review of Systems   Constitutional: Positive for appetite change, chills, fatigue and fever.   HENT: Negative.    Eyes: Negative.    Respiratory: Positive for cough, shortness of breath and wheezing.    Cardiovascular: Positive for chest pain.   Gastrointestinal: Positive for nausea and vomiting.   Endocrine: Negative.    Genitourinary: Negative.    Musculoskeletal: Positive for myalgias.   Skin: Negative.   [FreeTextEntry2] : Restore Optimal Skin Integrity \par Localized Wound Care \par Infection Prevention \par Compression Therapy \par    Allergic/Immunologic: Negative.    Neurological: Negative.    Hematological: Negative.    Psychiatric/Behavioral: Negative.    All other systems reviewed and are negative.      Physical Exam     Initial Vitals [12/24/20 1242]   BP Pulse Resp Temp SpO2   (!) 143/93 (!) 120 (!) 22 99 °F (37.2 °C) (!) 93 %      MAP       --         Physical Exam    Nursing note and vitals reviewed.  Constitutional: She appears well-developed and well-nourished. No distress.   HENT:   Head: Normocephalic and atraumatic.   Nose: Nose normal.   Mouth/Throat: Oropharynx is clear and moist.   Eyes: Conjunctivae and EOM are normal. Pupils are equal, round, and reactive to light.   Neck: Normal range of motion. Neck supple. No thyromegaly present. No tracheal deviation present. No JVD present.   Cardiovascular: Normal rate, regular rhythm, normal heart sounds and intact distal pulses.   No murmur heard.  Pulmonary/Chest: Breath sounds normal. No stridor. No respiratory distress. She has no wheezes. She has no rales. She exhibits tenderness.   Abdominal: Soft. Bowel sounds are normal. She exhibits no distension. There is no abdominal tenderness. There is no rebound and no guarding.   Musculoskeletal: Normal range of motion. No tenderness or edema.   Neurological: She is alert and oriented to person, place, and time. No sensory deficit. GCS score is 15. GCS eye subscore is 4. GCS verbal subscore is 5. GCS motor subscore is 6.   Skin: Skin is warm. Capillary refill takes less than 2 seconds.   Psychiatric: She has a normal mood and affect. Thought content normal.         ED Course   Procedures  Labs Reviewed   CBC W/ AUTO DIFFERENTIAL - Abnormal; Notable for the following components:       Result Value    RBC 5.52 (*)     MCV 79 (*)     MCH 25.2 (*)     MCHC 31.8 (*)     Gran % 75.4 (*)     All other components within normal limits   COMPREHENSIVE METABOLIC PANEL - Abnormal; Notable for the following components:    Potassium 3.3 (*)      [FreeTextEntry4] : MD reviewed Vascular Studies & Ultrasound results with Pt. Pt verbalized Understanding.\par Pt to F/U to [facility] in 1 Week \par \par  Calcium 8.6 (*)     All other components within normal limits   C-REACTIVE PROTEIN - Abnormal; Notable for the following components:    CRP 12.81 (*)     All other components within normal limits   D DIMER, QUANTITATIVE - Abnormal; Notable for the following components:    D-Dimer 1.33 (*)     All other components within normal limits    Narrative:     Ddimer critical result(s) called and verbal readback obtained from   Reynaldo RN/ED  by JBWill 12/24/2020 14:35   ISTAT PROCEDURE - Abnormal; Notable for the following components:    POC PH 7.497 (*)     POC PCO2 30.7 (*)     POC PO2 70 (*)     POC HCO3 23.8 (*)     POC Potassium 3.3 (*)     All other components within normal limits   CULTURE, BLOOD   CULTURE, BLOOD   FERRITIN   LACTATE DEHYDROGENASE   CK   LACTIC ACID, PLASMA   TROPONIN I   B-TYPE NATRIURETIC PEPTIDE   PROTIME-INR   PROCALCITONIN     EKG Readings: (Independently Interpreted)   Initial Reading: No STEMI. Rhythm: Sinus Tachycardia. Ectopy: No Ectopy. Conduction: Normal.     ECG Results          EKG 12-lead (In process)  Result time 12/24/20 14:09:07    In process by Interface, Lab In [facility] (12/24/20 14:09:07)                 Narrative:    Test Reason : Z20.828,    Vent. Rate : 106 BPM     Atrial Rate : 106 BPM     P-R Int : 158 ms          QRS Dur : 088 ms      QT Int : 332 ms       P-R-T Axes : 056 048 028 degrees     QTc Int : 441 ms    Sinus tachycardia  Otherwise normal ECG  No previous ECGs available    Referred By: [email or web address]   SELF           Confirmed By:                             Imaging Results          CTA Chest Non-Coronary (PE Study) (Final result)  Result time 12/24/20 15:48:38    Final result by Nakul Leal MD (12/24/20 15:48:38)                 Narrative:    CMS MANDATED QUALITY DATA - CT RADIATION  436    All CT scans at this facility utilize dose modulation, iterative  reconstruction, and/or weight based dosing when appropriate to reduce  radiation dose to as low as  reasonably achievable.    CLINICAL HISTORY:  43 years (1977) Female PE suspected, low pretest prob    TECHNIQUE:  CTA CHEST NON CORONARY.  461 images obtained. Axial CT examination of  the chest with attention to the pulmonary arteries was performed using  contiguous axial images from the diaphragms to the lung apices  following the intravenous administration of non-ionic contrast  material. Images were reviewed using lung, mediastinal, and bone  windows. The study was performed with thin sections with subsequent  3-D reformats/MIP/reconstructions in multiple planes.    CONTRAST:  IV contrast was administered.    COMPARISON:  Radiograph the chest from the same day.    FINDINGS:  CTA PE evaluation: This is a moderate to low quality study for the  evaluation of pulmonary embolism secondary to a combination of  contrast bolus timing and motion artifact. The pulmonary arteries are  normal in appearance without pulmonary emboli noted up to the central  main level, noting the limitations of CT technique for identifying  small or isolated subsegmental emboli.    CT Chest:  Visualized neck: Within normal limits.  Lungs: Moderate peripheral predominant groundglass  attenuation/airspace opacities with air bronchograms with a somewhat  mid to lower lung zone predominance.  Airway: The trachea and central bronchial tree appear normal.  Pleura: There is no pleural effusion. There is no pneumothorax.  Cardiovascular: The heart is normal in size. There are no findings of  right heart failure. The pulmonary artery is normal in size. There is  no pericardial effusion. The thoracic aorta and great vessels are  normal in course and caliber.  Mediastinum: Numerous small mediastinal lymph nodes are seen, none of  which are enlarged by short axis size criteria.  Soft tissues: The peripheral soft tissues appear normal.  Musculoskeletal: There are mild degenerative changes of the thoracic  spine.  There are no suspicious osseous  lesions.  Esophagus: The esophagus appears grossly normal.  Upper Abdomen: Relative diffuse low-attenuation liver in keeping with  steatosis.    IMPRESSION:  1. No evidence of pulmonary embolism to the central main arterial  level. If there is continued clinical concern, consider further  evaluation with lower extremity doppler.  2. Moderate patchy multifocal airspace/groundglass attenuation  opacities most consistent with multifocal pneumonia (and compatible  with Covid pneumonia) and less likely edema, ARDS, or emboli.  3. Additional and incidental findings as above.                    .    Electronically Signed by MACKENZIE Chanel on 12/24/2020 3:53 PM                             X-Ray Chest AP Portable (Final result)  Result time 12/24/20 13:07:30    Final result by Nakul Leal MD (12/24/20 13:07:30)                 Narrative:    CLINICAL HISTORY:  43 years (1977) Female Suspected Covid-19 Virus Infection    TECHNIQUE:  Portable AP radiograph the chest.    COMPARISON:  None available.    FINDINGS:  Faint airspace opacity in the base of the right upper lobe and  moderate scattered interstitial opacities, left greater than right  lung base. Costophrenic angles are seen without effusion. No  pneumothorax is identified. The heart is normal in size. Osseous  structures show degenerative disc disease and degenerative changes in  the shoulders. The visualized upper abdomen is unremarkable.    IMPRESSION:  Mixed interstitial and alveolar opacities in the mid to lower lung  zones bilaterally in keeping with  multifocal viral pneumonia  (compatible with Covid 19). Consider PA and lateral radiographic  follow-up to document resolution after treatment.                  .            Electronically Signed by MACKENZIE Chanel on 12/24/2020 1:11 PM                            X-Rays:   Independently Interpreted Readings:   Other Readings:  Chest x-ray shows multiple infiltrate consistent with multifocal  pneumonia secondary to viral infection    Medical Decision Making:   Differential Diagnosis:   43-year-old female presented emergency department with cough, shortness of breath and generalized malaise and weakness and nausea and vomiting and body aches.  Symptoms consistent with viral syndrome secondary to COVID-19 virus.  Patient hypoxic to levels of 88 on room air at times and improved with supplemental oxygen.  Given the multifocal pneumonia and patient's illness and increased inflammatory markers screening labs done.  CT of the chest done for further evaluation given multifocal pneumonia also started on broad-spectrum antibiotic.  Hospital Medicine consulted for evaluation for admission and further management.  Clinical Tests:   Lab Tests: Reviewed  Radiological Study: Reviewed  Medical Tests: Reviewed                             Clinical Impression:       ICD-10-CM ICD-9-CM   1. Pneumonia due to COVID-19 virus  U07.1 480.8    J12.89    2. Suspected COVID-19 virus infection  Z20.828 V01.79   3. Shortness of breath  R06.02 786.05   4. Viral syndrome  B34.9 079.99   5. Hypoxia  R09.02 799.02                          ED Disposition Condition    Admit                             Aidee Hamilton MD  12/24/20 [id number]

## 2020-12-28 ENCOUNTER — APPOINTMENT (OUTPATIENT)
Dept: WOUND CARE | Facility: HOSPITAL | Age: 65
End: 2020-12-28
Payer: MEDICARE

## 2020-12-28 ENCOUNTER — OUTPATIENT (OUTPATIENT)
Dept: OUTPATIENT SERVICES | Facility: HOSPITAL | Age: 65
LOS: 1 days | Discharge: HOME | End: 2020-12-28
Payer: MEDICARE

## 2020-12-28 VITALS
WEIGHT: 265 LBS | OXYGEN SATURATION: 96 % | BODY MASS INDEX: 46.95 KG/M2 | HEIGHT: 63 IN | RESPIRATION RATE: 20 BRPM | HEART RATE: 62 BPM | SYSTOLIC BLOOD PRESSURE: 100 MMHG | TEMPERATURE: 97 F | DIASTOLIC BLOOD PRESSURE: 60 MMHG

## 2020-12-28 DIAGNOSIS — I83.218 VARICOSE VEINS OF RIGHT LOWER EXTREMITY WITH BOTH ULCER OF OTHER PART OF LOWER EXTREMITY AND INFLAMMATION: ICD-10-CM

## 2020-12-28 DIAGNOSIS — Z95.2 PRESENCE OF PROSTHETIC HEART VALVE: Chronic | ICD-10-CM

## 2020-12-28 DIAGNOSIS — S91.312A LACERATION W/OUT FOREIGN BODY, LEFT FOOT, INITIAL ENCOUNTER: ICD-10-CM

## 2020-12-28 PROCEDURE — 29581 APPL MULTLAYER CMPRN SYS LEG: CPT | Mod: RT

## 2020-12-28 PROCEDURE — 99214 OFFICE O/P EST MOD 30 MIN: CPT

## 2020-12-28 NOTE — HISTORY OF PRESENT ILLNESS
[FreeTextEntry1] : Patient seen for new left foot laceration down to skin, subcutaneous tissue.. Pt also seen for venous stasis wounds to right and left lower leg down to skin and subcutaneous tissue and fat with stasis dermatitis which have healed. Pt also seen for right posterior ankle diabetic ulcer down to skin, subcutaneous tissue, and fat, epithelialized at this time.

## 2020-12-28 NOTE — ASSESSMENT
[Verbal] : Verbal [Patient] : Patient [Good - alert, interested, motivated] : Good - alert, interested, motivated [Verbalizes knowledge/Understanding] : Verbalizes knowledge/understanding [Dressing changes] : dressing changes [Foot Care] : foot care [Skin Care] : skin care [Pressure relief] : pressure relief [Signs and symptoms of infection] : sign and symptoms of infection [Nutrition] : nutrition [How and When to Call] : how and when to call [Off-loading] : off-loading [Patient responsibility to plan of care] : patient responsibility to plan of care [Glycemic Control] : glycemic control [Stable] : stable [Home] : Home [Cane] : Cane [Not Applicable - Long Term Care/Home Health Agency] : Long Term Care/Home Health Agency: Not Applicable [] : Yes [FreeTextEntry2] : Infection Prevention\par Localized Wound Care\par Skin Graft\par Promote Optimal Skin Integrity\par Offloading / Pressure Relief\par  [FreeTextEntry4] : DPSINA informed patient that once her wounds are completely closed, we will refer her to a lymphedema clinic.\par F/U to [facility] in One Week for Assessment

## 2020-12-28 NOTE — PHYSICAL EXAM
[4 x 4] : 4 x 4  [0] : left 0 [Ankle Swelling (On Exam)] : present [Ankle Swelling Bilaterally] : bilaterally  [Varicose Veins Of Lower Extremities] : bilaterally [] : bilaterally [Ankle Swelling On The Left] : moderate [Skin Ulcer] : ulcer [JVD] : no jugular venous distention  [de-identified] : A&Ox3, NAD [de-identified] : 4/5 strength in all quadrants bilaterally [de-identified] : New left dorsal foot laceration down to skin, subcutaneous tissue. Right posterior ankle ulceration down to skin, subcutaneous tissue, and fat, epithelialized. venous stasis wound on right lower leg down to skin and subcutaneous tissue and fat epithelialized. venous stasis wounds on left lower leg down to skin and subcutaneous tissue and fat epithelialized. bilateral stasis dermatitis. [FreeTextEntry7] : Left Achilles - Closed [de-identified] : Allevyn Boarder  [de-identified] : Cleansed with Normal Saline  [de-identified] : Right Achilles - Area of new epithelialized tissue [de-identified] : Patients own compression stocking [de-identified] : Renato Rodríguez [de-identified] : Cleansed with Normal Saline \par Kerlix\par  [FreeTextEntry1] : Dorsal Foot [FreeTextEntry2] : 0.2 [FreeTextEntry3] : 0.2 [FreeTextEntry4] : 0.1 [de-identified] : Mild [de-identified] : Patients own compression stockings [de-identified] : Allevyn Boarder [de-identified] : Cleansed with Normal Saline [de-identified] : None [de-identified] : 3x Weekly [de-identified] : None [de-identified] : No [de-identified] : No [de-identified] : Normal [de-identified] : None [de-identified] : None [de-identified] : None [de-identified] : No [de-identified] : Other [de-identified] : 3x Weekly [TWNoteComboBox1] : Left [de-identified] : Erythema [de-identified] : None [de-identified] : None [de-identified] : 100% [de-identified] : No [de-identified] : Other

## 2020-12-28 NOTE — REVIEW OF SYSTEMS
[Skin Wound] : skin wound [Fever] : no fever [Eye Pain] : no eye pain [Earache] : no earache [Chest Pain] : no chest pain [Shortness Of Breath] : no shortness of breath [Abdominal Pain] : no abdominal pain [de-identified] : New left dorsal foot laceration down to skin, subcutaneous tissue. Right posterior ankle ulceration down to skin, subcutaneous tissue, and fat, epithelialized. venous stasis wound on right lower leg down to skin and subcutaneous tissue and fat epithelialized. venous stasis wounds on left lower leg down to skin and subcutaneous tissue and fat epithelialized. bilateral stasis dermatitis.

## 2020-12-28 NOTE — PLAN
[FreeTextEntry1] : Patient examined and evaluated at this time.\par Continue local wound care and offloading.\par Compression socks for the bilateral lower legs.\par Pt to return in 1 week for follow up.

## 2020-12-29 DIAGNOSIS — Z82.49 FAMILY HISTORY OF ISCHEMIC HEART DISEASE AND OTHER DISEASES OF THE CIRCULATORY SYSTEM: ICD-10-CM

## 2020-12-29 DIAGNOSIS — E11.622 TYPE 2 DIABETES MELLITUS WITH OTHER SKIN ULCER: ICD-10-CM

## 2020-12-29 DIAGNOSIS — Z79.899 OTHER LONG TERM (CURRENT) DRUG THERAPY: ICD-10-CM

## 2020-12-29 DIAGNOSIS — Z95.4 PRESENCE OF OTHER HEART-VALVE REPLACEMENT: ICD-10-CM

## 2020-12-29 DIAGNOSIS — G47.10 HYPERSOMNIA, UNSPECIFIED: ICD-10-CM

## 2020-12-29 DIAGNOSIS — Z98.890 OTHER SPECIFIED POSTPROCEDURAL STATES: ICD-10-CM

## 2020-12-29 DIAGNOSIS — I11.9 HYPERTENSIVE HEART DISEASE WITHOUT HEART FAILURE: ICD-10-CM

## 2020-12-29 DIAGNOSIS — M47.817 SPONDYLOSIS WITHOUT MYELOPATHY OR RADICULOPATHY, LUMBOSACRAL REGION: ICD-10-CM

## 2020-12-29 DIAGNOSIS — L97.822 NON-PRESSURE CHRONIC ULCER OF OTHER PART OF LEFT LOWER LEG WITH FAT LAYER EXPOSED: ICD-10-CM

## 2020-12-29 DIAGNOSIS — Z87.81 PERSONAL HISTORY OF (HEALED) TRAUMATIC FRACTURE: ICD-10-CM

## 2020-12-29 DIAGNOSIS — E05.90 THYROTOXICOSIS, UNSPECIFIED WITHOUT THYROTOXIC CRISIS OR STORM: ICD-10-CM

## 2020-12-29 DIAGNOSIS — E66.01 MORBID (SEVERE) OBESITY DUE TO EXCESS CALORIES: ICD-10-CM

## 2020-12-29 DIAGNOSIS — M48.07 SPINAL STENOSIS, LUMBOSACRAL REGION: ICD-10-CM

## 2020-12-29 DIAGNOSIS — I34.1 NONRHEUMATIC MITRAL (VALVE) PROLAPSE: ICD-10-CM

## 2020-12-29 DIAGNOSIS — Z88.5 ALLERGY STATUS TO NARCOTIC AGENT: ICD-10-CM

## 2020-12-29 DIAGNOSIS — Z79.4 LONG TERM (CURRENT) USE OF INSULIN: ICD-10-CM

## 2020-12-29 DIAGNOSIS — M43.16 SPONDYLOLISTHESIS, LUMBAR REGION: ICD-10-CM

## 2020-12-29 DIAGNOSIS — E11.59 TYPE 2 DIABETES MELLITUS WITH OTHER CIRCULATORY COMPLICATIONS: ICD-10-CM

## 2020-12-29 DIAGNOSIS — Z87.01 PERSONAL HISTORY OF PNEUMONIA (RECURRENT): ICD-10-CM

## 2020-12-29 DIAGNOSIS — I83.893 VARICOSE VEINS OF BILATERAL LOWER EXTREMITIES WITH OTHER COMPLICATIONS: ICD-10-CM

## 2020-12-29 DIAGNOSIS — Z83.49 FAMILY HISTORY OF OTHER ENDOCRINE, NUTRITIONAL AND METABOLIC DISEASES: ICD-10-CM

## 2020-12-29 DIAGNOSIS — E55.9 VITAMIN D DEFICIENCY, UNSPECIFIED: ICD-10-CM

## 2020-12-29 DIAGNOSIS — Z79.82 LONG TERM (CURRENT) USE OF ASPIRIN: ICD-10-CM

## 2020-12-29 DIAGNOSIS — Z87.891 PERSONAL HISTORY OF NICOTINE DEPENDENCE: ICD-10-CM

## 2020-12-29 DIAGNOSIS — Z98.84 BARIATRIC SURGERY STATUS: ICD-10-CM

## 2020-12-29 DIAGNOSIS — L97.312 NON-PRESSURE CHRONIC ULCER OF RIGHT ANKLE WITH FAT LAYER EXPOSED: ICD-10-CM

## 2020-12-29 DIAGNOSIS — I47.1 SUPRAVENTRICULAR TACHYCARDIA: ICD-10-CM

## 2020-12-29 DIAGNOSIS — I83.228 VARICOSE VEINS OF LEFT LOWER EXTREMITY WITH BOTH ULCER OF OTHER PART OF LOWER EXTREMITY AND INFLAMMATION: ICD-10-CM

## 2020-12-29 DIAGNOSIS — G47.33 OBSTRUCTIVE SLEEP APNEA (ADULT) (PEDIATRIC): ICD-10-CM

## 2020-12-29 NOTE — HISTORY OF PRESENT ILLNESS
Addended by: AZALIA MALHOTRA on: 12/29/2020 04:30 PM     Modules accepted: Orders     [FreeTextEntry1] : Patient seen for venous stasis wounds to right and left lower leg down to skin and subcutaneous tissue and fat with stasis dermatitis. Patient relates that her ulcers appear to be more red today.

## 2020-12-30 ENCOUNTER — APPOINTMENT (OUTPATIENT)
Dept: WOUND CARE | Facility: HOSPITAL | Age: 65
End: 2020-12-30
Payer: MEDICARE

## 2020-12-30 ENCOUNTER — OUTPATIENT (OUTPATIENT)
Dept: OUTPATIENT SERVICES | Facility: HOSPITAL | Age: 65
LOS: 1 days | Discharge: HOME | End: 2020-12-30
Payer: MEDICARE

## 2020-12-30 VITALS
BODY MASS INDEX: 46.95 KG/M2 | RESPIRATION RATE: 20 BRPM | OXYGEN SATURATION: 96 % | DIASTOLIC BLOOD PRESSURE: 67 MMHG | WEIGHT: 265 LBS | TEMPERATURE: 97.6 F | HEIGHT: 63 IN | SYSTOLIC BLOOD PRESSURE: 120 MMHG | HEART RATE: 87 BPM

## 2020-12-30 DIAGNOSIS — I83.218 VARICOSE VEINS OF RIGHT LOWER EXTREMITY WITH BOTH ULCER OF OTHER PART OF LOWER EXTREMITY AND INFLAMMATION: ICD-10-CM

## 2020-12-30 DIAGNOSIS — E11.622 TYPE 2 DIABETES MELLITUS WITH OTHER SKIN ULCER: ICD-10-CM

## 2020-12-30 DIAGNOSIS — Z95.2 PRESENCE OF PROSTHETIC HEART VALVE: Chronic | ICD-10-CM

## 2020-12-30 DIAGNOSIS — L97.822 NON-PRESSURE CHRONIC ULCER OF OTHER PART OF LEFT LOWER LEG WITH FAT LAYER EXPOSED: ICD-10-CM

## 2020-12-30 DIAGNOSIS — L97.312 NON-PRESSURE CHRONIC ULCER OF RIGHT ANKLE WITH FAT LAYER EXPOSED: ICD-10-CM

## 2020-12-30 DIAGNOSIS — I83.228 VARICOSE VEINS OF LEFT LOWER EXTREMITY WITH BOTH ULCER OF OTHER PART OF LOWER EXTREMITY AND INFLAMMATION: ICD-10-CM

## 2020-12-30 PROCEDURE — G0463: CPT

## 2020-12-30 PROCEDURE — ZZZZZ: CPT

## 2021-01-04 ENCOUNTER — OUTPATIENT (OUTPATIENT)
Dept: OUTPATIENT SERVICES | Facility: HOSPITAL | Age: 66
LOS: 1 days | Discharge: HOME | End: 2021-01-04
Payer: MEDICARE

## 2021-01-04 ENCOUNTER — APPOINTMENT (OUTPATIENT)
Dept: WOUND CARE | Facility: HOSPITAL | Age: 66
End: 2021-01-04
Payer: MEDICARE

## 2021-01-04 VITALS
RESPIRATION RATE: 20 BRPM | BODY MASS INDEX: 46.95 KG/M2 | SYSTOLIC BLOOD PRESSURE: 138 MMHG | DIASTOLIC BLOOD PRESSURE: 61 MMHG | WEIGHT: 265 LBS | OXYGEN SATURATION: 97 % | HEART RATE: 69 BPM | HEIGHT: 63 IN | TEMPERATURE: 97.8 F

## 2021-01-04 DIAGNOSIS — I47.1 SUPRAVENTRICULAR TACHYCARDIA: ICD-10-CM

## 2021-01-04 DIAGNOSIS — M43.16 SPONDYLOLISTHESIS, LUMBAR REGION: ICD-10-CM

## 2021-01-04 DIAGNOSIS — Z79.899 OTHER LONG TERM (CURRENT) DRUG THERAPY: ICD-10-CM

## 2021-01-04 DIAGNOSIS — G47.33 OBSTRUCTIVE SLEEP APNEA (ADULT) (PEDIATRIC): ICD-10-CM

## 2021-01-04 DIAGNOSIS — E55.9 VITAMIN D DEFICIENCY, UNSPECIFIED: ICD-10-CM

## 2021-01-04 DIAGNOSIS — E66.01 MORBID (SEVERE) OBESITY DUE TO EXCESS CALORIES: ICD-10-CM

## 2021-01-04 DIAGNOSIS — G47.10 HYPERSOMNIA, UNSPECIFIED: ICD-10-CM

## 2021-01-04 DIAGNOSIS — Z98.890 OTHER SPECIFIED POSTPROCEDURAL STATES: ICD-10-CM

## 2021-01-04 DIAGNOSIS — Z95.4 PRESENCE OF OTHER HEART-VALVE REPLACEMENT: ICD-10-CM

## 2021-01-04 DIAGNOSIS — Z87.81 PERSONAL HISTORY OF (HEALED) TRAUMATIC FRACTURE: ICD-10-CM

## 2021-01-04 DIAGNOSIS — E11.622 TYPE 2 DIABETES MELLITUS WITH OTHER SKIN ULCER: ICD-10-CM

## 2021-01-04 DIAGNOSIS — E11.59 TYPE 2 DIABETES MELLITUS WITH OTHER CIRCULATORY COMPLICATIONS: ICD-10-CM

## 2021-01-04 DIAGNOSIS — I11.9 HYPERTENSIVE HEART DISEASE WITHOUT HEART FAILURE: ICD-10-CM

## 2021-01-04 DIAGNOSIS — Z87.01 PERSONAL HISTORY OF PNEUMONIA (RECURRENT): ICD-10-CM

## 2021-01-04 DIAGNOSIS — M48.07 SPINAL STENOSIS, LUMBOSACRAL REGION: ICD-10-CM

## 2021-01-04 DIAGNOSIS — Z79.4 LONG TERM (CURRENT) USE OF INSULIN: ICD-10-CM

## 2021-01-04 DIAGNOSIS — Z98.84 BARIATRIC SURGERY STATUS: ICD-10-CM

## 2021-01-04 DIAGNOSIS — M47.817 SPONDYLOSIS WITHOUT MYELOPATHY OR RADICULOPATHY, LUMBOSACRAL REGION: ICD-10-CM

## 2021-01-04 DIAGNOSIS — I83.228 VARICOSE VEINS OF LEFT LOWER EXTREMITY WITH BOTH ULCER OF OTHER PART OF LOWER EXTREMITY AND INFLAMMATION: ICD-10-CM

## 2021-01-04 DIAGNOSIS — I83.893 VARICOSE VEINS OF BILATERAL LOWER EXTREMITIES WITH OTHER COMPLICATIONS: ICD-10-CM

## 2021-01-04 DIAGNOSIS — I83.218 VARICOSE VEINS OF RIGHT LOWER EXTREMITY WITH BOTH ULCER OF OTHER PART OF LOWER EXTREMITY AND INFLAMMATION: ICD-10-CM

## 2021-01-04 DIAGNOSIS — I34.1 NONRHEUMATIC MITRAL (VALVE) PROLAPSE: ICD-10-CM

## 2021-01-04 DIAGNOSIS — Z95.2 PRESENCE OF PROSTHETIC HEART VALVE: Chronic | ICD-10-CM

## 2021-01-04 DIAGNOSIS — L97.312 NON-PRESSURE CHRONIC ULCER OF RIGHT ANKLE WITH FAT LAYER EXPOSED: ICD-10-CM

## 2021-01-04 DIAGNOSIS — Z88.5 ALLERGY STATUS TO NARCOTIC AGENT: ICD-10-CM

## 2021-01-04 DIAGNOSIS — Z82.49 FAMILY HISTORY OF ISCHEMIC HEART DISEASE AND OTHER DISEASES OF THE CIRCULATORY SYSTEM: ICD-10-CM

## 2021-01-04 DIAGNOSIS — L97.822 NON-PRESSURE CHRONIC ULCER OF OTHER PART OF LEFT LOWER LEG WITH FAT LAYER EXPOSED: ICD-10-CM

## 2021-01-04 DIAGNOSIS — Z87.891 PERSONAL HISTORY OF NICOTINE DEPENDENCE: ICD-10-CM

## 2021-01-04 DIAGNOSIS — Z79.82 LONG TERM (CURRENT) USE OF ASPIRIN: ICD-10-CM

## 2021-01-04 DIAGNOSIS — E05.90 THYROTOXICOSIS, UNSPECIFIED WITHOUT THYROTOXIC CRISIS OR STORM: ICD-10-CM

## 2021-01-04 DIAGNOSIS — Z83.49 FAMILY HISTORY OF OTHER ENDOCRINE, NUTRITIONAL AND METABOLIC DISEASES: ICD-10-CM

## 2021-01-04 PROCEDURE — 99213 OFFICE O/P EST LOW 20 MIN: CPT

## 2021-01-04 PROCEDURE — G0463: CPT

## 2021-01-04 NOTE — ASSESSMENT
[Verbal] : Verbal [Patient] : Patient [Good - alert, interested, motivated] : Good - alert, interested, motivated [Verbalizes knowledge/Understanding] : Verbalizes knowledge/understanding [Foot Care] : foot care [Skin Care] : skin care [Pressure relief] : pressure relief [Signs and symptoms of infection] : sign and symptoms of infection [Nutrition] : nutrition [How and When to Call] : how and when to call [Patient responsibility to plan of care] : patient responsibility to plan of care [Glycemic Control] : glycemic control [] : Yes [Stable] : stable [Home] : Home [Cane] : Cane [Not Applicable - Long Term Care/Home Health Agency] : Long Term Care/Home Health Agency: Not Applicable [Off-loading] : off-loading [Compression Therapy] : compression therapy [FreeTextEntry2] : Maintain Skin Integrity \par Compression Therapy   [FreeTextEntry4] : F/U to [facility] in 2 Weeks for Assessment

## 2021-01-04 NOTE — REVIEW OF SYSTEMS
[Fever] : no fever [Eye Pain] : no eye pain [Earache] : no earache [Chest Pain] : no chest pain [Shortness Of Breath] : no shortness of breath [Abdominal Pain] : no abdominal pain [Skin Wound] : skin wound [de-identified] : Left dorsal foot laceration down to skin, subcutaneous tissue, healed. Right posterior ankle ulceration down to skin, subcutaneous tissue, and fat, epithelialized. venous stasis wound on right lower leg down to skin and subcutaneous tissue and fat epithelialized. venous stasis wounds on left lower leg down to skin and subcutaneous tissue and fat epithelialized. bilateral stasis dermatitis.

## 2021-01-04 NOTE — PLAN
[FreeTextEntry1] : Patient examined and evaluated at this time.\par Continue local wound care and offloading.\par Compression socks for the bilateral lower legs.\par Pt to return in 2 weeks for follow up.\par Spent 20 minutes for patient care and medical decision making.\par

## 2021-01-04 NOTE — PHYSICAL EXAM
[4 x 4] : 4 x 4  [JVD] : no jugular venous distention  [0] : left 0 [Ankle Swelling (On Exam)] : present [Ankle Swelling Bilaterally] : bilaterally  [Varicose Veins Of Lower Extremities] : bilaterally [] : bilaterally [Ankle Swelling On The Left] : moderate [Skin Ulcer] : ulcer [de-identified] : A&Ox3, NAD [de-identified] : 4/5 strength in all quadrants bilaterally [de-identified] : Left dorsal foot laceration down to skin, subcutaneous tissue, healed. Right posterior ankle ulceration down to skin, subcutaneous tissue, and fat, epithelialized. venous stasis wound on right lower leg down to skin and subcutaneous tissue and fat epithelialized. venous stasis wounds on left lower leg down to skin and subcutaneous tissue and fat epithelialized. bilateral stasis dermatitis. [FreeTextEntry7] : Left Achilles - Closed [de-identified] : No Treatment  [de-identified] : Cleansed with Normal Saline  [de-identified] : Right Achilles - Dry Eschar [de-identified] : Patients own compression stocking [de-identified] : No Treatment  [de-identified] : Cleansed with Normal Saline \par Kerlix\par  [FreeTextEntry1] : Dorsal Foot- Resolved [de-identified] : Intact  [de-identified] : Patients own compression stockings [de-identified] : No Treatment  [de-identified] : Cleansed with Normal Saline [de-identified] : None [de-identified] : 3x Weekly [de-identified] : None [de-identified] : No [de-identified] : No [de-identified] : Normal [de-identified] : None [de-identified] : None [de-identified] : None [de-identified] : No [de-identified] : Other [de-identified] : 3x Weekly [TWNoteComboBox1] : Left [de-identified] : other [de-identified] : None [de-identified] : None [de-identified] : 100% [de-identified] : No [de-identified] : Other

## 2021-01-06 ENCOUNTER — APPOINTMENT (OUTPATIENT)
Dept: WOUND CARE | Facility: HOSPITAL | Age: 66
End: 2021-01-06

## 2021-01-08 ENCOUNTER — APPOINTMENT (OUTPATIENT)
Dept: OBGYN | Facility: HOSPITAL | Age: 66
End: 2021-01-08

## 2021-01-11 ENCOUNTER — APPOINTMENT (OUTPATIENT)
Dept: WOUND CARE | Facility: HOSPITAL | Age: 66
End: 2021-01-11

## 2021-01-13 ENCOUNTER — APPOINTMENT (OUTPATIENT)
Dept: WOUND CARE | Facility: HOSPITAL | Age: 66
End: 2021-01-13

## 2021-01-15 ENCOUNTER — APPOINTMENT (OUTPATIENT)
Dept: WOUND CARE | Facility: HOSPITAL | Age: 66
End: 2021-01-15

## 2021-01-18 ENCOUNTER — OUTPATIENT (OUTPATIENT)
Dept: OUTPATIENT SERVICES | Facility: HOSPITAL | Age: 66
LOS: 1 days | Discharge: HOME | End: 2021-01-18
Payer: MEDICARE

## 2021-01-18 ENCOUNTER — APPOINTMENT (OUTPATIENT)
Dept: WOUND CARE | Facility: HOSPITAL | Age: 66
End: 2021-01-18

## 2021-01-18 ENCOUNTER — APPOINTMENT (OUTPATIENT)
Dept: PLASTIC SURGERY | Facility: HOSPITAL | Age: 66
End: 2021-01-18
Payer: MEDICARE

## 2021-01-18 VITALS
TEMPERATURE: 96.9 F | SYSTOLIC BLOOD PRESSURE: 149 MMHG | BODY MASS INDEX: 46.95 KG/M2 | HEART RATE: 65 BPM | OXYGEN SATURATION: 97 % | RESPIRATION RATE: 20 BRPM | HEIGHT: 63 IN | DIASTOLIC BLOOD PRESSURE: 60 MMHG | WEIGHT: 265 LBS

## 2021-01-18 DIAGNOSIS — E11.622 TYPE 2 DIABETES MELLITUS WITH OTHER SKIN ULCER: ICD-10-CM

## 2021-01-18 DIAGNOSIS — I83.228 VARICOSE VEINS OF LEFT LOWER EXTREMITY WITH BOTH ULCER OF OTHER PART OF LOWER EXTREMITY AND INFLAMMATION: ICD-10-CM

## 2021-01-18 DIAGNOSIS — I83.218 VARICOSE VEINS OF RIGHT LOWER EXTREMITY WITH BOTH ULCER OF OTHER PART OF LOWER EXTREMITY AND INFLAMMATION: ICD-10-CM

## 2021-01-18 DIAGNOSIS — Z95.2 PRESENCE OF PROSTHETIC HEART VALVE: Chronic | ICD-10-CM

## 2021-01-18 DIAGNOSIS — L97.822 NON-PRESSURE CHRONIC ULCER OF OTHER PART OF LEFT LOWER LEG WITH FAT LAYER EXPOSED: ICD-10-CM

## 2021-01-18 DIAGNOSIS — L97.312 NON-PRESSURE CHRONIC ULCER OF RIGHT ANKLE WITH FAT LAYER EXPOSED: ICD-10-CM

## 2021-01-18 PROCEDURE — G0463: CPT

## 2021-01-18 PROCEDURE — ZZZZZ: CPT

## 2021-01-20 ENCOUNTER — APPOINTMENT (OUTPATIENT)
Dept: WOUND CARE | Facility: HOSPITAL | Age: 66
End: 2021-01-20

## 2021-01-22 ENCOUNTER — APPOINTMENT (OUTPATIENT)
Dept: WOUND CARE | Facility: HOSPITAL | Age: 66
End: 2021-01-22

## 2021-01-25 ENCOUNTER — APPOINTMENT (OUTPATIENT)
Dept: WOUND CARE | Facility: HOSPITAL | Age: 66
End: 2021-01-25
Payer: MEDICARE

## 2021-01-25 ENCOUNTER — OUTPATIENT (OUTPATIENT)
Dept: OUTPATIENT SERVICES | Facility: HOSPITAL | Age: 66
LOS: 1 days | Discharge: HOME | End: 2021-01-25
Payer: MEDICARE

## 2021-01-25 VITALS
HEIGHT: 63 IN | TEMPERATURE: 97.8 F | SYSTOLIC BLOOD PRESSURE: 140 MMHG | WEIGHT: 265 LBS | DIASTOLIC BLOOD PRESSURE: 66 MMHG | HEART RATE: 76 BPM | OXYGEN SATURATION: 96 % | RESPIRATION RATE: 20 BRPM | BODY MASS INDEX: 46.95 KG/M2

## 2021-01-25 VITALS — TEMPERATURE: 96.2 F

## 2021-01-25 DIAGNOSIS — E11.622 TYPE 2 DIABETES MELLITUS WITH OTHER SKIN ULCER: ICD-10-CM

## 2021-01-25 DIAGNOSIS — L97.312 NON-PRESSURE CHRONIC ULCER OF RIGHT ANKLE WITH FAT LAYER EXPOSED: ICD-10-CM

## 2021-01-25 DIAGNOSIS — Z95.2 PRESENCE OF PROSTHETIC HEART VALVE: Chronic | ICD-10-CM

## 2021-01-25 PROCEDURE — 99213 OFFICE O/P EST LOW 20 MIN: CPT

## 2021-01-25 PROCEDURE — G0463: CPT

## 2021-01-26 DIAGNOSIS — Z87.01 PERSONAL HISTORY OF PNEUMONIA (RECURRENT): ICD-10-CM

## 2021-01-26 DIAGNOSIS — M48.07 SPINAL STENOSIS, LUMBOSACRAL REGION: ICD-10-CM

## 2021-01-26 DIAGNOSIS — E11.59 TYPE 2 DIABETES MELLITUS WITH OTHER CIRCULATORY COMPLICATIONS: ICD-10-CM

## 2021-01-26 DIAGNOSIS — L84 CORNS AND CALLOSITIES: ICD-10-CM

## 2021-01-26 DIAGNOSIS — Z83.49 FAMILY HISTORY OF OTHER ENDOCRINE, NUTRITIONAL AND METABOLIC DISEASES: ICD-10-CM

## 2021-01-26 DIAGNOSIS — G47.10 HYPERSOMNIA, UNSPECIFIED: ICD-10-CM

## 2021-01-26 DIAGNOSIS — L97.312 NON-PRESSURE CHRONIC ULCER OF RIGHT ANKLE WITH FAT LAYER EXPOSED: ICD-10-CM

## 2021-01-26 DIAGNOSIS — M47.817 SPONDYLOSIS WITHOUT MYELOPATHY OR RADICULOPATHY, LUMBOSACRAL REGION: ICD-10-CM

## 2021-01-26 DIAGNOSIS — Z79.899 OTHER LONG TERM (CURRENT) DRUG THERAPY: ICD-10-CM

## 2021-01-26 DIAGNOSIS — E05.90 THYROTOXICOSIS, UNSPECIFIED WITHOUT THYROTOXIC CRISIS OR STORM: ICD-10-CM

## 2021-01-26 DIAGNOSIS — Z98.890 OTHER SPECIFIED POSTPROCEDURAL STATES: ICD-10-CM

## 2021-01-26 DIAGNOSIS — E55.9 VITAMIN D DEFICIENCY, UNSPECIFIED: ICD-10-CM

## 2021-01-26 DIAGNOSIS — I83.893 VARICOSE VEINS OF BILATERAL LOWER EXTREMITIES WITH OTHER COMPLICATIONS: ICD-10-CM

## 2021-01-26 DIAGNOSIS — Z87.81 PERSONAL HISTORY OF (HEALED) TRAUMATIC FRACTURE: ICD-10-CM

## 2021-01-26 DIAGNOSIS — E66.01 MORBID (SEVERE) OBESITY DUE TO EXCESS CALORIES: ICD-10-CM

## 2021-01-26 DIAGNOSIS — I47.1 SUPRAVENTRICULAR TACHYCARDIA: ICD-10-CM

## 2021-01-26 DIAGNOSIS — I11.9 HYPERTENSIVE HEART DISEASE WITHOUT HEART FAILURE: ICD-10-CM

## 2021-01-26 DIAGNOSIS — G47.33 OBSTRUCTIVE SLEEP APNEA (ADULT) (PEDIATRIC): ICD-10-CM

## 2021-01-26 DIAGNOSIS — Z87.891 PERSONAL HISTORY OF NICOTINE DEPENDENCE: ICD-10-CM

## 2021-01-26 DIAGNOSIS — Z79.82 LONG TERM (CURRENT) USE OF ASPIRIN: ICD-10-CM

## 2021-01-26 DIAGNOSIS — I83.228 VARICOSE VEINS OF LEFT LOWER EXTREMITY WITH BOTH ULCER OF OTHER PART OF LOWER EXTREMITY AND INFLAMMATION: ICD-10-CM

## 2021-01-26 DIAGNOSIS — Z95.4 PRESENCE OF OTHER HEART-VALVE REPLACEMENT: ICD-10-CM

## 2021-01-26 DIAGNOSIS — Z79.4 LONG TERM (CURRENT) USE OF INSULIN: ICD-10-CM

## 2021-01-26 DIAGNOSIS — Z98.84 BARIATRIC SURGERY STATUS: ICD-10-CM

## 2021-01-26 DIAGNOSIS — Z82.49 FAMILY HISTORY OF ISCHEMIC HEART DISEASE AND OTHER DISEASES OF THE CIRCULATORY SYSTEM: ICD-10-CM

## 2021-01-26 DIAGNOSIS — M43.16 SPONDYLOLISTHESIS, LUMBAR REGION: ICD-10-CM

## 2021-01-26 DIAGNOSIS — E11.622 TYPE 2 DIABETES MELLITUS WITH OTHER SKIN ULCER: ICD-10-CM

## 2021-01-26 DIAGNOSIS — I34.1 NONRHEUMATIC MITRAL (VALVE) PROLAPSE: ICD-10-CM

## 2021-01-26 DIAGNOSIS — L97.822 NON-PRESSURE CHRONIC ULCER OF OTHER PART OF LEFT LOWER LEG WITH FAT LAYER EXPOSED: ICD-10-CM

## 2021-01-26 DIAGNOSIS — Z88.5 ALLERGY STATUS TO NARCOTIC AGENT: ICD-10-CM

## 2021-01-26 NOTE — PHYSICAL EXAM
[4 x 4] : 4 x 4  [0] : left 0 [Ankle Swelling (On Exam)] : present [Ankle Swelling Bilaterally] : bilaterally  [Varicose Veins Of Lower Extremities] : bilaterally [] : bilaterally [Ankle Swelling On The Left] : moderate [Skin Ulcer] : ulcer [JVD] : no jugular venous distention  [de-identified] : A&Ox3, NAD [de-identified] : 4/5 strength in all quadrants bilaterally [de-identified] : Left dorsal foot laceration down to skin, subcutaneous tissue, healed. Right posterior ankle ulceration down to skin, subcutaneous tissue, and fat, epithelialized. venous stasis wound on right lower leg down to skin and subcutaneous tissue and fat epithelialized. venous stasis wounds on left lower leg down to skin and subcutaneous tissue and fat epithelialized. bilateral stasis dermatitis. [FreeTextEntry7] : Left Achilles - Closed [de-identified] : No Treatment  [de-identified] : Cleansed with Normal Saline  [de-identified] : Right Achilles - Dry Eschar [de-identified] : Patients own compression stocking [de-identified] : No Treatment  [de-identified] : Cleansed with Normal Saline \par Kerlix\par  [de-identified] : DPM shaved calluses on bilateral feel [FreeTextEntry1] : Dorsal Foot- Resolved [de-identified] : Intact  [de-identified] : Patients own compression stockings [de-identified] : No Treatment  [de-identified] : Cleansed with Normal Saline [de-identified] : None [de-identified] : 3x Weekly [de-identified] : None [de-identified] : No [de-identified] : No [de-identified] : Normal [de-identified] : None [de-identified] : None [de-identified] : None [de-identified] : No [de-identified] : Other [de-identified] : 3x Weekly [TWNoteComboBox1] : Left [de-identified] : other [de-identified] : None [de-identified] : Other

## 2021-01-26 NOTE — HISTORY OF PRESENT ILLNESS
[FreeTextEntry1] : Patient seen for left foot laceration down to skin, subcutaneous tissue which has healed. Pt also seen for venous stasis wounds to right and left lower leg down to skin and subcutaneous tissue and fat with stasis dermatitis which have healed. Pt also seen for right posterior ankle diabetic ulcer down to skin, subcutaneous tissue, and fat, epithelialized at this time.

## 2021-01-26 NOTE — PLAN
[FreeTextEntry1] : Patient examined and evaluated at this time.\par Continue local wound care and offloading.\par Compression socks for the bilateral lower legs.\par Pt to return in 4 weeks for follow up.\par Spent 20 minutes for patient care and medical decision making.\par

## 2021-01-26 NOTE — REVIEW OF SYSTEMS
[Skin Wound] : skin wound [Fever] : no fever [Eye Pain] : no eye pain [Earache] : no earache [Chest Pain] : no chest pain [Shortness Of Breath] : no shortness of breath [Abdominal Pain] : no abdominal pain [de-identified] : Left dorsal foot laceration down to skin, subcutaneous tissue, healed. Right posterior ankle ulceration down to skin, subcutaneous tissue, and fat, epithelialized. venous stasis wound on right lower leg down to skin and subcutaneous tissue and fat epithelialized. venous stasis wounds on left lower leg down to skin and subcutaneous tissue and fat epithelialized. bilateral stasis dermatitis.

## 2021-01-26 NOTE — ASSESSMENT
[Verbal] : Verbal [Patient] : Patient [Good - alert, interested, motivated] : Good - alert, interested, motivated [Verbalizes knowledge/Understanding] : Verbalizes knowledge/understanding [Foot Care] : foot care [Skin Care] : skin care [Pressure relief] : pressure relief [Signs and symptoms of infection] : sign and symptoms of infection [Nutrition] : nutrition [How and When to Call] : how and when to call [Off-loading] : off-loading [Compression Therapy] : compression therapy [Patient responsibility to plan of care] : patient responsibility to plan of care [Glycemic Control] : glycemic control [] : Yes [Stable] : stable [Home] : Home [Cane] : Cane [Not Applicable - Long Term Care/Home Health Agency] : Long Term Care/Home Health Agency: Not Applicable [FreeTextEntry2] : Maintain Skin Integrity \par Compression Therapy  \par Glycemic Control [FreeTextEntry4] : F/U to [facility] in 1 month for Assessment

## 2021-01-27 ENCOUNTER — APPOINTMENT (OUTPATIENT)
Dept: WOUND CARE | Facility: HOSPITAL | Age: 66
End: 2021-01-27

## 2021-01-29 ENCOUNTER — APPOINTMENT (OUTPATIENT)
Dept: WOUND CARE | Facility: HOSPITAL | Age: 66
End: 2021-01-29

## 2021-03-10 ENCOUNTER — OUTPATIENT (OUTPATIENT)
Dept: OUTPATIENT SERVICES | Facility: HOSPITAL | Age: 66
LOS: 1 days | Discharge: HOME | End: 2021-03-10
Payer: MEDICARE

## 2021-03-10 ENCOUNTER — APPOINTMENT (OUTPATIENT)
Dept: WOUND CARE | Facility: HOSPITAL | Age: 66
End: 2021-03-10
Payer: MEDICARE

## 2021-03-10 VITALS
DIASTOLIC BLOOD PRESSURE: 64 MMHG | HEIGHT: 63 IN | BODY MASS INDEX: 46.95 KG/M2 | RESPIRATION RATE: 20 BRPM | OXYGEN SATURATION: 98 % | TEMPERATURE: 98.1 F | WEIGHT: 265 LBS | HEART RATE: 67 BPM | SYSTOLIC BLOOD PRESSURE: 143 MMHG

## 2021-03-10 DIAGNOSIS — M48.07 SPINAL STENOSIS, LUMBOSACRAL REGION: ICD-10-CM

## 2021-03-10 DIAGNOSIS — E05.90 THYROTOXICOSIS, UNSPECIFIED WITHOUT THYROTOXIC CRISIS OR STORM: ICD-10-CM

## 2021-03-10 DIAGNOSIS — Z95.4 PRESENCE OF OTHER HEART-VALVE REPLACEMENT: ICD-10-CM

## 2021-03-10 DIAGNOSIS — I11.9 HYPERTENSIVE HEART DISEASE WITHOUT HEART FAILURE: ICD-10-CM

## 2021-03-10 DIAGNOSIS — I47.1 SUPRAVENTRICULAR TACHYCARDIA: ICD-10-CM

## 2021-03-10 DIAGNOSIS — Z98.84 BARIATRIC SURGERY STATUS: ICD-10-CM

## 2021-03-10 DIAGNOSIS — Z79.4 LONG TERM (CURRENT) USE OF INSULIN: ICD-10-CM

## 2021-03-10 DIAGNOSIS — E11.621 TYPE 2 DIABETES MELLITUS WITH FOOT ULCER: ICD-10-CM

## 2021-03-10 DIAGNOSIS — E11.59 TYPE 2 DIABETES MELLITUS WITH OTHER CIRCULATORY COMPLICATIONS: ICD-10-CM

## 2021-03-10 DIAGNOSIS — Z82.49 FAMILY HISTORY OF ISCHEMIC HEART DISEASE AND OTHER DISEASES OF THE CIRCULATORY SYSTEM: ICD-10-CM

## 2021-03-10 DIAGNOSIS — E66.01 MORBID (SEVERE) OBESITY DUE TO EXCESS CALORIES: ICD-10-CM

## 2021-03-10 DIAGNOSIS — M43.16 SPONDYLOLISTHESIS, LUMBAR REGION: ICD-10-CM

## 2021-03-10 DIAGNOSIS — Z87.01 PERSONAL HISTORY OF PNEUMONIA (RECURRENT): ICD-10-CM

## 2021-03-10 DIAGNOSIS — Z87.81 PERSONAL HISTORY OF (HEALED) TRAUMATIC FRACTURE: ICD-10-CM

## 2021-03-10 DIAGNOSIS — Z79.899 OTHER LONG TERM (CURRENT) DRUG THERAPY: ICD-10-CM

## 2021-03-10 DIAGNOSIS — M47.817 SPONDYLOSIS WITHOUT MYELOPATHY OR RADICULOPATHY, LUMBOSACRAL REGION: ICD-10-CM

## 2021-03-10 DIAGNOSIS — Z79.82 LONG TERM (CURRENT) USE OF ASPIRIN: ICD-10-CM

## 2021-03-10 DIAGNOSIS — I34.1 NONRHEUMATIC MITRAL (VALVE) PROLAPSE: ICD-10-CM

## 2021-03-10 DIAGNOSIS — E55.9 VITAMIN D DEFICIENCY, UNSPECIFIED: ICD-10-CM

## 2021-03-10 DIAGNOSIS — Z98.890 OTHER SPECIFIED POSTPROCEDURAL STATES: ICD-10-CM

## 2021-03-10 DIAGNOSIS — I83.218 VARICOSE VEINS OF RIGHT LOWER EXTREMITY WITH BOTH ULCER OF OTHER PART OF LOWER EXTREMITY AND INFLAMMATION: ICD-10-CM

## 2021-03-10 DIAGNOSIS — E11.40 TYPE 2 DIABETES MELLITUS WITH DIABETIC NEUROPATHY, UNSPECIFIED: ICD-10-CM

## 2021-03-10 DIAGNOSIS — Z83.49 FAMILY HISTORY OF OTHER ENDOCRINE, NUTRITIONAL AND METABOLIC DISEASES: ICD-10-CM

## 2021-03-10 DIAGNOSIS — G47.33 OBSTRUCTIVE SLEEP APNEA (ADULT) (PEDIATRIC): ICD-10-CM

## 2021-03-10 DIAGNOSIS — I83.893 VARICOSE VEINS OF BILATERAL LOWER EXTREMITIES WITH OTHER COMPLICATIONS: ICD-10-CM

## 2021-03-10 DIAGNOSIS — Z87.891 PERSONAL HISTORY OF NICOTINE DEPENDENCE: ICD-10-CM

## 2021-03-10 DIAGNOSIS — I83.11 VARICOSE VEINS OF RIGHT LOWER EXTREMITY WITH INFLAMMATION: ICD-10-CM

## 2021-03-10 DIAGNOSIS — L97.412 NON-PRESSURE CHRONIC ULCER OF RIGHT HEEL AND MIDFOOT WITH FAT LAYER EXPOSED: ICD-10-CM

## 2021-03-10 DIAGNOSIS — Z88.5 ALLERGY STATUS TO NARCOTIC AGENT: ICD-10-CM

## 2021-03-10 DIAGNOSIS — Z95.2 PRESENCE OF PROSTHETIC HEART VALVE: Chronic | ICD-10-CM

## 2021-03-10 DIAGNOSIS — I83.12 VARICOSE VEINS OF LEFT LOWER EXTREMITY WITH INFLAMMATION: ICD-10-CM

## 2021-03-10 DIAGNOSIS — G47.10 HYPERSOMNIA, UNSPECIFIED: ICD-10-CM

## 2021-03-10 PROCEDURE — G0463: CPT

## 2021-03-10 PROCEDURE — 99214 OFFICE O/P EST MOD 30 MIN: CPT

## 2021-03-10 NOTE — REVIEW OF SYSTEMS
[Fever] : no fever [Eye Pain] : no eye pain [Earache] : no earache [Chest Pain] : no chest pain [Shortness Of Breath] : no shortness of breath [Abdominal Pain] : no abdominal pain [Skin Wound] : skin wound [de-identified] : right heel ulcer down to skin and subcutaneous tissue. bilateral stasis dermatitis. [de-identified] : diabetic neuropathy [de-identified] : type 2 diabetes

## 2021-03-10 NOTE — ASSESSMENT
[Verbal] : Verbal [Demo] : Demo [Patient] : Patient [Good - alert, interested, motivated] : Good - alert, interested, motivated [Verbalizes knowledge/Understanding] : Verbalizes knowledge/understanding [Foot Care] : foot care [Skin Care] : skin care [Pressure relief] : pressure relief [Signs and symptoms of infection] : sign and symptoms of infection [Nutrition] : nutrition [How and When to Call] : how and when to call [Off-loading] : off-loading [Compression Therapy] : compression therapy [Patient responsibility to plan of care] : patient responsibility to plan of care [Glycemic Control] : glycemic control [Stable] : stable [Home] : Home [Cane] : Cane [Not Applicable - Long Term Care/Home Health Agency] : Long Term Care/Home Health Agency: Not Applicable [Pain Management] : pain management [Home Health] : home health [] : No [FreeTextEntry2] : Promote skin integrity\par Infection Prevention\par Encourage Glycemic control\par Weight reduction strategies\par elevation\par compression compliance\par low Na+ diet\par demonstrates use of both pharmacological and non pharmacological pain management interventions\par localized wound care [FreeTextEntry3] : New Wound [FreeTextEntry4] : F/U 1 week for assessment and 3/12/21 for dressing change

## 2021-03-10 NOTE — HISTORY OF PRESENT ILLNESS
[FreeTextEntry1] : Pt seen for new right heel ulceration down to skin, subcutaneous tissue. Pt relates that she noticed the ulceration a few days prior when she noticed bleeding. Pt currently wearing compression for venous stasis.

## 2021-03-10 NOTE — PLAN
[FreeTextEntry1] : Patient examined and evaluated at this time.\par Continue local wound care and offloading.\par Compression socks for the bilateral lower legs.\par Pt to return in 1 week for follow up.\par Spent 30 minutes for patient care and medical decision making.\par

## 2021-03-10 NOTE — PHYSICAL EXAM
[2 x 2] : 2 x 2  [4 x 4] : 4 x 4  [JVD] : no jugular venous distention  [0] : left 0 [Ankle Swelling (On Exam)] : present [Ankle Swelling Bilaterally] : bilaterally  [Varicose Veins Of Lower Extremities] : bilaterally [] : bilaterally [Ankle Swelling On The Left] : moderate [Skin Ulcer] : ulcer [de-identified] : A&Ox3, NAD [de-identified] : 4/5 strength in all quadrants bilaterally [de-identified] : right heel ulcer down to skin and subcutaneous tissue. bilateral stasis dermatitis. [FreeTextEntry2] : 0.7 [FreeTextEntry3] : 0.3 [FreeTextEntry4] : 0.1 [de-identified] : serosanguineous [de-identified] : Circ neurovascular function WNL post custom compression stockinettes, pt expressed comfort. [FreeTextEntry7] : Left Achilles - Closed [de-identified] : No Treatment  [de-identified] : Cleansed with Normal Saline  [de-identified] : Circ neurovascular function WNL post custom compression stockinettes, pt expressed comfort. [de-identified] : Right Achilles - Dry Eschar [de-identified] : 0.5 [de-identified] : 0.5 [de-identified] : Patients own compression stocking [de-identified] : No Treatment  [de-identified] : Cleansed with Normal Saline \par Kerlix\par  [de-identified] : DPM shaved calluses on bilateral feet\par Nails trimmed by DPM [de-identified] : Circ neurovascular function WNL post custom compression stockinettes, pt expressed comfort. [FreeTextEntry1] : Dorsal Foot- Resolved [de-identified] : Intact  [de-identified] : Patients own compression stockings [de-identified] : No Treatment  [de-identified] : Cleansed with Normal Saline [TWNoteComboBox4] : Small [TWNoteComboBox5] : No [de-identified] : No [de-identified] : None [de-identified] : 100% [de-identified] : 3x Weekly [de-identified] : Primary Dressing [de-identified] : None [de-identified] : 3x Weekly [de-identified] : None [de-identified] : No [de-identified] : No [de-identified] : Normal [de-identified] : None [de-identified] : None [de-identified] : None [de-identified] : No [de-identified] : Other [de-identified] : 3x Weekly [TWNoteComboBox1] : Left [de-identified] : other [de-identified] : None [de-identified] : None [de-identified] : None [de-identified] : No [de-identified] : Other

## 2021-03-12 ENCOUNTER — OUTPATIENT (OUTPATIENT)
Dept: OUTPATIENT SERVICES | Facility: HOSPITAL | Age: 66
LOS: 1 days | Discharge: HOME | End: 2021-03-12
Payer: MEDICARE

## 2021-03-12 ENCOUNTER — APPOINTMENT (OUTPATIENT)
Dept: WOUND CARE | Facility: HOSPITAL | Age: 66
End: 2021-03-12
Payer: MEDICARE

## 2021-03-12 VITALS
HEART RATE: 65 BPM | OXYGEN SATURATION: 97 % | TEMPERATURE: 98 F | BODY MASS INDEX: 46.95 KG/M2 | RESPIRATION RATE: 20 BRPM | SYSTOLIC BLOOD PRESSURE: 119 MMHG | DIASTOLIC BLOOD PRESSURE: 51 MMHG | HEIGHT: 63 IN | WEIGHT: 265 LBS

## 2021-03-12 DIAGNOSIS — Z95.2 PRESENCE OF PROSTHETIC HEART VALVE: Chronic | ICD-10-CM

## 2021-03-12 DIAGNOSIS — E11.621 TYPE 2 DIABETES MELLITUS WITH FOOT ULCER: ICD-10-CM

## 2021-03-12 DIAGNOSIS — L97.412 NON-PRESSURE CHRONIC ULCER OF RIGHT HEEL AND MIDFOOT WITH FAT LAYER EXPOSED: ICD-10-CM

## 2021-03-12 DIAGNOSIS — I83.218 VARICOSE VEINS OF RIGHT LOWER EXTREMITY WITH BOTH ULCER OF OTHER PART OF LOWER EXTREMITY AND INFLAMMATION: ICD-10-CM

## 2021-03-12 PROCEDURE — G0463: CPT

## 2021-03-12 PROCEDURE — ZZZZZ: CPT

## 2021-03-16 ENCOUNTER — APPOINTMENT (OUTPATIENT)
Dept: WOUND CARE | Facility: HOSPITAL | Age: 66
End: 2021-03-16
Payer: MEDICARE

## 2021-03-16 ENCOUNTER — OUTPATIENT (OUTPATIENT)
Dept: OUTPATIENT SERVICES | Facility: HOSPITAL | Age: 66
LOS: 1 days | Discharge: HOME | End: 2021-03-16
Payer: MEDICARE

## 2021-03-16 VITALS
HEIGHT: 63 IN | WEIGHT: 265 LBS | DIASTOLIC BLOOD PRESSURE: 60 MMHG | SYSTOLIC BLOOD PRESSURE: 130 MMHG | TEMPERATURE: 97.1 F | BODY MASS INDEX: 46.95 KG/M2

## 2021-03-16 DIAGNOSIS — Z95.2 PRESENCE OF PROSTHETIC HEART VALVE: Chronic | ICD-10-CM

## 2021-03-16 DIAGNOSIS — I83.218 VARICOSE VEINS OF RIGHT LOWER EXTREMITY WITH BOTH ULCER OF OTHER PART OF LOWER EXTREMITY AND INFLAMMATION: ICD-10-CM

## 2021-03-16 DIAGNOSIS — S81.812A LACERATION W/OUT FOREIGN BODY, LEFT LOWER LEG, INITIAL ENCOUNTER: ICD-10-CM

## 2021-03-16 PROCEDURE — 99214 OFFICE O/P EST MOD 30 MIN: CPT

## 2021-03-16 PROCEDURE — G0463: CPT

## 2021-03-17 NOTE — HISTORY OF PRESENT ILLNESS
[FreeTextEntry1] : Pt seen for right heel ulceration down to skin, subcutaneous tissue. Pt currently wearing compression for venous stasis. Pt seen for new left lower leg laceration.

## 2021-03-17 NOTE — REVIEW OF SYSTEMS
[Skin Wound] : skin wound [Fever] : no fever [Eye Pain] : no eye pain [Earache] : no earache [Chest Pain] : no chest pain [Shortness Of Breath] : no shortness of breath [Abdominal Pain] : no abdominal pain [de-identified] : new left lower leg laceration. right heel ulcer down to skin and subcutaneous tissue. bilateral stasis dermatitis. [de-identified] : diabetic neuropathy [de-identified] : type 2 diabetes

## 2021-03-17 NOTE — ASSESSMENT
[Verbal] : Verbal [Demo] : Demo [Patient] : Patient [Good - alert, interested, motivated] : Good - alert, interested, motivated [Verbalizes knowledge/Understanding] : Verbalizes knowledge/understanding [Foot Care] : foot care [Skin Care] : skin care [Pressure relief] : pressure relief [Signs and symptoms of infection] : sign and symptoms of infection [Nutrition] : nutrition [How and When to Call] : how and when to call [Pain Management] : pain management [Off-loading] : off-loading [Compression Therapy] : compression therapy [Home Health] : home health [Patient responsibility to plan of care] : patient responsibility to plan of care [Glycemic Control] : glycemic control [Stable] : stable [Home] : Home [Cane] : Cane [Not Applicable - Long Term Care/Home Health Agency] : Long Term Care/Home Health Agency: Not Applicable [] : No [FreeTextEntry2] : Promote skin integrity\par Infection Prevention\par Encourage Glycemic control\par Weight reduction strategies\par elevation\par compression compliance\par low Na+ diet\par demonstrates use of both pharmacological and non pharmacological pain management interventions\par localized wound care [FreeTextEntry3] : New skin tear to left lower leg [FreeTextEntry4] : Submitted pre-auth for Nu-shield to right heel next assessment.\par F/U 1x for dressing change and in 1 week for assessment\par \par 1- 1.6 NuShield disc ordered on 03/17/21 for pts next assessment.

## 2021-03-17 NOTE — PLAN
[FreeTextEntry1] : Patient examined and evaluated at this time.\par Continue local wound care and offloading.\par Compression socks for the bilateral lower legs.\par Pt to return in 1 week for follow up.\par Will auth for [facility].\par Spent 30 minutes for patient care and medical decision making.\par

## 2021-03-17 NOTE — PHYSICAL EXAM
[2 x 2] : 2 x 2  [0] : left 0 [Ankle Swelling (On Exam)] : present [Ankle Swelling Bilaterally] : bilaterally  [Varicose Veins Of Lower Extremities] : bilaterally [] : bilaterally [Ankle Swelling On The Left] : moderate [Skin Ulcer] : ulcer [JVD] : no jugular venous distention  [de-identified] : A&Ox3, NAD [de-identified] : 4/5 strength in all quadrants bilaterally [de-identified] : new left lower leg laceration. right heel ulcer down to skin and subcutaneous tissue. bilateral stasis dermatitis. [de-identified] : \par  [FreeTextEntry2] : 0.8 [FreeTextEntry3] : 0.7 [FreeTextEntry4] : 0.1 [de-identified] : serosanguineous [de-identified] : Right Achilles - Closed [de-identified] : Patients own compression stocking [de-identified] : No Treatment  [de-identified] : Cleansed with Normal Saline \par Kerlix\par  [FreeTextEntry1] : Dorsal Foot- Resolved [de-identified] : Intact  [de-identified] : Patients own compression stockings [de-identified] : No Treatment  [de-identified] : Cleansed with Normal Saline [FreeTextEntry7] : Left Lower Leg - skin tear [FreeTextEntry8] : 1.0 [FreeTextEntry9] : 0.1 [de-identified] : 0.1 [de-identified] : Allevyn Boarder [de-identified] : Cleansed with Normal Saline\par  [TWNoteComboBox4] : Small [TWNoteComboBox5] : No [de-identified] : No [de-identified] : None [de-identified] : 100% [de-identified] : 2x Weekly [de-identified] : Primary Dressing [de-identified] : None [de-identified] : 3x Weekly [de-identified] : None [de-identified] : No [de-identified] : No [de-identified] : Normal [de-identified] : None [de-identified] : None [de-identified] : None [de-identified] : No [de-identified] : Other [de-identified] : 3x Weekly [TWNoteComboBox1] : Left [de-identified] : other [de-identified] : None [de-identified] : None [de-identified] : None [de-identified] : No [de-identified] : Other [de-identified] : 100% [de-identified] : 2x Weekly

## 2021-03-18 ENCOUNTER — APPOINTMENT (OUTPATIENT)
Dept: PLASTIC SURGERY | Facility: HOSPITAL | Age: 66
End: 2021-03-18

## 2021-03-18 DIAGNOSIS — I83.12 VARICOSE VEINS OF LEFT LOWER EXTREMITY WITH INFLAMMATION: ICD-10-CM

## 2021-03-18 DIAGNOSIS — S81.812A LACERATION WITHOUT FOREIGN BODY, LEFT LOWER LEG, INITIAL ENCOUNTER: ICD-10-CM

## 2021-03-18 DIAGNOSIS — M47.817 SPONDYLOSIS WITHOUT MYELOPATHY OR RADICULOPATHY, LUMBOSACRAL REGION: ICD-10-CM

## 2021-03-18 DIAGNOSIS — Z98.84 BARIATRIC SURGERY STATUS: ICD-10-CM

## 2021-03-18 DIAGNOSIS — Z82.49 FAMILY HISTORY OF ISCHEMIC HEART DISEASE AND OTHER DISEASES OF THE CIRCULATORY SYSTEM: ICD-10-CM

## 2021-03-18 DIAGNOSIS — Z98.890 OTHER SPECIFIED POSTPROCEDURAL STATES: ICD-10-CM

## 2021-03-18 DIAGNOSIS — G47.33 OBSTRUCTIVE SLEEP APNEA (ADULT) (PEDIATRIC): ICD-10-CM

## 2021-03-18 DIAGNOSIS — Z95.4 PRESENCE OF OTHER HEART-VALVE REPLACEMENT: ICD-10-CM

## 2021-03-18 DIAGNOSIS — I34.0 NONRHEUMATIC MITRAL (VALVE) INSUFFICIENCY: ICD-10-CM

## 2021-03-18 DIAGNOSIS — Z87.01 PERSONAL HISTORY OF PNEUMONIA (RECURRENT): ICD-10-CM

## 2021-03-18 DIAGNOSIS — L97.412 NON-PRESSURE CHRONIC ULCER OF RIGHT HEEL AND MIDFOOT WITH FAT LAYER EXPOSED: ICD-10-CM

## 2021-03-18 DIAGNOSIS — E11.621 TYPE 2 DIABETES MELLITUS WITH FOOT ULCER: ICD-10-CM

## 2021-03-18 DIAGNOSIS — M48.07 SPINAL STENOSIS, LUMBOSACRAL REGION: ICD-10-CM

## 2021-03-18 DIAGNOSIS — Z88.5 ALLERGY STATUS TO NARCOTIC AGENT: ICD-10-CM

## 2021-03-18 DIAGNOSIS — E66.01 MORBID (SEVERE) OBESITY DUE TO EXCESS CALORIES: ICD-10-CM

## 2021-03-18 DIAGNOSIS — Z87.891 PERSONAL HISTORY OF NICOTINE DEPENDENCE: ICD-10-CM

## 2021-03-18 DIAGNOSIS — E11.40 TYPE 2 DIABETES MELLITUS WITH DIABETIC NEUROPATHY, UNSPECIFIED: ICD-10-CM

## 2021-03-18 DIAGNOSIS — Z79.899 OTHER LONG TERM (CURRENT) DRUG THERAPY: ICD-10-CM

## 2021-03-18 DIAGNOSIS — E55.9 VITAMIN D DEFICIENCY, UNSPECIFIED: ICD-10-CM

## 2021-03-18 DIAGNOSIS — M43.16 SPONDYLOLISTHESIS, LUMBAR REGION: ICD-10-CM

## 2021-03-18 DIAGNOSIS — E11.59 TYPE 2 DIABETES MELLITUS WITH OTHER CIRCULATORY COMPLICATIONS: ICD-10-CM

## 2021-03-18 DIAGNOSIS — G47.10 HYPERSOMNIA, UNSPECIFIED: ICD-10-CM

## 2021-03-18 DIAGNOSIS — Z79.82 LONG TERM (CURRENT) USE OF ASPIRIN: ICD-10-CM

## 2021-03-18 DIAGNOSIS — I83.893 VARICOSE VEINS OF BILATERAL LOWER EXTREMITIES WITH OTHER COMPLICATIONS: ICD-10-CM

## 2021-03-18 DIAGNOSIS — I11.9 HYPERTENSIVE HEART DISEASE WITHOUT HEART FAILURE: ICD-10-CM

## 2021-03-18 DIAGNOSIS — Z87.81 PERSONAL HISTORY OF (HEALED) TRAUMATIC FRACTURE: ICD-10-CM

## 2021-03-18 DIAGNOSIS — Z79.4 LONG TERM (CURRENT) USE OF INSULIN: ICD-10-CM

## 2021-03-18 DIAGNOSIS — I83.11 VARICOSE VEINS OF RIGHT LOWER EXTREMITY WITH INFLAMMATION: ICD-10-CM

## 2021-03-18 DIAGNOSIS — E05.90 THYROTOXICOSIS, UNSPECIFIED WITHOUT THYROTOXIC CRISIS OR STORM: ICD-10-CM

## 2021-03-18 DIAGNOSIS — I47.1 SUPRAVENTRICULAR TACHYCARDIA: ICD-10-CM

## 2021-03-18 DIAGNOSIS — Z83.49 FAMILY HISTORY OF OTHER ENDOCRINE, NUTRITIONAL AND METABOLIC DISEASES: ICD-10-CM

## 2021-03-19 ENCOUNTER — APPOINTMENT (OUTPATIENT)
Dept: OBGYN | Facility: HOSPITAL | Age: 66
End: 2021-03-19

## 2021-03-23 ENCOUNTER — APPOINTMENT (OUTPATIENT)
Dept: WOUND CARE | Facility: HOSPITAL | Age: 66
End: 2021-03-23
Payer: MEDICARE

## 2021-03-23 ENCOUNTER — OUTPATIENT (OUTPATIENT)
Dept: OUTPATIENT SERVICES | Facility: HOSPITAL | Age: 66
LOS: 1 days | Discharge: HOME | End: 2021-03-23
Payer: MEDICARE

## 2021-03-23 VITALS
HEIGHT: 63 IN | BODY MASS INDEX: 46.95 KG/M2 | SYSTOLIC BLOOD PRESSURE: 118 MMHG | WEIGHT: 265 LBS | TEMPERATURE: 97.6 F | HEART RATE: 66 BPM | RESPIRATION RATE: 20 BRPM | OXYGEN SATURATION: 96 % | DIASTOLIC BLOOD PRESSURE: 63 MMHG

## 2021-03-23 DIAGNOSIS — Z95.2 PRESENCE OF PROSTHETIC HEART VALVE: Chronic | ICD-10-CM

## 2021-03-23 DIAGNOSIS — I83.218 VARICOSE VEINS OF RIGHT LOWER EXTREMITY WITH BOTH ULCER OF OTHER PART OF LOWER EXTREMITY AND INFLAMMATION: ICD-10-CM

## 2021-03-23 PROCEDURE — 15271 SKIN SUB GRAFT TRNK/ARM/LEG: CPT

## 2021-03-23 PROCEDURE — 15275 SKIN SUB GRAFT FACE/NK/HF/G: CPT

## 2021-03-24 DIAGNOSIS — I47.1 SUPRAVENTRICULAR TACHYCARDIA: ICD-10-CM

## 2021-03-24 DIAGNOSIS — Z82.49 FAMILY HISTORY OF ISCHEMIC HEART DISEASE AND OTHER DISEASES OF THE CIRCULATORY SYSTEM: ICD-10-CM

## 2021-03-24 DIAGNOSIS — G47.10 HYPERSOMNIA, UNSPECIFIED: ICD-10-CM

## 2021-03-24 DIAGNOSIS — Z87.891 PERSONAL HISTORY OF NICOTINE DEPENDENCE: ICD-10-CM

## 2021-03-24 DIAGNOSIS — E66.01 MORBID (SEVERE) OBESITY DUE TO EXCESS CALORIES: ICD-10-CM

## 2021-03-24 DIAGNOSIS — Z87.81 PERSONAL HISTORY OF (HEALED) TRAUMATIC FRACTURE: ICD-10-CM

## 2021-03-24 DIAGNOSIS — E11.621 TYPE 2 DIABETES MELLITUS WITH FOOT ULCER: ICD-10-CM

## 2021-03-24 DIAGNOSIS — Z98.84 BARIATRIC SURGERY STATUS: ICD-10-CM

## 2021-03-24 DIAGNOSIS — M48.07 SPINAL STENOSIS, LUMBOSACRAL REGION: ICD-10-CM

## 2021-03-24 DIAGNOSIS — G47.33 OBSTRUCTIVE SLEEP APNEA (ADULT) (PEDIATRIC): ICD-10-CM

## 2021-03-24 DIAGNOSIS — E11.59 TYPE 2 DIABETES MELLITUS WITH OTHER CIRCULATORY COMPLICATIONS: ICD-10-CM

## 2021-03-24 DIAGNOSIS — I83.12 VARICOSE VEINS OF LEFT LOWER EXTREMITY WITH INFLAMMATION: ICD-10-CM

## 2021-03-24 DIAGNOSIS — Z79.4 LONG TERM (CURRENT) USE OF INSULIN: ICD-10-CM

## 2021-03-24 DIAGNOSIS — I83.893 VARICOSE VEINS OF BILATERAL LOWER EXTREMITIES WITH OTHER COMPLICATIONS: ICD-10-CM

## 2021-03-24 DIAGNOSIS — E55.9 VITAMIN D DEFICIENCY, UNSPECIFIED: ICD-10-CM

## 2021-03-24 DIAGNOSIS — Z88.5 ALLERGY STATUS TO NARCOTIC AGENT: ICD-10-CM

## 2021-03-24 DIAGNOSIS — Z83.49 FAMILY HISTORY OF OTHER ENDOCRINE, NUTRITIONAL AND METABOLIC DISEASES: ICD-10-CM

## 2021-03-24 DIAGNOSIS — I11.9 HYPERTENSIVE HEART DISEASE WITHOUT HEART FAILURE: ICD-10-CM

## 2021-03-24 DIAGNOSIS — Z95.4 PRESENCE OF OTHER HEART-VALVE REPLACEMENT: ICD-10-CM

## 2021-03-24 DIAGNOSIS — E05.90 THYROTOXICOSIS, UNSPECIFIED WITHOUT THYROTOXIC CRISIS OR STORM: ICD-10-CM

## 2021-03-24 DIAGNOSIS — M43.16 SPONDYLOLISTHESIS, LUMBAR REGION: ICD-10-CM

## 2021-03-24 DIAGNOSIS — Z87.01 PERSONAL HISTORY OF PNEUMONIA (RECURRENT): ICD-10-CM

## 2021-03-24 DIAGNOSIS — Z79.82 LONG TERM (CURRENT) USE OF ASPIRIN: ICD-10-CM

## 2021-03-24 DIAGNOSIS — Z98.890 OTHER SPECIFIED POSTPROCEDURAL STATES: ICD-10-CM

## 2021-03-24 DIAGNOSIS — Z79.899 OTHER LONG TERM (CURRENT) DRUG THERAPY: ICD-10-CM

## 2021-03-24 DIAGNOSIS — L97.412 NON-PRESSURE CHRONIC ULCER OF RIGHT HEEL AND MIDFOOT WITH FAT LAYER EXPOSED: ICD-10-CM

## 2021-03-24 DIAGNOSIS — M47.817 SPONDYLOSIS WITHOUT MYELOPATHY OR RADICULOPATHY, LUMBOSACRAL REGION: ICD-10-CM

## 2021-03-24 DIAGNOSIS — E11.40 TYPE 2 DIABETES MELLITUS WITH DIABETIC NEUROPATHY, UNSPECIFIED: ICD-10-CM

## 2021-03-24 DIAGNOSIS — I83.11 VARICOSE VEINS OF RIGHT LOWER EXTREMITY WITH INFLAMMATION: ICD-10-CM

## 2021-03-24 DIAGNOSIS — I34.1 NONRHEUMATIC MITRAL (VALVE) PROLAPSE: ICD-10-CM

## 2021-03-24 NOTE — ASSESSMENT
[Verbal] : Verbal [Demo] : Demo [Patient] : Patient [Good - alert, interested, motivated] : Good - alert, interested, motivated [Verbalizes knowledge/Understanding] : Verbalizes knowledge/understanding [Dressing changes] : dressing changes [Foot Care] : foot care [Skin Care] : skin care [Pressure relief] : pressure relief [Signs and symptoms of infection] : sign and symptoms of infection [Venous Disease] : venous disease [Nutrition] : nutrition [How and When to Call] : how and when to call [Off-loading] : off-loading [Compression Therapy] : compression therapy [Patient responsibility to plan of care] : patient responsibility to plan of care [Glycemic Control] : glycemic control [] : Yes [Stable] : stable [Home] : Home [Cane] : Cane [Not Applicable - Long Term Care/Home Health Agency] : Long Term Care/Home Health Agency: Not Applicable [FreeTextEntry2] : Infection Prevention\par Localized Wound Care\par Promote Optimal Skin Integrity\par Maintain acceptable pain level with use of pharmacological and nonpharmacological interventions\par Skin Graft - NuShield \par Offloading / Pressure Relief\par Glycemic Control \par  [FreeTextEntry4] : F/U to [facility] in One Week for NuShield application\par 1- 1.6 Nushield disc ordered on 03/24/21 for pts next assessment.\par

## 2021-03-24 NOTE — PROCEDURE
[Saline] : saline [Other:___] : [unfilled] [Hydrated with saline] : hydrated with saline [____ % was used] : and [unfilled] % was used [____ % was discarded] : and [unfilled] % was discarded [FreeTextEntry1] : adaptic, calcium alginate, dry dressing, ACE [[facility]] : 4535 [de-identified] : right heel ulcer down to skin and subcutaneous tissue. bilateral stasis dermatitis. [de-identified] : ora [de-identified] : christiano [FreeTextEntry6] : right heel ulcer down to skin and subcutaneous tissue. bilateral stasis dermatitis. [FreeTextEntry7] : right heel ulcer down to skin and subcutaneous tissue. bilateral stasis dermatitis.

## 2021-03-24 NOTE — VITALS
[] : No [de-identified] : Pt rates pain 0/10.  Patient denies any pain or discomfort at the present

## 2021-03-24 NOTE — PHYSICAL EXAM
[4 x 4] : 4 x 4  [Abdominal Pad] : Abdominal Pad [0] : left 0 [Ankle Swelling (On Exam)] : present [Ankle Swelling Bilaterally] : bilaterally  [Varicose Veins Of Lower Extremities] : bilaterally [] : bilaterally [Ankle Swelling On The Left] : moderate [Skin Ulcer] : ulcer [JVD] : no jugular venous distention  [de-identified] : A&Ox3, NAD [de-identified] : 4/5 strength in all quadrants bilaterally [de-identified] : right heel ulcer down to skin and subcutaneous tissue. bilateral stasis dermatitis. [de-identified] : No neurovascular deficit noted \par NuShield application # 1\par Item #: [id number] \par Donor #: [id number]\par ID#: [id number]\par Expiration: 05/30/2025\par Product #: NO - [id number]\par Irrigated with NS Lot #: [id number]   Expiration: [email or web address]\par 1 Unit of 1.6cm Disc NuShield applied\par 100% applied 0% discarded\par Bandage applied by physician \par No Debridement   [FreeTextEntry2] : 0.5 [FreeTextEntry3] : 0.3 [FreeTextEntry4] : 0.1 [de-identified] : Scant Serosanguineous [de-identified] : Right Achilles - Closed [de-identified] : Patients own compression stocking [de-identified] : No Treatment  [de-identified] : Cleansed with Normal Saline \par Kerlix\par  [FreeTextEntry1] : Left Dorsal Foot- Resolved [de-identified] : Intact  [de-identified] : Patients own compression stockings [de-identified] : No Treatment  [de-identified] : Cleansed with Normal Saline [FreeTextEntry7] : Left Lower Leg - skin tear [FreeTextEntry8] : 1.0 [FreeTextEntry9] : 0.1 [de-identified] : 0.1 [de-identified] : Allevyn Boarder [de-identified] : Cleansed with Normal Saline\par  [TWNoteComboBox4] : None [TWNoteComboBox5] : No [de-identified] : No [de-identified] : None [de-identified] : 100% [de-identified] : Weekly [de-identified] : Primary Dressing [de-identified] : None [de-identified] : None [de-identified] : No [de-identified] : No [de-identified] : Normal [de-identified] : None [de-identified] : None [de-identified] : None [de-identified] : No [de-identified] : Other [TWNoteComboBox1] : Left [de-identified] : other [de-identified] : None [de-identified] : None [de-identified] : None [de-identified] : No [de-identified] : Other [de-identified] : 100% [de-identified] : 2x Weekly

## 2021-03-24 NOTE — REVIEW OF SYSTEMS
[Skin Wound] : skin wound [FreeTextEntry1] : 0244 [Fever] : no fever [Eye Pain] : no eye pain [Earache] : no earache [Chest Pain] : no chest pain [Shortness Of Breath] : no shortness of breath [Abdominal Pain] : no abdominal pain [de-identified] : right heel ulcer down to skin and subcutaneous tissue. bilateral stasis dermatitis. [de-identified] : diabetic neuropathy [de-identified] : type 2 diabetes

## 2021-03-30 ENCOUNTER — OUTPATIENT (OUTPATIENT)
Dept: OUTPATIENT SERVICES | Facility: HOSPITAL | Age: 66
LOS: 1 days | Discharge: HOME | End: 2021-03-30
Payer: MEDICARE

## 2021-03-30 ENCOUNTER — APPOINTMENT (OUTPATIENT)
Dept: WOUND CARE | Facility: HOSPITAL | Age: 66
End: 2021-03-30
Payer: MEDICARE

## 2021-03-30 VITALS
HEART RATE: 63 BPM | WEIGHT: 265 LBS | RESPIRATION RATE: 20 BRPM | SYSTOLIC BLOOD PRESSURE: 118 MMHG | TEMPERATURE: 97.8 F | HEIGHT: 63 IN | OXYGEN SATURATION: 97 % | DIASTOLIC BLOOD PRESSURE: 63 MMHG | BODY MASS INDEX: 46.95 KG/M2

## 2021-03-30 DIAGNOSIS — I83.218 VARICOSE VEINS OF RIGHT LOWER EXTREMITY WITH BOTH ULCER OF OTHER PART OF LOWER EXTREMITY AND INFLAMMATION: ICD-10-CM

## 2021-03-30 DIAGNOSIS — Z95.2 PRESENCE OF PROSTHETIC HEART VALVE: Chronic | ICD-10-CM

## 2021-03-30 PROCEDURE — G0463: CPT

## 2021-03-30 PROCEDURE — 99213 OFFICE O/P EST LOW 20 MIN: CPT

## 2021-03-30 NOTE — HISTORY OF PRESENT ILLNESS
[FreeTextEntry1] : Pt seen for right heel ulceration down to skin, subcutaneous tissue, healed. Pt currently wearing compression for venous stasis.

## 2021-03-30 NOTE — ASSESSMENT
[Verbal] : Verbal [Demo] : Demo [Patient] : Patient [Good - alert, interested, motivated] : Good - alert, interested, motivated [Verbalizes knowledge/Understanding] : Verbalizes knowledge/understanding [Dressing changes] : dressing changes [Foot Care] : foot care [Skin Care] : skin care [Pressure relief] : pressure relief [Signs and symptoms of infection] : sign and symptoms of infection [Venous Disease] : venous disease [Nutrition] : nutrition [How and When to Call] : how and when to call [Off-loading] : off-loading [Compression Therapy] : compression therapy [Patient responsibility to plan of care] : patient responsibility to plan of care [Glycemic Control] : glycemic control [Stable] : stable [Home] : Home [Not Applicable - Long Term Care/Home Health Agency] : Long Term Care/Home Health Agency: Not Applicable [Cane] : Cane [] : No [FreeTextEntry2] : Edema Control\par Promote Optimal Skin Integrity\par Maintain acceptable pain level with use of pharmacological and nonpharmacological interventions\par Glycemic Control  [FreeTextEntry3] : Wound is now fragile new epithelium. [FreeTextEntry4] : Nu shield not applied as per DPM due to wound healing.\par Pt to f/u in 1 week\par \par

## 2021-03-30 NOTE — PLAN
[FreeTextEntry1] : Patient examined and evaluated at this time.\par Continue local wound care and offloading.\par Compression socks for the bilateral lower legs.\par Pt to return in 1 week for follow up.\par Spent 20 minutes for patient care and medical decision making.\par

## 2021-03-30 NOTE — REVIEW OF SYSTEMS
[Skin Wound] : skin wound [Fever] : no fever [Earache] : no earache [Eye Pain] : no eye pain [Chest Pain] : no chest pain [Shortness Of Breath] : no shortness of breath [Abdominal Pain] : no abdominal pain [de-identified] : right heel ulcer down to skin and subcutaneous tissue, healed. bilateral stasis dermatitis. [de-identified] : diabetic neuropathy [de-identified] : type 2 diabetes

## 2021-03-30 NOTE — REVIEW OF SYSTEMS
[Skin Wound] : skin wound [Fever] : no fever [Earache] : no earache [Eye Pain] : no eye pain [Chest Pain] : no chest pain [Shortness Of Breath] : no shortness of breath [Abdominal Pain] : no abdominal pain [de-identified] : right heel ulcer down to skin and subcutaneous tissue, healed. bilateral stasis dermatitis. [de-identified] : diabetic neuropathy [de-identified] : type 2 diabetes

## 2021-03-30 NOTE — PHYSICAL EXAM
[0] : left 0 [Ankle Swelling (On Exam)] : present [Ankle Swelling Bilaterally] : bilaterally  [Varicose Veins Of Lower Extremities] : bilaterally [] : bilaterally [Ankle Swelling On The Left] : moderate [Skin Ulcer] : ulcer [JVD] : no jugular venous distention  [de-identified] : 4/5 strength in all quadrants bilaterally [de-identified] : A&Ox3, NAD [de-identified] : right heel ulcer down to skin and subcutaneous tissue, healed. bilateral stasis dermatitis. [de-identified] : No neurovascular deficit noted \par  [de-identified] : Patients own compression stocking [de-identified] : Right Achilles - Closed [de-identified] : Patients own compression stocking [de-identified] : Cleansed with Normal Saline \par Kerlix\par  [de-identified] : No Treatment  [FreeTextEntry1] : Left Dorsal Foot- Resolved [de-identified] : Intact  [de-identified] : No Treatment  [de-identified] : Patients own compression stockings [de-identified] : Cleansed with Normal Saline [FreeTextEntry7] : Left Lower Leg - Resolved [de-identified] : Cleansed with Normal Saline\par  [de-identified] : No treatment necessary [TWNoteComboBox5] : No [de-identified] : No [de-identified] : None [de-identified] : None [de-identified] : False [de-identified] : False [de-identified] : None [de-identified] : Other [de-identified] : None [de-identified] : No [de-identified] : No [de-identified] : Normal [de-identified] : None [de-identified] : None [de-identified] : None [de-identified] : No [de-identified] : Other [de-identified] : other [de-identified] : None [de-identified] : None [de-identified] : None [de-identified] : No [de-identified] : None [de-identified] : Other [de-identified] : 2x Weekly

## 2021-03-30 NOTE — PHYSICAL EXAM
[0] : left 0 [Ankle Swelling (On Exam)] : present [Ankle Swelling Bilaterally] : bilaterally  [Varicose Veins Of Lower Extremities] : bilaterally [] : bilaterally [Ankle Swelling On The Left] : moderate [Skin Ulcer] : ulcer [JVD] : no jugular venous distention  [de-identified] : 4/5 strength in all quadrants bilaterally [de-identified] : A&Ox3, NAD [de-identified] : right heel ulcer down to skin and subcutaneous tissue, healed. bilateral stasis dermatitis. [de-identified] : No neurovascular deficit noted \par  [de-identified] : Patients own compression stocking [de-identified] : Right Achilles - Closed [de-identified] : Patients own compression stocking [de-identified] : No Treatment  [de-identified] : Cleansed with Normal Saline \par Kerlix\par  [FreeTextEntry1] : Left Dorsal Foot- Resolved [de-identified] : Intact  [de-identified] : No Treatment  [de-identified] : Patients own compression stockings [de-identified] : Cleansed with Normal Saline [FreeTextEntry7] : Left Lower Leg - Resolved [de-identified] : Cleansed with Normal Saline\par  [de-identified] : No treatment necessary [TWNoteComboBox5] : No [de-identified] : No [de-identified] : None [de-identified] : None [de-identified] : False [de-identified] : False [de-identified] : None [de-identified] : Other [de-identified] : None [de-identified] : No [de-identified] : No [de-identified] : Normal [de-identified] : None [de-identified] : None [de-identified] : None [de-identified] : No [de-identified] : Other [de-identified] : other [de-identified] : None [de-identified] : None [de-identified] : None [de-identified] : No [de-identified] : Other [de-identified] : None [de-identified] : 2x Weekly

## 2021-04-01 DIAGNOSIS — Z87.81 PERSONAL HISTORY OF (HEALED) TRAUMATIC FRACTURE: ICD-10-CM

## 2021-04-01 DIAGNOSIS — Z95.4 PRESENCE OF OTHER HEART-VALVE REPLACEMENT: ICD-10-CM

## 2021-04-01 DIAGNOSIS — Z87.01 PERSONAL HISTORY OF PNEUMONIA (RECURRENT): ICD-10-CM

## 2021-04-01 DIAGNOSIS — Z88.5 ALLERGY STATUS TO NARCOTIC AGENT: ICD-10-CM

## 2021-04-01 DIAGNOSIS — Z98.84 BARIATRIC SURGERY STATUS: ICD-10-CM

## 2021-04-01 DIAGNOSIS — E05.90 THYROTOXICOSIS, UNSPECIFIED WITHOUT THYROTOXIC CRISIS OR STORM: ICD-10-CM

## 2021-04-01 DIAGNOSIS — G47.10 HYPERSOMNIA, UNSPECIFIED: ICD-10-CM

## 2021-04-01 DIAGNOSIS — Z79.82 LONG TERM (CURRENT) USE OF ASPIRIN: ICD-10-CM

## 2021-04-01 DIAGNOSIS — E11.621 TYPE 2 DIABETES MELLITUS WITH FOOT ULCER: ICD-10-CM

## 2021-04-01 DIAGNOSIS — Z82.49 FAMILY HISTORY OF ISCHEMIC HEART DISEASE AND OTHER DISEASES OF THE CIRCULATORY SYSTEM: ICD-10-CM

## 2021-04-01 DIAGNOSIS — I83.12 VARICOSE VEINS OF LEFT LOWER EXTREMITY WITH INFLAMMATION: ICD-10-CM

## 2021-04-01 DIAGNOSIS — Z79.4 LONG TERM (CURRENT) USE OF INSULIN: ICD-10-CM

## 2021-04-01 DIAGNOSIS — I11.9 HYPERTENSIVE HEART DISEASE WITHOUT HEART FAILURE: ICD-10-CM

## 2021-04-01 DIAGNOSIS — G47.33 OBSTRUCTIVE SLEEP APNEA (ADULT) (PEDIATRIC): ICD-10-CM

## 2021-04-01 DIAGNOSIS — Z87.891 PERSONAL HISTORY OF NICOTINE DEPENDENCE: ICD-10-CM

## 2021-04-01 DIAGNOSIS — M43.16 SPONDYLOLISTHESIS, LUMBAR REGION: ICD-10-CM

## 2021-04-01 DIAGNOSIS — I83.893 VARICOSE VEINS OF BILATERAL LOWER EXTREMITIES WITH OTHER COMPLICATIONS: ICD-10-CM

## 2021-04-01 DIAGNOSIS — E55.9 VITAMIN D DEFICIENCY, UNSPECIFIED: ICD-10-CM

## 2021-04-01 DIAGNOSIS — M48.07 SPINAL STENOSIS, LUMBOSACRAL REGION: ICD-10-CM

## 2021-04-01 DIAGNOSIS — Z83.49 FAMILY HISTORY OF OTHER ENDOCRINE, NUTRITIONAL AND METABOLIC DISEASES: ICD-10-CM

## 2021-04-01 DIAGNOSIS — I83.11 VARICOSE VEINS OF RIGHT LOWER EXTREMITY WITH INFLAMMATION: ICD-10-CM

## 2021-04-01 DIAGNOSIS — M47.817 SPONDYLOSIS WITHOUT MYELOPATHY OR RADICULOPATHY, LUMBOSACRAL REGION: ICD-10-CM

## 2021-04-01 DIAGNOSIS — I34.1 NONRHEUMATIC MITRAL (VALVE) PROLAPSE: ICD-10-CM

## 2021-04-01 DIAGNOSIS — Z98.890 OTHER SPECIFIED POSTPROCEDURAL STATES: ICD-10-CM

## 2021-04-01 DIAGNOSIS — E11.40 TYPE 2 DIABETES MELLITUS WITH DIABETIC NEUROPATHY, UNSPECIFIED: ICD-10-CM

## 2021-04-01 DIAGNOSIS — E11.59 TYPE 2 DIABETES MELLITUS WITH OTHER CIRCULATORY COMPLICATIONS: ICD-10-CM

## 2021-04-01 DIAGNOSIS — Z79.899 OTHER LONG TERM (CURRENT) DRUG THERAPY: ICD-10-CM

## 2021-04-01 DIAGNOSIS — I47.1 SUPRAVENTRICULAR TACHYCARDIA: ICD-10-CM

## 2021-04-01 DIAGNOSIS — L97.412 NON-PRESSURE CHRONIC ULCER OF RIGHT HEEL AND MIDFOOT WITH FAT LAYER EXPOSED: ICD-10-CM

## 2021-04-01 DIAGNOSIS — E66.01 MORBID (SEVERE) OBESITY DUE TO EXCESS CALORIES: ICD-10-CM

## 2021-04-06 ENCOUNTER — APPOINTMENT (OUTPATIENT)
Dept: WOUND CARE | Facility: HOSPITAL | Age: 66
End: 2021-04-06

## 2021-04-06 ENCOUNTER — APPOINTMENT (OUTPATIENT)
Dept: CARDIOLOGY | Facility: CLINIC | Age: 66
End: 2021-04-06
Payer: MEDICARE

## 2021-04-06 ENCOUNTER — NON-APPOINTMENT (OUTPATIENT)
Age: 66
End: 2021-04-06

## 2021-04-06 VITALS — HEART RATE: 71 BPM | SYSTOLIC BLOOD PRESSURE: 89 MMHG | DIASTOLIC BLOOD PRESSURE: 53 MMHG

## 2021-04-06 VITALS — OXYGEN SATURATION: 96 % | BODY MASS INDEX: 51.91 KG/M2 | HEIGHT: 63 IN | HEART RATE: 70 BPM | WEIGHT: 293 LBS

## 2021-04-06 PROCEDURE — 93000 ELECTROCARDIOGRAM COMPLETE: CPT

## 2021-04-06 PROCEDURE — 99214 OFFICE O/P EST MOD 30 MIN: CPT

## 2021-04-06 RX ORDER — VERAPAMIL HYDROCHLORIDE 120 MG/1
120 TABLET ORAL DAILY
Refills: 0 | Status: DISCONTINUED | COMMUNITY
End: 2021-04-06

## 2021-04-07 NOTE — REASON FOR VISIT
[FreeTextEntry1] : Referred because of intracavitary systolic gradient on echocardiogram to the HCM program at [facility].

## 2021-04-07 NOTE — PHYSICAL EXAM
[Normal Conjunctiva] : the conjunctiva exhibited no abnormalities [Heart Sounds] : normal S1 and S2 [Auscultation Breath Sounds / Voice Sounds] : lungs were clear to auscultation bilaterally [Skin Color & Pigmentation] : normal skin color and pigmentation [Oriented To Time, Place, And Person] : oriented to person, place, and time [Impaired Insight] : insight and judgment were intact [Affect] : the affect was normal [Mood] : the mood was normal [Memory Recent] : recent memory was not impaired [Memory Remote] : remote memory was not impaired [No Anxiety] : not feeling anxious [FreeTextEntry1] : tattoo noted on left leg

## 2021-04-07 NOTE — ASSESSMENT
[FreeTextEntry1] : (1) Relating to the  intracavitary systolic gradient noted on echocardiography in August 2020, I would opine as follows:\par Discontinuing vasodilators (losartan) would seem appropriate, given the systolic BP here today (89 mmHg) which is similar to readings that  the patient states are obtained in other offices. Although furosemide also contributes to this gradient, I understand its benefit. too much dependent edema (likely from morbid obesity) could lead to skin blisters, breakdown, and infection. Perhaps she can get away with a lower dose of furosemide, but that is clearly a trial and error situation. The patient tells me that she is already very careful about her sodium intake. \par \par It is also my opinion that the intracavitary gradient is not a major cause of the patient's symptoms. The echo does not suggest any problem with the aortic valve prosthesis, and there is no SHAILESH. Therefore, it is best addressed by any maneuvers which decrease sympathetic activation, such as  a decrease or elimination in the use of vasodilators and diuretics. \par \par (2) Other things which may be beneficial:\par (a) Consider an SGLT2 inhibitor. These agents have many positive cardiovascular endpoints, and may allow for a decrease in the dose of furosemide. They do so without any apparent sympathetic activation. \par (b) Consider addition of metformin and a GLP-1 agonist. These would allow for better glycemic control without weight gain (these agents also have positive cardiovascular data, and can assist with weight loss).. Insulin is problematic in that it promotes weight gain, which leads to greater insulin resistance, which in turn necessitates a higher dose of insulin; a viscous cycle. I\par (c) There is likely a pulmonary component to the patient's symptoms, given the history of untreated obstructive sleep apnea and morbid obesity, both of which could lead to pulmonary hypertension and restrictive (and obstructive ?) lung disease. Formal PFTs and a pulmonary evaluation may be reasonable. It si possible that she is severely hypoventilating at night, leading to further pulmonary hypertension.\par \par \par

## 2021-04-07 NOTE — HISTORY OF PRESENT ILLNESS
[FreeTextEntry1] : 4/06/2021, 9 AM: Reviewed echocardiographic images from August 28, 2020: the left ventricle is hyperdynamic. A transcatheter aortic valve is in place, with gradients which are high and likely reflect the hyperdynamic LV (i.e. not valve stenosis). There is an intracavitary gradient from mid-cavitary obstruction which peaks at 4 m/s, increasing to 5.9 m/s with Valsalva. Heart rhythm was sinus, rate during the study was in the vicinity of 90/min. \par \par 65-year old woman with a very complicated medical history:\par Morbid obesity, type-2 diabetes which is uncontrolled,  h/o TAVR (2016), obstructive sleep apnea, uses home-O2, no CPAP.\par BMI 54.9 kg/m2. Diabetes is under poor control.(last A1c 2/21 10.6%)\par Had DVT in August 2020; now on Xarelto. Dyspnea has not been better since left hospital \par Had gastric banding in the past; also Had gastroparesis - lost weight, then got worse\par \par At the time of the echocardiogram referenced above, medications included carvedilol, furosemide, and an ARB. Since that time, the carvedilol has been switched to metoprolol. Patient is still on a vasodilator (losartan) and is receiving furosemide to treat dependent edema. \par \par To reiterate - primary symptom is persistent dyspnea since having been admitted for a DVT in August 2020. Patient has minimal mobility. She states that she "can't walk two steps". She  has noted that for a "long time" that her systolic blood pressure runs similar to what was obtained here, a systolic of approximately 90 mmHg.

## 2021-04-13 ENCOUNTER — APPOINTMENT (OUTPATIENT)
Dept: WOUND CARE | Facility: HOSPITAL | Age: 66
End: 2021-04-13

## 2021-04-23 ENCOUNTER — APPOINTMENT (OUTPATIENT)
Dept: WOUND CARE | Facility: HOSPITAL | Age: 66
End: 2021-04-23

## 2021-04-28 ENCOUNTER — APPOINTMENT (OUTPATIENT)
Dept: CARDIOTHORACIC SURGERY | Facility: CLINIC | Age: 66
End: 2021-04-28
Payer: MEDICARE

## 2021-04-28 ENCOUNTER — NON-APPOINTMENT (OUTPATIENT)
Age: 66
End: 2021-04-28

## 2021-04-28 ENCOUNTER — OUTPATIENT (OUTPATIENT)
Dept: OUTPATIENT SERVICES | Facility: HOSPITAL | Age: 66
LOS: 1 days | End: 2021-04-28
Payer: MEDICARE

## 2021-04-28 VITALS
OXYGEN SATURATION: 99 % | WEIGHT: 293 LBS | DIASTOLIC BLOOD PRESSURE: 73 MMHG | RESPIRATION RATE: 18 BRPM | SYSTOLIC BLOOD PRESSURE: 128 MMHG | HEIGHT: 63 IN | BODY MASS INDEX: 51.91 KG/M2 | TEMPERATURE: 98.2 F | HEART RATE: 64 BPM

## 2021-04-28 DIAGNOSIS — R06.02 SHORTNESS OF BREATH: ICD-10-CM

## 2021-04-28 DIAGNOSIS — I35.0 NONRHEUMATIC AORTIC (VALVE) STENOSIS: ICD-10-CM

## 2021-04-28 DIAGNOSIS — Z95.2 PRESENCE OF PROSTHETIC HEART VALVE: Chronic | ICD-10-CM

## 2021-04-28 LAB
ALBUMIN SERPL ELPH-MCNC: 3.9 G/DL
ALP BLD-CCNC: 138 U/L
ALT SERPL-CCNC: 22 U/L
ANION GAP SERPL CALC-SCNC: 16 MMOL/L
AST SERPL-CCNC: 24 U/L
BASOPHILS # BLD AUTO: 0.03 K/UL
BASOPHILS NFR BLD AUTO: 0.2 %
BILIRUB SERPL-MCNC: 0.2 MG/DL
BUN SERPL-MCNC: 22 MG/DL
CALCIUM SERPL-MCNC: 9.9 MG/DL
CHLORIDE SERPL-SCNC: 98 MMOL/L
CO2 SERPL-SCNC: 25 MMOL/L
CREAT SERPL-MCNC: 0.95 MG/DL
EOSINOPHIL # BLD AUTO: 0.17 K/UL
EOSINOPHIL NFR BLD AUTO: 1.3 %
GLUCOSE SERPL-MCNC: 170 MG/DL
HCT VFR BLD CALC: 38.1 %
HGB BLD-MCNC: 11.8 G/DL
IMM GRANULOCYTES NFR BLD AUTO: 0.3 %
LYMPHOCYTES # BLD AUTO: 1.9 K/UL
LYMPHOCYTES NFR BLD AUTO: 14.8 %
MAN DIFF?: NORMAL
MCHC RBC-ENTMCNC: 27.3 PG
MCHC RBC-ENTMCNC: 31 GM/DL
MCV RBC AUTO: 88.2 FL
MONOCYTES # BLD AUTO: 0.97 K/UL
MONOCYTES NFR BLD AUTO: 7.6 %
NEUTROPHILS # BLD AUTO: 9.72 K/UL
NEUTROPHILS NFR BLD AUTO: 75.8 %
NT-PROBNP SERPL-MCNC: 456 PG/ML
PLATELET # BLD AUTO: 322 K/UL
POTASSIUM SERPL-SCNC: 3.9 MMOL/L
PROT SERPL-MCNC: 6.9 G/DL
RBC # BLD: 4.32 M/UL
RBC # FLD: 15.1 %
SODIUM SERPL-SCNC: 139 MMOL/L
TSH SERPL-ACNC: 0.46 UIU/ML
WBC # FLD AUTO: 12.83 K/UL

## 2021-04-28 PROCEDURE — 99215 OFFICE O/P EST HI 40 MIN: CPT

## 2021-04-28 PROCEDURE — 93306 TTE W/DOPPLER COMPLETE: CPT

## 2021-04-28 PROCEDURE — 93000 ELECTROCARDIOGRAM COMPLETE: CPT

## 2021-04-28 PROCEDURE — 93306 TTE W/DOPPLER COMPLETE: CPT | Mod: 26

## 2021-04-28 RX ORDER — RIVAROXABAN 20 MG/1
20 TABLET, FILM COATED ORAL
Qty: 90 | Refills: 3 | Status: ACTIVE | COMMUNITY

## 2021-04-28 NOTE — PHYSICAL EXAM
[Obese] : obese [Normal Conjunctiva] : normal conjunctiva [No Carotid Bruit] : no carotid bruit [Normal S1, S2] : normal S1, S2 [Murmur] : murmur [Clear Lung Fields] : clear lung fields [Soft] : abdomen soft [Non Tender] : non-tender [Normal Gait] : normal gait [Normal] : no edema, no cyanosis, no clubbing, no varicosities [No Rash] : no rash [Moves all extremities] : moves all extremities [No Focal Deficits] : no focal deficits [Alert and Oriented] : alert and oriented [Normal memory] : normal memory [de-identified] : Overweight [de-identified] : RUSB 2/6 systolic murmur, 2/6 RUSB systolic murmur [de-identified] : Walks with a cane [de-identified] : BL LE 1+ edema

## 2021-04-28 NOTE — REVIEW OF SYSTEMS
[SOB] : shortness of breath [Dyspnea on exertion] : dyspnea during exertion [Chest Discomfort] : chest discomfort [Lower Ext Edema] : lower extremity edema [Palpitations] : palpitations [Orthopnea] : orthopnea [Cough] : cough [Joint Pain] : joint pain [Depression] : depression [Anxiety] : anxiety [Easy Bleeding] : a tendency for easy bleeding [Negative] : Neurological [Dizziness] : no dizziness [Convulsions] : no convulsions [FreeTextEntry9] : Walks with a cane

## 2021-04-28 NOTE — CARDIOLOGY SUMMARY
[de-identified] : NSR 64 [de-identified] : 8/28/2020  Severe posterior mitral annular calcification, otherwise normal mitral valve. Mild MR. Mean transmitral valve gradient equals 6-7 mm Hg. (HR about 90s-100 bpm). TAVR is not well visualized. Peak transaortic valve velocity equals 4.0\par m/sec, mean transaortic valve gradient equals 39 mm Hg, which is significantly elevated even in the presence of a TAVR. DVI=0.37.\par Calculated EOA=0.9 cmsq. Acceleration time about 110 msec. Findings suggest prosthetic valve dysfunction. No AR seen.  Hyperdynamic LV systolic function. Intracavitary gradient with valsalva maneuver approximately 59 mmHg. Normal RV size and function. Consider additional imaging of the aortic valve such as with LAMBERT if clinically indicated. *** Compared with echocardiogram of 1/27/2016, AV gradients are higher on today's study. TAVR gradients were similarly elevated on TTE 12/14/2015.  [de-identified] : 8/27/2020: CTA CHEST WITH CONTRAST: Extensive bilateral interlobar and lobar pulmonary embolism.\par 12/3/2015 CT HEART WITH CORONARIES: Technically difficult exam secondary to the low signal-to-noise ratio. Severely calcified trileaflet aortic valve (Agatston calcium score). Aortic and peripheral access vessel measurements as reported above. The right lung nodule was better visualized on the prior CT. Upper abdominal lymph nodes are increased in number and size and are indeterminate.  [de-identified] : 11/3/2017 The coronary circulation is right dominant. Distal LM: discrete 40% stenosis. The lesion was eccentric and calcified. Lesion severity was assessed by intravascular ultrasound (IVUS). LAD: mild atherosclerosis with no flow limiting lesions. Circumflex: mild atherosclerosis with no flow limiting lesions. RCA:mild atherosclerosis with no flow limiting lesions.  [de-identified] : 12/10/2015: Urgent TAVR using a 23 mm Ramon 3 bioprosthesis [de-identified] : 12/2/2015 CAROTID DUPLEX Intimal thickening/Intimal plaque noted bilaterally but not well delineated secondary to the limitations of this study.  There is no duplex evidence of hemodynamically significant stenosis. The degree of luminal narrowing is estimated at 0-15% bilaterally. [de-identified] : \par

## 2021-04-28 NOTE — HISTORY OF PRESENT ILLNESS
[FreeTextEntry1] : MARIA D ROBLEDO is a 65 year old female here at the request of Dr. Jamal Espinal with the chief complaint of shortness of breath. To review, she is s/p TAVR using a 23 mm Ramon 3 bioprosthesis on 12/10/2015 when she presented with Class IV HF secondary to critical AV stenosis. Due to morbid obesity and other comorbidities that included DM, JOSR, and hypothyroidism, she was felt to be a better candidate for TAVR vs. open replacement. Her post-operative course was complicated by a non-healing right TF access site that required a Wound Vac (ultimately healed after a prolonged course).\par \par Her last TTE done here in August 2020 demonstrated  a hyperdynamic LV with slightly elevated TAVR gradients. There is an intracavitary gradient from mid-cavitary obstruction which peaks at 4 m/s, increasing to 5.9 m/s with Valsalva. Heart rhythm was sinus, rate during the study was in the vicinity of 90/min. \par \par She was hospitalized in August for DVT/PE (negative COVID) and has had persistent dyspnea since that time. She was seen by Dr. Choco Herrera recently who felt that the intracavitary gradient was not a cause of the her symptoms, and that the echo did not suggest SHAILESH or significant HOCM.\par \par She presents today and reports she has progressively gotten more SOB that began in August 2020 when she was hospitalized for PE/DVT.  She was treated with Xarelto however her SOB has continued since then and has progressively gotten worse.  She reports she gets SOB with minimal exertion (walking 5 steps to the bathroom).  She also notes chest pressure and palpitations as well since last August.  Notices worsening swelling to BL LE but denies weight gain. Unable to lay flat due to orthopnea and sleeps in a recliner.  Lives alone with 2 dogs in a one floor house and is able to care for ADL's independently except for cleaning.  She reports chronic lower back pain and left knee arthritis that also prevents her from walking as much as she used to. Denies any hospitalizations since last August. Has walked with a cane since 2014 to help with stability. \par \par She has had a very difficult time trying to lose weight, and is essentially unchanged. She notes that Dr Deng had told her at the time of her PATSY that if she was to feel better, significant weight loss was critically important. She is frustrated by not being able to do so. That said, there was a period post PATSY "where I felt a little bit better, I was able to get around a little better, but not much". At this point, she is getting winded with her ADL's, such as taking out the garbage. She wears oxygen at night for her JOSR. She has followed closely with Dr Espinal, who has tried various medication adjustments without significant relief. She saw Dr Herrera, as noted, who felt that this was not due to HOCM physiology, but did recommend some additional medication adjustments in the setting of her hyperdynamic LV function. She feels "pressure in my chest at night, almost like a heartburn" that is concerning to her (understandably so). She started Xarelto last August (2020). \par \par PCP: Dr. Jason Kinney\par CARD:Jamal Espinal MD -listed dx on chart reviewe  -pt self d/c eliquis  -on hep gtt for acs holding a/c for now, will need to revisit prior to discharge  -c/w bb as noted  -s/p aicd, current a paced on tele with pvc

## 2021-04-28 NOTE — DISCUSSION/SUMMARY
[Aortic Stenosis] : aortic stenosis [Transesophageal Echocardiogram] : transesophageal echocardiogram [None] : There are no changes in medication management [FreeTextEntry1] : Ms Olivo is 5+ years following a TF PATSY (23mm S3) and returns for evaluation of increasing SOB. She repeated a TTE today which demonstrates elevated gradients of her PATSY valve--peak and mean of 61 and 41 mmHg (prior were 38 and 22 mmHg, respectively) and an AoV of 3.9m/s. The TTE windows are somewhat limited given her habitus, and I can't definitively say if the leaflets appear thickened or restricted. It would seem less likely due to HALT (which is presumed related to leaflet thrombus formation) given that she is maintained on an oral anticoagulant. As such, I would recommend a LAMBERT for further assessment of her PATSY valve. If inconclusive, I would also consider a 4D cardiac CT to try and visualize the leaflets--understanding that her habitus also poses limitations with this imaging modality. Finally, if we are unable to identify a cause for her symptoms, I would also consider repeat angiography, given that she is known to have a distal LMCA 30% calcified stenosis at the time of her pre-PATSY angiogram in 2015 (and ongoing risk factors for CAD progression). I will follow up with her after the LAMBERT and will notify Dr Espinal (I left him a VM) of my impressions and recommendations.

## 2021-05-04 ENCOUNTER — INPATIENT (INPATIENT)
Facility: HOSPITAL | Age: 66
LOS: 6 days | Discharge: HOME | DRG: 628 | End: 2021-05-11
Attending: HOSPITALIST | Admitting: INTERNAL MEDICINE
Payer: MEDICARE

## 2021-05-04 ENCOUNTER — RESULT REVIEW (OUTPATIENT)
Age: 66
End: 2021-05-04

## 2021-05-04 ENCOUNTER — OUTPATIENT (OUTPATIENT)
Dept: OUTPATIENT SERVICES | Facility: HOSPITAL | Age: 66
LOS: 1 days | Discharge: ANOTHER HOSPITAL | End: 2021-05-04

## 2021-05-04 ENCOUNTER — APPOINTMENT (OUTPATIENT)
Dept: WOUND CARE | Facility: HOSPITAL | Age: 66
End: 2021-05-04
Payer: MEDICARE

## 2021-05-04 VITALS
DIASTOLIC BLOOD PRESSURE: 70 MMHG | SYSTOLIC BLOOD PRESSURE: 153 MMHG | HEART RATE: 76 BPM | OXYGEN SATURATION: 98 % | HEIGHT: 63 IN | WEIGHT: 293 LBS | RESPIRATION RATE: 15 BRPM | TEMPERATURE: 98 F

## 2021-05-04 VITALS
HEIGHT: 63 IN | TEMPERATURE: 97.6 F | RESPIRATION RATE: 20 BRPM | SYSTOLIC BLOOD PRESSURE: 151 MMHG | HEART RATE: 64 BPM | OXYGEN SATURATION: 98 % | WEIGHT: 293 LBS | BODY MASS INDEX: 51.91 KG/M2 | DIASTOLIC BLOOD PRESSURE: 69 MMHG

## 2021-05-04 DIAGNOSIS — E11.621 TYPE 2 DIABETES MELLITUS WITH FOOT ULCER: ICD-10-CM

## 2021-05-04 DIAGNOSIS — Z95.2 PRESENCE OF PROSTHETIC HEART VALVE: Chronic | ICD-10-CM

## 2021-05-04 DIAGNOSIS — G47.33 OBSTRUCTIVE SLEEP APNEA (ADULT) (PEDIATRIC): ICD-10-CM

## 2021-05-04 DIAGNOSIS — I10 ESSENTIAL (PRIMARY) HYPERTENSION: ICD-10-CM

## 2021-05-04 DIAGNOSIS — E11.9 TYPE 2 DIABETES MELLITUS WITHOUT COMPLICATIONS: ICD-10-CM

## 2021-05-04 DIAGNOSIS — M86.179 OTHER ACUTE OSTEOMYELITIS, UNSPECIFIED ANKLE AND FOOT: ICD-10-CM

## 2021-05-04 DIAGNOSIS — I48.0 PAROXYSMAL ATRIAL FIBRILLATION: ICD-10-CM

## 2021-05-04 DIAGNOSIS — I35.0 NONRHEUMATIC AORTIC (VALVE) STENOSIS: ICD-10-CM

## 2021-05-04 DIAGNOSIS — F32.9 MAJOR DEPRESSIVE DISORDER, SINGLE EPISODE, UNSPECIFIED: ICD-10-CM

## 2021-05-04 DIAGNOSIS — J45.909 UNSPECIFIED ASTHMA, UNCOMPLICATED: ICD-10-CM

## 2021-05-04 DIAGNOSIS — I42.1 OBSTRUCTIVE HYPERTROPHIC CARDIOMYOPATHY: ICD-10-CM

## 2021-05-04 DIAGNOSIS — Z29.9 ENCOUNTER FOR PROPHYLACTIC MEASURES, UNSPECIFIED: ICD-10-CM

## 2021-05-04 DIAGNOSIS — E78.5 HYPERLIPIDEMIA, UNSPECIFIED: ICD-10-CM

## 2021-05-04 DIAGNOSIS — R10.9 UNSPECIFIED ABDOMINAL PAIN: ICD-10-CM

## 2021-05-04 LAB
ALBUMIN SERPL ELPH-MCNC: 4 G/DL — SIGNIFICANT CHANGE UP (ref 3.3–5)
ALP SERPL-CCNC: 152 U/L — HIGH (ref 40–120)
ALT FLD-CCNC: 28 U/L — SIGNIFICANT CHANGE UP (ref 12–78)
ANION GAP SERPL CALC-SCNC: 8 MMOL/L — SIGNIFICANT CHANGE UP (ref 5–17)
APPEARANCE UR: CLEAR — SIGNIFICANT CHANGE UP
APTT BLD: 38.1 SEC — HIGH (ref 27.5–35.5)
AST SERPL-CCNC: 23 U/L — SIGNIFICANT CHANGE UP (ref 15–37)
BASOPHILS # BLD AUTO: 0.03 K/UL — SIGNIFICANT CHANGE UP (ref 0–0.2)
BASOPHILS NFR BLD AUTO: 0.3 % — SIGNIFICANT CHANGE UP (ref 0–2)
BILIRUB SERPL-MCNC: 0.5 MG/DL — SIGNIFICANT CHANGE UP (ref 0.2–1.2)
BILIRUB UR-MCNC: NEGATIVE — SIGNIFICANT CHANGE UP
BUN SERPL-MCNC: 18 MG/DL — SIGNIFICANT CHANGE UP (ref 7–23)
CALCIUM SERPL-MCNC: 9.9 MG/DL — SIGNIFICANT CHANGE UP (ref 8.5–10.1)
CHLORIDE SERPL-SCNC: 100 MMOL/L — SIGNIFICANT CHANGE UP (ref 96–108)
CO2 SERPL-SCNC: 30 MMOL/L — SIGNIFICANT CHANGE UP (ref 22–31)
COLOR SPEC: YELLOW — SIGNIFICANT CHANGE UP
CREAT SERPL-MCNC: 0.9 MG/DL — SIGNIFICANT CHANGE UP (ref 0.5–1.3)
DIFF PNL FLD: NEGATIVE — SIGNIFICANT CHANGE UP
EOSINOPHIL # BLD AUTO: 0.14 K/UL — SIGNIFICANT CHANGE UP (ref 0–0.5)
EOSINOPHIL NFR BLD AUTO: 1.2 % — SIGNIFICANT CHANGE UP (ref 0–6)
ERYTHROCYTE [SEDIMENTATION RATE] IN BLOOD: 50 MM/HR — HIGH (ref 0–20)
GLUCOSE SERPL-MCNC: 198 MG/DL — HIGH (ref 70–99)
GLUCOSE UR QL: 100 MG/DL
HCT VFR BLD CALC: 40.9 % — SIGNIFICANT CHANGE UP (ref 34.5–45)
HGB BLD-MCNC: 12.9 G/DL — SIGNIFICANT CHANGE UP (ref 11.5–15.5)
IMM GRANULOCYTES NFR BLD AUTO: 0.3 % — SIGNIFICANT CHANGE UP (ref 0–1.5)
INR BLD: 1.48 RATIO — HIGH (ref 0.88–1.16)
KETONES UR-MCNC: NEGATIVE — SIGNIFICANT CHANGE UP
LEUKOCYTE ESTERASE UR-ACNC: ABNORMAL
LYMPHOCYTES # BLD AUTO: 1.76 K/UL — SIGNIFICANT CHANGE UP (ref 1–3.3)
LYMPHOCYTES # BLD AUTO: 15.2 % — SIGNIFICANT CHANGE UP (ref 13–44)
MCHC RBC-ENTMCNC: 27.1 PG — SIGNIFICANT CHANGE UP (ref 27–34)
MCHC RBC-ENTMCNC: 31.5 GM/DL — LOW (ref 32–36)
MCV RBC AUTO: 85.9 FL — SIGNIFICANT CHANGE UP (ref 80–100)
MONOCYTES # BLD AUTO: 0.73 K/UL — SIGNIFICANT CHANGE UP (ref 0–0.9)
MONOCYTES NFR BLD AUTO: 6.3 % — SIGNIFICANT CHANGE UP (ref 2–14)
NEUTROPHILS # BLD AUTO: 8.86 K/UL — HIGH (ref 1.8–7.4)
NEUTROPHILS NFR BLD AUTO: 76.7 % — SIGNIFICANT CHANGE UP (ref 43–77)
NITRITE UR-MCNC: NEGATIVE — SIGNIFICANT CHANGE UP
NRBC # BLD: 0 /100 WBCS — SIGNIFICANT CHANGE UP (ref 0–0)
PH UR: 6 — SIGNIFICANT CHANGE UP (ref 5–8)
PLATELET # BLD AUTO: 289 K/UL — SIGNIFICANT CHANGE UP (ref 150–400)
POTASSIUM SERPL-MCNC: 3.6 MMOL/L — SIGNIFICANT CHANGE UP (ref 3.5–5.3)
POTASSIUM SERPL-SCNC: 3.6 MMOL/L — SIGNIFICANT CHANGE UP (ref 3.5–5.3)
PROT SERPL-MCNC: 9.1 G/DL — HIGH (ref 6–8.3)
PROT UR-MCNC: 30 MG/DL
PROTHROM AB SERPL-ACNC: 17 SEC — HIGH (ref 10.6–13.6)
RBC # BLD: 4.76 M/UL — SIGNIFICANT CHANGE UP (ref 3.8–5.2)
RBC # FLD: 15.2 % — HIGH (ref 10.3–14.5)
SARS-COV-2 RNA SPEC QL NAA+PROBE: SIGNIFICANT CHANGE UP
SODIUM SERPL-SCNC: 138 MMOL/L — SIGNIFICANT CHANGE UP (ref 135–145)
SP GR SPEC: 1.01 — SIGNIFICANT CHANGE UP (ref 1.01–1.02)
UROBILINOGEN FLD QL: NEGATIVE — SIGNIFICANT CHANGE UP
WBC # BLD: 11.55 K/UL — HIGH (ref 3.8–10.5)
WBC # FLD AUTO: 11.55 K/UL — HIGH (ref 3.8–10.5)

## 2021-05-04 PROCEDURE — 88311 DECALCIFY TISSUE: CPT | Mod: 26

## 2021-05-04 PROCEDURE — 88304 TISSUE EXAM BY PATHOLOGIST: CPT | Mod: 26

## 2021-05-04 PROCEDURE — 99285 EMERGENCY DEPT VISIT HI MDM: CPT | Mod: CS

## 2021-05-04 PROCEDURE — 99214 OFFICE O/P EST MOD 30 MIN: CPT

## 2021-05-04 PROCEDURE — 99223 1ST HOSP IP/OBS HIGH 75: CPT | Mod: GC

## 2021-05-04 PROCEDURE — 93010 ELECTROCARDIOGRAM REPORT: CPT

## 2021-05-04 PROCEDURE — 71045 X-RAY EXAM CHEST 1 VIEW: CPT | Mod: 26

## 2021-05-04 RX ORDER — SODIUM CHLORIDE 9 MG/ML
1000 INJECTION, SOLUTION INTRAVENOUS
Refills: 0 | Status: DISCONTINUED | OUTPATIENT
Start: 2021-05-04 | End: 2021-05-06

## 2021-05-04 RX ORDER — RIVAROXABAN 15 MG-20MG
20 KIT ORAL
Refills: 0 | Status: DISCONTINUED | OUTPATIENT
Start: 2021-05-05 | End: 2021-05-05

## 2021-05-04 RX ORDER — GLUCAGON INJECTION, SOLUTION 0.5 MG/.1ML
1 INJECTION, SOLUTION SUBCUTANEOUS ONCE
Refills: 0 | Status: DISCONTINUED | OUTPATIENT
Start: 2021-05-04 | End: 2021-05-06

## 2021-05-04 RX ORDER — INSULIN LISPRO 100/ML
15 VIAL (ML) SUBCUTANEOUS
Refills: 0 | Status: DISCONTINUED | OUTPATIENT
Start: 2021-05-04 | End: 2021-05-06

## 2021-05-04 RX ORDER — ASPIRIN/CALCIUM CARB/MAGNESIUM 324 MG
81 TABLET ORAL DAILY
Refills: 0 | Status: DISCONTINUED | OUTPATIENT
Start: 2021-05-04 | End: 2021-05-06

## 2021-05-04 RX ORDER — LOSARTAN POTASSIUM 100 MG/1
25 TABLET, FILM COATED ORAL
Refills: 0 | Status: DISCONTINUED | OUTPATIENT
Start: 2021-05-04 | End: 2021-05-06

## 2021-05-04 RX ORDER — METOPROLOL TARTRATE 50 MG
50 TABLET ORAL
Refills: 0 | Status: DISCONTINUED | OUTPATIENT
Start: 2021-05-04 | End: 2021-05-06

## 2021-05-04 RX ORDER — PHENYLEPHRINE HCL 0.25 %
1 AEROSOL, SPRAY WITH PUMP (ML) NASAL
Qty: 0 | Refills: 0 | DISCHARGE

## 2021-05-04 RX ORDER — ALBUTEROL 90 UG/1
2 AEROSOL, METERED ORAL EVERY 6 HOURS
Refills: 0 | Status: DISCONTINUED | OUTPATIENT
Start: 2021-05-04 | End: 2021-05-06

## 2021-05-04 RX ORDER — DEXTROSE 50 % IN WATER 50 %
25 SYRINGE (ML) INTRAVENOUS ONCE
Refills: 0 | Status: DISCONTINUED | OUTPATIENT
Start: 2021-05-04 | End: 2021-05-06

## 2021-05-04 RX ORDER — INSULIN LISPRO 100/ML
VIAL (ML) SUBCUTANEOUS AT BEDTIME
Refills: 0 | Status: DISCONTINUED | OUTPATIENT
Start: 2021-05-04 | End: 2021-05-05

## 2021-05-04 RX ORDER — FLUOXETINE HCL 10 MG
40 CAPSULE ORAL DAILY
Refills: 0 | Status: DISCONTINUED | OUTPATIENT
Start: 2021-05-04 | End: 2021-05-06

## 2021-05-04 RX ORDER — FUROSEMIDE 40 MG
80 TABLET ORAL DAILY
Refills: 0 | Status: DISCONTINUED | OUTPATIENT
Start: 2021-05-04 | End: 2021-05-04

## 2021-05-04 RX ORDER — PANTOPRAZOLE SODIUM 20 MG/1
40 TABLET, DELAYED RELEASE ORAL
Refills: 0 | Status: DISCONTINUED | OUTPATIENT
Start: 2021-05-04 | End: 2021-05-06

## 2021-05-04 RX ORDER — INSULIN GLARGINE 100 [IU]/ML
30 INJECTION, SOLUTION SUBCUTANEOUS AT BEDTIME
Refills: 0 | Status: DISCONTINUED | OUTPATIENT
Start: 2021-05-04 | End: 2021-05-06

## 2021-05-04 RX ORDER — PIPERACILLIN AND TAZOBACTAM 4; .5 G/20ML; G/20ML
3.38 INJECTION, POWDER, LYOPHILIZED, FOR SOLUTION INTRAVENOUS EVERY 8 HOURS
Refills: 0 | Status: DISCONTINUED | OUTPATIENT
Start: 2021-05-04 | End: 2021-05-06

## 2021-05-04 RX ORDER — SODIUM SULFATE, POTASSIUM SULFATE, MAGNESIUM SULFATE 1.6; 3.13; 17.5 G/177ML; G/177ML; G/177ML
0 SOLUTION ORAL
Qty: 0 | Refills: 0 | DISCHARGE

## 2021-05-04 RX ORDER — MONTELUKAST 4 MG/1
10 TABLET, CHEWABLE ORAL DAILY
Refills: 0 | Status: DISCONTINUED | OUTPATIENT
Start: 2021-05-04 | End: 2021-05-06

## 2021-05-04 RX ORDER — LOSARTAN POTASSIUM 100 MG/1
1 TABLET, FILM COATED ORAL
Qty: 0 | Refills: 0 | DISCHARGE

## 2021-05-04 RX ORDER — OMEPRAZOLE 10 MG/1
40 CAPSULE, DELAYED RELEASE ORAL
Qty: 0 | Refills: 0 | DISCHARGE

## 2021-05-04 RX ORDER — ALBUTEROL 90 UG/1
0 AEROSOL, METERED ORAL
Qty: 0 | Refills: 0 | DISCHARGE

## 2021-05-04 RX ORDER — DEXTROSE 50 % IN WATER 50 %
15 SYRINGE (ML) INTRAVENOUS ONCE
Refills: 0 | Status: DISCONTINUED | OUTPATIENT
Start: 2021-05-04 | End: 2021-05-06

## 2021-05-04 RX ORDER — INSULIN GLARGINE 100 [IU]/ML
40 INJECTION, SOLUTION SUBCUTANEOUS
Qty: 0 | Refills: 0 | DISCHARGE

## 2021-05-04 RX ORDER — INSULIN DEGLUDEC 100 U/ML
0 INJECTION, SOLUTION SUBCUTANEOUS
Qty: 0 | Refills: 0 | DISCHARGE

## 2021-05-04 RX ORDER — RIVAROXABAN 15 MG-20MG
20 KIT ORAL ONCE
Refills: 0 | Status: COMPLETED | OUTPATIENT
Start: 2021-05-04 | End: 2021-05-04

## 2021-05-04 RX ORDER — FUROSEMIDE 40 MG
40 TABLET ORAL
Refills: 0 | Status: DISCONTINUED | OUTPATIENT
Start: 2021-05-04 | End: 2021-05-06

## 2021-05-04 RX ORDER — INSULIN LISPRO 100/ML
VIAL (ML) SUBCUTANEOUS
Refills: 0 | Status: DISCONTINUED | OUTPATIENT
Start: 2021-05-04 | End: 2021-05-05

## 2021-05-04 RX ORDER — ATORVASTATIN CALCIUM 80 MG/1
80 TABLET, FILM COATED ORAL AT BEDTIME
Refills: 0 | Status: DISCONTINUED | OUTPATIENT
Start: 2021-05-04 | End: 2021-05-06

## 2021-05-04 RX ORDER — POTASSIUM CHLORIDE 20 MEQ
2 PACKET (EA) ORAL
Qty: 0 | Refills: 0 | DISCHARGE

## 2021-05-04 RX ORDER — DEXTROSE 50 % IN WATER 50 %
12.5 SYRINGE (ML) INTRAVENOUS ONCE
Refills: 0 | Status: DISCONTINUED | OUTPATIENT
Start: 2021-05-04 | End: 2021-05-06

## 2021-05-04 RX ORDER — VANCOMYCIN HCL 1 G
2000 VIAL (EA) INTRAVENOUS EVERY 12 HOURS
Refills: 0 | Status: DISCONTINUED | OUTPATIENT
Start: 2021-05-05 | End: 2021-05-05

## 2021-05-04 RX ORDER — VANCOMYCIN HCL 1 G
2000 VIAL (EA) INTRAVENOUS ONCE
Refills: 0 | Status: COMPLETED | OUTPATIENT
Start: 2021-05-04 | End: 2021-05-04

## 2021-05-04 RX ORDER — PIPERACILLIN AND TAZOBACTAM 4; .5 G/20ML; G/20ML
3.38 INJECTION, POWDER, LYOPHILIZED, FOR SOLUTION INTRAVENOUS ONCE
Refills: 0 | Status: COMPLETED | OUTPATIENT
Start: 2021-05-04 | End: 2021-05-04

## 2021-05-04 RX ADMIN — Medication 2000 MILLIGRAM(S): at 23:00

## 2021-05-04 RX ADMIN — PIPERACILLIN AND TAZOBACTAM 3.38 GRAM(S): 4; .5 INJECTION, POWDER, LYOPHILIZED, FOR SOLUTION INTRAVENOUS at 20:30

## 2021-05-04 RX ADMIN — Medication 600 MILLIGRAM(S): at 22:46

## 2021-05-04 RX ADMIN — Medication 250 MILLIGRAM(S): at 20:50

## 2021-05-04 RX ADMIN — RIVAROXABAN 20 MILLIGRAM(S): KIT at 19:47

## 2021-05-04 RX ADMIN — PIPERACILLIN AND TAZOBACTAM 200 GRAM(S): 4; .5 INJECTION, POWDER, LYOPHILIZED, FOR SOLUTION INTRAVENOUS at 19:50

## 2021-05-04 NOTE — H&P ADULT - NSHPREVIEWOFSYSTEMS_GEN_ALL_CORE
REVIEW OF SYSTEMS:    CONSTITUTIONAL: No weakness, fevers or chills  HEENT:  No headache, no visual changes  RESPIRATORY: +chronic SOB, RASMUSSEN, orthopnea, no wheezing or cough, no hemopytsis  CARDIOVASCULAR: No chest pain or palpitations  GASTROINTESTINAL: No abdominal pain, no nausea, vomiting, or hematemesis; No diarrhea or constipation. No melena or hematochezia.  GENITOURINARY: +bilat flank pain, +"frothy" urine, No dysuria, frequency or hematuria  NEUROLOGICAL: No focal weakness or dizziness   MSK: +bilat leg edema chronic, no myalgias  SKIN: +R foot sub 4th met head ulcer with erythema right lower leg

## 2021-05-04 NOTE — PROGRESS NOTE ADULT - ASSESSMENT
Right foot ulcer sub 4th met head      PROBLEM/RECOMMENDATIONS:    Pt evaluated and chart reviewed  RFWCx obtained sub 4th met head ulcer  Right foot dressed with Calcium Alginate and DSD  MRI ordered right foot  Adup and NIVS ordered RLE  Pt admitted for IV Abx, vascular studies, MRI  Pt is a high risk for more proximal amputation sepsis and death  Planned Procedure: right foot 4th met head resection - not booked, pending vascular studies, MRI  Discussed with pt surgical procedure  All of pt's questions were answered to satisfaction  Pt  stated she understood all discussed  Podiatry will follow pt while in house

## 2021-05-04 NOTE — H&P ADULT - PROBLEM SELECTOR PLAN 5
Chronic  -Patient does not tolerate CPAP, states she uses 2L NC qhs at home, continue  -Patient also likely has component of OHS Patient c/o bilat flank pain with frothy urine  -neg CVA tenderness, no dysuria or hematuria  -F/u UA

## 2021-05-04 NOTE — H&P ADULT - PROBLEM SELECTOR PLAN 7
Chronic  -Continue home metoprolol, lasix, losartan with hold parameters  -Monitor routine hemodynamics Patient also with component of asthma vs. allergies  -Continue Montelukast 10mg qd, Mucinex 600mg qd, albuterol inhaler PRN

## 2021-05-04 NOTE — ED PROVIDER NOTE - CLINICAL SUMMARY MEDICAL DECISION MAKING FREE TEXT BOX
sent in for wound debridement/partial toe amputation diabetic, obese.  preop labs, antibiotics r/o osteo

## 2021-05-04 NOTE — ED ADULT NURSE NOTE - PSH
Breast anomaly  Biopsy of both breast benign lesions  Cataract  B/L cataracts  Cervix abnormality  Ablation of cervix  right eye torn retina    S/P bariatric surgery  lap band surgery two times due to erosion, now no more  S/P D&C (status post dilation and curettage)  D&C when she was 18  S/P laminectomy  Pt denied this?  S/P TAVR (transcatheter aortic valve replacement)    S/P tonsillectomy and adenoidectomy

## 2021-05-04 NOTE — ED PROVIDER NOTE - OBJECTIVE STATEMENT
64 y/o F with hx of morbid obesity, DM, s/p TAVR sent in by podiatry/wound care Dr. Sheppard for admission.  pt has ulceration to right 4th toe that kareem require debridement/partial amputation, antibiotics and r/o osteo.     PCP: Dr. Hudson  Cardio: Dr. Espinal

## 2021-05-04 NOTE — H&P ADULT - NSHPPHYSICALEXAM_GEN_ALL_CORE
T(C): 36.7 (05-04-21 @ 17:44), Max: 36.7 (05-04-21 @ 17:44)  HR: 76 (05-04-21 @ 17:44) (76 - 76)  BP: 153/70 (05-04-21 @ 17:44) (153/70 - 153/70)  RR: 15 (05-04-21 @ 17:44) (15 - 15)  SpO2: 98% (05-04-21 @ 17:44) (98% - 98%)      PHYSICAL EXAM:  GENERAL: NAD, resting comfortably in bed  HEENT:  anicteric, moist mucous membranes, EOMI, NC/AT  CHEST/LUNG:  CTA b/l, no rales, wheezes, or rhonchi  HEART:  distant heart sounds, RRR, S1, S2  ABDOMEN:  obese, BS+, soft, nontender, nondistended  EXTREMITIES: +2 pitting edema bilat LE, no cyanosis, or calf tenderness, bilat LE wrapped in gauze  SKIN: 0.75 cm ulceration noted to the right foot sub 4th met head, necrotic wound bed, PTB(+) 4th met head,  no periwound erythema, no fluctuance, no malodor, no proximal streaking at this time  NERVOUS SYSTEM: answers questions and follows commands appropriately, moving all extremities T(C): 36.7 (05-04-21 @ 17:44), Max: 36.7 (05-04-21 @ 17:44)  HR: 76 (05-04-21 @ 17:44) (76 - 76)  BP: 153/70 (05-04-21 @ 17:44) (153/70 - 153/70)  RR: 15 (05-04-21 @ 17:44) (15 - 15)  SpO2: 98% (05-04-21 @ 17:44) (98% - 98%)      PHYSICAL EXAM:  GENERAL: NAD, resting comfortably in bed  HEENT:  anicteric, moist mucous membranes, EOMI, NC/AT  CHEST/LUNG:  CTA b/l, no rales, wheezes, or rhonchi  HEART:  distant heart sounds, RRR, S1, S2  ABDOMEN:  obese, BS+, soft, nontender, nondistended  BACK: neg CVA tenderness  EXTREMITIES: +2 pitting edema bilat LE, no cyanosis, or calf tenderness, bilat LE wrapped in gauze  SKIN: 0.75 cm ulceration noted to the right foot sub 4th met head, necrotic wound bed, PTB(+) 4th met head,  no periwound erythema, no fluctuance, no malodor, no proximal streaking at this time  NERVOUS SYSTEM: answers questions and follows commands appropriately, moving all extremities

## 2021-05-04 NOTE — H&P ADULT - PROBLEM SELECTOR PLAN 3
Chronic  -EKG showing NSR rate 79 with PVCs, LVH  -Continue Xarelto 20mg qd, Metoprolol tartrate 50mg BID  -Monitor on remote telemetry  -Cardiology consulted

## 2021-05-04 NOTE — H&P ADULT - PROBLEM SELECTOR PLAN 8
Chronic  -Continue home atorvastatin Chronic  -Continue home metoprolol, lasix, losartan with hold parameters  -Monitor routine hemodynamics

## 2021-05-04 NOTE — ED ADULT NURSE NOTE - NSIMPLEMENTINTERV_GEN_ALL_ED
Implemented All Universal Safety Interventions:  [place] to call system. Call bell, personal items and telephone within reach. Instruct patient to call for assistance. Room bathroom lighting operational. Non-slip footwear when patient is off stretcher. Physically safe environment: no spills, clutter or unnecessary equipment. Stretcher in lowest position, wheels locked, appropriate side rails in place. Implemented All Fall with Harm Risk Interventions:  [place] to call system. Call bell, personal items and telephone within reach. Instruct patient to call for assistance. Room bathroom lighting operational. Non-slip footwear when patient is off stretcher. Physically safe environment: no spills, clutter or unnecessary equipment. Stretcher in lowest position, wheels locked, appropriate side rails in place. Provide visual cue, wrist band, yellow gown, etc. Monitor gait and stability. Monitor for mental status changes and reorient to person, place, and time. Review medications for side effects contributing to fall risk. Reinforce activity limits and safety measures with patient and family. Provide visual clues: red socks.

## 2021-05-04 NOTE — H&P ADULT - NSHPSOCIALHISTORY_GEN_ALL_CORE
Tobacco: denies  EtOH: denies  Recreational drug use: denies  Lives with: alone with 2 dogs  Ambulates: with cane  ADLs: independent  Vaccinations: 2/2 covid complete

## 2021-05-04 NOTE — H&P ADULT - PROBLEM SELECTOR PLAN 4
Hx of Type 2 DM on insulin, poorly controlled  -On 30-40units Humalog 3x daily pre-meal, 48-52units Lantus qhs  -Hold home insulin regimen, start low dose sliding scale, 15 units Admelog pre-meal, 35U Lantus qhs  -hypoglycemia protocol, accuchecks  -Goal glucose on hospital setting 100-180, adjust insulin regimen PRN  -F/u HgbA1c Hx of Type 2 DM on insulin, poorly controlled  -On 30-40units Humalog 3x daily pre-meal, 48-52units Lantus qhs  -Hold home insulin regimen, start low dose sliding scale, 15 units Admelog pre-meal, 35U Lantus qhs  -hypoglycemia protocol, accuchecks  -Goal glucose on hospital setting 100-180, adjust insulin regimen PRN  -F/u HgbA1c  -Endo Dr. Perlman consulted Hx of Type 2 DM on insulin, poorly controlled  -On 30-40units Humalog 3x daily pre-meal, 48-52units Lantus qhs  -Hold home insulin regimen, start low dose sliding scale, 15 units Admelog pre-meal, 30U Lantus qhs  -hypoglycemia protocol, accuchecks  -Goal glucose on hospital setting 100-180, adjust insulin regimen PRN  -F/u HgbA1c  -Endo Dr. Perlman consulted

## 2021-05-04 NOTE — H&P ADULT - NSICDXPASTMEDICALHX_GEN_ALL_CORE_FT
PAST MEDICAL HISTORY:  Cholesterol serum elevated     Depression     Diabetes type 2, uncontrolled     Gastroparesis due to DM     GERD (gastroesophageal reflux disease)     Herniated disc     History of diabetic retinopathy     Hypertension     Hypothyroid not on medication    IBS (irritable bowel syndrome)     MVP (mitral valve prolapse)     Obesity     Spinal stenosis chronic back pain

## 2021-05-04 NOTE — H&P ADULT - NSICDXFAMILYHX_GEN_ALL_CORE_FT
FAMILY HISTORY:  Family history of anxiety disorder  Family history of aortic stenosis, Family hx of structural heart dx  FH: CAD (coronary artery disease)  FHx: diabetes mellitus

## 2021-05-04 NOTE — H&P ADULT - HISTORY OF PRESENT ILLNESS
64yo woman with PMHx AS s/p TAVR 2015, HOCM, pAFib, JOSR (does not tolerate CPAP, on 2L O2 at home qhs), anxiety, morbid obesity, DM, HTN presents to ED with R foot ulcer. Patient follows with podiatry Dr. Palencia and was sent to ED today after her appointment to be evaluated for osteo. Patient does not know how long the wound has been present, as she has chronic peripheral neuropathy and does not feel pain. Patient states she did not go to wound care for the past few weeks due to her worsening SOB, RASMUSSEN. Patient states she felt better yesterday so she scheduled her appointment for wound care today. Patient has extensive cardiac history, states she is following with Dr. Espinal for a possibility of repeat TAVR as she is still significantly SOB, RASMUSSEN, with increased leg edema for the past few months. Patient is scheduled for a LAMBERT on 5/24 with Dr. Espinal. Patient also currently complaining of bilateral flank pain, onset approx 1 week ago. Denies any dysuria, hematuria, but states her urine is "frothy." Denies any other complaints, denies abd pain, N/V/D, fevers, chills, CP, palpitations.     In ED:  Vitals: T 98, HR 76, /70, RR 15, SpO2 98%  Labs significant for: ESR 50, WBC 11.55, auto neutrophil 8.86, PT 17.0, INR 1.48, PTT 38.1, glucose 198, protein 9.1, alk phos 152  CXR: no active chest disease, looks unchanged from 8/2020  EKG: NSR rate 79 with PVCs, LVH  Given: 2g Vanco IV x1, 3.375g Zosyn IV x1, Xarelto 20mg PO x1

## 2021-05-04 NOTE — H&P ADULT - PROBLEM SELECTOR PLAN 6
Patient also with component of asthma vs. allergies  -Continue Montelukast 10mg qd, Mucinex 600mg qd, albuterol inhaler PRN Chronic  -Patient does not tolerate CPAP, states she uses 2L NC qhs at home, continue  -Patient also likely has component of OHS

## 2021-05-04 NOTE — ED ADULT NURSE NOTE - PMH
Cholesterol serum elevated    Depression    Diabetes type 2, uncontrolled    Gastroparesis due to DM    GERD (gastroesophageal reflux disease)    Herniated disc    History of diabetic retinopathy    Hypertension    Hypothyroid  not on medication  IBS (irritable bowel syndrome)    MVP (mitral valve prolapse)    Obesity    Spinal stenosis  chronic back pain

## 2021-05-04 NOTE — H&P ADULT - ATTENDING COMMENTS
66yo woman with PMHx AS s/p TAVR 2015, HOCM, pAFib, JOSR (does not tolerate CPAP, on 2L O2 at home qhs), anxiety, morbid obesity, DM, HTN presents to ED with R foot ulcer, admitted for rule out osteomyelitis.     -continue vanco and zosyn,   -f/u cultures  -remainder as above

## 2021-05-04 NOTE — H&P ADULT - PROBLEM SELECTOR PLAN 10
DVT PPx: Xarelto 20mg qd  GI PPx: on pantoprazole qd Chronic  -Continue home fluoxetine    Problem 11: Need for prophylactic measure  DVT PPx: Xarelto 20mg qd  GI PPx: on pantoprazole qd Chronic  -Continue home fluoxetine    Problem 11: Need for prophylactic measure  DVT PPx: Xarelto 20mg qd  GI PPx: on pantoprazole qd  Electrolyte Imbalances: Hold home PO K tablets, monitor daily CMP and replete electrolytes PRN

## 2021-05-04 NOTE — H&P ADULT - NSICDXPASTSURGICALHX_GEN_ALL_CORE_FT
PAST SURGICAL HISTORY:  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    Cataract B/L cataracts    Cervix abnormality Ablation of cervix    right eye torn retina     S/P bariatric surgery lap band surgery two times due to erosion, now no more    S/P D&C (status post dilation and curettage) D&C when she was 18    S/P laminectomy Pt denied this?    S/P TAVR (transcatheter aortic valve replacement)     S/P tonsillectomy and adenoidectomy

## 2021-05-04 NOTE — H&P ADULT - PROBLEM SELECTOR PLAN 2
s/p TAVR 2015, also with hx of HOCM diagnosed 2/20 per chart review  -currently undergoing workup for potential repeat TAVR  -TTE from 4/28/21 showing: hyperdynamic LV systolic function, LVEF 75%, mild pulmonary pressures  -Scheduled for LAMBERT with Marium on 5/24/21  -Continue PO Lasix 80mg qd, Metoprolol tartrate 50mg BID, ASA  -Monitor on remote telemetry  -[facility] consulted for cardiac clearance s/p TAVR 2015, also with hx of HOCM diagnosed 2/20 per chart review  -currently undergoing workup for potential repeat TAVR  -TTE from 4/28/21 showing: hyperdynamic LV systolic function, LVEF 75%, mild pulmonary pressures  -Scheduled for LAMBERT with Marium on 5/24/21  -will place on Lasix 40mg IV BID due to increased LE edema and SOB, Metoprolol tartrate 50mg BID, ASA  -Monitor on remote telemetry  -[facility] consulted for cardiac clearance

## 2021-05-04 NOTE — ED ADULT NURSE NOTE - OBJECTIVE STATEMENT
Pt A&Ox4, brought to ED by wound care staff for admission for foot wound.  Pt states she does not know when wound started but presents with unstageable wound to distal plantar area of right foot beneath 5th toe.  Pt denies fever, chills.  Pt states that due to neuropathy she has no feeling to wound or foot.  Pt states wound care doctor sent her to ED for possibility of osteomyelitis and surgery on foot.

## 2021-05-04 NOTE — PROGRESS NOTE ADULT - SUBJECTIVE AND OBJECTIVE BOX
65y year old Female seen at [facility] ED for right foot sub 4th met head ulcer with erythema right lower leg.  Pt was sent to the ED from the [facility].  Denies any fever, chills, nausea, vomiting, chest pain, shortness of breath, or calf pain at this time.        PAST MEDICAL & SURGICAL HISTORY:  Hypertension    Gastroparesis due to DM    Depression    Herniated disc    Spinal stenosis  chronic back pain    Cholesterol serum elevated    MVP (mitral valve prolapse)    Hypothyroid  not on medication    Obesity    IBS (irritable bowel syndrome)    GERD (gastroesophageal reflux disease)    Diabetes type 2, uncontrolled    History of diabetic retinopathy    S/P bariatric surgery  lap band surgery two times due to erosion, now no more    S/P tonsillectomy and adenoidectomy    Cataract  B/L cataracts    S/P laminectomy  Pt denied this?    S/P D&amp;C (status post dilation and curettage)  D&amp;C when she was 18    Cervix abnormality  Ablation of cervix    Breast anomaly  Biopsy of both breast benign lesions    right eye torn retina    S/P TAVR (transcatheter aortic valve replacement)        Allergies    codeine (Vomiting)  shellfish (Stomach Upset)    Intolerances    fish (Stomach Upset)      MEDICATIONS  (STANDING):    MEDICATIONS  (PRN):      Social History:      FAMILY HISTORY:  Family history of aortic stenosis  Family hx of structural heart dx        Vital Signs Last 24 Hrs  Pending    PHYSICAL EXAM:  Vascular: DP & PT palpable bilaterally, Capillary refill 3 seconds.  Nonpitting edema right foot and leg.  Erythema right lower leg.  No ecchymosis b/l  Neurological: Light touch sensation intact bilaterally  Musculoskeletal: 5/5 strength in all quadrants bilaterally, AJ & STJ ROM intact  Dermatological:  0.75 cm ulceration noted to the right foot sub 4th met head, necrotic wound bed, PTB(+) 4th met head,  no periwound erythema, no fluctuance, no malodor, no proximal streaking at this time        CBC: pending  CHEM: pending        Imaging: ----------  pending

## 2021-05-04 NOTE — H&P ADULT - ASSESSMENT
66yo woman with PMHx AS s/p TAVR 2015, HOCM, pAFib, JOSR (does not tolerate CPAP, on 2L O2 at home qhs), anxiety, morbid obesity, DM, HTN presents to ED with R foot ulcer, admitted for rule out osteomyelitis.

## 2021-05-05 ENCOUNTER — TRANSCRIPTION ENCOUNTER (OUTPATIENT)
Age: 66
End: 2021-05-05

## 2021-05-05 DIAGNOSIS — E11.621 TYPE 2 DIABETES MELLITUS WITH FOOT ULCER: ICD-10-CM

## 2021-05-05 DIAGNOSIS — Z86.711 PERSONAL HISTORY OF PULMONARY EMBOLISM: ICD-10-CM

## 2021-05-05 LAB
A1C WITH ESTIMATED AVERAGE GLUCOSE RESULT: 9 % — HIGH (ref 4–5.6)
A1C WITH ESTIMATED AVERAGE GLUCOSE RESULT: 9.2 % — HIGH (ref 4–5.6)
ALBUMIN SERPL ELPH-MCNC: 3.3 G/DL — SIGNIFICANT CHANGE UP (ref 3.3–5)
ALP SERPL-CCNC: 128 U/L — HIGH (ref 40–120)
ALT FLD-CCNC: 22 U/L — SIGNIFICANT CHANGE UP (ref 12–78)
ANION GAP SERPL CALC-SCNC: 9 MMOL/L — SIGNIFICANT CHANGE UP (ref 5–17)
AST SERPL-CCNC: 19 U/L — SIGNIFICANT CHANGE UP (ref 15–37)
BASOPHILS # BLD AUTO: 0.03 K/UL — SIGNIFICANT CHANGE UP (ref 0–0.2)
BASOPHILS NFR BLD AUTO: 0.2 % — SIGNIFICANT CHANGE UP (ref 0–2)
BILIRUB SERPL-MCNC: 0.5 MG/DL — SIGNIFICANT CHANGE UP (ref 0.2–1.2)
BUN SERPL-MCNC: 25 MG/DL — HIGH (ref 7–23)
CALCIUM SERPL-MCNC: 9.3 MG/DL — SIGNIFICANT CHANGE UP (ref 8.5–10.1)
CHLORIDE SERPL-SCNC: 99 MMOL/L — SIGNIFICANT CHANGE UP (ref 96–108)
CK SERPL-CCNC: 101 U/L — SIGNIFICANT CHANGE UP (ref 26–192)
CO2 SERPL-SCNC: 29 MMOL/L — SIGNIFICANT CHANGE UP (ref 22–31)
COVID-19 SPIKE DOMAIN AB INTERP: POSITIVE
COVID-19 SPIKE DOMAIN ANTIBODY RESULT: >250 U/ML — HIGH
CREAT SERPL-MCNC: 1 MG/DL — SIGNIFICANT CHANGE UP (ref 0.5–1.3)
CRP SERPL-MCNC: 13 MG/L — HIGH
EOSINOPHIL # BLD AUTO: 0.14 K/UL — SIGNIFICANT CHANGE UP (ref 0–0.5)
EOSINOPHIL NFR BLD AUTO: 1.1 % — SIGNIFICANT CHANGE UP (ref 0–6)
ESTIMATED AVERAGE GLUCOSE: 212 MG/DL — HIGH (ref 68–114)
ESTIMATED AVERAGE GLUCOSE: 217 MG/DL — HIGH (ref 68–114)
GLUCOSE SERPL-MCNC: 287 MG/DL — HIGH (ref 70–99)
GRAM STN FLD: SIGNIFICANT CHANGE UP
HCT VFR BLD CALC: 36.8 % — SIGNIFICANT CHANGE UP (ref 34.5–45)
HGB BLD-MCNC: 11.8 G/DL — SIGNIFICANT CHANGE UP (ref 11.5–15.5)
IMM GRANULOCYTES NFR BLD AUTO: 0.4 % — SIGNIFICANT CHANGE UP (ref 0–1.5)
LYMPHOCYTES # BLD AUTO: 1.97 K/UL — SIGNIFICANT CHANGE UP (ref 1–3.3)
LYMPHOCYTES # BLD AUTO: 15.1 % — SIGNIFICANT CHANGE UP (ref 13–44)
MAGNESIUM SERPL-MCNC: 2.1 MG/DL — SIGNIFICANT CHANGE UP (ref 1.6–2.6)
MCHC RBC-ENTMCNC: 27.6 PG — SIGNIFICANT CHANGE UP (ref 27–34)
MCHC RBC-ENTMCNC: 32.1 GM/DL — SIGNIFICANT CHANGE UP (ref 32–36)
MCV RBC AUTO: 86 FL — SIGNIFICANT CHANGE UP (ref 80–100)
MONOCYTES # BLD AUTO: 1.02 K/UL — HIGH (ref 0–0.9)
MONOCYTES NFR BLD AUTO: 7.8 % — SIGNIFICANT CHANGE UP (ref 2–14)
MRSA PCR RESULT.: SIGNIFICANT CHANGE UP
NEUTROPHILS # BLD AUTO: 9.84 K/UL — HIGH (ref 1.8–7.4)
NEUTROPHILS NFR BLD AUTO: 75.4 % — SIGNIFICANT CHANGE UP (ref 43–77)
NRBC # BLD: 0 /100 WBCS — SIGNIFICANT CHANGE UP (ref 0–0)
PHOSPHATE SERPL-MCNC: 3.3 MG/DL — SIGNIFICANT CHANGE UP (ref 2.5–4.5)
PLATELET # BLD AUTO: 277 K/UL — SIGNIFICANT CHANGE UP (ref 150–400)
POTASSIUM SERPL-MCNC: 3.8 MMOL/L — SIGNIFICANT CHANGE UP (ref 3.5–5.3)
POTASSIUM SERPL-SCNC: 3.8 MMOL/L — SIGNIFICANT CHANGE UP (ref 3.5–5.3)
PROT SERPL-MCNC: 7.4 G/DL — SIGNIFICANT CHANGE UP (ref 6–8.3)
RBC # BLD: 4.28 M/UL — SIGNIFICANT CHANGE UP (ref 3.8–5.2)
RBC # FLD: 15.2 % — HIGH (ref 10.3–14.5)
S AUREUS DNA NOSE QL NAA+PROBE: DETECTED
SARS-COV-2 IGG+IGM SERPL QL IA: >250 U/ML — HIGH
SARS-COV-2 IGG+IGM SERPL QL IA: POSITIVE
SODIUM SERPL-SCNC: 137 MMOL/L — SIGNIFICANT CHANGE UP (ref 135–145)
SPECIMEN SOURCE: SIGNIFICANT CHANGE UP
WBC # BLD: 13.05 K/UL — HIGH (ref 3.8–10.5)
WBC # FLD AUTO: 13.05 K/UL — HIGH (ref 3.8–10.5)

## 2021-05-05 PROCEDURE — 99223 1ST HOSP IP/OBS HIGH 75: CPT

## 2021-05-05 PROCEDURE — 99221 1ST HOSP IP/OBS SF/LOW 40: CPT | Mod: 57

## 2021-05-05 PROCEDURE — 73718 MRI LOWER EXTREMITY W/O DYE: CPT | Mod: 26,RT

## 2021-05-05 PROCEDURE — 99232 SBSQ HOSP IP/OBS MODERATE 35: CPT | Mod: GC

## 2021-05-05 PROCEDURE — 93926 LOWER EXTREMITY STUDY: CPT | Mod: 26,RT

## 2021-05-05 PROCEDURE — 73630 X-RAY EXAM OF FOOT: CPT | Mod: 26,RT

## 2021-05-05 PROCEDURE — 93922 UPR/L XTREMITY ART 2 LEVELS: CPT | Mod: 26

## 2021-05-05 RX ORDER — NYSTATIN CREAM 100000 [USP'U]/G
1 CREAM TOPICAL
Refills: 0 | Status: DISCONTINUED | OUTPATIENT
Start: 2021-05-05 | End: 2021-05-06

## 2021-05-05 RX ORDER — INSULIN LISPRO 100/ML
VIAL (ML) SUBCUTANEOUS
Refills: 0 | Status: DISCONTINUED | OUTPATIENT
Start: 2021-05-05 | End: 2021-05-06

## 2021-05-05 RX ORDER — INSULIN LISPRO 100/ML
VIAL (ML) SUBCUTANEOUS AT BEDTIME
Refills: 0 | Status: DISCONTINUED | OUTPATIENT
Start: 2021-05-05 | End: 2021-05-06

## 2021-05-05 RX ORDER — DAPTOMYCIN 500 MG/10ML
550 INJECTION, POWDER, LYOPHILIZED, FOR SOLUTION INTRAVENOUS EVERY 24 HOURS
Refills: 0 | Status: DISCONTINUED | OUTPATIENT
Start: 2021-05-05 | End: 2021-05-06

## 2021-05-05 RX ADMIN — NYSTATIN CREAM 1 APPLICATION(S): 100000 CREAM TOPICAL at 17:32

## 2021-05-05 RX ADMIN — Medication 4: at 22:23

## 2021-05-05 RX ADMIN — Medication 50 MILLIGRAM(S): at 17:17

## 2021-05-05 RX ADMIN — PIPERACILLIN AND TAZOBACTAM 25 GRAM(S): 4; .5 INJECTION, POWDER, LYOPHILIZED, FOR SOLUTION INTRAVENOUS at 03:05

## 2021-05-05 RX ADMIN — INSULIN GLARGINE 30 UNIT(S): 100 INJECTION, SOLUTION SUBCUTANEOUS at 00:25

## 2021-05-05 RX ADMIN — MONTELUKAST 10 MILLIGRAM(S): 4 TABLET, CHEWABLE ORAL at 12:14

## 2021-05-05 RX ADMIN — Medication 8: at 17:31

## 2021-05-05 RX ADMIN — PANTOPRAZOLE SODIUM 40 MILLIGRAM(S): 20 TABLET, DELAYED RELEASE ORAL at 08:40

## 2021-05-05 RX ADMIN — Medication 40 MILLIGRAM(S): at 12:15

## 2021-05-05 RX ADMIN — DAPTOMYCIN 122 MILLIGRAM(S): 500 INJECTION, POWDER, LYOPHILIZED, FOR SOLUTION INTRAVENOUS at 18:35

## 2021-05-05 RX ADMIN — LOSARTAN POTASSIUM 25 MILLIGRAM(S): 100 TABLET, FILM COATED ORAL at 06:49

## 2021-05-05 RX ADMIN — Medication 250 MILLIGRAM(S): at 07:00

## 2021-05-05 RX ADMIN — Medication 3: at 07:26

## 2021-05-05 RX ADMIN — Medication 15 UNIT(S): at 12:51

## 2021-05-05 RX ADMIN — PIPERACILLIN AND TAZOBACTAM 25 GRAM(S): 4; .5 INJECTION, POWDER, LYOPHILIZED, FOR SOLUTION INTRAVENOUS at 13:46

## 2021-05-05 RX ADMIN — Medication 2: at 00:38

## 2021-05-05 RX ADMIN — Medication 40 MILLIGRAM(S): at 06:51

## 2021-05-05 RX ADMIN — Medication 40 MILLIGRAM(S): at 17:17

## 2021-05-05 RX ADMIN — Medication 6: at 12:51

## 2021-05-05 RX ADMIN — Medication 15 UNIT(S): at 17:31

## 2021-05-05 RX ADMIN — Medication 50 MILLIGRAM(S): at 06:49

## 2021-05-05 RX ADMIN — LOSARTAN POTASSIUM 25 MILLIGRAM(S): 100 TABLET, FILM COATED ORAL at 17:17

## 2021-05-05 RX ADMIN — Medication 15 UNIT(S): at 08:40

## 2021-05-05 RX ADMIN — ATORVASTATIN CALCIUM 80 MILLIGRAM(S): 80 TABLET, FILM COATED ORAL at 22:22

## 2021-05-05 RX ADMIN — Medication 81 MILLIGRAM(S): at 12:14

## 2021-05-05 RX ADMIN — INSULIN GLARGINE 30 UNIT(S): 100 INJECTION, SOLUTION SUBCUTANEOUS at 22:22

## 2021-05-05 RX ADMIN — PIPERACILLIN AND TAZOBACTAM 25 GRAM(S): 4; .5 INJECTION, POWDER, LYOPHILIZED, FOR SOLUTION INTRAVENOUS at 22:23

## 2021-05-05 NOTE — PHYSICAL EXAM
[4 x 4] : 4 x 4  [JVD] : no jugular venous distention  [0] : left 0 [Ankle Swelling (On Exam)] : present [Ankle Swelling Bilaterally] : bilaterally  [Varicose Veins Of Lower Extremities] : bilaterally [] : bilaterally [Ankle Swelling On The Left] : moderate [Skin Ulcer] : ulcer [de-identified] : 4/5 strength in all quadrants bilaterally [de-identified] : A&Ox3, NAD [de-identified] : right submet 4th ulcer down to skin, subcutaneous tissue, fat, tendon, bone (not necrotic). right lower leg cellulitis. [de-identified] : 0.1-0.7 cm circumferential [de-identified] : callus [de-identified] : 75% [de-identified] : 10% [de-identified] : 15% [de-identified] : pt admitted to [facility] post C assessment [FreeTextEntry8] : 0.4 [FreeTextEntry7] : Right Foot 3rd Digit [FreeTextEntry9] : 0.1 [de-identified] : 0.1 [de-identified] : Patients own compression stocking [de-identified] : Dry Dressing [de-identified] : pt admitted to [facility] post C assessment Silver alginate applied as per DPM [de-identified] : Right Anterior Leg- small weeping area [de-identified] : 0.3 [de-identified] : 0.2 [de-identified] : 0.1 [de-identified] : serous  [de-identified] : mild [de-identified] : Patients own compression stocking [de-identified] : silver alginate [de-identified] : Cleansed with Normal Saline \par \par  [de-identified] : Blister drained by DPM\par pt admitted to [facility] post WCC assessment\par Silver alginate applied as per DPM [FreeTextEntry1] : Left Anterior Leg- Blister [FreeTextEntry2] : 0.7 [FreeTextEntry3] : 1.1 [FreeTextEntry4] : 0.1 [de-identified] : serous [de-identified] : Patients own compression stockings [de-identified] : silver alginate [de-identified] : Cleansed with Normal Saline [de-identified] : \par  [de-identified] : Yes [de-identified] : >75% [de-identified] : <20% [de-identified] : 3x Weekly [de-identified] : Primary Dressing [de-identified] : None [de-identified] : No [de-identified] : No [de-identified] : Normal [de-identified] : None [de-identified] : None [de-identified] : None [de-identified] : No [de-identified] : Other [de-identified] : 3x Weekly [de-identified] : Secondary Dressing [de-identified] : Small [de-identified] : Venous [de-identified] : No [de-identified] : No [de-identified] : Erythema [de-identified] : None [de-identified] : None [de-identified] : 100% [de-identified] : No [de-identified] : Other [de-identified] : 3x Weekly [de-identified] : Primary Dressing [TWNoteComboBox4] : Small [TWNoteComboBox6] : Venous [TWNoteComboBox5] : No [de-identified] : No [de-identified] : Normal [de-identified] : None [de-identified] : None [de-identified] : 100% [de-identified] : No [de-identified] : Other [de-identified] : 3x Weekly [de-identified] : Primary Dressing

## 2021-05-05 NOTE — REVIEW OF SYSTEMS
[Fever] : no fever [Eye Pain] : no eye pain [Earache] : no earache [Chest Pain] : no chest pain [Shortness Of Breath] : no shortness of breath [Abdominal Pain] : no abdominal pain [Skin Wound] : skin wound [de-identified] : right submet 4th ulcer down to skin, subcutaneous tissue, fat, tendon, bone (not necrotic). right lower leg cellulitis. [de-identified] : diabetic neuropathy [de-identified] : type 2 diabetes

## 2021-05-05 NOTE — ASSESSMENT
[Verbal] : Verbal [Demo] : Demo [Patient] : Patient [Good - alert, interested, motivated] : Good - alert, interested, motivated [Dressing changes] : dressing changes [Foot Care] : foot care [Skin Care] : skin care [Pressure relief] : pressure relief [Signs and symptoms of infection] : sign and symptoms of infection [Venous Disease] : venous disease [Nutrition] : nutrition [How and When to Call] : how and when to call [Pain Management] : pain management [Off-loading] : off-loading [Compression Therapy] : compression therapy [Patient responsibility to plan of care] : patient responsibility to plan of care [Glycemic Control] : glycemic control [Stable] : stable [Not Applicable - Long Term Care/Home Health Agency] : Long Term Care/Home Health Agency: Not Applicable [Needs reinforcement] : needs reinforcement [Emergency Room] : Emergency Room [Wheelchair] : Wheelchair [] : No [FreeTextEntry3] : New Wounds [FreeTextEntry2] : Infection Prevention\par Promote Skin Integrity\par Offloading\par Elevation\par Compression Compliance\par Low Na+ Diet\par Maintain acceptable levels of pain\par Demonstrates use of both pharmacological and nonpharmacological pain management interventions\par Encourage Glycemic Control\par Weight reduction Strategies\par Pressure Relief [FreeTextEntry4] : Pt admitted to [facility] as per DPM for potential Right Foot 4th met OM\par Pathology and Tissue Culture obtained and sent to lab\par DPM discussed the need for a Right Foot 4th metatarsal amputation with patient, area is at high risk for infection. \par

## 2021-05-05 NOTE — DISCHARGE NOTE PROVIDER - CARE PROVIDERS DIRECT ADDRESSES
,DirectAddress_Unknown,[email or web address]DirectAddress_Unknown ,DirectAddress_Unknown,[email or web address],DirectAddress_Unknown,[email or web address]

## 2021-05-05 NOTE — DISCHARGE NOTE PROVIDER - PROVIDER TOKENS
PROVIDER:[TOKEN:[[id number]]],PROVIDER:[TOKEN:[[id number]]],PROVIDER:[TOKEN:[[id number]]] PROVIDER:[TOKEN:[[id number]]],PROVIDER:[TOKEN:[[id number]]],PROVIDER:[TOKEN:[[id number]]],PROVIDER:[TOKEN:[[id number]]]

## 2021-05-05 NOTE — PROGRESS NOTE ADULT - PROBLEM SELECTOR PLAN 1
New acute R sub 4th met head ulcer, sent by Dr. Palencia from wound care, likely OM as pt has nonhealing ulcer which was stated to have bone exposure on admission  -Patient with leukocytosis, ESR 50, afebrile on admission; white count risen to 13.05 this AM  -s/p IV Vanco and Zosyn in ED, continue Vanco 2g q12h, Zosyn 3.375g q8h, f/u Vanco trough  -F/u MRI R foot and arterial studies RLE per podiatry  -F/u wound culture, MRSA PCR  -F/u Blood and urine cultures, pending  -Continue routine dressing with Calcium Alginate and DSD per podiatry  -Hold off pain regimen as patient with chronic neuropathy  -Podiatry procedure not booked yet, pending vascular studies/MRI -- planned for right 4th metatarsal head resection per podiatry note  -Podiatry Dr. Palencia following  -ID Dr. Alexis consulted, f/u recs  -Cardio Dr. Mayorga consulted for clearance pending possible pod procedure New acute R sub 4th met head ulcer, sent by Dr. Palencia from wound care, likely from chronic uncontrolled DM  -Patient with leukocytosis, ESR 50, afebrile on admission; white count risen to 13.05 this AM  -s/p IV Vanco and Zosyn in ED, continue Vanco 2g q12h, Zosyn 3.375g q8h, f/u Vanco trough  -MRI foot shows No evidence for osteomyelitis or abscess.  -Vascular studies pending, f/u results  -F/u wound culture, MRSA PCR  -F/u Blood and urine cultures, pending  -Continue routine dressing with Calcium Alginate and DSD per podiatry  -Hold off pain regimen as patient with chronic neuropathy  -Podiatry Dr. Palencia following, MRI negative for OM, will follow up new plans  -ID Dr. Alexis consulted, f/u recs  -Cardio Dr. Mayorga consulted for clearance pending possible pod procedure New acute R sub 4th met head ulcer, sent by Dr. Palencia from wound care, likely from chronic uncontrolled DM  -Patient with leukocytosis, ESR 50, afebrile on admission; white count risen to 13.05 this AM  -s/p IV Vanco and Zosyn in ED, continue Vanco 2g q12h and Zosyn 3.375g q8h, f/u Vanco trough  -MRI foot shows No evidence for osteomyelitis or abscess.  -Vascular studies pending, f/u results  -F/u wound culture, MRSA PCR  -F/u Blood and urine cultures, pending  -Continue routine dressing with Calcium Alginate and DSD per podiatry  -Hold off pain regimen as patient with chronic neuropathy  -Podiatry Dr. Palencia following, MRI negative for OM, will follow up new plans  -ID Dr. Alexis consulted, f/u recs  -Cardio Dr. Mayorga consulted for clearance pending possible pod procedure

## 2021-05-05 NOTE — PROGRESS NOTE ADULT - ASSESSMENT
64yo woman with PMHx AS s/p TAVR 2015, HOCM, pAFib, JOSR (does not tolerate CPAP, on 2L O2 at home qhs), anxiety, morbid obesity, DM, HTN presents to ED with R foot ulcer, admitted for rule out osteomyelitis.

## 2021-05-05 NOTE — VITALS
[] : No [de-identified] : 5 [FreeTextEntry3] : Right anterior Leg [FreeTextEntry2] : Prolonged standing [FreeTextEntry1] : Offloading, elevation, and compression [FreeTextEntry4] : Offloading/elevation/compression

## 2021-05-05 NOTE — PROGRESS NOTE ADULT - PROBLEM SELECTOR PLAN 10
Chronic  -Continue home fluoxetine    Problem 11: Need for prophylactic measure  DVT PPx: Xarelto 20mg qd  GI PPx: on pantoprazole qd  Electrolyte Imbalances: Hold home PO K tablets, monitor daily CMP and replete electrolytes PRN    Problem 12: HLD  Chronic  -Continue home atorvastatin

## 2021-05-05 NOTE — DISCHARGE NOTE PROVIDER - NSDCFUADDINST_GEN_ALL_CORE_FT
Please follow up with your PCP and cardiologist within 1 week of discharge    Please follow up with your podiatrist for wound care    Please establish care with a hematologist due to your history of clotting Please follow up with your PCP and cardiologist within 1 week of discharge  Please establish care with a hematologist due to your history of clotting    Please follow up with your podiatrist for wound care:  Wound Care Instructions  1.  Keep dressing clean, dry and intact until clinic visit  2.  Make appointment to be seen by Dr. Palnecia or Dr. John at [facility] w/in 3-5 days of d/c  3.  If symptoms of nausea, vomiting, fever, chills, shortness of breath, chest pain, increasing pain foot or posterior leg pain develop - go to the emergency department.

## 2021-05-05 NOTE — CONSULT NOTE ADULT - SUBJECTIVE AND OBJECTIVE BOX
Podiatry was consulted for this 64 yo female, who was sent in [facility] on 5/4/2021.   The pt had presented to her regular follow up with a newly developed ulcer to her right foot, with redness and swelling to the area surrounding.  The pt was sent by Dr. Palencia to be admitted in order to work her up to rule out osteomyelitis.  The pt relates that she had not previously noticed the ulcer prior to this appointment.   The pt denies any drainage from her foot and any malodor from her foot.   The pt is neuropathic and does not relate any pain at this time.   The pt denies n/v/f/c/sob/cp.    HPI:  66yo woman with PMHx AS s/p TAVR 2015, HOCM, pAFib, JOSR (does not tolerate CPAP, on 2L O2 at home qhs), anxiety, morbid obesity, DM, HTN presents to ED with R foot ulcer. Patient follows with podiatry Dr. Palencia and was sent to ED today after her appointment to be evaluated for osteo. Patient does not know how long the wound has been present, as she has chronic peripheral neuropathy and does not feel pain. Patient states she did not go to wound care for the past few weeks due to her worsening SOB, RASMUSSEN. Patient states she felt better yesterday so she scheduled her appointment for wound care today. Patient has extensive cardiac history, states she is following with Dr. Espinal for a possibility of repeat TAVR as she is still significantly SOB, RASMUSSEN, with increased leg edema for the past few months. Patient is scheduled for a LAMBERT on 5/24 with Dr. Espinal. Patient also currently complaining of bilateral flank pain, onset approx 1 week ago. Denies any dysuria, hematuria, but states her urine is "frothy." Denies any other complaints, denies abd pain, N/V/D, fevers, chills, CP, palpitations.     In ED:  Vitals: T 98, HR 76, /70, RR 15, SpO2 98%  Labs significant for: ESR 50, WBC 11.55, auto neutrophil 8.86, PT 17.0, INR 1.48, PTT 38.1, glucose 198, protein 9.1, alk phos 152  CXR: no active chest disease, looks unchanged from 8/2020  EKG: NSR rate 79 with PVCs, LVH  Given: 2g Vanco IV x1, 3.375g Zosyn IV x1, Xarelto 20mg PO x1   (04 May 2021 21:48)      REVIEW OF SYSTEMS    PAST MEDICAL & SURGICAL HISTORY:  Hypertension    Gastroparesis due to DM    Depression    Herniated disc    Spinal stenosis  chronic back pain    Cholesterol serum elevated    MVP (mitral valve prolapse)    Hypothyroid  not on medication    Obesity    IBS (irritable bowel syndrome)    GERD (gastroesophageal reflux disease)    Diabetes type 2, uncontrolled    History of diabetic retinopathy    S/P bariatric surgery  lap band surgery two times due to erosion, now no more    S/P tonsillectomy and adenoidectomy    Cataract  B/L cataracts    S/P laminectomy  Pt denied this?    S/P D&amp;C (status post dilation and curettage)  D&amp;C when she was 18    Cervix abnormality  Ablation of cervix    Breast anomaly  Biopsy of both breast benign lesions    right eye torn retina    S/P TAVR (transcatheter aortic valve replacement)        Allergies    codeine (Vomiting)  shellfish (Stomach Upset)    Intolerances    fish (Stomach Upset)      MEDICATIONS  (STANDING):  aspirin enteric coated 81 milliGRAM(s) Oral daily  atorvastatin 80 milliGRAM(s) Oral at bedtime  DAPTOmycin IVPB 550 milliGRAM(s) IV Intermittent every 24 hours  dextrose 40% Gel 15 Gram(s) Oral once  dextrose 5%. 1000 milliLiter(s) (50 mL/Hr) IV Continuous <Continuous>  dextrose 5%. 1000 milliLiter(s) (100 mL/Hr) IV Continuous <Continuous>  dextrose 50% Injectable 25 Gram(s) IV Push once  dextrose 50% Injectable 12.5 Gram(s) IV Push once  dextrose 50% Injectable 25 Gram(s) IV Push once  FLUoxetine 40 milliGRAM(s) Oral daily  furosemide   Injectable 40 milliGRAM(s) IV Push two times a day  glucagon  Injectable 1 milliGRAM(s) IntraMuscular once  guaiFENesin  milliGRAM(s) Oral <User Schedule>  insulin glargine Injectable (LANTUS) 30 Unit(s) SubCutaneous at bedtime  insulin lispro (ADMELOG) corrective regimen sliding scale   SubCutaneous three times a day before meals  insulin lispro (ADMELOG) corrective regimen sliding scale   SubCutaneous at bedtime  insulin lispro Injectable (ADMELOG) 15 Unit(s) SubCutaneous three times a day before meals  losartan 25 milliGRAM(s) Oral two times a day  metoprolol tartrate 50 milliGRAM(s) Oral two times a day  montelukast 10 milliGRAM(s) Oral daily  pantoprazole    Tablet 40 milliGRAM(s) Oral before breakfast  piperacillin/tazobactam IVPB.. 3.375 Gram(s) IV Intermittent every 8 hours  rivaroxaban 20 milliGRAM(s) Oral with dinner    MEDICATIONS  (PRN):  ALBUTerol    90 MICROgram(s) HFA Inhaler 2 Puff(s) Inhalation every 6 hours PRN Shortness of Breath and/or Wheezing  nystatin Powder 1 Application(s) Topical two times a day PRN intertrigo      Social History:  Tobacco: denies  EtOH: denies  Recreational drug use: denies  Lives with: alone with 2 dogs  Ambulates: with cane  ADLs: independent  Vaccinations: 2/2 covid complete (04 May 2021 21:48)      FAMILY HISTORY:  Family history of aortic stenosis  Family hx of structural heart dx    FHx: diabetes mellitus    FH: CAD (coronary artery disease)    Family history of anxiety disorder        Vital Signs Last 24 Hrs  T(C): 36.6 (05 May 2021 08:17), Max: 37 (05 May 2021 00:22)  T(F): 97.8 (05 May 2021 08:17), Max: 98.6 (05 May 2021 00:22)  HR: 72 (05 May 2021 08:17) (72 - 80)  BP: 140/81 (05 May 2021 08:17) (125/61 - 153/70)  BP(mean): --  RR: 18 (05 May 2021 08:17) (15 - 18)  SpO2: 94% (05 May 2021 08:17) (94% - 98%)    PHYSICAL EXAM:  Vascular: DP & PT faintly palpable bilaterally, Capillary refill 3 seconds, Digital hair growth diminished b/l.  Mild non-pitting edema appreciated to b/l lower extremities.    Neurological: Light touch sensation diminished bilaterally  Musculoskeletal: 5/5 strength in all quadrants bilaterally, AJ & STJ ROM intact  Dermatological: 2cm x 1.5cm x 0.3 cm ulceration noted to the plantar right foot sub met head 4, fibrogranular wound bed, (+) probe to bone, mild periwound erythema, no fluctuance, no malodor, no proximal streaking at this time    CBC Full  -  ( 05 May 2021 05:07 )  WBC Count : 13.05 K/uL  RBC Count : 4.28 M/uL  Hemoglobin : 11.8 g/dL  Hematocrit : 36.8 %  Platelet Count - Automated : 277 K/uL  Mean Cell Volume : 86.0 fl  Mean Cell Hemoglobin : 27.6 pg  Mean Cell Hemoglobin Concentration : 32.1 gm/dL  Auto Neutrophil # : 9.84 K/uL  Auto Lymphocyte # : 1.97 K/uL  Auto Monocyte # : 1.02 K/uL  Auto Eosinophil # : 0.14 K/uL  Auto Basophil # : 0.03 K/uL  Auto Neutrophil % : 75.4 %  Auto Lymphocyte % : 15.1 %  Auto Monocyte % : 7.8 %  Auto Eosinophil % : 1.1 %  Auto Basophil % : 0.2 %      ----------CHEM PANEL----------    PT/INR - ( 04 May 2021 20:03 )   PT: 17.0 sec;   INR: 1.48 ratio         PTT - ( 04 May 2021 20:03 )  PTT:38.1 sec      Culture - Tissue with Gram Stain (collected 05 May 2021 03:37)  Source: .Tissue Other, Right foot 4th Metatarsal  Gram Stain (05 May 2021 07:30):    Rare polymorphonuclear leukocytes per low power field    No organisms seen per oil power field        Imaging: ----------

## 2021-05-05 NOTE — HISTORY OF PRESENT ILLNESS
[FreeTextEntry1] : Pt seen for new right foot plantar 4th metatarsal head ulceration down to skin, subcutaneous tissue, fat, tendon, and bone. Pt does not recall any illiticing events. Pt also presents with new right lower leg cellulitis. Pt currently wearing compression for venous stasis.

## 2021-05-05 NOTE — PLAN
[FreeTextEntry1] : Patient examined and evaluated at this time.\par Patient advised regarding need to go to the emergency room at this time.\par Pt advised regarding the need for surgical resection to alleviate mechanical pressure to aid in healing of ulceration.\par Pt is a high risk for limb loss, sepsis, and death.\par Spent 30 minutes for patient care and medical decision making.\par

## 2021-05-05 NOTE — DISCHARGE NOTE PROVIDER - HOSPITAL COURSE
FROM ADMISSION H+P:   HPI:  64yo woman with PMHx AS s/p TAVR 2015, HOCM, pAFib, JOSR (does not tolerate CPAP, on 2L O2 at home qhs), anxiety, morbid obesity, DM, HTN presents to ED with R foot ulcer. Patient follows with podiatry Dr. Palencia and was sent to ED today after her appointment to be evaluated for osteo. Patient does not know how long the wound has been present, as she has chronic peripheral neuropathy and does not feel pain. Patient states she did not go to wound care for the past few weeks due to her worsening SOB, RASMUSSEN. Patient states she felt better yesterday so she scheduled her appointment for wound care today. Patient has extensive cardiac history, states she is following with Dr. Espinal for a possibility of repeat TAVR as she is still significantly SOB, RASMUSSEN, with increased leg edema for the past few months. Patient is scheduled for a LAMBERT on 5/24 with Dr. Espinal. Patient also currently complaining of bilateral flank pain, onset approx 1 week ago. Denies any dysuria, hematuria, but states her urine is "frothy." Denies any other complaints, denies abd pain, N/V/D, fevers, chills, CP, palpitations.     In ED:  Vitals: T 98, HR 76, /70, RR 15, SpO2 98%  Labs significant for: ESR 50, WBC 11.55, auto neutrophil 8.86, PT 17.0, INR 1.48, PTT 38.1, glucose 198, protein 9.1, alk phos 152  CXR: no active chest disease, looks unchanged from 8/2020  EKG: NSR rate 79 with PVCs, LVH  Given: 2g Vanco IV x1, 3.375g Zosyn IV x1, Xarelto 20mg PO x1   (04 May 2021 21:48)    ---  HOSPITAL COURSE: Patient admitted for R foot wound, sent in from wound care center for r/o OM. MRI foot showed no evidence of OM. Vascular studies done and showed ________. Podiatry and ID consulted. Pt started on IV Zosyn and Vanco, in ED, Zosyn continued and Vanco changed to Daptomycin per ID recs. Pt also with c/o SOB before admission, follows with outpatient cardio s/p TAVR in 2015 for Aortic stenosis, HFpRF, and paroxysmal Afib. LAMBERT done 4/21 showed hyperdynamic LV function and EF 75%, LAMBERT scheduled outpatient 5/21 for further eval of valve function. Pt started on IV Lasix 40 BID, transitioned back to home dose Lasix 80mg QD on dc?? Xarelto continued and ????changed to heparin for podiatric procedure. Podiatry recommended _____ procedure. Blood and urine cultures done and showed ________. Wound culture done and showed _______. MRSA PCR done and showed ________. Pt complained of back/flank pain, UA done and only showed trace LE, urine cx showed ________. For uncontrolled DM, endo consulted and managed with Insulin regimen reduced from home doses. Cardio was consulted for clearance. PT saw the patient and recommended ______. Patient was seen and examined on day of discharge and found to be hemodynamically stable and medically optimized for dc home.     ---  CONSULTANTS:   Podiatry- Dr. Talha VELAZQUEZ- Dr. Alexis  Cardio- Dr. Mayorga      ---  TIME SPENT:  The total amount of time spent reviewing the hospital notes, laboratory values, imaging findings, assessing/counseling the patient, discussing with consultant physicians, social work, nursing staff was -- minutes       FROM ADMISSION H+P:   HPI:  66yo woman with PMHx AS s/p TAVR 2015, HOCM, pAFib, JOSR (does not tolerate CPAP, on 2L O2 at home qhs), anxiety, morbid obesity, DM, HTN presents to ED with R foot ulcer. Patient follows with podiatry Dr. Palencia and was sent to ED today after her appointment to be evaluated for osteo. Patient does not know how long the wound has been present, as she has chronic peripheral neuropathy and does not feel pain. Patient states she did not go to wound care for the past few weeks due to her worsening SOB, RASMUSSEN. Patient states she felt better yesterday so she scheduled her appointment for wound care today. Patient has extensive cardiac history, states she is following with Dr. Espinal for a possibility of repeat TAVR as she is still significantly SOB, RASMUSSEN, with increased leg edema for the past few months. Patient is scheduled for a LAMBERT on 5/24 with Dr. Espinal. Patient also currently complaining of bilateral flank pain, onset approx 1 week ago. Denies any dysuria, hematuria, but states her urine is "frothy." Denies any other complaints, denies abd pain, N/V/D, fevers, chills, CP, palpitations.     In ED:  Vitals: T 98, HR 76, /70, RR 15, SpO2 98%  Labs significant for: ESR 50, WBC 11.55, auto neutrophil 8.86, PT 17.0, INR 1.48, PTT 38.1, glucose 198, protein 9.1, alk phos 152  CXR: no active chest disease, looks unchanged from 8/2020  EKG: NSR rate 79 with PVCs, LVH  Given: 2g Vanco IV x1, 3.375g Zosyn IV x1, Xarelto 20mg PO x1   (04 May 2021 21:48)    ---  HOSPITAL COURSE: Patient admitted for R foot wound, sent in from wound care center for r/o OM. MRI foot showed no evidence of OM. Vascular studies done and showed Extensive calcified atheromatous plaque.   Two-vessel runoff in the right calf with presumed occlusion of the peroneal artery. 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 Podiatry and ID consulted. Pt started on IV Zosyn and Vanco, in ED, Zosyn continued and Vanco changed to Daptomycin per ID recs. MRSA PCR negative so antibiotics deescalated to _______. Due to high risk of OM developing, right 4th plantar condylectomy performed on 5/6/21 by Dr. John. Pt also with c/o SOB before admission, follows with outpatient cardio s/p TAVR in 2015 for Aortic stenosis, HFpRF, and paroxysmal Afib. LAMBERT done 4/21 showed hyperdynamic LV function and EF 75%, LAMBERT scheduled outpatient 5/21 for further eval of valve function. Pt started on IV Lasix 40 BID, transitioned back to home dose Lasix 80mg QD on dc?? Xarelto continued post procedure. Blood and urine cultures done and showed no growth. Wound culture done and showed _______. Pt complained of back/flank pain, UA done and only showed trace LE, urine cx negative. For uncontrolled DM, endo consulted and managed with Insulin regimen reduced from home doses. Cardio was consulted for clearance. PT saw the patient and recommended ______. Patient was seen and examined on day of discharge and found to be hemodynamically stable and medically optimized for dc home.     ---  CONSULTANTS:   Podiatry- Dr. Talha VELAZQUEZ- Dr. Alexis  Cardio- Dr. Mayorga      ---  TIME SPENT:  The total amount of time spent reviewing the hospital notes, laboratory values, imaging findings, assessing/counseling the patient, discussing with consultant physicians, social work, nursing staff was -- minutes       FROM ADMISSION H+P:   HPI:  64yo woman with PMHx AS s/p TAVR 2015, HOCM, pAFib, JOSR (does not tolerate CPAP, on 2L O2 at home qhs), anxiety, morbid obesity, DM, HTN presents to ED with R foot ulcer. Patient follows with podiatry Dr. Palencia and was sent to ED today after her appointment to be evaluated for osteo. Patient does not know how long the wound has been present, as she has chronic peripheral neuropathy and does not feel pain. Patient states she did not go to wound care for the past few weeks due to her worsening SOB, RASMUSSEN. Patient states she felt better yesterday so she scheduled her appointment for wound care today. Patient has extensive cardiac history, states she is following with Dr. Espinal for a possibility of repeat TAVR as she is still significantly SOB, RASMUSSEN, with increased leg edema for the past few months. Patient is scheduled for a LAMBERT on 5/24 with Dr. Espinal. Patient also currently complaining of bilateral flank pain, onset approx 1 week ago. Denies any dysuria, hematuria, but states her urine is "frothy." Denies any other complaints, denies abd pain, N/V/D, fevers, chills, CP, palpitations.     In ED:  Vitals: T 98, HR 76, /70, RR 15, SpO2 98%  Labs significant for: ESR 50, WBC 11.55, auto neutrophil 8.86, PT 17.0, INR 1.48, PTT 38.1, glucose 198, protein 9.1, alk phos 152  CXR: no active chest disease, looks unchanged from 8/2020  EKG: NSR rate 79 with PVCs, LVH  Given: 2g Vanco IV x1, 3.375g Zosyn IV x1, Xarelto 20mg PO x1   (04 May 2021 21:48)    ---  HOSPITAL COURSE: Patient admitted for R foot wound, sent in from wound care center for r/o OM. MRI foot showed no evidence of OM. Vascular studies done and showed Extensive calcified atheromatous plaque.   Two-vessel runoff in the right calf with presumed occlusion of the peroneal artery. 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 Podiatry and ID consulted. Pt started on IV Zosyn and Vanco, in ED, Zosyn continued and Vanco changed to Daptomycin per ID recs. MRSA PCR negative so dapto was subsequently discontinued. Due to high risk of OM developing, right 4th plantar condylectomy performed on 5/6/21 by Dr. John. Pt also with c/o SOB before admission, follows with outpatient cardio s/p TAVR in 2015 for Aortic stenosis, HFpRF, and paroxysmal Afib. LAMBERT done 4/21 showed hyperdynamic LV function and EF 75%, LAMBERT scheduled outpatient 5/21 for further eval of valve function. Pt started on IV Lasix 40 BID, transitioned back to home dose Lasix 80mg QD on dc?? Xarelto continued post procedure. Blood and urine cultures done and showed no growth. Wound culture done and showed _______. Pt complained of back/flank pain, UA done and only showed trace LE, urine cx negative. For uncontrolled DM, endo consulted and managed with Insulin regimen reduced from home doses. Cardio was consulted for clearance. PT saw the patient and recommended home with assist. Patient was seen and examined on day of discharge and found to be hemodynamically stable and medically optimized for dc home.     updated through 5/7    ---  CONSULTANTS:   Podiatry- Dr. Talha VELAZQUEZ- Dr. Alexis  Cardio- Dr. Mayorga      ---  TIME SPENT:  The total amount of time spent reviewing the hospital notes, laboratory values, imaging findings, assessing/counseling the patient, discussing with consultant physicians, social work, nursing staff was -- minutes       FROM ADMISSION H+P:   HPI:  66yo woman with PMHx AS s/p TAVR 2015, HOCM, pAFib, JOSR (does not tolerate CPAP, on 2L O2 at home qhs), anxiety, morbid obesity, DM, HTN presents to ED with R foot ulcer. Patient follows with podiatry Dr. Palencia and was sent to ED today after her appointment to be evaluated for osteo. Patient does not know how long the wound has been present, as she has chronic peripheral neuropathy and does not feel pain. Patient states she did not go to wound care for the past few weeks due to her worsening SOB, RASMUSSEN. Patient states she felt better yesterday so she scheduled her appointment for wound care today. Patient has extensive cardiac history, states she is following with Dr. Espinal for a possibility of repeat TAVR as she is still significantly SOB, RASMUSSEN, with increased leg edema for the past few months. Patient is scheduled for a LAMBERT on 5/24 with Dr. Espinal. Patient also currently complaining of bilateral flank pain, onset approx 1 week ago. Denies any dysuria, hematuria, but states her urine is "frothy." Denies any other complaints, denies abd pain, N/V/D, fevers, chills, CP, palpitations.     In ED:  Vitals: T 98, HR 76, /70, RR 15, SpO2 98%  Labs significant for: ESR 50, WBC 11.55, auto neutrophil 8.86, PT 17.0, INR 1.48, PTT 38.1, glucose 198, protein 9.1, alk phos 152  CXR: no active chest disease, looks unchanged from 8/2020  EKG: NSR rate 79 with PVCs, LVH  Given: 2g Vanco IV x1, 3.375g Zosyn IV x1, Xarelto 20mg PO x1   (04 May 2021 21:48)    ---  HOSPITAL COURSE: Patient admitted for R foot wound, sent in from wound care center for r/o OM. MRI foot showed no evidence of OM. Vascular studies done and showed Extensive calcified atheromatous plaque.   Two-vessel runoff in the right calf with presumed occlusion of the peroneal artery. 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 Podiatry and ID consulted. Pt started on IV Zosyn and Vanco, in ED, Zosyn continued and Vanco changed to Daptomycin per ID recs. MRSA PCR negative so dapto was subsequently discontinued. Due to high risk of OM developing, right 4th plantar condylectomy performed on 5/6/21 by Dr. John. Pt also with c/o SOB before admission, follows with outpatient cardio s/p TAVR in 2015 for Aortic stenosis, HFpRF, and paroxysmal Afib. LAMBERT done 4/21 showed hyperdynamic LV function and EF 75%, LAMBERT scheduled outpatient 5/21 for further eval of valve function. Pt started on IV Lasix 40 BID, transitioned back to home dose Lasix 80mg QD on dc?? Xarelto continued post procedure. Blood and urine cultures done and showed no growth. Wound culture and surgical path done and showed _______. Pt complained of back/flank pain, UA done and only showed trace LE, urine cx negative. For uncontrolled DM, endo consulted and managed with Insulin regimen reduced from home doses. Cardio was consulted for clearance. PT saw the patient and recommended home with assist. Patient was seen and examined on day of discharge and found to be hemodynamically stable and medically optimized for dc home.     updated through 5/8    ---  CONSULTANTS:   Podiatry- Dr. Talha VELAZQUEZ- Dr. Alexis  Cardio- Dr. Mukesh Garcia- Dr. Perlman      ---  TIME SPENT:  The total amount of time spent reviewing the hospital notes, laboratory values, imaging findings, assessing/counseling the patient, discussing with consultant physicians, social work, nursing staff was -- minutes       FROM ADMISSION H+P:   HPI:  64yo woman with PMHx AS s/p TAVR 2015, HOCM, pAFib, JOSR (does not tolerate CPAP, on 2L O2 at home qhs), anxiety, morbid obesity, DM, HTN presents to ED with R foot ulcer. Patient follows with podiatry Dr. Palencia and was sent to ED today after her appointment to be evaluated for osteo. Patient does not know how long the wound has been present, as she has chronic peripheral neuropathy and does not feel pain. Patient states she did not go to wound care for the past few weeks due to her worsening SOB, RASMUSSEN. Patient states she felt better yesterday so she scheduled her appointment for wound care today. Patient has extensive cardiac history, states she is following with Dr. Espinal for a possibility of repeat TAVR as she is still significantly SOB, RASMUSSEN, with increased leg edema for the past few months. Patient is scheduled for a LAMBERT on 5/24 with Dr. Espinal. Patient also currently complaining of bilateral flank pain, onset approx 1 week ago. Denies any dysuria, hematuria, but states her urine is "frothy." Denies any other complaints, denies abd pain, N/V/D, fevers, chills, CP, palpitations.     In ED:  Vitals: T 98, HR 76, /70, RR 15, SpO2 98%  Labs significant for: ESR 50, WBC 11.55, auto neutrophil 8.86, PT 17.0, INR 1.48, PTT 38.1, glucose 198, protein 9.1, alk phos 152  CXR: no active chest disease, looks unchanged from 8/2020  EKG: NSR rate 79 with PVCs, LVH  Given: 2g Vanco IV x1, 3.375g Zosyn IV x1, Xarelto 20mg PO x1   (04 May 2021 21:48)    ---  HOSPITAL COURSE: Patient admitted for R foot wound, sent in from wound care center for r/o OM. MRI foot showed no evidence of OM. Vascular studies done and showed Extensive calcified atheromatous plaque.   Two-vessel runoff in the right calf with presumed occlusion of the peroneal artery. 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 Podiatry and ID consulted. Pt started on IV Zosyn and Vanco, in ED, Zosyn continued and Vanco changed to Daptomycin per ID recs. MRSA PCR negative so daptomycin was subsequently discontinued. Due to high risk of OM developing, right 4th plantar condylectomy performed on 5/6/21 by Dr. John without any complications. Pt also with c/o SOB before admission, follows with outpatient cardio s/p TAVR in 2015 for Aortic stenosis, HFpRF, and paroxysmal Afib. LAMBERT done 4/21 showed hyperdynamic LV function and EF 75%, LAMBERT scheduled outpatient 5/21 for further eval of valve function. Pt started on IV Lasix 40 BID, transitioned back to home dose Lasix 80mg QD on dc?? Pt also treated with Metolazone prn by cardiology for volume overload. Xarelto continued post procedure without any evidence of blood loss. Blood and urine cultures done and showed no growth. Wound culture and surgical path done and showed _______. Pt complained of back/flank pain, UA done and only showed trace LE, urine cx negative. For uncontrolled DM, endo consulted and managed with Insulin regimen reduced from home doses. Cardio was consulted for clearance. PT saw the patient and recommended home with assist. Patient was seen and examined on day of discharge and found to be hemodynamically stable and medically optimized for dc home.     updated through 5/9    ---  CONSULTANTS:   Podiatry- Dr. Talha VELAZQUEZ- Dr. Alexis  Cardio- Dr. Mukesh Garcia- Dr. Perlman    Vitals ----    PHYSICAL EXAM:  GENERAL: NAD, sitting comfortably in chair next to bed  HEENT:  anicteric, moist mucous membranes  CHEST/LUNG:  CTA b/l, decreased breath sounds throughout, no rales, wheezes, or rhonchi  HEART:  RRR, S1, S2, +holosystolic murmur  ABDOMEN:  BS+, soft, obese abdomen, nontender, nondistended  EXTREMITIES: no edema, cyanosis, or calf tenderness, R foot dressed and in surgical shoe  NERVOUS SYSTEM: answers questions and follows commands appropriately      ---  TIME SPENT:  The total amount of time spent reviewing the hospital notes, laboratory values, imaging findings, assessing/counseling the patient, discussing with consultant physicians, social work, nursing staff was -- minutes       FROM ADMISSION H+P:   HPI:  66yo woman with PMHx AS s/p TAVR 2015, HOCM, pAFib, JOSR (does not tolerate CPAP, on 2L O2 at home qhs), anxiety, morbid obesity, DM, HTN presents to ED with R foot ulcer. Patient follows with podiatry Dr. Palencia and was sent to ED today after her appointment to be evaluated for osteo. Patient does not know how long the wound has been present, as she has chronic peripheral neuropathy and does not feel pain. Patient states she did not go to wound care for the past few weeks due to her worsening SOB, RASMUSSEN. Patient states she felt better yesterday so she scheduled her appointment for wound care today. Patient has extensive cardiac history, states she is following with Dr. Espinal for a possibility of repeat TAVR as she is still significantly SOB, RASMUSSEN, with increased leg edema for the past few months. Patient is scheduled for a LAMBERT on 5/24 with Dr. Espinal. Patient also currently complaining of bilateral flank pain, onset approx 1 week ago. Denies any dysuria, hematuria, but states her urine is "frothy." Denies any other complaints, denies abd pain, N/V/D, fevers, chills, CP, palpitations.     In ED:  Vitals: T 98, HR 76, /70, RR 15, SpO2 98%  Labs significant for: ESR 50, WBC 11.55, auto neutrophil 8.86, PT 17.0, INR 1.48, PTT 38.1, glucose 198, protein 9.1, alk phos 152  CXR: no active chest disease, looks unchanged from 8/2020  EKG: NSR rate 79 with PVCs, LVH  Given: 2g Vanco IV x1, 3.375g Zosyn IV x1, Xarelto 20mg PO x1   (04 May 2021 21:48)    ---  HOSPITAL COURSE: Patient admitted for R foot wound, sent in from wound care center for r/o OM. MRI foot showed no evidence of OM. Vascular studies done and showed Extensive calcified atheromatous plaque.   Two-vessel runoff in the right calf with presumed occlusion of the peroneal artery. 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 Podiatry and ID consulted. Pt started on IV Zosyn and Vanco, in ED, Zosyn continued and Vanco changed to Daptomycin per ID recs. MRSA PCR negative so daptomycin was subsequently discontinued. Due to high risk of OM developing, right 4th plantar condylectomy performed on 5/6/21 by Dr. John without any complications. Pt also with c/o SOB before admission, follows with outpatient cardio s/p TAVR in 2015 for Aortic stenosis, HFpRF, and paroxysmal Afib. LAMBERT done 4/21 showed hyperdynamic LV function and EF 75%, LAMBERT scheduled outpatient 5/21 for further eval of valve function. Pt started on IV Lasix 40 BID, transitioned back to home dose Lasix 80mg QD on dc. Pt also treated with Metolazone prn by cardiology for volume overload. Xarelto continued post procedure without any evidence of blood loss. Blood and urine cultures done and showed no growth. Wound culture and surgical path done and showed no evidence of osteomyelitis. Pt complained of back/flank pain, UA done and only showed trace LE, urine cx negative. For uncontrolled DM, endo consulted and managed with Insulin regimen reduced from home doses. Cardio was consulted for clearance. PT saw the patient and recommended home with assist. Patient was seen and examined on day of discharge and found to be hemodynamically stable and medically optimized for dc home.         ---  CONSULTANTS:   Podiatry- Dr. Talha VELAZQUEZ- Dr. Alexis  Cardio- Dr. Mayorga  Endo- Dr. Perlman    Vitals   Vital Signs Last 24 Hrs  T(C): 36.6 (11 May 2021 05:30), Max: 37.3 (10 May 2021 21:06)  T(F): 97.9 (11 May 2021 05:30), Max: 99.2 (10 May 2021 21:06)  HR: 78 (11 May 2021 05:30) (65 - 91)  BP: 130/72 (11 May 2021 05:30) (104/66 - 130/72)  BP(mean): --  RR: 20 (11 May 2021 05:30) (17 - 20)  SpO2: 94% (11 May 2021 05:30) (92% - 94%)      PHYSICAL EXAM:  GENERAL: NAD, sitting comfortably in chair next to bed  HEENT:  anicteric, moist mucous membranes  CHEST/LUNG:  CTA b/l, decreased breath sounds throughout, no rales, wheezes, or rhonchi  HEART:  RRR, S1, S2, +holosystolic murmur  ABDOMEN:  BS+, soft, obese abdomen, nontender, nondistended  EXTREMITIES: no edema, cyanosis, or calf tenderness, R foot dressed and in surgical shoe  NERVOUS SYSTEM: answers questions and follows commands appropriately      ---  TIME SPENT:  The total amount of time spent reviewing the hospital notes, laboratory values, imaging findings, assessing/counseling the patient, discussing with consultant physicians, social work, nursing staff was -- minutes       FROM ADMISSION H+P:   HPI:  66yo woman with PMHx AS s/p TAVR 2015, HOCM, pAFib, JOSR (does not tolerate CPAP, on 2L O2 at home qhs), anxiety, morbid obesity, DM, HTN presents to ED with R foot ulcer. Patient follows with podiatry Dr. Palencia and was sent to ED today after her appointment to be evaluated for osteo. Patient does not know how long the wound has been present, as she has chronic peripheral neuropathy and does not feel pain. Patient states she did not go to wound care for the past few weeks due to her worsening SOB, RASMUSSEN. Patient states she felt better yesterday so she scheduled her appointment for wound care today. Patient has extensive cardiac history, states she is following with Dr. Espinal for a possibility of repeat TAVR as she is still significantly SOB, RASMUSSEN, with increased leg edema for the past few months. Patient is scheduled for a LAMBERT on 5/24 with Dr. Espinal. Patient also currently complaining of bilateral flank pain, onset approx 1 week ago. Denies any dysuria, hematuria, but states her urine is "frothy." Denies any other complaints, denies abd pain, N/V/D, fevers, chills, CP, palpitations.     In ED:  Vitals: T 98, HR 76, /70, RR 15, SpO2 98%  Labs significant for: ESR 50, WBC 11.55, auto neutrophil 8.86, PT 17.0, INR 1.48, PTT 38.1, glucose 198, protein 9.1, alk phos 152  CXR: no active chest disease, looks unchanged from 8/2020  EKG: NSR rate 79 with PVCs, LVH  Given: 2g Vanco IV x1, 3.375g Zosyn IV x1, Xarelto 20mg PO x1   (04 May 2021 21:48)    ---  HOSPITAL COURSE: Patient admitted for R foot wound, sent in from wound care center for r/o OM. MRI foot showed no evidence of OM. Vascular studies done and showed Extensive calcified atheromatous plaque.   Two-vessel runoff in the right calf with presumed occlusion of the peroneal artery. 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 Podiatry and ID consulted. Pt started on IV Zosyn and Vanco, in ED, Zosyn continued and Vanco changed to Daptomycin per ID recs. Daptomycin was discontinued and Zosyn was transitioned to PO Augmentin before discharge. MRSA PCR negative so daptomycin was subsequently discontinued. Due to high risk of OM developing, right 4th plantar condylectomy performed on 5/6/21 by Dr. John without any complications. Pt also with c/o SOB before admission, follows with outpatient cardio s/p TAVR in 2015 for Aortic stenosis, HFpRF, and paroxysmal Afib. LAMBERT done 4/21 showed hyperdynamic LV function and EF 75%, LAMBERT scheduled outpatient 5/21 for further eval of valve function. Pt started on IV Lasix 40 BID, transitioned back to home dose Lasix 80mg QD on dc. Pt also treated with Metolazone prn by cardiology for volume overload. Xarelto continued post procedure without any evidence of blood loss. Blood and urine cultures done and showed no growth. Wound culture and surgical path done and showed no evidence of osteomyelitis. Pt complained of back/flank pain, UA done and only showed trace LE, urine cx negative. For uncontrolled DM, endo consulted and managed with Insulin regimen reduced from home doses. Cardio was consulted for clearance. PT saw the patient and recommended home with assist. Patient was seen and examined on day of discharge and found to be hemodynamically stable and medically optimized for dc home.         ---  CONSULTANTS:   Podiatry- Dr. Talha VELAZQUEZ- Dr. Alexis  Cardio- Dr. Mayorga  Endo- Dr. Perlman    Vitals   Vital Signs Last 24 Hrs  T(C): 36.6 (11 May 2021 05:30), Max: 37.3 (10 May 2021 21:06)  T(F): 97.9 (11 May 2021 05:30), Max: 99.2 (10 May 2021 21:06)  HR: 78 (11 May 2021 05:30) (65 - 91)  BP: 130/72 (11 May 2021 05:30) (104/66 - 130/72)  BP(mean): --  RR: 20 (11 May 2021 05:30) (17 - 20)  SpO2: 94% (11 May 2021 05:30) (92% - 94%)      PHYSICAL EXAM:  GENERAL: NAD, sitting comfortably in chair next to bed  HEENT:  anicteric, moist mucous membranes  CHEST/LUNG:  CTA b/l, decreased breath sounds throughout, no rales, wheezes, or rhonchi  HEART:  RRR, S1, S2, +holosystolic murmur  ABDOMEN:  BS+, soft, obese abdomen, nontender, nondistended  EXTREMITIES: no edema, cyanosis, or calf tenderness, R foot dressed and in surgical shoe  NERVOUS SYSTEM: answers questions and follows commands appropriately      ---  TIME SPENT:  The total amount of time spent reviewing the hospital notes, laboratory values, imaging findings, assessing/counseling the patient, discussing with consultant physicians, social work, nursing staff was -- minutes       FROM ADMISSION H+P:   HPI:  66yo woman with PMHx AS s/p TAVR 2015, HOCM, pAFib, JOSR (does not tolerate CPAP, on 2L O2 at home qhs), anxiety, morbid obesity, DM, HTN presents to ED with R foot ulcer. Patient follows with podiatry Dr. Palencia and was sent to ED today after her appointment to be evaluated for osteo. Patient does not know how long the wound has been present, as she has chronic peripheral neuropathy and does not feel pain. Patient states she did not go to wound care for the past few weeks due to her worsening SOB, RASMUSSEN. Patient states she felt better yesterday so she scheduled her appointment for wound care today. Patient has extensive cardiac history, states she is following with Dr. Espinal for a possibility of repeat TAVR as she is still significantly SOB, RASMUSSEN, with increased leg edema for the past few months. Patient is scheduled for a LAMBERT on 5/24 with Dr. Espinal. Patient also currently complaining of bilateral flank pain, onset approx 1 week ago. Denies any dysuria, hematuria, but states her urine is "frothy." Denies any other complaints, denies abd pain, N/V/D, fevers, chills, CP, palpitations.     In ED:  Vitals: T 98, HR 76, /70, RR 15, SpO2 98%  Labs significant for: ESR 50, WBC 11.55, auto neutrophil 8.86, PT 17.0, INR 1.48, PTT 38.1, glucose 198, protein 9.1, alk phos 152  CXR: no active chest disease, looks unchanged from 8/2020  EKG: NSR rate 79 with PVCs, LVH  Given: 2g Vanco IV x1, 3.375g Zosyn IV x1, Xarelto 20mg PO x1   (04 May 2021 21:48)    ---  HOSPITAL COURSE: Patient admitted for R foot wound, sent in from wound care center for r/o OM. MRI foot showed no evidence of OM. Vascular studies done and showed Extensive calcified atheromatous plaque.   Two-vessel runoff in the right calf with presumed occlusion of the peroneal artery. 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 Podiatry and ID consulted. Pt started on IV Zosyn and Vanco, in ED, Zosyn continued and Vanco changed to Daptomycin per ID recs. Daptomycin was discontinued and Zosyn was transitioned to PO Augmentin before discharge for RLE cellulitis. MRSA PCR negative so daptomycin was subsequently discontinued. Due to high risk of OM developing, right 4th plantar condylectomy performed on 5/6/21 by Dr. John without any complications. Pt also with c/o SOB before admission, follows with outpatient cardio s/p TAVR in 2015 for Aortic stenosis, HFpRF, and paroxysmal Afib. LAMBERT done 4/21 showed hyperdynamic LV function and EF 75%, LAMBERT scheduled outpatient 5/21 for further eval of valve function. Pt started on IV Lasix 40 BID, transitioned back to home dose Lasix 80mg QD on dc. Pt also treated with Metolazone prn by cardiology for volume overload. Xarelto continued post procedure without any evidence of blood loss. Blood and urine cultures done and showed no growth. Wound culture and surgical path done and showed no evidence of osteomyelitis. Pt complained of back/flank pain, UA done and only showed trace LE, urine cx negative. For uncontrolled DM, endo consulted and managed with Insulin regimen reduced from home doses. Cardio was consulted for clearance. PT saw the patient and recommended home with assist. Patient was seen and examined on day of discharge and found to be hemodynamically stable and medically optimized for dc home.         ---  CONSULTANTS:   Podiatry- Dr. Talha VELAZQUEZ- Dr. Alexis  Cardio- Dr. Mayorga  Endo- Dr. Perlman    Vitals   Vital Signs Last 24 Hrs  T(C): 36.6 (11 May 2021 05:30), Max: 37.3 (10 May 2021 21:06)  T(F): 97.9 (11 May 2021 05:30), Max: 99.2 (10 May 2021 21:06)  HR: 78 (11 May 2021 05:30) (65 - 91)  BP: 130/72 (11 May 2021 05:30) (104/66 - 130/72)  BP(mean): --  RR: 20 (11 May 2021 05:30) (17 - 20)  SpO2: 94% (11 May 2021 05:30) (92% - 94%)      PHYSICAL EXAM:  GENERAL: NAD, sitting comfortably in chair next to bed  HEENT:  anicteric, moist mucous membranes  CHEST/LUNG:  CTA b/l, decreased breath sounds throughout, no rales, wheezes, or rhonchi  HEART:  RRR, S1, S2, +holosystolic murmur  ABDOMEN:  BS+, soft, obese abdomen, nontender, nondistended  EXTREMITIES: no edema, cyanosis, or calf tenderness, R foot dressed and in surgical shoe  NERVOUS SYSTEM: answers questions and follows commands appropriately      ---  TIME SPENT:  The total amount of time spent reviewing the hospital notes, laboratory values, imaging findings, assessing/counseling the patient, discussing with consultant physicians, social work, nursing staff was -- minutes       FROM ADMISSION H+P:   HPI:  64yo woman with PMHx AS s/p TAVR 2015, HOCM, pAFib, JOSR (does not tolerate CPAP, on 2L O2 at home qhs), anxiety, morbid obesity, DM, HTN presents to ED with R foot ulcer. Patient follows with podiatry Dr. Palencia and was sent to ED today after her appointment to be evaluated for osteo. Patient does not know how long the wound has been present, as she has chronic peripheral neuropathy and does not feel pain. Patient states she did not go to wound care for the past few weeks due to her worsening SOB, RASMUSSEN. Patient states she felt better yesterday so she scheduled her appointment for wound care today. Patient has extensive cardiac history, states she is following with Dr. Espinal for a possibility of repeat TAVR as she is still significantly SOB, RASMUSSEN, with increased leg edema for the past few months. Patient is scheduled for a LAMBERT on 5/24 with Dr. Espinal. Patient also currently complaining of bilateral flank pain, onset approx 1 week ago. Denies any dysuria, hematuria, but states her urine is "frothy." Denies any other complaints, denies abd pain, N/V/D, fevers, chills, CP, palpitations.     In ED:  Vitals: T 98, HR 76, /70, RR 15, SpO2 98%  Labs significant for: ESR 50, WBC 11.55, auto neutrophil 8.86, PT 17.0, INR 1.48, PTT 38.1, glucose 198, protein 9.1, alk phos 152  CXR: no active chest disease, looks unchanged from 8/2020  EKG: NSR rate 79 with PVCs, LVH  Given: 2g Vanco IV x1, 3.375g Zosyn IV x1, Xarelto 20mg PO x1   (04 May 2021 21:48)    ---  HOSPITAL COURSE: Patient admitted for R foot wound, sent in from wound care center for r/o OM. MRI foot showed no evidence of OM. Vascular studies done and showed Extensive calcified atheromatous plaque.   Two-vessel runoff in the right calf with presumed occlusion of the peroneal artery. 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 Podiatry and ID consulted. Pt started on IV Zosyn and Vanco, in ED, Zosyn continued and Vanco changed to Daptomycin per ID recs. Daptomycin was discontinued and Zosyn was transitioned to PO Augmentin before discharge for RLE cellulitis. MRSA PCR negative so daptomycin was subsequently discontinued. Due to high risk of OM developing, right 4th plantar condylectomy performed on 5/6/21 by Dr. John without any complications. Pt also with c/o SOB before admission, follows with outpatient cardio s/p TAVR in 2015 for Aortic stenosis, HFpRF, and paroxysmal Afib. TTE done 4/21 showed hyperdynamic LV function and EF 75%, LAMBERT scheduled outpatient 5/21 for further eval of valve function. Pt started on IV Lasix 40 BID, transitioned back to home dose Lasix 80mg QD on dc. Pt also treated with Metolazone prn by cardiology for volume overload. Xarelto continued post procedure without any evidence of blood loss. Blood and urine cultures done and showed no growth. Wound culture and surgical path done and showed no evidence of osteomyelitis. Pt complained of back/flank pain, UA done and only showed trace LE, urine cx negative. For uncontrolled DM, endo consulted and managed with Insulin regimen reduced from home doses. Cardio was consulted for clearance. PT saw the patient and recommended home with assist. Patient was seen and examined on day of discharge and found to be hemodynamically stable and medically optimized for dc home.         ---  CONSULTANTS:   Podiatry- Dr. Talha VELAZQUEZ- Dr. Alexis  Cardio- Dr. Mayorga  Endo- Dr. Perlman    Vitals   Vital Signs Last 24 Hrs  T(C): 36.6 (11 May 2021 05:30), Max: 37.3 (10 May 2021 21:06)  T(F): 97.9 (11 May 2021 05:30), Max: 99.2 (10 May 2021 21:06)  HR: 78 (11 May 2021 05:30) (65 - 91)  BP: 130/72 (11 May 2021 05:30) (104/66 - 130/72)  BP(mean): --  RR: 20 (11 May 2021 05:30) (17 - 20)  SpO2: 94% (11 May 2021 05:30) (92% - 94%)      PHYSICAL EXAM:  GENERAL: NAD, sitting comfortably in chair next to bed  HEENT:  anicteric, moist mucous membranes  CHEST/LUNG:  CTA b/l, decreased breath sounds throughout, no rales, wheezes, or rhonchi  HEART:  RRR, S1, S2, +holosystolic murmur  ABDOMEN:  BS+, soft, obese abdomen, nontender, nondistended  EXTREMITIES: no edema, cyanosis, or calf tenderness, R foot dressed and in surgical shoe  NERVOUS SYSTEM: answers questions and follows commands appropriately      ---  TIME SPENT:  The total amount of time spent reviewing the hospital notes, laboratory values, imaging findings, assessing/counseling the patient, discussing with consultant physicians, social work, nursing staff was 38 minutes

## 2021-05-05 NOTE — CONSULT NOTE ADULT - SUBJECTIVE AND OBJECTIVE BOX
[facility] - Oni Campbell, Merlin Mayorga, Melissa, Blayne, Amena Caldera  Office Number: [phone]    Initial Consult Note  CHIEF COMPLAINT: Patient is a 65y old  Female who presents with a chief complaint of r/o osteomyelitis (04 May 2021 21:48)    HPI: 64yo woman with PMH AS s/p TAVR 2015, HOCM, pAFib, JOSR (does not tolerate CPAP, on 2L O2 at home qhs), anxiety, morbid obesity, DM, HTN presents to ED with R foot ulcer. Patient follows with podiatry Dr. Palencia and was sent to ED after her appointment to be evaluated for osteomyelitis. Patient does not know how long the wound has been present, as she has chronic peripheral neuropathy and does not feel pain. Patient states she did not go to wound care for the past few weeks due to her worsening SOB, RASMUSSEN. Patient states she felt better yesterday so she scheduled her appointment for wound care today. Patient has extensive cardiac history, states she is following with Dr. Espinal for a possibility of repeat TAVR as she is still significantly SOB, RASMUSSEN, with increased leg edema for the past few months. Patient is scheduled for a LAMBERT on 5/24 with Dr. Espinal. Podiatry seen for right foot sub 4th met head ulcer with erythema right lower leg. Plan for possible OR. Cardiology consulted for cardiac optimization pre/post procedure in the case of OR. Plan for right foot 4th met head resection pending vascular studies, MRI. Patient currently on antibiotics. EKG: NSR rate 79 with PVCs, LVH. Patient seen and examined. Patient awake, alert, resting in bed. No complaints of chest pain, dyspnea, palpitations or dizziness. Tolerating O2 via nasal cannula.       Allergies  codeine (Vomiting)  shellfish (Stomach Upset)    Intolerances  fish (Stomach Upset)    PAST MEDICAL & SURGICAL HISTORY:  Hypertension    Gastroparesis due to DM    Depression    Herniated disc    Spinal stenosis  chronic back pain    Cholesterol serum elevated    MVP (mitral valve prolapse)    Hypothyroid  not on medication    Obesity    IBS (irritable bowel syndrome)    GERD (gastroesophageal reflux disease)    Diabetes type 2, uncontrolled    History of diabetic retinopathy    S/P bariatric surgery  lap band surgery two times due to erosion, now no more    S/P tonsillectomy and adenoidectomy    Cataract  B/L cataracts    S/P laminectomy  Pt denied this?    S/P D&amp;C (status post dilation and curettage)  D&amp;C when she was 18    Cervix abnormality  Ablation of cervix    Breast anomaly  Biopsy of both breast benign lesions    right eye torn retina    S/P TAVR (transcatheter aortic valve replacement)    MEDICATIONS  (STANDING):  aspirin enteric coated 81 milliGRAM(s) Oral daily  atorvastatin 80 milliGRAM(s) Oral at bedtime  dextrose 40% Gel 15 Gram(s) Oral once  dextrose 5%. 1000 milliLiter(s) (50 mL/Hr) IV Continuous <Continuous>  dextrose 5%. 1000 milliLiter(s) (100 mL/Hr) IV Continuous <Continuous>  dextrose 50% Injectable 25 Gram(s) IV Push once  dextrose 50% Injectable 12.5 Gram(s) IV Push once  dextrose 50% Injectable 25 Gram(s) IV Push once  FLUoxetine 40 milliGRAM(s) Oral daily  furosemide   Injectable 40 milliGRAM(s) IV Push two times a day  glucagon  Injectable 1 milliGRAM(s) IntraMuscular once  insulin glargine Injectable (LANTUS) 30 Unit(s) SubCutaneous at bedtime  insulin lispro (ADMELOG) corrective regimen sliding scale   SubCutaneous three times a day before meals  insulin lispro (ADMELOG) corrective regimen sliding scale   SubCutaneous at bedtime  insulin lispro Injectable (ADMELOG) 15 Unit(s) SubCutaneous three times a day before meals  losartan 25 milliGRAM(s) Oral two times a day  metoprolol tartrate 50 milliGRAM(s) Oral two times a day  montelukast 10 milliGRAM(s) Oral daily  pantoprazole    Tablet 40 milliGRAM(s) Oral before breakfast  piperacillin/tazobactam IVPB.. 3.375 Gram(s) IV Intermittent every 8 hours  rivaroxaban 20 milliGRAM(s) Oral with dinner  vancomycin  IVPB 2000 milliGRAM(s) IV Intermittent every 12 hours    MEDICATIONS  (PRN):  ALBUTerol    90 MICROgram(s) HFA Inhaler 2 Puff(s) Inhalation every 6 hours PRN Shortness of Breath and/or Wheezing    FAMILY HISTORY:  Family history of aortic stenosis  Family hx of structural heart dx  FHx: diabetes mellitus  FH: CAD (coronary artery disease)  Family history of anxiety disorder    SOCIAL HISTORY  Marital Status:   Occupation:   Lives with:     SUBSTANCE USE  Tobacco Usage:  ( ) None ( ) never smoked   ( ) former smoker  ( ) current smoker; Packs per day:   Alcohol Usage: ( ) none  ( ) occasional ( ) 2-3 times a week ( ) daily; Last drink:   Recreational drugs ( ) None    REVIEW OF SYSTEMS   CONSTITUTIONAL: No fevers, No chills, No fatigue, No weight gain  EYES: No vision changes, No vertigo, No throat pain   ENT: No congestion, No ear pain, No sore throat.  NECK: No pain, No stiffness  RESPIRATORY: No shortness of breath, No cough, No wheezing, No hemoptysis  CARDIOVASCULAR: No chest pain. No palpitations, No RASMUSSEN, No orthopnea, No PND, No pleuritic pain  GASTROINTESTINAL: No abdominal pain, No nausea, No vomiting, No hematemesis, No diarrhea No constipation. No melena  GENITOURINARY: No dysuria, No frequency, No incontinence, No hematuria  NEUROLOGICAL: No dizziness, No lightheadedness, No syncope, No LOC, No headache, No numbness, No weakness  MUSCULOSKELETAL: No joint pain, No joint swelling.  PSYCHIATRIC: No anxiety, No depression  DERMATOLOGY: No diaphoresis. No itching, No rashes, No pressure ulcers  HEME/LYMPH: No easy bruising, or bleeding gums  All other review of systems is negative unless indicated above.    VITAL SIGNS:   Vital Signs Last 24 Hrs  T(C): 36.7 (05 May 2021 07:00), Max: 37 (05 May 2021 00:22)  T(F): 98 (05 May 2021 07:00), Max: 98.6 (05 May 2021 00:22)  HR: 74 (05 May 2021 07:00) (74 - 80)  BP: 125/61 (05 May 2021 07:00) (125/61 - 153/70)  BP(mean): --  RR: 18 (05 May 2021 07:00) (15 - 18)  SpO2: 98% (05 May 2021 07:00) (98% - 98%)    Physical Exam:    Appearance: NAD, no distress, alert,   HEENT: Moist Mucous Membranes, Anicteric  Cardiovascular: Regular rate and rhythm, Normal S1 S2, No JVD, No murmurs, No rubs, gallops or clicks  Respiratory: Lungs clear to auscultation. No rales, No rhonchi, No wheezing. No tenderness to palpation  Gastrointestinal:  Soft, Non-tender, + BS  Neurologic: Non-focal  Skin: Warm and dry, No rashes, No ecchymosis, No cyanosis, No ulcers   Musculoskeletal: No clubbing, No cyanosis  Vascular: Peripheral pulses palpable 2+ bilaterally  Psychiatry: Mood & affect appropriate  Lymph: No peripheral edema.     I&O's Summary    LABS: All Labs Reviewed:                        11.8   13.05 )-----------( 277      ( 05 May 2021 05:07 )             36.8                         12.9   11.55 )-----------( 289      ( 04 May 2021 20:03 )             40.9     05 May 2021 05:07    137    |  99     |  25     ----------------------------<  287    3.8     |  29     |  1.00   04 May 2021 20:03    138    |  100    |  18     ----------------------------<  198    3.6     |  30     |  0.90     Ca    9.3        05 May 2021 05:07  Ca    9.9        04 May 2021 20:03  Phos  3.3       05 May 2021 05:07  Mg     2.1       05 May 2021 05:07    TPro  7.4    /  Alb  3.3    /  TBili  0.5    /  DBili  x      /  AST  19     /  ALT  22     /  AlkPhos  128    05 May 2021 05:07  TPro  9.1    /  Alb  4.0    /  TBili  0.5    /  DBili  x      /  AST  23     /  ALT  28     /  AlkPhos  152    04 May 2021 20:03  PT/INR - ( 04 May 2021 20:03 )   PT: 17.0 sec;   INR: 1.48 ratio    PTT - ( 04 May 2021 20:03 )  PTT:38.1 sec      Blood Culture: Organism --  Gram Stain Blood -- Gram Stain   Rare polymorphonuclear leukocytes per low power field  No organisms seen per oil power field  Specimen Source .Tissue Other, Right foot 4th Metatarsal  Culture-Blood -- [facility] - Oni Campbell, Merlin Mayorga, Melissa, Blayne, Amena Caldera  Office Number: [phone]    Initial Consult Note  CHIEF COMPLAINT: Patient is a 65y old  Female who presents with a chief complaint of r/o osteomyelitis (04 May 2021 21:48)    HPI: 64yo woman with PMH AS s/p TAVR 2015, HOCM, pAFib, JOSR (does not tolerate CPAP, on 2L O2 at home qhs), anxiety, morbid obesity, DM, HTN presents to ED with R foot ulcer. Patient follows with podiatry Dr. Palencia and was sent to ED after her appointment to be evaluated for osteomyelitis. Patient does not know how long the wound has been present, as she has chronic peripheral neuropathy and does not feel pain. Patient states she did not go to wound care for the past few weeks due to her worsening SOB, RASMUSSEN. Patient states she felt better yesterday so she scheduled her appointment for wound care today. Patient has extensive cardiac history, states she is following with Dr. Espinal for a possibility of repeat TAVR as she is still significantly SOB, RASMUSSEN, with increased leg edema for the past few months. Patient is scheduled for a LAMBERT on 5/24 with Dr. Espinal. Podiatry seen for right foot sub 4th met head ulcer with erythema right lower leg. Plan for possible OR. Cardiology consulted for cardiac optimization pre/post procedure in the case of OR. Plan for right foot 4th met head resection pending vascular studies, MRI. Patient currently on antibiotics. EKG: NSR rate 79 with PVCs, LVH. Patient seen and examined. Patient awake, alert, OOB to chair. No complaints of chest pain, dyspnea, palpitations or dizziness. Patient admits to RASMUSSEN on minimal exertion. Tolerating O2 via nasal cannula intermittent. Uses O2 at night.       Allergies  codeine (Vomiting)  shellfish (Stomach Upset)    Intolerances  fish (Stomach Upset)    PAST MEDICAL & SURGICAL HISTORY:  Hypertension    Gastroparesis due to DM    Depression    Herniated disc    Spinal stenosis  chronic back pain    Cholesterol serum elevated    MVP (mitral valve prolapse)    Hypothyroid  not on medication    Obesity    IBS (irritable bowel syndrome)    GERD (gastroesophageal reflux disease)    Diabetes type 2, uncontrolled    History of diabetic retinopathy    S/P bariatric surgery  lap band surgery two times due to erosion, now no more    S/P tonsillectomy and adenoidectomy    Cataract  B/L cataracts    S/P laminectomy  Pt denied this?    S/P D&amp;C (status post dilation and curettage)  D&amp;C when she was 18    Cervix abnormality  Ablation of cervix    Breast anomaly  Biopsy of both breast benign lesions    right eye torn retina    S/P TAVR (transcatheter aortic valve replacement)    MEDICATIONS  (STANDING):  aspirin enteric coated 81 milliGRAM(s) Oral daily  atorvastatin 80 milliGRAM(s) Oral at bedtime  dextrose 40% Gel 15 Gram(s) Oral once  dextrose 5%. 1000 milliLiter(s) (50 mL/Hr) IV Continuous <Continuous>  dextrose 5%. 1000 milliLiter(s) (100 mL/Hr) IV Continuous <Continuous>  dextrose 50% Injectable 25 Gram(s) IV Push once  dextrose 50% Injectable 12.5 Gram(s) IV Push once  dextrose 50% Injectable 25 Gram(s) IV Push once  FLUoxetine 40 milliGRAM(s) Oral daily  furosemide   Injectable 40 milliGRAM(s) IV Push two times a day  glucagon  Injectable 1 milliGRAM(s) IntraMuscular once  insulin glargine Injectable (LANTUS) 30 Unit(s) SubCutaneous at bedtime  insulin lispro (ADMELOG) corrective regimen sliding scale   SubCutaneous three times a day before meals  insulin lispro (ADMELOG) corrective regimen sliding scale   SubCutaneous at bedtime  insulin lispro Injectable (ADMELOG) 15 Unit(s) SubCutaneous three times a day before meals  losartan 25 milliGRAM(s) Oral two times a day  metoprolol tartrate 50 milliGRAM(s) Oral two times a day  montelukast 10 milliGRAM(s) Oral daily  pantoprazole    Tablet 40 milliGRAM(s) Oral before breakfast  piperacillin/tazobactam IVPB.. 3.375 Gram(s) IV Intermittent every 8 hours  rivaroxaban 20 milliGRAM(s) Oral with dinner  vancomycin  IVPB 2000 milliGRAM(s) IV Intermittent every 12 hours    MEDICATIONS  (PRN):  ALBUTerol    90 MICROgram(s) HFA Inhaler 2 Puff(s) Inhalation every 6 hours PRN Shortness of Breath and/or Wheezing    FAMILY HISTORY:  Family history of aortic stenosis  Family hx of structural heart dx  FHx: diabetes mellitus  FH: CAD (coronary artery disease)  Family history of anxiety disorder    SOCIAL HISTORY  Marital Status:   Occupation:   Lives with: alone     SUBSTANCE USE  Tobacco Usage:  (x ) None ( ) never smoked   ( ) former smoker  ( ) current smoker; Packs per day:   Alcohol Usage: (x ) none  ( ) occasional ( ) 2-3 times a week ( ) daily; Last drink:   Recreational drugs (x ) None    REVIEW OF SYSTEMS   CONSTITUTIONAL: No fevers, No chills, No fatigue, No weight gain  EYES: No vision changes, No vertigo, No throat pain   ENT: No congestion, No ear pain, No sore throat.  NECK: No pain, No stiffness  RESPIRATORY: No shortness of breath, No cough, No wheezing, No hemoptysis  CARDIOVASCULAR: No chest pain. No palpitations, + RASMUSSEN, No orthopnea, No PND, No pleuritic pain, + LE edema   GASTROINTESTINAL: No abdominal pain, No nausea, No vomiting, No hematemesis, No diarrhea No constipation. No melena  GENITOURINARY: No dysuria, No frequency, No incontinence, No hematuria  NEUROLOGICAL: No dizziness, No lightheadedness, No syncope, No LOC, No headache, No numbness, No weakness  MUSCULOSKELETAL: No joint pain, No joint swelling.  PSYCHIATRIC: No anxiety, No depression  DERMATOLOGY: No diaphoresis. No itching, No rashes, No pressure ulcers, + LE ulcers with LE redness and edema   HEME/LYMPH: No easy bruising, or bleeding gums  All other review of systems is negative unless indicated above.    VITAL SIGNS:   Vital Signs Last 24 Hrs  T(C): 36.7 (05 May 2021 07:00), Max: 37 (05 May 2021 00:22)  T(F): 98 (05 May 2021 07:00), Max: 98.6 (05 May 2021 00:22)  HR: 74 (05 May 2021 07:00) (74 - 80)  BP: 125/61 (05 May 2021 07:00) (125/61 - 153/70)  BP(mean): --  RR: 18 (05 May 2021 07:00) (15 - 18)  SpO2: 98% (05 May 2021 07:00) (98% - 98%)    Physical Exam:    Appearance: NAD, no distress, alert, Obese   HEENT: Moist Mucous Membranes, Anicteric  Cardiovascular: Regular rate and rhythm, Normal S1 S2, No JVD, No murmurs, No rubs, gallops or clicks  Respiratory: Lungs clear to auscultation. No rales, No rhonchi, No wheezing. No tenderness to palpation  Gastrointestinal:  Soft, Non-tender, + BS  Neurologic: Non-focal  Skin: Warm and dry, No rashes, No ecchymosis, No cyanosis, + BLE ulcers   Musculoskeletal: No clubbing, No cyanosis  Vascular: Peripheral pulses palpable 2+ bilaterally  Psychiatry: Mood & affect appropriate  Lymph: + 2 Bilateral LE Edema with erythema     I&O's Summary    LABS: All Labs Reviewed:                        11.8   13.05 )-----------( 277      ( 05 May 2021 05:07 )             36.8                         12.9   11.55 )-----------( 289      ( 04 May 2021 20:03 )             40.9     05 May 2021 05:07    137    |  99     |  25     ----------------------------<  287    3.8     |  29     |  1.00   04 May 2021 20:03    138    |  100    |  18     ----------------------------<  198    3.6     |  30     |  0.90     Ca    9.3        05 May 2021 05:07  Ca    9.9        04 May 2021 20:03  Phos  3.3       05 May 2021 05:07  Mg     2.1       05 May 2021 05:07    TPro  7.4    /  Alb  3.3    /  TBili  0.5    /  DBili  x      /  AST  19     /  ALT  22     /  AlkPhos  128    05 May 2021 05:07  TPro  9.1    /  Alb  4.0    /  TBili  0.5    /  DBili  x      /  AST  23     /  ALT  28     /  AlkPhos  152    04 May 2021 20:03  PT/INR - ( 04 May 2021 20:03 )   PT: 17.0 sec;   INR: 1.48 ratio    PTT - ( 04 May 2021 20:03 )  PTT:38.1 sec      Blood Culture: Organism --  Gram Stain Blood -- Gram Stain   Rare polymorphonuclear leukocytes per low power field  No organisms seen per oil power field  Specimen Source .Tissue Other, Right foot 4th Metatarsal  Culture-Blood --    < from: Transthoracic Echocardiogram (04.28.21 @ 08:59) >  Dimensions:    Normal Values:  LA:     4.2    2.0 - 4.0 cm  Ao:     2.7    2.0 - 3.8 cm  SEPTUM: 1.2    0.6 - 1.2 cm  PWT:    1.1    0.6 - 1.1 cm  LVIDd:  5.0    3.0 - 5.6 cm  LVIDs:  2.6    1.8 - 4.0 cm  Derived variables:  LVMI: 97 g/m2  RWT: 0.43  Fractional short: 49 %  EF (Visual Estimate): 75 %  Doppler Peak Velocity (m/sec): MV=1.8 AoV=4.2  ------------------------------------------------------------------------  Observations:  Mitral Valve: Dense posterior mitral annular calcification.  Mild-moderate mitral regurgitation. Peak mitral valve  gradient equals 12 mm Hg, mean transmitral valve gradient  equals 5 mm Hg, heart rate 66 bpm.  Aortic Valve/Aorta: Transcatheter aortic valve replacement.  Peak transaortic valve gradient equals 71 mm Hg, mean  transaortic valve gradient equals 41 mm Hg, which is  elevated even in the presence of a transcatheter aortic  valve replacement.  DI=0.38 Mild paravalvular aortic  regurgitation. Peak left ventricular outflow tract gradient  equals 9 mm Hg, mean gradient is equal to 6 mm Hg, LVOT  velocity time integral equals 37 cm.  Aortic Root: 2.7 cm.  LVOT diameter: 2 cm.  Left Atrium: Moderately dilated left atrium.  LA volume  index = 48 cc/m2.  Left Ventricle: Hyperdynamic left ventricular systolic  function. Mid cavitary gradients of 32 mmHg which increase  to 49 mmHg with valsalva.  Normal left ventricular internal  dimensions and wall thicknesses. Normal diastolic function  Right Heart: Normal right atrium. Normal right ventricular  size and function. Normal tricuspid valve. Mild tricuspid  regurgitation. Normal pulmonic valve.  Pericardium/Pleura: Normal pericardium with no pericardial  effusion.  Hemodynamic: Estimated right atrial pressure is 8 mm Hg.  Estimated right ventricular systolic pressure equals 49 mm  Hg, assuming right atrial pressure equals 8 mm Hg,  consistent with mild pulmonary hypertension.  ------------------------------------------------------------------------  Conclusions:  1. Transcatheter aortic valve replacement. Peak transaortic  valve gradient equals 71 mm Hg, mean transaortic valve  gradient equals 41 mm Hg, which is elevated even in the  presence of a transcatheter aortic valve replacement.  DI=0.38 Mild paravalvular aortic regurgitation.  2. Moderately dilated left atrium.  LA volume index = 48  cc/m2.  3. Normal left ventricular internaldimensions and wall  thicknesses.  4. Hyperdynamic left ventricular systolic function. Mid  cavitary gradients of 32 mmHg which increase to 49 mmHg  with valsalva.  5. Estimated pulmonary artery systolic pressure equals 49  mm Hg, assuming right atrial pressure equals 8 mm Hg,  consistent with mild pulmonary pressures.  *** Compared with echocardiogram of 8/28/2020, no  significant changes noted.  < end of copied text >    < from: Cardiac Cath Lab - Adult (11.03.17 @ 08:19) >  CORONARY VESSELS: The coronary circulation is right dominant.  LM:   --  Distal left main: There was a discrete 40 % stenosis. The lesion  was eccentric and calcified. Lesion severity was assessed by intravascular  ultrasound (IVUS).  LAD:   --  LAD: Angiography showed mild atherosclerosis with no flow  limiting lesions.  CX:   --  Circumflex: Angiography showed mild atherosclerosis with no flow  limiting lesions.  RCA:   --  RCA: Angiography showed mild atherosclerosis with no flow  limiting lesions.  COMPLICATIONS: There were no complications.  DIAGNOSTIC IMPRESSIONS: The Ramon 3 valve is functioning appropriately,  with an CORDELL of 1.5 and mild AI. By IVUS, the distal LM has afocal and  eccentric calcified lesion with a CSA in the 7-8sqmm range and, by IVUS  tracings, a 40-45% stenosis. Visually, the LMCA appears angiographically  unchanged from the pre-TAVR angiogram in 2015.  DIAGNOSTIC RECOMMENDATIONS: Outpatient follow up with Dr Espinal in the  next 1-2 weeks for hemodynamic review and optimization of the medical  regimen (for HTN and diastolic heart failure); follow up with Pulmonary  for optimization of the JOSR and PHTN treatment regimens.  < end of copied text >

## 2021-05-05 NOTE — PROGRESS NOTE ADULT - PROBLEM SELECTOR PLAN 3
Chronic  -EKG showing NSR rate 79 with PVCs, LVH  -Continue Xarelto 20mg qd, Metoprolol tartrate 50mg BID  -Consider switching xarelto to heparin if pt to go to OR  -Monitor on remote telemetry, currently in sinus rhythm and rate controlled  -[facility] following, recs appreciated Chronic  -EKG showing NSR rate 79 with PVCs, LVH  -Continue Xarelto 20mg qd, Metoprolol tartrate 50mg BID  -Monitor on remote telemetry, currently in sinus rhythm and rate controlled  -[facility] following, recs appreciated

## 2021-05-05 NOTE — CONSULT NOTE ADULT - SUBJECTIVE AND OBJECTIVE BOX
Patient is a 65y old  Female who presents with a chief complaint of r/o osteomyelitis (05 May 2021 08:09)      Reason For Consult: dm2 uncontrolled    HPI:  64yo woman with PMHx AS s/p TAVR 2015, HOCM, pAFib, JOSR (does not tolerate CPAP, on 2L O2 at home qhs), anxiety, morbid obesity, DM, HTN presents to ED with R foot ulcer. Patient follows with podiatry Dr. Palencia and was sent to ED today after her appointment to be evaluated for osteo. Patient does not know how long the wound has been present, as she has chronic peripheral neuropathy and does not feel pain. Patient states she did not go to wound care for the past few weeks due to her worsening SOB, RASMUSSEN. Patient states she felt better yesterday so she scheduled her appointment for wound care today. Patient has extensive cardiac history, states she is following with Dr. Espinal for a possibility of repeat TAVR as she is still significantly SOB, RASMUSSEN, with increased leg edema for the past few months. Patient is scheduled for a LAMBERT on 5/24 with Dr. Espinal. Patient also currently complaining of bilateral flank pain, onset approx 1 week ago. Denies any dysuria, hematuria, but states her urine is "frothy." Denies any other complaints, denies abd pain, N/V/D, fevers, chills, CP, palpitations.     In ED:  Vitals: T 98, HR 76, /70, RR 15, SpO2 98%  Labs significant for: ESR 50, WBC 11.55, auto neutrophil 8.86, PT 17.0, INR 1.48, PTT 38.1, glucose 198, protein 9.1, alk phos 152  CXR: no active chest disease, looks unchanged from 8/2020  EKG: NSR rate 79 with PVCs, LVH  Given: 2g Vanco IV x1, 3.375g Zosyn IV x1, Xarelto 20mg PO x1   (04 May 2021 21:48)      PAST MEDICAL & SURGICAL HISTORY:  Hypertension    Gastroparesis due to DM    Depression    Herniated disc    Spinal stenosis  chronic back pain    Cholesterol serum elevated    MVP (mitral valve prolapse)    Hypothyroid  not on medication    Obesity    IBS (irritable bowel syndrome)    GERD (gastroesophageal reflux disease)    Diabetes type 2, uncontrolled    History of diabetic retinopathy    S/P bariatric surgery  lap band surgery two times due to erosion, now no more    S/P tonsillectomy and adenoidectomy    Cataract  B/L cataracts    S/P laminectomy  Pt denied this?    S/P D&amp;C (status post dilation and curettage)  D&amp;C when she was 18    Cervix abnormality  Ablation of cervix    Breast anomaly  Biopsy of both breast benign lesions    right eye torn retina    S/P TAVR (transcatheter aortic valve replacement)        FAMILY HISTORY:  Family history of aortic stenosis  Family hx of structural heart dx    FHx: diabetes mellitus    FH: CAD (coronary artery disease)    Family history of anxiety disorder          Social History:    MEDICATIONS  (STANDING):  aspirin enteric coated 81 milliGRAM(s) Oral daily  atorvastatin 80 milliGRAM(s) Oral at bedtime  dextrose 40% Gel 15 Gram(s) Oral once  dextrose 5%. 1000 milliLiter(s) (50 mL/Hr) IV Continuous <Continuous>  dextrose 5%. 1000 milliLiter(s) (100 mL/Hr) IV Continuous <Continuous>  dextrose 50% Injectable 25 Gram(s) IV Push once  dextrose 50% Injectable 12.5 Gram(s) IV Push once  dextrose 50% Injectable 25 Gram(s) IV Push once  FLUoxetine 40 milliGRAM(s) Oral daily  furosemide   Injectable 40 milliGRAM(s) IV Push two times a day  glucagon  Injectable 1 milliGRAM(s) IntraMuscular once  insulin glargine Injectable (LANTUS) 30 Unit(s) SubCutaneous at bedtime  insulin lispro (ADMELOG) corrective regimen sliding scale   SubCutaneous three times a day before meals  insulin lispro (ADMELOG) corrective regimen sliding scale   SubCutaneous at bedtime  insulin lispro Injectable (ADMELOG) 15 Unit(s) SubCutaneous three times a day before meals  losartan 25 milliGRAM(s) Oral two times a day  metoprolol tartrate 50 milliGRAM(s) Oral two times a day  montelukast 10 milliGRAM(s) Oral daily  pantoprazole    Tablet 40 milliGRAM(s) Oral before breakfast  piperacillin/tazobactam IVPB.. 3.375 Gram(s) IV Intermittent every 8 hours  rivaroxaban 20 milliGRAM(s) Oral with dinner  vancomycin  IVPB 2000 milliGRAM(s) IV Intermittent every 12 hours    MEDICATIONS  (PRN):  ALBUTerol    90 MICROgram(s) HFA Inhaler 2 Puff(s) Inhalation every 6 hours PRN Shortness of Breath and/or Wheezing        T(C): 36.6 (05-05-21 @ 08:17), Max: 37 (05-05-21 @ 00:22)  HR: 72 (05-05-21 @ 08:17) (72 - 80)  BP: 140/81 (05-05-21 @ 08:17) (125/61 - 153/70)  RR: 18 (05-05-21 @ 08:17) (15 - 18)  SpO2: 94% (05-05-21 @ 08:17) (94% - 98%)  Wt(kg): --    PHYSICAL EXAM:  GENERAL: NAD, well-groomed, well-developed  HEAD:  Atraumatic, Normocephalic  NECK: Supple, No JVD, Normal thyroid  CHEST/LUNG: Clear to percussion bilaterally; No rales, rhonchi, wheezing, or rubs  HEART: Regular rate and rhythm; No murmurs, rubs, or gallops  ABDOMEN: Soft, Nontender, Nondistended; Bowel sounds present  EXTREMITIES:  le foot dsg intact    CAPILLARY BLOOD GLUCOSE      POCT Blood Glucose.: 322 mg/dL (05 May 2021 08:05)  POCT Blood Glucose.: 292 mg/dL (05 May 2021 07:21)  POCT Blood Glucose.: 328 mg/dL (04 May 2021 23:57)                            11.8   13.05 )-----------( 277      ( 05 May 2021 05:07 )             36.8       CMP:  05-05 @ 05:07  SGPT 22  Albumin 3.3   Alk Phos 128   Anion Gap 9   SGOT 19   Total Bili 0.5   BUN 25   Calcium Total 9.3   CO2 29   Chloride 99   Creatinine 1.00   eGFR if AA 68   eGFR if non AA 59   Glucose 287   Potassium 3.8   Protein 7.4   Sodium 137      Thyroid Function Tests:      Diabetes Tests:       Radiology:

## 2021-05-05 NOTE — PROGRESS NOTE ADULT - SUBJECTIVE AND OBJECTIVE BOX
Patient is a 65y old  Female who presents with a chief complaint of r/o osteomyelitis (05 May 2021 10:31)      INTERVAL HPI/OVERNIGHT EVENTS:  Pt seen and examined this AM.  Pt admitted to r/o osteomyelitis, sent in from wound clinic.  No acute events overnight.  Podiatry consulted, awaiting MRI and vascular studies.  Wound cultures and MRSA PCR obtained, awaiting results.  Denies fever, chills, n/v/d, abd pain, urinary symptoms.  Pt also c/o mild SOB however has improved overnight. Also c/o flank pain which is unchanged with continued frothy urine.         MEDICATIONS  (STANDING):  aspirin enteric coated 81 milliGRAM(s) Oral daily  atorvastatin 80 milliGRAM(s) Oral at bedtime  dextrose 40% Gel 15 Gram(s) Oral once  dextrose 5%. 1000 milliLiter(s) (50 mL/Hr) IV Continuous <Continuous>  dextrose 5%. 1000 milliLiter(s) (100 mL/Hr) IV Continuous <Continuous>  dextrose 50% Injectable 25 Gram(s) IV Push once  dextrose 50% Injectable 12.5 Gram(s) IV Push once  dextrose 50% Injectable 25 Gram(s) IV Push once  FLUoxetine 40 milliGRAM(s) Oral daily  furosemide   Injectable 40 milliGRAM(s) IV Push two times a day  glucagon  Injectable 1 milliGRAM(s) IntraMuscular once  insulin glargine Injectable (LANTUS) 30 Unit(s) SubCutaneous at bedtime  insulin lispro (ADMELOG) corrective regimen sliding scale   SubCutaneous three times a day before meals  insulin lispro (ADMELOG) corrective regimen sliding scale   SubCutaneous at bedtime  insulin lispro Injectable (ADMELOG) 15 Unit(s) SubCutaneous three times a day before meals  losartan 25 milliGRAM(s) Oral two times a day  metoprolol tartrate 50 milliGRAM(s) Oral two times a day  montelukast 10 milliGRAM(s) Oral daily  pantoprazole    Tablet 40 milliGRAM(s) Oral before breakfast  piperacillin/tazobactam IVPB.. 3.375 Gram(s) IV Intermittent every 8 hours  rivaroxaban 20 milliGRAM(s) Oral with dinner  vancomycin  IVPB 2000 milliGRAM(s) IV Intermittent every 12 hours    MEDICATIONS  (PRN):  ALBUTerol    90 MICROgram(s) HFA Inhaler 2 Puff(s) Inhalation every 6 hours PRN Shortness of Breath and/or Wheezing      Allergies    codeine (Vomiting)  shellfish (Stomach Upset)    Intolerances    fish (Stomach Upset)      REVIEW OF SYSTEMS:  CONSTITUTIONAL: No fever or chills  SKIN: + RLE wound, no rashes  HEENT:  No headache, no sore throat  RESPIRATORY: + SOB, No cough, wheezing  CARDIOVASCULAR: No chest pain, palpitations  GASTROINTESTINAL: No abd pain, nausea, vomiting, or diarrhea  GENITOURINARY: No dysuria, frequency, or hematuria  NEUROLOGICAL: + chronic numbness, no focal weakness or dizziness  MUSCULOSKELETAL: + back pain, no joint pains    Vital Signs Last 24 Hrs  T(C): 36.6 (05 May 2021 08:17), Max: 37 (05 May 2021 00:22)  T(F): 97.8 (05 May 2021 08:17), Max: 98.6 (05 May 2021 00:22)  HR: 72 (05 May 2021 08:17) (72 - 80)  BP: 140/81 (05 May 2021 08:17) (125/61 - 153/70)  BP(mean): --  RR: 18 (05 May 2021 08:17) (15 - 18)  SpO2: 94% (05 May 2021 08:17) (94% - 98%)    PHYSICAL EXAM:  GENERAL: NAD, sitting upright in recliner with feet elevated  HEENT:  anicteric, moist mucous membranes  CHEST/LUNG:  minimal crackles at B/L bases, decreased breath sounds throughout likely 2/2 body habitus, no wheezing  HEART:  RRR, S1, S2, + holosystolic murmur, heard best at R upper sternal border, 2ICS  ABDOMEN:  BS+, obese abdomen, soft, nontender, nondistended  EXTREMITIES: +2 pitting edema B/L LE, no cyanosis, or calf tenderness; + erythematous, open wound with deep tissue exposure and pustular drainage of the 4th MTP, plantar aspect of R foot  NERVOUS SYSTEM: A&Ox3, no motor deficits, + decreased sensation of B/L LE, extending up to malleoli, R > L    LABS:                        11.8   13.05 )-----------( 277      ( 05 May 2021 05:07 )             36.8     CBC Full  -  ( 05 May 2021 05:07 )  WBC Count : 13.05 K/uL  Hemoglobin : 11.8 g/dL  Hematocrit : 36.8 %  Platelet Count - Automated : 277 K/uL  Mean Cell Volume : 86.0 fl  Mean Cell Hemoglobin : 27.6 pg  Mean Cell Hemoglobin Concentration : 32.1 gm/dL  Auto Neutrophil # : 9.84 K/uL  Auto Lymphocyte # : 1.97 K/uL  Auto Monocyte # : 1.02 K/uL  Auto Eosinophil # : 0.14 K/uL  Auto Basophil # : 0.03 K/uL  Auto Neutrophil % : 75.4 %  Auto Lymphocyte % : 15.1 %  Auto Monocyte % : 7.8 %  Auto Eosinophil % : 1.1 %  Auto Basophil % : 0.2 %    05 May 2021 05:07    137    |  99     |  25     ----------------------------<  287    3.8     |  29     |  1.00     Ca    9.3        05 May 2021 05:07  Phos  3.3       05 May 2021 05:07  Mg     2.1       05 May 2021 05:07    TPro  7.4    /  Alb  3.3    /  TBili  0.5    /  DBili  x      /  AST  19     /  ALT  22     /  AlkPhos  128    05 May 2021 05:07    PT/INR - ( 04 May 2021 20:03 )   PT: 17.0 sec;   INR: 1.48 ratio         PTT - ( 04 May 2021 20:03 )  PTT:38.1 sec  Urinalysis Basic - ( 04 May 2021 23:34 )    Color: Yellow / Appearance: Clear / S.015 / pH: x  Gluc: x / Ketone: Negative  / Bili: Negative / Urobili: Negative   Blood: x / Protein: 30 mg/dL / Nitrite: Negative   Leuk Esterase: Trace / RBC: 0-2 /HPF / WBC 11-25   Sq Epi: x / Non Sq Epi: Few / Bacteria: Few      CAPILLARY BLOOD GLUCOSE      POCT Blood Glucose.: 322 mg/dL (05 May 2021 08:05)  POCT Blood Glucose.: 292 mg/dL (05 May 2021 07:21)  POCT Blood Glucose.: 328 mg/dL (04 May 2021 23:57)        Culture - Tissue with Gram Stain (collected 21 @ 03:37)  Source: .Tissue Other, Right foot 4th Metatarsal  Gram Stain (21 @ 07:30):    Rare polymorphonuclear leukocytes per low power field    No organisms seen per oil power field        RADIOLOGY & ADDITIONAL TESTS: MRI foot and vascular studies pending    < from: Xray Chest 1 View- PORTABLE-Urgent (Xray Chest 1 View- PORTABLE-Urgent .) (21 @ 19:02) >    EXAM:  XR CHEST PORTABLE URGENT 1V                            PROCEDURE DATE:  2021          INTERPRETATION:  AP semierect chest on May 4, 2021 at 6:52 PM. Patient is preop. 2 images.    Heart is possibly enlarged.    Lungs are clear.    Chest is similar to 2020.    IMPRESSION: Probable heart enlargement again noted.    < end of copied text >        Personally reviewed.     Consultant(s) Notes Reviewed:  [x] YES  [ ] NO     Patient is a 65y old  Female who presents with a chief complaint of r/o osteomyelitis (05 May 2021 10:31)      INTERVAL HPI/OVERNIGHT EVENTS: Pt seen and examined this AM. No acute events overnight.  Pending imaging results at this time.  Denies fever, chills, n/v/d, abd pain, urinary symptoms.  Pt also c/o mild SOB however has improved overnight. Also c/o flank pain which is unchanged with continued frothy urine.         MEDICATIONS  (STANDING):  aspirin enteric coated 81 milliGRAM(s) Oral daily  atorvastatin 80 milliGRAM(s) Oral at bedtime  dextrose 40% Gel 15 Gram(s) Oral once  dextrose 5%. 1000 milliLiter(s) (50 mL/Hr) IV Continuous <Continuous>  dextrose 5%. 1000 milliLiter(s) (100 mL/Hr) IV Continuous <Continuous>  dextrose 50% Injectable 25 Gram(s) IV Push once  dextrose 50% Injectable 12.5 Gram(s) IV Push once  dextrose 50% Injectable 25 Gram(s) IV Push once  FLUoxetine 40 milliGRAM(s) Oral daily  furosemide   Injectable 40 milliGRAM(s) IV Push two times a day  glucagon  Injectable 1 milliGRAM(s) IntraMuscular once  insulin glargine Injectable (LANTUS) 30 Unit(s) SubCutaneous at bedtime  insulin lispro (ADMELOG) corrective regimen sliding scale   SubCutaneous three times a day before meals  insulin lispro (ADMELOG) corrective regimen sliding scale   SubCutaneous at bedtime  insulin lispro Injectable (ADMELOG) 15 Unit(s) SubCutaneous three times a day before meals  losartan 25 milliGRAM(s) Oral two times a day  metoprolol tartrate 50 milliGRAM(s) Oral two times a day  montelukast 10 milliGRAM(s) Oral daily  pantoprazole    Tablet 40 milliGRAM(s) Oral before breakfast  piperacillin/tazobactam IVPB.. 3.375 Gram(s) IV Intermittent every 8 hours  rivaroxaban 20 milliGRAM(s) Oral with dinner  vancomycin  IVPB 2000 milliGRAM(s) IV Intermittent every 12 hours    MEDICATIONS  (PRN):  ALBUTerol    90 MICROgram(s) HFA Inhaler 2 Puff(s) Inhalation every 6 hours PRN Shortness of Breath and/or Wheezing      Allergies    codeine (Vomiting)  shellfish (Stomach Upset)    Intolerances    fish (Stomach Upset)      REVIEW OF SYSTEMS:  CONSTITUTIONAL: No fever or chills  SKIN: + RLE wound, no rashes  HEENT:  No headache, no sore throat  RESPIRATORY: + SOB, No cough, wheezing  CARDIOVASCULAR: No chest pain, palpitations  GASTROINTESTINAL: No abd pain, nausea, vomiting, or diarrhea  GENITOURINARY: No dysuria, frequency, or hematuria  NEUROLOGICAL: + chronic numbness, no focal weakness or dizziness  MUSCULOSKELETAL: + back pain, no joint pains    Vital Signs Last 24 Hrs  T(C): 36.6 (05 May 2021 08:17), Max: 37 (05 May 2021 00:22)  T(F): 97.8 (05 May 2021 08:17), Max: 98.6 (05 May 2021 00:22)  HR: 72 (05 May 2021 08:17) (72 - 80)  BP: 140/81 (05 May 2021 08:17) (125/61 - 153/70)  BP(mean): --  RR: 18 (05 May 2021 08:17) (15 - 18)  SpO2: 94% (05 May 2021 08:17) (94% - 98%)    PHYSICAL EXAM:  GENERAL: NAD, sitting upright in recliner with feet elevated  HEENT:  anicteric, moist mucous membranes  CHEST/LUNG:  minimal crackles at B/L bases, decreased breath sounds throughout likely 2/2 body habitus, no wheezing  HEART:  RRR, S1, S2, + holosystolic murmur, heard best at R upper sternal border, 2ICS  ABDOMEN:  BS+, obese abdomen, soft, nontender, nondistended  EXTREMITIES: +2 pitting edema B/L LE, no cyanosis, or calf tenderness; + erythematous, open wound with deep tissue exposure and pustular drainage of the 4th MTP, plantar aspect of R foot  NERVOUS SYSTEM: A&Ox3, no motor deficits, + decreased sensation of B/L LE, extending up to malleoli, R > L    LABS:                        11.8   13.05 )-----------( 277      ( 05 May 2021 05:07 )             36.8     CBC Full  -  ( 05 May 2021 05:07 )  WBC Count : 13.05 K/uL  Hemoglobin : 11.8 g/dL  Hematocrit : 36.8 %  Platelet Count - Automated : 277 K/uL  Mean Cell Volume : 86.0 fl  Mean Cell Hemoglobin : 27.6 pg  Mean Cell Hemoglobin Concentration : 32.1 gm/dL  Auto Neutrophil # : 9.84 K/uL  Auto Lymphocyte # : 1.97 K/uL  Auto Monocyte # : 1.02 K/uL  Auto Eosinophil # : 0.14 K/uL  Auto Basophil # : 0.03 K/uL  Auto Neutrophil % : 75.4 %  Auto Lymphocyte % : 15.1 %  Auto Monocyte % : 7.8 %  Auto Eosinophil % : 1.1 %  Auto Basophil % : 0.2 %    05 May 2021 05:07    137    |  99     |  25     ----------------------------<  287    3.8     |  29     |  1.00     Ca    9.3        05 May 2021 05:07  Phos  3.3       05 May 2021 05:07  Mg     2.1       05 May 2021 05:07    TPro  7.4    /  Alb  3.3    /  TBili  0.5    /  DBili  x      /  AST  19     /  ALT  22     /  AlkPhos  128    05 May 2021 05:07    PT/INR - ( 04 May 2021 20:03 )   PT: 17.0 sec;   INR: 1.48 ratio         PTT - ( 04 May 2021 20:03 )  PTT:38.1 sec  Urinalysis Basic - ( 04 May 2021 23:34 )    Color: Yellow / Appearance: Clear / S.015 / pH: x  Gluc: x / Ketone: Negative  / Bili: Negative / Urobili: Negative   Blood: x / Protein: 30 mg/dL / Nitrite: Negative   Leuk Esterase: Trace / RBC: 0-2 /HPF / WBC 11-25   Sq Epi: x / Non Sq Epi: Few / Bacteria: Few      CAPILLARY BLOOD GLUCOSE      POCT Blood Glucose.: 322 mg/dL (05 May 2021 08:05)  POCT Blood Glucose.: 292 mg/dL (05 May 2021 07:21)  POCT Blood Glucose.: 328 mg/dL (04 May 2021 23:57)        Culture - Tissue with Gram Stain (collected 21 @ 03:37)  Source: .Tissue Other, Right foot 4th Metatarsal  Gram Stain (21 @ 07:30):    Rare polymorphonuclear leukocytes per low power field    No organisms seen per oil power field        RADIOLOGY & ADDITIONAL TESTS: MRI foot and vascular studies pending    < from: Xray Chest 1 View- PORTABLE-Urgent (Xray Chest 1 View- PORTABLE-Urgent .) (21 @ 19:02) >    EXAM:  XR CHEST PORTABLE URGENT 1V                            PROCEDURE DATE:  2021          INTERPRETATION:  AP semierect chest on May 4, 2021 at 6:52 PM. Patient is preop. 2 images.    Heart is possibly enlarged.    Lungs are clear.    Chest is similar to 2020.    IMPRESSION: Probable heart enlargement again noted.    < from: MR Foot No Cont, Right (21 @ 11:03) >    EXAM:  MR FOOT RT                            PROCEDURE DATE:  2021          INTERPRETATION:  MR FOOT RIGHT    HISTORY: Right foot ulcer. Pain. Infection. Evaluate for osteomyelitis.    TECHNIQUE:  Multiplanar MRI of the right forefoot was performed without contrast using 7 sequences.    COMPARISON:  Bilateral foot x-rays dated 2015    FINDINGS:    OSSEOUS STRUCTURES    Fractures/Stress Reactions:  None.    Osteomyelitis: None.    INTERPHALANGEAL JOINTS    Articular Surfaces:  Preserved. There are hammertoe deformities.    Effusions/Synovitis:  None.    1ST METATARSOPHALANGEAL JOINT    Articular Surfaces:  Tiny marginal osteophytes are present.    Effusions/Synovitis:  None.    Sesamoids:  Located with small subchondral cysts in the metatarsal head.    LESSER METATARSOPHALANGEAL JOINTS    Articular Surfaces:  Preserved.    Effusions/Synovitis:  None.    TARSOMETATARSAL JOINTS    Articular Surfaces:  There are tiny scattered marginal osteophytes. Tiny subchondral cysts/intraosseous ganglion are noted within the third and fourth metatarsal bases.    Effusions/Synovitis:  None.    INTERTARSAL JOINTS    Articular Surfaces:  Small naviculocuneiform joint osteophytes are present with small subchondral cysts.    Effusions/Synovitis:  None.    INTERMETATARSAL SPACES    Neuromas:  None.    Bursa:  Small first, slight second, and small third webspace bursa are noted.    LISFRANC LIGAMENT  Intact.    TENDONS    Flexor Tendons:  Intact.    Extensor Tendons:  Intact.    DISTAL PLANTAR FASCIA  Intact.    SOFT TISSUES    Musculature:  Moderate to severe muscle atrophy is noted with increased T2 signal consistent with diabetic neuropathy.    Subcutaneous Tissues:  Moderate subcutaneous edema is present without abscess. Plantarulceration appears to be present at the level of the fourth webspace.    IMPRESSION:  1. No evidence for osteomyelitis or abscess.        Personally reviewed.     Consultant(s) Notes Reviewed:  [x] YES  [ ] NO

## 2021-05-05 NOTE — PROGRESS NOTE ADULT - PROBLEM SELECTOR PLAN 5
Hx of Type 2 DM on insulin, poorly controlled  -On 30-40units Humalog 3x daily pre-meal, 48-52units Lantus qhs  -Hold home insulin regimen  -continue 15 units Admelog pre-meal, 30U Lantus qhs  -adjust low ISS to Mod ISS  -HgA1C 9.2   -hypoglycemia protocol, accuchecks qac and qhs  -Goal glucose on hospital setting 100-180, adjust insulin regimen PRN  -Endo Dr. Perlman following, recs appreciated

## 2021-05-05 NOTE — CONSULT NOTE ADULT - ATTENDING COMMENTS
Chart reviewed    Patient seen and examined    Agree with plan as outlined above    64yo woman with PMH AS s/p TAVR 2015, HOCM, pAFib, JOSR (does not tolerate CPAP, on 2L O2 at home qhs), anxiety, morbid obesity, DM, HTN presents to ED from wound clnic with R foot ulcer to evaluate for osteomyelitis. Plan for right foot 4th met head resection pending vascular studies, MRI. Cardiology consulted for cardiac optimization pre/post procedure    Valvular Heart Disease/ Aortic stenosis s/p TAVR 2015/ HOCM  -  Patient has extensive cardiac history, follows with Dr. Espinal for a possibility of repeat TAVR as she is still significantly SOB, RASMUSSEN, with increased leg edema for the past few months. Patient is scheduled for a LAMBERT on 5/24 with Dr. Espinal.   - TTE from 4/28/21 showing: hyperdynamic LV systolic function, LVEF 75%, mild to mod MR, mild pulmonary pressures  - Continue Lasix 40mg IV BID for LE edema and SOB   - Continue Metoprolol tartrate 50mg BID  - Continue aspirin   - She is on O2 2L at home. Does have h/o asthma and JOSR   - Call placed to Dr Pierce for records     Paroxysmal atrial fibrillation  - EKG showing NSR rate 79 with PVCs, LVH  - Continue Xarelto 20mg qd, Metoprolol tartrate 50mg BID  - Patient reports that she has h/o DVT and PE and was told to be on AC indefinitely and had testing done for hereditary issues.   - Monitor on remote telemetry

## 2021-05-05 NOTE — DISCHARGE NOTE PROVIDER - CARE PROVIDER_API CALL
Jason Hudson)  Internal Medicine  [address] [place] [address]  Phone: [phone]  Fax: [phone]  Follow Up Time:     Jamal Espinal (DO)  Cardiovascular Disease; Internal Medicine  [facility], [address], [address]  [place] [address]  Phone: [phone]  Fax: [phone]  Follow Up Time:     Jose F Pérez  HEMATOLOGY  [address] [facility], [place] [address]  Phone: [phone]  Fax: [phone]  Follow Up Time:    Jason Hudson)  Internal Medicine  [address] [place] [address]  Phone: [phone]  Fax: [phone]  Follow Up Time:     Jamal Espinal (DO)  Cardiovascular Disease; Internal Medicine  [facility], [address], [address]  [place] [address]  Phone: [phone]  Fax: [phone]  Follow Up Time:     Jose F Pérez  HEMATOLOGY  [address] [facility], [address] [place] [address]  Phone: [phone]  Fax: [phone]  Follow Up Time:     Ramírez John (DPM)  Podiatric Medicine and Surgery  [address] [place] [address]  Phone: [phone]  Fax: [phone]  Follow Up Time:    O-L Flap Text: The defect edges were debeveled with a #15 scalpel blade.  Given the location of the defect, shape of the defect and the proximity to free margins an O-L flap was deemed most appropriate.  Using a sterile surgical marker, an appropriate advancement flap was drawn incorporating the defect and placing the expected incisions within the relaxed skin tension lines where possible.    The area thus outlined was incised deep to adipose tissue with a #15 scalpel blade.  The skin margins were undermined to an appropriate distance in all directions utilizing iris scissors.

## 2021-05-05 NOTE — CONSULT NOTE ADULT - PROBLEM SELECTOR RECOMMENDATION 9
cont lantus 30 units qhs  cont admelog 15 units 3x/day before meals  cont mod dose admelog scale coverage qac/qhs  cont cons cho diet  goal bg 100-180 in hosp setting
Pt assessed with Dr. Palencia, Dr. John at bedside  Right foot MRI shows no signs of OM or abscess associated with wound.  However, due to concerns for chronic ulceration and possible osteomyelitis developing at a later date due to an underlying biomechanical cause, pt would benefit from OR procedure.   Discussed plantar condylectomy of right foot 4th metatarsal with the pt to resolve mechanical issue causing ulceration.  Pt is agreeable and all questions were answered to pt's satisfaction.    Pt is booked for plantar condylectomy, 5/6/21 at 7:30am with Dr. John.  Please keep pt NPO after midnight.    Podiatry will follow pt while in house.

## 2021-05-05 NOTE — CONSULT NOTE ADULT - SUBJECTIVE AND OBJECTIVE BOX
[facility]  INFECTIOUS DISEASES   [address] [place] [address]  Tel: [phone]     Fax: [phone]  =======================================================    [id number]  MARIA D ROBLEDO     CC: foot ulcer and cellulitis     HPI:  66yo woman with PMH of HTN, DM2, AS s/p TAVR , HOCM, pAFib, JOSR on 2L O2 at home qhs, anxiety and morbid obesity was admitted with right foot ulcer. She had appointment with wound center and due to unhealing ulcer and cellulitis in lower leg was referred to ED.   She does not know how long the wound has been present since has peripheral neuropathy. Not been seen in wound center for few weeks. She had MRSA infection in the past.   No fever or chills.   Tissue culture from foot has been sent yesterday.   MRI this morning showed No OM or abscess.   She has been started on vancomycin and zosyn and ID was called for further recommendation.     PAST MEDICAL & SURGICAL HISTORY:  Hypertension  Gastroparesis due to DM  Depression  Herniated disc  Spinal stenosis  chronic back pain  Cholesterol serum elevated  MVP (mitral valve prolapse)  Hypothyroid  not on medication  Obesity  IBS (irritable bowel syndrome)  GERD (gastroesophageal reflux disease)  Diabetes type 2, uncontrolled  History of diabetic retinopathy  S/P bariatric surgery  lap band surgery two times due to erosion, now no more  S/P tonsillectomy and adenoidectomy  Cataract  B/L cataracts  S/P laminectomy  Pt denied this?  S/P D&amp;C (status post dilation and curettage)  D&amp;C when she was 18  Cervix abnormality  Ablation of cervix  Breast anomaly  Biopsy of both breast benign lesions  right eye torn retina  S/P TAVR (transcatheter aortic valve replacement)    Social Hx: no smoking, ETOH or drugs     FAMILY HISTORY:  Family history of aortic stenosis  Family hx of structural heart dx    FHx: diabetes mellitus    FH: CAD (coronary artery disease)    Family history of anxiety disorder    Allergies  codeine (Vomiting)  shellfish (Stomach Upset)    Antibiotics:  piperacillin/tazobactam IVPB.. 3.375 Gram(s) IV Intermittent every 8 hours  vancomycin  IVPB 2000 milliGRAM(s) IV Intermittent every 12 hours    REVIEW OF SYSTEMS:  CONSTITUTIONAL:  No Fever or chills  HEENT:  No diplopia or blurred vision.  No sore throat or runny nose.  CARDIOVASCULAR:  No chest pain or SOB.  RESPIRATORY:  No cough, shortness of breath, PND or orthopnea.  GASTROINTESTINAL:  No nausea, vomiting or diarrhea.  GENITOURINARY:  No dysuria, frequency or urgency. No Blood in urine  MUSCULOSKELETAL:  no joint aches, no muscle pain  SKIN:  No change in skin, hair or nails.  NEUROLOGIC:  No paresthesias, fasciculations, seizures or weakness.  PSYCHIATRIC:  No disorder of thought or mood.  ENDOCRINE:  No heat or cold intolerance, polyuria or polydipsia.  HEMATOLOGICAL:  No easy bruising or bleeding.     Physical Exam:  Vital Signs Last 24 Hrs  T(C): 36.6 (05 May 2021 08:17), Max: 37 (05 May 2021 00:22)  T(F): 97.8 (05 May 2021 08:17), Max: 98.6 (05 May 2021 00:22)  HR: 72 (05 May 2021 08:17) (72 - 80)  BP: 140/81 (05 May 2021 08:17) (125/61 - 153/70)  BP(mean): --  RR: 18 (05 May 2021 08:17) (15 - 18)  SpO2: 94% (05 May 2021 08:17) (94% - 98%)  Height (cm): 160 ( @ 17:44)  Weight (kg): 136.5 ( 17:44)  BMI (kg/m2): 53.3 ( @ 17:44)  BSA (m2): 2.3 ( @ 17:44)  GEN: NAD, morbidly obese   HEENT: normocephalic and atraumatic. EOMI. PERRL.    NECK: Supple.  No lymphadenopathy   LUNGS: Clear to auscultation.  HEART: Regular rate and rhythm   ABDOMEN: Soft, nontender, and nondistended.  Positive bowel sounds.    : No CVA tenderness  EXTREMITIES: Bilateral edema. RLE with an ulcer on head of first metatarsal bone with no discharge, swelling, warmth and erythema in 1/3 of lower leg.   NEUROLOGIC: grossly intact.  PSYCHIATRIC: Appropriate affect .  SKIN: No rash     Labs:      137  |  99  |  25<H>  ----------------------------<  287<H>  3.8   |  29  |  1.00    Ca    9.3      05 May 2021 05:07  Phos  3.3       Mg     2.1         TPro  7.4  /  Alb  3.3  /  TBili  0.5  /  DBili  x   /  AST  19  /  ALT  22  /  AlkPhos  128<H>                          11.8   13.05 )-----------( 277      ( 05 May 2021 05:07 )             36.8     PT/INR - ( 04 May 2021 20:03 )   PT: 17.0 sec;   INR: 1.48 ratio    PTT - ( 04 May 2021 20:03 )  PTT:38.1 sec  Urinalysis Basic - ( 04 May 2021 23:34 )    Color: Yellow / Appearance: Clear / S.015 / pH: x  Gluc: x / Ketone: Negative  / Bili: Negative / Urobili: Negative   Blood: x / Protein: 30 mg/dL / Nitrite: Negative   Leuk Esterase: Trace / RBC: 0-2 /HPF / WBC 11-25   Sq Epi: x / Non Sq Epi: Few / Bacteria: Few    LIVER FUNCTIONS - ( 05 May 2021 05:07 )  Alb: 3.3 g/dL / Pro: 7.4 g/dL / ALK PHOS: 128 U/L / ALT: 22 U/L / AST: 19 U/L / GGT: x           C-Reactive Protein, Serum: 13 mg/L (21 @ 01:01)    Sedimentation Rate, Erythrocyte: 50 mm/hr (21 @ 20:03)    COVID-19 PCR: NotDetec (21 @ 20:03)    RECENT CULTURES:   @ 03:37 .Tissue Other, Right foot 4th Metatarsal       Rare polymorphonuclear leukocytes per low power field  No organisms seen per oil power field    All imaging and other data have been reviewed.  < from: MR Foot No Cont, Right (21 @ 11:03) >  IMPRESSION:  1. No evidence for osteomyelitis or abscess.      Assessment and Plan:   66yo woman with PMH of HTN, DM2, AS s/p TAVR , HOCM, pAFib, JOSR on 2L O2 at home qhs, anxiety and morbid obesity was admitted with right foot ulcer. She had appointment with wound center and due to unhealing ulcer and cellulitis in lower leg was referred to ED.  MRI with no OM or abscess. Wound culture has been sent but still pending.   Until cultures are back will cover for MRSA as well, Since she is morbidly obese dosing vancomycin will be difficult, so will switch to daptomycin.     RLE cellulitis and R foot ulcer  - Blood culture x 2 pending  - Wound culture pending  - MRI with no OM or abscess  - Vascular studies are pending  - Podiatry follow up   - Elevate leg   - Good glycemic control  - Agree with zosyn 3.375gm q8h  - Stop vancomycin and start Daptomycin 550mg daily, CK for baseline    Thank you for courtesy of this consult.     Will follow.    Lamar Alexis MD  Division of Infectious Diseases   Cell [phone] between 8am and 6pm   After 6pm and weekends please call ID service at [phone].

## 2021-05-05 NOTE — DISCHARGE NOTE PROVIDER - NSDCCPCAREPLAN_GEN_ALL_CORE_FT
PRINCIPAL DISCHARGE DIAGNOSIS  Diagnosis: Diabetic foot ulcer  Assessment and Plan of Treatment: You were admitted to the hospital for Right foot ulcer from diabetes. You were treated with IV antibiotics. MRI was done and showed no osteomyelitis. Vascular imaging was done and showed ________. Podiatry and infectious disease treated you while in the hospital.   Please follow up with your PCP and podiatrist within 1 week of discharge.      SECONDARY DISCHARGE DIAGNOSES  Diagnosis: Aortic stenosis  Assessment and Plan of Treatment: For your history of TAVR, please follow up as direcred with your cardiologist. Please make sure you keep your LAMBERT scheduled for 5/24/21.    Diagnosis: History of pulmonary embolism  Assessment and Plan of Treatment: Please establish care with a hematologist regarding your history of clotting for further work up.     PRINCIPAL DISCHARGE DIAGNOSIS  Diagnosis: Diabetic foot ulcer  Assessment and Plan of Treatment: You were admitted to the hospital for Right foot ulcer from diabetes. You were treated with IV antibiotics. MRI was done and showed no osteomyelitis however due to increased risk of osteomyelitis in the future, podiatry performed a right 4th plantar condylectomy on 5/6/21.  Podiatry and infectious disease treated you while in the hospital.   Please follow up with your PCP and podiatrist within 1 week of discharge.  Please follow up with your podiatrist within 5 days of discharge.  Wound Care Instructions  1.  Keep dressing clean, dry and intact until clinic visit  2.  Make appointment to be seen by Dr. Palencia or Dr. John at [facility] w/in 3-5 days of d/c  3.  If symptoms of nausea, vomiting, fever, chills, shortness of breath, chest pain, increasing pain foot or posterior leg pain develop - go to the emergency department.      SECONDARY DISCHARGE DIAGNOSES  Diagnosis: Aortic stenosis  Assessment and Plan of Treatment: For your history of TAVR, please follow up as direcred with your cardiologist. Please make sure you keep your LAMBERT scheduled for 5/24/21.  Continue your home dose of Lasix as prescribed.    Diagnosis: Paroxysmal atrial fibrillation  Assessment and Plan of Treatment: Please continue your home medications as prescribed including metoprolol and xarelto.    Diagnosis: History of pulmonary embolism  Assessment and Plan of Treatment: Please establish care with a hematologist regarding your history of clotting for further work up.     PRINCIPAL DISCHARGE DIAGNOSIS  Diagnosis: Diabetic foot ulcer  Assessment and Plan of Treatment: You were admitted to the hospital for Right foot ulcer from diabetes. You were treated with IV antibiotics. MRI was done and showed no osteomyelitis however due to increased risk of osteomyelitis in the future, podiatry performed a right 4th plantar condylectomy on 5/6/21.  Podiatry and infectious disease treated you while in the hospital.   Please follow up with your PCP and cardiologist within 1 week of discharge.  Please follow up with your podiatrist within 3-5 days of discharge.  Wound Care Instructions  1.  Keep dressing clean, dry and intact until clinic visit  2.  Make appointment to be seen by Dr. Palencia or Dr. John at [facility] [place] w/in 3-5 days of d/c  3.  If symptoms of nausea, vomiting, fever, chills, shortness of breath, chest pain, increasing pain foot or posterior leg pain develop - go to the emergency department.      SECONDARY DISCHARGE DIAGNOSES  Diagnosis: Aortic stenosis  Assessment and Plan of Treatment: For your history of TAVR, please follow up as direcred with your cardiologist. Please make sure you keep your LAMBERT scheduled for 5/24/21.  Continue your home dose of Lasix as prescribed.    Diagnosis: HTN (hypertension)  Assessment and Plan of Treatment: Please continue your home blood pressure medications metoprolol, lasix, losartan as precribed    Diagnosis: History of pulmonary embolism  Assessment and Plan of Treatment: Please establish care with a hematologist regarding your history of clotting for further work up.    Diagnosis: Paroxysmal atrial fibrillation  Assessment and Plan of Treatment: Please continue your home medications as prescribed including metoprolol and xarelto.    Diagnosis: Diabetes  Assessment and Plan of Treatment: Please continue your home insulin regimen    Diagnosis: JOSR (obstructive sleep apnea)  Assessment and Plan of Treatment: Please continue home O2 at bedtime    Diagnosis: Asthma  Assessment and Plan of Treatment: Please continue montelukast, mucinex, and inhaler as needed     PRINCIPAL DISCHARGE DIAGNOSIS  Diagnosis: Diabetic foot ulcer  Assessment and Plan of Treatment: You were admitted to the hospital for Right foot ulcer from diabetes. You were treated with IV antibiotics. MRI was done and showed no osteomyelitis however due to increased risk of osteomyelitis in the future, podiatry performed a right 4th plantar condylectomy on 5/6/21.  Podiatry and infectious disease treated you while in the hospital.   You were given IV antibiotics which was transitioned to oral antibiotics. Continue to take Augmentin twice a day, starting TONIGHT (you received your morning dose at the hospital) for 3 more days, last dose on 5/14   Please follow up with your PCP and cardiologist within 1 week of discharge.  Please follow up with your podiatrist within 3-5 days of discharge.  Wound Care Instructions  1.  Keep dressing clean, dry and intact until clinic visit  2.  Make appointment to be seen by Dr. Palencia or Dr. John at [facility] w/in 3-5 days of d/c  3.  If symptoms of nausea, vomiting, fever, chills, shortness of breath, chest pain, increasing pain foot or posterior leg pain develop - go to the emergency department.      SECONDARY DISCHARGE DIAGNOSES  Diagnosis: Aortic stenosis  Assessment and Plan of Treatment: For your history of TAVR, please follow up as direcred with your cardiologist. Please make sure you keep your LAMBERT scheduled for 5/24/21.  Continue your home dose of Lasix as prescribed.    Diagnosis: HTN (hypertension)  Assessment and Plan of Treatment: Please continue your home blood pressure medications metoprolol, lasix, losartan as precribed    Diagnosis: History of pulmonary embolism  Assessment and Plan of Treatment: Please establish care with a hematologist regarding your history of clotting for further work up.    Diagnosis: Paroxysmal atrial fibrillation  Assessment and Plan of Treatment: Please continue your home medications as prescribed including metoprolol and xarelto.    Diagnosis: Diabetes  Assessment and Plan of Treatment: Please continue your home insulin regimen    Diagnosis: JOSR (obstructive sleep apnea)  Assessment and Plan of Treatment: Please continue home O2 at bedtime    Diagnosis: Asthma  Assessment and Plan of Treatment: Please continue montelukast, mucinex, and inhaler as needed     PRINCIPAL DISCHARGE DIAGNOSIS  Diagnosis: Diabetic foot ulcer  Assessment and Plan of Treatment: You were admitted to the hospital for Right foot ulcer from diabetes. You were treated with IV antibiotics. MRI was done and showed no osteomyelitis however due to increased risk of osteomyelitis in the future, podiatry performed a right 4th plantar condylectomy on 5/6/21.  Podiatry and infectious disease treated you while in the hospital.   You were given IV antibiotics for R leg cellulitis which was transitioned to oral antibiotics. Continue to take Augmentin twice a day, starting TONIGHT (you received your morning dose at the hospital) for 3 more days, last dose on 5/14   Please follow up with your PCP and cardiologist within 1 week of discharge.  Please follow up with your podiatrist within 3-5 days of discharge.  Wound Care Instructions  1.  Keep dressing clean, dry and intact until clinic visit  2.  Make appointment to be seen by Dr. Palencia or Dr. John at [facility] w/in 3-5 days of d/c  3.  If symptoms of nausea, vomiting, fever, chills, shortness of breath, chest pain, increasing pain foot or posterior leg pain develop - go to the emergency department.      SECONDARY DISCHARGE DIAGNOSES  Diagnosis: Aortic stenosis  Assessment and Plan of Treatment: For your history of TAVR, please follow up as direcred with your cardiologist. Please make sure you keep your LAMBERT scheduled for 5/24/21.  Continue your home dose of Lasix as prescribed.    Diagnosis: HTN (hypertension)  Assessment and Plan of Treatment: Please continue your home blood pressure medications metoprolol, lasix, losartan as precribed    Diagnosis: History of pulmonary embolism  Assessment and Plan of Treatment: Please establish care with a hematologist regarding your history of clotting for further work up.    Diagnosis: Paroxysmal atrial fibrillation  Assessment and Plan of Treatment: Please continue your home medications as prescribed including metoprolol and xarelto.    Diagnosis: Diabetes  Assessment and Plan of Treatment: Please continue your home insulin regimen    Diagnosis: JOSR (obstructive sleep apnea)  Assessment and Plan of Treatment: Please continue home O2 at bedtime    Diagnosis: Asthma  Assessment and Plan of Treatment: Please continue montelukast, mucinex, and inhaler as needed

## 2021-05-05 NOTE — PROGRESS NOTE ADULT - PROBLEM SELECTOR PLAN 6
Patient c/o bilat flank pain with frothy urine  -neg CVA tenderness, no dysuria or hematuria  -UA shows min LE; no blood, bacteria or WBCs  -f/u urine culture  -pain likely musculoskeletal in nature

## 2021-05-05 NOTE — PATIENT PROFILE ADULT - HAS THE PATIENT USED TOBACCO IN THE PAST 30 DAYS?
[facility],                                 See most recent Lab values in Labs section above. OTHER RELEVANT DIAGNOSIS:as noted in patient's active problem list:  TESTS ORDERED: BMP. All previously ordered tests reviewed. ARRHYTHMIAS: None known   MEDICATIONS: CPM. Patient to take medication regularly, low salt diet, diet, exercise & loose weight. Keep  BP record & call  in two weeks. Office f/u in six months. Primary/secondary prevention is the goal by aggressive risk modification, healthy and therapeutic life style changes for cardiovascular risk reduction. Various goals are discussed and multiple questions answered.
No

## 2021-05-05 NOTE — DISCHARGE NOTE PROVIDER - NSDCFUSCHEDAPPT_GEN_ALL_CORE_FT
MARIA D ROBLEDO ; 05/24/2021 ; [facility] Cardio Echo [facility] [address]  MARIA D ROBLEDO ; 05/14/2021 ; NPP Wound Care  Old MARIA D Jones ; 05/24/2021 ; NPP Cardio Echo [address]

## 2021-05-05 NOTE — ED ADULT NURSE REASSESSMENT NOTE - NS ED NURSE REASSESS COMMENT FT1
pt reavaluated and bilateral legs wound with dressing in place pt medicated and report given to RN for transfer to floor vital signs stable

## 2021-05-05 NOTE — CONSULT NOTE ADULT - ASSESSMENT
DOCUMENTATION IN PROGRESS   66yo woman with PMH AS s/p TAVR 2015, HOCM, pAFib, JOSR (does not tolerate CPAP, on 2L O2 at home qhs), anxiety, morbid obesity, DM, HTN presents to ED from wound clnic with R foot ulcer to evaluate for osteomyelitis. Plan for right foot 4th met head resection pending vascular studies, MRI. Cardiology consulted for cardiac optimization pre/post procedure    Valvular Heart Disease/ Aortic stenosis s/p TAVR 2015/ HOCM  -  Patient has extensive cardiac history, follows with Dr. Espinal for a possibility of repeat TAVR as she is still significantly SOB, RASMUSSEN, with increased leg edema for the past few months. Patient is scheduled for a LAMBERT on 5/24 with Dr. Espinal.   - TTE from 4/28/21 showing: hyperdynamic LV systolic function, LVEF 75%, mild pulmonary pressures  - Continue Lasix 40mg IV BID for LE edema and SOB   - Continue Metoprolol tartrate 50mg BID  - Continue aspirin   - She is on O2 2L at home. Does have h/o asthma and JOSR     Paroxysmal atrial fibrillation  - EKG showing NSR rate 79 with PVCs, LVH  - Continue Xarelto 20mg qd, Metoprolol tartrate 50mg BID  - Monitor on remote telemetry    Hypertension  - Continue home metoprolol, Lasix, losartan with hold parameters  - Monitor routine hemodynamics.     Hyperlipidemia  - Continue home atorvastatin.     Osteomyelitis foot, R sub 4th met head ulcer  - Continue antibiotics per medicine   - F/u MRI R foot and arterial studies RLE per podiatry  - Plan for right foot 4th met head resection pending vascular studies, MRI    - Monitor and replete lytes, keep K>4, Mg>2.  - All other medical needs as per primary team.  - Other cardiovascular workup will depend on clinical course.  - Will continue to follow.    Nenita Alejandro, MS FNP, [facility]  Nurse Practitioner- Cardiology   Spectra #[phone] 64yo woman with PMH AS s/p TAVR 2015, HOCM, pAFib, JOSR (does not tolerate CPAP, on 2L O2 at home qhs), anxiety, morbid obesity, DM, HTN presents to ED from wound clnic with R foot ulcer to evaluate for osteomyelitis. Plan for right foot 4th met head resection pending vascular studies, MRI. Cardiology consulted for cardiac optimization pre/post procedure    Valvular Heart Disease/ Aortic stenosis s/p TAVR 2015/ HOCM  -  Patient has extensive cardiac history, follows with Dr. Espinal for a possibility of repeat TAVR as she is still significantly SOB, RASMUSSEN, with increased leg edema for the past few months. Patient is scheduled for a LAMBERT on 5/24 with Dr. Espinal.   - TTE from 4/28/21 showing: hyperdynamic LV systolic function, LVEF 75%, mild to mod MR, mild pulmonary pressures  - Continue Lasix 40mg IV BID for LE edema and SOB   - Continue Metoprolol tartrate 50mg BID  - Continue aspirin   - She is on O2 2L at home. Does have h/o asthma and JOSR   - Call placed to Dr Pierce for records     Paroxysmal atrial fibrillation  - EKG showing NSR rate 79 with PVCs, LVH  - Continue Xarelto 20mg qd, Metoprolol tartrate 50mg BID  - Patient reports that she has h/o DVT and PE and was told to be on AC indefinitely and had testing done for hereditary issues.   - Monitor on remote telemetry    Hypertension  - Continue home metoprolol, Lasix, losartan with hold parameters  - Monitor routine hemodynamics.     Hyperlipidemia  - Continue home atorvastatin.     Osteomyelitis foot, R sub 4th met head ulcer  - Continue antibiotics per medicine   - F/u MRI R foot and arterial studies RLE per podiatry  - Plan for right foot 4th met head resection pending vascular studies, MRI  - Pt has h/o non obs CAD, no signs of active ischemia, decompensated heart failure or unstable arrythmia. She does have severe AS, now s/p TAVR in 2015, ? AS pending LAMBERT. She is at high risk given her valvular disease , but she is optimized from cardiovascular standpoint to proceed with planned low risk procedure with routine hemodynamic monitoring is she require to go to OR.     - Monitor and replete lytes, keep K>4, Mg>2.  - All other medical needs as per primary team.  - Other cardiovascular workup will depend on clinical course.  - Will continue to follow.    Nenita Alejandro, MS CHAVEZP, [facility]  Nurse Practitioner- Cardiology   Spectra #[phone]

## 2021-05-05 NOTE — DISCHARGE NOTE PROVIDER - NSDCMRMEDTOKEN_GEN_ALL_CORE_FT
aspirin 81 mg oral delayed release tablet: 1 tab(s) orally once a day  atorvastatin 80 mg oral tablet: 1 tab(s) orally once a day (at bedtime)  Cozaar 25 mg oral tablet: 1 tab(s) orally 2 times a day  FLUoxetine 40 mg oral capsule: 1 cap(s) orally once a day  furosemide 80 mg oral tablet: 1 tab(s) orally once a day  HumaLOG 100 units/mL injectable solution: 35 unit(s) injectable 3 times a day premeal  Lantus 100 units/mL subcutaneous solution: 50 unit(s) subcutaneous once a day (at bedtime)  metoprolol tartrate 50 mg oral tablet: 1 tab(s) orally 2 times a day  Motrin 800 mg oral tablet: 1 tab(s) orally 3 times a day, As Needed  Mucinex 600 mg oral tablet, extended release: 1 tab(s) orally once a day  nystatin 100,000 units/g topical cream: Apply topically to affected area 3 times a day  nystatin 100,000 units/g topical powder: 1 application topically 2 times a day  omeprazole 40 mg oral delayed release capsule: 1 cap(s) orally once a day  potassium chloride 10 mEq oral tablet, extended release: 1 tab(s) orally 2 times a day  Probiotic Formula oral capsule: 1 cap(s) orally once a day  Proventil HFA 90 mcg/inh inhalation aerosol: 2 puff(s) inhaled every 6 hours  Singulair 10 mg oral tablet: 1 tab(s) orally once a day  Slow Release Iron (as elemental iron) 45 mg oral tablet, extended release: 1 tab(s) orally once a day  Vitamin D3: 5000 international unit(s) orally once a day  Xarelto 20 mg oral tablet: 1 tab(s) orally once a day (in the evening)   amoxicillin-clavulanate 875 mg-125 mg oral tablet: 1 tab(s) orally 2 times a day   aspirin 81 mg oral delayed release tablet: 1 tab(s) orally once a day  atorvastatin 80 mg oral tablet: 1 tab(s) orally once a day (at bedtime)  Cozaar 25 mg oral tablet: 1 tab(s) orally 2 times a day  FLUoxetine 40 mg oral capsule: 1 cap(s) orally once a day  furosemide 80 mg oral tablet: 1 tab(s) orally once a day  HumaLOG 100 units/mL injectable solution: 35 unit(s) injectable 3 times a day premeal  Lantus 100 units/mL subcutaneous solution: 50 unit(s) subcutaneous once a day (at bedtime)  metoprolol tartrate 50 mg oral tablet: 1 tab(s) orally 2 times a day  Motrin 800 mg oral tablet: 1 tab(s) orally 3 times a day, As Needed  Mucinex 600 mg oral tablet, extended release: 1 tab(s) orally once a day  nystatin 100,000 units/g topical cream: Apply topically to affected area 3 times a day  nystatin 100,000 units/g topical powder: 1 application topically 2 times a day  omeprazole 40 mg oral delayed release capsule: 1 cap(s) orally once a day  potassium chloride 10 mEq oral tablet, extended release: 1 tab(s) orally 2 times a day  Probiotic Formula oral capsule: 1 cap(s) orally once a day  Proventil HFA 90 mcg/inh inhalation aerosol: 2 puff(s) inhaled every 6 hours  Singulair 10 mg oral tablet: 1 tab(s) orally once a day  Slow Release Iron (as elemental iron) 45 mg oral tablet, extended release: 1 tab(s) orally once a day  Vitamin D3: 5000 international unit(s) orally once a day  Xarelto 20 mg oral tablet: 1 tab(s) orally once a day (in the evening)

## 2021-05-06 ENCOUNTER — RESULT REVIEW (OUTPATIENT)
Age: 66
End: 2021-05-06

## 2021-05-06 DIAGNOSIS — I83.12 VARICOSE VEINS OF LEFT LOWER EXTREMITY WITH INFLAMMATION: ICD-10-CM

## 2021-05-06 DIAGNOSIS — Z79.4 LONG TERM (CURRENT) USE OF INSULIN: ICD-10-CM

## 2021-05-06 DIAGNOSIS — L03.115 CELLULITIS OF RIGHT LOWER LIMB: ICD-10-CM

## 2021-05-06 DIAGNOSIS — Z87.81 PERSONAL HISTORY OF (HEALED) TRAUMATIC FRACTURE: ICD-10-CM

## 2021-05-06 DIAGNOSIS — Z83.49 FAMILY HISTORY OF OTHER ENDOCRINE, NUTRITIONAL AND METABOLIC DISEASES: ICD-10-CM

## 2021-05-06 DIAGNOSIS — E11.40 TYPE 2 DIABETES MELLITUS WITH DIABETIC NEUROPATHY, UNSPECIFIED: ICD-10-CM

## 2021-05-06 DIAGNOSIS — M47.817 SPONDYLOSIS WITHOUT MYELOPATHY OR RADICULOPATHY, LUMBOSACRAL REGION: ICD-10-CM

## 2021-05-06 DIAGNOSIS — E05.90 THYROTOXICOSIS, UNSPECIFIED WITHOUT THYROTOXIC CRISIS OR STORM: ICD-10-CM

## 2021-05-06 DIAGNOSIS — E55.9 VITAMIN D DEFICIENCY, UNSPECIFIED: ICD-10-CM

## 2021-05-06 DIAGNOSIS — Z87.01 PERSONAL HISTORY OF PNEUMONIA (RECURRENT): ICD-10-CM

## 2021-05-06 DIAGNOSIS — L97.412 NON-PRESSURE CHRONIC ULCER OF RIGHT HEEL AND MIDFOOT WITH FAT LAYER EXPOSED: ICD-10-CM

## 2021-05-06 DIAGNOSIS — I83.893 VARICOSE VEINS OF BILATERAL LOWER EXTREMITIES WITH OTHER COMPLICATIONS: ICD-10-CM

## 2021-05-06 DIAGNOSIS — I34.1 NONRHEUMATIC MITRAL (VALVE) PROLAPSE: ICD-10-CM

## 2021-05-06 DIAGNOSIS — Z95.4 PRESENCE OF OTHER HEART-VALVE REPLACEMENT: ICD-10-CM

## 2021-05-06 DIAGNOSIS — Z79.82 LONG TERM (CURRENT) USE OF ASPIRIN: ICD-10-CM

## 2021-05-06 DIAGNOSIS — M48.07 SPINAL STENOSIS, LUMBOSACRAL REGION: ICD-10-CM

## 2021-05-06 DIAGNOSIS — Z98.890 OTHER SPECIFIED POSTPROCEDURAL STATES: ICD-10-CM

## 2021-05-06 DIAGNOSIS — I11.9 HYPERTENSIVE HEART DISEASE WITHOUT HEART FAILURE: ICD-10-CM

## 2021-05-06 DIAGNOSIS — G47.10 HYPERSOMNIA, UNSPECIFIED: ICD-10-CM

## 2021-05-06 DIAGNOSIS — I47.1 SUPRAVENTRICULAR TACHYCARDIA: ICD-10-CM

## 2021-05-06 DIAGNOSIS — Z79.899 OTHER LONG TERM (CURRENT) DRUG THERAPY: ICD-10-CM

## 2021-05-06 DIAGNOSIS — Z87.891 PERSONAL HISTORY OF NICOTINE DEPENDENCE: ICD-10-CM

## 2021-05-06 DIAGNOSIS — E66.01 MORBID (SEVERE) OBESITY DUE TO EXCESS CALORIES: ICD-10-CM

## 2021-05-06 DIAGNOSIS — Z88.5 ALLERGY STATUS TO NARCOTIC AGENT: ICD-10-CM

## 2021-05-06 DIAGNOSIS — I83.11 VARICOSE VEINS OF RIGHT LOWER EXTREMITY WITH INFLAMMATION: ICD-10-CM

## 2021-05-06 DIAGNOSIS — Z98.84 BARIATRIC SURGERY STATUS: ICD-10-CM

## 2021-05-06 DIAGNOSIS — M43.16 SPONDYLOLISTHESIS, LUMBAR REGION: ICD-10-CM

## 2021-05-06 DIAGNOSIS — E11.59 TYPE 2 DIABETES MELLITUS WITH OTHER CIRCULATORY COMPLICATIONS: ICD-10-CM

## 2021-05-06 DIAGNOSIS — E11.621 TYPE 2 DIABETES MELLITUS WITH FOOT ULCER: ICD-10-CM

## 2021-05-06 DIAGNOSIS — G47.33 OBSTRUCTIVE SLEEP APNEA (ADULT) (PEDIATRIC): ICD-10-CM

## 2021-05-06 DIAGNOSIS — Z82.49 FAMILY HISTORY OF ISCHEMIC HEART DISEASE AND OTHER DISEASES OF THE CIRCULATORY SYSTEM: ICD-10-CM

## 2021-05-06 LAB
ALBUMIN SERPL ELPH-MCNC: 3 G/DL — LOW (ref 3.3–5)
ALP SERPL-CCNC: 104 U/L — SIGNIFICANT CHANGE UP (ref 40–120)
ALT FLD-CCNC: 20 U/L — SIGNIFICANT CHANGE UP (ref 12–78)
ANION GAP SERPL CALC-SCNC: 5 MMOL/L — SIGNIFICANT CHANGE UP (ref 5–17)
AST SERPL-CCNC: 18 U/L — SIGNIFICANT CHANGE UP (ref 15–37)
BASOPHILS # BLD AUTO: 0.03 K/UL — SIGNIFICANT CHANGE UP (ref 0–0.2)
BASOPHILS NFR BLD AUTO: 0.4 % — SIGNIFICANT CHANGE UP (ref 0–2)
BILIRUB SERPL-MCNC: 0.5 MG/DL — SIGNIFICANT CHANGE UP (ref 0.2–1.2)
BUN SERPL-MCNC: 23 MG/DL — SIGNIFICANT CHANGE UP (ref 7–23)
CALCIUM SERPL-MCNC: 8.6 MG/DL — SIGNIFICANT CHANGE UP (ref 8.5–10.1)
CHLORIDE SERPL-SCNC: 100 MMOL/L — SIGNIFICANT CHANGE UP (ref 96–108)
CO2 SERPL-SCNC: 33 MMOL/L — HIGH (ref 22–31)
CREAT SERPL-MCNC: 0.95 MG/DL — SIGNIFICANT CHANGE UP (ref 0.5–1.3)
CULTURE RESULTS: NO GROWTH — SIGNIFICANT CHANGE UP
EOSINOPHIL # BLD AUTO: 0.3 K/UL — SIGNIFICANT CHANGE UP (ref 0–0.5)
EOSINOPHIL NFR BLD AUTO: 4.2 % — SIGNIFICANT CHANGE UP (ref 0–6)
GLUCOSE SERPL-MCNC: 226 MG/DL — HIGH (ref 70–99)
GRAM STN FLD: SIGNIFICANT CHANGE UP
HCT VFR BLD CALC: 32 % — LOW (ref 34.5–45)
HGB BLD-MCNC: 10.2 G/DL — LOW (ref 11.5–15.5)
IMM GRANULOCYTES NFR BLD AUTO: 0.1 % — SIGNIFICANT CHANGE UP (ref 0–1.5)
LYMPHOCYTES # BLD AUTO: 1.46 K/UL — SIGNIFICANT CHANGE UP (ref 1–3.3)
LYMPHOCYTES # BLD AUTO: 20.3 % — SIGNIFICANT CHANGE UP (ref 13–44)
MCHC RBC-ENTMCNC: 27.7 PG — SIGNIFICANT CHANGE UP (ref 27–34)
MCHC RBC-ENTMCNC: 31.9 GM/DL — LOW (ref 32–36)
MCV RBC AUTO: 87 FL — SIGNIFICANT CHANGE UP (ref 80–100)
MONOCYTES # BLD AUTO: 0.54 K/UL — SIGNIFICANT CHANGE UP (ref 0–0.9)
MONOCYTES NFR BLD AUTO: 7.5 % — SIGNIFICANT CHANGE UP (ref 2–14)
NEUTROPHILS # BLD AUTO: 4.86 K/UL — SIGNIFICANT CHANGE UP (ref 1.8–7.4)
NEUTROPHILS NFR BLD AUTO: 67.5 % — SIGNIFICANT CHANGE UP (ref 43–77)
NRBC # BLD: 0 /100 WBCS — SIGNIFICANT CHANGE UP (ref 0–0)
NT-PROBNP SERPL-SCNC: 527 PG/ML — HIGH (ref 0–125)
PLATELET # BLD AUTO: 216 K/UL — SIGNIFICANT CHANGE UP (ref 150–400)
POTASSIUM SERPL-MCNC: 3.4 MMOL/L — LOW (ref 3.5–5.3)
POTASSIUM SERPL-SCNC: 3.4 MMOL/L — LOW (ref 3.5–5.3)
PROT SERPL-MCNC: 6.5 G/DL — SIGNIFICANT CHANGE UP (ref 6–8.3)
RBC # BLD: 3.68 M/UL — LOW (ref 3.8–5.2)
RBC # FLD: 15.3 % — HIGH (ref 10.3–14.5)
SODIUM SERPL-SCNC: 138 MMOL/L — SIGNIFICANT CHANGE UP (ref 135–145)
SPECIMEN SOURCE: SIGNIFICANT CHANGE UP
SPECIMEN SOURCE: SIGNIFICANT CHANGE UP
WBC # BLD: 7.2 K/UL — SIGNIFICANT CHANGE UP (ref 3.8–10.5)
WBC # FLD AUTO: 7.2 K/UL — SIGNIFICANT CHANGE UP (ref 3.8–10.5)

## 2021-05-06 PROCEDURE — 99232 SBSQ HOSP IP/OBS MODERATE 35: CPT | Mod: GC

## 2021-05-06 PROCEDURE — 99232 SBSQ HOSP IP/OBS MODERATE 35: CPT

## 2021-05-06 PROCEDURE — 88311 DECALCIFY TISSUE: CPT | Mod: 26

## 2021-05-06 PROCEDURE — 99233 SBSQ HOSP IP/OBS HIGH 50: CPT

## 2021-05-06 PROCEDURE — 28122 PARTIAL REMOVAL OF FOOT BONE: CPT | Mod: T8

## 2021-05-06 PROCEDURE — 88304 TISSUE EXAM BY PATHOLOGIST: CPT | Mod: 26

## 2021-05-06 RX ORDER — POTASSIUM CHLORIDE 20 MEQ
40 PACKET (EA) ORAL ONCE
Refills: 0 | Status: COMPLETED | OUTPATIENT
Start: 2021-05-06 | End: 2021-05-06

## 2021-05-06 RX ORDER — INSULIN LISPRO 100/ML
VIAL (ML) SUBCUTANEOUS
Refills: 0 | Status: DISCONTINUED | OUTPATIENT
Start: 2021-05-06 | End: 2021-05-10

## 2021-05-06 RX ORDER — SODIUM CHLORIDE 9 MG/ML
1000 INJECTION, SOLUTION INTRAVENOUS
Refills: 0 | Status: DISCONTINUED | OUTPATIENT
Start: 2021-05-06 | End: 2021-05-06

## 2021-05-06 RX ORDER — ONDANSETRON 8 MG/1
4 TABLET, FILM COATED ORAL ONCE
Refills: 0 | Status: DISCONTINUED | OUTPATIENT
Start: 2021-05-06 | End: 2021-05-06

## 2021-05-06 RX ORDER — FLUOXETINE HCL 10 MG
40 CAPSULE ORAL DAILY
Refills: 0 | Status: DISCONTINUED | OUTPATIENT
Start: 2021-05-06 | End: 2021-05-11

## 2021-05-06 RX ORDER — INSULIN LISPRO 100/ML
VIAL (ML) SUBCUTANEOUS AT BEDTIME
Refills: 0 | Status: DISCONTINUED | OUTPATIENT
Start: 2021-05-06 | End: 2021-05-10

## 2021-05-06 RX ORDER — INSULIN GLARGINE 100 [IU]/ML
40 INJECTION, SOLUTION SUBCUTANEOUS AT BEDTIME
Refills: 0 | Status: DISCONTINUED | OUTPATIENT
Start: 2021-05-06 | End: 2021-05-07

## 2021-05-06 RX ORDER — ATORVASTATIN CALCIUM 80 MG/1
80 TABLET, FILM COATED ORAL AT BEDTIME
Refills: 0 | Status: DISCONTINUED | OUTPATIENT
Start: 2021-05-06 | End: 2021-05-11

## 2021-05-06 RX ORDER — PANTOPRAZOLE SODIUM 20 MG/1
40 TABLET, DELAYED RELEASE ORAL
Refills: 0 | Status: DISCONTINUED | OUTPATIENT
Start: 2021-05-06 | End: 2021-05-11

## 2021-05-06 RX ORDER — RIVAROXABAN 15 MG-20MG
20 KIT ORAL
Refills: 0 | Status: DISCONTINUED | OUTPATIENT
Start: 2021-05-06 | End: 2021-05-11

## 2021-05-06 RX ORDER — METOPROLOL TARTRATE 50 MG
50 TABLET ORAL
Refills: 0 | Status: DISCONTINUED | OUTPATIENT
Start: 2021-05-06 | End: 2021-05-11

## 2021-05-06 RX ORDER — ALBUTEROL 90 UG/1
2 AEROSOL, METERED ORAL EVERY 6 HOURS
Refills: 0 | Status: DISCONTINUED | OUTPATIENT
Start: 2021-05-06 | End: 2021-05-11

## 2021-05-06 RX ORDER — FUROSEMIDE 40 MG
40 TABLET ORAL
Refills: 0 | Status: DISCONTINUED | OUTPATIENT
Start: 2021-05-06 | End: 2021-05-11

## 2021-05-06 RX ORDER — PIPERACILLIN AND TAZOBACTAM 4; .5 G/20ML; G/20ML
3.38 INJECTION, POWDER, LYOPHILIZED, FOR SOLUTION INTRAVENOUS EVERY 8 HOURS
Refills: 0 | Status: DISCONTINUED | OUTPATIENT
Start: 2021-05-06 | End: 2021-05-10

## 2021-05-06 RX ORDER — INSULIN LISPRO 100/ML
15 VIAL (ML) SUBCUTANEOUS
Refills: 0 | Status: DISCONTINUED | OUTPATIENT
Start: 2021-05-06 | End: 2021-05-06

## 2021-05-06 RX ORDER — MONTELUKAST 4 MG/1
10 TABLET, CHEWABLE ORAL DAILY
Refills: 0 | Status: DISCONTINUED | OUTPATIENT
Start: 2021-05-06 | End: 2021-05-11

## 2021-05-06 RX ORDER — ACETAMINOPHEN 500 MG
650 TABLET ORAL EVERY 6 HOURS
Refills: 0 | Status: DISCONTINUED | OUTPATIENT
Start: 2021-05-06 | End: 2021-05-11

## 2021-05-06 RX ORDER — INSULIN LISPRO 100/ML
3 VIAL (ML) SUBCUTANEOUS ONCE
Refills: 0 | Status: COMPLETED | OUTPATIENT
Start: 2021-05-06 | End: 2021-05-06

## 2021-05-06 RX ORDER — ASPIRIN/CALCIUM CARB/MAGNESIUM 324 MG
81 TABLET ORAL DAILY
Refills: 0 | Status: DISCONTINUED | OUTPATIENT
Start: 2021-05-06 | End: 2021-05-11

## 2021-05-06 RX ORDER — SACCHAROMYCES BOULARDII 250 MG
250 POWDER IN PACKET (EA) ORAL
Refills: 0 | Status: DISCONTINUED | OUTPATIENT
Start: 2021-05-06 | End: 2021-05-11

## 2021-05-06 RX ORDER — HYDROMORPHONE HYDROCHLORIDE 2 MG/ML
0.5 INJECTION INTRAMUSCULAR; INTRAVENOUS; SUBCUTANEOUS
Refills: 0 | Status: DISCONTINUED | OUTPATIENT
Start: 2021-05-06 | End: 2021-05-06

## 2021-05-06 RX ORDER — LOSARTAN POTASSIUM 100 MG/1
25 TABLET, FILM COATED ORAL
Refills: 0 | Status: DISCONTINUED | OUTPATIENT
Start: 2021-05-06 | End: 2021-05-11

## 2021-05-06 RX ORDER — OXYCODONE AND ACETAMINOPHEN 5; 325 MG/1; MG/1
1 TABLET ORAL EVERY 6 HOURS
Refills: 0 | Status: DISCONTINUED | OUTPATIENT
Start: 2021-05-06 | End: 2021-05-11

## 2021-05-06 RX ORDER — INSULIN GLARGINE 100 [IU]/ML
30 INJECTION, SOLUTION SUBCUTANEOUS AT BEDTIME
Refills: 0 | Status: DISCONTINUED | OUTPATIENT
Start: 2021-05-06 | End: 2021-05-06

## 2021-05-06 RX ORDER — INSULIN LISPRO 100/ML
20 VIAL (ML) SUBCUTANEOUS
Refills: 0 | Status: DISCONTINUED | OUTPATIENT
Start: 2021-05-06 | End: 2021-05-09

## 2021-05-06 RX ORDER — DAPTOMYCIN 500 MG/10ML
550 INJECTION, POWDER, LYOPHILIZED, FOR SOLUTION INTRAVENOUS EVERY 24 HOURS
Refills: 0 | Status: DISCONTINUED | OUTPATIENT
Start: 2021-05-06 | End: 2021-05-07

## 2021-05-06 RX ORDER — HYDROMORPHONE HYDROCHLORIDE 2 MG/ML
1 INJECTION INTRAMUSCULAR; INTRAVENOUS; SUBCUTANEOUS
Refills: 0 | Status: DISCONTINUED | OUTPATIENT
Start: 2021-05-06 | End: 2021-05-06

## 2021-05-06 RX ORDER — GLUCAGON INJECTION, SOLUTION 0.5 MG/.1ML
1 INJECTION, SOLUTION SUBCUTANEOUS ONCE
Refills: 0 | Status: DISCONTINUED | OUTPATIENT
Start: 2021-05-06 | End: 2021-05-11

## 2021-05-06 RX ORDER — OXYCODONE AND ACETAMINOPHEN 5; 325 MG/1; MG/1
0.5 TABLET ORAL EVERY 6 HOURS
Refills: 0 | Status: DISCONTINUED | OUTPATIENT
Start: 2021-05-06 | End: 2021-05-11

## 2021-05-06 RX ADMIN — Medication 4: at 17:21

## 2021-05-06 RX ADMIN — OXYCODONE AND ACETAMINOPHEN 1 TABLET(S): 5; 325 TABLET ORAL at 13:14

## 2021-05-06 RX ADMIN — Medication 3 UNIT(S): at 09:45

## 2021-05-06 RX ADMIN — PANTOPRAZOLE SODIUM 40 MILLIGRAM(S): 20 TABLET, DELAYED RELEASE ORAL at 06:09

## 2021-05-06 RX ADMIN — PIPERACILLIN AND TAZOBACTAM 25 GRAM(S): 4; .5 INJECTION, POWDER, LYOPHILIZED, FOR SOLUTION INTRAVENOUS at 22:14

## 2021-05-06 RX ADMIN — MONTELUKAST 10 MILLIGRAM(S): 4 TABLET, CHEWABLE ORAL at 11:38

## 2021-05-06 RX ADMIN — Medication 40 MILLIGRAM(S): at 11:38

## 2021-05-06 RX ADMIN — PIPERACILLIN AND TAZOBACTAM 25 GRAM(S): 4; .5 INJECTION, POWDER, LYOPHILIZED, FOR SOLUTION INTRAVENOUS at 05:24

## 2021-05-06 RX ADMIN — Medication 40 MILLIEQUIVALENT(S): at 16:19

## 2021-05-06 RX ADMIN — ALBUTEROL 2 PUFF(S): 90 AEROSOL, METERED ORAL at 23:10

## 2021-05-06 RX ADMIN — Medication 650 MILLIGRAM(S): at 21:35

## 2021-05-06 RX ADMIN — Medication 650 MILLIGRAM(S): at 22:23

## 2021-05-06 RX ADMIN — Medication 6: at 11:38

## 2021-05-06 RX ADMIN — Medication 40 MILLIGRAM(S): at 05:24

## 2021-05-06 RX ADMIN — Medication 6: at 06:00

## 2021-05-06 RX ADMIN — INSULIN GLARGINE 40 UNIT(S): 100 INJECTION, SOLUTION SUBCUTANEOUS at 21:36

## 2021-05-06 RX ADMIN — Medication 20 UNIT(S): at 11:40

## 2021-05-06 RX ADMIN — OXYCODONE AND ACETAMINOPHEN 1 TABLET(S): 5; 325 TABLET ORAL at 14:10

## 2021-05-06 RX ADMIN — LOSARTAN POTASSIUM 25 MILLIGRAM(S): 100 TABLET, FILM COATED ORAL at 05:24

## 2021-05-06 RX ADMIN — Medication 20 UNIT(S): at 17:23

## 2021-05-06 RX ADMIN — ATORVASTATIN CALCIUM 80 MILLIGRAM(S): 80 TABLET, FILM COATED ORAL at 22:17

## 2021-05-06 RX ADMIN — Medication 81 MILLIGRAM(S): at 11:38

## 2021-05-06 RX ADMIN — DAPTOMYCIN 122 MILLIGRAM(S): 500 INJECTION, POWDER, LYOPHILIZED, FOR SOLUTION INTRAVENOUS at 18:56

## 2021-05-06 RX ADMIN — Medication 50 MILLIGRAM(S): at 05:24

## 2021-05-06 RX ADMIN — RIVAROXABAN 20 MILLIGRAM(S): KIT at 17:21

## 2021-05-06 RX ADMIN — Medication 250 MILLIGRAM(S): at 17:21

## 2021-05-06 NOTE — PROGRESS NOTE ADULT - SUBJECTIVE AND OBJECTIVE BOX
65y year old Female seen at [facility] [address]  for right foot sub 4th met head ulcer with erythema right lower leg.   Pt is s/p 5/6/21 right 4th metatarsal head condylectomy.  Denies any fever, chills, nausea, vomiting, chest pain, shortness of breath, or calf pain at this time.        PAST MEDICAL & SURGICAL HISTORY:  Hypertension    Gastroparesis due to DM    Depression    Herniated disc    Spinal stenosis  chronic back pain    Cholesterol serum elevated    MVP (mitral valve prolapse)    Hypothyroid  not on medication    Obesity    IBS (irritable bowel syndrome)    GERD (gastroesophageal reflux disease)    Diabetes type 2, uncontrolled    History of diabetic retinopathy    S/P bariatric surgery  lap band surgery two times due to erosion, now no more    S/P tonsillectomy and adenoidectomy    Cataract  B/L cataracts    S/P laminectomy  Pt denied this?    S/P D&amp;C (status post dilation and curettage)  D&amp;C when she was 18    Cervix abnormality  Ablation of cervix    Breast anomaly  Biopsy of both breast benign lesions    right eye torn retina    S/P TAVR (transcatheter aortic valve replacement)        Allergies    codeine (Vomiting)  shellfish (Stomach Upset)    Intolerances    fish (Stomach Upset)      MEDICATIONS  (STANDING):    MEDICATIONS  (PRN):      Social History:      FAMILY HISTORY:  Family history of aortic stenosis  Family hx of structural heart dx        Vital Signs Last 24 Hrs  Vital Signs Last 24 Hrs  T(C): 36.2 (06 May 2021 09:33), Max: 37 (06 May 2021 07:11)  T(F): 97.2 (06 May 2021 09:33), Max: 98.6 (06 May 2021 07:11)  HR: 61 (06 May 2021 11:26) (59 - 75)  BP: 106/66 (06 May 2021 11:26) (85/31 - 151/64)  BP(mean): --  RR: 22 (06 May 2021 11:26) (14 - 22)  SpO2: 98% (06 May 2021 11:26) (93% - 99%)    PHYSICAL EXAM:  Vascular: DP & PT palpable bilaterally, Capillary refill 3 seconds.  Nonpitting edema right foot and leg.  Erythema right lower leg.  No ecchymosis b/l  Neurological: Light touch sensation intact bilaterally  Musculoskeletal: 5/5 strength in all quadrants bilaterally, AJ & STJ ROM intact  Dermatological:  0.75 cm ulceration noted to the right foot sub 4th met head, necrotic wound bed, PTB(+) 4th met head,  no periwound erythema, no fluctuance, no malodor, no proximal streaking at this time                              11.8   13.05 )-----------( 277      ( 05 May 2021 05:07 )             36.8   05-05    137  |  99  |  25<H>  ----------------------------<  287<H>  3.8   |  29  |  1.00    Ca    9.3      05 May 2021 05:07  Phos  3.3     05-05  Mg     2.1     05-05    TPro  7.4  /  Alb  3.3  /  TBili  0.5  /  DBili  x   /  AST  19  /  ALT  22  /  AlkPhos  128<H>  05-05          Imaging: ----------      EXAM: DUPLEX LOW ARTERIES UNI LTD RT      PROCEDURE DATE: 05/05/2021        INTERPRETATION: Clinical information: History smoking, diabetes, hypertension, hyperlipidemia, presents with lower extremity nonhealing ulcers.    COMPARISON: Lower extremity arterial Doppler study dated 5/5/2021.    TECHNIQUE: Right lower extremity arterial duplex study. Common femoral artery is not visualized due to body habitus.    FINDINGS: Duplex evaluation of the distributive arteries of the right lower extremity was performed. There is extensive calcific plaque. The arteries above the knee are patent. Biphasic and triphasic arterial waveforms are present. The peroneal artery is not visualized and may be occluded. Disturbed color flow and elevated blood flow velocities are present in the dorsalis pedis artery.    The systolic velocities are as follows:    Right lower extremity    Common femoral artery not visualized  Superficial femoral artery proximal 184 cm/s, mid 108 cm/s, distal 168 cm/s  Popliteal artery 126 cm/s  Posterior tibial artery 128 cm/s  Anterior tibial artery 103 cm/s  Peroneal artery not visualized  Dorsalis pedis artery 326 cm/s    IMPRESSION: Extensive calcified atheromatous plaque.    Two-vessel runoff in the right calf with presumed occlusion of the peroneal artery.    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            CASSIDY JIANG MD; Attending Radiologist  This document has been electronically signed. May 6 2021 9:10AM      EXAM: MR FOOT RT      PROCEDURE DATE: 05/05/2021        INTERPRETATION: MR FOOT RIGHT    HISTORY: Right foot ulcer. Pain. Infection. Evaluate for osteomyelitis.    TECHNIQUE: Multiplanar MRI of the right forefoot was performed without contrast using 7 sequences.    COMPARISON: Bilateral foot x-rays dated June 20, 2015    FINDINGS:    OSSEOUS STRUCTURES  Fractures/Stress Reactions: None.  Osteomyelitis: None.    INTERPHALANGEAL JOINTS  Articular Surfaces: Preserved. There are hammertoe deformities.  Effusions/Synovitis: None.    1ST METATARSOPHALANGEAL JOINT  Articular Surfaces: Tiny marginal osteophytes are present.  Effusions/Synovitis: None.  Sesamoids: Located with small subchondral cysts in the metatarsal head.    LESSER METATARSOPHALANGEAL JOINTS  Articular Surfaces: Preserved.  Effusions/Synovitis: None.    TARSOMETATARSAL JOINTS  Articular Surfaces: There are tiny scattered marginal osteophytes. Tiny subchondral cysts/intraosseous ganglion are noted within the third and fourth metatarsal bases.  Effusions/Synovitis: None.    INTERTARSAL JOINTS  Articular Surfaces: Small naviculocuneiform joint osteophytes are present with small subchondral cysts.  Effusions/Synovitis: None.    INTERMETATARSAL SPACES  Neuromas: None.  Bursa: Small first, slight second, and small third webspace bursa are noted.    LISFRANC LIGAMENT  Intact.    TENDONS  Flexor Tendons: Intact.  Extensor Tendons: Intact.    DISTAL PLANTAR FASCIA  Intact.    SOFT TISSUES  Musculature: Moderate to severe muscle atrophy is noted with increased T2 signal consistent with diabetic neuropathy.  Subcutaneous Tissues: Moderate subcutaneous edema is present without abscess. Plantar ulceration appears to be present at the level of the fourth webspace.    IMPRESSION:  1. No evidence for osteomyelitis or abscess.      XAM: XR FOOT COMP MIN 3 VIEWS RT      PROCEDURE DATE: 05/05/2021        INTERPRETATION: Right foot    HISTORY: Chronic ulcer, fourth metatarsal head    Three views of the right foot show no evidence of fracture nor destructive change. The joint spaces are maintained.    IMPRESSION: No gross destructive changes.            Thank you for this referral.            DONALD BECK MD; Attending Interventional Radiologist  This document has been electronically signed. May 5 2021 3:49PM      EXAM: PHYSIOL EXTREM LOW 3+ LEV BI      PROCEDURE DATE: 05/05/2021        INTERPRETATION: History: Nonhealing ulcer right foot    Risk factors: Smoking, hypertension, diabetes.    A prior examination, dated 6/18/2020, showed a right MARQUIS of 1.23 and a left MARQUIS of 1.26.    On this examination, the right ankle-brachial index equals 1.21; the left ankle-brachial index equals 1.28.    The blood pressure measurements on the right are 155 mmHg at the upper calf and at the right ankle 147 mmHg in the posterior tibial artery and 139 mmHg in the dorsal pedis artery.    The blood pressure measurement at the right great toe is 150 mmHg and the right toe-brachial index equals 1.24.    The blood pressure measurements on the left are 167 mmHg in the upper calf and at the left ankle 143 mmHg in the posterior tibial artery and 155 mmHg in the dorsal pedis artery.    The blood pressure measurement at the left great toe is 188 mmHg and the left toe-brachial index equals 1.55.    Artificially elevated blood pressure measurements, and in this case toe-brachial indices are characteristic for calcified, noncompressible, diabetic arteries.    The amplitude of the pulse volume recordings are normal bilaterally at all levels from the upper thighs through the toes.    Impression: Lower extremity arterial disease is NOT identified.

## 2021-05-06 NOTE — DIETITIAN INITIAL EVALUATION ADULT. - PROBLEM SELECTOR PLAN 6
Chronic  -Patient does not tolerate CPAP, states she uses 2L NC qhs at home, continue  -Patient also likely has component of OHS

## 2021-05-06 NOTE — DIETITIAN INITIAL EVALUATION ADULT. - OTHER INFO
64 y/o female adm with type 2 DM with foot ulcer. PMH diabetic retinopathy, type 2 DM, GERD, obesity, hypothyroidism, high chol, depression, gastroparesis, HTN, Tavr 2015. Pt s/p OR today for right foot, 4th metatarsal plantar condylectomy. Pt eating lunch, appetite very good. Pt states that she has been educated numerous times in the past re: her diet. Diet compliance not there. Pt's weight fluctuates a little depending on fluid balance. Pt aware of current A1c level of 9.0l last reading over 10, as per pt. Provided pt with verbal and written information re: consistent carb diet. Pt verbalized understanding. Compliance encouraged, however not expected.

## 2021-05-06 NOTE — PROGRESS NOTE ADULT - ASSESSMENT
66yo woman with PMHx AS s/p TAVR 2015, HOCM, pAFib, JOSR (does not tolerate CPAP, on 2L O2 at home qhs), anxiety, morbid obesity, DM, HTN presents to ED with R foot ulcer, admitted for rule out osteomyelitis.  MRI negative for osteomyelitis however due to increased risk of developing OM in the future, podiatry recommended surgical intervention and now pt is POD 0 Right 4th plantar condylectomy.

## 2021-05-06 NOTE — PROGRESS NOTE ADULT - SUBJECTIVE AND OBJECTIVE BOX
[facility] -- Oni Campbell, Merlin Mayorga, Blayne Torres Savella  Office # [phone]      Follow Up:      Subjective/Observations:       REVIEW OF SYSTEMS: All other review of systems is negative unless indicated above    PAST MEDICAL & SURGICAL HISTORY:  Hypertension    Gastroparesis due to DM    Depression    Herniated disc    Spinal stenosis  chronic back pain    Cholesterol serum elevated    MVP (mitral valve prolapse)    Hypothyroid  not on medication    Obesity    IBS (irritable bowel syndrome)    GERD (gastroesophageal reflux disease)    Diabetes type 2, uncontrolled    History of diabetic retinopathy    S/P bariatric surgery  lap band surgery two times due to erosion, now no more    S/P tonsillectomy and adenoidectomy    Cataract  B/L cataracts    S/P laminectomy  Pt denied this?    S/P D&amp;C (status post dilation and curettage)  D&amp;C when she was 18    Cervix abnormality  Ablation of cervix    Breast anomaly  Biopsy of both breast benign lesions    right eye torn retina    S/P TAVR (transcatheter aortic valve replacement)        MEDICATIONS  (STANDING):  aspirin enteric coated 81 milliGRAM(s) Oral daily  atorvastatin 80 milliGRAM(s) Oral at bedtime  DAPTOmycin IVPB 550 milliGRAM(s) IV Intermittent every 24 hours  FLUoxetine 40 milliGRAM(s) Oral daily  furosemide   Injectable 40 milliGRAM(s) IV Push two times a day  glucagon  Injectable 1 milliGRAM(s) IntraMuscular once  guaiFENesin  milliGRAM(s) Oral <User Schedule>  insulin glargine Injectable (LANTUS) 40 Unit(s) SubCutaneous at bedtime  insulin lispro (ADMELOG) corrective regimen sliding scale   SubCutaneous three times a day before meals  insulin lispro (ADMELOG) corrective regimen sliding scale   SubCutaneous at bedtime  insulin lispro Injectable (ADMELOG) 20 Unit(s) SubCutaneous three times a day before meals  losartan 25 milliGRAM(s) Oral two times a day  metoprolol tartrate 50 milliGRAM(s) Oral two times a day  montelukast 10 milliGRAM(s) Oral daily  pantoprazole    Tablet 40 milliGRAM(s) Oral before breakfast  rivaroxaban 20 milliGRAM(s) Oral with dinner  saccharomyces boulardii 250 milliGRAM(s) Oral two times a day    MEDICATIONS  (PRN):  acetaminophen   Tablet .. 650 milliGRAM(s) Oral every 6 hours PRN Mild Pain (1 - 3)  ALBUTerol    90 MICROgram(s) HFA Inhaler 2 Puff(s) Inhalation every 6 hours PRN Shortness of Breath and/or Wheezing  oxycodone    5 mG/acetaminophen 325 mG 1 Tablet(s) Oral every 6 hours PRN Severe Pain (7 - 10)  oxycodone    5 mG/acetaminophen 325 mG 0.5 Tablet(s) Oral every 6 hours PRN Moderate Pain (4 - 6)      Allergies    codeine (Vomiting)  shellfish (Stomach Upset)    Intolerances    fish (Stomach Upset)      Vital Signs Last 24 Hrs  T(C): 36.2 (06 May 2021 09:33), Max: 37 (06 May 2021 07:11)  T(F): 97.2 (06 May 2021 09:33), Max: 98.6 (06 May 2021 07:11)  HR: 61 (06 May 2021 11:26) (59 - 75)  BP: 106/66 (06 May 2021 11:26) (85/31 - 151/64)  BP(mean): --  RR: 22 (06 May 2021 11:26) (14 - 22)  SpO2: 98% (06 May 2021 11:26) (93% - 99%)    I&O's Summary    05 May 2021 07:01  -  06 May 2021 07:00  --------------------------------------------------------  IN: 650 mL / OUT: 0 mL / NET: 650 mL    06 May 2021 07:01  -  06 May 2021 13:02  --------------------------------------------------------  IN: 340 mL / OUT: 0 mL / NET: 340 mL          PHYSICAL EXAM:  TELE:   Constitutional: NAD, awake and alert, well-developed  HEENT: Moist Mucous Membranes, Anicteric  Pulmonary: Non-labored, breath sounds are clear bilaterally, No wheezing, crackles or rhonchi  Cardiovascular: Regular, S1 and S2 nl, No murmurs, rubs, gallops or clicks  Gastrointestinal: Bowel Sounds present, soft, nontender.   Lymph: No lymphadenopathy. No peripheral edema.  Skin: No visible rashes or ulcers.  Psych:  Mood & affect appropriate    LABS: All Labs Reviewed:                        11.8   13.05 )-----------( 277      ( 05 May 2021 05:07 )             36.8                         12.9   11.55 )-----------( 289      ( 04 May 2021 20:03 )             40.9     05 May 2021 05:07    137    |  99     |  25     ----------------------------<  287    3.8     |  29     |  1.00   04 May 2021 20:03    138    |  100    |  18     ----------------------------<  198    3.6     |  30     |  0.90     Ca    9.3        05 May 2021 05:07  Ca    9.9        04 May 2021 20:03  Phos  3.3       05 May 2021 05:07  Mg     2.1       05 May 2021 05:07    TPro  7.4    /  Alb  3.3    /  TBili  0.5    /  DBili  x      /  AST  19     /  ALT  22     /  AlkPhos  128    05 May 2021 05:07  TPro  9.1    /  Alb  4.0    /  TBili  0.5    /  DBili  x      /  AST  23     /  ALT  28     /  AlkPhos  152    04 May 2021 20:03    PT/INR - ( 04 May 2021 20:03 )   PT: 17.0 sec;   INR: 1.48 ratio         PTT - ( 04 May 2021 20:03 )  PTT:38.1 sec  CARDIAC MARKERS ( 05 May 2021 14:21 )  x     / x     / 101 U/L / x     / x             ECG:    Echo:    Radiology:

## 2021-05-06 NOTE — DIETITIAN INITIAL EVALUATION ADULT. - PROBLEM SELECTOR PLAN 5
Patient c/o bilat flank pain with frothy urine  -neg CVA tenderness, no dysuria or hematuria  -F/u UA

## 2021-05-06 NOTE — PROGRESS NOTE ADULT - PROBLEM SELECTOR PLAN 1
cont lantus 40 units qhs  cont admelog 20 units 3x/day before meals  cont mod dose admelog scale coverage qac/qhs  cont cons cho diet  goal bg 100-180 in hosp setting

## 2021-05-06 NOTE — PROGRESS NOTE ADULT - PROBLEM SELECTOR PLAN 3
Chronic  -EKG showing NSR rate 79 with PVCs, LVH  -Continue Xarelto 20mg qd, Metoprolol tartrate 50mg BID  -Monitor on remote telemetry, currently in sinus rhythm and rate controlled  -Cardio [facility] group following, recs appreciated

## 2021-05-06 NOTE — PROGRESS NOTE ADULT - SUBJECTIVE AND OBJECTIVE BOX
CAPILLARY BLOOD GLUCOSE      POCT Blood Glucose.: 275 mg/dL (06 May 2021 10:51)  POCT Blood Glucose.: 278 mg/dL (06 May 2021 08:59)  POCT Blood Glucose.: 281 mg/dL (06 May 2021 05:30)  POCT Blood Glucose.: 342 mg/dL (05 May 2021 22:19)  POCT Blood Glucose.: 348 mg/dL (05 May 2021 17:10)  POCT Blood Glucose.: 298 mg/dL (05 May 2021 12:13)      Vital Signs Last 24 Hrs  T(C): 36.2 (06 May 2021 09:33), Max: 37 (06 May 2021 07:11)  T(F): 97.2 (06 May 2021 09:33), Max: 98.6 (06 May 2021 07:11)  HR: 61 (06 May 2021 11:26) (59 - 75)  BP: 106/66 (06 May 2021 11:26) (85/31 - 151/64)  BP(mean): --  RR: 22 (06 May 2021 11:26) (14 - 22)  SpO2: 98% (06 May 2021 11:26) (93% - 99%)    General: WN/WD NAD  Respiratory: CTA B/L  CV: RRR, S1S2, no murmurs, rubs or gallops  Abdominal: Soft, NT, ND +BS, Last BM  Extremities: No edema, + peripheral pulses     05-05    137  |  99  |  25<H>  ----------------------------<  287<H>  3.8   |  29  |  1.00    Ca    9.3      05 May 2021 05:07  Phos  3.3     05-05  Mg     2.1     05-05    TPro  7.4  /  Alb  3.3  /  TBili  0.5  /  DBili  x   /  AST  19  /  ALT  22  /  AlkPhos  128<H>  05-05      atorvastatin 80 milliGRAM(s) Oral at bedtime  glucagon  Injectable 1 milliGRAM(s) IntraMuscular once  insulin glargine Injectable (LANTUS) 40 Unit(s) SubCutaneous at bedtime  insulin lispro (ADMELOG) corrective regimen sliding scale   SubCutaneous three times a day before meals  insulin lispro (ADMELOG) corrective regimen sliding scale   SubCutaneous at bedtime  insulin lispro Injectable (ADMELOG) 20 Unit(s) SubCutaneous three times a day before meals

## 2021-05-06 NOTE — DIETITIAN INITIAL EVALUATION ADULT. - PROBLEM SELECTOR PLAN 8
Chronic  -Continue home metoprolol, lasix, losartan with hold parameters  -Monitor routine hemodynamics

## 2021-05-06 NOTE — DIETITIAN INITIAL EVALUATION ADULT. - PROBLEM SELECTOR PLAN 10
Chronic  -Continue home fluoxetine    Problem 11: Need for prophylactic measure  DVT PPx: Xarelto 20mg qd  GI PPx: on pantoprazole qd  Electrolyte Imbalances: Hold home PO K tablets, monitor daily CMP and replete electrolytes PRN

## 2021-05-06 NOTE — DIETITIAN INITIAL EVALUATION ADULT. - PROBLEM SELECTOR PLAN 4
Hx of Type 2 DM on insulin, poorly controlled  -On 30-40units Humalog 3x daily pre-meal, 48-52units Lantus qhs  -Hold home insulin regimen, start low dose sliding scale, 15 units Admelog pre-meal, 30U Lantus qhs  -hypoglycemia protocol, accuchecks  -Goal glucose on hospital setting 100-180, adjust insulin regimen PRN  -F/u HgbA1c  -Endo Dr. Perlman consulted

## 2021-05-06 NOTE — PROGRESS NOTE ADULT - ASSESSMENT
66yo woman with PMH AS s/p TAVR 2015, HOCM, pAFib, JOSR (does not tolerate CPAP, on 2L O2 at home qhs), anxiety, morbid obesity, DM, HTN presents to ED from wound clnic with R foot ulcer to evaluate for osteomyelitis. Plan for right foot 4th met head resection pending vascular studies, MRI. Cardiology consulted for cardiac optimization pre/post procedure    Valvular Heart Disease/ Aortic stenosis s/p TAVR 2015/ HOCM  -  Patient has extensive cardiac history, follows with Dr. Espinal for a possibility of repeat TAVR as she is still significantly SOB, RASMUSSEN, with increased leg edema for the past few months. Patient is scheduled for a LAMBERT on 5/24 with Dr. Espinal.   - TTE from 4/28/21 showing: hyperdynamic LV systolic function, LVEF 75%, mild to mod MR, mild pulmonary pressures  - Continue Lasix 40mg IV BID for LE edema and SOB   - Continue Metoprolol tartrate 50mg BID  - Continue aspirin   - continue home O2 2L at home. Does have h/o asthma and JOSR     Paroxysmal atrial fibrillation  - EKG showing NSR rate 79 with PVCs, LVH  - Continue Xarelto 20mg qd, Metoprolol tartrate 50mg BID  - Patient reports that she has h/o DVT and PE and was told to be on AC indefinitely and had testing done for hereditary issues.   - Monitor on remote telemetry    Hypertension  -151/64  - Continue home metoprolol, Lasix, losartan with hold parameters  - Monitor routine hemodynamics.     Hyperlipidemia  - Continue home atorvastatin.     Osteomyelitis foot, R sub 4th met head ulcer  - sp right fourth metatarsal head condylectomy  -fu podiatry recs      - Monitor and replete lytes, keep K>4, Mg>2.  - All other medical needs as per primary team.  - Other cardiovascular workup will depend on clinical course.  - Will continue to follow.  Ester Womack FNP-C  Cardiology NP  [id number] [phone]

## 2021-05-06 NOTE — DIETITIAN INITIAL EVALUATION ADULT. - PROBLEM SELECTOR PLAN 7
Patient also with component of asthma vs. allergies  -Continue Montelukast 10mg qd, Mucinex 600mg qd, albuterol inhaler PRN

## 2021-05-06 NOTE — PROGRESS NOTE ADULT - PROBLEM SELECTOR PLAN 1
New acute R sub 4th met head ulcer, sent by Dr. Palencia from wound care, likely from chronic uncontrolled DM  -POD 0 from R 4th plantar condylectomy with Dr. John   -s/p IV Vanco and Zosyn in ED, now on Daptomycin and Zosyn per ID recs  -MRI foot shows No evidence for osteomyelitis or abscess.  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   -wound culture + for MSSA; MRSA PCR negative  -Blood and urine cultures NGTD  -Continue routine dressing with Calcium Alginate and DSD per podiatry  -pain regimen prn: tylenol for mild, Percocet 5/325 1/2 tab q6 for moderate and 1 tab q6 for severe  -ID Dr. Alexis following, recs appreciated  -Cardio Dr. Mayorga consulted for clearance  -PT consult, 50% WB RLE on heel New acute R sub 4th met head ulcer, sent by Dr. Palencia from wound care, likely from chronic uncontrolled DM  -POD 0 from R 4th plantar condylectomy with Dr. John   -s/p IV Vanco and Zosyn in ED, now on Daptomycin and Zosyn per ID recs however MRSA negative and MRI neg for OM, will deescalate pending Dr. Alexis's recs  -- Probiotics started as pt now c/o diarrhea  -MRI foot shows No evidence for osteomyelitis or abscess.  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   -wound culture + for MSSA; MRSA PCR negative  -Blood and urine cultures NGTD  -Continue routine dressing with Calcium Alginate and DSD per podiatry  -pain regimen prn: tylenol for mild, Percocet 5/325 1/2 tab q6 for moderate and 1 tab q6 for severe  -ID Dr. Alexis following, recs appreciated  -Cardio Dr. Mayorga consulted for clearance  -PT consult, 50% WB RLE on heel New acute R sub 4th met head ulcer, sent by Dr. Palencia from wound care, likely from chronic uncontrolled DM  -POD 0 from R 4th plantar condylectomy with Dr. John   -s/p IV Vanco and Zosyn in ED, now on Daptomycin and Zosyn per ID recs however MRSA negative and MRI neg for OM, will deescalate pending Dr. Alexis's recs  -- Probiotics started as pt now c/o diarrhea  -MRI foot shows No evidence for osteomyelitis or abscess.  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   -+staph aureus PCR; MRSA PCR negative  -Blood and urine cultures NGTD  -Continue routine dressing with Calcium Alginate and DSD per podiatry  -pain regimen prn: tylenol for mild, Percocet 5/325 1/2 tab q6 for moderate and 1 tab q6 for severe  -ID Dr. Alexis following, recs appreciated  -Cardio Dr. Mayorga consulted for clearance  -PT consult, 50% WB RLE on heel

## 2021-05-06 NOTE — PROGRESS NOTE ADULT - PROBLEM SELECTOR PLAN 10
Chronic  -Continue home fluoxetine    Problem 11: Need for prophylactic measure  DVT PPx: Xarelto 20mg qd, restart today, cleared by podiatry    Problem 12: HLD  Chronic  -Continue home atorvastatin    Problem 13: GERD:  -continue pantoprazole qd

## 2021-05-06 NOTE — ASU PREOP CHECKLIST - ALLERGIES REVIEWED
Conjuntivae and eyelids appear normal, Sclerae : White without injection
Final Anesthesia Post-op Assessment    Patient: Henri Sheridan  Procedure(s) Performed: RECTAL EXAM UNDER ANESTHESIA, INCISION DRAINAGE OF ABCESS  Anesthesia type: Monitor Anesthesia Care    Vital Last Value   Temperature 36.8 °C (98.3 °F) (12/29/17 0801)   Pulse 80 (12/29/17 0801)   Respiratory Rate 22 (12/29/17 0801)   Non-Invasive   Blood Pressure 129/62 (12/29/17 0801)   Arterial  Blood Pressure     Pulse Oximetry 95 % (12/29/17 0801)     Last 24 I/O:   Intake/Output Summary (Last 24 hours) at 12/29/17 1004  Last data filed at 12/29/17 0931   Gross per 24 hour   Intake              600 ml   Output                0 ml   Net              600 ml       PATIENT LOCATION: Day Surgery  POST-OP VITAL SIGNS: stable  LEVEL OF CONSCIOUSNESS: participates in exam, answers questions appropriately, awake, alert and oriented  RESPIRATORY STATUS: spontaneous ventilation  CARDIOVASCULAR: blood pressure returned to baseline and stable  HYDRATION: euvolemic    PAIN MANAGEMENT: adequately controlled  NAUSEA: None  AIRWAY PATENCY: patent  POST-OP ASSESSMENT: no complications, patient tolerated procedure well with no complications and sufficiently recovered from acute administration of anesthesia effects and able to participate in evaluation  COMPLICATIONS: none      
done

## 2021-05-06 NOTE — DIETITIAN INITIAL EVALUATION ADULT. - PERSON TAUGHT/METHOD
consistent carb/verbal instruction/written material/patient instructed/teach back - (Patient repeats in own words)

## 2021-05-06 NOTE — PROGRESS NOTE ADULT - ASSESSMENT
Right foot ulcer sub 4th met head      PROBLEM/RECOMMENDATIONS:    Pt evaluated and chart reviewed  f/u RFWCx sub 4th met head ulcer  S/P right 4th met head condylectomy 5/6/21 w/ Dr. John  No further podiatric surgical intervention at this time  All of pt's questions were answered to satisfaction  Pt stated she understood all discussed  Podiatry will follow pt while in house    Wound Care Instructions  1.  Keep dressing clean, dry and intact until clinic visit  2.  Make appointment to be seen by Dr. Palencia or Dr. John at [facility] w/in 3-5 days of d/c  3.  If symptoms of nausea, vomiting, fever, chills, shortness of breath, chest pain, increasing pain foot or posterior leg pain develop - go to the emergency department.

## 2021-05-06 NOTE — DIETITIAN INITIAL EVALUATION ADULT. - PROBLEM SELECTOR PLAN 1
New acute R sub 4th met head ulcer, sent by Dr. Palencia from wound care  -Patient with leukocytosis, ESR 50, afebrile on admission  -Admit to general medical floor  -F/u x-ray R foot  -F/u MRI R foot and arterial studies RLE per podiatry  -s/p IV Vanco and Zosyn in ED, continue Vanco 2g q12h, Zosyn 3.375g q8h, f/u Vanco trough  -F/u wound culture, MRSA PCR  -F/u Blood cultures, **of note patient received IV Zosyn, Vanco in ED prior to cultures being drawn  -Continue routine dressing with Calcium Alginate and DSD per podiatry  -Hold off pain regimen as patient with chronic neuropathy  -Podiatry procedure not booked yet, pending vascular studies/MRI  -Podiatry Dr. Palencia following  -ID Dr. Alexis consulted  -Will need cardiac clearance, [facility] consulted

## 2021-05-06 NOTE — PROGRESS NOTE ADULT - SUBJECTIVE AND OBJECTIVE BOX
Patient is a 65y old  Female who presents with a chief complaint of r/o osteomyelitis (06 May 2021 11:37)      INTERVAL HPI/OVERNIGHT EVENTS: Pt seen and evaluated at bedside this AM.  No acute events overnights.  POD 0 Right 4th plantar condylectomy this AM.  Procedure tolerated well.  Pt c/o mild acute on chronic mid to lower back pain, likely 2/2 positional as pt states she prefers to sit on chair rather than lay in bed even while at home, states she wants to get out of bed into chair, she is advised that PT will evaluate her first and then she can proceed with PWB on heel of RLE. Pt denies fever, chills, n/v, CP, SOB, abd pain, motor or sensory deficits.    MEDICATIONS  (STANDING):  aspirin enteric coated 81 milliGRAM(s) Oral daily  atorvastatin 80 milliGRAM(s) Oral at bedtime  DAPTOmycin IVPB 550 milliGRAM(s) IV Intermittent every 24 hours  FLUoxetine 40 milliGRAM(s) Oral daily  furosemide   Injectable 40 milliGRAM(s) IV Push two times a day  glucagon  Injectable 1 milliGRAM(s) IntraMuscular once  guaiFENesin  milliGRAM(s) Oral <User Schedule>  insulin glargine Injectable (LANTUS) 40 Unit(s) SubCutaneous at bedtime  insulin lispro (ADMELOG) corrective regimen sliding scale   SubCutaneous three times a day before meals  insulin lispro (ADMELOG) corrective regimen sliding scale   SubCutaneous at bedtime  insulin lispro Injectable (ADMELOG) 20 Unit(s) SubCutaneous three times a day before meals  losartan 25 milliGRAM(s) Oral two times a day  metoprolol tartrate 50 milliGRAM(s) Oral two times a day  montelukast 10 milliGRAM(s) Oral daily  pantoprazole    Tablet 40 milliGRAM(s) Oral before breakfast  rivaroxaban 20 milliGRAM(s) Oral with dinner    MEDICATIONS  (PRN):  ALBUTerol    90 MICROgram(s) HFA Inhaler 2 Puff(s) Inhalation every 6 hours PRN Shortness of Breath and/or Wheezing      Allergies    codeine (Vomiting)  shellfish (Stomach Upset)    Intolerances    fish (Stomach Upset)      REVIEW OF SYSTEMS:  CONSTITUTIONAL: No fever or chills  HEENT:  No headache, no sore throat  RESPIRATORY: No cough, wheezing, or shortness of breath  CARDIOVASCULAR: No chest pain, palpitations  GASTROINTESTINAL: No abd pain, nausea, vomiting, or diarrhea  GENITOURINARY: No dysuria, frequency, or hematuria  NEUROLOGICAL: no focal weakness or dizziness  MUSCULOSKELETAL: + mid-low back pain chronic, no focal weakness    Vital Signs Last 24 Hrs  T(C): 36.2 (06 May 2021 09:33), Max: 37 (06 May 2021 07:11)  T(F): 97.2 (06 May 2021 09:33), Max: 98.6 (06 May 2021 07:11)  HR: 61 (06 May 2021 11:26) (59 - 75)  BP: 106/66 (06 May 2021 11:26) (85/31 - 151/64)  BP(mean): --  RR: 22 (06 May 2021 11:26) (14 - 22)  SpO2: 98% (06 May 2021 11:26) (93% - 99%)    PHYSICAL EXAM:  GENERAL: NAD, pt sitting upright in bed comfortably, eating breakfast  HEENT:  anicteric, moist mucous membranes  CHEST/LUNG:  CTA b/l, no rales, wheezes, or rhonchi  HEART:  RRR, S1, S2, + holosystolic murmur, heard best at R upper sternal border, 2ICS  ABDOMEN:  BS+, obese, soft, nontender, nondistended  EXTREMITIES: no edema, cyanosis, or calf tenderness;  R foot dressed with post op DSD, C/D/I  NERVOUS SYSTEM: A&Ox3: no motor or sensory deficits    LABS:    CBC Full  -  ( 05 May 2021 05:07 )  WBC Count : 13.05 K/uL  Hemoglobin : 11.8 g/dL  Hematocrit : 36.8 %  Platelet Count - Automated : 277 K/uL  Mean Cell Volume : 86.0 fl  Mean Cell Hemoglobin : 27.6 pg  Mean Cell Hemoglobin Concentration : 32.1 gm/dL  Auto Neutrophil # : 9.84 K/uL  Auto Lymphocyte # : 1.97 K/uL  Auto Monocyte # : 1.02 K/uL  Auto Eosinophil # : 0.14 K/uL  Auto Basophil # : 0.03 K/uL  Auto Neutrophil % : 75.4 %  Auto Lymphocyte % : 15.1 %  Auto Monocyte % : 7.8 %  Auto Eosinophil % : 1.1 %  Auto Basophil % : 0.2 %      Ca    9.3        05 May 2021 05:07      PT/INR - ( 04 May 2021 20:03 )   PT: 17.0 sec;   INR: 1.48 ratio         PTT - ( 04 May 2021 20:03 )  PTT:38.1 sec  Urinalysis Basic - ( 04 May 2021 23:34 )    Color: Yellow / Appearance: Clear / S.015 / pH: x  Gluc: x / Ketone: Negative  / Bili: Negative / Urobili: Negative   Blood: x / Protein: 30 mg/dL / Nitrite: Negative   Leuk Esterase: Trace / RBC: 0-2 /HPF / WBC 11-25   Sq Epi: x / Non Sq Epi: Few / Bacteria: Few      CAPILLARY BLOOD GLUCOSE      POCT Blood Glucose.: 275 mg/dL (06 May 2021 10:51)  POCT Blood Glucose.: 278 mg/dL (06 May 2021 08:59)  POCT Blood Glucose.: 281 mg/dL (06 May 2021 05:30)  POCT Blood Glucose.: 342 mg/dL (05 May 2021 22:19)  POCT Blood Glucose.: 348 mg/dL (05 May 2021 17:10)  POCT Blood Glucose.: 298 mg/dL (05 May 2021 12:13)        Culture - Urine (collected 21 @ 06:28)  Source: .Urine Clean Catch (Midstream)  Final Report (21 @ 08:59):    No growth    Culture - Tissue with Gram Stain (collected 21 @ 03:37)  Source: .Tissue Other, Right foot 4th Metatarsal  Gram Stain (21 @ 07:30):    Rare polymorphonuclear leukocytes per low power field    No organisms seen per oil power field  Preliminary Report (21 @ 11:21):    Insufficient growth-culture reincubated    Culture - Blood (collected 21 @ 00:36)  Source: .Blood Blood  Preliminary Report (21 @ 01:08):    No growth to date.    Culture - Blood (collected 21 @ 00:36)  Source: .Blood Blood  Preliminary Report (21 @ 01:08):    No growth to date.        RADIOLOGY & ADDITIONAL TESTS:  < from: Xray Foot AP + Lateral + Oblique, Right (21 @ 11:46) >    EXAM:  XR FOOT COMP MIN 3 VIEWS RT                            PROCEDURE DATE:  2021          INTERPRETATION:  Right foot    HISTORY: Chronic ulcer, fourth metatarsal head     Three views of the right foot show no evidence of fracture nor destructive change. The joint spaces are maintained.    IMPRESSION: No gross destructive changes.        Personally reviewed.     Consultant(s) Notes Reviewed:  [x] YES  [ ] NO     Patient is a 65y old  Female who presents with a chief complaint of r/o osteomyelitis (06 May 2021 11:37)      INTERVAL HPI/OVERNIGHT EVENTS: Pt seen and evaluated at bedside this AM.  No acute events overnights.  POD 0 Right 4th plantar condylectomy this AM.  Procedure tolerated well.  Pt c/o mild acute on chronic mid to lower back pain, likely 2/2 positional as pt states she prefers to sit on chair rather than lay in bed even while at home, states she wants to get out of bed into chair, she is advised that PT will evaluate her first and then she can proceed with PWB on heel of RLE. Now complaining of diarrhea since yesterday evening. Pt denies fever, chills, n/v, CP, SOB, abd pain, motor or sensory deficits.    MEDICATIONS  (STANDING):  aspirin enteric coated 81 milliGRAM(s) Oral daily  atorvastatin 80 milliGRAM(s) Oral at bedtime  DAPTOmycin IVPB 550 milliGRAM(s) IV Intermittent every 24 hours  FLUoxetine 40 milliGRAM(s) Oral daily  furosemide   Injectable 40 milliGRAM(s) IV Push two times a day  glucagon  Injectable 1 milliGRAM(s) IntraMuscular once  guaiFENesin  milliGRAM(s) Oral <User Schedule>  insulin glargine Injectable (LANTUS) 40 Unit(s) SubCutaneous at bedtime  insulin lispro (ADMELOG) corrective regimen sliding scale   SubCutaneous three times a day before meals  insulin lispro (ADMELOG) corrective regimen sliding scale   SubCutaneous at bedtime  insulin lispro Injectable (ADMELOG) 20 Unit(s) SubCutaneous three times a day before meals  losartan 25 milliGRAM(s) Oral two times a day  metoprolol tartrate 50 milliGRAM(s) Oral two times a day  montelukast 10 milliGRAM(s) Oral daily  pantoprazole    Tablet 40 milliGRAM(s) Oral before breakfast  rivaroxaban 20 milliGRAM(s) Oral with dinner    MEDICATIONS  (PRN):  ALBUTerol    90 MICROgram(s) HFA Inhaler 2 Puff(s) Inhalation every 6 hours PRN Shortness of Breath and/or Wheezing      Allergies    codeine (Vomiting)  shellfish (Stomach Upset)    Intolerances    fish (Stomach Upset)      REVIEW OF SYSTEMS:  CONSTITUTIONAL: No fever or chills  HEENT:  No headache, no sore throat  RESPIRATORY: No cough, wheezing, or shortness of breath  CARDIOVASCULAR: No chest pain, palpitations  GASTROINTESTINAL: endorses diarrhea, No abd pain, nausea, vomiting  GENITOURINARY: No dysuria, frequency, or hematuria  NEUROLOGICAL: no focal weakness or dizziness  MUSCULOSKELETAL: + mid-low back pain chronic, no focal weakness    Vital Signs Last 24 Hrs  T(C): 36.2 (06 May 2021 09:33), Max: 37 (06 May 2021 07:11)  T(F): 97.2 (06 May 2021 09:33), Max: 98.6 (06 May 2021 07:11)  HR: 61 (06 May 2021 11:26) (59 - 75)  BP: 106/66 (06 May 2021 11:26) (85/31 - 151/64)  BP(mean): --  RR: 22 (06 May 2021 11:26) (14 - 22)  SpO2: 98% (06 May 2021 11:26) (93% - 99%)    PHYSICAL EXAM:  GENERAL: NAD, pt sitting upright in bed comfortably, eating breakfast  HEENT:  anicteric, moist mucous membranes  CHEST/LUNG:  CTA b/l, no rales, wheezes, or rhonchi  HEART:  RRR, S1, S2, + holosystolic murmur, heard best at R upper sternal border, 2ICS  ABDOMEN:  BS+, obese, soft, nontender, nondistended  EXTREMITIES: no edema, cyanosis, or calf tenderness;  R foot dressed with post op DSD, C/D/I  NERVOUS SYSTEM: A&Ox3: no motor or sensory deficits    LABS:    CBC Full  -  ( 05 May 2021 05:07 )  WBC Count : 13.05 K/uL  Hemoglobin : 11.8 g/dL  Hematocrit : 36.8 %  Platelet Count - Automated : 277 K/uL  Mean Cell Volume : 86.0 fl  Mean Cell Hemoglobin : 27.6 pg  Mean Cell Hemoglobin Concentration : 32.1 gm/dL  Auto Neutrophil # : 9.84 K/uL  Auto Lymphocyte # : 1.97 K/uL  Auto Monocyte # : 1.02 K/uL  Auto Eosinophil # : 0.14 K/uL  Auto Basophil # : 0.03 K/uL  Auto Neutrophil % : 75.4 %  Auto Lymphocyte % : 15.1 %  Auto Monocyte % : 7.8 %  Auto Eosinophil % : 1.1 %  Auto Basophil % : 0.2 %      Ca    9.3        05 May 2021 05:07      PT/INR - ( 04 May 2021 20:03 )   PT: 17.0 sec;   INR: 1.48 ratio         PTT - ( 04 May 2021 20:03 )  PTT:38.1 sec  Urinalysis Basic - ( 04 May 2021 23:34 )    Color: Yellow / Appearance: Clear / S.015 / pH: x  Gluc: x / Ketone: Negative  / Bili: Negative / Urobili: Negative   Blood: x / Protein: 30 mg/dL / Nitrite: Negative   Leuk Esterase: Trace / RBC: 0-2 /HPF / WBC 11-25   Sq Epi: x / Non Sq Epi: Few / Bacteria: Few      CAPILLARY BLOOD GLUCOSE      POCT Blood Glucose.: 275 mg/dL (06 May 2021 10:51)  POCT Blood Glucose.: 278 mg/dL (06 May 2021 08:59)  POCT Blood Glucose.: 281 mg/dL (06 May 2021 05:30)  POCT Blood Glucose.: 342 mg/dL (05 May 2021 22:19)  POCT Blood Glucose.: 348 mg/dL (05 May 2021 17:10)  POCT Blood Glucose.: 298 mg/dL (05 May 2021 12:13)        Culture - Urine (collected 21 @ 06:28)  Source: .Urine Clean Catch (Midstream)  Final Report (21 @ 08:59):    No growth    Culture - Tissue with Gram Stain (collected 21 @ 03:37)  Source: .Tissue Other, Right foot 4th Metatarsal  Gram Stain (21 @ 07:30):    Rare polymorphonuclear leukocytes per low power field    No organisms seen per oil power field  Preliminary Report (21 @ 11:21):    Insufficient growth-culture reincubated    Culture - Blood (collected 21 @ 00:36)  Source: .Blood Blood  Preliminary Report (21 @ 01:08):    No growth to date.    Culture - Blood (collected 21 @ 00:36)  Source: .Blood Blood  Preliminary Report (21 @ 01:08):    No growth to date.        RADIOLOGY & ADDITIONAL TESTS:  < from: Xray Foot AP + Lateral + Oblique, Right (21 @ 11:46) >    EXAM:  XR FOOT COMP MIN 3 VIEWS RT                            PROCEDURE DATE:  2021          INTERPRETATION:  Right foot    HISTORY: Chronic ulcer, fourth metatarsal head     Three views of the right foot show no evidence of fracture nor destructive change. The joint spaces are maintained.    IMPRESSION: No gross destructive changes.        Personally reviewed.     Consultant(s) Notes Reviewed:  [x] YES  [ ] NO

## 2021-05-06 NOTE — PROGRESS NOTE ADULT - PROBLEM SELECTOR PLAN 5
Hx of Type 2 DM on insulin, poorly controlled  -On 30-40units Humalog 3x daily pre-meal, 48-52units Lantus qhs at home  -Hold home insulin regimen  -HgA1C 9.2   -POCT this ; raise bedtime insulin to 40U Lantus qhs and 20U admelog 3x/day pre-meal   -hypoglycemia protocol, accuchecks qac and qhs  -Goal glucose on hospital setting 100-180, adjust insulin regimen PRN  -Endo Dr. Perlman following, recs appreciated

## 2021-05-06 NOTE — PROGRESS NOTE ADULT - SUBJECTIVE AND OBJECTIVE BOX
[facility] -- Oni Campbell, Mukesh, Merlin, Blayne Torres Savella  Office # [phone]      Follow Up:  post op evaluation     Subjective/Observations:   No events overnight resting comfortably in bed.  No complaints of chest pain, dyspnea, or palpitations reported. No signs of orthopnea or PND.      REVIEW OF SYSTEMS: All other review of systems is negative unless indicated above    PAST MEDICAL & SURGICAL HISTORY:  Hypertension    Gastroparesis due to DM    Depression    Herniated disc    Spinal stenosis  chronic back pain    Cholesterol serum elevated    MVP (mitral valve prolapse)    Hypothyroid  not on medication    Obesity    IBS (irritable bowel syndrome)    GERD (gastroesophageal reflux disease)    Diabetes type 2, uncontrolled    History of diabetic retinopathy    S/P bariatric surgery  lap band surgery two times due to erosion, now no more    S/P tonsillectomy and adenoidectomy    Cataract  B/L cataracts    S/P laminectomy  Pt denied this?    S/P D&amp;C (status post dilation and curettage)  D&amp;C when she was 18    Cervix abnormality  Ablation of cervix    Breast anomaly  Biopsy of both breast benign lesions    right eye torn retina    S/P TAVR (transcatheter aortic valve replacement)        MEDICATIONS  (STANDING):  aspirin enteric coated 81 milliGRAM(s) Oral daily  atorvastatin 80 milliGRAM(s) Oral at bedtime  DAPTOmycin IVPB 550 milliGRAM(s) IV Intermittent every 24 hours  FLUoxetine 40 milliGRAM(s) Oral daily  furosemide   Injectable 40 milliGRAM(s) IV Push two times a day  glucagon  Injectable 1 milliGRAM(s) IntraMuscular once  guaiFENesin  milliGRAM(s) Oral <User Schedule>  insulin glargine Injectable (LANTUS) 40 Unit(s) SubCutaneous at bedtime  insulin lispro (ADMELOG) corrective regimen sliding scale   SubCutaneous three times a day before meals  insulin lispro (ADMELOG) corrective regimen sliding scale   SubCutaneous at bedtime  insulin lispro Injectable (ADMELOG) 20 Unit(s) SubCutaneous three times a day before meals  losartan 25 milliGRAM(s) Oral two times a day  metoprolol tartrate 50 milliGRAM(s) Oral two times a day  montelukast 10 milliGRAM(s) Oral daily  pantoprazole    Tablet 40 milliGRAM(s) Oral before breakfast  rivaroxaban 20 milliGRAM(s) Oral with dinner  saccharomyces boulardii 250 milliGRAM(s) Oral two times a day    MEDICATIONS  (PRN):  acetaminophen   Tablet .. 650 milliGRAM(s) Oral every 6 hours PRN Mild Pain (1 - 3)  ALBUTerol    90 MICROgram(s) HFA Inhaler 2 Puff(s) Inhalation every 6 hours PRN Shortness of Breath and/or Wheezing  oxycodone    5 mG/acetaminophen 325 mG 1 Tablet(s) Oral every 6 hours PRN Severe Pain (7 - 10)  oxycodone    5 mG/acetaminophen 325 mG 0.5 Tablet(s) Oral every 6 hours PRN Moderate Pain (4 - 6)      Allergies    codeine (Vomiting)  shellfish (Stomach Upset)    Intolerances    fish (Stomach Upset)      Vital Signs Last 24 Hrs  T(C): 36.2 (06 May 2021 09:33), Max: 37 (06 May 2021 07:11)  T(F): 97.2 (06 May 2021 09:33), Max: 98.6 (06 May 2021 07:11)  HR: 61 (06 May 2021 11:26) (59 - 75)  BP: 106/66 (06 May 2021 11:26) (85/31 - 151/64)  BP(mean): --  RR: 22 (06 May 2021 11:26) (14 - 22)  SpO2: 98% (06 May 2021 11:26) (93% - 99%)    I&O's Summary    05 May 2021 07:01  -  06 May 2021 07:00  --------------------------------------------------------  IN: 650 mL / OUT: 0 mL / NET: 650 mL    06 May 2021 07:01  -  06 May 2021 14:12  --------------------------------------------------------  IN: 340 mL / OUT: 0 mL / NET: 340 mL          PHYSICAL EXAM:  TELE: SR pvc   Constitutional: NAD, awake and alert, obese   HEENT: Moist Mucous Membranes, Anicteric  Pulmonary: Non-labored, breath sounds are clear bilaterally, No wheezing, crackles or rhonchi  Cardiovascular: Regular, S1 and S2 nl, murmur   Gastrointestinal: Bowel Sounds present, soft, nontender.   Lymph: No lymphadenopathy. No peripheral edema.  Skin: right foot dressing   Psych:  Mood & affect appropriate    LABS: All Labs Reviewed:                        11.8   13.05 )-----------( 277      ( 05 May 2021 05:07 )             36.8                         12.9   11.55 )-----------( 289      ( 04 May 2021 20:03 )             40.9     05 May 2021 05:07    137    |  99     |  25     ----------------------------<  287    3.8     |  29     |  1.00   04 May 2021 20:03    138    |  100    |  18     ----------------------------<  198    3.6     |  30     |  0.90     Ca    9.3        05 May 2021 05:07  Ca    9.9        04 May 2021 20:03  Phos  3.3       05 May 2021 05:07  Mg     2.1       05 May 2021 05:07    TPro  7.4    /  Alb  3.3    /  TBili  0.5    /  DBili  x      /  AST  19     /  ALT  22     /  AlkPhos  128    05 May 2021 05:07  TPro  9.1    /  Alb  4.0    /  TBili  0.5    /  DBili  x      /  AST  23     /  ALT  28     /  AlkPhos  152    04 May 2021 20:03    PT/INR - ( 04 May 2021 20:03 )   PT: 17.0 sec;   INR: 1.48 ratio         PTT - ( 04 May 2021 20:03 )  PTT:38.1 sec  CARDIAC MARKERS ( 05 May 2021 14:21 )  x     / x     / 101 U/L / x     / x             ECG:    Echo:    Radiology:

## 2021-05-06 NOTE — PROGRESS NOTE ADULT - SUBJECTIVE AND OBJECTIVE BOX
[facility]  INFECTIOUS DISEASES   [address] [place] [address]  Tel: [phone]     Fax: [phone]  =======================================================    [facility]-[id number]  MARIA D ROBLEDO     Follow up: foot ulcer and cellulitis     Lying down on a recliner, no complaint, went to OR for R foot condylectomy of 4th metatarsal head, also had bone biopsy and culture.   No fever.    PAST MEDICAL & SURGICAL HISTORY:  Hypertension  Gastroparesis due to DM  Depression  Herniated disc  Spinal stenosis  chronic back pain  Cholesterol serum elevated  MVP (mitral valve prolapse)  Hypothyroid  not on medication  Obesity  IBS (irritable bowel syndrome)  GERD (gastroesophageal reflux disease)  Diabetes type 2, uncontrolled  History of diabetic retinopathy  S/P bariatric surgery  lap band surgery two times due to erosion, now no more  S/P tonsillectomy and adenoidectomy  Cataract  B/L cataracts  S/P laminectomy  Pt denied this?  S/P D&amp;C (status post dilation and curettage)  D&amp;C when she was 18  Cervix abnormality  Ablation of cervix  Breast anomaly  Biopsy of both breast benign lesions  right eye torn retina  S/P TAVR (transcatheter aortic valve replacement)    Social Hx: no smoking, ETOH or drugs     FAMILY HISTORY:  Family history of aortic stenosis  Family hx of structural heart dx    FHx: diabetes mellitus    FH: CAD (coronary artery disease)    Family history of anxiety disorder    Allergies  codeine (Vomiting)  shellfish (Stomach Upset)    Antibiotics:  piperacillin/tazobactam IVPB.. 3.375 Gram(s) IV Intermittent every 8 hours  vancomycin  changed to dampto    REVIEW OF SYSTEMS:  CONSTITUTIONAL:  No Fever or chills  HEENT:  No diplopia or blurred vision.  No sore throat or runny nose.  CARDIOVASCULAR:  No chest pain or SOB.  RESPIRATORY:  No cough, shortness of breath, PND or orthopnea.  GASTROINTESTINAL:  No nausea, vomiting or diarrhea.  GENITOURINARY:  No dysuria, frequency or urgency. No Blood in urine  MUSCULOSKELETAL:  no joint aches, no muscle pain  SKIN:  No change in skin, hair or nails.  NEUROLOGIC:  No paresthesias, fasciculations, seizures or weakness.  PSYCHIATRIC:  No disorder of thought or mood.  ENDOCRINE:  No heat or cold intolerance, polyuria or polydipsia.  HEMATOLOGICAL:  No easy bruising or bleeding.     Physical Exam:  Vital Signs Last 24 Hrs  T(C): 36.2 (06 May 2021 09:33), Max: 37 (06 May 2021 07:11)  T(F): 97.2 (06 May 2021 09:33), Max: 98.6 (06 May 2021 07:11)  HR: 61 (06 May 2021 11:26) (59 - 75)  BP: 106/66 (06 May 2021 11:26) (85/31 - 151/64)  BP(mean): --  RR: 22 (06 May 2021 11:26) (14 - 22)  SpO2: 98% (06 May 2021 11:26) (93% - 99%)  GEN: NAD, morbidly obese   HEENT: normocephalic and atraumatic. EOMI. PERRL.    NECK: Supple.  No lymphadenopathy   LUNGS: Clear to auscultation.  HEART: Regular rate and rhythm   ABDOMEN: Soft, nontender, and nondistended.  Positive bowel sounds.    : No CVA tenderness  EXTREMITIES: Bilateral edema. RLE with an ulcer on head of first metatarsal bone with no discharge, swelling, warmth and erythema in 1/3 of lower leg.   NEUROLOGIC: grossly intact.  PSYCHIATRIC: Appropriate affect .  SKIN: No rash     Labs:                        10.2   7.20  )-----------( 216      ( 06 May 2021 14:15 )             32.0     05-06    138  |  100  |  23  ----------------------------<  226<H>  3.4<L>   |  33<H>  |  0.95    Ca    8.6      06 May 2021 14:14  Phos  3.3     05-05  Mg     2.1     05-05    TPro  6.5  /  Alb  3.0<L>  /  TBili  0.5  /  DBili  x   /  AST  18  /  ALT  20  /  AlkPhos  104  05-06    Culture - Urine (collected 05-05-21 @ 06:28)  Source: .Urine Clean Catch (Midstream)  Final Report (05-06-21 @ 08:59):    No growth    Culture - Tissue with Gram Stain (collected 05-05-21 @ 03:37)  Source: .Tissue Other, Right foot 4th Metatarsal  Gram Stain (05-05-21 @ 07:30):    Rare polymorphonuclear leukocytes per low power field    No organisms seen per oil power field    Culture - Blood (collected 05-05-21 @ 00:36)  Source: .Blood Blood    Culture - Blood (collected 05-05-21 @ 00:36)  Source: .Blood Blood    WBC Count: 7.20 K/uL (05-06-21 @ 14:15)  WBC Count: 13.05 K/uL (05-05-21 @ 05:07)  WBC Count: 11.55 K/uL (05-04-21 @ 20:03)    Creatinine, Serum: 0.95 mg/dL (05-06-21 @ 14:14)  Creatinine, Serum: 1.00 mg/dL (05-05-21 @ 05:07)  Creatinine, Serum: 0.90 mg/dL (05-04-21 @ 20:03)    C-Reactive Protein, Serum: 13 mg/L (05-05-21 @ 01:01)    Sedimentation Rate, Erythrocyte: 50 mm/hr (05-04-21 @ 20:03)    COVID-19 PCR: NotDetec (05-04-21 @ 20:03)    All imaging and other data have been reviewed.  < from: MR Foot No Cont, Right (05.05.21 @ 11:03) >  IMPRESSION:  1. No evidence for osteomyelitis or abscess.      Assessment and Plan:   64yo woman with PMH of HTN, DM2, AS s/p TAVR 2015, HOCM, pAFib, JOSR on 2L O2 at home qhs, anxiety and morbid obesity was admitted with right foot ulcer. She had appointment with wound center and due to unhealing ulcer and cellulitis in lower leg was referred to ED.  MRI with no OM or abscess. Wound culture has been sent but still pending.   Until cultures are back will cover for MRSA as well, Since she is morbidly obese dosing vancomycin will be difficult, so will switch to daptomycin.     RLE cellulitis and R foot ulcer  - Blood culture x 2 NGTD  - Wound culture pending  - MRSA PCR negative   - MRI with no OM or abscess  - R foot condylectomy of 4th metatarsal head, also had bone biopsy and culture, this morning  - Elevate leg   - Good glycemic control  - Continue zosyn 3.375gm q8h  - Continue Daptomycin 550mg daily, if no MRSA will stop this    Will follow.    D/W Dr. Knight.    Lamar Alexis MD  Division of Infectious Diseases   Cell [phone] between 8am and 6pm   After 6pm and weekends please call ID service at [phone].

## 2021-05-06 NOTE — DIETITIAN INITIAL EVALUATION ADULT. - PROBLEM SELECTOR PLAN 2
s/p TAVR 2015, also with hx of HOCM diagnosed 2/20 per chart review  -currently undergoing workup for potential repeat TAVR  -TTE from 4/28/21 showing: hyperdynamic LV systolic function, LVEF 75%, mild pulmonary pressures  -Scheduled for LAMBERT with Marium on 5/24/21  -will place on Lasix 40mg IV BID due to increased LE edema and SOB, Metoprolol tartrate 50mg BID, ASA  -Monitor on remote telemetry  -Cardio [facility] consulted for cardiac clearance

## 2021-05-06 NOTE — PROGRESS NOTE ADULT - PROBLEM SELECTOR PLAN 6
Patient c/o bilat flank pain with frothy urine  -neg CVA tenderness, no dysuria or hematuria  -UA shows min LE; no blood, bacteria or WBCs  -urine culture- NGTD  -pain likely musculoskeletal in nature  -pain regimen as above

## 2021-05-07 LAB
ALBUMIN SERPL ELPH-MCNC: 3.2 G/DL — LOW (ref 3.3–5)
ALP SERPL-CCNC: 116 U/L — SIGNIFICANT CHANGE UP (ref 40–120)
ALT FLD-CCNC: 20 U/L — SIGNIFICANT CHANGE UP (ref 12–78)
ANION GAP SERPL CALC-SCNC: 4 MMOL/L — LOW (ref 5–17)
AST SERPL-CCNC: 21 U/L — SIGNIFICANT CHANGE UP (ref 15–37)
BASOPHILS # BLD AUTO: 0.03 K/UL — SIGNIFICANT CHANGE UP (ref 0–0.2)
BASOPHILS NFR BLD AUTO: 0.4 % — SIGNIFICANT CHANGE UP (ref 0–2)
BILIRUB SERPL-MCNC: 0.6 MG/DL — SIGNIFICANT CHANGE UP (ref 0.2–1.2)
BUN SERPL-MCNC: 19 MG/DL — SIGNIFICANT CHANGE UP (ref 7–23)
CALCIUM SERPL-MCNC: 9 MG/DL — SIGNIFICANT CHANGE UP (ref 8.5–10.1)
CHLORIDE SERPL-SCNC: 101 MMOL/L — SIGNIFICANT CHANGE UP (ref 96–108)
CO2 SERPL-SCNC: 34 MMOL/L — HIGH (ref 22–31)
CREAT SERPL-MCNC: 0.97 MG/DL — SIGNIFICANT CHANGE UP (ref 0.5–1.3)
EOSINOPHIL # BLD AUTO: 0.23 K/UL — SIGNIFICANT CHANGE UP (ref 0–0.5)
EOSINOPHIL NFR BLD AUTO: 2.8 % — SIGNIFICANT CHANGE UP (ref 0–6)
GLUCOSE SERPL-MCNC: 239 MG/DL — HIGH (ref 70–99)
HCT VFR BLD CALC: 35.6 % — SIGNIFICANT CHANGE UP (ref 34.5–45)
HGB BLD-MCNC: 10.9 G/DL — LOW (ref 11.5–15.5)
IMM GRANULOCYTES NFR BLD AUTO: 0.4 % — SIGNIFICANT CHANGE UP (ref 0–1.5)
LYMPHOCYTES # BLD AUTO: 0.95 K/UL — LOW (ref 1–3.3)
LYMPHOCYTES # BLD AUTO: 11.4 % — LOW (ref 13–44)
MAGNESIUM SERPL-MCNC: 2.2 MG/DL — SIGNIFICANT CHANGE UP (ref 1.6–2.6)
MCHC RBC-ENTMCNC: 26.7 PG — LOW (ref 27–34)
MCHC RBC-ENTMCNC: 30.6 GM/DL — LOW (ref 32–36)
MCV RBC AUTO: 87 FL — SIGNIFICANT CHANGE UP (ref 80–100)
MONOCYTES # BLD AUTO: 0.63 K/UL — SIGNIFICANT CHANGE UP (ref 0–0.9)
MONOCYTES NFR BLD AUTO: 7.6 % — SIGNIFICANT CHANGE UP (ref 2–14)
NEUTROPHILS # BLD AUTO: 6.46 K/UL — SIGNIFICANT CHANGE UP (ref 1.8–7.4)
NEUTROPHILS NFR BLD AUTO: 77.4 % — HIGH (ref 43–77)
NRBC # BLD: 0 /100 WBCS — SIGNIFICANT CHANGE UP (ref 0–0)
PHOSPHATE SERPL-MCNC: 3.3 MG/DL — SIGNIFICANT CHANGE UP (ref 2.5–4.5)
PLATELET # BLD AUTO: 245 K/UL — SIGNIFICANT CHANGE UP (ref 150–400)
POTASSIUM SERPL-MCNC: 3.8 MMOL/L — SIGNIFICANT CHANGE UP (ref 3.5–5.3)
POTASSIUM SERPL-SCNC: 3.8 MMOL/L — SIGNIFICANT CHANGE UP (ref 3.5–5.3)
PROT SERPL-MCNC: 7.2 G/DL — SIGNIFICANT CHANGE UP (ref 6–8.3)
RBC # BLD: 4.09 M/UL — SIGNIFICANT CHANGE UP (ref 3.8–5.2)
RBC # FLD: 15.5 % — HIGH (ref 10.3–14.5)
SODIUM SERPL-SCNC: 139 MMOL/L — SIGNIFICANT CHANGE UP (ref 135–145)
WBC # BLD: 8.33 K/UL — SIGNIFICANT CHANGE UP (ref 3.8–10.5)
WBC # FLD AUTO: 8.33 K/UL — SIGNIFICANT CHANGE UP (ref 3.8–10.5)

## 2021-05-07 PROCEDURE — 99232 SBSQ HOSP IP/OBS MODERATE 35: CPT

## 2021-05-07 PROCEDURE — 99024 POSTOP FOLLOW-UP VISIT: CPT

## 2021-05-07 PROCEDURE — 99232 SBSQ HOSP IP/OBS MODERATE 35: CPT | Mod: GC

## 2021-05-07 PROCEDURE — 99233 SBSQ HOSP IP/OBS HIGH 50: CPT

## 2021-05-07 RX ORDER — INSULIN GLARGINE 100 [IU]/ML
50 INJECTION, SOLUTION SUBCUTANEOUS AT BEDTIME
Refills: 0 | Status: DISCONTINUED | OUTPATIENT
Start: 2021-05-07 | End: 2021-05-09

## 2021-05-07 RX ORDER — NYSTATIN CREAM 100000 [USP'U]/G
1 CREAM TOPICAL
Refills: 0 | Status: DISCONTINUED | OUTPATIENT
Start: 2021-05-07 | End: 2021-05-11

## 2021-05-07 RX ADMIN — Medication 2: at 21:45

## 2021-05-07 RX ADMIN — PIPERACILLIN AND TAZOBACTAM 25 GRAM(S): 4; .5 INJECTION, POWDER, LYOPHILIZED, FOR SOLUTION INTRAVENOUS at 14:31

## 2021-05-07 RX ADMIN — NYSTATIN CREAM 1 APPLICATION(S): 100000 CREAM TOPICAL at 17:12

## 2021-05-07 RX ADMIN — INSULIN GLARGINE 50 UNIT(S): 100 INJECTION, SOLUTION SUBCUTANEOUS at 21:46

## 2021-05-07 RX ADMIN — PANTOPRAZOLE SODIUM 40 MILLIGRAM(S): 20 TABLET, DELAYED RELEASE ORAL at 05:26

## 2021-05-07 RX ADMIN — PIPERACILLIN AND TAZOBACTAM 25 GRAM(S): 4; .5 INJECTION, POWDER, LYOPHILIZED, FOR SOLUTION INTRAVENOUS at 05:26

## 2021-05-07 RX ADMIN — MONTELUKAST 10 MILLIGRAM(S): 4 TABLET, CHEWABLE ORAL at 12:10

## 2021-05-07 RX ADMIN — Medication 40 MILLIGRAM(S): at 05:25

## 2021-05-07 RX ADMIN — Medication 4: at 17:15

## 2021-05-07 RX ADMIN — Medication 20 UNIT(S): at 12:16

## 2021-05-07 RX ADMIN — RIVAROXABAN 20 MILLIGRAM(S): KIT at 17:12

## 2021-05-07 RX ADMIN — Medication 6: at 09:10

## 2021-05-07 RX ADMIN — LOSARTAN POTASSIUM 25 MILLIGRAM(S): 100 TABLET, FILM COATED ORAL at 17:12

## 2021-05-07 RX ADMIN — PIPERACILLIN AND TAZOBACTAM 25 GRAM(S): 4; .5 INJECTION, POWDER, LYOPHILIZED, FOR SOLUTION INTRAVENOUS at 21:40

## 2021-05-07 RX ADMIN — Medication 40 MILLIGRAM(S): at 17:49

## 2021-05-07 RX ADMIN — Medication 40 MILLIGRAM(S): at 12:10

## 2021-05-07 RX ADMIN — Medication 250 MILLIGRAM(S): at 17:12

## 2021-05-07 RX ADMIN — Medication 50 MILLIGRAM(S): at 05:26

## 2021-05-07 RX ADMIN — Medication 650 MILLIGRAM(S): at 13:20

## 2021-05-07 RX ADMIN — Medication 650 MILLIGRAM(S): at 12:23

## 2021-05-07 RX ADMIN — Medication 6: at 12:15

## 2021-05-07 RX ADMIN — ATORVASTATIN CALCIUM 80 MILLIGRAM(S): 80 TABLET, FILM COATED ORAL at 21:40

## 2021-05-07 RX ADMIN — LOSARTAN POTASSIUM 25 MILLIGRAM(S): 100 TABLET, FILM COATED ORAL at 05:26

## 2021-05-07 RX ADMIN — Medication 250 MILLIGRAM(S): at 05:25

## 2021-05-07 RX ADMIN — Medication 600 MILLIGRAM(S): at 07:48

## 2021-05-07 RX ADMIN — Medication 81 MILLIGRAM(S): at 12:10

## 2021-05-07 RX ADMIN — Medication 50 MILLIGRAM(S): at 17:12

## 2021-05-07 RX ADMIN — Medication 20 UNIT(S): at 17:15

## 2021-05-07 NOTE — PROVIDER CONTACT NOTE (OTHER) - BACKGROUND
2 bm yesterday per pt they were loose  Florastor ordered
remote tele monitor orders discontinued for transfer to OR this morning  cardiology following pt.  s/p surgery with Dr John 5/6/21
pt arrived back onto unit s/p foot surgery with Dr John.

## 2021-05-07 NOTE — PROGRESS NOTE ADULT - ASSESSMENT
66yo woman with PMHx AS s/p TAVR 2015, HOCM, pAFib, JOSR (does not tolerate CPAP, on 2L O2 at home qhs), anxiety, morbid obesity, DM, HTN presents to ED with R foot ulcer, admitted for rule out osteomyelitis.  MRI negative for osteomyelitis however due to increased risk of developing OM in the future, podiatry recommended surgical intervention and now pt is POD 1 s/p Right 4th plantar condylectomy.

## 2021-05-07 NOTE — PROGRESS NOTE ADULT - PROBLEM SELECTOR PLAN 1
New acute R sub 4th met head ulcer, sent by Dr. Palencia from wound care, likely from chronic uncontrolled DM  -POD 1 s/p R 4th plantar condylectomy with Dr. John   -s/p IV Vanco and Zosyn in ED, now on Daptomycin and Zosyn per ID recs however MRSA negative and MRI neg for OM, will de-escalate pending Dr. Alexis's recs  - Patient reported 2 loose stools yesterday and today, RN received sign out of one additional loose stool overnight, Probiotics started, will continue to monitor for progression of watery BMs  -MRI foot shows No evidence for osteomyelitis or abscess.  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   -+staph aureus PCR; MRSA PCR negative  -Blood and urine cultures NGTD  -Continue routine dressing with Calcium Alginate and DSD per podiatry  -pain regimen prn: tylenol for mild, Percocet 5/325 1/2 tab q6 for moderate and 1 tab q6 for severe  -ID Dr. Alexis following, recs appreciated  -Cardio Dr. Mayorga consulted for clearance  -PT consult, 50% WB RLE on heel New acute R sub 4th met head ulcer, sent by Dr. Palencia from wound care, likely from chronic uncontrolled DM  -POD 1 s/p R 4th plantar condylectomy with Dr. John   -s/p IV Vanco and Zosyn in ED, now on Daptomycin and Zosyn per ID recs however MRSA PCR negative and MRI neg for OM, will de-escalate pending Dr. Alexis's recs and OR cultures  - Patient reported 2 loose stools yesterday and today, RN received sign out of one additional loose stool overnight, Probiotics started, will continue to monitor for progression of watery BMs  -MRI foot shows No evidence for osteomyelitis or abscess.  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   -+staph aureus PCR; MRSA PCR negative  -Blood and urine cultures NGTD  -Continue routine dressing with Calcium Alginate and DSD per podiatry  -pain regimen prn: tylenol for mild, Percocet 5/325 1/2 tab q6 for moderate and 1 tab q6 for severe  -ID Dr. Alexis following, recs appreciated  -Cardio Dr. Mayorga consulted for clearance  -PT consult, 50% WB RLE on heel

## 2021-05-07 NOTE — PROGRESS NOTE ADULT - SUBJECTIVE AND OBJECTIVE BOX
[facility] -- Oni Campbell, Mukesh, Merlin, Blayne Torres Savella  Office # [phone]      Follow Up:    as, post evaluation   Subjective/Observations:     Seen at bedside, reports episode of dyspnea overnight  denies chest pain palpitations fever chills, n/v/d   REVIEW OF SYSTEMS: All other review of systems is negative unless indicated above    PAST MEDICAL & SURGICAL HISTORY:  Hypertension    Gastroparesis due to DM    Depression    Herniated disc    Spinal stenosis  chronic back pain    Cholesterol serum elevated    MVP (mitral valve prolapse)    Hypothyroid  not on medication    Obesity    IBS (irritable bowel syndrome)    GERD (gastroesophageal reflux disease)    Diabetes type 2, uncontrolled    History of diabetic retinopathy    S/P bariatric surgery  lap band surgery two times due to erosion, now no more    S/P tonsillectomy and adenoidectomy    Cataract  B/L cataracts    S/P laminectomy  Pt denied this?    S/P D&amp;C (status post dilation and curettage)  D&amp;C when she was 18    Cervix abnormality  Ablation of cervix    Breast anomaly  Biopsy of both breast benign lesions    right eye torn retina    S/P TAVR (transcatheter aortic valve replacement)        MEDICATIONS  (STANDING):  aspirin enteric coated 81 milliGRAM(s) Oral daily  atorvastatin 80 milliGRAM(s) Oral at bedtime  DAPTOmycin IVPB 550 milliGRAM(s) IV Intermittent every 24 hours  FLUoxetine 40 milliGRAM(s) Oral daily  furosemide   Injectable 40 milliGRAM(s) IV Push two times a day  glucagon  Injectable 1 milliGRAM(s) IntraMuscular once  guaiFENesin  milliGRAM(s) Oral <User Schedule>  insulin glargine Injectable (LANTUS) 50 Unit(s) SubCutaneous at bedtime  insulin lispro (ADMELOG) corrective regimen sliding scale   SubCutaneous three times a day before meals  insulin lispro (ADMELOG) corrective regimen sliding scale   SubCutaneous at bedtime  insulin lispro Injectable (ADMELOG) 20 Unit(s) SubCutaneous three times a day before meals  losartan 25 milliGRAM(s) Oral two times a day  metoprolol tartrate 50 milliGRAM(s) Oral two times a day  montelukast 10 milliGRAM(s) Oral daily  pantoprazole    Tablet 40 milliGRAM(s) Oral before breakfast  piperacillin/tazobactam IVPB.. 3.375 Gram(s) IV Intermittent every 8 hours  rivaroxaban 20 milliGRAM(s) Oral with dinner  saccharomyces boulardii 250 milliGRAM(s) Oral two times a day    MEDICATIONS  (PRN):  acetaminophen   Tablet .. 650 milliGRAM(s) Oral every 6 hours PRN Mild Pain (1 - 3)  ALBUTerol    90 MICROgram(s) HFA Inhaler 2 Puff(s) Inhalation every 6 hours PRN Shortness of Breath and/or Wheezing  oxycodone    5 mG/acetaminophen 325 mG 1 Tablet(s) Oral every 6 hours PRN Severe Pain (7 - 10)  oxycodone    5 mG/acetaminophen 325 mG 0.5 Tablet(s) Oral every 6 hours PRN Moderate Pain (4 - 6)      Allergies    codeine (Vomiting)  shellfish (Stomach Upset)    Intolerances    fish (Stomach Upset)      Vital Signs Last 24 Hrs  T(C): 36.9 (07 May 2021 05:07), Max: 36.9 (07 May 2021 05:07)  T(F): 98.5 (07 May 2021 05:07), Max: 98.5 (07 May 2021 05:07)  HR: 85 (07 May 2021 05:07) (59 - 85)  BP: 132/70 (07 May 2021 05:07) (85/31 - 132/70)  BP(mean): --  RR: 18 (07 May 2021 05:07) (14 - 22)  SpO2: 94% (07 May 2021 05:07) (94% - 99%)    I&O's Summary    06 May 2021 07:01  -  07 May 2021 07:00  --------------------------------------------------------  IN: 340 mL / OUT: 0 mL / NET: 340 mL          PHYSICAL EXAM:  TELE: not on tele   Constitutional: NAD, awake and alert, obese   HEENT: Moist Mucous Membranes, Anicteric  Pulmonary: Non-labored, breath sounds are clear bilaterally, No wheezing, crackles or rhonchi  Cardiovascular: Regular, S1 and S2 nl, CHARLES   Gastrointestinal: Bowel Sounds present, soft, nontender.   Lymph: No lymphadenopathy. trace peripheral edema.  Skin: No visible rashes or ulcers.  Psych:  Mood & affect appropriate    LABS: All Labs Reviewed:                        10.9   8.33  )-----------( 245      ( 07 May 2021 07:55 )             35.6                         10.2   7.20  )-----------( 216      ( 06 May 2021 14:15 )             32.0                         11.8   13.05 )-----------( 277      ( 05 May 2021 05:07 )             36.8     07 May 2021 07:55    139    |  101    |  19     ----------------------------<  239    3.8     |  34     |  0.97   06 May 2021 14:14    138    |  100    |  23     ----------------------------<  226    3.4     |  33     |  0.95   05 May 2021 05:07    137    |  99     |  25     ----------------------------<  287    3.8     |  29     |  1.00     Ca    9.0        07 May 2021 07:55  Ca    8.6        06 May 2021 14:14  Ca    9.3        05 May 2021 05:07  Phos  3.3       05 May 2021 05:07  Mg     2.2       07 May 2021 07:55  Mg     2.1       05 May 2021 05:07    TPro  7.2    /  Alb  3.2    /  TBili  0.6    /  DBili  x      /  AST  21     /  ALT  20     /  AlkPhos  116    07 May 2021 07:55  TPro  6.5    /  Alb  3.0    /  TBili  0.5    /  DBili  x      /  AST  18     /  ALT  20     /  AlkPhos  104    06 May 2021 14:14  TPro  7.4    /  Alb  3.3    /  TBili  0.5    /  DBili  x      /  AST  19     /  ALT  22     /  AlkPhos  128    05 May 2021 05:07      CARDIAC MARKERS ( 05 May 2021 14:21 )

## 2021-05-07 NOTE — PHYSICAL THERAPY INITIAL EVALUATION ADULT - RANGE OF MOTION EXAMINATION, REHAB EVAL
WFL due to body habitus/bilateral upper extremity ROM was WFL (within functional limits)/bilateral lower extremity ROM was WFL (within functional limits)

## 2021-05-07 NOTE — PROGRESS NOTE ADULT - PROBLEM SELECTOR PLAN 10
Chronic  -Continue home fluoxetine    Problem 11: Need for prophylactic measure  DVT PPx: Xarelto 20mg qd, cleared by podiatry    Problem 12: HLD  Chronic  -Continue home atorvastatin    Problem 13: GERD:  -continue pantoprazole qd

## 2021-05-07 NOTE — PROVIDER CONTACT NOTE (OTHER) - ASSESSMENT
pt ambulates to bathroom with one assist. Pt states diarrhea watery and loose .
pt had a  large BM diarrhea with some liquid. No odor noted brown in color.
pt complains of back pain of an 8 on a scale of 1-10. pt states she would rather be able to sit in recliner. Podiatry made aware at this time at bedside.

## 2021-05-07 NOTE — PROGRESS NOTE ADULT - ASSESSMENT
64yo woman with PMH AS s/p TAVR 2015, HOCM, pAFib, JOSR (does not tolerate CPAP, on 2L O2 at home qhs), anxiety, morbid obesity, DM, HTN presents to ED from wound clnic with R foot ulcer to evaluate for osteomyelitis. Plan for right foot 4th met head resection pending vascular studies, MRI. Cardiology consulted for cardiac optimization pre/post procedure    Valvular Heart Disease/ Aortic stenosis s/p TAVR 2015/ HOCM  -  Patient has extensive cardiac history, follows with Dr. Espinal for a possibility of repeat TAVR as she is still significantly SOB, RASMUSSEN, with increased leg edema for the past few months. Patient is scheduled for a LAMBERT on 5/24 with Dr. Espinal.   - TTE from 4/28/21 showing: hyperdynamic LV systolic function, LVEF 75%, mild to mod MR, mild pulmonary pressures  - Continue Lasix 40mg IV BID for LE edema and SOB   - Continue Metoprolol tartrate 50mg BID  - Continue aspirin   - continue home O2 2L at home. Does have h/o asthma and JOSR , 94% on RA as per flow sheets     Paroxysmal atrial fibrillation  - EKG showing NSR rate 79 with PVCs, LVH  - Continue Xarelto 20mg qd, Metoprolol tartrate 50mg BID  - Patient reports that she has h/o DVT and PE and was told to be on AC indefinitely and had testing done for hereditary issues.     Hypertension  -132/70  - Continue home metoprolol, Lasix, losartan with hold parameters  - Monitor routine hemodynamics.     Hyperlipidemia  - Continue home atorvastatin.     Osteomyelitis foot, R sub 4th met head ulcer  - sp right fourth metatarsal head condylectomy  -fu podiatry recs      - Monitor and replete lytes, keep K>4, Mg>2.  - All other medical needs as per primary team.  - Other cardiovascular workup will depend on clinical course.  - Will continue to follow.  Ester Womack FNP-C  Cardiology NP  [id number] [phone]   64yo woman with PMH AS s/p TAVR 2015, HOCM, pAFib, JOSR (does not tolerate CPAP, on 2L O2 at home qhs), anxiety, morbid obesity, DM, HTN presents to ED from wound clnic with R foot ulcer to evaluate for osteomyelitis. Plan for right foot 4th met head resection pending vascular studies, MRI. Cardiology consulted for cardiac optimization pre/post procedure    Valvular Heart Disease/ Aortic stenosis s/p TAVR 2015/ HOCM  -  Patient has extensive cardiac history, follows with Dr. Espinal for a possibility of repeat TAVR as she is still significantly SOB, RASMUSSEN, with increased leg edema for the past few months. Patient is scheduled for a LAMBERT on 5/24 with Dr. Espinal.   - TTE from 4/28/21 showing: hyperdynamic LV systolic function, LVEF 75%, mild to mod MR, mild pulmonary pressures  - Continue Lasix 40mg IV BID for LE edema and SOB . given worsening sob last night would give a metolazone  5mg x 1 tonight.   - Continue Metoprolol tartrate 50mg BID  - Continue aspirin   - continue home O2 2L at home. Does have h/o asthma and JOSR , 94% on RA as per flow sheets     Paroxysmal atrial fibrillation  - EKG showing NSR rate 79 with PVCs, LVH  - Continue Xarelto 20mg qd, Metoprolol tartrate 50mg BID  - Patient reports that she has h/o DVT and PE and was told to be on AC indefinitely and had testing done for hereditary issues.     Hypertension  -132/70  - Continue home metoprolol, Lasix, losartan with hold parameters  - Monitor routine hemodynamics.     Hyperlipidemia  - Continue home atorvastatin.     Osteomyelitis foot, R sub 4th met head ulcer  - sp right fourth metatarsal head condylectomy  -fu podiatry recs      - Monitor and replete lytes, keep K>4, Mg>2.  - All other medical needs as per primary team.  - Other cardiovascular workup will depend on clinical course.  - Will continue to follow.  Ester Womack FNP-C  Cardiology NP  [phone]

## 2021-05-07 NOTE — PROGRESS NOTE ADULT - PROBLEM SELECTOR PLAN 5
Hx of Type 2 DM on insulin, poorly controlled  -On 30-40units Humalog 3x daily pre-meal, 48-52units Lantus qhs at home  -Hold home insulin regimen  -HgA1C 9.2   -POCT this ; raise bedtime insulin to 40U Lantus qhs and 20U admelog 3x/day pre-meal   -hypoglycemia protocol, accuchecks qac and qhs  -Goal glucose on hospital setting 100-180, adjust insulin regimen PRN  -Endo Dr. Perlman following, recs appreciated Hx of Type 2 DM on insulin, poorly controlled  -On 30-40units Humalog 3x daily pre-meal, 48-52units Lantus qhs at home  -Hold home insulin regimen  -HgA1C 9.2   -POCT this ; raise bedtime insulin to 50U Lantus qhs and 20U admelog 3x/day pre-meal   -hypoglycemia protocol, accuchecks qac and qhs  -Goal glucose on hospital setting 100-180, adjust insulin regimen PRN  -Endo Dr. Perlman following, recs appreciated

## 2021-05-07 NOTE — PROGRESS NOTE ADULT - SUBJECTIVE AND OBJECTIVE BOX
CAPILLARY BLOOD GLUCOSE      POCT Blood Glucose.: 238 mg/dL (07 May 2021 07:59)  POCT Blood Glucose.: 228 mg/dL (06 May 2021 21:24)  POCT Blood Glucose.: 205 mg/dL (06 May 2021 16:48)  POCT Blood Glucose.: 263 mg/dL (06 May 2021 12:09)  POCT Blood Glucose.: 275 mg/dL (06 May 2021 10:51)  POCT Blood Glucose.: 278 mg/dL (06 May 2021 08:59)      Vital Signs Last 24 Hrs  T(C): 36.9 (07 May 2021 05:07), Max: 36.9 (07 May 2021 05:07)  T(F): 98.5 (07 May 2021 05:07), Max: 98.5 (07 May 2021 05:07)  HR: 85 (07 May 2021 05:07) (59 - 85)  BP: 132/70 (07 May 2021 05:07) (85/31 - 132/70)  BP(mean): --  RR: 18 (07 May 2021 05:07) (14 - 22)  SpO2: 94% (07 May 2021 05:07) (94% - 99%)    General: WN/WD NAD  Respiratory: CTA B/L  CV: RRR, S1S2, no murmurs, rubs or gallops  Abdominal: Soft, NT, ND +BS, Last BM  Extremities: No edema, + peripheral pulses     05-07    139  |  101  |  19  ----------------------------<  239<H>  3.8   |  34<H>  |  0.97    Ca    9.0      07 May 2021 07:55  Mg     2.2     05-07    TPro  7.2  /  Alb  3.2<L>  /  TBili  0.6  /  DBili  x   /  AST  21  /  ALT  20  /  AlkPhos  116  05-07      atorvastatin 80 milliGRAM(s) Oral at bedtime  glucagon  Injectable 1 milliGRAM(s) IntraMuscular once  insulin glargine Injectable (LANTUS) 40 Unit(s) SubCutaneous at bedtime  insulin lispro (ADMELOG) corrective regimen sliding scale   SubCutaneous three times a day before meals  insulin lispro (ADMELOG) corrective regimen sliding scale   SubCutaneous at bedtime  insulin lispro Injectable (ADMELOG) 20 Unit(s) SubCutaneous three times a day before meals

## 2021-05-07 NOTE — PROGRESS NOTE ADULT - SUBJECTIVE AND OBJECTIVE BOX
[facility]  INFECTIOUS DISEASES   [address] [place] [address]  Tel: [phone]     Fax: [phone]  =======================================================    [id number]  MARIA D ROBLEDO     Follow up: foot ulcer and cellulitis     Lying down on a recliner, no complaint, S/p R foot condylectomy of 4th metatarsal head, also had bone biopsy and culture. so far negative.   No fever.    PAST MEDICAL & SURGICAL HISTORY:  Hypertension  Gastroparesis due to DM  Depression  Herniated disc  Spinal stenosis  chronic back pain  Cholesterol serum elevated  MVP (mitral valve prolapse)  Hypothyroid  not on medication  Obesity  IBS (irritable bowel syndrome)  GERD (gastroesophageal reflux disease)  Diabetes type 2, uncontrolled  History of diabetic retinopathy  S/P bariatric surgery  lap band surgery two times due to erosion, now no more  S/P tonsillectomy and adenoidectomy  Cataract  B/L cataracts  S/P laminectomy  Pt denied this?  S/P D&amp;C (status post dilation and curettage)  D&amp;C when she was 18  Cervix abnormality  Ablation of cervix  Breast anomaly  Biopsy of both breast benign lesions  right eye torn retina  S/P TAVR (transcatheter aortic valve replacement)    Social Hx: no smoking, ETOH or drugs     FAMILY HISTORY:  Family history of aortic stenosis  Family hx of structural heart dx    FHx: diabetes mellitus    FH: CAD (coronary artery disease)    Family history of anxiety disorder    Allergies  codeine (Vomiting)  shellfish (Stomach Upset)    Antibiotics:  piperacillin/tazobactam IVPB.. 3.375 Gram(s) IV Intermittent every 8 hours  vancomycin  changed to dampto    REVIEW OF SYSTEMS:  CONSTITUTIONAL:  No Fever or chills  HEENT:  No diplopia or blurred vision.  No sore throat or runny nose.  CARDIOVASCULAR:  No chest pain or SOB.  RESPIRATORY:  No cough, shortness of breath, PND or orthopnea.  GASTROINTESTINAL:  No nausea, vomiting or diarrhea.  GENITOURINARY:  No dysuria, frequency or urgency. No Blood in urine  MUSCULOSKELETAL:  no joint aches, no muscle pain  SKIN:  No change in skin, hair or nails.  NEUROLOGIC:  No paresthesias, fasciculations, seizures or weakness.  PSYCHIATRIC:  No disorder of thought or mood.  ENDOCRINE:  No heat or cold intolerance, polyuria or polydipsia.  HEMATOLOGICAL:  No easy bruising or bleeding.     Physical Exam:  Vital Signs Last 24 Hrs  T(C): 36.9 (07 May 2021 12:02), Max: 36.9 (07 May 2021 05:07)  T(F): 98.5 (07 May 2021 12:02), Max: 98.5 (07 May 2021 05:07)  HR: 71 (07 May 2021 12:02) (62 - 85)  BP: 108/68 (07 May 2021 12:02) (101/58 - 132/70)  BP(mean): --  RR: 18 (07 May 2021 12:02) (18 - 20)  SpO2: 91% (07 May 2021 12:43) (91% - 98%)  GEN: NAD, morbidly obese   HEENT: normocephalic and atraumatic. EOMI. PERRL.    NECK: Supple.  No lymphadenopathy   LUNGS: Clear to auscultation.  HEART: Regular rate and rhythm   ABDOMEN: Soft, nontender, and nondistended.  Positive bowel sounds.    : No CVA tenderness  EXTREMITIES: Bilateral edema. RLE with an ulcer on head of first metatarsal bone with no discharge, swelling, warmth and erythema in 1/3 of lower leg. s/p surgery now dressed  NEUROLOGIC: grossly intact.  PSYCHIATRIC: Appropriate affect .  SKIN: No rash     Labs:                        10.9   8.33  )-----------( 245      ( 07 May 2021 07:55 )             35.6     05-07    139  |  101  |  19  ----------------------------<  239<H>  3.8   |  34<H>  |  0.97    Ca    9.0      07 May 2021 07:55  Phos  3.3     05-07  Mg     2.2     05-07    TPro  7.2  /  Alb  3.2<L>  /  TBili  0.6  /  DBili  x   /  AST  21  /  ALT  20  /  AlkPhos  116  05-07    Culture - Tissue with Gram Stain (collected 05-06-21 @ 13:23)  Source: .Tissue Other, right [id number] metatarsal condy  Gram Stain (05-06-21 @ 18:58):    Rare polymorphonuclear leukocytes per low power field    No organisms seen    Culture - Urine (collected 05-05-21 @ 06:28)  Source: .Urine Clean Catch (Midstream)  Final Report (05-06-21 @ 08:59):    No growth    Culture - Tissue with Gram Stain (collected 05-05-21 @ 03:37)  Source: .Tissue Other, Right foot 4th Metatarsal  Gram Stain (05-05-21 @ 07:30):    Rare polymorphonuclear leukocytes per low power field    No organisms seen per oil power field    Culture - Blood (collected 05-05-21 @ 00:36)  Source: .Blood Blood    Culture - Blood (collected 05-05-21 @ 00:36)  Source: .Blood Blood    WBC Count: 8.33 K/uL (05-07-21 @ 07:55)  WBC Count: 7.20 K/uL (05-06-21 @ 14:15)  WBC Count: 13.05 K/uL (05-05-21 @ 05:07)  WBC Count: 11.55 K/uL (05-04-21 @ 20:03)    Creatinine, Serum: 0.97 mg/dL (05-07-21 @ 07:55)  Creatinine, Serum: 0.95 mg/dL (05-06-21 @ 14:14)  Creatinine, Serum: 1.00 mg/dL (05-05-21 @ 05:07)  Creatinine, Serum: 0.90 mg/dL (05-04-21 @ 20:03)    C-Reactive Protein, Serum: 13 mg/L (05-05-21 @ 01:01)    Sedimentation Rate, Erythrocyte: 50 mm/hr (05-04-21 @ 20:03)    COVID-19 PCR: NotDetec (05-04-21 @ 20:03)    All imaging and other data have been reviewed.  < from: MR Foot No Cont, Right (05.05.21 @ 11:03) >  IMPRESSION:  1. No evidence for osteomyelitis or abscess.      Assessment and Plan:   66yo woman with PMH of HTN, DM2, AS s/p TAVR 2015, HOCM, pAFib, JOSR on 2L O2 at home qhs, anxiety and morbid obesity was admitted with right foot ulcer. She had appointment with wound center and due to unhealing ulcer and cellulitis in lower leg was referred to ED.  MRI with no OM or abscess. Wound culture has been sent but still pending.   Until cultures are back will cover for MRSA as well, Since she is morbidly obese dosing vancomycin will be difficult, so will switch to daptomycin.     RLE cellulitis and R foot ulcer  - Blood culture x 2 NGTD  - Wound culture before and from OR both NGTD, negative gram stain  - MRSA PCR negative   - MRI with no OM or abscess  - R foot condylectomy of 4th metatarsal head, also had bone biopsy and culture, on 5/6  - Elevate leg   - Good glycemic control  - Continue zosyn 3.375gm q8h  - Can stop Daptomycin    Will follow.    Lamar Alexis MD  Division of Infectious Diseases   Cell [phone] between 8am and 6pm   After 6pm and weekends please call ID service at [phone].

## 2021-05-07 NOTE — PROGRESS NOTE ADULT - PROBLEM SELECTOR PLAN 1
increase lantus 50 units qhs  cont admelog 20 units 3x/day before meals  cont mod dose admelog scale coverage qac/qhs  cont cons cho diet  goal bg 100-180 in hosp setting

## 2021-05-07 NOTE — PROGRESS NOTE ADULT - SUBJECTIVE AND OBJECTIVE BOX
65y year old Female seen at [facility]  for right foot sub 4th met head ulcer with erythema right lower leg.   Pt is s/p 5/6/21 right 4th metatarsal head condylectomy.   Pt seen during AM rounds AAOx3 with NAD.  Dressing were clean, dry and intact.  Pt had no new pedal complaints.  Denies any fever, chills, nausea, vomiting, chest pain, shortness of breath, or calf pain at this time.        PAST MEDICAL & SURGICAL HISTORY:  Hypertension    Gastroparesis due to DM    Depression    Herniated disc    Spinal stenosis  chronic back pain    Cholesterol serum elevated    MVP (mitral valve prolapse)    Hypothyroid  not on medication    Obesity    IBS (irritable bowel syndrome)    GERD (gastroesophageal reflux disease)    Diabetes type 2, uncontrolled    History of diabetic retinopathy    S/P bariatric surgery  lap band surgery two times due to erosion, now no more    S/P tonsillectomy and adenoidectomy    Cataract  B/L cataracts    S/P laminectomy  Pt denied this?    S/P D&amp;C (status post dilation and curettage)  D&amp;C when she was 18    Cervix abnormality  Ablation of cervix    Breast anomaly  Biopsy of both breast benign lesions    right eye torn retina    S/P TAVR (transcatheter aortic valve replacement)        Allergies    codeine (Vomiting)  shellfish (Stomach Upset)    Intolerances    fish (Stomach Upset)      MEDICATIONS  (STANDING):    MEDICATIONS  (PRN):      Social History:      FAMILY HISTORY:  Family history of aortic stenosis  Family hx of structural heart dx        Vital Signs Last 24 Hrs  T(C): 36.9 (07 May 2021 12:02), Max: 36.9 (07 May 2021 05:07)  T(F): 98.5 (07 May 2021 12:02), Max: 98.5 (07 May 2021 05:07)  HR: 71 (07 May 2021 12:02) (62 - 85)  BP: 108/68 (07 May 2021 12:02) (101/58 - 132/70)  BP(mean): --  RR: 18 (07 May 2021 12:02) (18 - 20)  SpO2: 91% (07 May 2021 12:43) (91% - 98%)    PHYSICAL EXAM:  Vascular: DP & PT palpable bilaterally, Capillary refill 3 seconds.  Nonpitting edema right foot and leg.  Erythema right lower leg.  No ecchymosis b/l  Neurological: Light touch sensation intact bilaterally  Musculoskeletal: 5/5 strength in all quadrants bilaterally, AJ & STJ ROM intact  Dermatological:  0.75 cm ulceration noted to the right foot sub 4th met head, necrotic wound bed, PTB(+) 4th met head,  no periwound erythema, no fluctuance, no malodor, no proximal streaking at this time                                                  10.9   8.33  )-----------( 245      ( 07 May 2021 07:55 )             35.6   05-07    139  |  101  |  19  ----------------------------<  239<H>  3.8   |  34<H>  |  0.97    Ca    9.0      07 May 2021 07:55  Phos  3.3     05-07  Mg     2.2     05-07    TPro  7.2  /  Alb  3.2<L>  /  TBili  0.6  /  DBili  x   /  AST  21  /  ALT  20  /  AlkPhos  116  05-07            Imaging: ----------      EXAM: DUPLEX LOW ARTERIES UNI LTD RT      PROCEDURE DATE: 05/05/2021        INTERPRETATION: Clinical information: History smoking, diabetes, hypertension, hyperlipidemia, presents with lower extremity nonhealing ulcers.    COMPARISON: Lower extremity arterial Doppler study dated 5/5/2021.    TECHNIQUE: Right lower extremity arterial duplex study. Common femoral artery is not visualized due to body habitus.    FINDINGS: Duplex evaluation of the distributive arteries of the right lower extremity was performed. There is extensive calcific plaque. The arteries above the knee are patent. Biphasic and triphasic arterial waveforms are present. The peroneal artery is not visualized and may be occluded. Disturbed color flow and elevated blood flow velocities are present in the dorsalis pedis artery.    The systolic velocities are as follows:    Right lower extremity    Common femoral artery not visualized  Superficial femoral artery proximal 184 cm/s, mid 108 cm/s, distal 168 cm/s  Popliteal artery 126 cm/s  Posterior tibial artery 128 cm/s  Anterior tibial artery 103 cm/s  Peroneal artery not visualized  Dorsalis pedis artery 326 cm/s    IMPRESSION: Extensive calcified atheromatous plaque.    Two-vessel runoff in the right calf with presumed occlusion of the peroneal artery.    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            CASSIDY JIANG MD; Attending Radiologist  This document has been electronically signed. May 6 2021 9:10AM      EXAM: MR FOOT RT      PROCEDURE DATE: 05/05/2021        INTERPRETATION: MR FOOT RIGHT    HISTORY: Right foot ulcer. Pain. Infection. Evaluate for osteomyelitis.    TECHNIQUE: Multiplanar MRI of the right forefoot was performed without contrast using 7 sequences.    COMPARISON: Bilateral foot x-rays dated June 20, 2015    FINDINGS:    OSSEOUS STRUCTURES  Fractures/Stress Reactions: None.  Osteomyelitis: None.    INTERPHALANGEAL JOINTS  Articular Surfaces: Preserved. There are hammertoe deformities.  Effusions/Synovitis: None.    1ST METATARSOPHALANGEAL JOINT  Articular Surfaces: Tiny marginal osteophytes are present.  Effusions/Synovitis: None.  Sesamoids: Located with small subchondral cysts in the metatarsal head.    LESSER METATARSOPHALANGEAL JOINTS  Articular Surfaces: Preserved.  Effusions/Synovitis: None.    TARSOMETATARSAL JOINTS  Articular Surfaces: There are tiny scattered marginal osteophytes. Tiny subchondral cysts/intraosseous ganglion are noted within the third and fourth metatarsal bases.  Effusions/Synovitis: None.    INTERTARSAL JOINTS  Articular Surfaces: Small naviculocuneiform joint osteophytes are present with small subchondral cysts.  Effusions/Synovitis: None.    INTERMETATARSAL SPACES  Neuromas: None.  Bursa: Small first, slight second, and small third webspace bursa are noted.    LISFRANC LIGAMENT  Intact.    TENDONS  Flexor Tendons: Intact.  Extensor Tendons: Intact.    DISTAL PLANTAR FASCIA  Intact.    SOFT TISSUES  Musculature: Moderate to severe muscle atrophy is noted with increased T2 signal consistent with diabetic neuropathy.  Subcutaneous Tissues: Moderate subcutaneous edema is present without abscess. Plantar ulceration appears to be present at the level of the fourth webspace.    IMPRESSION:  1. No evidence for osteomyelitis or abscess.      XAM: XR FOOT COMP MIN 3 VIEWS RT      PROCEDURE DATE: 05/05/2021        INTERPRETATION: Right foot    HISTORY: Chronic ulcer, fourth metatarsal head    Three views of the right foot show no evidence of fracture nor destructive change. The joint spaces are maintained.    IMPRESSION: No gross destructive changes.            Thank you for this referral.            DONALD BECK MD; Attending Interventional Radiologist  This document has been electronically signed. May 5 2021 3:49PM      EXAM: PHYSIOL EXTREM LOW 3+ LEV BI      PROCEDURE DATE: 05/05/2021        INTERPRETATION: History: Nonhealing ulcer right foot    Risk factors: Smoking, hypertension, diabetes.    A prior examination, dated 6/18/2020, showed a right MARQUIS of 1.23 and a left MARQUIS of 1.26.    On this examination, the right ankle-brachial index equals 1.21; the left ankle-brachial index equals 1.28.    The blood pressure measurements on the right are 155 mmHg at the upper calf and at the right ankle 147 mmHg in the posterior tibial artery and 139 mmHg in the dorsal pedis artery.    The blood pressure measurement at the right great toe is 150 mmHg and the right toe-brachial index equals 1.24.    The blood pressure measurements on the left are 167 mmHg in the upper calf and at the left ankle 143 mmHg in the posterior tibial artery and 155 mmHg in the dorsal pedis artery.    The blood pressure measurement at the left great toe is 188 mmHg and the left toe-brachial index equals 1.55.    Artificially elevated blood pressure measurements, and in this case toe-brachial indices are characteristic for calcified, noncompressible, diabetic arteries.    The amplitude of the pulse volume recordings are normal bilaterally at all levels from the upper thighs through the toes.    Impression: Lower extremity arterial disease is NOT identified.

## 2021-05-07 NOTE — PROGRESS NOTE ADULT - SUBJECTIVE AND OBJECTIVE BOX
Patient is a 65y old  Female who presents with a chief complaint of r/o osteomyelitis (07 May 2021 08:58)      INTERVAL HPI/OVERNIGHT EVENTS: Patient seen and examined at bedside. Today is POD#1 s/p R 4th plantar condylectomy. No overnight events occurred. Patient reports having had a "difficult night" due to discomfort and feeling winded whenever she moved around. Denies fevers, chills, headache, lightheadedness, chest pain, dyspnea, abdominal pain, n/v/d/c.    MEDICATIONS  (STANDING):  aspirin enteric coated 81 milliGRAM(s) Oral daily  atorvastatin 80 milliGRAM(s) Oral at bedtime  FLUoxetine 40 milliGRAM(s) Oral daily  furosemide   Injectable 40 milliGRAM(s) IV Push two times a day  glucagon  Injectable 1 milliGRAM(s) IntraMuscular once  guaiFENesin  milliGRAM(s) Oral <User Schedule>  insulin glargine Injectable (LANTUS) 50 Unit(s) SubCutaneous at bedtime  insulin lispro (ADMELOG) corrective regimen sliding scale   SubCutaneous three times a day before meals  insulin lispro (ADMELOG) corrective regimen sliding scale   SubCutaneous at bedtime  insulin lispro Injectable (ADMELOG) 20 Unit(s) SubCutaneous three times a day before meals  losartan 25 milliGRAM(s) Oral two times a day  metoprolol tartrate 50 milliGRAM(s) Oral two times a day  montelukast 10 milliGRAM(s) Oral daily  pantoprazole    Tablet 40 milliGRAM(s) Oral before breakfast  piperacillin/tazobactam IVPB.. 3.375 Gram(s) IV Intermittent every 8 hours  rivaroxaban 20 milliGRAM(s) Oral with dinner  saccharomyces boulardii 250 milliGRAM(s) Oral two times a day    MEDICATIONS  (PRN):  acetaminophen   Tablet .. 650 milliGRAM(s) Oral every 6 hours PRN Mild Pain (1 - 3)  ALBUTerol    90 MICROgram(s) HFA Inhaler 2 Puff(s) Inhalation every 6 hours PRN Shortness of Breath and/or Wheezing  oxycodone    5 mG/acetaminophen 325 mG 1 Tablet(s) Oral every 6 hours PRN Severe Pain (7 - 10)  oxycodone    5 mG/acetaminophen 325 mG 0.5 Tablet(s) Oral every 6 hours PRN Moderate Pain (4 - 6)      Allergies    codeine (Vomiting)  shellfish (Stomach Upset)    Intolerances    fish (Stomach Upset)      REVIEW OF SYSTEMS:  CONSTITUTIONAL: No fever or chills  HEENT:  No headache, no sore throat  RESPIRATORY: No cough, wheezing, or shortness of breath  CARDIOVASCULAR: No chest pain, palpitations  GASTROINTESTINAL: No abd pain, nausea, vomiting, or diarrhea  GENITOURINARY: No dysuria, frequency, or hematuria  NEUROLOGICAL: no focal weakness or dizziness  MUSCULOSKELETAL: no myalgias     Vital Signs Last 24 Hrs  T(C): 36.9 (07 May 2021 05:07), Max: 36.9 (07 May 2021 05:07)  T(F): 98.5 (07 May 2021 05:07), Max: 98.5 (07 May 2021 05:07)  HR: 85 (07 May 2021 05:07) (62 - 85)  BP: 132/70 (07 May 2021 05:07) (101/58 - 132/70)  BP(mean): --  RR: 18 (07 May 2021 05:07) (18 - 20)  SpO2: 94% (07 May 2021 05:07) (94% - 98%)    PHYSICAL EXAM:  GENERAL: NAD  HEENT:  anicteric, moist mucous membranes  CHEST/LUNG:  CTA b/l, no rales, wheezes, or rhonchi  HEART:  RRR, S1, S2, +holosystolic murmur  ABDOMEN:  BS+, soft, nontender, nondistended  EXTREMITIES: no edema, cyanosis, or calf tenderness, R foot dressed and in surgical shoe  NERVOUS SYSTEM: answers questions and follows commands appropriately    LABS:                        10.9   8.33  )-----------( 245      ( 07 May 2021 07:55 )             35.6     CBC Full  -  ( 07 May 2021 07:55 )  WBC Count : 8.33 K/uL  Hemoglobin : 10.9 g/dL  Hematocrit : 35.6 %  Platelet Count - Automated : 245 K/uL  Mean Cell Volume : 87.0 fl  Mean Cell Hemoglobin : 26.7 pg  Mean Cell Hemoglobin Concentration : 30.6 gm/dL  Auto Neutrophil # : 6.46 K/uL  Auto Lymphocyte # : 0.95 K/uL  Auto Monocyte # : 0.63 K/uL  Auto Eosinophil # : 0.23 K/uL  Auto Basophil # : 0.03 K/uL  Auto Neutrophil % : 77.4 %  Auto Lymphocyte % : 11.4 %  Auto Monocyte % : 7.6 %  Auto Eosinophil % : 2.8 %  Auto Basophil % : 0.4 %    07 May 2021 07:55    139    |  101    |  19     ----------------------------<  239    3.8     |  34     |  0.97     Ca    9.0        07 May 2021 07:55  Phos  3.3       07 May 2021 07:55  Mg     2.2       07 May 2021 07:55    TPro  7.2    /  Alb  3.2    /  TBili  0.6    /  DBili  x      /  AST  21     /  ALT  20     /  AlkPhos  116    07 May 2021 07:55        CAPILLARY BLOOD GLUCOSE      POCT Blood Glucose.: 260 mg/dL (07 May 2021 09:05)  POCT Blood Glucose.: 238 mg/dL (07 May 2021 07:59)  POCT Blood Glucose.: 228 mg/dL (06 May 2021 21:24)  POCT Blood Glucose.: 205 mg/dL (06 May 2021 16:48)  POCT Blood Glucose.: 263 mg/dL (06 May 2021 12:09)        Culture - Tissue with Gram Stain (collected 05-06-21 @ 13:23)  Source: .Tissue Other, right [id number] metatarsal condy  Gram Stain (05-06-21 @ 18:58):    Rare polymorphonuclear leukocytes per low power field    No organisms seen    Culture - Urine (collected 05-05-21 @ 06:28)  Source: .Urine Clean Catch (Midstream)  Final Report (05-06-21 @ 08:59):    No growth    Culture - Tissue with Gram Stain (collected 05-05-21 @ 03:37)  Source: .Tissue Other, Right foot 4th Metatarsal  Gram Stain (05-05-21 @ 07:30):    Rare polymorphonuclear leukocytes per low power field    No organisms seen per oil power field  Preliminary Report (05-06-21 @ 11:21):    Insufficient growth-culture reincubated    Culture - Blood (collected 05-05-21 @ 00:36)  Source: .Blood Blood  Preliminary Report (05-06-21 @ 01:08):    No growth to date.    Culture - Blood (collected 05-05-21 @ 00:36)  Source: .Blood Blood  Preliminary Report (05-06-21 @ 01:08):    No growth to date.        RADIOLOGY & ADDITIONAL TESTS: No new imaging.    Personally reviewed.     Consultant(s) Notes Reviewed:  [x] YES  [ ] NO

## 2021-05-07 NOTE — PHYSICAL THERAPY INITIAL EVALUATION ADULT - ADDITIONAL COMMENTS
Pt lives in rental home, no steps to negotiate. Lives alone. Prior to admission pt was independent with all functional mobility, used a straight cane to ambulate. Patient has food delivery. Sister lives nearby; assists as needed. Patient sleeps in recliner at home, limited ambulation around the home due to RASMUSSEN.

## 2021-05-07 NOTE — PROGRESS NOTE ADULT - ASSESSMENT
Right foot ulcer sub 4th met head      PROBLEM/RECOMMENDATIONS:    Pt evaluated and chart reviewed  Right foot dressed with Aquacel and DSD  f/u RFWCx sub 4th met head ulcer  S/P right 4th met head condylectomy 5/6/21 w/ Dr. John  No further podiatric surgical intervention at this time  All of pt's questions were answered to satisfaction  Pt stated she understood all discussed  Podiatry will follow pt while in house    Wound Care Instructions  1.  Keep dressing clean, dry and intact until clinic visit  2.  Make appointment to be seen by Dr. Palencia or Dr. John at [facility] w/in 3-5 days of d/c  3.  If symptoms of nausea, vomiting, fever, chills, shortness of breath, chest pain, increasing pain foot or posterior leg pain develop - go to the emergency department.

## 2021-05-07 NOTE — PROVIDER CONTACT NOTE (OTHER) - ACTION/TREATMENT ORDERED:
per Md ok to keep tele orders discontinued at this time.
per md they will order pain medication. no new orders for now.
per md lab may not accept soft stool. put hat in toilet for next BM . No new orders for now.
per md " we will watch for now" no new orders .

## 2021-05-08 LAB
-  AMPICILLIN/SULBACTAM: SIGNIFICANT CHANGE UP
-  CEFAZOLIN: SIGNIFICANT CHANGE UP
-  CLINDAMYCIN: SIGNIFICANT CHANGE UP
-  ERYTHROMYCIN: SIGNIFICANT CHANGE UP
-  GENTAMICIN: SIGNIFICANT CHANGE UP
-  OXACILLIN: SIGNIFICANT CHANGE UP
-  PENICILLIN: SIGNIFICANT CHANGE UP
-  RIFAMPIN: SIGNIFICANT CHANGE UP
-  TETRACYCLINE: SIGNIFICANT CHANGE UP
-  TRIMETHOPRIM/SULFAMETHOXAZOLE: SIGNIFICANT CHANGE UP
-  VANCOMYCIN: SIGNIFICANT CHANGE UP
ANION GAP SERPL CALC-SCNC: 7 MMOL/L — SIGNIFICANT CHANGE UP (ref 5–17)
BUN SERPL-MCNC: 20 MG/DL — SIGNIFICANT CHANGE UP (ref 7–23)
CALCIUM SERPL-MCNC: 9.2 MG/DL — SIGNIFICANT CHANGE UP (ref 8.5–10.1)
CHLORIDE SERPL-SCNC: 99 MMOL/L — SIGNIFICANT CHANGE UP (ref 96–108)
CO2 SERPL-SCNC: 32 MMOL/L — HIGH (ref 22–31)
CREAT SERPL-MCNC: 0.99 MG/DL — SIGNIFICANT CHANGE UP (ref 0.5–1.3)
GLUCOSE SERPL-MCNC: 284 MG/DL — HIGH (ref 70–99)
HCT VFR BLD CALC: 33.5 % — LOW (ref 34.5–45)
HGB BLD-MCNC: 10.6 G/DL — LOW (ref 11.5–15.5)
MCHC RBC-ENTMCNC: 27.1 PG — SIGNIFICANT CHANGE UP (ref 27–34)
MCHC RBC-ENTMCNC: 31.6 GM/DL — LOW (ref 32–36)
MCV RBC AUTO: 85.7 FL — SIGNIFICANT CHANGE UP (ref 80–100)
METHOD TYPE: SIGNIFICANT CHANGE UP
NRBC # BLD: 0 /100 WBCS — SIGNIFICANT CHANGE UP (ref 0–0)
PLATELET # BLD AUTO: 229 K/UL — SIGNIFICANT CHANGE UP (ref 150–400)
POTASSIUM SERPL-MCNC: 3.9 MMOL/L — SIGNIFICANT CHANGE UP (ref 3.5–5.3)
POTASSIUM SERPL-SCNC: 3.9 MMOL/L — SIGNIFICANT CHANGE UP (ref 3.5–5.3)
RBC # BLD: 3.91 M/UL — SIGNIFICANT CHANGE UP (ref 3.8–5.2)
RBC # FLD: 15.4 % — HIGH (ref 10.3–14.5)
SODIUM SERPL-SCNC: 138 MMOL/L — SIGNIFICANT CHANGE UP (ref 135–145)
WBC # BLD: 7.89 K/UL — SIGNIFICANT CHANGE UP (ref 3.8–10.5)
WBC # FLD AUTO: 7.89 K/UL — SIGNIFICANT CHANGE UP (ref 3.8–10.5)

## 2021-05-08 PROCEDURE — 99024 POSTOP FOLLOW-UP VISIT: CPT

## 2021-05-08 PROCEDURE — 99232 SBSQ HOSP IP/OBS MODERATE 35: CPT | Mod: GC

## 2021-05-08 PROCEDURE — 99232 SBSQ HOSP IP/OBS MODERATE 35: CPT

## 2021-05-08 RX ORDER — LOPERAMIDE HCL 2 MG
2 TABLET ORAL ONCE
Refills: 0 | Status: COMPLETED | OUTPATIENT
Start: 2021-05-08 | End: 2021-05-08

## 2021-05-08 RX ADMIN — Medication 2 MILLIGRAM(S): at 19:00

## 2021-05-08 RX ADMIN — NYSTATIN CREAM 1 APPLICATION(S): 100000 CREAM TOPICAL at 06:14

## 2021-05-08 RX ADMIN — PIPERACILLIN AND TAZOBACTAM 25 GRAM(S): 4; .5 INJECTION, POWDER, LYOPHILIZED, FOR SOLUTION INTRAVENOUS at 22:00

## 2021-05-08 RX ADMIN — Medication 250 MILLIGRAM(S): at 06:14

## 2021-05-08 RX ADMIN — Medication 40 MILLIGRAM(S): at 18:56

## 2021-05-08 RX ADMIN — NYSTATIN CREAM 1 APPLICATION(S): 100000 CREAM TOPICAL at 17:54

## 2021-05-08 RX ADMIN — Medication 6: at 08:29

## 2021-05-08 RX ADMIN — INSULIN GLARGINE 50 UNIT(S): 100 INJECTION, SOLUTION SUBCUTANEOUS at 21:57

## 2021-05-08 RX ADMIN — Medication 600 MILLIGRAM(S): at 08:30

## 2021-05-08 RX ADMIN — Medication 20 UNIT(S): at 08:30

## 2021-05-08 RX ADMIN — PIPERACILLIN AND TAZOBACTAM 25 GRAM(S): 4; .5 INJECTION, POWDER, LYOPHILIZED, FOR SOLUTION INTRAVENOUS at 15:52

## 2021-05-08 RX ADMIN — Medication 50 MILLIGRAM(S): at 17:53

## 2021-05-08 RX ADMIN — LOSARTAN POTASSIUM 25 MILLIGRAM(S): 100 TABLET, FILM COATED ORAL at 17:52

## 2021-05-08 RX ADMIN — Medication 6: at 17:51

## 2021-05-08 RX ADMIN — ATORVASTATIN CALCIUM 80 MILLIGRAM(S): 80 TABLET, FILM COATED ORAL at 21:56

## 2021-05-08 RX ADMIN — Medication 2 MILLIGRAM(S): at 09:53

## 2021-05-08 RX ADMIN — Medication 2: at 21:58

## 2021-05-08 RX ADMIN — Medication 20 UNIT(S): at 17:51

## 2021-05-08 RX ADMIN — RIVAROXABAN 20 MILLIGRAM(S): KIT at 17:53

## 2021-05-08 RX ADMIN — PIPERACILLIN AND TAZOBACTAM 25 GRAM(S): 4; .5 INJECTION, POWDER, LYOPHILIZED, FOR SOLUTION INTRAVENOUS at 06:14

## 2021-05-08 RX ADMIN — Medication 40 MILLIGRAM(S): at 11:34

## 2021-05-08 RX ADMIN — Medication 81 MILLIGRAM(S): at 11:35

## 2021-05-08 RX ADMIN — Medication 20 UNIT(S): at 12:45

## 2021-05-08 RX ADMIN — PANTOPRAZOLE SODIUM 40 MILLIGRAM(S): 20 TABLET, DELAYED RELEASE ORAL at 06:14

## 2021-05-08 RX ADMIN — Medication 250 MILLIGRAM(S): at 17:52

## 2021-05-08 RX ADMIN — Medication 8: at 12:45

## 2021-05-08 RX ADMIN — MONTELUKAST 10 MILLIGRAM(S): 4 TABLET, CHEWABLE ORAL at 11:35

## 2021-05-08 NOTE — PROGRESS NOTE ADULT - ASSESSMENT
66yo woman with PMHx AS s/p TAVR 2015, HOCM, pAFib, JOSR (does not tolerate CPAP, on 2L O2 at home qhs), anxiety, morbid obesity, DM, HTN presents to ED with R foot ulcer, admitted for rule out osteomyelitis.  MRI negative for osteomyelitis however due to increased risk of developing OM in the future, podiatry recommended surgical intervention and now pt is POD 2 s/p Right 4th plantar condylectomy.

## 2021-05-08 NOTE — PROGRESS NOTE ADULT - SUBJECTIVE AND OBJECTIVE BOX
[facility] -- Oni Campbell, Merlin Mayorga, Blayne Torres, Verenice Beckwith: Office # [phone]    Follow Up:  AS, cardiac optimization pre/post procedure     Subjective/Observations: Patient seen and examined. Patient awake, alert, OOB to chair. No complaints of chest pain, dyspnea, palpitations or dizziness. No signs of orthopnea or PND. Tolerating O2 via nasal cannula.     REVIEW OF SYSTEMS: All review of systems is negative for eye, ENT, GI, , allergic, dermatologic, musculoskeletal and neurologic except as described above    PAST MEDICAL & SURGICAL HISTORY:  Hypertension    Gastroparesis due to DM    Depression    Herniated disc    Spinal stenosis  chronic back pain    Cholesterol serum elevated    MVP (mitral valve prolapse)    Hypothyroid  not on medication    Obesity    IBS (irritable bowel syndrome)    GERD (gastroesophageal reflux disease)    Diabetes type 2, uncontrolled    History of diabetic retinopathy    S/P bariatric surgery  lap band surgery two times due to erosion, now no more    S/P tonsillectomy and adenoidectomy    Cataract  B/L cataracts    S/P laminectomy  Pt denied this?    S/P D&amp;C (status post dilation and curettage)  D&amp;C when she was 18    Cervix abnormality  Ablation of cervix    Breast anomaly  Biopsy of both breast benign lesions    right eye torn retina    S/P TAVR (transcatheter aortic valve replacement)    MEDICATIONS  (STANDING):  aspirin enteric coated 81 milliGRAM(s) Oral daily  atorvastatin 80 milliGRAM(s) Oral at bedtime  FLUoxetine 40 milliGRAM(s) Oral daily  furosemide   Injectable 40 milliGRAM(s) IV Push two times a day  glucagon  Injectable 1 milliGRAM(s) IntraMuscular once  guaiFENesin  milliGRAM(s) Oral <User Schedule>  insulin glargine Injectable (LANTUS) 50 Unit(s) SubCutaneous at bedtime  insulin lispro (ADMELOG) corrective regimen sliding scale   SubCutaneous three times a day before meals  insulin lispro (ADMELOG) corrective regimen sliding scale   SubCutaneous at bedtime  insulin lispro Injectable (ADMELOG) 20 Unit(s) SubCutaneous three times a day before meals  losartan 25 milliGRAM(s) Oral two times a day  metoprolol tartrate 50 milliGRAM(s) Oral two times a day  montelukast 10 milliGRAM(s) Oral daily  nystatin Powder 1 Application(s) Topical two times a day  pantoprazole    Tablet 40 milliGRAM(s) Oral before breakfast  piperacillin/tazobactam IVPB.. 3.375 Gram(s) IV Intermittent every 8 hours  rivaroxaban 20 milliGRAM(s) Oral with dinner  saccharomyces boulardii 250 milliGRAM(s) Oral two times a day    MEDICATIONS  (PRN):  acetaminophen   Tablet .. 650 milliGRAM(s) Oral every 6 hours PRN Mild Pain (1 - 3)  ALBUTerol    90 MICROgram(s) HFA Inhaler 2 Puff(s) Inhalation every 6 hours PRN Shortness of Breath and/or Wheezing  oxycodone    5 mG/acetaminophen 325 mG 1 Tablet(s) Oral every 6 hours PRN Severe Pain (7 - 10)  oxycodone    5 mG/acetaminophen 325 mG 0.5 Tablet(s) Oral every 6 hours PRN Moderate Pain (4 - 6)    Allergies    codeine (Vomiting)  shellfish (Stomach Upset)    Intolerances  fish (Stomach Upset)    Vital Signs Last 24 Hrs  T(C): 36.6 (08 May 2021 12:22), Max: 36.9 (08 May 2021 05:43)  T(F): 97.8 (08 May 2021 12:22), Max: 98.5 (08 May 2021 05:43)  HR: 89 (08 May 2021 12:22) (61 - 93)  BP: 131/62 (08 May 2021 12:22) (100/65 - 131/62)  BP(mean): --  RR: 17 (08 May 2021 12:22) (17 - 19)  SpO2: 96% (08 May 2021 12:22) (95% - 97%)  I&O's Summary        TELE: Not on telemetry   PHYSICAL EXAM:  Appearance: NAD, no distress, alert, Obese   HEENT: Moist Mucous Membranes, Anicteric  Cardiovascular: Regular rate and rhythm, Normal S1 S2, No JVD, + murmurs, No rubs, gallops or clicks  Respiratory: Non-labored, Clear to auscultation, No rales, No rhonchi, No wheezing.   Gastrointestinal:  Soft, Non-tender, + BS  Neurologic: Non-focal  Skin: Warm and dry, Right foot DSD intact, No ecchymosis, No cyanosis  Musculoskeletal: No clubbing, No cyanosis, No joint swelling/tenderness  Psychiatry: Mood & affect appropriate  Lymph: +2 Bilateral LE Edema    LABS: All Labs Reviewed:                        10.6   7.89  )-----------( 229      ( 08 May 2021 09:16 )             33.5                         10.9   8.33  )-----------( 245      ( 07 May 2021 07:55 )             35.6                         10.2   7.20  )-----------( 216      ( 06 May 2021 14:15 )             32.0     08 May 2021 09:16    138    |  99     |  20     ----------------------------<  284    3.9     |  32     |  0.99   07 May 2021 07:55    139    |  101    |  19     ----------------------------<  239    3.8     |  34     |  0.97   06 May 2021 14:14    138    |  100    |  23     ----------------------------<  226    3.4     |  33     |  0.95     Ca    9.2        08 May 2021 09:16  Ca    9.0        07 May 2021 07:55  Ca    8.6        06 May 2021 14:14  Phos  3.3       07 May 2021 07:55  Mg     2.2       07 May 2021 07:55    TPro  7.2    /  Alb  3.2    /  TBili  0.6    /  DBili  x      /  AST  21     /  ALT  20     /  AlkPhos  116    07 May 2021 07:55  TPro  6.5    /  Alb  3.0    /  TBili  0.5    /  DBili  x      /  AST  18     /  ALT  20     /  AlkPhos  104    06 May 2021 14:14  Creatine Kinase, Serum: 101 U/L (05-05-21 @ 14:21)    12 Lead ECG:   Ventricular Rate 79 BPM  Atrial Rate 79 BPM  P-R Interval 172 ms  QRS Duration 88 ms  Q-T Interval 378 ms  QTC Calculation(Bazett) 433 ms  P Axis 9 degrees  R Axis -25 degrees  T Axis 88 degrees  Diagnosis Line Sinus rhythm with occasional premature ventricular complexes  Left ventricular hypertrophy with repolarization abnormality  Abnormal ECG  No previous ECGs available  Confirmed by Faustino Mayorga MD ([id number]) on 5/5/2021 3:13:50 PM (05-04-21 @ 21:14)    < from: Transthoracic Echocardiogram (04.28.21 @ 08:59) >  Dimensions:    Normal Values:  LA:     4.2    2.0 - 4.0 cm  Ao:     2.7    2.0 - 3.8 cm  SEPTUM: 1.2    0.6 - 1.2 cm  PWT:    1.1    0.6 - 1.1 cm  LVIDd:  5.0    3.0 - 5.6 cm  LVIDs:  2.6    1.8 - 4.0 cm  Derived variables:  LVMI: 97 g/m2  RWT: 0.43  Fractional short: 49 %  EF (Visual Estimate): 75 %  Doppler Peak Velocity (m/sec): MV=1.8 AoV=4.2  ------------------------------------------------------------------------  Observations:  Mitral Valve: Dense posterior mitral annular calcification.  Mild-moderate mitral regurgitation. Peak mitral valve  gradient equals 12 mm Hg, mean transmitral valve gradient  equals 5 mm Hg, heart rate 66 bpm.  Aortic Valve/Aorta: Transcatheter aortic valve replacement.  Peak transaortic valve gradient equals 71 mm Hg, mean  transaortic valve gradient equals 41 mm Hg, which is  elevated even in the presence of a transcatheter aortic  valve replacement.  DI=0.38 Mild paravalvular aortic  regurgitation. Peak left ventricular outflow tract gradient  equals 9 mm Hg, mean gradient is equal to 6 mm Hg, LVOT  velocity time integral equals 37 cm.  Aortic Root: 2.7 cm.  LVOT diameter: 2 cm.  Left Atrium: Moderately dilated left atrium.  LA volume  index = 48 cc/m2.  Left Ventricle: Hyperdynamic left ventricular systolic  function. Mid cavitary gradients of 32 mmHg which increase  to 49 mmHg with valsalva.  Normal left ventricular internal  dimensions and wall thicknesses. Normal diastolic function  Right Heart: Normal right atrium. Normal right ventricular  size and function. Normal tricuspid valve. Mild tricuspid  regurgitation. Normal pulmonic valve.  Pericardium/Pleura: Normal pericardium with no pericardial  effusion.  Hemodynamic: Estimated right atrial pressure is 8 mm Hg.  Estimated right ventricular systolic pressure equals 49 mm  Hg, assuming right atrial pressure equals 8 mm Hg,  consistent with mild pulmonary hypertension.  ------------------------------------------------------------------------  Conclusions:  1. Transcatheter aortic valve replacement. Peak transaortic  valve gradient equals 71 mm Hg, mean transaortic valve  gradient equals 41 mm Hg, which is elevated even in the  presence of a transcatheter aortic valve replacement.  DI=0.38 Mild paravalvular aortic regurgitation.  2. Moderately dilated left atrium.  LA volume index = 48  cc/m2.  3. Normal left ventricular internaldimensions and wall  thicknesses.  4. Hyperdynamic left ventricular systolic function. Mid  cavitary gradients of 32 mmHg which increase to 49 mmHg  with valsalva.  5. Estimated pulmonary artery systolic pressure equals 49  mm Hg, assuming right atrial pressure equals 8 mm Hg,  consistent with mild pulmonary pressures.  *** Compared with echocardiogram of 8/28/2020, no  significant changes noted.  < end of copied text >   [facility] -- Oni Campbell Grossman, [place], Blayne Torres, Verenice Beckwith: Office # [phone]    Follow Up:  AS, cardiac optimization pre/post procedure     Subjective/Observations: Patient seen and examined. Patient awake, alert, OOB to chair. No complaints of chest pain,  palpitations or dizziness.   Tolerating O2 via nasal cannula. dyspena now improved.     REVIEW OF SYSTEMS: All review of systems is negative for eye, ENT, GI, , allergic, dermatologic, musculoskeletal and neurologic except as described above    PAST MEDICAL & SURGICAL HISTORY:  Hypertension    Gastroparesis due to DM    Depression    Herniated disc    Spinal stenosis  chronic back pain    Cholesterol serum elevated    MVP (mitral valve prolapse)    Hypothyroid  not on medication    Obesity    IBS (irritable bowel syndrome)    GERD (gastroesophageal reflux disease)    Diabetes type 2, uncontrolled    History of diabetic retinopathy    S/P bariatric surgery  lap band surgery two times due to erosion, now no more    S/P tonsillectomy and adenoidectomy    Cataract  B/L cataracts    S/P laminectomy  Pt denied this?    S/P D&amp;C (status post dilation and curettage)  D&amp;C when she was 18    Cervix abnormality  Ablation of cervix    Breast anomaly  Biopsy of both breast benign lesions    right eye torn retina    S/P TAVR (transcatheter aortic valve replacement)    MEDICATIONS  (STANDING):  aspirin enteric coated 81 milliGRAM(s) Oral daily  atorvastatin 80 milliGRAM(s) Oral at bedtime  FLUoxetine 40 milliGRAM(s) Oral daily  furosemide   Injectable 40 milliGRAM(s) IV Push two times a day  glucagon  Injectable 1 milliGRAM(s) IntraMuscular once  guaiFENesin  milliGRAM(s) Oral <User Schedule>  insulin glargine Injectable (LANTUS) 50 Unit(s) SubCutaneous at bedtime  insulin lispro (ADMELOG) corrective regimen sliding scale   SubCutaneous three times a day before meals  insulin lispro (ADMELOG) corrective regimen sliding scale   SubCutaneous at bedtime  insulin lispro Injectable (ADMELOG) 20 Unit(s) SubCutaneous three times a day before meals  losartan 25 milliGRAM(s) Oral two times a day  metoprolol tartrate 50 milliGRAM(s) Oral two times a day  montelukast 10 milliGRAM(s) Oral daily  nystatin Powder 1 Application(s) Topical two times a day  pantoprazole    Tablet 40 milliGRAM(s) Oral before breakfast  piperacillin/tazobactam IVPB.. 3.375 Gram(s) IV Intermittent every 8 hours  rivaroxaban 20 milliGRAM(s) Oral with dinner  saccharomyces boulardii 250 milliGRAM(s) Oral two times a day    MEDICATIONS  (PRN):  acetaminophen   Tablet .. 650 milliGRAM(s) Oral every 6 hours PRN Mild Pain (1 - 3)  ALBUTerol    90 MICROgram(s) HFA Inhaler 2 Puff(s) Inhalation every 6 hours PRN Shortness of Breath and/or Wheezing  oxycodone    5 mG/acetaminophen 325 mG 1 Tablet(s) Oral every 6 hours PRN Severe Pain (7 - 10)  oxycodone    5 mG/acetaminophen 325 mG 0.5 Tablet(s) Oral every 6 hours PRN Moderate Pain (4 - 6)    Allergies    codeine (Vomiting)  shellfish (Stomach Upset)    Intolerances  fish (Stomach Upset)    Vital Signs Last 24 Hrs  T(C): 36.6 (08 May 2021 12:22), Max: 36.9 (08 May 2021 05:43)  T(F): 97.8 (08 May 2021 12:22), Max: 98.5 (08 May 2021 05:43)  HR: 89 (08 May 2021 12:22) (61 - 93)  BP: 131/62 (08 May 2021 12:22) (100/65 - 131/62)  BP(mean): --  RR: 17 (08 May 2021 12:22) (17 - 19)  SpO2: 96% (08 May 2021 12:22) (95% - 97%)  I&O's Summary        TELE: Not on telemetry   PHYSICAL EXAM:  Appearance: NAD, no distress, alert, Obese   HEENT: Moist Mucous Membranes, Anicteric  Cardiovascular: Regular rate and rhythm, Normal S1 S2, No JVD, + murmurs, No rubs, gallops or clicks  Respiratory: Non-labored, Clear to auscultation, No rales, No rhonchi, No wheezing.   Gastrointestinal:  Soft, Non-tender, + BS  Neurologic: Non-focal  Skin: Warm and dry, Right foot DSD intact, No ecchymosis, No cyanosis  Musculoskeletal: No clubbing, No cyanosis, No joint swelling/tenderness  Psychiatry: Mood & affect appropriate  Lymph: +2 Bilateral LE Edema    LABS: All Labs Reviewed:                        10.6   7.89  )-----------( 229      ( 08 May 2021 09:16 )             33.5                         10.9   8.33  )-----------( 245      ( 07 May 2021 07:55 )             35.6                         10.2   7.20  )-----------( 216      ( 06 May 2021 14:15 )             32.0     08 May 2021 09:16    138    |  99     |  20     ----------------------------<  284    3.9     |  32     |  0.99   07 May 2021 07:55    139    |  101    |  19     ----------------------------<  239    3.8     |  34     |  0.97   06 May 2021 14:14    138    |  100    |  23     ----------------------------<  226    3.4     |  33     |  0.95     Ca    9.2        08 May 2021 09:16  Ca    9.0        07 May 2021 07:55  Ca    8.6        06 May 2021 14:14  Phos  3.3       07 May 2021 07:55  Mg     2.2       07 May 2021 07:55    TPro  7.2    /  Alb  3.2    /  TBili  0.6    /  DBili  x      /  AST  21     /  ALT  20     /  AlkPhos  116    07 May 2021 07:55  TPro  6.5    /  Alb  3.0    /  TBili  0.5    /  DBili  x      /  AST  18     /  ALT  20     /  AlkPhos  104    06 May 2021 14:14  Creatine Kinase, Serum: 101 U/L (05-05-21 @ 14:21)    12 Lead ECG:   Ventricular Rate 79 BPM  Atrial Rate 79 BPM  P-R Interval 172 ms  QRS Duration 88 ms  Q-T Interval 378 ms  QTC Calculation(Bazett) 433 ms  P Axis 9 degrees  R Axis -25 degrees  T Axis 88 degrees  Diagnosis Line Sinus rhythm with occasional premature ventricular complexes  Left ventricular hypertrophy with repolarization abnormality  Abnormal ECG  No previous ECGs available  Confirmed by Faustino Mayorga MD ([id number]) on 5/5/2021 3:13:50 PM (05-04-21 @ 21:14)    < from: Transthoracic Echocardiogram (04.28.21 @ 08:59) >  Dimensions:    Normal Values:  LA:     4.2    2.0 - 4.0 cm  Ao:     2.7    2.0 - 3.8 cm  SEPTUM: 1.2    0.6 - 1.2 cm  PWT:    1.1    0.6 - 1.1 cm  LVIDd:  5.0    3.0 - 5.6 cm  LVIDs:  2.6    1.8 - 4.0 cm  Derived variables:  LVMI: 97 g/m2  RWT: 0.43  Fractional short: 49 %  EF (Visual Estimate): 75 %  Doppler Peak Velocity (m/sec): MV=1.8 AoV=4.2  ------------------------------------------------------------------------  Observations:  Mitral Valve: Dense posterior mitral annular calcification.  Mild-moderate mitral regurgitation. Peak mitral valve  gradient equals 12 mm Hg, mean transmitral valve gradient  equals 5 mm Hg, heart rate 66 bpm.  Aortic Valve/Aorta: Transcatheter aortic valve replacement.  Peak transaortic valve gradient equals 71 mm Hg, mean  transaortic valve gradient equals 41 mm Hg, which is  elevated even in the presence of a transcatheter aortic  valve replacement.  DI=0.38 Mild paravalvular aortic  regurgitation. Peak left ventricular outflow tract gradient  equals 9 mm Hg, mean gradient is equal to 6 mm Hg, LVOT  velocity time integral equals 37 cm.  Aortic Root: 2.7 cm.  LVOT diameter: 2 cm.  Left Atrium: Moderately dilated left atrium.  LA volume  index = 48 cc/m2.  Left Ventricle: Hyperdynamic left ventricular systolic  function. Mid cavitary gradients of 32 mmHg which increase  to 49 mmHg with valsalva.  Normal left ventricular internal  dimensions and wall thicknesses. Normal diastolic function  Right Heart: Normal right atrium. Normal right ventricular  size and function. Normal tricuspid valve. Mild tricuspid  regurgitation. Normal pulmonic valve.  Pericardium/Pleura: Normal pericardium with no pericardial  effusion.  Hemodynamic: Estimated right atrial pressure is 8 mm Hg.  Estimated right ventricular systolic pressure equals 49 mm  Hg, assuming right atrial pressure equals 8 mm Hg,  consistent with mild pulmonary hypertension.  ------------------------------------------------------------------------  Conclusions:  1. Transcatheter aortic valve replacement. Peak transaortic  valve gradient equals 71 mm Hg, mean transaortic valve  gradient equals 41 mm Hg, which is elevated even in the  presence of a transcatheter aortic valve replacement.  DI=0.38 Mild paravalvular aortic regurgitation.  2. Moderately dilated left atrium.  LA volume index = 48  cc/m2.  3. Normal left ventricular internaldimensions and wall  thicknesses.  4. Hyperdynamic left ventricular systolic function. Mid  cavitary gradients of 32 mmHg which increase to 49 mmHg  with valsalva.  5. Estimated pulmonary artery systolic pressure equals 49  mm Hg, assuming right atrial pressure equals 8 mm Hg,  consistent with mild pulmonary pressures.  *** Compared with echocardiogram of 8/28/2020, no  significant changes noted.  < end of copied text >

## 2021-05-08 NOTE — PROGRESS NOTE ADULT - PROBLEM SELECTOR PLAN 1
New acute R sub 4th met head ulcer, sent by Dr. Palencia from wound care, likely from chronic uncontrolled DM  -POD 2 s/p R 4th plantar condylectomy with Dr. John   -s/p IV Vanco and Zosyn in ED, on Zosyn currently, Daptomycin discontinued  - Patient with continued loose stools, not diarrhea or profuse, will give Imodium x1, continue Florastor  -MRI foot shows No evidence for osteomyelitis or abscess.  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   -+staph aureus PCR; MRSA PCR negative  -Blood and urine cultures NGTD, tissue cx NGTD  -surgical path pending  -Continue routine dressing with Calcium Alginate and DSD per podiatry  -pain regimen prn: tylenol for mild, Percocet 5/325 1/2 tab q6 for moderate and 1 tab q6 for severe  -ID Dr. Alexis following, recs appreciated  -Cardio Dr. Mayorga consulted for clearance  -PT consult, 50% WB RLE on heel New acute R sub 4th met head ulcer, sent by Dr. Palencia from wound care, likely from chronic uncontrolled DM  -POD 2 s/p R 4th plantar condylectomy with Dr. John   -s/p IV Vanco and Zosyn in ED, on Zosyn currently, Daptomycin discontinued  - Patient with continued loose stools, not diarrhea or profuse, will give Imodium x1, continue Florastor  -MRI foot shows No evidence for osteomyelitis or abscess.  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   -+staph aureus PCR; MRSA PCR negative  -Blood and urine cultures NGTD, tissue cx with rare staph and prevotella species  -surgical path pending  -Continue routine dressing with Calcium Alginate and DSD per podiatry  -pain regimen prn: tylenol for mild, Percocet 5/325 1/2 tab q6 for moderate and 1 tab q6 for severe  -ID Dr. Alexis following, recs appreciated  -Cardio Dr. Mayorga consulted for clearance  -PT consult, 50% WB RLE on heel

## 2021-05-08 NOTE — PROGRESS NOTE ADULT - SUBJECTIVE AND OBJECTIVE BOX
[facility]  INFECTIOUS DISEASES   [address] [place] [address]  Tel: [phone]     Fax: [phone]  =======================================================    [id number]  MARIA D ROBLEDO     Follow up: foot ulcer and cellulitis     Lying down on a recliner, S/p R foot condylectomy of 4th metatarsal head, also had bone biopsy and culture. so far negative.   No fever.    PAST MEDICAL & SURGICAL HISTORY:  Hypertension  Gastroparesis due to DM  Depression  Herniated disc  Spinal stenosis  chronic back pain  Cholesterol serum elevated  MVP (mitral valve prolapse)  Hypothyroid  not on medication  Obesity  IBS (irritable bowel syndrome)  GERD (gastroesophageal reflux disease)  Diabetes type 2, uncontrolled  History of diabetic retinopathy  S/P bariatric surgery  lap band surgery two times due to erosion, now no more  S/P tonsillectomy and adenoidectomy  Cataract  B/L cataracts  S/P laminectomy  Pt denied this?  S/P D&amp;C (status post dilation and curettage)  D&amp;C when she was 18  Cervix abnormality  Ablation of cervix  Breast anomaly  Biopsy of both breast benign lesions  right eye torn retina  S/P TAVR (transcatheter aortic valve replacement)    Social Hx: no smoking, ETOH or drugs     FAMILY HISTORY:  Family history of aortic stenosis  Family hx of structural heart dx    FHx: diabetes mellitus    FH: CAD (coronary artery disease)    Family history of anxiety disorder    Allergies  codeine (Vomiting)  shellfish (Stomach Upset)    Antibiotics:  piperacillin/tazobactam IVPB.. 3.375 Gram(s) IV Intermittent every 8 hours  vancomycin  changed to dampto    REVIEW OF SYSTEMS:  CONSTITUTIONAL:  No Fever or chills  HEENT:  No diplopia or blurred vision.  No sore throat or runny nose.  CARDIOVASCULAR:  No chest pain or SOB.  RESPIRATORY:  No cough, shortness of breath, PND or orthopnea.  GASTROINTESTINAL:  No nausea, vomiting or diarrhea.  GENITOURINARY:  No dysuria, frequency or urgency. No Blood in urine  MUSCULOSKELETAL:  no joint aches, no muscle pain  SKIN:  No change in skin, hair or nails.  NEUROLOGIC:  No paresthesias, fasciculations, seizures or weakness.  PSYCHIATRIC:  No disorder of thought or mood.  ENDOCRINE:  No heat or cold intolerance, polyuria or polydipsia.  HEMATOLOGICAL:  No easy bruising or bleeding.     Physical Exam:  Vital Signs Last 24 Hrs  T(C): 36.6 (08 May 2021 12:22), Max: 36.9 (08 May 2021 05:43)  T(F): 97.8 (08 May 2021 12:22), Max: 98.5 (08 May 2021 05:43)  HR: 89 (08 May 2021 12:22) (61 - 93)  BP: 131/62 (08 May 2021 12:22) (100/65 - 131/62)  BP(mean): --  RR: 17 (08 May 2021 12:22) (17 - 19)  SpO2: 96% (08 May 2021 12:22) (95% - 97%)  GEN: NAD, morbidly obese   HEENT: normocephalic and atraumatic. EOMI. PERRL.    NECK: Supple.  No lymphadenopathy   LUNGS: Clear to auscultation.  HEART: Regular rate and rhythm   ABDOMEN: Soft, nontender, and nondistended.  Positive bowel sounds.    : No CVA tenderness  EXTREMITIES: Bilateral edema. RLE with an ulcer on head of first metatarsal bone with no discharge, swelling, warmth and erythema in 1/3 of lower leg. s/p surgery now dressed  NEUROLOGIC: grossly intact.  PSYCHIATRIC: Appropriate affect .  SKIN: No rash     Labs:                        10.6   7.89  )-----------( 229      ( 08 May 2021 09:16 )             33.5     05-08    138  |  99  |  20  ----------------------------<  284<H>  3.9   |  32<H>  |  0.99    Ca    9.2      08 May 2021 09:16  Phos  3.3     05-07  Mg     2.2     05-07    TPro  7.2  /  Alb  3.2<L>  /  TBili  0.6  /  DBili  x   /  AST  21  /  ALT  20  /  AlkPhos  116  05-07    Culture - Tissue with Gram Stain (collected 05-06-21 @ 13:23)  Source: .Tissue Other, right [id number] metatarsal condy  Gram Stain (05-06-21 @ 18:58):    Rare polymorphonuclear leukocytes per low power field    No organisms seen    Culture - Urine (collected 05-05-21 @ 06:28)  Source: .Urine Clean Catch (Midstream)  Final Report (05-06-21 @ 08:59):    No growth    Culture - Tissue with Gram Stain (collected 05-05-21 @ 03:37)  Source: .Tissue Other, Right foot 4th Metatarsal  Gram Stain (05-05-21 @ 07:30):    Rare polymorphonuclear leukocytes per low power field    No organisms seen per oil power field    Culture - Blood (collected 05-05-21 @ 00:36)  Source: .Blood Blood    Culture - Blood (collected 05-05-21 @ 00:36)  Source: .Blood Blood    WBC Count: 7.89 K/uL (05-08-21 @ 09:16)  WBC Count: 8.33 K/uL (05-07-21 @ 07:55)  WBC Count: 7.20 K/uL (05-06-21 @ 14:15)  WBC Count: 13.05 K/uL (05-05-21 @ 05:07)  WBC Count: 11.55 K/uL (05-04-21 @ 20:03)    Creatinine, Serum: 0.99 mg/dL (05-08-21 @ 09:16)  Creatinine, Serum: 0.97 mg/dL (05-07-21 @ 07:55)  Creatinine, Serum: 0.95 mg/dL (05-06-21 @ 14:14)  Creatinine, Serum: 1.00 mg/dL (05-05-21 @ 05:07)  Creatinine, Serum: 0.90 mg/dL (05-04-21 @ 20:03)    C-Reactive Protein, Serum: 13 mg/L (05-05-21 @ 01:01)    Sedimentation Rate, Erythrocyte: 50 mm/hr (05-04-21 @ 20:03)    COVID-19 PCR: NotDetec (05-04-21 @ 20:03)    All imaging and other data have been reviewed.  < from: MR Foot No Cont, Right (05.05.21 @ 11:03) >  IMPRESSION:  1. No evidence for osteomyelitis or abscess.      Assessment and Plan:   66yo woman with PMH of HTN, DM2, AS s/p TAVR 2015, HOCM, pAFib, JOSR on 2L O2 at home qhs, anxiety and morbid obesity was admitted with right foot ulcer. She had appointment with wound center and due to unhealing ulcer and cellulitis in lower leg was referred to ED.  MRI with no OM or abscess. Wound culture has been sent but still pending.   Until cultures are back will cover for MRSA as well, Since she is morbidly obese dosing vancomycin will be difficult, so will switch to daptomycin.     RLE cellulitis and R foot ulcer  - Blood culture x 2 NGTD  - Wound culture before and from OR both NGTD, negative gram stain  - MRSA PCR negative   - MRI with no OM or abscess  - biopsy showed necrotic skin/tissue and bacteria.  - R foot condylectomy of 4th metatarsal head, also had bone biopsy and culture, on 5/6  - Elevate leg   - Good glycemic control  - Continue zosyn 3.375gm q8h    Will follow.    Lamar Alexis MD  Division of Infectious Diseases   Cell [phone] between 8am and 6pm   After 6pm and weekends please call ID service at [phone].

## 2021-05-08 NOTE — PROGRESS NOTE ADULT - PROBLEM SELECTOR PLAN 6
Patient c/o bilat flank pain with frothy urine  -neg CVA tenderness, no dysuria or hematuria  -UA shows min LE; no blood, bacteria or WBCs  -urine culture- NGTD  -pain likely musculoskeletal in nature  -pain regimen as above Depressed

## 2021-05-08 NOTE — PROGRESS NOTE ADULT - PROBLEM SELECTOR PLAN 10
Chronic  -Continue home fluoxetine    Problem 11: Need for prophylactic measure  DVT PPx: Xarelto 20mg qd    Problem 12: HLD  Chronic  -Continue home atorvastatin    Problem 13: GERD:  -continue pantoprazole qd

## 2021-05-08 NOTE — PROGRESS NOTE ADULT - SUBJECTIVE AND OBJECTIVE BOX
Patient is a 65y old  Female who presents with a chief complaint of r/o osteomyelitis (07 May 2021 14:19)      INTERVAL HPI/OVERNIGHT EVENTS: No acute events overnight. Pt seen and examined at bedside. Pt complains of loose stools, states they arent consistent with watery diarrhea but they are loose and frequent. She says it is not similar to the time she had Cdiff. She otherwise feels well and denies fevers, chills, CP, abd pain, N, V, D, urinary symptoms or focal weakness. She still has SOB which is unchanged. Denies pain at surgical site.    MEDICATIONS  (STANDING):  aspirin enteric coated 81 milliGRAM(s) Oral daily  atorvastatin 80 milliGRAM(s) Oral at bedtime  FLUoxetine 40 milliGRAM(s) Oral daily  furosemide   Injectable 40 milliGRAM(s) IV Push two times a day  glucagon  Injectable 1 milliGRAM(s) IntraMuscular once  guaiFENesin  milliGRAM(s) Oral <User Schedule>  insulin glargine Injectable (LANTUS) 50 Unit(s) SubCutaneous at bedtime  insulin lispro (ADMELOG) corrective regimen sliding scale   SubCutaneous three times a day before meals  insulin lispro (ADMELOG) corrective regimen sliding scale   SubCutaneous at bedtime  insulin lispro Injectable (ADMELOG) 20 Unit(s) SubCutaneous three times a day before meals  losartan 25 milliGRAM(s) Oral two times a day  metoprolol tartrate 50 milliGRAM(s) Oral two times a day  montelukast 10 milliGRAM(s) Oral daily  nystatin Powder 1 Application(s) Topical two times a day  pantoprazole    Tablet 40 milliGRAM(s) Oral before breakfast  piperacillin/tazobactam IVPB.. 3.375 Gram(s) IV Intermittent every 8 hours  rivaroxaban 20 milliGRAM(s) Oral with dinner  saccharomyces boulardii 250 milliGRAM(s) Oral two times a day    MEDICATIONS  (PRN):  acetaminophen   Tablet .. 650 milliGRAM(s) Oral every 6 hours PRN Mild Pain (1 - 3)  ALBUTerol    90 MICROgram(s) HFA Inhaler 2 Puff(s) Inhalation every 6 hours PRN Shortness of Breath and/or Wheezing  oxycodone    5 mG/acetaminophen 325 mG 1 Tablet(s) Oral every 6 hours PRN Severe Pain (7 - 10)  oxycodone    5 mG/acetaminophen 325 mG 0.5 Tablet(s) Oral every 6 hours PRN Moderate Pain (4 - 6)      Allergies    codeine (Vomiting)  shellfish (Stomach Upset)    Intolerances    fish (Stomach Upset)      REVIEW OF SYSTEMS:  CONSTITUTIONAL: No fever or chills  HEENT:  No headache, no sore throat  RESPIRATORY: + SOB, No cough, wheezing  CARDIOVASCULAR: No chest pain, palpitations  GASTROINTESTINAL: endorses loose stools, No abd pain, nausea, vomiting  GENITOURINARY: No dysuria, frequency, or hematuria  NEUROLOGICAL: no focal weakness or dizziness  MUSCULOSKELETAL: + mid-low back pain chronic, no focal weakness    Vital Signs Last 24 Hrs  T(C): 36.9 (08 May 2021 05:43), Max: 36.9 (07 May 2021 12:02)  T(F): 98.5 (08 May 2021 05:43), Max: 98.5 (07 May 2021 12:02)  HR: 93 (08 May 2021 05:43) (61 - 93)  BP: 100/65 (08 May 2021 05:43) (100/65 - 125/53)  BP(mean): --  RR: 18 (08 May 2021 05:43) (18 - 19)  SpO2: 97% (08 May 2021 05:43) (91% - 97%)    PHYSICAL EXAM:  GENERAL: NAD  HEENT:  anicteric, moist mucous membranes  CHEST/LUNG:  CTA b/l, no rales, wheezes, or rhonchi  HEART:  RRR, S1, S2, +holosystolic murmur  ABDOMEN:  BS+, soft, obese abdomen, nontender, nondistended  EXTREMITIES: no edema, cyanosis, or calf tenderness, R foot dressed and in surgical shoe  NERVOUS SYSTEM: answers questions and follows commands appropriately  LABS:                        10.6   7.89  )-----------( 229      ( 08 May 2021 09:16 )             33.5     CBC Full  -  ( 08 May 2021 09:16 )  WBC Count : 7.89 K/uL  Hemoglobin : 10.6 g/dL  Hematocrit : 33.5 %  Platelet Count - Automated : 229 K/uL  Mean Cell Volume : 85.7 fl  Mean Cell Hemoglobin : 27.1 pg  Mean Cell Hemoglobin Concentration : 31.6 gm/dL  Auto Neutrophil # : x  Auto Lymphocyte # : x  Auto Monocyte # : x  Auto Eosinophil # : x  Auto Basophil # : x  Auto Neutrophil % : x  Auto Lymphocyte % : x  Auto Monocyte % : x  Auto Eosinophil % : x  Auto Basophil % : x    08 May 2021 09:16    138    |  99     |  20     ----------------------------<  284    3.9     |  32     |  0.99     Ca    9.2        08 May 2021 09:16          CAPILLARY BLOOD GLUCOSE      POCT Blood Glucose.: 288 mg/dL (08 May 2021 08:04)  POCT Blood Glucose.: 253 mg/dL (07 May 2021 21:43)  POCT Blood Glucose.: 245 mg/dL (07 May 2021 17:07)  POCT Blood Glucose.: 291 mg/dL (07 May 2021 11:59)        Culture - Tissue with Gram Stain (collected 05-06-21 @ 13:23)  Source: .Tissue Other, right [id number] metatarsal condy  Gram Stain (05-06-21 @ 18:58):    Rare polymorphonuclear leukocytes per low power field    No organisms seen  Preliminary Report (05-07-21 @ 18:23):    No growth    Culture - Urine (collected 05-05-21 @ 06:28)  Source: .Urine Clean Catch (Midstream)  Final Report (05-06-21 @ 08:59):    No growth    Culture - Tissue with Gram Stain (collected 05-05-21 @ 03:37)  Source: .Tissue Other, Right foot 4th Metatarsal  Gram Stain (05-05-21 @ 07:30):    Rare polymorphonuclear leukocytes per low power field    No organisms seen per oil power field  Preliminary Report (05-07-21 @ 15:15):    Rare Staphylococcus species Susceptibility to follow.    Rare Coag Negative Staphylococcus    Moderate Anaerobic Gram Negative Nicolas    Most closely resembling    Prevotella disiens "Susceptibilities not performed"    Culture - Blood (collected 05-05-21 @ 00:36)  Source: .Blood Blood  Preliminary Report (05-06-21 @ 01:08):    No growth to date.    Culture - Blood (collected 05-05-21 @ 00:36)  Source: .Blood Blood  Preliminary Report (05-06-21 @ 01:08):    No growth to date.        RADIOLOGY & ADDITIONAL TESTS: No new imaging      Personally reviewed.     Consultant(s) Notes Reviewed:  [x] YES  [ ] NO

## 2021-05-08 NOTE — PROGRESS NOTE ADULT - ASSESSMENT
64yo woman with PMH AS s/p TAVR 2015, HOCM, pAFib, JOSR (does not tolerate CPAP, on 2L O2 at home qhs), anxiety, morbid obesity, DM, HTN presents to ED from wound clnic with R foot ulcer to evaluate for osteomyelitis. Plan for right foot 4th met head resection pending vascular studies, MRI. Cardiology consulted for cardiac optimization pre/post procedure    Valvular Heart Disease/ Aortic stenosis s/p TAVR 2015/ HOCM  -  Patient has extensive cardiac history, follows with Dr. Espinal for a possibility of repeat TAVR as she is still significantly SOB, RASMUSSEN, with increased leg edema for the past few months. Patient is scheduled for a LAMBERT on 5/24 with Dr. Espinal.   - TTE from 4/28/21 showing: hyperdynamic LV systolic function, LVEF 75%, mild to mod MR, mild pulmonary pressures  - Continue Lasix 40mg IV BID for LE edema and SOB. S/p metolazone  5mg x 1 5/7  - Continue Metoprolol tartrate 50mg BID  - Continue aspirin   - continue home O2 2L at home. Does have h/o asthma and JOSR , 94% on RA as per flow sheets     Paroxysmal atrial fibrillation  - EKG showing NSR rate 79 with PVCs, LVH  - Continue Xarelto 20mg qd, Metoprolol tartrate 50mg BID  - Patient reports that she has h/o DVT and PE and was told to be on AC indefinitely and had testing done for hereditary issues.     Hypertension  - BP: 131/62 (05-08-21 @ 12:22) (100/65 - 131/62)  - Continue home metoprolol, Lasix, losartan with hold parameters  - Monitor routine hemodynamics.     Hyperlipidemia  - Continue home atorvastatin.     Osteomyelitis foot, R sub 4th met head ulcer  - s/p right fourth metatarsal head condylectomy  - fu podiatry recs      - Monitor and replete lytes, keep K>4, Mg>2.  - All other medical needs as per primary team.  - Other cardiovascular workup will depend on clinical course.  - Will continue to follow.    Nenita Alejandro, MS FNP, [facility]  Nurse Practitioner- Cardiology   Spectra #[phone] 66yo woman with PMH AS s/p TAVR 2015, HOCM, pAFib, JOSR (does not tolerate CPAP, on 2L O2 at home qhs), anxiety, morbid obesity, DM, HTN presents to ED from wound clnic with R foot ulcer to evaluate for osteomyelitis. Plan for right foot 4th met head resection pending vascular studies, MRI. Cardiology consulted for cardiac optimization pre/post procedure    Valvular Heart Disease/ Aortic stenosis s/p TAVR 2015/ HOCM  -  Patient has extensive cardiac history, follows with Dr. Espinal for a possibility of repeat TAVR as she is still significantly SOB, RASMUSSEN, with increased leg edema for the past few months. Patient is scheduled for a LAMBERT on 5/24 with Dr. Espinal.   - TTE from 4/28/21 showing: hyperdynamic LV systolic function, LVEF 75%, mild to mod MR, mild pulmonary pressures  - Continue Lasix 40mg IV BID for LE edema and SOB. S/p metolazone  5mg x 1 5/7.  - would redose metolazone tonight   - Continue Metoprolol tartrate 50mg BID  - Continue aspirin   - continue home O2 2L at home. Does have h/o asthma and JOSR , 94% on RA as per flow sheets     Paroxysmal atrial fibrillation  - EKG showing NSR rate 79 with PVCs, LVH  - Continue Xarelto 20mg qd, Metoprolol tartrate 50mg BID  - Patient reports that she has h/o DVT and PE and was told to be on AC indefinitely and had testing done for hereditary issues.     Hypertension  - BP: 131/62 (05-08-21 @ 12:22) (100/65 - 131/62)  - Continue home metoprolol, Lasix, losartan with hold parameters  - Monitor routine hemodynamics.     Hyperlipidemia  - Continue home atorvastatin.     Osteomyelitis foot, R sub 4th met head ulcer  - s/p right fourth metatarsal head condylectomy  - fu podiatry recs      - Monitor and replete lytes, keep K>4, Mg>2.  - All other medical needs as per primary team.  - Other cardiovascular workup will depend on clinical course.  - Will continue to follow.    Nenita Alejandro, MS FNP, [facility]  Nurse Practitioner- Cardiology   Spectra #[phone]

## 2021-05-08 NOTE — PROGRESS NOTE ADULT - SUBJECTIVE AND OBJECTIVE BOX
65y year old Female seen at [facility]  for right foot sub 4th met head ulcer with erythema right lower leg.   Pt is s/p 5/6/21 right 4th metatarsal head condylectomy.   Pt seen during AM rounds AAOx3 with NAD.  Dressing were clean, dry and intact.  Pt had no new pedal complaints.  Denies any fever, chills, nausea, vomiting, chest pain, shortness of breath, or calf pain at this time.        PAST MEDICAL & SURGICAL HISTORY:  Hypertension    Gastroparesis due to DM    Depression    Herniated disc    Spinal stenosis  chronic back pain    Cholesterol serum elevated    MVP (mitral valve prolapse)    Hypothyroid  not on medication    Obesity    IBS (irritable bowel syndrome)    GERD (gastroesophageal reflux disease)    Diabetes type 2, uncontrolled    History of diabetic retinopathy    S/P bariatric surgery  lap band surgery two times due to erosion, now no more    S/P tonsillectomy and adenoidectomy    Cataract  B/L cataracts    S/P laminectomy  Pt denied this?    S/P D&amp;C (status post dilation and curettage)  D&amp;C when she was 18    Cervix abnormality  Ablation of cervix    Breast anomaly  Biopsy of both breast benign lesions    right eye torn retina    S/P TAVR (transcatheter aortic valve replacement)        Allergies    codeine (Vomiting)  shellfish (Stomach Upset)    Intolerances    fish (Stomach Upset)      MEDICATIONS  (STANDING):    MEDICATIONS  (PRN):      Social History:      FAMILY HISTORY:  Family history of aortic stenosis  Family hx of structural heart dx      Vital Signs Last 24 Hrs  T(C): 36.9 (08 May 2021 05:43), Max: 36.9 (08 May 2021 05:43)  T(F): 98.5 (08 May 2021 05:43), Max: 98.5 (08 May 2021 05:43)  HR: 93 (08 May 2021 05:43) (61 - 93)  BP: 100/65 (08 May 2021 05:43) (100/65 - 125/53)  BP(mean): --  RR: 18 (08 May 2021 05:43) (18 - 19)  SpO2: 97% (08 May 2021 05:43) (91% - 97%)    PHYSICAL EXAM:  Vascular: DP & PT palpable bilaterally, Capillary refill 3 seconds.  Nonpitting edema right foot and leg.  Erythema right lower leg.  No ecchymosis b/l  Neurological: Light touch sensation intact bilaterally  Musculoskeletal: 5/5 strength in all quadrants bilaterally, AJ & STJ ROM intact  Dermatological:  0.75 cm ulceration noted to the right foot sub 4th met head, necrotic wound bed, PTB(+) 4th met head,  no periwound erythema, no fluctuance, no malodor, no proximal streaking at this time                                                                 10.6   7.89  )-----------( 229      ( 08 May 2021 09:16 )             33.5   05-08    138  |  99  |  20  ----------------------------<  284<H>  3.9   |  32<H>  |  0.99    Ca    9.2      08 May 2021 09:16  Phos  3.3     05-07  Mg     2.2     05-07    TPro  7.2  /  Alb  3.2<L>  /  TBili  0.6  /  DBili  x   /  AST  21  /  ALT  20  /  AlkPhos  116  05-07              Imaging: ----------      EXAM: DUPLEX LOW ARTERIES UNI LTD RT      PROCEDURE DATE: 05/05/2021        INTERPRETATION: Clinical information: History smoking, diabetes, hypertension, hyperlipidemia, presents with lower extremity nonhealing ulcers.    COMPARISON: Lower extremity arterial Doppler study dated 5/5/2021.    TECHNIQUE: Right lower extremity arterial duplex study. Common femoral artery is not visualized due to body habitus.    FINDINGS: Duplex evaluation of the distributive arteries of the right lower extremity was performed. There is extensive calcific plaque. The arteries above the knee are patent. Biphasic and triphasic arterial waveforms are present. The peroneal artery is not visualized and may be occluded. Disturbed color flow and elevated blood flow velocities are present in the dorsalis pedis artery.    The systolic velocities are as follows:    Right lower extremity    Common femoral artery not visualized  Superficial femoral artery proximal 184 cm/s, mid 108 cm/s, distal 168 cm/s  Popliteal artery 126 cm/s  Posterior tibial artery 128 cm/s  Anterior tibial artery 103 cm/s  Peroneal artery not visualized  Dorsalis pedis artery 326 cm/s    IMPRESSION: Extensive calcified atheromatous plaque.    Two-vessel runoff in the right calf with presumed occlusion of the peroneal artery.    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            CASSIDY JIANG MD; Attending Radiologist  This document has been electronically signed. May 6 2021 9:10AM      EXAM: MR FOOT RT      PROCEDURE DATE: 05/05/2021        INTERPRETATION: MR FOOT RIGHT    HISTORY: Right foot ulcer. Pain. Infection. Evaluate for osteomyelitis.    TECHNIQUE: Multiplanar MRI of the right forefoot was performed without contrast using 7 sequences.    COMPARISON: Bilateral foot x-rays dated June 20, 2015    FINDINGS:    OSSEOUS STRUCTURES  Fractures/Stress Reactions: None.  Osteomyelitis: None.    INTERPHALANGEAL JOINTS  Articular Surfaces: Preserved. There are hammertoe deformities.  Effusions/Synovitis: None.    1ST METATARSOPHALANGEAL JOINT  Articular Surfaces: Tiny marginal osteophytes are present.  Effusions/Synovitis: None.  Sesamoids: Located with small subchondral cysts in the metatarsal head.    LESSER METATARSOPHALANGEAL JOINTS  Articular Surfaces: Preserved.  Effusions/Synovitis: None.    TARSOMETATARSAL JOINTS  Articular Surfaces: There are tiny scattered marginal osteophytes. Tiny subchondral cysts/intraosseous ganglion are noted within the third and fourth metatarsal bases.  Effusions/Synovitis: None.    INTERTARSAL JOINTS  Articular Surfaces: Small naviculocuneiform joint osteophytes are present with small subchondral cysts.  Effusions/Synovitis: None.    INTERMETATARSAL SPACES  Neuromas: None.  Bursa: Small first, slight second, and small third webspace bursa are noted.    LISFRANC LIGAMENT  Intact.    TENDONS  Flexor Tendons: Intact.  Extensor Tendons: Intact.    DISTAL PLANTAR FASCIA  Intact.    SOFT TISSUES  Musculature: Moderate to severe muscle atrophy is noted with increased T2 signal consistent with diabetic neuropathy.  Subcutaneous Tissues: Moderate subcutaneous edema is present without abscess. Plantar ulceration appears to be present at the level of the fourth webspace.    IMPRESSION:  1. No evidence for osteomyelitis or abscess.      XAM: XR FOOT COMP MIN 3 VIEWS RT      PROCEDURE DATE: 05/05/2021        INTERPRETATION: Right foot    HISTORY: Chronic ulcer, fourth metatarsal head    Three views of the right foot show no evidence of fracture nor destructive change. The joint spaces are maintained.    IMPRESSION: No gross destructive changes.            Thank you for this referral.            DONALD BECK MD; Attending Interventional Radiologist  This document has been electronically signed. May 5 2021 3:49PM      EXAM: PHYSIOL EXTREM LOW 3+ LEV BI      PROCEDURE DATE: 05/05/2021        INTERPRETATION: History: Nonhealing ulcer right foot    Risk factors: Smoking, hypertension, diabetes.    A prior examination, dated 6/18/2020, showed a right MARQUIS of 1.23 and a left MARQUIS of 1.26.    On this examination, the right ankle-brachial index equals 1.21; the left ankle-brachial index equals 1.28.    The blood pressure measurements on the right are 155 mmHg at the upper calf and at the right ankle 147 mmHg in the posterior tibial artery and 139 mmHg in the dorsal pedis artery.    The blood pressure measurement at the right great toe is 150 mmHg and the right toe-brachial index equals 1.24.    The blood pressure measurements on the left are 167 mmHg in the upper calf and at the left ankle 143 mmHg in the posterior tibial artery and 155 mmHg in the dorsal pedis artery.    The blood pressure measurement at the left great toe is 188 mmHg and the left toe-brachial index equals 1.55.    Artificially elevated blood pressure measurements, and in this case toe-brachial indices are characteristic for calcified, noncompressible, diabetic arteries.    The amplitude of the pulse volume recordings are normal bilaterally at all levels from the upper thighs through the toes.    Impression: Lower extremity arterial disease is NOT identified.

## 2021-05-08 NOTE — PROGRESS NOTE ADULT - ASSESSMENT
Right foot ulcer sub 4th met head      PROBLEM/RECOMMENDATIONS:    Pt evaluated and chart reviewed  Right foot dressed with Aquacel and DSD  f/u RFWCx sub 4th met head ulcer  S/P right 4th met head condylectomy 5/6/21 w/ Dr. John  No further podiatric surgical intervention at this time  All of pt's questions were answered to satisfaction  Pt stated she understood all discussed  Podiatry will follow pt while in house    Wound Care Instructions  1.  Keep dressing clean, dry and intact until clinic visit  2.  Make appointment to be seen by Dr. Palencia or Dr. John at [facility] w/in 3-5 days of d/c  3.  If symptoms of nausea, vomiting, fever, chills, shortness of breath, chest pain, increasing pain foot or posterior leg pain develop - go to the emergency department.

## 2021-05-09 LAB
-  AMPICILLIN/SULBACTAM: SIGNIFICANT CHANGE UP
-  CEFAZOLIN: SIGNIFICANT CHANGE UP
-  CLINDAMYCIN: SIGNIFICANT CHANGE UP
-  ERYTHROMYCIN: SIGNIFICANT CHANGE UP
-  GENTAMICIN: SIGNIFICANT CHANGE UP
-  OXACILLIN: SIGNIFICANT CHANGE UP
-  PENICILLIN: SIGNIFICANT CHANGE UP
-  RIFAMPIN: SIGNIFICANT CHANGE UP
-  TETRACYCLINE: SIGNIFICANT CHANGE UP
-  TRIMETHOPRIM/SULFAMETHOXAZOLE: SIGNIFICANT CHANGE UP
-  VANCOMYCIN: SIGNIFICANT CHANGE UP
ANION GAP SERPL CALC-SCNC: 6 MMOL/L — SIGNIFICANT CHANGE UP (ref 5–17)
BUN SERPL-MCNC: 23 MG/DL — SIGNIFICANT CHANGE UP (ref 7–23)
CALCIUM SERPL-MCNC: 9.5 MG/DL — SIGNIFICANT CHANGE UP (ref 8.5–10.1)
CHLORIDE SERPL-SCNC: 95 MMOL/L — LOW (ref 96–108)
CO2 SERPL-SCNC: 34 MMOL/L — HIGH (ref 22–31)
CREAT SERPL-MCNC: 1.1 MG/DL — SIGNIFICANT CHANGE UP (ref 0.5–1.3)
CULTURE RESULTS: SIGNIFICANT CHANGE UP
GLUCOSE SERPL-MCNC: 289 MG/DL — HIGH (ref 70–99)
HCT VFR BLD CALC: 34.3 % — LOW (ref 34.5–45)
HGB BLD-MCNC: 11 G/DL — LOW (ref 11.5–15.5)
MAGNESIUM SERPL-MCNC: 2.1 MG/DL — SIGNIFICANT CHANGE UP (ref 1.6–2.6)
MCHC RBC-ENTMCNC: 27.4 PG — SIGNIFICANT CHANGE UP (ref 27–34)
MCHC RBC-ENTMCNC: 32.1 GM/DL — SIGNIFICANT CHANGE UP (ref 32–36)
MCV RBC AUTO: 85.3 FL — SIGNIFICANT CHANGE UP (ref 80–100)
METHOD TYPE: SIGNIFICANT CHANGE UP
NRBC # BLD: 0 /100 WBCS — SIGNIFICANT CHANGE UP (ref 0–0)
ORGANISM # SPEC MICROSCOPIC CNT: SIGNIFICANT CHANGE UP
PHOSPHATE SERPL-MCNC: 4.3 MG/DL — SIGNIFICANT CHANGE UP (ref 2.5–4.5)
PLATELET # BLD AUTO: 257 K/UL — SIGNIFICANT CHANGE UP (ref 150–400)
POTASSIUM SERPL-MCNC: 3.5 MMOL/L — SIGNIFICANT CHANGE UP (ref 3.5–5.3)
POTASSIUM SERPL-SCNC: 3.5 MMOL/L — SIGNIFICANT CHANGE UP (ref 3.5–5.3)
RBC # BLD: 4.02 M/UL — SIGNIFICANT CHANGE UP (ref 3.8–5.2)
RBC # FLD: 15.2 % — HIGH (ref 10.3–14.5)
SODIUM SERPL-SCNC: 135 MMOL/L — SIGNIFICANT CHANGE UP (ref 135–145)
SPECIMEN SOURCE: SIGNIFICANT CHANGE UP
WBC # BLD: 9.13 K/UL — SIGNIFICANT CHANGE UP (ref 3.8–10.5)
WBC # FLD AUTO: 9.13 K/UL — SIGNIFICANT CHANGE UP (ref 3.8–10.5)

## 2021-05-09 PROCEDURE — 99232 SBSQ HOSP IP/OBS MODERATE 35: CPT

## 2021-05-09 PROCEDURE — 99232 SBSQ HOSP IP/OBS MODERATE 35: CPT | Mod: GC

## 2021-05-09 RX ORDER — DEXTROSE 50 % IN WATER 50 %
15 SYRINGE (ML) INTRAVENOUS ONCE
Refills: 0 | Status: DISCONTINUED | OUTPATIENT
Start: 2021-05-09 | End: 2021-05-11

## 2021-05-09 RX ORDER — LOPERAMIDE HCL 2 MG
2 TABLET ORAL
Refills: 0 | Status: DISCONTINUED | OUTPATIENT
Start: 2021-05-09 | End: 2021-05-11

## 2021-05-09 RX ORDER — DEXTROSE 50 % IN WATER 50 %
12.5 SYRINGE (ML) INTRAVENOUS ONCE
Refills: 0 | Status: DISCONTINUED | OUTPATIENT
Start: 2021-05-09 | End: 2021-05-11

## 2021-05-09 RX ORDER — DEXTROSE 50 % IN WATER 50 %
25 SYRINGE (ML) INTRAVENOUS ONCE
Refills: 0 | Status: DISCONTINUED | OUTPATIENT
Start: 2021-05-09 | End: 2021-05-11

## 2021-05-09 RX ORDER — INSULIN GLARGINE 100 [IU]/ML
60 INJECTION, SOLUTION SUBCUTANEOUS AT BEDTIME
Refills: 0 | Status: DISCONTINUED | OUTPATIENT
Start: 2021-05-09 | End: 2021-05-11

## 2021-05-09 RX ORDER — INSULIN LISPRO 100/ML
24 VIAL (ML) SUBCUTANEOUS
Refills: 0 | Status: DISCONTINUED | OUTPATIENT
Start: 2021-05-09 | End: 2021-05-11

## 2021-05-09 RX ORDER — POTASSIUM CHLORIDE 20 MEQ
40 PACKET (EA) ORAL ONCE
Refills: 0 | Status: COMPLETED | OUTPATIENT
Start: 2021-05-09 | End: 2021-05-09

## 2021-05-09 RX ORDER — SODIUM CHLORIDE 9 MG/ML
1000 INJECTION, SOLUTION INTRAVENOUS
Refills: 0 | Status: DISCONTINUED | OUTPATIENT
Start: 2021-05-09 | End: 2021-05-11

## 2021-05-09 RX ORDER — GLUCAGON INJECTION, SOLUTION 0.5 MG/.1ML
1 INJECTION, SOLUTION SUBCUTANEOUS ONCE
Refills: 0 | Status: DISCONTINUED | OUTPATIENT
Start: 2021-05-09 | End: 2021-05-11

## 2021-05-09 RX ADMIN — PANTOPRAZOLE SODIUM 40 MILLIGRAM(S): 20 TABLET, DELAYED RELEASE ORAL at 05:54

## 2021-05-09 RX ADMIN — Medication 2 MILLIGRAM(S): at 17:13

## 2021-05-09 RX ADMIN — Medication 40 MILLIGRAM(S): at 11:23

## 2021-05-09 RX ADMIN — Medication 81 MILLIGRAM(S): at 11:23

## 2021-05-09 RX ADMIN — Medication 4: at 21:45

## 2021-05-09 RX ADMIN — Medication 6: at 12:13

## 2021-05-09 RX ADMIN — PIPERACILLIN AND TAZOBACTAM 25 GRAM(S): 4; .5 INJECTION, POWDER, LYOPHILIZED, FOR SOLUTION INTRAVENOUS at 05:53

## 2021-05-09 RX ADMIN — Medication 250 MILLIGRAM(S): at 05:53

## 2021-05-09 RX ADMIN — LOSARTAN POTASSIUM 25 MILLIGRAM(S): 100 TABLET, FILM COATED ORAL at 05:53

## 2021-05-09 RX ADMIN — RIVAROXABAN 20 MILLIGRAM(S): KIT at 17:13

## 2021-05-09 RX ADMIN — Medication 40 MILLIEQUIVALENT(S): at 12:13

## 2021-05-09 RX ADMIN — NYSTATIN CREAM 1 APPLICATION(S): 100000 CREAM TOPICAL at 07:08

## 2021-05-09 RX ADMIN — NYSTATIN CREAM 1 APPLICATION(S): 100000 CREAM TOPICAL at 17:15

## 2021-05-09 RX ADMIN — MONTELUKAST 10 MILLIGRAM(S): 4 TABLET, CHEWABLE ORAL at 11:23

## 2021-05-09 RX ADMIN — Medication 20 UNIT(S): at 08:43

## 2021-05-09 RX ADMIN — Medication 600 MILLIGRAM(S): at 08:09

## 2021-05-09 RX ADMIN — Medication 24 UNIT(S): at 17:14

## 2021-05-09 RX ADMIN — Medication 24 UNIT(S): at 12:14

## 2021-05-09 RX ADMIN — Medication 6: at 17:14

## 2021-05-09 RX ADMIN — INSULIN GLARGINE 60 UNIT(S): 100 INJECTION, SOLUTION SUBCUTANEOUS at 21:47

## 2021-05-09 RX ADMIN — Medication 250 MILLIGRAM(S): at 17:13

## 2021-05-09 RX ADMIN — Medication 40 MILLIGRAM(S): at 05:52

## 2021-05-09 RX ADMIN — PIPERACILLIN AND TAZOBACTAM 25 GRAM(S): 4; .5 INJECTION, POWDER, LYOPHILIZED, FOR SOLUTION INTRAVENOUS at 21:45

## 2021-05-09 RX ADMIN — ATORVASTATIN CALCIUM 80 MILLIGRAM(S): 80 TABLET, FILM COATED ORAL at 21:45

## 2021-05-09 RX ADMIN — Medication 50 MILLIGRAM(S): at 05:52

## 2021-05-09 RX ADMIN — Medication 8: at 08:43

## 2021-05-09 RX ADMIN — PIPERACILLIN AND TAZOBACTAM 25 GRAM(S): 4; .5 INJECTION, POWDER, LYOPHILIZED, FOR SOLUTION INTRAVENOUS at 13:44

## 2021-05-09 NOTE — PROGRESS NOTE ADULT - SUBJECTIVE AND OBJECTIVE BOX
CAPILLARY BLOOD GLUCOSE      POCT Blood Glucose.: 271 mg/dL (09 May 2021 12:04)  POCT Blood Glucose.: 303 mg/dL (09 May 2021 08:12)  POCT Blood Glucose.: 284 mg/dL (08 May 2021 21:40)  POCT Blood Glucose.: 271 mg/dL (08 May 2021 17:07)      Vital Signs Last 24 Hrs  T(C): 36.7 (09 May 2021 11:46), Max: 36.8 (08 May 2021 21:11)  T(F): 98.1 (09 May 2021 11:46), Max: 98.2 (08 May 2021 21:11)  HR: 60 (09 May 2021 11:46) (60 - 72)  BP: 102/67 (09 May 2021 11:46) (102/67 - 136/75)  BP(mean): --  RR: 17 (09 May 2021 11:46) (16 - 17)  SpO2: 99% (09 May 2021 11:46) (67% - 99%)    Respiratory: CTA B/L  CV: RRR, S1S2, no murmurs, rubs or gallops  Abdominal: Soft, NT, ND +BS, Last BM  Extremities: No edema, + peripheral pulses     05-09    135  |  95<L>  |  23  ----------------------------<  289<H>  3.5   |  34<H>  |  1.10    Ca    9.5      09 May 2021 09:21  Phos  4.3     05-09  Mg     2.1     05-09        atorvastatin 80 milliGRAM(s) Oral at bedtime  dextrose 40% Gel 15 Gram(s) Oral once  dextrose 50% Injectable 25 Gram(s) IV Push once  dextrose 50% Injectable 12.5 Gram(s) IV Push once  dextrose 50% Injectable 25 Gram(s) IV Push once  glucagon  Injectable 1 milliGRAM(s) IntraMuscular once  glucagon  Injectable 1 milliGRAM(s) IntraMuscular once  insulin glargine Injectable (LANTUS) 60 Unit(s) SubCutaneous at bedtime  insulin lispro (ADMELOG) corrective regimen sliding scale   SubCutaneous three times a day before meals  insulin lispro (ADMELOG) corrective regimen sliding scale   SubCutaneous at bedtime  insulin lispro Injectable (ADMELOG) 24 Unit(s) SubCutaneous three times a day before meals

## 2021-05-09 NOTE — PROGRESS NOTE ADULT - SUBJECTIVE AND OBJECTIVE BOX
[facility] -- Oni Campbell, Merlin Mayorga, Blayne Torres Savella  Office # [phone]      Follow Up:  HF    Subjective/Observations: Patient seen and examined. Events noted. Resting comfortably in bed. No complaints of chest pain,  or palpitations reported. No signs of orthopnea or PND. Her sob has improved.       REVIEW OF SYSTEMS: All other review of systems is negative unless indicated above    PAST MEDICAL & SURGICAL HISTORY:  Hypertension    Gastroparesis due to DM    Depression    Herniated disc    Spinal stenosis  chronic back pain    Cholesterol serum elevated    MVP (mitral valve prolapse)    Hypothyroid  not on medication    Obesity    IBS (irritable bowel syndrome)    GERD (gastroesophageal reflux disease)    Diabetes type 2, uncontrolled    History of diabetic retinopathy    S/P bariatric surgery  lap band surgery two times due to erosion, now no more    S/P tonsillectomy and adenoidectomy    Cataract  B/L cataracts    S/P laminectomy  Pt denied this?    S/P D&amp;C (status post dilation and curettage)  D&amp;C when she was 18    Cervix abnormality  Ablation of cervix    Breast anomaly  Biopsy of both breast benign lesions    right eye torn retina    S/P TAVR (transcatheter aortic valve replacement)        MEDICATIONS  (STANDING):  aspirin enteric coated 81 milliGRAM(s) Oral daily  atorvastatin 80 milliGRAM(s) Oral at bedtime  dextrose 40% Gel 15 Gram(s) Oral once  dextrose 5%. 1000 milliLiter(s) (50 mL/Hr) IV Continuous <Continuous>  dextrose 5%. 1000 milliLiter(s) (100 mL/Hr) IV Continuous <Continuous>  dextrose 50% Injectable 25 Gram(s) IV Push once  dextrose 50% Injectable 12.5 Gram(s) IV Push once  dextrose 50% Injectable 25 Gram(s) IV Push once  FLUoxetine 40 milliGRAM(s) Oral daily  furosemide   Injectable 40 milliGRAM(s) IV Push two times a day  glucagon  Injectable 1 milliGRAM(s) IntraMuscular once  glucagon  Injectable 1 milliGRAM(s) IntraMuscular once  guaiFENesin  milliGRAM(s) Oral <User Schedule>  insulin glargine Injectable (LANTUS) 60 Unit(s) SubCutaneous at bedtime  insulin lispro (ADMELOG) corrective regimen sliding scale   SubCutaneous three times a day before meals  insulin lispro (ADMELOG) corrective regimen sliding scale   SubCutaneous at bedtime  insulin lispro Injectable (ADMELOG) 24 Unit(s) SubCutaneous three times a day before meals  loperamide 2 milliGRAM(s) Oral two times a day  losartan 25 milliGRAM(s) Oral two times a day  metoprolol tartrate 50 milliGRAM(s) Oral two times a day  montelukast 10 milliGRAM(s) Oral daily  nystatin Powder 1 Application(s) Topical two times a day  pantoprazole    Tablet 40 milliGRAM(s) Oral before breakfast  piperacillin/tazobactam IVPB.. 3.375 Gram(s) IV Intermittent every 8 hours  rivaroxaban 20 milliGRAM(s) Oral with dinner  saccharomyces boulardii 250 milliGRAM(s) Oral two times a day    MEDICATIONS  (PRN):  acetaminophen   Tablet .. 650 milliGRAM(s) Oral every 6 hours PRN Mild Pain (1 - 3)  ALBUTerol    90 MICROgram(s) HFA Inhaler 2 Puff(s) Inhalation every 6 hours PRN Shortness of Breath and/or Wheezing  oxycodone    5 mG/acetaminophen 325 mG 1 Tablet(s) Oral every 6 hours PRN Severe Pain (7 - 10)  oxycodone    5 mG/acetaminophen 325 mG 0.5 Tablet(s) Oral every 6 hours PRN Moderate Pain (4 - 6)      Allergies    codeine (Vomiting)  shellfish (Stomach Upset)    Intolerances    fish (Stomach Upset)          Vital Signs Last 24 Hrs  T(C): 36.7 (09 May 2021 11:46), Max: 36.8 (08 May 2021 21:11)  T(F): 98.1 (09 May 2021 11:46), Max: 98.2 (08 May 2021 21:11)  HR: 60 (09 May 2021 11:46) (60 - 72)  BP: 102/67 (09 May 2021 11:46) (102/67 - 136/75)  BP(mean): --  RR: 17 (09 May 2021 11:46) (16 - 17)  SpO2: 99% (09 May 2021 11:46) (67% - 99%)    I&O's Summary    09 May 2021 07:01  -  09 May 2021 14:13  --------------------------------------------------------  IN: 25 mL / OUT: 0 mL / NET: 25 mL          PHYSICAL EXAM:  Appearance: NAD, no distress, alert, Obese   HEENT: Moist Mucous Membranes, Anicteric  Cardiovascular: Regular rate and rhythm, Normal S1 S2, No JVD, + murmurs, No rubs, gallops or clicks  Respiratory: Decreased breath sounds b/l. No rales, crackles or wheeze appreciated.   Gastrointestinal:  Soft, Non-tender, + BS  Neurologic: Non-focal  Skin: Warm and dry, Right foot DSD intact, No ecchymosis, No cyanosis  Musculoskeletal: No clubbing, No cyanosis, No joint swelling/tenderness  Psychiatry: Mood & affect appropriate  Lymph: +1Bilateral LE Edema    LABS: All Labs Reviewed:                        11.0   9.13  )-----------( 257      ( 09 May 2021 09:21 )             34.3                         10.6   7.89  )-----------( 229      ( 08 May 2021 09:16 )             33.5                         10.9   8.33  )-----------( 245      ( 07 May 2021 07:55 )             35.6     09 May 2021 09:21    135    |  95     |  23     ----------------------------<  289    3.5     |  34     |  1.10   08 May 2021 09:16    138    |  99     |  20     ----------------------------<  284    3.9     |  32     |  0.99   07 May 2021 07:55    139    |  101    |  19     ----------------------------<  239    3.8     |  34     |  0.97     Ca    9.5        09 May 2021 09:21  Ca    9.2        08 May 2021 09:16  Ca    9.0        07 May 2021 07:55  Phos  4.3       09 May 2021 09:21  Phos  3.3       07 May 2021 07:55  Mg     2.1       09 May 2021 09:21  Mg     2.2       07 May 2021 07:55    TPro  7.2    /  Alb  3.2    /  TBili  0.6    /  DBili  x      /  AST  21     /  ALT  20     /  AlkPhos  116    07 May 2021 07:55  TPro  6.5    /  Alb  3.0    /  TBili  0.5    /  DBili  x      /  AST  18     /  ALT  20     /  AlkPhos  104    06 May 2021 14:14

## 2021-05-09 NOTE — PROGRESS NOTE ADULT - PROBLEM SELECTOR PLAN 5
Hx of Type 2 DM on insulin, poorly controlled  -On 30-40units Humalog 3x daily pre-meal, 48-52units Lantus qhs at home  -Hold home insulin regimen  -HgA1C 9.2   -increase to 60U Lantus qhs and 24U admelog 3x/day pre-meal as BG remains in 300s despite previous increase  -hypoglycemia protocol, accuchecks qac and qhs  -Goal glucose on hospital setting 100-180, adjust insulin regimen PRN  -Endo Dr. Perlman following, f/u new recs

## 2021-05-09 NOTE — PROGRESS NOTE ADULT - PROBLEM SELECTOR PLAN 1
lantus increased 60 units qhs  admelog increased 24 units 3x/day before meals  cont mod dose admelog scale coverge qac/qhs  cont cons cho diet  goal bg 100-180 in hosp setting

## 2021-05-09 NOTE — PROGRESS NOTE ADULT - ASSESSMENT
66yo woman with PMH AS s/p TAVR 2015, HOCM, pAFib, JOSR (does not tolerate CPAP, on 2L O2 at home qhs), anxiety, morbid obesity, DM, HTN presents to ED from wound clnic with R foot ulcer to evaluate for osteomyelitis. Plan for right foot 4th met head resection pending vascular studies, MRI. Cardiology consulted for cardiac optimization pre/post procedure    Valvular Heart Disease/ Aortic stenosis s/p TAVR 2015/ HOCM  -  Patient has extensive cardiac history, follows with Dr. Espinal for a possibility of repeat TAVR as she is still significantly SOB, RASMUSSEN, with increased leg edema for the past few months. Patient is scheduled for a LAMBERT on 5/24 with Dr. Espinal.   - TTE from 4/28/21 showing: hyperdynamic LV systolic function, LVEF 75%, mild to mod MR, mild pulmonary pressures  - Continue Lasix 40mg IV BID for LE edema and SOB. S/p metolazone  5mg x 1 5/7 and 5/8.  - now sig improved. Would likely see if can transition to po lasix carlos   - Continue Metoprolol tartrate 50mg BID  - Continue aspirin   - continue home O2 2L at home. Does have h/o asthma and JOSR , 94% on RA as per flow sheets     Paroxysmal atrial fibrillation  - EKG showing NSR rate 79 with PVCs, LVH  - Continue Xarelto 20mg qd, Metoprolol tartrate 50mg BID  - Patient reports that she has h/o DVT and PE and was told to be on AC indefinitely and had testing done for hereditary issues.     Hypertension  - BP: 131/62 (05-08-21 @ 12:22) (100/65 - 131/62)  - Continue home metoprolol, Lasix, losartan with hold parameters  - Monitor routine hemodynamics.     Hyperlipidemia  - Continue home atorvastatin.     Osteomyelitis foot, R sub 4th met head ulcer  - s/p right fourth metatarsal head condylectomy  - fu podiatry recs      - Monitor and replete lytes, keep K>4, Mg>2.  - All other medical needs as per primary team.  - Other cardiovascular workup will depend on clinical course.  - Will continue to follow.

## 2021-05-09 NOTE — PROGRESS NOTE ADULT - PROBLEM SELECTOR PLAN 6
Patient c/o bilat flank pain with frothy urine  -neg CVA tenderness, no dysuria or hematuria  -UA shows min LE; no blood, bacteria or WBCs  -urine culture- NGTD  -pain likely musculoskeletal in nature  -pain regimen as above Patient c/o bilat flank pain with frothy urine  -neg CVA tenderness, no dysuria or hematuria  -UA shows min LE; no blood, bacteria or WBCs  -urine culture- NG  -pain likely musculoskeletal in nature  -pain regimen as above

## 2021-05-09 NOTE — PROGRESS NOTE ADULT - SUBJECTIVE AND OBJECTIVE BOX
Patient is a 65y old  Female who presents with a chief complaint of r/o osteomyelitis (08 May 2021 15:52)      INTERVAL HPI/OVERNIGHT EVENTS: No acute events overnight. Patient seen and examined at bedside. States her diarrhea improved slightly with Imodium and she wishes to continue. States it is not profuse and watery like the time she had Cdiff however she is uncomfortable and wants relief. She denies any fevers, CP, abd pain, N, V, urinary symptoms or focal weakness. States her foot feels ok without pain however during dressing changes with podiatry, she notices some bleeding at the surgical site. She states her SOB is unchanged but she still has RASMUSSEN when walking to the bathroom, even prior to hospitalization. She understands that her pathology from the OR is pending.     MEDICATIONS  (STANDING):  aspirin enteric coated 81 milliGRAM(s) Oral daily  atorvastatin 80 milliGRAM(s) Oral at bedtime  dextrose 40% Gel 15 Gram(s) Oral once  dextrose 5%. 1000 milliLiter(s) (50 mL/Hr) IV Continuous <Continuous>  dextrose 5%. 1000 milliLiter(s) (100 mL/Hr) IV Continuous <Continuous>  dextrose 50% Injectable 12.5 Gram(s) IV Push once  dextrose 50% Injectable 25 Gram(s) IV Push once  dextrose 50% Injectable 25 Gram(s) IV Push once  FLUoxetine 40 milliGRAM(s) Oral daily  furosemide   Injectable 40 milliGRAM(s) IV Push two times a day  glucagon  Injectable 1 milliGRAM(s) IntraMuscular once  glucagon  Injectable 1 milliGRAM(s) IntraMuscular once  guaiFENesin  milliGRAM(s) Oral <User Schedule>  insulin glargine Injectable (LANTUS) 60 Unit(s) SubCutaneous at bedtime  insulin lispro (ADMELOG) corrective regimen sliding scale   SubCutaneous three times a day before meals  insulin lispro (ADMELOG) corrective regimen sliding scale   SubCutaneous at bedtime  insulin lispro Injectable (ADMELOG) 24 Unit(s) SubCutaneous three times a day before meals  loperamide 2 milliGRAM(s) Oral two times a day  losartan 25 milliGRAM(s) Oral two times a day  metoprolol tartrate 50 milliGRAM(s) Oral two times a day  montelukast 10 milliGRAM(s) Oral daily  nystatin Powder 1 Application(s) Topical two times a day  pantoprazole    Tablet 40 milliGRAM(s) Oral before breakfast  piperacillin/tazobactam IVPB.. 3.375 Gram(s) IV Intermittent every 8 hours  potassium chloride    Tablet ER 40 milliEquivalent(s) Oral once  rivaroxaban 20 milliGRAM(s) Oral with dinner  saccharomyces boulardii 250 milliGRAM(s) Oral two times a day    MEDICATIONS  (PRN):  acetaminophen   Tablet .. 650 milliGRAM(s) Oral every 6 hours PRN Mild Pain (1 - 3)  ALBUTerol    90 MICROgram(s) HFA Inhaler 2 Puff(s) Inhalation every 6 hours PRN Shortness of Breath and/or Wheezing  oxycodone    5 mG/acetaminophen 325 mG 1 Tablet(s) Oral every 6 hours PRN Severe Pain (7 - 10)  oxycodone    5 mG/acetaminophen 325 mG 0.5 Tablet(s) Oral every 6 hours PRN Moderate Pain (4 - 6)      Allergies    codeine (Vomiting)  shellfish (Stomach Upset)    Intolerances    fish (Stomach Upset)      REVIEW OF SYSTEMS:  CONSTITUTIONAL: No fever or chills  HEENT:  No headache, no sore throat  RESPIRATORY: + SOB, No cough, wheezing  CARDIOVASCULAR: No chest pain, palpitations  GASTROINTESTINAL: + loose stools, No abd pain, nausea, vomiting  GENITOURINARY: No dysuria, frequency, or hematuria  NEUROLOGICAL: no focal weakness or dizziness  MUSCULOSKELETAL: + mid-low back pain chronic, no focal weakness    Vital Signs Last 24 Hrs  T(C): 36.8 (09 May 2021 05:30), Max: 36.8 (08 May 2021 21:11)  T(F): 98.2 (09 May 2021 05:30), Max: 98.2 (08 May 2021 21:11)  HR: 72 (09 May 2021 05:30) (70 - 89)  BP: 131/64 (09 May 2021 05:30) (131/62 - 136/75)  BP(mean): --  RR: 16 (09 May 2021 05:30) (16 - 17)  SpO2: 97% (09 May 2021 05:45) (67% - 97%)    PHYSICAL EXAM:  GENERAL: NAD, sitting comfortably in chair next to bed  HEENT:  anicteric, moist mucous membranes  CHEST/LUNG:  CTA b/l, decreased breath sounds throughout, no rales, wheezes, or rhonchi  HEART:  RRR, S1, S2, +holosystolic murmur  ABDOMEN:  BS+, soft, obese abdomen, nontender, nondistended  EXTREMITIES: no edema, cyanosis, or calf tenderness, R foot dressed and in surgical shoe  NERVOUS SYSTEM: answers questions and follows commands appropriately    LABS:                        11.0   9.13  )-----------( 257      ( 09 May 2021 09:21 )             34.3     CBC Full  -  ( 09 May 2021 09:21 )  WBC Count : 9.13 K/uL  Hemoglobin : 11.0 g/dL  Hematocrit : 34.3 %  Platelet Count - Automated : 257 K/uL  Mean Cell Volume : 85.3 fl  Mean Cell Hemoglobin : 27.4 pg  Mean Cell Hemoglobin Concentration : 32.1 gm/dL  Auto Neutrophil # : x  Auto Lymphocyte # : x  Auto Monocyte # : x  Auto Eosinophil # : x  Auto Basophil # : x  Auto Neutrophil % : x  Auto Lymphocyte % : x  Auto Monocyte % : x  Auto Eosinophil % : x  Auto Basophil % : x    09 May 2021 09:21    135    |  95     |  23     ----------------------------<  289    3.5     |  34     |  1.10     Ca    9.5        09 May 2021 09:21  Phos  4.3       09 May 2021 09:21  Mg     2.1       09 May 2021 09:21          CAPILLARY BLOOD GLUCOSE      POCT Blood Glucose.: 303 mg/dL (09 May 2021 08:12)  POCT Blood Glucose.: 284 mg/dL (08 May 2021 21:40)  POCT Blood Glucose.: 271 mg/dL (08 May 2021 17:07)  POCT Blood Glucose.: 317 mg/dL (08 May 2021 12:01)        Culture - Tissue with Gram Stain (collected 05-06-21 @ 13:23)  Source: .Tissue Other, right [id number] metatarsal condy  Gram Stain (05-06-21 @ 18:58):    Rare polymorphonuclear leukocytes per low power field    No organisms seen  Preliminary Report (05-07-21 @ 18:23):    No growth    Culture - Urine (collected 05-05-21 @ 06:28)  Source: .Urine Clean Catch (Midstream)  Final Report (05-06-21 @ 08:59):    No growth    Culture - Tissue with Gram Stain (collected 05-05-21 @ 03:37)  Source: .Tissue Other, Right foot 4th Metatarsal  Gram Stain (05-05-21 @ 07:30):    Rare polymorphonuclear leukocytes per low power field    No organisms seen per oil power field  Preliminary Report (05-07-21 @ 15:15):    Rare Staphylococcus species Susceptibility to follow.    Rare Coag Negative Staphylococcus    Moderate Anaerobic Gram Negative Nicolas    Most closely resembling    Prevotella disiens "Susceptibilities not performed"  Organism: Coag Negative Staphylococcus (05-08-21 @ 16:02)  Organism: Coag Negative Staphylococcus (05-08-21 @ 16:02)      -  Ampicillin/Sulbactam: R <=8/4      -  Cefazolin: R <=4      -  Clindamycin: R >4      -  Erythromycin: R >4      -  Gentamicin: S <=1 Should not be used as monotherapy      -  Oxacillin: R >2      -  Penicillin: R >8      -  RIF- Rifampin: S <=1 Should not be used as monotherapy      -  Tetra/Doxy: S <=1      -  Trimethoprim/Sulfamethoxazole: S 1/19      -  Vancomycin: S 2      Method Type: RAMÓN    Culture - Blood (collected 05-05-21 @ 00:36)  Source: .Blood Blood  Preliminary Report (05-06-21 @ 01:08):    No growth to date.    Culture - Blood (collected 05-05-21 @ 00:36)  Source: .Blood Blood  Preliminary Report (05-06-21 @ 01:08):    No growth to date.        RADIOLOGY & ADDITIONAL TESTS: No new imaging    Personally reviewed.     Consultant(s) Notes Reviewed:  [x] YES  [ ] NO     Patient is a 65y old  Female who presents with a chief complaint of r/o osteomyelitis (08 May 2021 15:52)      INTERVAL HPI/OVERNIGHT EVENTS: No acute events overnight. Patient seen and examined at bedside. States her "diarrhea" improved slightly with Imodium and she wishes to continue. States it is not profuse and watery like the time she had Cdiff however she is uncomfortable and wants relief. She denies any fevers, CP, abd pain, N, V, urinary symptoms or focal weakness. States her foot feels ok without pain however during dressing changes with podiatry, she notices some bleeding at the surgical site. She states her SOB is unchanged but she still has RASMUSSEN when walking to the bathroom, even prior to hospitalization. She understands that her pathology from the OR is pending.     MEDICATIONS  (STANDING):  aspirin enteric coated 81 milliGRAM(s) Oral daily  atorvastatin 80 milliGRAM(s) Oral at bedtime  dextrose 40% Gel 15 Gram(s) Oral once  dextrose 5%. 1000 milliLiter(s) (50 mL/Hr) IV Continuous <Continuous>  dextrose 5%. 1000 milliLiter(s) (100 mL/Hr) IV Continuous <Continuous>  dextrose 50% Injectable 12.5 Gram(s) IV Push once  dextrose 50% Injectable 25 Gram(s) IV Push once  dextrose 50% Injectable 25 Gram(s) IV Push once  FLUoxetine 40 milliGRAM(s) Oral daily  furosemide   Injectable 40 milliGRAM(s) IV Push two times a day  glucagon  Injectable 1 milliGRAM(s) IntraMuscular once  glucagon  Injectable 1 milliGRAM(s) IntraMuscular once  guaiFENesin  milliGRAM(s) Oral <User Schedule>  insulin glargine Injectable (LANTUS) 60 Unit(s) SubCutaneous at bedtime  insulin lispro (ADMELOG) corrective regimen sliding scale   SubCutaneous three times a day before meals  insulin lispro (ADMELOG) corrective regimen sliding scale   SubCutaneous at bedtime  insulin lispro Injectable (ADMELOG) 24 Unit(s) SubCutaneous three times a day before meals  loperamide 2 milliGRAM(s) Oral two times a day  losartan 25 milliGRAM(s) Oral two times a day  metoprolol tartrate 50 milliGRAM(s) Oral two times a day  montelukast 10 milliGRAM(s) Oral daily  nystatin Powder 1 Application(s) Topical two times a day  pantoprazole    Tablet 40 milliGRAM(s) Oral before breakfast  piperacillin/tazobactam IVPB.. 3.375 Gram(s) IV Intermittent every 8 hours  potassium chloride    Tablet ER 40 milliEquivalent(s) Oral once  rivaroxaban 20 milliGRAM(s) Oral with dinner  saccharomyces boulardii 250 milliGRAM(s) Oral two times a day    MEDICATIONS  (PRN):  acetaminophen   Tablet .. 650 milliGRAM(s) Oral every 6 hours PRN Mild Pain (1 - 3)  ALBUTerol    90 MICROgram(s) HFA Inhaler 2 Puff(s) Inhalation every 6 hours PRN Shortness of Breath and/or Wheezing  oxycodone    5 mG/acetaminophen 325 mG 1 Tablet(s) Oral every 6 hours PRN Severe Pain (7 - 10)  oxycodone    5 mG/acetaminophen 325 mG 0.5 Tablet(s) Oral every 6 hours PRN Moderate Pain (4 - 6)      Allergies    codeine (Vomiting)  shellfish (Stomach Upset)    Intolerances    fish (Stomach Upset)      REVIEW OF SYSTEMS:  CONSTITUTIONAL: No fever or chills  HEENT:  No headache, no sore throat  RESPIRATORY: + SOB, No cough, wheezing  CARDIOVASCULAR: No chest pain, palpitations  GASTROINTESTINAL: + loose stools, No abd pain, nausea, vomiting  GENITOURINARY: No dysuria, frequency, or hematuria  NEUROLOGICAL: no focal weakness or dizziness  MUSCULOSKELETAL: + mid-low back pain chronic, no focal weakness    Vital Signs Last 24 Hrs  T(C): 36.8 (09 May 2021 05:30), Max: 36.8 (08 May 2021 21:11)  T(F): 98.2 (09 May 2021 05:30), Max: 98.2 (08 May 2021 21:11)  HR: 72 (09 May 2021 05:30) (70 - 89)  BP: 131/64 (09 May 2021 05:30) (131/62 - 136/75)  BP(mean): --  RR: 16 (09 May 2021 05:30) (16 - 17)  SpO2: 97% (09 May 2021 05:45) (67% - 97%)    PHYSICAL EXAM:  GENERAL: NAD, sitting comfortably in chair next to bed  HEENT:  anicteric, moist mucous membranes  CHEST/LUNG:  CTA b/l, decreased breath sounds throughout, no rales, wheezes, or rhonchi  HEART:  RRR, S1, S2, +holosystolic murmur  ABDOMEN:  BS+, soft, obese abdomen, nontender, nondistended  EXTREMITIES: no edema, cyanosis, or calf tenderness, R foot dressed and in surgical shoe  NERVOUS SYSTEM: answers questions and follows commands appropriately    LABS:                        11.0   9.13  )-----------( 257      ( 09 May 2021 09:21 )             34.3     CBC Full  -  ( 09 May 2021 09:21 )  WBC Count : 9.13 K/uL  Hemoglobin : 11.0 g/dL  Hematocrit : 34.3 %  Platelet Count - Automated : 257 K/uL  Mean Cell Volume : 85.3 fl  Mean Cell Hemoglobin : 27.4 pg  Mean Cell Hemoglobin Concentration : 32.1 gm/dL  Auto Neutrophil # : x  Auto Lymphocyte # : x  Auto Monocyte # : x  Auto Eosinophil # : x  Auto Basophil # : x  Auto Neutrophil % : x  Auto Lymphocyte % : x  Auto Monocyte % : x  Auto Eosinophil % : x  Auto Basophil % : x    09 May 2021 09:21    135    |  95     |  23     ----------------------------<  289    3.5     |  34     |  1.10     Ca    9.5        09 May 2021 09:21  Phos  4.3       09 May 2021 09:21  Mg     2.1       09 May 2021 09:21          CAPILLARY BLOOD GLUCOSE      POCT Blood Glucose.: 303 mg/dL (09 May 2021 08:12)  POCT Blood Glucose.: 284 mg/dL (08 May 2021 21:40)  POCT Blood Glucose.: 271 mg/dL (08 May 2021 17:07)  POCT Blood Glucose.: 317 mg/dL (08 May 2021 12:01)        Culture - Tissue with Gram Stain (collected 05-06-21 @ 13:23)  Source: .Tissue Other, right [id number] metatarsal condy  Gram Stain (05-06-21 @ 18:58):    Rare polymorphonuclear leukocytes per low power field    No organisms seen  Preliminary Report (05-07-21 @ 18:23):    No growth    Culture - Urine (collected 05-05-21 @ 06:28)  Source: .Urine Clean Catch (Midstream)  Final Report (05-06-21 @ 08:59):    No growth    Culture - Tissue with Gram Stain (collected 05-05-21 @ 03:37)  Source: .Tissue Other, Right foot 4th Metatarsal  Gram Stain (05-05-21 @ 07:30):    Rare polymorphonuclear leukocytes per low power field    No organisms seen per oil power field  Preliminary Report (05-07-21 @ 15:15):    Rare Staphylococcus species Susceptibility to follow.    Rare Coag Negative Staphylococcus    Moderate Anaerobic Gram Negative Nicolas    Most closely resembling    Prevotella disiens "Susceptibilities not performed"  Organism: Coag Negative Staphylococcus (05-08-21 @ 16:02)  Organism: Coag Negative Staphylococcus (05-08-21 @ 16:02)      -  Ampicillin/Sulbactam: R <=8/4      -  Cefazolin: R <=4      -  Clindamycin: R >4      -  Erythromycin: R >4      -  Gentamicin: S <=1 Should not be used as monotherapy      -  Oxacillin: R >2      -  Penicillin: R >8      -  RIF- Rifampin: S <=1 Should not be used as monotherapy      -  Tetra/Doxy: S <=1      -  Trimethoprim/Sulfamethoxazole: S 1/19      -  Vancomycin: S 2      Method Type: RAMÓN    Culture - Blood (collected 05-05-21 @ 00:36)  Source: .Blood Blood  Preliminary Report (05-06-21 @ 01:08):    No growth to date.    Culture - Blood (collected 05-05-21 @ 00:36)  Source: .Blood Blood  Preliminary Report (05-06-21 @ 01:08):    No growth to date.        RADIOLOGY & ADDITIONAL TESTS: No new imaging    Personally reviewed.     Consultant(s) Notes Reviewed:  [x] YES  [ ] NO

## 2021-05-09 NOTE — PROGRESS NOTE ADULT - ASSESSMENT
66yo woman with PMHx AS s/p TAVR 2015, HOCM, pAFib, JOSR (does not tolerate CPAP, on 2L O2 at home qhs), anxiety, morbid obesity, DM, HTN presents to ED with R foot ulcer, admitted for rule out osteomyelitis.  MRI negative for osteomyelitis however due to increased risk of developing OM in the future, podiatry recommended surgical intervention and now pt is POD 3 s/p Right 4th plantar condylectomy.

## 2021-05-09 NOTE — PROGRESS NOTE ADULT - PROBLEM SELECTOR PLAN 1
New acute R sub 4th met head ulcer, sent by Dr. Palencia from wound care, likely from chronic uncontrolled DM  -POD 3 s/p R 4th plantar condylectomy with Dr. John   -s/p IV Vanco and Zosyn in ED, on Zosyn currently, Daptomycin discontinued  - Patient with continued loose stools, not diarrhea or profuse, will continue Imodium and Florastor, if symptoms worsen or WBC rises will screen for Cdiff  -MRI foot shows No evidence for osteomyelitis or abscess.  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   -+staph aureus PCR; MRSA PCR negative  -Blood and urine cultures NGTD, tissue cx with rare staph and prevotella species  -surgical path pending  -Continue routine dressing with Calcium Alginate and DSD per podiatry  -pain regimen prn: tylenol for mild, Percocet 5/325 1/2 tab q6 for moderate and 1 tab q6 for severe  -ID Dr. Alexis following, recs appreciated  -Cardio Dr. Mayorga consulted for clearance  -PT consult, 50% WB RLE on heel New acute R sub 4th met head ulcer, sent by Dr. Palencia from wound care, likely from chronic uncontrolled DM  -POD 3 s/p R 4th plantar condylectomy with Dr. John   -s/p IV Vanco and Zosyn in ED, on Zosyn currently, Daptomycin discontinued  - Patient with continued loose stools, not diarrhea or profuse, will continue Imodium and Florastor, if symptoms worsen or WBC rises will screen for Cdiff  -MRI foot shows No evidence for osteomyelitis or abscess.  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   -+staph aureus PCR; MRSA PCR negative  -Blood and urine cultures NGTD, tissue cx with rare coag negative staph and prevotella species, Tissue cx fom condylectomy with no growth  -surgical path pending  -Continue routine dressing with Calcium Alginate and DSD per podiatry  -pain regimen prn: tylenol for mild, Percocet 5/325 1/2 tab q6 for moderate and 1 tab q6 for severe  -ID Dr. Alexis following, recs appreciated  -Cardio Dr. Mayorga consulted for clearance  -PT consult, 50% WB RLE on heel

## 2021-05-10 LAB
ANION GAP SERPL CALC-SCNC: 6 MMOL/L — SIGNIFICANT CHANGE UP (ref 5–17)
BASOPHILS # BLD AUTO: 0.04 K/UL — SIGNIFICANT CHANGE UP (ref 0–0.2)
BASOPHILS NFR BLD AUTO: 0.4 % — SIGNIFICANT CHANGE UP (ref 0–2)
BUN SERPL-MCNC: 26 MG/DL — HIGH (ref 7–23)
CALCIUM SERPL-MCNC: 9.3 MG/DL — SIGNIFICANT CHANGE UP (ref 8.5–10.1)
CHLORIDE SERPL-SCNC: 96 MMOL/L — SIGNIFICANT CHANGE UP (ref 96–108)
CO2 SERPL-SCNC: 33 MMOL/L — HIGH (ref 22–31)
CREAT SERPL-MCNC: 1 MG/DL — SIGNIFICANT CHANGE UP (ref 0.5–1.3)
CULTURE RESULTS: SIGNIFICANT CHANGE UP
CULTURE RESULTS: SIGNIFICANT CHANGE UP
EOSINOPHIL # BLD AUTO: 0.38 K/UL — SIGNIFICANT CHANGE UP (ref 0–0.5)
EOSINOPHIL NFR BLD AUTO: 3.9 % — SIGNIFICANT CHANGE UP (ref 0–6)
GLUCOSE SERPL-MCNC: 245 MG/DL — HIGH (ref 70–99)
HCT VFR BLD CALC: 35.1 % — SIGNIFICANT CHANGE UP (ref 34.5–45)
HGB BLD-MCNC: 11.1 G/DL — LOW (ref 11.5–15.5)
IMM GRANULOCYTES NFR BLD AUTO: 0.2 % — SIGNIFICANT CHANGE UP (ref 0–1.5)
LYMPHOCYTES # BLD AUTO: 1.31 K/UL — SIGNIFICANT CHANGE UP (ref 1–3.3)
LYMPHOCYTES # BLD AUTO: 13.3 % — SIGNIFICANT CHANGE UP (ref 13–44)
MAGNESIUM SERPL-MCNC: 2.2 MG/DL — SIGNIFICANT CHANGE UP (ref 1.6–2.6)
MCHC RBC-ENTMCNC: 26.9 PG — LOW (ref 27–34)
MCHC RBC-ENTMCNC: 31.6 GM/DL — LOW (ref 32–36)
MCV RBC AUTO: 85 FL — SIGNIFICANT CHANGE UP (ref 80–100)
MONOCYTES # BLD AUTO: 0.74 K/UL — SIGNIFICANT CHANGE UP (ref 0–0.9)
MONOCYTES NFR BLD AUTO: 7.5 % — SIGNIFICANT CHANGE UP (ref 2–14)
NEUTROPHILS # BLD AUTO: 7.34 K/UL — SIGNIFICANT CHANGE UP (ref 1.8–7.4)
NEUTROPHILS NFR BLD AUTO: 74.7 % — SIGNIFICANT CHANGE UP (ref 43–77)
NRBC # BLD: 0 /100 WBCS — SIGNIFICANT CHANGE UP (ref 0–0)
PHOSPHATE SERPL-MCNC: 3.9 MG/DL — SIGNIFICANT CHANGE UP (ref 2.5–4.5)
PLATELET # BLD AUTO: 254 K/UL — SIGNIFICANT CHANGE UP (ref 150–400)
POTASSIUM SERPL-MCNC: 3.2 MMOL/L — LOW (ref 3.5–5.3)
POTASSIUM SERPL-SCNC: 3.2 MMOL/L — LOW (ref 3.5–5.3)
RBC # BLD: 4.13 M/UL — SIGNIFICANT CHANGE UP (ref 3.8–5.2)
RBC # FLD: 14.8 % — HIGH (ref 10.3–14.5)
SODIUM SERPL-SCNC: 135 MMOL/L — SIGNIFICANT CHANGE UP (ref 135–145)
SPECIMEN SOURCE: SIGNIFICANT CHANGE UP
SPECIMEN SOURCE: SIGNIFICANT CHANGE UP
SURGICAL PATHOLOGY STUDY: SIGNIFICANT CHANGE UP
WBC # BLD: 9.83 K/UL — SIGNIFICANT CHANGE UP (ref 3.8–10.5)
WBC # FLD AUTO: 9.83 K/UL — SIGNIFICANT CHANGE UP (ref 3.8–10.5)

## 2021-05-10 PROCEDURE — 99024 POSTOP FOLLOW-UP VISIT: CPT

## 2021-05-10 PROCEDURE — 99232 SBSQ HOSP IP/OBS MODERATE 35: CPT

## 2021-05-10 PROCEDURE — 99232 SBSQ HOSP IP/OBS MODERATE 35: CPT | Mod: GC

## 2021-05-10 RX ORDER — INSULIN LISPRO 100/ML
VIAL (ML) SUBCUTANEOUS AT BEDTIME
Refills: 0 | Status: DISCONTINUED | OUTPATIENT
Start: 2021-05-10 | End: 2021-05-11

## 2021-05-10 RX ORDER — INSULIN LISPRO 100/ML
VIAL (ML) SUBCUTANEOUS
Refills: 0 | Status: DISCONTINUED | OUTPATIENT
Start: 2021-05-10 | End: 2021-05-11

## 2021-05-10 RX ORDER — POTASSIUM CHLORIDE 20 MEQ
40 PACKET (EA) ORAL EVERY 4 HOURS
Refills: 0 | Status: DISCONTINUED | OUTPATIENT
Start: 2021-05-10 | End: 2021-05-10

## 2021-05-10 RX ORDER — POTASSIUM CHLORIDE 20 MEQ
40 PACKET (EA) ORAL ONCE
Refills: 0 | Status: COMPLETED | OUTPATIENT
Start: 2021-05-10 | End: 2021-05-10

## 2021-05-10 RX ADMIN — Medication 40 MILLIGRAM(S): at 11:26

## 2021-05-10 RX ADMIN — Medication 24 UNIT(S): at 17:41

## 2021-05-10 RX ADMIN — Medication 9: at 12:15

## 2021-05-10 RX ADMIN — MONTELUKAST 10 MILLIGRAM(S): 4 TABLET, CHEWABLE ORAL at 11:26

## 2021-05-10 RX ADMIN — Medication 2 MILLIGRAM(S): at 05:34

## 2021-05-10 RX ADMIN — RIVAROXABAN 20 MILLIGRAM(S): KIT at 17:41

## 2021-05-10 RX ADMIN — Medication 250 MILLIGRAM(S): at 05:37

## 2021-05-10 RX ADMIN — Medication 6: at 17:41

## 2021-05-10 RX ADMIN — Medication 650 MILLIGRAM(S): at 11:28

## 2021-05-10 RX ADMIN — Medication 50 MILLIGRAM(S): at 05:34

## 2021-05-10 RX ADMIN — Medication 24 UNIT(S): at 08:38

## 2021-05-10 RX ADMIN — PIPERACILLIN AND TAZOBACTAM 25 GRAM(S): 4; .5 INJECTION, POWDER, LYOPHILIZED, FOR SOLUTION INTRAVENOUS at 05:36

## 2021-05-10 RX ADMIN — LOSARTAN POTASSIUM 25 MILLIGRAM(S): 100 TABLET, FILM COATED ORAL at 05:34

## 2021-05-10 RX ADMIN — Medication 2: at 21:32

## 2021-05-10 RX ADMIN — Medication 600 MILLIGRAM(S): at 08:38

## 2021-05-10 RX ADMIN — Medication 24 UNIT(S): at 12:16

## 2021-05-10 RX ADMIN — Medication 81 MILLIGRAM(S): at 11:26

## 2021-05-10 RX ADMIN — PANTOPRAZOLE SODIUM 40 MILLIGRAM(S): 20 TABLET, DELAYED RELEASE ORAL at 06:36

## 2021-05-10 RX ADMIN — Medication 250 MILLIGRAM(S): at 17:41

## 2021-05-10 RX ADMIN — PIPERACILLIN AND TAZOBACTAM 25 GRAM(S): 4; .5 INJECTION, POWDER, LYOPHILIZED, FOR SOLUTION INTRAVENOUS at 13:22

## 2021-05-10 RX ADMIN — ATORVASTATIN CALCIUM 80 MILLIGRAM(S): 80 TABLET, FILM COATED ORAL at 21:13

## 2021-05-10 RX ADMIN — NYSTATIN CREAM 1 APPLICATION(S): 100000 CREAM TOPICAL at 17:46

## 2021-05-10 RX ADMIN — Medication 1 TABLET(S): at 17:41

## 2021-05-10 RX ADMIN — Medication 40 MILLIGRAM(S): at 05:34

## 2021-05-10 RX ADMIN — Medication 40 MILLIEQUIVALENT(S): at 11:26

## 2021-05-10 RX ADMIN — NYSTATIN CREAM 1 APPLICATION(S): 100000 CREAM TOPICAL at 05:35

## 2021-05-10 RX ADMIN — Medication 40 MILLIEQUIVALENT(S): at 14:17

## 2021-05-10 RX ADMIN — Medication 650 MILLIGRAM(S): at 12:28

## 2021-05-10 RX ADMIN — INSULIN GLARGINE 60 UNIT(S): 100 INJECTION, SOLUTION SUBCUTANEOUS at 21:32

## 2021-05-10 NOTE — PROGRESS NOTE ADULT - PROBLEM SELECTOR PLAN 5
Hx of Type 2 DM on insulin, poorly controlled  -On 30-40units Humalog 3x daily pre-meal, 48-52units Lantus qhs at home  -Hold home insulin regimen  -HgA1C 9.2   -POCT this morning 265, glucose on   -continue 60U Lantus qhs and 20U admelog 3x/day pre-meal   -hypoglycemia protocol, accuchecks qac and qhs  -Goal glucose on hospital setting 100-180, adjust insulin regimen PRN  -Endo Dr. Perlman following, recs appreciated Hx of Type 2 DM on insulin, poorly controlled  -On 30-40units Humalog 3x daily pre-meal, 48-52units Lantus qhs at home  -Hold home insulin regimen  -HgA1C 9.2   -POCT this morning 265, glucose on   -continue 60U Lantus qhs and 20U admelog 3x/day pre-meal, increase SS to high dose  -hypoglycemia protocol, accuchecks qac and qhs  -Goal glucose on hospital setting 100-180, adjust insulin regimen PRN  -Endo Dr. Perlman following, recs appreciated

## 2021-05-10 NOTE — PROGRESS NOTE ADULT - SUBJECTIVE AND OBJECTIVE BOX
Patient is a 65y old  Female who presents with a chief complaint of r/o osteomyelitis (10 May 2021 07:54)      INTERVAL HPI/OVERNIGHT EVENTS: Patient seen and examined at bedside. No acute events overnight.   Pt was started on immodium x 2 days ago for "loose stool" upon admission, however states she had BM this morning which was more solid in consistency.  Patient has no complaints at this time. Denies fevers, chills, headache, lightheadedness, chest pain, abdominal pain, n/v/d/c, motor deficits.    MEDICATIONS  (STANDING):  aspirin enteric coated 81 milliGRAM(s) Oral daily  atorvastatin 80 milliGRAM(s) Oral at bedtime  dextrose 40% Gel 15 Gram(s) Oral once  dextrose 5%. 1000 milliLiter(s) (50 mL/Hr) IV Continuous <Continuous>  dextrose 5%. 1000 milliLiter(s) (100 mL/Hr) IV Continuous <Continuous>  dextrose 50% Injectable 25 Gram(s) IV Push once  dextrose 50% Injectable 12.5 Gram(s) IV Push once  dextrose 50% Injectable 25 Gram(s) IV Push once  FLUoxetine 40 milliGRAM(s) Oral daily  furosemide   Injectable 40 milliGRAM(s) IV Push two times a day  glucagon  Injectable 1 milliGRAM(s) IntraMuscular once  glucagon  Injectable 1 milliGRAM(s) IntraMuscular once  guaiFENesin  milliGRAM(s) Oral <User Schedule>  insulin glargine Injectable (LANTUS) 60 Unit(s) SubCutaneous at bedtime  insulin lispro (ADMELOG) corrective regimen sliding scale   SubCutaneous at bedtime  insulin lispro (ADMELOG) corrective regimen sliding scale   SubCutaneous three times a day before meals  insulin lispro Injectable (ADMELOG) 24 Unit(s) SubCutaneous three times a day before meals  loperamide 2 milliGRAM(s) Oral two times a day  losartan 25 milliGRAM(s) Oral two times a day  metoprolol tartrate 50 milliGRAM(s) Oral two times a day  montelukast 10 milliGRAM(s) Oral daily  nystatin Powder 1 Application(s) Topical two times a day  pantoprazole    Tablet 40 milliGRAM(s) Oral before breakfast  piperacillin/tazobactam IVPB.. 3.375 Gram(s) IV Intermittent every 8 hours  potassium chloride   Powder 40 milliEquivalent(s) Oral every 4 hours  rivaroxaban 20 milliGRAM(s) Oral with dinner  saccharomyces boulardii 250 milliGRAM(s) Oral two times a day    MEDICATIONS  (PRN):  acetaminophen   Tablet .. 650 milliGRAM(s) Oral every 6 hours PRN Mild Pain (1 - 3)  ALBUTerol    90 MICROgram(s) HFA Inhaler 2 Puff(s) Inhalation every 6 hours PRN Shortness of Breath and/or Wheezing  oxycodone    5 mG/acetaminophen 325 mG 1 Tablet(s) Oral every 6 hours PRN Severe Pain (7 - 10)  oxycodone    5 mG/acetaminophen 325 mG 0.5 Tablet(s) Oral every 6 hours PRN Moderate Pain (4 - 6)      Allergies    codeine (Vomiting)  shellfish (Stomach Upset)    Intolerances    fish (Stomach Upset)      REVIEW OF SYSTEMS:  CONSTITUTIONAL: No fever or chills  HEENT:  No headache, no sore throat  RESPIRATORY: No cough, wheezing, or shortness of breath  CARDIOVASCULAR: No chest pain, SOB palpitations: Hx of chronic RASMUSSEN s/p TAVR in 2015  GASTROINTESTINAL: No abd pain, nausea, vomiting, or diarrhea  GENITOURINARY: No dysuria, frequency, or hematuria  NEUROLOGICAL: no focal weakness or dizziness: + sensation deficit to B/L LE extending up to knee, worse on R  MUSCULOSKELETAL:  no myalgias, ext weakness     Vital Signs Last 24 Hrs  T(C): 36.6 (10 May 2021 05:21), Max: 36.7 (09 May 2021 11:46)  T(F): 97.8 (10 May 2021 05:21), Max: 98.1 (09 May 2021 11:46)  HR: 76 (10 May 2021 05:21) (60 - 76)  BP: 131/77 (10 May 2021 05:21) (96/64 - 131/77)  BP(mean): --  RR: 17 (10 May 2021 05:21) (15 - 17)  SpO2: 98% (10 May 2021 05:21) (98% - 99%)    PHYSICAL EXAM:  GENERAL: NAD  HEENT:  anicteric, moist mucous membranes  CHEST/LUNG:  CTA b/l, no rales, wheezes, or rhonchi  HEART:  RRR, S1, S2, +holosystolic murmur  ABDOMEN:  BS+, soft, nontender, nondistended  EXTREMITIES: no edema, cyanosis, or calf tenderness, R foot dressed and in surgical shoe  NERVOUS SYSTEM: answers questions and follows commands appropriately        LABS:                        11.1   9.83  )-----------( 254      ( 10 May 2021 08:17 )             35.1     CBC Full  -  ( 10 May 2021 08:17 )  WBC Count : 9.83 K/uL  Hemoglobin : 11.1 g/dL  Hematocrit : 35.1 %  Platelet Count - Automated : 254 K/uL  Mean Cell Volume : 85.0 fl  Mean Cell Hemoglobin : 26.9 pg  Mean Cell Hemoglobin Concentration : 31.6 gm/dL  Auto Neutrophil # : 7.34 K/uL  Auto Lymphocyte # : 1.31 K/uL  Auto Monocyte # : 0.74 K/uL  Auto Eosinophil # : 0.38 K/uL  Auto Basophil # : 0.04 K/uL  Auto Neutrophil % : 74.7 %  Auto Lymphocyte % : 13.3 %  Auto Monocyte % : 7.5 %  Auto Eosinophil % : 3.9 %  Auto Basophil % : 0.4 %    10 May 2021 08:17    135    |  96     |  26     ----------------------------<  245    3.2     |  33     |  1.00     Ca    9.3        10 May 2021 08:17  Phos  3.9       10 May 2021 08:17  Mg     2.2       10 May 2021 08:17          CAPILLARY BLOOD GLUCOSE      POCT Blood Glucose.: 265 mg/dL (10 May 2021 08:07)  POCT Blood Glucose.: 307 mg/dL (09 May 2021 21:39)  POCT Blood Glucose.: 282 mg/dL (09 May 2021 16:54)  POCT Blood Glucose.: 271 mg/dL (09 May 2021 12:04)        Culture - Tissue with Gram Stain (collected 05-06-21 @ 13:23)  Source: .Tissue Other, right [id number] metatarsal condy  Gram Stain (05-06-21 @ 18:58):    Rare polymorphonuclear leukocytes per low power field    No organisms seen  Preliminary Report (05-07-21 @ 18:23):    No growth    Culture - Urine (collected 05-05-21 @ 06:28)  Source: .Urine Clean Catch (Midstream)  Final Report (05-06-21 @ 08:59):    No growth    Culture - Tissue with Gram Stain (collected 05-05-21 @ 03:37)  Source: .Tissue Other, Right foot 4th Metatarsal  Gram Stain (05-05-21 @ 07:30):    Rare polymorphonuclear leukocytes per low power field    No organisms seen per oil power field  Final Report (05-09-21 @ 13:38):    Rare Coag Negative Staphylococcus    Rare Coag Negative Staphylococcus #2    Moderate Anaerobic Gram Negative Nicolas    Most closely resembling    Prevotella species "Susceptibilities not performed"  Organism: Coag Negative Staphylococcus  Coag Negative Staphylococcus (05-09-21 @ 13:38)  Organism: Coag Negative Staphylococcus (05-09-21 @ 13:38)      -  Ampicillin/Sulbactam: R <=8/4      -  Cefazolin: R <=4      -  Clindamycin: R >4      -  Erythromycin: R >4      -  Gentamicin: S <=1 Should not be used as monotherapy      -  Oxacillin: R >2      -  Penicillin: R >8      -  RIF- Rifampin: S <=1 Should not be used as monotherapy      -  Tetra/Doxy: S <=1      -  Trimethoprim/Sulfamethoxazole: S 1/19      -  Vancomycin: S 2      Method Type: RAMÓN  Organism: Coag Negative Staphylococcus (05-09-21 @ 13:38)      -  Ampicillin/Sulbactam: R <=8/4      -  Cefazolin: R <=4      -  Clindamycin: R >4      -  Erythromycin: R >4      -  Gentamicin: R >8 Should not be used as monotherapy      -  Oxacillin: R >2      -  Penicillin: R >8      -  RIF- Rifampin: S <=1 Should not be used as monotherapy      -  Tetra/Doxy: R >8      -  Trimethoprim/Sulfamethoxazole: R >2/38      -  Vancomycin: S 1      Method Type: RAMÓN    Culture - Blood (collected 05-05-21 @ 00:36)  Source: .Blood Blood  Final Report (05-10-21 @ 01:00):    No Growth Final    Culture - Blood (collected 05-05-21 @ 00:36)  Source: .Blood Blood  Final Report (05-10-21 @ 01:00):    No Growth Final        RADIOLOGY & ADDITIONAL TESTS:    Personally reviewed.     Consultant(s) Notes Reviewed:  [x] YES  [ ] NO     Patient is a 65y old  Female who presents with a chief complaint of r/o osteomyelitis (10 May 2021 07:54)      INTERVAL HPI/OVERNIGHT EVENTS: Patient seen and examined at bedside. No acute events overnight.   Pt was started on immodium x 2 days ago for "loose stool" upon admission, however states she had BM this morning which was more solid in consistency. Also states decreasing in frequency. Patient has no complaints at this time. Denies fevers, chills, headache, lightheadedness, chest pain, abdominal pain, n/v/d/c, motor deficits.    MEDICATIONS  (STANDING):  aspirin enteric coated 81 milliGRAM(s) Oral daily  atorvastatin 80 milliGRAM(s) Oral at bedtime  dextrose 40% Gel 15 Gram(s) Oral once  dextrose 5%. 1000 milliLiter(s) (50 mL/Hr) IV Continuous <Continuous>  dextrose 5%. 1000 milliLiter(s) (100 mL/Hr) IV Continuous <Continuous>  dextrose 50% Injectable 25 Gram(s) IV Push once  dextrose 50% Injectable 12.5 Gram(s) IV Push once  dextrose 50% Injectable 25 Gram(s) IV Push once  FLUoxetine 40 milliGRAM(s) Oral daily  furosemide   Injectable 40 milliGRAM(s) IV Push two times a day  glucagon  Injectable 1 milliGRAM(s) IntraMuscular once  glucagon  Injectable 1 milliGRAM(s) IntraMuscular once  guaiFENesin  milliGRAM(s) Oral <User Schedule>  insulin glargine Injectable (LANTUS) 60 Unit(s) SubCutaneous at bedtime  insulin lispro (ADMELOG) corrective regimen sliding scale   SubCutaneous at bedtime  insulin lispro (ADMELOG) corrective regimen sliding scale   SubCutaneous three times a day before meals  insulin lispro Injectable (ADMELOG) 24 Unit(s) SubCutaneous three times a day before meals  loperamide 2 milliGRAM(s) Oral two times a day  losartan 25 milliGRAM(s) Oral two times a day  metoprolol tartrate 50 milliGRAM(s) Oral two times a day  montelukast 10 milliGRAM(s) Oral daily  nystatin Powder 1 Application(s) Topical two times a day  pantoprazole    Tablet 40 milliGRAM(s) Oral before breakfast  piperacillin/tazobactam IVPB.. 3.375 Gram(s) IV Intermittent every 8 hours  potassium chloride   Powder 40 milliEquivalent(s) Oral every 4 hours  rivaroxaban 20 milliGRAM(s) Oral with dinner  saccharomyces boulardii 250 milliGRAM(s) Oral two times a day    MEDICATIONS  (PRN):  acetaminophen   Tablet .. 650 milliGRAM(s) Oral every 6 hours PRN Mild Pain (1 - 3)  ALBUTerol    90 MICROgram(s) HFA Inhaler 2 Puff(s) Inhalation every 6 hours PRN Shortness of Breath and/or Wheezing  oxycodone    5 mG/acetaminophen 325 mG 1 Tablet(s) Oral every 6 hours PRN Severe Pain (7 - 10)  oxycodone    5 mG/acetaminophen 325 mG 0.5 Tablet(s) Oral every 6 hours PRN Moderate Pain (4 - 6)      Allergies    codeine (Vomiting)  shellfish (Stomach Upset)    Intolerances    fish (Stomach Upset)      REVIEW OF SYSTEMS:  CONSTITUTIONAL: No fever or chills  HEENT:  No headache, no sore throat  RESPIRATORY: No cough, wheezing, or shortness of breath  CARDIOVASCULAR: No chest pain, SOB palpitations: Hx of chronic RASMUSSEN s/p TAVR in 2015  GASTROINTESTINAL: No abd pain, nausea, vomiting, or diarrhea  GENITOURINARY: No dysuria, frequency, or hematuria  NEUROLOGICAL: no focal weakness or dizziness: + sensation deficit to B/L LE extending up to knee, worse on R  MUSCULOSKELETAL:  no myalgias, ext weakness     Vital Signs Last 24 Hrs  T(C): 36.6 (10 May 2021 05:21), Max: 36.7 (09 May 2021 11:46)  T(F): 97.8 (10 May 2021 05:21), Max: 98.1 (09 May 2021 11:46)  HR: 76 (10 May 2021 05:21) (60 - 76)  BP: 131/77 (10 May 2021 05:21) (96/64 - 131/77)  BP(mean): --  RR: 17 (10 May 2021 05:21) (15 - 17)  SpO2: 98% (10 May 2021 05:21) (98% - 99%)    PHYSICAL EXAM:  GENERAL: NAD  HEENT:  anicteric, moist mucous membranes  CHEST/LUNG:  CTA b/l, no rales, wheezes, or rhonchi  HEART:  RRR, S1, S2, +holosystolic murmur  ABDOMEN:  BS+, soft, nontender, nondistended  EXTREMITIES: no edema, cyanosis, or calf tenderness, R foot dressed and in surgical shoe  NERVOUS SYSTEM: answers questions and follows commands appropriately        LABS:                        11.1   9.83  )-----------( 254      ( 10 May 2021 08:17 )             35.1     CBC Full  -  ( 10 May 2021 08:17 )  WBC Count : 9.83 K/uL  Hemoglobin : 11.1 g/dL  Hematocrit : 35.1 %  Platelet Count - Automated : 254 K/uL  Mean Cell Volume : 85.0 fl  Mean Cell Hemoglobin : 26.9 pg  Mean Cell Hemoglobin Concentration : 31.6 gm/dL  Auto Neutrophil # : 7.34 K/uL  Auto Lymphocyte # : 1.31 K/uL  Auto Monocyte # : 0.74 K/uL  Auto Eosinophil # : 0.38 K/uL  Auto Basophil # : 0.04 K/uL  Auto Neutrophil % : 74.7 %  Auto Lymphocyte % : 13.3 %  Auto Monocyte % : 7.5 %  Auto Eosinophil % : 3.9 %  Auto Basophil % : 0.4 %    10 May 2021 08:17    135    |  96     |  26     ----------------------------<  245    3.2     |  33     |  1.00     Ca    9.3        10 May 2021 08:17  Phos  3.9       10 May 2021 08:17  Mg     2.2       10 May 2021 08:17          CAPILLARY BLOOD GLUCOSE      POCT Blood Glucose.: 265 mg/dL (10 May 2021 08:07)  POCT Blood Glucose.: 307 mg/dL (09 May 2021 21:39)  POCT Blood Glucose.: 282 mg/dL (09 May 2021 16:54)  POCT Blood Glucose.: 271 mg/dL (09 May 2021 12:04)        Culture - Tissue with Gram Stain (collected 05-06-21 @ 13:23)  Source: .Tissue Other, right [id number] metatarsal condy  Gram Stain (05-06-21 @ 18:58):    Rare polymorphonuclear leukocytes per low power field    No organisms seen  Preliminary Report (05-07-21 @ 18:23):    No growth    Culture - Urine (collected 05-05-21 @ 06:28)  Source: .Urine Clean Catch (Midstream)  Final Report (05-06-21 @ 08:59):    No growth    Culture - Tissue with Gram Stain (collected 05-05-21 @ 03:37)  Source: .Tissue Other, Right foot 4th Metatarsal  Gram Stain (05-05-21 @ 07:30):    Rare polymorphonuclear leukocytes per low power field    No organisms seen per oil power field  Final Report (05-09-21 @ 13:38):    Rare Coag Negative Staphylococcus    Rare Coag Negative Staphylococcus #2    Moderate Anaerobic Gram Negative Nicolas    Most closely resembling    Prevotella species "Susceptibilities not performed"  Organism: Coag Negative Staphylococcus  Coag Negative Staphylococcus (05-09-21 @ 13:38)  Organism: Coag Negative Staphylococcus (05-09-21 @ 13:38)      -  Ampicillin/Sulbactam: R <=8/4      -  Cefazolin: R <=4      -  Clindamycin: R >4      -  Erythromycin: R >4      -  Gentamicin: S <=1 Should not be used as monotherapy      -  Oxacillin: R >2      -  Penicillin: R >8      -  RIF- Rifampin: S <=1 Should not be used as monotherapy      -  Tetra/Doxy: S <=1      -  Trimethoprim/Sulfamethoxazole: S 1/19      -  Vancomycin: S 2      Method Type: RAMÓN  Organism: Coag Negative Staphylococcus (05-09-21 @ 13:38)      -  Ampicillin/Sulbactam: R <=8/4      -  Cefazolin: R <=4      -  Clindamycin: R >4      -  Erythromycin: R >4      -  Gentamicin: R >8 Should not be used as monotherapy      -  Oxacillin: R >2      -  Penicillin: R >8      -  RIF- Rifampin: S <=1 Should not be used as monotherapy      -  Tetra/Doxy: R >8      -  Trimethoprim/Sulfamethoxazole: R >2/38      -  Vancomycin: S 1      Method Type: RAMÓN    Culture - Blood (collected 05-05-21 @ 00:36)  Source: .Blood Blood  Final Report (05-10-21 @ 01:00):    No Growth Final    Culture - Blood (collected 05-05-21 @ 00:36)  Source: .Blood Blood  Final Report (05-10-21 @ 01:00):    No Growth Final        RADIOLOGY & ADDITIONAL TESTS:    Personally reviewed.     Consultant(s) Notes Reviewed:  [x] YES  [ ] NO     Patient is a 65y old  Female who presents with a chief complaint of r/o osteomyelitis (10 May 2021 07:54)      INTERVAL HPI/OVERNIGHT EVENTS: Patient seen and examined at bedside. No acute events overnight.   Pt was started on immodium x 2 days ago for "loose stool" upon admission, however states she had BM this morning which was more solid in consistency. Also states decreasing in frequency. Patient has no complaints at this time. Denies fevers, chills, headache, lightheadedness, chest pain, abdominal pain, n/v/d/c, motor deficits.    MEDICATIONS  (STANDING):  aspirin enteric coated 81 milliGRAM(s) Oral daily  atorvastatin 80 milliGRAM(s) Oral at bedtime  dextrose 40% Gel 15 Gram(s) Oral once  dextrose 5%. 1000 milliLiter(s) (50 mL/Hr) IV Continuous <Continuous>  dextrose 5%. 1000 milliLiter(s) (100 mL/Hr) IV Continuous <Continuous>  dextrose 50% Injectable 25 Gram(s) IV Push once  dextrose 50% Injectable 12.5 Gram(s) IV Push once  dextrose 50% Injectable 25 Gram(s) IV Push once  FLUoxetine 40 milliGRAM(s) Oral daily  furosemide   Injectable 40 milliGRAM(s) IV Push two times a day  glucagon  Injectable 1 milliGRAM(s) IntraMuscular once  glucagon  Injectable 1 milliGRAM(s) IntraMuscular once  guaiFENesin  milliGRAM(s) Oral <User Schedule>  insulin glargine Injectable (LANTUS) 60 Unit(s) SubCutaneous at bedtime  insulin lispro (ADMELOG) corrective regimen sliding scale   SubCutaneous at bedtime  insulin lispro (ADMELOG) corrective regimen sliding scale   SubCutaneous three times a day before meals  insulin lispro Injectable (ADMELOG) 24 Unit(s) SubCutaneous three times a day before meals  loperamide 2 milliGRAM(s) Oral two times a day  losartan 25 milliGRAM(s) Oral two times a day  metoprolol tartrate 50 milliGRAM(s) Oral two times a day  montelukast 10 milliGRAM(s) Oral daily  nystatin Powder 1 Application(s) Topical two times a day  pantoprazole    Tablet 40 milliGRAM(s) Oral before breakfast  piperacillin/tazobactam IVPB.. 3.375 Gram(s) IV Intermittent every 8 hours  potassium chloride   Powder 40 milliEquivalent(s) Oral every 4 hours  rivaroxaban 20 milliGRAM(s) Oral with dinner  saccharomyces boulardii 250 milliGRAM(s) Oral two times a day    MEDICATIONS  (PRN):  acetaminophen   Tablet .. 650 milliGRAM(s) Oral every 6 hours PRN Mild Pain (1 - 3)  ALBUTerol    90 MICROgram(s) HFA Inhaler 2 Puff(s) Inhalation every 6 hours PRN Shortness of Breath and/or Wheezing  oxycodone    5 mG/acetaminophen 325 mG 1 Tablet(s) Oral every 6 hours PRN Severe Pain (7 - 10)  oxycodone    5 mG/acetaminophen 325 mG 0.5 Tablet(s) Oral every 6 hours PRN Moderate Pain (4 - 6)      Allergies    codeine (Vomiting)  shellfish (Stomach Upset)    Intolerances    fish (Stomach Upset)      REVIEW OF SYSTEMS:  CONSTITUTIONAL: No fever or chills  HEENT:  No headache, no sore throat  RESPIRATORY: No cough, wheezing, or shortness of breath  CARDIOVASCULAR: No chest pain, SOB palpitations: Hx of chronic RASMUSSEN s/p TAVR in 2015  GASTROINTESTINAL: No abd pain, nausea, vomiting, or diarrhea  GENITOURINARY: No dysuria, frequency, or hematuria  NEUROLOGICAL: no focal weakness or dizziness: + sensation deficit to B/L LE extending up to knee, worse on R  MUSCULOSKELETAL:  no myalgias, ext weakness     Vital Signs Last 24 Hrs  T(C): 36.6 (10 May 2021 05:21), Max: 36.7 (09 May 2021 11:46)  T(F): 97.8 (10 May 2021 05:21), Max: 98.1 (09 May 2021 11:46)  HR: 76 (10 May 2021 05:21) (60 - 76)  BP: 131/77 (10 May 2021 05:21) (96/64 - 131/77)  BP(mean): --  RR: 17 (10 May 2021 05:21) (15 - 17)  SpO2: 98% (10 May 2021 05:21) (98% - 99%)    PHYSICAL EXAM:  GENERAL: NAD  HEENT:  anicteric, moist mucous membranes  CHEST/LUNG:  CTA b/l, no rales, wheezes, or rhonchi  HEART:  RRR, S1, S2, +holosystolic murmur  ABDOMEN:  BS+, soft, nontender, nondistended  EXTREMITIES:  +LE edema, no cyanosis, or calf tenderness, R foot dressed and in surgical shoe  NERVOUS SYSTEM: answers questions and follows commands appropriately        LABS:                        11.1   9.83  )-----------( 254      ( 10 May 2021 08:17 )             35.1     CBC Full  -  ( 10 May 2021 08:17 )  WBC Count : 9.83 K/uL  Hemoglobin : 11.1 g/dL  Hematocrit : 35.1 %  Platelet Count - Automated : 254 K/uL  Mean Cell Volume : 85.0 fl  Mean Cell Hemoglobin : 26.9 pg  Mean Cell Hemoglobin Concentration : 31.6 gm/dL  Auto Neutrophil # : 7.34 K/uL  Auto Lymphocyte # : 1.31 K/uL  Auto Monocyte # : 0.74 K/uL  Auto Eosinophil # : 0.38 K/uL  Auto Basophil # : 0.04 K/uL  Auto Neutrophil % : 74.7 %  Auto Lymphocyte % : 13.3 %  Auto Monocyte % : 7.5 %  Auto Eosinophil % : 3.9 %  Auto Basophil % : 0.4 %    10 May 2021 08:17    135    |  96     |  26     ----------------------------<  245    3.2     |  33     |  1.00     Ca    9.3        10 May 2021 08:17  Phos  3.9       10 May 2021 08:17  Mg     2.2       10 May 2021 08:17          CAPILLARY BLOOD GLUCOSE      POCT Blood Glucose.: 265 mg/dL (10 May 2021 08:07)  POCT Blood Glucose.: 307 mg/dL (09 May 2021 21:39)  POCT Blood Glucose.: 282 mg/dL (09 May 2021 16:54)  POCT Blood Glucose.: 271 mg/dL (09 May 2021 12:04)        Culture - Tissue with Gram Stain (collected 05-06-21 @ 13:23)  Source: .Tissue Other, right [id number] metatarsal condy  Gram Stain (05-06-21 @ 18:58):    Rare polymorphonuclear leukocytes per low power field    No organisms seen  Preliminary Report (05-07-21 @ 18:23):    No growth    Culture - Urine (collected 05-05-21 @ 06:28)  Source: .Urine Clean Catch (Midstream)  Final Report (05-06-21 @ 08:59):    No growth    Culture - Tissue with Gram Stain (collected 05-05-21 @ 03:37)  Source: .Tissue Other, Right foot 4th Metatarsal  Gram Stain (05-05-21 @ 07:30):    Rare polymorphonuclear leukocytes per low power field    No organisms seen per oil power field  Final Report (05-09-21 @ 13:38):    Rare Coag Negative Staphylococcus    Rare Coag Negative Staphylococcus #2    Moderate Anaerobic Gram Negative Nicolas    Most closely resembling    Prevotella species "Susceptibilities not performed"  Organism: Coag Negative Staphylococcus  Coag Negative Staphylococcus (05-09-21 @ 13:38)  Organism: Coag Negative Staphylococcus (05-09-21 @ 13:38)      -  Ampicillin/Sulbactam: R <=8/4      -  Cefazolin: R <=4      -  Clindamycin: R >4      -  Erythromycin: R >4      -  Gentamicin: S <=1 Should not be used as monotherapy      -  Oxacillin: R >2      -  Penicillin: R >8      -  RIF- Rifampin: S <=1 Should not be used as monotherapy      -  Tetra/Doxy: S <=1      -  Trimethoprim/Sulfamethoxazole: S 1/19      -  Vancomycin: S 2      Method Type: RAMÓN  Organism: Coag Negative Staphylococcus (05-09-21 @ 13:38)      -  Ampicillin/Sulbactam: R <=8/4      -  Cefazolin: R <=4      -  Clindamycin: R >4      -  Erythromycin: R >4      -  Gentamicin: R >8 Should not be used as monotherapy      -  Oxacillin: R >2      -  Penicillin: R >8      -  RIF- Rifampin: S <=1 Should not be used as monotherapy      -  Tetra/Doxy: R >8      -  Trimethoprim/Sulfamethoxazole: R >2/38      -  Vancomycin: S 1      Method Type: RAMÓN    Culture - Blood (collected 05-05-21 @ 00:36)  Source: .Blood Blood  Final Report (05-10-21 @ 01:00):    No Growth Final    Culture - Blood (collected 05-05-21 @ 00:36)  Source: .Blood Blood  Final Report (05-10-21 @ 01:00):    No Growth Final        RADIOLOGY & ADDITIONAL TESTS:    Personally reviewed.     Consultant(s) Notes Reviewed:  [x] YES  [ ] NO

## 2021-05-10 NOTE — PROGRESS NOTE ADULT - PROBLEM SELECTOR PLAN 4
-pt states that she was diagnosed with RLE DVT and multiple PEs last summer 2020 because of worsening SOB  -started on Xarelto 20mg QD at that time  -states she underwent blood testing for underlying hereditary clotting disorders as she does have a fam hx of clots in her mother, pt unsure of any diagnoses that she has at this time  -has not followed with a hematologist outpatient, pt will be provided a specialist and was told to make an appointment once discharged  -resumed xarelto post procedure as cleared by podiatry
-pt states that she was diagnosed with RLE DVT and multiple PEs last summer 2020 because of worsening SOB  -started on Xarelto 20mg QD at that time  -states she underwent blood testing for underlying hereditary clotting disorders as she does have a fam hx of clots in her mother, pt unsure of any diagnoses that she has at this time  -has not followed with a hematologist outpatient, pt will be provided a specialist and was told to make an appointment once discharged  -resumed xarelto post procedure as cleared by podiatry
-pt states that she was diagnosed with RLE DVT and multiple PEs last summer 2020 because of worsening SOB  -started on Xarelto 20mg QD at that time  -states she underwent blood testing for underlying hereditary clotting disorders as she does have a fam hx of clots in her mother, pt unsure of any diagnoses that she has at this time  -has not followed with a hematologist outpatient, pt will be provided a specialist and was told to make an appointment once discharged  -pt to resume xarelto post procedure as cleared by podiatry
-pt states that she was diagnosed with RLE DVT and multiple PEs last summer 2020 because of worsening SOB  -started on Xarelto 20mg QD at that time  -states she underwent blood testing for underlying hereditary clotting disorders as she does have a fam hx of clots in her mother, pt unsure of any diagnoses that she has at this time  -has not followed with a hematologist outpatient, pt will be provided a specialist and was told to make an appointment once discharged  -pending imaging and podiatry decision of whether pt will proceed to OR, will adjust AC appropriately
-pt states that she was diagnosed with RLE DVT and multiple PEs last summer 2020 because of worsening SOB  -started on Xarelto 20mg QD at that time  -states she underwent blood testing for underlying hereditary clotting disorders as she does have a fam hx of clots in her mother, pt unsure of any diagnoses that she has at this time  -has not followed with a hematologist outpatient, pt will be provided a specialist and was told to make an appointment once discharged  -resumed xarelto post procedure as cleared by podiatry
-pt states that she was diagnosed with RLE DVT and multiple PEs last summer 2020 because of worsening SOB  -started on Xarelto 20mg QD at that time  -states she underwent blood testing for underlying hereditary clotting disorders as she does have a fam hx of clots in her mother, pt unsure of any diagnoses that she has at this time  -has not followed with a hematologist outpatient, pt will be provided a specialist and was told to make an appointment once discharged  -resumed xarelto post procedure as cleared by podiatry

## 2021-05-10 NOTE — PROGRESS NOTE ADULT - ASSESSMENT
Right foot ulcer sub 4th met head  s/p 5/6/21 right 4th metatarsal head condylectomy    PROBLEM/RECOMMENDATIONS:    Pt evaluated and chart reviewed  Right foot dressed with Aquacel and DSD  No further podiatric surgical intervention at this time  All of pt's questions were answered to satisfaction  Pt stated she understood all discussed  Podiatry will follow pt while in house    Wound Care Instructions  1.  Keep dressing clean, dry and intact until clinic visit  2.  Make appointment to be seen by Dr. Palencia or Dr. John at [facility] w/in 3-5 days of d/c  3.  If symptoms of nausea, vomiting, fever, chills, shortness of breath, chest pain, increasing pain foot or posterior leg pain develop - go to the emergency department.

## 2021-05-10 NOTE — PROGRESS NOTE ADULT - ASSESSMENT
64yo woman with PMH AS s/p TAVR 2015, HOCM, pAFib, JOSR (does not tolerate CPAP, on 2L O2 at home qhs), anxiety, morbid obesity, DM, HTN presents to ED from wound clnic with R foot ulcer to evaluate for osteomyelitis. Plan for right foot 4th met head resection pending vascular studies, MRI. Cardiology consulted for cardiac optimization pre/post procedure    Valvular Heart Disease/ Aortic stenosis s/p TAVR 2015/ HOCM  -  Patient has extensive cardiac history, follows with Dr. Espinal for a possibility of repeat TAVR as she is still significantly SOB, RASMUSSEN, with increased leg edema for the past few months. Patient is scheduled for a LAMBERT on 5/24 with Dr. Espinal.   - TTE from 4/28/21 showing: hyperdynamic LV systolic function, LVEF 75%, mild to mod MR, mild pulmonary pressures  - on Lasix 40mg IV BID for LE edema and SOB. S/p metolazone  5mg x 1 5/7 and 5/8.  - can change to lasix PO daily 40mg   - Continue Metoprolol tartrate 50mg BID  - Continue aspirin   - continue home O2 2L at home. Does have h/o asthma and JOSR , 94% on RA as per flow sheets     Paroxysmal atrial fibrillation  - EKG showing NSR rate 79 with PVCs, LVH  - Continue Xarelto 20mg qd, Metoprolol tartrate 50mg BID  - Patient reports that she has h/o DVT and PE and was told to be on AC indefinitely and had testing done for hereditary issues.     Hypertension  - Continue home metoprolol, Lasix, losartan with hold parameters  - Monitor routine hemodynamics.     Hyperlipidemia  - Continue home atorvastatin.     Osteomyelitis foot, R sub 4th met head ulcer  - s/p right fourth metatarsal head condylectomy  - fu podiatry recs      hypokalemia  supplement k for goal > 4.0     - Monitor and replete lytes, keep K>4, Mg>2.  - All other medical needs as per primary team.  - Other cardiovascular workup will depend on clinical course.  - Will continue to follow.  Ester Womack FNP-C  Cardiology NP  [phone]

## 2021-05-10 NOTE — PROGRESS NOTE ADULT - ASSESSMENT
66yo woman with PMHx AS s/p TAVR 2015, HOCM, pAFib, JOSR (does not tolerate CPAP, on 2L O2 at home qhs), anxiety, morbid obesity, DM, HTN presents to ED with R foot ulcer, admitted for rule out osteomyelitis.  MRI negative for osteomyelitis however due to increased risk of developing OM in the future, podiatry recommended surgical intervention and now pt is POD 6 s/p Right 4th plantar condylectomy.

## 2021-05-10 NOTE — PROGRESS NOTE ADULT - PROBLEM SELECTOR PLAN 1
cont lantus 60 units qhs  cont admelog 24 units 3x/day before meals  change high dose admelog scale coverage qac/qhs  cont cons cho diet  goal bg 100-180 in hosp setting

## 2021-05-10 NOTE — PROGRESS NOTE ADULT - PROBLEM SELECTOR PROBLEM 4
History of pulmonary embolism

## 2021-05-10 NOTE — PROGRESS NOTE ADULT - SUBJECTIVE AND OBJECTIVE BOX
[facility] -- Oni Campbell, Mukesh, Merlin, Blayne Torres Savella  Office # [phone]      Follow Up:    AS, HF  Subjective/Observations:     No events overnight resting comfortably in bed.  No complaints of chest pain, palpitations reported. No signs of orthopnea or PND.  remains on nasal cannula   chronic dyspnea   REVIEW OF SYSTEMS: All other review of systems is negative unless indicated above    PAST MEDICAL & SURGICAL HISTORY:  Hypertension    Gastroparesis due to DM    Depression    Herniated disc    Spinal stenosis  chronic back pain    Cholesterol serum elevated    MVP (mitral valve prolapse)    Hypothyroid  not on medication    Obesity    IBS (irritable bowel syndrome)    GERD (gastroesophageal reflux disease)    Diabetes type 2, uncontrolled    History of diabetic retinopathy    S/P bariatric surgery  lap band surgery two times due to erosion, now no more    S/P tonsillectomy and adenoidectomy    Cataract  B/L cataracts    S/P laminectomy  Pt denied this?    S/P D&amp;C (status post dilation and curettage)  D&amp;C when she was 18    Cervix abnormality  Ablation of cervix    Breast anomaly  Biopsy of both breast benign lesions    right eye torn retina    S/P TAVR (transcatheter aortic valve replacement)        MEDICATIONS  (STANDING):  aspirin enteric coated 81 milliGRAM(s) Oral daily  atorvastatin 80 milliGRAM(s) Oral at bedtime  dextrose 40% Gel 15 Gram(s) Oral once  dextrose 5%. 1000 milliLiter(s) (50 mL/Hr) IV Continuous <Continuous>  dextrose 5%. 1000 milliLiter(s) (100 mL/Hr) IV Continuous <Continuous>  dextrose 50% Injectable 25 Gram(s) IV Push once  dextrose 50% Injectable 12.5 Gram(s) IV Push once  dextrose 50% Injectable 25 Gram(s) IV Push once  FLUoxetine 40 milliGRAM(s) Oral daily  furosemide   Injectable 40 milliGRAM(s) IV Push two times a day  glucagon  Injectable 1 milliGRAM(s) IntraMuscular once  glucagon  Injectable 1 milliGRAM(s) IntraMuscular once  guaiFENesin  milliGRAM(s) Oral <User Schedule>  insulin glargine Injectable (LANTUS) 60 Unit(s) SubCutaneous at bedtime  insulin lispro (ADMELOG) corrective regimen sliding scale   SubCutaneous at bedtime  insulin lispro (ADMELOG) corrective regimen sliding scale   SubCutaneous three times a day before meals  insulin lispro Injectable (ADMELOG) 24 Unit(s) SubCutaneous three times a day before meals  loperamide 2 milliGRAM(s) Oral two times a day  losartan 25 milliGRAM(s) Oral two times a day  metoprolol tartrate 50 milliGRAM(s) Oral two times a day  montelukast 10 milliGRAM(s) Oral daily  nystatin Powder 1 Application(s) Topical two times a day  pantoprazole    Tablet 40 milliGRAM(s) Oral before breakfast  piperacillin/tazobactam IVPB.. 3.375 Gram(s) IV Intermittent every 8 hours  potassium chloride   Powder 40 milliEquivalent(s) Oral every 4 hours  rivaroxaban 20 milliGRAM(s) Oral with dinner  saccharomyces boulardii 250 milliGRAM(s) Oral two times a day    MEDICATIONS  (PRN):  acetaminophen   Tablet .. 650 milliGRAM(s) Oral every 6 hours PRN Mild Pain (1 - 3)  ALBUTerol    90 MICROgram(s) HFA Inhaler 2 Puff(s) Inhalation every 6 hours PRN Shortness of Breath and/or Wheezing  oxycodone    5 mG/acetaminophen 325 mG 1 Tablet(s) Oral every 6 hours PRN Severe Pain (7 - 10)  oxycodone    5 mG/acetaminophen 325 mG 0.5 Tablet(s) Oral every 6 hours PRN Moderate Pain (4 - 6)      Allergies    codeine (Vomiting)  shellfish (Stomach Upset)    Intolerances    fish (Stomach Upset)      Vital Signs Last 24 Hrs  T(C): 36.6 (10 May 2021 05:21), Max: 36.7 (09 May 2021 11:46)  T(F): 97.8 (10 May 2021 05:21), Max: 98.1 (09 May 2021 11:46)  HR: 76 (10 May 2021 05:21) (60 - 76)  BP: 131/77 (10 May 2021 05:21) (96/64 - 131/77)  BP(mean): --  RR: 17 (10 May 2021 05:21) (15 - 17)  SpO2: 98% (10 May 2021 05:21) (98% - 99%)    I&O's Summary    09 May 2021 07:01  -  10 May 2021 07:00  --------------------------------------------------------  IN: 565 mL / OUT: 0 mL / NET: 565 mL          PHYSICAL EXAM:  TELE: not on tele   Constitutional: NAD, awake and alert, obese   HEENT: Moist Mucous Membranes, Anicteric  Pulmonary: Non-labored, breath sounds are diminished   Cardiovascular: Regular, S1 and S2 nl, No murmurs, rubs, gallops or clicks  Gastrointestinal: Bowel Sounds present, soft, nontender.   Lymph: No lymphadenopathy. No peripheral edema.  Skin right foot dressing   Psych:  Mood & affect appropriate    LABS: All Labs Reviewed:                        11.1   9.83  )-----------( 254      ( 10 May 2021 08:17 )             35.1                         11.0   9.13  )-----------( 257      ( 09 May 2021 09:21 )             34.3                         10.6   7.89  )-----------( 229      ( 08 May 2021 09:16 )             33.5     10 May 2021 08:17    135    |  96     |  26     ----------------------------<  245    3.2     |  33     |  1.00   09 May 2021 09:21    135    |  95     |  23     ----------------------------<  289    3.5     |  34     |  1.10   08 May 2021 09:16    138    |  99     |  20     ----------------------------<  284    3.9     |  32     |  0.99     Ca    9.3        10 May 2021 08:17  Ca    9.5        09 May 2021 09:21  Ca    9.2        08 May 2021 09:16  Phos  3.9       10 May 2021 08:17  Phos  4.3       09 May 2021 09:21  Mg     2.2       10 May 2021 08:17  Mg     2.1       09 May 2021 09:21               ECG:    Echo:    Radiology:

## 2021-05-10 NOTE — PROGRESS NOTE ADULT - PROBLEM SELECTOR PLAN 10
Chronic  -Continue home fluoxetine    Problem 11: Need for prophylactic measure  DVT PPx: Xarelto 20mg qd    Problem 12: HLD  Chronic  -Continue home atorvastatin    Problem 13: GERD:  -continue pantoprazole qd Chronic  -Continue home fluoxetine    Problem 11: Need for prophylactic measure  DVT PPx: Xarelto 20mg qd    Problem 12: HLD  Chronic  -Continue home atorvastatin    Problem 13: GERD:  -continue pantoprazole qd    Problem 14: Hypokalemia  -K 3.2 this morning, replete with 40 meq kcl x2  -f/u am BMP

## 2021-05-10 NOTE — PROGRESS NOTE ADULT - SUBJECTIVE AND OBJECTIVE BOX
[facility]  INFECTIOUS DISEASES   [address] [place] [address]  Tel: [phone]     Fax: [phone]  =======================================================    [id number]  MARIA D ROBLEDO     Follow up: foot ulcer and cellulitis     Lying down on a recliner, S/p R foot condylectomy of 4th metatarsal head, also had bone biopsy and culture. so far negative.   No fever.  Culture from 5/5 with different CoNS possibly skin kayley.   Lower leg cellulitis has improved, less erythema and swelling, no warmth.     PAST MEDICAL & SURGICAL HISTORY:  Hypertension  Gastroparesis due to DM  Depression  Herniated disc  Spinal stenosis  chronic back pain  Cholesterol serum elevated  MVP (mitral valve prolapse)  Hypothyroid  not on medication  Obesity  IBS (irritable bowel syndrome)  GERD (gastroesophageal reflux disease)  Diabetes type 2, uncontrolled  History of diabetic retinopathy  S/P bariatric surgery  lap band surgery two times due to erosion, now no more  S/P tonsillectomy and adenoidectomy  Cataract  B/L cataracts  S/P laminectomy  Pt denied this?  S/P D&amp;C (status post dilation and curettage)  D&amp;C when she was 18  Cervix abnormality  Ablation of cervix  Breast anomaly  Biopsy of both breast benign lesions  right eye torn retina  S/P TAVR (transcatheter aortic valve replacement)    Social Hx: no smoking, ETOH or drugs     FAMILY HISTORY:  Family history of aortic stenosis  Family hx of structural heart dx    FHx: diabetes mellitus    FH: CAD (coronary artery disease)    Family history of anxiety disorder    Allergies  codeine (Vomiting)  shellfish (Stomach Upset)    Antibiotics:  piperacillin/tazobactam IVPB.. 3.375 Gram(s) IV Intermittent every 8 hours  vancomycin  changed to dampto    REVIEW OF SYSTEMS:  CONSTITUTIONAL:  No Fever or chills  HEENT:  No diplopia or blurred vision.  No sore throat or runny nose.  CARDIOVASCULAR:  No chest pain or SOB.  RESPIRATORY:  No cough, shortness of breath, PND or orthopnea.  GASTROINTESTINAL:  No nausea, vomiting or diarrhea.  GENITOURINARY:  No dysuria, frequency or urgency. No Blood in urine  MUSCULOSKELETAL:  no joint aches, no muscle pain  SKIN:  No change in skin, hair or nails.  NEUROLOGIC:  No paresthesias, fasciculations, seizures or weakness.  PSYCHIATRIC:  No disorder of thought or mood.  ENDOCRINE:  No heat or cold intolerance, polyuria or polydipsia.  HEMATOLOGICAL:  No easy bruising or bleeding.     Physical Exam:  Vital Signs Last 24 Hrs  T(C): 36.6 (10 May 2021 05:21), Max: 36.7 (09 May 2021 21:20)  T(F): 97.8 (10 May 2021 05:21), Max: 98.1 (09 May 2021 21:20)  HR: 76 (10 May 2021 05:21) (64 - 76)  BP: 131/77 (10 May 2021 05:21) (96/64 - 131/77)  BP(mean): --  RR: 17 (10 May 2021 05:21) (15 - 17)  SpO2: 98% (10 May 2021 05:21) (98% - 98%)  GEN: NAD, morbidly obese   HEENT: normocephalic and atraumatic. EOMI. PERRL.    NECK: Supple.  No lymphadenopathy   LUNGS: Clear to auscultation.  HEART: Regular rate and rhythm   ABDOMEN: Soft, nontender, and nondistended.  Positive bowel sounds.    : No CVA tenderness  EXTREMITIES: Bilateral edema. RLE with an ulcer on head of first metatarsal bone with no discharge, swelling, warmth and erythema in 1/3 of lower leg. s/p surgery now dressed  NEUROLOGIC: grossly intact.  PSYCHIATRIC: Appropriate affect .  SKIN: No rash     Labs:                        11.1   9.83  )-----------( 254      ( 10 May 2021 08:17 )             35.1     05-10    135  |  96  |  26<H>  ----------------------------<  245<H>  3.2<L>   |  33<H>  |  1.00    Ca    9.3      10 May 2021 08:17  Phos  3.9     05-10  Mg     2.2     05-10    Culture - Tissue with Gram Stain (collected 05-06-21 @ 13:23)  Source: .Tissue Other, right [id number] metatarsal condy  Gram Stain (05-06-21 @ 18:58):    Rare polymorphonuclear leukocytes per low power field    No organisms seen    Culture - Urine (collected 05-05-21 @ 06:28)  Source: .Urine Clean Catch (Midstream)  Final Report (05-06-21 @ 08:59):    No growth    Culture - Tissue with Gram Stain (collected 05-05-21 @ 03:37)  Source: .Tissue Other, Right foot 4th Metatarsal  Gram Stain (05-05-21 @ 07:30):    Rare polymorphonuclear leukocytes per low power field    No organisms seen per oil power field  Final Report (05-09-21 @ 13:38):    Rare Coag Negative Staphylococcus    Rare Coag Negative Staphylococcus #2    Moderate Anaerobic Gram Negative Nicolas    Most closely resembling    Prevotella species "Susceptibilities not performed"  Organism: Coag Negative Staphylococcus  Coag Negative Staphylococcus (05-09-21 @ 13:38)  Organism: Coag Negative Staphylococcus (05-09-21 @ 13:38)    Sensitivities:      -  Ampicillin/Sulbactam: R <=8/4      -  Cefazolin: R <=4      -  Clindamycin: R >4      -  Erythromycin: R >4      -  Gentamicin: S <=1 Should not be used as monotherapy      -  Oxacillin: R >2      -  Penicillin: R >8      -  RIF- Rifampin: S <=1 Should not be used as monotherapy      -  Tetra/Doxy: S <=1      -  Trimethoprim/Sulfamethoxazole: S 1/19      -  Vancomycin: S 2      Method Type: RAMÓN  Organism: Coag Negative Staphylococcus (05-09-21 @ 13:38)    Sensitivities:      -  Ampicillin/Sulbactam: R <=8/4      -  Cefazolin: R <=4      -  Clindamycin: R >4      -  Erythromycin: R >4      -  Gentamicin: R >8 Should not be used as monotherapy      -  Oxacillin: R >2      -  Penicillin: R >8      -  RIF- Rifampin: S <=1 Should not be used as monotherapy      -  Tetra/Doxy: R >8      -  Trimethoprim/Sulfamethoxazole: R >2/38      -  Vancomycin: S 1      Method Type: RAMÓN    Culture - Blood (collected 05-05-21 @ 00:36)  Source: .Blood Blood  Final Report (05-10-21 @ 01:00):    No Growth Final    Culture - Blood (collected 05-05-21 @ 00:36)  Source: .Blood Blood  Final Report (05-10-21 @ 01:00):    No Growth Final    WBC Count: 9.83 K/uL (05-10-21 @ 08:17)  WBC Count: 9.13 K/uL (05-09-21 @ 09:21)  WBC Count: 7.89 K/uL (05-08-21 @ 09:16)  WBC Count: 8.33 K/uL (05-07-21 @ 07:55)  WBC Count: 7.20 K/uL (05-06-21 @ 14:15)    Creatinine, Serum: 1.00 mg/dL (05-10-21 @ 08:17)  Creatinine, Serum: 1.10 mg/dL (05-09-21 @ 09:21)  Creatinine, Serum: 0.99 mg/dL (05-08-21 @ 09:16)  Creatinine, Serum: 0.97 mg/dL (05-07-21 @ 07:55)  Creatinine, Serum: 0.95 mg/dL (05-06-21 @ 14:14)    C-Reactive Protein, Serum: 13 mg/L (05-05-21 @ 01:01)    Sedimentation Rate, Erythrocyte: 50 mm/hr (05-04-21 @ 20:03)    COVID-19 PCR: NotDetec (05-04-21 @ 20:03)    All imaging and other data have been reviewed.  < from: MR Foot No Cont, Right (05.05.21 @ 11:03) >  IMPRESSION:  1. No evidence for osteomyelitis or abscess.      Assessment and Plan:   66yo woman with PMH of HTN, DM2, AS s/p TAVR 2015, HOCM, pAFib, JOSR on 2L O2 at home qhs, anxiety and morbid obesity was admitted with right foot ulcer. She had appointment with wound center and due to unhealing ulcer and cellulitis in lower leg was referred to ED.  MRI with no OM or abscess. Wound culture has been sent but still pending.   Until cultures are back will cover for MRSA as well, Since she is morbidly obese dosing vancomycin will be difficult, so will switch to daptomycin.     RLE cellulitis and R foot ulcer  - Blood culture x 2 NGTD  - Wound culture before and from OR both NGTD, with skin kayley   - MRSA PCR negative   - MRI with no OM or abscess  - biopsy showed necrotic skin/tissue and bacteria, no OM   - R foot condylectomy of 4th metatarsal head, also had bone biopsy and culture, on 5/6  - Elevate leg   - Good glycemic control  - Can switch zosyn to oral augmentin for few more days to complete total 10days for lower leg cellulitis.    Will follow.    Lamar Alexis MD  Division of Infectious Diseases   Cell [phone] between 8am and 6pm   After 6pm and weekends please call ID service at [phone].

## 2021-05-10 NOTE — PROGRESS NOTE ADULT - SUBJECTIVE AND OBJECTIVE BOX
65y year old Female seen at [facility]  for right foot sub 4th met head ulcer with erythema right lower leg.   Pt is s/p 5/6/21 right 4th metatarsal head condylectomy.   Pt seen during AM rounds AAOx3 with NAD.  Dressing were clean, dry and intact.  Pt had no new pedal complaints.  Denies any fever, chills, nausea, vomiting, chest pain, shortness of breath, or calf pain at this time.        PAST MEDICAL & SURGICAL HISTORY:  Hypertension    Gastroparesis due to DM    Depression    Herniated disc    Spinal stenosis  chronic back pain    Cholesterol serum elevated    MVP (mitral valve prolapse)    Hypothyroid  not on medication    Obesity    IBS (irritable bowel syndrome)    GERD (gastroesophageal reflux disease)    Diabetes type 2, uncontrolled    History of diabetic retinopathy    S/P bariatric surgery  lap band surgery two times due to erosion, now no more    S/P tonsillectomy and adenoidectomy    Cataract  B/L cataracts    S/P laminectomy  Pt denied this?    S/P D&amp;C (status post dilation and curettage)  D&amp;C when she was 18    Cervix abnormality  Ablation of cervix    Breast anomaly  Biopsy of both breast benign lesions    right eye torn retina    S/P TAVR (transcatheter aortic valve replacement)        Allergies    codeine (Vomiting)  shellfish (Stomach Upset)    Intolerances    fish (Stomach Upset)      MEDICATIONS  (STANDING):    MEDICATIONS  (PRN):      Social History:      FAMILY HISTORY:  Family history of aortic stenosis  Family hx of structural heart dx    Vital Signs Last 24 Hrs  T(C): 36.6 (10 May 2021 05:21), Max: 36.7 (09 May 2021 21:20)  T(F): 97.8 (10 May 2021 05:21), Max: 98.1 (09 May 2021 21:20)  HR: 76 (10 May 2021 05:21) (64 - 76)  BP: 131/77 (10 May 2021 05:21) (96/64 - 131/77)  BP(mean): --  RR: 17 (10 May 2021 05:21) (15 - 17)  SpO2: 98% (10 May 2021 05:21) (98% - 98%)    PHYSICAL EXAM:  Vascular: DP & PT palpable bilaterally, Capillary refill 3 seconds.  Nonpitting edema right foot and leg.  Erythema right lower leg.  No ecchymosis b/l  Neurological: Light touch sensation intact bilaterally  Musculoskeletal: 5/5 strength in all quadrants bilaterally, AJ & STJ ROM intact  Dermatological:  0.75 cm ulceration noted to the right foot sub 4th met head, necrotic wound bed, PTB(+) 4th met head,  no periwound erythema, no fluctuance, no malodor, no proximal streaking at this time                                                                            11.1   9.83  )-----------( 254      ( 10 May 2021 08:17 )             35.1   05-10    135  |  96  |  26<H>  ----------------------------<  245<H>  3.2<L>   |  33<H>  |  1.00    Ca    9.3      10 May 2021 08:17  Phos  3.9     05-10  Mg     2.2     05-10                  Imaging: ----------      EXAM: DUPLEX LOW ARTERIES UNI LTD RT      PROCEDURE DATE: 05/05/2021        INTERPRETATION: Clinical information: History smoking, diabetes, hypertension, hyperlipidemia, presents with lower extremity nonhealing ulcers.    COMPARISON: Lower extremity arterial Doppler study dated 5/5/2021.    TECHNIQUE: Right lower extremity arterial duplex study. Common femoral artery is not visualized due to body habitus.    FINDINGS: Duplex evaluation of the distributive arteries of the right lower extremity was performed. There is extensive calcific plaque. The arteries above the knee are patent. Biphasic and triphasic arterial waveforms are present. The peroneal artery is not visualized and may be occluded. Disturbed color flow and elevated blood flow velocities are present in the dorsalis pedis artery.    The systolic velocities are as follows:    Right lower extremity    Common femoral artery not visualized  Superficial femoral artery proximal 184 cm/s, mid 108 cm/s, distal 168 cm/s  Popliteal artery 126 cm/s  Posterior tibial artery 128 cm/s  Anterior tibial artery 103 cm/s  Peroneal artery not visualized  Dorsalis pedis artery 326 cm/s    IMPRESSION: Extensive calcified atheromatous plaque.    Two-vessel runoff in the right calf with presumed occlusion of the peroneal artery.    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            CASSIDY JIANG MD; Attending Radiologist  This document has been electronically signed. May 6 2021 9:10AM      EXAM: MR FOOT RT      PROCEDURE DATE: 05/05/2021        INTERPRETATION: MR FOOT RIGHT    HISTORY: Right foot ulcer. Pain. Infection. Evaluate for osteomyelitis.    TECHNIQUE: Multiplanar MRI of the right forefoot was performed without contrast using 7 sequences.    COMPARISON: Bilateral foot x-rays dated June 20, 2015    FINDINGS:    OSSEOUS STRUCTURES  Fractures/Stress Reactions: None.  Osteomyelitis: None.    INTERPHALANGEAL JOINTS  Articular Surfaces: Preserved. There are hammertoe deformities.  Effusions/Synovitis: None.    1ST METATARSOPHALANGEAL JOINT  Articular Surfaces: Tiny marginal osteophytes are present.  Effusions/Synovitis: None.  Sesamoids: Located with small subchondral cysts in the metatarsal head.    LESSER METATARSOPHALANGEAL JOINTS  Articular Surfaces: Preserved.  Effusions/Synovitis: None.    TARSOMETATARSAL JOINTS  Articular Surfaces: There are tiny scattered marginal osteophytes. Tiny subchondral cysts/intraosseous ganglion are noted within the third and fourth metatarsal bases.  Effusions/Synovitis: None.    INTERTARSAL JOINTS  Articular Surfaces: Small naviculocuneiform joint osteophytes are present with small subchondral cysts.  Effusions/Synovitis: None.    INTERMETATARSAL SPACES  Neuromas: None.  Bursa: Small first, slight second, and small third webspace bursa are noted.    LISFRANC LIGAMENT  Intact.    TENDONS  Flexor Tendons: Intact.  Extensor Tendons: Intact.    DISTAL PLANTAR FASCIA  Intact.    SOFT TISSUES  Musculature: Moderate to severe muscle atrophy is noted with increased T2 signal consistent with diabetic neuropathy.  Subcutaneous Tissues: Moderate subcutaneous edema is present without abscess. Plantar ulceration appears to be present at the level of the fourth webspace.    IMPRESSION:  1. No evidence for osteomyelitis or abscess.      XAM: XR FOOT COMP MIN 3 VIEWS RT      PROCEDURE DATE: 05/05/2021        INTERPRETATION: Right foot    HISTORY: Chronic ulcer, fourth metatarsal head    Three views of the right foot show no evidence of fracture nor destructive change. The joint spaces are maintained.    IMPRESSION: No gross destructive changes.            Thank you for this referral.            DONALD BECK MD; Attending Interventional Radiologist  This document has been electronically signed. May 5 2021 3:49PM      EXAM: PHYSIOL EXTREM LOW 3+ LEV BI      PROCEDURE DATE: 05/05/2021        INTERPRETATION: History: Nonhealing ulcer right foot    Risk factors: Smoking, hypertension, diabetes.    A prior examination, dated 6/18/2020, showed a right MARQUIS of 1.23 and a left MARQUIS of 1.26.    On this examination, the right ankle-brachial index equals 1.21; the left ankle-brachial index equals 1.28.    The blood pressure measurements on the right are 155 mmHg at the upper calf and at the right ankle 147 mmHg in the posterior tibial artery and 139 mmHg in the dorsal pedis artery.    The blood pressure measurement at the right great toe is 150 mmHg and the right toe-brachial index equals 1.24.    The blood pressure measurements on the left are 167 mmHg in the upper calf and at the left ankle 143 mmHg in the posterior tibial artery and 155 mmHg in the dorsal pedis artery.    The blood pressure measurement at the left great toe is 188 mmHg and the left toe-brachial index equals 1.55.    Artificially elevated blood pressure measurements, and in this case toe-brachial indices are characteristic for calcified, noncompressible, diabetic arteries.    The amplitude of the pulse volume recordings are normal bilaterally at all levels from the upper thighs through the toes.    Impression: Lower extremity arterial disease is NOT identified.

## 2021-05-10 NOTE — PROGRESS NOTE ADULT - PROBLEM SELECTOR PROBLEM 7
JOSR (obstructive sleep apnea)

## 2021-05-10 NOTE — PROGRESS NOTE ADULT - PROBLEM SELECTOR PLAN 9
Chronic  -Continue home metoprolol, lasix, losartan with hold parameters  -Monitor routine hemodynamics
Chronic  -Continue home metoprolol and losartan with hold parameters  -continue IV Lasix  -Monitor routine hemodynamics
Chronic  -Continue home metoprolol, lasix, losartan with hold parameters  -Monitor routine hemodynamics

## 2021-05-10 NOTE — PROGRESS NOTE ADULT - SUBJECTIVE AND OBJECTIVE BOX
CAPILLARY BLOOD GLUCOSE      POCT Blood Glucose.: 307 mg/dL (09 May 2021 21:39)  POCT Blood Glucose.: 282 mg/dL (09 May 2021 16:54)  POCT Blood Glucose.: 271 mg/dL (09 May 2021 12:04)  POCT Blood Glucose.: 303 mg/dL (09 May 2021 08:12)      Vital Signs Last 24 Hrs  T(C): 36.6 (10 May 2021 05:21), Max: 36.7 (09 May 2021 11:46)  T(F): 97.8 (10 May 2021 05:21), Max: 98.1 (09 May 2021 11:46)  HR: 76 (10 May 2021 05:21) (60 - 76)  BP: 131/77 (10 May 2021 05:21) (96/64 - 131/77)  BP(mean): --  RR: 17 (10 May 2021 05:21) (15 - 17)  SpO2: 98% (10 May 2021 05:21) (98% - 99%)    General: WN/WD NAD  Respiratory: CTA B/L  CV: RRR, S1S2, no murmurs, rubs or gallops  Abdominal: Soft, NT, ND +BS, Last BM  Extremities: No edema, + peripheral pulses     05-09    135  |  95<L>  |  23  ----------------------------<  289<H>  3.5   |  34<H>  |  1.10    Ca    9.5      09 May 2021 09:21  Phos  4.3     05-09  Mg     2.1     05-09        atorvastatin 80 milliGRAM(s) Oral at bedtime  dextrose 40% Gel 15 Gram(s) Oral once  dextrose 50% Injectable 25 Gram(s) IV Push once  dextrose 50% Injectable 12.5 Gram(s) IV Push once  dextrose 50% Injectable 25 Gram(s) IV Push once  glucagon  Injectable 1 milliGRAM(s) IntraMuscular once  glucagon  Injectable 1 milliGRAM(s) IntraMuscular once  insulin glargine Injectable (LANTUS) 60 Unit(s) SubCutaneous at bedtime  insulin lispro (ADMELOG) corrective regimen sliding scale   SubCutaneous three times a day before meals  insulin lispro (ADMELOG) corrective regimen sliding scale   SubCutaneous at bedtime  insulin lispro Injectable (ADMELOG) 24 Unit(s) SubCutaneous three times a day before meals

## 2021-05-10 NOTE — PROGRESS NOTE ADULT - PROBLEM SELECTOR PLAN 1
New acute R sub 4th met head ulcer, sent by Dr. Palencia from wound care, likely from chronic uncontrolled DM  -POD 6 s/p R 4th plantar condylectomy with Dr. John   -s/p IV Vanco and Zosyn in ED, on Zosyn currently, Daptomycin discontinued  - Patient with continued loose stools, not diarrhea or profuse, will give Imodium x1, continue Florastor  -MRI foot shows No evidence for osteomyelitis or abscess.  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   -+staph aureus PCR; MRSA PCR negative  -Blood and urine cultures NGTD, tissue cx with rare staph and prevotella species  -surgical path pending  -Continue routine dressing with Calcium Alginate and DSD per podiatry  -pain regimen prn: tylenol for mild, Percocet 5/325 1/2 tab q6 for moderate and 1 tab q6 for severe  -ID Dr. Alexis following, recs appreciated  -Cardio Dr. Mayorga consulted for clearance  -PT consult, 50% WB RLE on heel New acute R sub 4th met head ulcer, sent by Dr. Palencia from wound care, likely from chronic uncontrolled DM  -POD 4 s/p R 4th plantar condylectomy with Dr. John   -s/p IV Vanco and Zosyn in ED, on Zosyn currently, Daptomycin discontinued  - Patient with continued loose stools, improving in frequency, most likely 2/2 IV abx, doubt c diff as wbc stable and wnl, continue Imodium and Florastor  -MRI foot shows No evidence for osteomyelitis or abscess.  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   -+staph aureus PCR; MRSA PCR negative  -Blood and urine cultures NGTD, tissue cx with rare staph and prevotella species  -surgical path pending  -Continue routine dressing with Calcium Alginate and DSD per podiatry  -pain regimen prn: tylenol for mild, Percocet 5/325 1/2 tab q6 for moderate and 1 tab q6 for severe  -ID Dr. Alexis following, recs appreciated  -Cardio Dr. Mayorga consulted for clearance  -PT consult, 50% WB RLE on heel New acute R sub 4th met head ulcer, sent by Dr. Palencia from wound care, likely from chronic uncontrolled DM  -POD 4 s/p R 4th plantar condylectomy with Dr. John   -s/p IV Vanco and Zosyn in ED, on Zosyn currently, Daptomycin discontinued  - Patient with continued loose stools, improving in frequency, most likely 2/2 IV abx, doubt c diff as wbc stable and wnl, continue Imodium and Florastor  -MRI foot shows No evidence for osteomyelitis or abscess.  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   -+staph aureus PCR; MRSA PCR negative  -Blood and urine cultures NGTD, tissue cx with rare staph and prevotella species  -surgical path with no evidence of osteomyelitis   -Continue routine dressing with Calcium Alginate and DSD per podiatry  -pain regimen prn: tylenol for mild, Percocet 5/325 1/2 tab q6 for moderate and 1 tab q6 for severe  -ID Dr. Alexis following, recs appreciated  -Cardio Dr. Mayorga consulted for clearance  -PT consult, 50% WB RLE on heel

## 2021-05-10 NOTE — PROGRESS NOTE ADULT - PROBLEM SELECTOR PLAN 2
s/p TAVR 2015, also with hx of HOCM diagnosed 2/20 per chart review  -states that lately her SOB has been increasing and her cardiologist is working her up to make sure her bioprosthetic valve is functioning properly  -TTE from 4/28/21 showing: hyperdynamic LV systolic function, LVEF 75%, mild pulmonary pressures  -Scheduled for LAMBERT with Marium on 5/24/21  -pt on Lasix 80mg QD at home, continue on Lasix 40mg IV BID due to increased LE edema and SOB and congested appearing CXR  -continue home beta blocker and ASA  -Cardio [facility] following, recs appreciated
s/p TAVR 2015, also with hx of HOCM diagnosed 2/20 per chart review  -states that lately her SOB has been increasing and her cardiologist is working her up to make sure her bioprosthetic valve is functioning properly  -TTE from 4/28/21 showing: hyperdynamic LV systolic function, LVEF 75%, mild pulmonary pressures  -Scheduled for LAMBERT with Marium on 5/24/21  -pt on Lasix 80mg QD at home, continue on Lasix 40mg IV BID due to increased LE edema and SOB and congested appearing CXR  -received 2 doses of Metolazone 5mg per cardio with improvement in fluid overload  -continue home beta blocker and ASA  -hold home Klor Con and supplement K+ prn, will give KCl 40mEq x1 today because level is at 3.5  -Cardio [facility] following, recs appreciated
s/p TAVR 2015, also with hx of HOCM diagnosed 2/20 per chart review  -states that lately her SOB has been increasing and her cardiologist is working her up to make sure her bioprosthetic valve is functioning properly  -TTE from 4/28/21 showing: hyperdynamic LV systolic function, LVEF 75%, mild pulmonary pressures  -Scheduled for LAMBERT with Marium on 5/24/21  -pt on Lasix 80mg QD at home, continue on Lasix 40mg IV BID due to increased LE edema and SOB and congested appearing CXR  -received 1 dose of Metolazone 5mg yesterday per cardio  -continue home beta blocker and ASA  -hold home Klor Con and supplement K+ prn  -Cardio [facility] following, recs appreciated
s/p TAVR 2015, also with hx of HOCM diagnosed 2/20 per chart review  -states that lately her SOB has been increasing and her cardiologist is working her up to make sure her bioprosthetic valve is functioning properly  -TTE from 4/28/21 showing: hyperdynamic LV systolic function, LVEF 75%, mild pulmonary pressures  -Scheduled for LAMBERT with Marium on 5/24/21  -pt on Lasix 80mg QD at home, continue on Lasix 40mg IV BID due to increased LE edema and SOB and congested appearing CXR  -continue home beta blocker and ASA  -hold home Klor Con and supplement K+ prn  -Cardio [facility] following, recs appreciated
s/p TAVR 2015, also with hx of HOCM diagnosed 2/20 per chart review  -states that lately her SOB has been increasing and her cardiologist is working her up to make sure her bioprosthetic valve is functioning properly  -TTE from 4/28/21 showing: hyperdynamic LV systolic function, LVEF 75%, mild pulmonary pressures  -Scheduled for LAMBERT with Marium on 5/24/21  -pt on Lasix 80mg QD at home, continue on Lasix 40mg IV BID due to increased LE edema and SOB and congested appearing CXR  -received 1 dose of Metolazone 5mg yesterday per cardio  -continue home beta blocker and ASA  -hold home Klor Con and supplement K+ prn  -Cardio [facility] following, recs appreciated
s/p TAVR 2015, also with hx of HOCM diagnosed 2/20 per chart review  -states that lately her SOB has been increasing and her cardiologist is working her up to make sure her bioprosthetic valve is functioning properly  -TTE from 4/28/21 showing: hyperdynamic LV systolic function, LVEF 75%, mild pulmonary pressures  -Scheduled for LAMBERT with Marium on 5/24/21  -pt on Lasix 80mg QD at home, continue on Lasix 40mg IV BID due to increased LE edema and SOB and congested appearing CXR  -continue home beta blocker and ASA  -hold home Klor Con and supplement K+ prn  -Cardio [facility] following, recs appreciated

## 2021-05-11 ENCOUNTER — TRANSCRIPTION ENCOUNTER (OUTPATIENT)
Age: 66
End: 2021-05-11

## 2021-05-11 VITALS
RESPIRATION RATE: 18 BRPM | TEMPERATURE: 98 F | DIASTOLIC BLOOD PRESSURE: 66 MMHG | SYSTOLIC BLOOD PRESSURE: 103 MMHG | HEART RATE: 63 BPM | OXYGEN SATURATION: 98 %

## 2021-05-11 LAB
ANION GAP SERPL CALC-SCNC: 4 MMOL/L — LOW (ref 5–17)
BUN SERPL-MCNC: 31 MG/DL — HIGH (ref 7–23)
CALCIUM SERPL-MCNC: 9.4 MG/DL — SIGNIFICANT CHANGE UP (ref 8.5–10.1)
CHLORIDE SERPL-SCNC: 96 MMOL/L — SIGNIFICANT CHANGE UP (ref 96–108)
CO2 SERPL-SCNC: 35 MMOL/L — HIGH (ref 22–31)
CREAT SERPL-MCNC: 1 MG/DL — SIGNIFICANT CHANGE UP (ref 0.5–1.3)
CULTURE RESULTS: SIGNIFICANT CHANGE UP
GLUCOSE SERPL-MCNC: 317 MG/DL — HIGH (ref 70–99)
HCT VFR BLD CALC: 34.8 % — SIGNIFICANT CHANGE UP (ref 34.5–45)
HGB BLD-MCNC: 11 G/DL — LOW (ref 11.5–15.5)
MAGNESIUM SERPL-MCNC: 2.3 MG/DL — SIGNIFICANT CHANGE UP (ref 1.6–2.6)
MCHC RBC-ENTMCNC: 26.9 PG — LOW (ref 27–34)
MCHC RBC-ENTMCNC: 31.6 GM/DL — LOW (ref 32–36)
MCV RBC AUTO: 85.1 FL — SIGNIFICANT CHANGE UP (ref 80–100)
NRBC # BLD: 0 /100 WBCS — SIGNIFICANT CHANGE UP (ref 0–0)
PHOSPHATE SERPL-MCNC: 3.6 MG/DL — SIGNIFICANT CHANGE UP (ref 2.5–4.5)
PLATELET # BLD AUTO: 253 K/UL — SIGNIFICANT CHANGE UP (ref 150–400)
POTASSIUM SERPL-MCNC: 4 MMOL/L — SIGNIFICANT CHANGE UP (ref 3.5–5.3)
POTASSIUM SERPL-SCNC: 4 MMOL/L — SIGNIFICANT CHANGE UP (ref 3.5–5.3)
RBC # BLD: 4.09 M/UL — SIGNIFICANT CHANGE UP (ref 3.8–5.2)
RBC # FLD: 14.7 % — HIGH (ref 10.3–14.5)
SARS-COV-2 RNA SPEC QL NAA+PROBE: SIGNIFICANT CHANGE UP
SODIUM SERPL-SCNC: 135 MMOL/L — SIGNIFICANT CHANGE UP (ref 135–145)
SPECIMEN SOURCE: SIGNIFICANT CHANGE UP
WBC # BLD: 9.25 K/UL — SIGNIFICANT CHANGE UP (ref 3.8–10.5)
WBC # FLD AUTO: 9.25 K/UL — SIGNIFICANT CHANGE UP (ref 3.8–10.5)

## 2021-05-11 PROCEDURE — 80048 BASIC METABOLIC PNL TOTAL CA: CPT

## 2021-05-11 PROCEDURE — 96366 THER/PROPH/DIAG IV INF ADDON: CPT

## 2021-05-11 PROCEDURE — 36415 COLL VENOUS BLD VENIPUNCTURE: CPT

## 2021-05-11 PROCEDURE — 84100 ASSAY OF PHOSPHORUS: CPT

## 2021-05-11 PROCEDURE — 97530 THERAPEUTIC ACTIVITIES: CPT

## 2021-05-11 PROCEDURE — G0463: CPT

## 2021-05-11 PROCEDURE — 85730 THROMBOPLASTIN TIME PARTIAL: CPT

## 2021-05-11 PROCEDURE — 99232 SBSQ HOSP IP/OBS MODERATE 35: CPT

## 2021-05-11 PROCEDURE — 88311 DECALCIFY TISSUE: CPT

## 2021-05-11 PROCEDURE — 93926 LOWER EXTREMITY STUDY: CPT

## 2021-05-11 PROCEDURE — 85652 RBC SED RATE AUTOMATED: CPT

## 2021-05-11 PROCEDURE — 86850 RBC ANTIBODY SCREEN: CPT

## 2021-05-11 PROCEDURE — 99285 EMERGENCY DEPT VISIT HI MDM: CPT | Mod: 25

## 2021-05-11 PROCEDURE — 87075 CULTR BACTERIA EXCEPT BLOOD: CPT

## 2021-05-11 PROCEDURE — 83036 HEMOGLOBIN GLYCOSYLATED A1C: CPT

## 2021-05-11 PROCEDURE — 86769 SARS-COV-2 COVID-19 ANTIBODY: CPT

## 2021-05-11 PROCEDURE — 81001 URINALYSIS AUTO W/SCOPE: CPT

## 2021-05-11 PROCEDURE — 85610 PROTHROMBIN TIME: CPT

## 2021-05-11 PROCEDURE — 73630 X-RAY EXAM OF FOOT: CPT

## 2021-05-11 PROCEDURE — 93923 UPR/LXTR ART STDY 3+ LVLS: CPT

## 2021-05-11 PROCEDURE — 83735 ASSAY OF MAGNESIUM: CPT

## 2021-05-11 PROCEDURE — 87640 STAPH A DNA AMP PROBE: CPT

## 2021-05-11 PROCEDURE — 87641 MR-STAPH DNA AMP PROBE: CPT

## 2021-05-11 PROCEDURE — 80053 COMPREHEN METABOLIC PANEL: CPT

## 2021-05-11 PROCEDURE — 87070 CULTURE OTHR SPECIMN AEROBIC: CPT

## 2021-05-11 PROCEDURE — 83880 ASSAY OF NATRIURETIC PEPTIDE: CPT

## 2021-05-11 PROCEDURE — 71045 X-RAY EXAM CHEST 1 VIEW: CPT

## 2021-05-11 PROCEDURE — 87040 BLOOD CULTURE FOR BACTERIA: CPT

## 2021-05-11 PROCEDURE — 96365 THER/PROPH/DIAG IV INF INIT: CPT

## 2021-05-11 PROCEDURE — 88304 TISSUE EXAM BY PATHOLOGIST: CPT

## 2021-05-11 PROCEDURE — 87086 URINE CULTURE/COLONY COUNT: CPT

## 2021-05-11 PROCEDURE — 99239 HOSP IP/OBS DSCHRG MGMT >30: CPT

## 2021-05-11 PROCEDURE — 86900 BLOOD TYPING SEROLOGIC ABO: CPT

## 2021-05-11 PROCEDURE — 87186 SC STD MICRODIL/AGAR DIL: CPT

## 2021-05-11 PROCEDURE — 99024 POSTOP FOLLOW-UP VISIT: CPT

## 2021-05-11 PROCEDURE — 82962 GLUCOSE BLOOD TEST: CPT

## 2021-05-11 PROCEDURE — 85027 COMPLETE CBC AUTOMATED: CPT

## 2021-05-11 PROCEDURE — 73718 MRI LOWER EXTREMITY W/O DYE: CPT

## 2021-05-11 PROCEDURE — 86140 C-REACTIVE PROTEIN: CPT

## 2021-05-11 PROCEDURE — U0003: CPT

## 2021-05-11 PROCEDURE — 97163 PT EVAL HIGH COMPLEX 45 MIN: CPT

## 2021-05-11 PROCEDURE — 96367 TX/PROPH/DG ADDL SEQ IV INF: CPT

## 2021-05-11 PROCEDURE — 85025 COMPLETE CBC W/AUTO DIFF WBC: CPT

## 2021-05-11 PROCEDURE — 82550 ASSAY OF CK (CPK): CPT

## 2021-05-11 PROCEDURE — 86901 BLOOD TYPING SEROLOGIC RH(D): CPT

## 2021-05-11 PROCEDURE — 97116 GAIT TRAINING THERAPY: CPT

## 2021-05-11 PROCEDURE — 87077 CULTURE AEROBIC IDENTIFY: CPT

## 2021-05-11 PROCEDURE — 93005 ELECTROCARDIOGRAM TRACING: CPT

## 2021-05-11 PROCEDURE — U0005: CPT

## 2021-05-11 PROCEDURE — 94640 AIRWAY INHALATION TREATMENT: CPT

## 2021-05-11 RX ORDER — FUROSEMIDE 40 MG
40 TABLET ORAL DAILY
Refills: 0 | Status: DISCONTINUED | OUTPATIENT
Start: 2021-05-11 | End: 2021-05-11

## 2021-05-11 RX ADMIN — LOSARTAN POTASSIUM 25 MILLIGRAM(S): 100 TABLET, FILM COATED ORAL at 05:35

## 2021-05-11 RX ADMIN — Medication 600 MILLIGRAM(S): at 08:46

## 2021-05-11 RX ADMIN — Medication 650 MILLIGRAM(S): at 08:44

## 2021-05-11 RX ADMIN — Medication 250 MILLIGRAM(S): at 05:36

## 2021-05-11 RX ADMIN — Medication 9: at 12:18

## 2021-05-11 RX ADMIN — Medication 81 MILLIGRAM(S): at 11:45

## 2021-05-11 RX ADMIN — Medication 24 UNIT(S): at 12:18

## 2021-05-11 RX ADMIN — Medication 9: at 08:43

## 2021-05-11 RX ADMIN — PANTOPRAZOLE SODIUM 40 MILLIGRAM(S): 20 TABLET, DELAYED RELEASE ORAL at 05:35

## 2021-05-11 RX ADMIN — Medication 24 UNIT(S): at 08:43

## 2021-05-11 RX ADMIN — Medication 40 MILLIGRAM(S): at 11:45

## 2021-05-11 RX ADMIN — Medication 40 MILLIGRAM(S): at 05:35

## 2021-05-11 RX ADMIN — NYSTATIN CREAM 1 APPLICATION(S): 100000 CREAM TOPICAL at 05:36

## 2021-05-11 RX ADMIN — MONTELUKAST 10 MILLIGRAM(S): 4 TABLET, CHEWABLE ORAL at 11:45

## 2021-05-11 RX ADMIN — Medication 1 TABLET(S): at 05:35

## 2021-05-11 RX ADMIN — Medication 50 MILLIGRAM(S): at 05:36

## 2021-05-11 NOTE — PROGRESS NOTE ADULT - ATTENDING COMMENTS
I saw and examined the patient personally. Spoke with above provider regarding this case. I reviewed the above findings completely.  I agree with the above history, physical, and plan which I have edited where appropriate.     improved with metolazone. would redose tonight
Seen/examined. agree with above.  clinically improving with iv lasix
I personally saw and examined the patient in detail.  I have spoken to the above provider regarding the assessment and plan of care.  I reviewed the above assessment and plan of care, and agree.  I have made changes in the body of the note where appropriate.  Remains volume overloaded with increased LE edema.  She is on Lasix 40 IV BID.  Continue to maintain negative balance.  Monitor I, O, K, creatinine closely.  To follow.
I saw and examined the patient personally. Spoke with above provider regarding this case. I reviewed the above findings completely.  I agree with the above history, physical, and plan which I have edited where appropriate.     more compensated from [facility] cotn meds.   DC planning per primary team.
I saw and examined the patient personally. Spoke with above provider regarding this case. I reviewed the above findings completely.  I agree with the above history, physical, and plan which I have edited where appropriate.     had worsening SOB. appears more vvol ol. will need additional diuresis. give metolazone 5mg x1 po tonight
Pt. seen and evaluated for right diabetic foot ulcer.  Pt. is s/p plantar condylectomy.  Tolerating IV antibiotic.  Reports loose stool that is improving.  Awaiting OR culture results.  Physical examination and plan as above.
Pt. seen and evaluated for right diabetic foot ulcer s/p plantar condylectomy.  Pt. is in no distress.  Tolerating IV antibiotic.  Awaiting surgical pathology.  Pt. has been tolerating diuresis well with improvement in respiratory status.  Physical examination and plan as above.
Pt. seen and evaluated for right diabetic foot ulcer.  Pt. is in no distress.  Feeling better today.  Reported getting a few hours of sleep.  Tolerating IV antibiotics.  Still reports having loose stool.  Given imodium x 1.  Physical examination and plan as above. Awaiting surgical path.
Pt. seen and evaluated for right diabetic foot wound.  Pt. is in no distress.  Tolerating IV antibiotics.  MRI of the right foot showed  no evidence for osteomyelitis or abscess.  Physical examination and plan as above.  Will continue IV antibiotics.  Await further input by Podiatry in regards to wound care.
Pt. seen and evaluated for right diabetic foot wound.  s/p plantar condylectomy.  Pt. is in no distress.  Tolerated procedure well.  Reported having diarrhea.  Denies having any CP.  Will start probiotic and continue IV antibiotics.  May need to check C. diff toxin assay if diarrhea persist.   Physical examination and plan as above.
Pt. seen and evaluated for right diabetic foot ulcer.  Tolerating IV antibiotic.  Reports BM becoming more formed.  Receiving IV Lasix.  States improvement with respiratory status.  Physical examination and plan as above.

## 2021-05-11 NOTE — DISCHARGE NOTE NURSING/CASE MANAGEMENT/SOCIAL WORK - PATIENT PORTAL LINK FT
no
You can access the FollowMyHealth Patient Portal offered by [facility] by registering at the following website: [email or web address]. By joining [facility]’s FollowMyHealth portal, you will also be able to view your health information using other applications (apps) compatible with our system.

## 2021-05-11 NOTE — PROGRESS NOTE ADULT - PROVIDER SPECIALTY LIST ADULT
Cardiology
Cardiology
Podiatry
Cardiology
Endocrinology
Infectious Disease
Podiatry
Cardiology
Cardiology
Podiatry
Infectious Disease
Podiatry
Cardiology
Cardiology
Endocrinology
Infectious Disease
Podiatry
Podiatry
Hospitalist

## 2021-05-11 NOTE — PROGRESS NOTE ADULT - ASSESSMENT
66yo woman with PMH AS s/p TAVR 2015, HOCM, pAFib, JOSR (does not tolerate CPAP, on 2L O2 at home qhs), anxiety, morbid obesity, DM, HTN presents to ED from wound clnic with R foot ulcer to evaluate for osteomyelitis. Plan for right foot 4th met head resection pending vascular studies, MRI. Cardiology consulted for cardiac optimization pre/post procedure    Valvular Heart Disease/ Aortic stenosis s/p TAVR 2015/ HOCM  -  Patient has extensive cardiac history, follows with Dr. Espinal for a possibility of repeat TAVR as she is still significantly SOB, RASMUSSEN, with increased leg edema for the past few months. Patient is scheduled for a LAMBERT on 5/24 with Dr. Espinal.   - TTE from 4/28/21 showing: hyperdynamic LV systolic function, LVEF 75%, mild to mod MR, mild pulmonary pressures  - on Lasix 40mg IV BID for LE edema and SOB. S/p metolazone  5mg x 1 5/7 and 5/8.  - now on po lasix   - Continue Metoprolol tartrate 50mg BID  - Continue aspirin   - continue home O2 2L at home. Does have h/o asthma and JOSR , 94% on RA as per flow sheets     Paroxysmal atrial fibrillation  - EKG showing NSR rate 79 with PVCs, LVH  - Continue Xarelto 20mg qd, Metoprolol tartrate 50mg BID  - Patient reports that she has h/o DVT and PE and was told to be on AC indefinitely and had testing done for hereditary issues.     Hypertension  -103/66  - Continue home metoprolol, Lasix, losartan     Hyperlipidemia  - Continue home atorvastatin.     Osteomyelitis foot, R sub 4th met head ulcer  - s/p right fourth metatarsal head condylectomy  - fu podiatry recs      awaiting discharge today,   - Will continue to follow.  Ester Womack FNP-C  Cardiology NP  [id number] [phone]

## 2021-05-11 NOTE — PROGRESS NOTE ADULT - PROBLEM SELECTOR PROBLEM 1
Diabetes
Osteomyelitis of ankle or foot, acute
Diabetes
Osteomyelitis of ankle or foot, acute

## 2021-05-11 NOTE — DISCHARGE NOTE NURSING/CASE MANAGEMENT/SOCIAL WORK - NSDCPEXARELTO_GEN_ALL_CORE
Rivaroxaban/Xarelto - Compliance/Rivaroxaban/Xarelto - Dietary Advice/Rivaroxaban/Xarelto - Follow up monitoring/Rivaroxaban/Xarelto - Potential for adverse drug reactions and interactions
[de-identified] : Mr. RAYMOND COLLETT is a 48 year old obese male, recently diagnosed with HTN ( 2.5 months ago)  presents for initial evaluation, establish care\par Says since he was told he has HTN, he has been dieting to try and loose weight\par He doing well. Offers no complaints. \par Denies SOB, chest pain, LE swelling, headache, dizziness

## 2021-05-11 NOTE — PROGRESS NOTE ADULT - SUBJECTIVE AND OBJECTIVE BOX
[facility] -- Oni Campbell, Merlin Mayorga, Blayne Torres Savella  Office # [phone]      Follow Up:    af  Subjective/Observations:   No events overnight resting comfortably in bed.  No complaints of chest pain, dyspnea, or palpitations reported. No signs of orthopnea or PND.      REVIEW OF SYSTEMS: All other review of systems is negative unless indicated above    PAST MEDICAL & SURGICAL HISTORY:  Hypertension    Gastroparesis due to DM    Depression    Herniated disc    Spinal stenosis  chronic back pain    Cholesterol serum elevated    MVP (mitral valve prolapse)    Hypothyroid  not on medication    Obesity    IBS (irritable bowel syndrome)    GERD (gastroesophageal reflux disease)    Diabetes type 2, uncontrolled    History of diabetic retinopathy    S/P bariatric surgery  lap band surgery two times due to erosion, now no more    S/P tonsillectomy and adenoidectomy    Cataract  B/L cataracts    S/P laminectomy  Pt denied this?    S/P D&amp;C (status post dilation and curettage)  D&amp;C when she was 18    Cervix abnormality  Ablation of cervix    Breast anomaly  Biopsy of both breast benign lesions    right eye torn retina    S/P TAVR (transcatheter aortic valve replacement)        MEDICATIONS  (STANDING):  amoxicillin  875 milliGRAM(s)/clavulanate 1 Tablet(s) Oral every 12 hours  aspirin enteric coated 81 milliGRAM(s) Oral daily  atorvastatin 80 milliGRAM(s) Oral at bedtime  dextrose 40% Gel 15 Gram(s) Oral once  dextrose 5%. 1000 milliLiter(s) (50 mL/Hr) IV Continuous <Continuous>  dextrose 5%. 1000 milliLiter(s) (100 mL/Hr) IV Continuous <Continuous>  dextrose 50% Injectable 25 Gram(s) IV Push once  dextrose 50% Injectable 12.5 Gram(s) IV Push once  dextrose 50% Injectable 25 Gram(s) IV Push once  FLUoxetine 40 milliGRAM(s) Oral daily  furosemide    Tablet 40 milliGRAM(s) Oral daily  glucagon  Injectable 1 milliGRAM(s) IntraMuscular once  glucagon  Injectable 1 milliGRAM(s) IntraMuscular once  guaiFENesin  milliGRAM(s) Oral <User Schedule>  insulin glargine Injectable (LANTUS) 60 Unit(s) SubCutaneous at bedtime  insulin lispro (ADMELOG) corrective regimen sliding scale   SubCutaneous at bedtime  insulin lispro (ADMELOG) corrective regimen sliding scale   SubCutaneous three times a day before meals  insulin lispro Injectable (ADMELOG) 24 Unit(s) SubCutaneous three times a day before meals  loperamide 2 milliGRAM(s) Oral two times a day  losartan 25 milliGRAM(s) Oral two times a day  metoprolol tartrate 50 milliGRAM(s) Oral two times a day  montelukast 10 milliGRAM(s) Oral daily  nystatin Powder 1 Application(s) Topical two times a day  pantoprazole    Tablet 40 milliGRAM(s) Oral before breakfast  rivaroxaban 20 milliGRAM(s) Oral with dinner  saccharomyces boulardii 250 milliGRAM(s) Oral two times a day    MEDICATIONS  (PRN):  acetaminophen   Tablet .. 650 milliGRAM(s) Oral every 6 hours PRN Mild Pain (1 - 3)  ALBUTerol    90 MICROgram(s) HFA Inhaler 2 Puff(s) Inhalation every 6 hours PRN Shortness of Breath and/or Wheezing  oxycodone    5 mG/acetaminophen 325 mG 1 Tablet(s) Oral every 6 hours PRN Severe Pain (7 - 10)  oxycodone    5 mG/acetaminophen 325 mG 0.5 Tablet(s) Oral every 6 hours PRN Moderate Pain (4 - 6)      Allergies    codeine (Vomiting)  shellfish (Stomach Upset)    Intolerances    fish (Stomach Upset)      Vital Signs Last 24 Hrs  T(C): 36.6 (11 May 2021 12:11), Max: 37.3 (10 May 2021 21:06)  T(F): 97.8 (11 May 2021 12:11), Max: 99.2 (10 May 2021 21:06)  HR: 63 (11 May 2021 12:11) (63 - 91)  BP: 103/66 (11 May 2021 12:11) (103/66 - 130/72)  BP(mean): --  RR: 18 (11 May 2021 12:11) (17 - 20)  SpO2: 98% (11 May 2021 12:11) (92% - 98%)    I&O's Summary    10 May 2021 07:01  -  11 May 2021 07:00  --------------------------------------------------------  IN: 240 mL / OUT: 0 mL / NET: 240 mL          PHYSICAL EXAM:  TELE: not on tele   Constitutional: NAD, awake and alert, obese   HEENT: Moist Mucous Membranes, Anicteric  Pulmonary: Non-labored, breath sounds are dim  Cardiovascular: Regular, S1 and S2 nl, No murmurs, rubs, gallops or clicks  Gastrointestinal: Bowel Sounds present, soft, nontender.   Lymph: + peripheral edema.  Skin: right foot dressing   Psych:  Mood & affect appropriate    LABS: All Labs Reviewed:                        11.0   9.25  )-----------( 253      ( 11 May 2021 09:23 )             34.8                         11.1   9.83  )-----------( 254      ( 10 May 2021 08:17 )             35.1                         11.0   9.13  )-----------( 257      ( 09 May 2021 09:21 )             34.3     11 May 2021 09:23    135    |  96     |  31     ----------------------------<  317    4.0     |  35     |  1.00   10 May 2021 08:17    135    |  96     |  26     ----------------------------<  245    3.2     |  33     |  1.00   09 May 2021 09:21    135    |  95     |  23     ----------------------------<  289    3.5     |  34     |  1.10     Ca    9.4        11 May 2021 09:23  Ca    9.3        10 May 2021 08:17  Ca    9.5        09 May 2021 09:21  Phos  3.6       11 May 2021 09:23  Phos  3.9       10 May 2021 08:17  Phos  4.3       09 May 2021 09:21  Mg     2.3       11 May 2021 09:23  Mg     2.2       10 May 2021 08:17  Mg     2.1       09 May 2021 09:21

## 2021-05-11 NOTE — PROGRESS NOTE ADULT - SUBJECTIVE AND OBJECTIVE BOX
65y year old Female seen at [facility]  for right foot sub 4th met head ulcer with erythema right lower leg.   Pt is s/p 5/6/21 right 4th metatarsal head condylectomy.   Pt seen during AM rounds AAOx3 with NAD.  Dressing were clean, dry and intact.  Pt had no new pedal complaints.  Denies any fever, chills, nausea, vomiting, chest pain, shortness of breath, or calf pain at this time.        PAST MEDICAL & SURGICAL HISTORY:  Hypertension    Gastroparesis due to DM    Depression    Herniated disc    Spinal stenosis  chronic back pain    Cholesterol serum elevated    MVP (mitral valve prolapse)    Hypothyroid  not on medication    Obesity    IBS (irritable bowel syndrome)    GERD (gastroesophageal reflux disease)    Diabetes type 2, uncontrolled    History of diabetic retinopathy    S/P bariatric surgery  lap band surgery two times due to erosion, now no more    S/P tonsillectomy and adenoidectomy    Cataract  B/L cataracts    S/P laminectomy  Pt denied this?    S/P D&amp;C (status post dilation and curettage)  D&amp;C when she was 18    Cervix abnormality  Ablation of cervix    Breast anomaly  Biopsy of both breast benign lesions    right eye torn retina    S/P TAVR (transcatheter aortic valve replacement)        Allergies    codeine (Vomiting)  shellfish (Stomach Upset)    Intolerances    fish (Stomach Upset)      MEDICATIONS  (STANDING):    MEDICATIONS  (PRN):      Social History:      FAMILY HISTORY:  Family history of aortic stenosis  Family hx of structural heart dx    Vital Signs Last 24 Hrs  T(C): 36.6 (11 May 2021 12:11), Max: 37.3 (10 May 2021 21:06)  T(F): 97.8 (11 May 2021 12:11), Max: 99.2 (10 May 2021 21:06)  HR: 63 (11 May 2021 12:11) (63 - 91)  BP: 103/66 (11 May 2021 12:11) (103/66 - 130/72)  BP(mean): --  RR: 18 (11 May 2021 12:11) (17 - 20)  SpO2: 98% (11 May 2021 12:11) (92% - 98%)      PHYSICAL EXAM:  Vascular: DP & PT palpable bilaterally, Capillary refill 3 seconds.  Nonpitting edema right foot and leg.  Erythema right lower leg.  No ecchymosis b/l  Neurological: Light touch sensation intact bilaterally  Musculoskeletal: 5/5 strength in all quadrants bilaterally, AJ & STJ ROM intact  Dermatological:  0.75 cm ulceration noted to the right foot sub 4th met head, necrotic wound bed, PTB(+) 4th met head,  no periwound erythema, no fluctuance, no malodor, no proximal streaking at this time                                                                                               11.0   9.25  )-----------( 253      ( 11 May 2021 09:23 )             34.8   05-11    135  |  96  |  31<H>  ----------------------------<  317<H>  4.0   |  35<H>  |  1.00    Ca    9.4      11 May 2021 09:23  Phos  3.6     05-11  Mg     2.3     05-11                      Imaging: ----------      EXAM: DUPLEX LOW ARTERIES UNI LTD RT      PROCEDURE DATE: 05/05/2021        INTERPRETATION: Clinical information: History smoking, diabetes, hypertension, hyperlipidemia, presents with lower extremity nonhealing ulcers.    COMPARISON: Lower extremity arterial Doppler study dated 5/5/2021.    TECHNIQUE: Right lower extremity arterial duplex study. Common femoral artery is not visualized due to body habitus.    FINDINGS: Duplex evaluation of the distributive arteries of the right lower extremity was performed. There is extensive calcific plaque. The arteries above the knee are patent. Biphasic and triphasic arterial waveforms are present. The peroneal artery is not visualized and may be occluded. Disturbed color flow and elevated blood flow velocities are present in the dorsalis pedis artery.    The systolic velocities are as follows:    Right lower extremity    Common femoral artery not visualized  Superficial femoral artery proximal 184 cm/s, mid 108 cm/s, distal 168 cm/s  Popliteal artery 126 cm/s  Posterior tibial artery 128 cm/s  Anterior tibial artery 103 cm/s  Peroneal artery not visualized  Dorsalis pedis artery 326 cm/s    IMPRESSION: Extensive calcified atheromatous plaque.    Two-vessel runoff in the right calf with presumed occlusion of the peroneal artery.    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            CASSIDY JIANG MD; Attending Radiologist  This document has been electronically signed. May 6 2021 9:10AM      EXAM: MR FOOT RT      PROCEDURE DATE: 05/05/2021        INTERPRETATION: MR FOOT RIGHT    HISTORY: Right foot ulcer. Pain. Infection. Evaluate for osteomyelitis.    TECHNIQUE: Multiplanar MRI of the right forefoot was performed without contrast using 7 sequences.    COMPARISON: Bilateral foot x-rays dated June 20, 2015    FINDINGS:    OSSEOUS STRUCTURES  Fractures/Stress Reactions: None.  Osteomyelitis: None.    INTERPHALANGEAL JOINTS  Articular Surfaces: Preserved. There are hammertoe deformities.  Effusions/Synovitis: None.    1ST METATARSOPHALANGEAL JOINT  Articular Surfaces: Tiny marginal osteophytes are present.  Effusions/Synovitis: None.  Sesamoids: Located with small subchondral cysts in the metatarsal head.    LESSER METATARSOPHALANGEAL JOINTS  Articular Surfaces: Preserved.  Effusions/Synovitis: None.    TARSOMETATARSAL JOINTS  Articular Surfaces: There are tiny scattered marginal osteophytes. Tiny subchondral cysts/intraosseous ganglion are noted within the third and fourth metatarsal bases.  Effusions/Synovitis: None.    INTERTARSAL JOINTS  Articular Surfaces: Small naviculocuneiform joint osteophytes are present with small subchondral cysts.  Effusions/Synovitis: None.    INTERMETATARSAL SPACES  Neuromas: None.  Bursa: Small first, slight second, and small third webspace bursa are noted.    LISFRANC LIGAMENT  Intact.    TENDONS  Flexor Tendons: Intact.  Extensor Tendons: Intact.    DISTAL PLANTAR FASCIA  Intact.    SOFT TISSUES  Musculature: Moderate to severe muscle atrophy is noted with increased T2 signal consistent with diabetic neuropathy.  Subcutaneous Tissues: Moderate subcutaneous edema is present without abscess. Plantar ulceration appears to be present at the level of the fourth webspace.    IMPRESSION:  1. No evidence for osteomyelitis or abscess.      XAM: XR FOOT COMP MIN 3 VIEWS RT      PROCEDURE DATE: 05/05/2021        INTERPRETATION: Right foot    HISTORY: Chronic ulcer, fourth metatarsal head    Three views of the right foot show no evidence of fracture nor destructive change. The joint spaces are maintained.    IMPRESSION: No gross destructive changes.            Thank you for this referral.            DONALD BECK MD; Attending Interventional Radiologist  This document has been electronically signed. May 5 2021 3:49PM      EXAM: PHYSIOL EXTREM LOW 3+ LEV BI      PROCEDURE DATE: 05/05/2021        INTERPRETATION: History: Nonhealing ulcer right foot    Risk factors: Smoking, hypertension, diabetes.    A prior examination, dated 6/18/2020, showed a right MARQUIS of 1.23 and a left MARQUIS of 1.26.    On this examination, the right ankle-brachial index equals 1.21; the left ankle-brachial index equals 1.28.    The blood pressure measurements on the right are 155 mmHg at the upper calf and at the right ankle 147 mmHg in the posterior tibial artery and 139 mmHg in the dorsal pedis artery.    The blood pressure measurement at the right great toe is 150 mmHg and the right toe-brachial index equals 1.24.    The blood pressure measurements on the left are 167 mmHg in the upper calf and at the left ankle 143 mmHg in the posterior tibial artery and 155 mmHg in the dorsal pedis artery.    The blood pressure measurement at the left great toe is 188 mmHg and the left toe-brachial index equals 1.55.    Artificially elevated blood pressure measurements, and in this case toe-brachial indices are characteristic for calcified, noncompressible, diabetic arteries.    The amplitude of the pulse volume recordings are normal bilaterally at all levels from the upper thighs through the toes.    Impression: Lower extremity arterial disease is NOT identified.

## 2021-05-11 NOTE — PROGRESS NOTE ADULT - SUBJECTIVE AND OBJECTIVE BOX
[facility]  INFECTIOUS DISEASES   [address] [place] [address]  Tel: [phone]     Fax: [phone]  =======================================================    [id number]  MARIA D ROBLEDO     Follow up: foot ulcer and cellulitis     Lying down on a recliner, S/p R foot condylectomy of 4th metatarsal head, also had bone biopsy and culture. so far negative.   No fever.  Culture from 5/5 with 2 different CoNS possibly skin kayley.   Lower leg cellulitis has improved, less erythema and swelling, no warmth.     PAST MEDICAL & SURGICAL HISTORY:  Hypertension  Gastroparesis due to DM  Depression  Herniated disc  Spinal stenosis  chronic back pain  Cholesterol serum elevated  MVP (mitral valve prolapse)  Hypothyroid  not on medication  Obesity  IBS (irritable bowel syndrome)  GERD (gastroesophageal reflux disease)  Diabetes type 2, uncontrolled  History of diabetic retinopathy  S/P bariatric surgery  lap band surgery two times due to erosion, now no more  S/P tonsillectomy and adenoidectomy  Cataract  B/L cataracts  S/P laminectomy  Pt denied this?  S/P D&amp;C (status post dilation and curettage)  D&amp;C when she was 18  Cervix abnormality  Ablation of cervix  Breast anomaly  Biopsy of both breast benign lesions  right eye torn retina  S/P TAVR (transcatheter aortic valve replacement)    Social Hx: no smoking, ETOH or drugs     FAMILY HISTORY:  Family history of aortic stenosis  Family hx of structural heart dx    FHx: diabetes mellitus    FH: CAD (coronary artery disease)    Family history of anxiety disorder    Allergies  codeine (Vomiting)  shellfish (Stomach Upset)    Antibiotics:  piperacillin/tazobactam IVPB.. 3.375 Gram(s) IV Intermittent every 8 hours  vancomycin  changed to dampto    REVIEW OF SYSTEMS:  CONSTITUTIONAL:  No Fever or chills  HEENT:  No diplopia or blurred vision.  No sore throat or runny nose.  CARDIOVASCULAR:  No chest pain or SOB.  RESPIRATORY:  No cough, shortness of breath, PND or orthopnea.  GASTROINTESTINAL:  No nausea, vomiting or diarrhea.  GENITOURINARY:  No dysuria, frequency or urgency. No Blood in urine  MUSCULOSKELETAL:  no joint aches, no muscle pain  SKIN:  No change in skin, hair or nails.  NEUROLOGIC:  No paresthesias, fasciculations, seizures or weakness.  PSYCHIATRIC:  No disorder of thought or mood.  ENDOCRINE:  No heat or cold intolerance, polyuria or polydipsia.  HEMATOLOGICAL:  No easy bruising or bleeding.     Physical Exam:  Vital Signs Last 24 Hrs  T(C): 36.6 (11 May 2021 05:30), Max: 37.3 (10 May 2021 21:06)  T(F): 97.9 (11 May 2021 05:30), Max: 99.2 (10 May 2021 21:06)  HR: 78 (11 May 2021 05:30) (65 - 91)  BP: 130/72 (11 May 2021 05:30) (104/66 - 130/72)  BP(mean): --  RR: 20 (11 May 2021 05:30) (17 - 20)  SpO2: 94% (11 May 2021 05:30) (92% - 94%)  GEN: NAD, morbidly obese   HEENT: normocephalic and atraumatic. EOMI. PERRL.    NECK: Supple.  No lymphadenopathy   LUNGS: Clear to auscultation.  HEART: Regular rate and rhythm   ABDOMEN: Soft, nontender, and nondistended.  Positive bowel sounds.    : No CVA tenderness  EXTREMITIES: Bilateral edema. RLE with an ulcer on head of first metatarsal bone with no discharge, swelling, warmth and erythema in 1/3 of lower leg. s/p surgery now dressed  NEUROLOGIC: grossly intact.  PSYCHIATRIC: Appropriate affect .  SKIN: No rash       Labs:                        11.0   9.25  )-----------( 253      ( 11 May 2021 09:23 )             34.8     05-11    135  |  96  |  31<H>  ----------------------------<  317<H>  4.0   |  35<H>  |  1.00    Ca    9.4      11 May 2021 09:23  Phos  3.6     05-11  Mg     2.3     05-11    Culture - Tissue with Gram Stain (collected 05-06-21 @ 13:23)  Source: .Tissue Other, right oyxg2ua metatarsal condy  Gram Stain (05-06-21 @ 18:58):    Rare polymorphonuclear leukocytes per low power field    No organisms seen    Culture - Urine (collected 05-05-21 @ 06:28)  Source: .Urine Clean Catch (Midstream)  Final Report (05-06-21 @ 08:59):    No growth    Culture - Tissue with Gram Stain (collected 05-05-21 @ 03:37)  Source: .Tissue Other, Right foot 4th Metatarsal  Gram Stain (05-05-21 @ 07:30):    Rare polymorphonuclear leukocytes per low power field    No organisms seen per oil power field  Final Report (05-09-21 @ 13:38):    Rare Coag Negative Staphylococcus    Rare Coag Negative Staphylococcus #2    Moderate Anaerobic Gram Negative Nicolas    Most closely resembling    Prevotella species "Susceptibilities not performed"  Organism: Coag Negative Staphylococcus  Coag Negative Staphylococcus (05-09-21 @ 13:38)  Organism: Coag Negative Staphylococcus (05-09-21 @ 13:38)    Sensitivities:      -  Ampicillin/Sulbactam: R <=8/4      -  Cefazolin: R <=4      -  Clindamycin: R >4      -  Erythromycin: R >4      -  Gentamicin: S <=1 Should not be used as monotherapy      -  Oxacillin: R >2      -  Penicillin: R >8      -  RIF- Rifampin: S <=1 Should not be used as monotherapy      -  Tetra/Doxy: S <=1      -  Trimethoprim/Sulfamethoxazole: S 1/19      -  Vancomycin: S 2      Method Type: RAMÓN  Organism: Coag Negative Staphylococcus (05-09-21 @ 13:38)    Sensitivities:      -  Ampicillin/Sulbactam: R <=8/4      -  Cefazolin: R <=4      -  Clindamycin: R >4      -  Erythromycin: R >4      -  Gentamicin: R >8 Should not be used as monotherapy      -  Oxacillin: R >2      -  Penicillin: R >8      -  RIF- Rifampin: S <=1 Should not be used as monotherapy      -  Tetra/Doxy: R >8      -  Trimethoprim/Sulfamethoxazole: R >2/38      -  Vancomycin: S 1      Method Type: RAMÓN    Culture - Blood (collected 05-05-21 @ 00:36)  Source: .Blood Blood  Final Report (05-10-21 @ 01:00):    No Growth Final    Culture - Blood (collected 05-05-21 @ 00:36)  Source: .Blood Blood  Final Report (05-10-21 @ 01:00):    No Growth Final    WBC Count: 9.25 K/uL (05-11-21 @ 09:23)  WBC Count: 9.83 K/uL (05-10-21 @ 08:17)  WBC Count: 9.13 K/uL (05-09-21 @ 09:21)  WBC Count: 7.89 K/uL (05-08-21 @ 09:16)  WBC Count: 8.33 K/uL (05-07-21 @ 07:55)  WBC Count: 7.20 K/uL (05-06-21 @ 14:15)    Creatinine, Serum: 1.00 mg/dL (05-11-21 @ 09:23)  Creatinine, Serum: 1.00 mg/dL (05-10-21 @ 08:17)  Creatinine, Serum: 1.10 mg/dL (05-09-21 @ 09:21)  Creatinine, Serum: 0.99 mg/dL (05-08-21 @ 09:16)  Creatinine, Serum: 0.97 mg/dL (05-07-21 @ 07:55)  Creatinine, Serum: 0.95 mg/dL (05-06-21 @ 14:14)    C-Reactive Protein, Serum: 13 mg/L (05-05-21 @ 01:01)    Sedimentation Rate, Erythrocyte: 50 mm/hr (05-04-21 @ 20:03)    COVID-19 PCR: NotDetec (05-04-21 @ 20:03)    All imaging and other data have been reviewed.  < from: MR Foot No Cont, Right (05.05.21 @ 11:03) >  IMPRESSION:  1. No evidence for osteomyelitis or abscess.    Assessment and Plan:   66yo woman with PMH of HTN, DM2, AS s/p TAVR 2015, HOCM, pAFib, JOSR on 2L O2 at home qhs, anxiety and morbid obesity was admitted with right foot ulcer. She had appointment with wound center and due to unhealing ulcer and cellulitis in lower leg was referred to ED.  MRI with no OM or abscess. Wound culture has been sent but still pending.   Until cultures are back will cover for MRSA as well, Since she is morbidly obese dosing vancomycin will be difficult, so will switch to daptomycin.     RLE cellulitis and R foot ulcer  - Blood culture x 2 NGTD  - Wound culture before surgery on 5/5 with CoNS skin kayley and from OR on 5/6 NGTD  - MRSA PCR negative   - MRI with no OM or abscess  - R foot condylectomy of 4th metatarsal head, also had bone biopsy and culture, on 5/6  - biopsy showed necrotic skin/tissue and bacteria, no OM   - Elevate leg   - Good glycemic control  - Continue oral Augmentin to complete total 10days for lower leg cellulitis.    Will follow PRN.     Discussed with Dr. Knight.     Lamar Alexis MD  Division of Infectious Diseases   Cell [phone] between 8am and 6pm   After 6pm and weekends please call ID service at [phone].

## 2021-05-11 NOTE — PROGRESS NOTE ADULT - REASON FOR ADMISSION
r/o osteomyelitis
Right foot ulcer, IV Abx
r/o osteomyelitis

## 2021-05-11 NOTE — PROGRESS NOTE ADULT - ASSESSMENT
Right foot ulcer sub 4th met head  s/p 5/6/21 right 4th metatarsal head condylectomy    PROBLEM/RECOMMENDATIONS:    Pt evaluated and chart reviewed  Right foot dressed with Aquacel and DSD, ace  No further podiatric surgical intervention at this time  All of pt's questions were answered to satisfaction  Pt stated she understood all discussed  Podiatry will follow pt while in house    Wound Care Instructions  1.  Keep dressing clean, dry and intact until clinic visit  2.  Make appointment to be seen by Dr. Palencia or Dr. John at [facility] w/in 3-5 days of d/c  3.  If symptoms of nausea, vomiting, fever, chills, shortness of breath, chest pain, increasing pain foot or posterior leg pain develop - go to the emergency department.

## 2021-05-11 NOTE — CHART NOTE - NSCHARTNOTEFT_GEN_A_CORE
Assessment/Follow up: Pt A+Ox4 during visit. Tolerating Consistent Carbohydrate, Dash/TLC diet well with good appetite/po intake. GI wdl, formed BM 5/11 per pt. Imodium, florastor rx. Hyperglycemia ongoing, endo following. Currently on 60units lantus qhs, 24units admelog TID, and high dose admelog covg. Recommend further insulin adjustments per Endo/MD. DM nutrition therapy provided 5/6 with written and verbal reinforcement on consistent carbohydrate heart healthy nutrition therapy today. Pt engaged in education session however future compliance questionable secondary long hx noncompliance. Plan for discharge home today.  RDs name/phone number left with patient if questions/concerns arise.     Factors impacting intake: [x ] none [ ] nausea  [ ] vomiting [ ] diarrhea [ ] constipation  [ ]chewing problems [ ] swallowing issues  [ ] other:     Diet Presciption: Diet, Consistent Carbohydrate/No Snacks:   DASH/TLC {Sodium & Cholesterol Restricted} (05-06-21 @ 09:10)    Intake: Good    Current Weight: Weight (kg): 136.5 (05-04 @ 17:44); request current weight; freddy legs 2+edema     Pertinent Medications: MEDICATIONS  (STANDING):  amoxicillin  875 milliGRAM(s)/clavulanate 1 Tablet(s) Oral every 12 hours  aspirin enteric coated 81 milliGRAM(s) Oral daily  atorvastatin 80 milliGRAM(s) Oral at bedtime  dextrose 40% Gel 15 Gram(s) Oral once  dextrose 5%. 1000 milliLiter(s) (50 mL/Hr) IV Continuous <Continuous>  dextrose 5%. 1000 milliLiter(s) (100 mL/Hr) IV Continuous <Continuous>  dextrose 50% Injectable 25 Gram(s) IV Push once  dextrose 50% Injectable 12.5 Gram(s) IV Push once  dextrose 50% Injectable 25 Gram(s) IV Push once  FLUoxetine 40 milliGRAM(s) Oral daily  furosemide    Tablet 40 milliGRAM(s) Oral daily  glucagon  Injectable 1 milliGRAM(s) IntraMuscular once  glucagon  Injectable 1 milliGRAM(s) IntraMuscular once  guaiFENesin  milliGRAM(s) Oral <User Schedule>  insulin glargine Injectable (LANTUS) 60 Unit(s) SubCutaneous at bedtime  insulin lispro (ADMELOG) corrective regimen sliding scale   SubCutaneous at bedtime  insulin lispro (ADMELOG) corrective regimen sliding scale   SubCutaneous three times a day before meals  insulin lispro Injectable (ADMELOG) 24 Unit(s) SubCutaneous three times a day before meals  loperamide 2 milliGRAM(s) Oral two times a day  losartan 25 milliGRAM(s) Oral two times a day  metoprolol tartrate 50 milliGRAM(s) Oral two times a day  montelukast 10 milliGRAM(s) Oral daily  nystatin Powder 1 Application(s) Topical two times a day  pantoprazole    Tablet 40 milliGRAM(s) Oral before breakfast  rivaroxaban 20 milliGRAM(s) Oral with dinner  saccharomyces boulardii 250 milliGRAM(s) Oral two times a day    MEDICATIONS  (PRN):  acetaminophen   Tablet .. 650 milliGRAM(s) Oral every 6 hours PRN Mild Pain (1 - 3)  ALBUTerol    90 MICROgram(s) HFA Inhaler 2 Puff(s) Inhalation every 6 hours PRN Shortness of Breath and/or Wheezing  oxycodone    5 mG/acetaminophen 325 mG 1 Tablet(s) Oral every 6 hours PRN Severe Pain (7 - 10)  oxycodone    5 mG/acetaminophen 325 mG 0.5 Tablet(s) Oral every 6 hours PRN Moderate Pain (4 - 6)    Pertinent Labs: 05-11 Na135 mmol/L Glu 317 mg/dL<H> K+ 4.0 mmol/L Cr  1.00 mg/dL BUN 31 mg/dL<H> 05-11 Phos 3.6 mg/dL 05-07 Alb 3.2 g/dL<L>     CAPILLARY BLOOD GLUCOSE      POCT Blood Glucose.: 291 mg/dL (11 May 2021 11:51)  POCT Blood Glucose.: 300 mg/dL (11 May 2021 07:55)  POCT Blood Glucose.: 276 mg/dL (10 May 2021 21:20)  POCT Blood Glucose.: 223 mg/dL (10 May 2021 17:15)  POCT Blood Glucose.: 223 mg/dL (10 May 2021 17:15)  POCT Blood Glucose.: 223 mg/dL (10 May 2021 17:15)    Skin: right foot surgical incision. Librado 19.     Estimated Needs:   [x ] no change since previous assessment  [ ] recalculated:     Previous Nutrition Diagnosis:   [ ] Inadequate Energy Intake [ ]Inadequate Oral Intake [ ] Excessive Energy Intake   [ ] Underweight [ ] Increased Nutrient Needs [ ] Overweight/Obesity   [ ] Altered GI Function [ ] Unintended Weight Loss [ ] Food & Nutrition Related Knowledge Deficit [ ] Malnutrition   [x ] Altered nutrition related labs (glucose, Hgba1c)    Nutrition Diagnosis is [x ] ongoing  [ ] resolved [ ] not applicable     New Nutrition Diagnosis: [ x] Obesity (class III) related to excessive energy intake & limited adherence to nutrition related recommendations as evidenced by BMI 53.3, dietary noncompliance.        Interventions:   Recommend  [ ] Change Diet To:  [ ] Nutrition Supplement  [ ] Nutrition Support  [x ] Other: Continued compliance to rx therapeutic diet.     Monitoring and Evaluation:   [ ] PO intake [ x ] Tolerance to diet prescription [ x ] weights [ x ] labs[ x ] follow up per protocol  [ ] other:

## 2021-05-11 NOTE — PROGRESS NOTE ADULT - SUBJECTIVE AND OBJECTIVE BOX
CAPILLARY BLOOD GLUCOSE      POCT Blood Glucose.: 276 mg/dL (10 May 2021 21:20)  POCT Blood Glucose.: 223 mg/dL (10 May 2021 17:15)  POCT Blood Glucose.: 223 mg/dL (10 May 2021 17:15)  POCT Blood Glucose.: 223 mg/dL (10 May 2021 17:15)  POCT Blood Glucose.: 289 mg/dL (10 May 2021 12:02)  POCT Blood Glucose.: 265 mg/dL (10 May 2021 08:07)      Vital Signs Last 24 Hrs  T(C): 36.6 (11 May 2021 05:30), Max: 37.3 (10 May 2021 21:06)  T(F): 97.9 (11 May 2021 05:30), Max: 99.2 (10 May 2021 21:06)  HR: 78 (11 May 2021 05:30) (65 - 91)  BP: 130/72 (11 May 2021 05:30) (104/66 - 130/72)  BP(mean): --  RR: 20 (11 May 2021 05:30) (17 - 20)  SpO2: 94% (11 May 2021 05:30) (92% - 94%)    General: WN/WD NAD  Respiratory: CTA B/L  CV: RRR, S1S2, no murmurs, rubs or gallops  Abdominal: Soft, NT, ND +BS, Last BM  Extremities: le foot dsg intact     05-10    135  |  96  |  26<H>  ----------------------------<  245<H>  3.2<L>   |  33<H>  |  1.00    Ca    9.3      10 May 2021 08:17  Phos  3.9     05-10  Mg     2.2     05-10        atorvastatin 80 milliGRAM(s) Oral at bedtime  dextrose 40% Gel 15 Gram(s) Oral once  dextrose 50% Injectable 25 Gram(s) IV Push once  dextrose 50% Injectable 25 Gram(s) IV Push once  dextrose 50% Injectable 12.5 Gram(s) IV Push once  glucagon  Injectable 1 milliGRAM(s) IntraMuscular once  glucagon  Injectable 1 milliGRAM(s) IntraMuscular once  insulin glargine Injectable (LANTUS) 60 Unit(s) SubCutaneous at bedtime  insulin lispro (ADMELOG) corrective regimen sliding scale   SubCutaneous at bedtime  insulin lispro (ADMELOG) corrective regimen sliding scale   SubCutaneous three times a day before meals  insulin lispro Injectable (ADMELOG) 24 Unit(s) SubCutaneous three times a day before meals

## 2021-05-11 NOTE — PROGRESS NOTE ADULT - PROBLEM SELECTOR PLAN 1
cont lantus 60 units qhs  cont admelog 24 units 3x/day before meals  cont high dose admelog scale coverage qac  cont cons cho diet  goal bg 100-180 in hosp setting

## 2021-05-14 ENCOUNTER — APPOINTMENT (OUTPATIENT)
Dept: WOUND CARE | Facility: HOSPITAL | Age: 66
End: 2021-05-14
Payer: MEDICARE

## 2021-05-14 ENCOUNTER — OUTPATIENT (OUTPATIENT)
Dept: OUTPATIENT SERVICES | Facility: HOSPITAL | Age: 66
LOS: 1 days | Discharge: HOME | End: 2021-05-14
Payer: MEDICARE

## 2021-05-14 VITALS
RESPIRATION RATE: 18 BRPM | TEMPERATURE: 97 F | BODY MASS INDEX: 51.91 KG/M2 | DIASTOLIC BLOOD PRESSURE: 57 MMHG | SYSTOLIC BLOOD PRESSURE: 108 MMHG | OXYGEN SATURATION: 95 % | HEIGHT: 63 IN | WEIGHT: 293 LBS | HEART RATE: 80 BPM

## 2021-05-14 DIAGNOSIS — Z95.2 PRESENCE OF PROSTHETIC HEART VALVE: Chronic | ICD-10-CM

## 2021-05-14 DIAGNOSIS — Z09 ENCOUNTER FOR FOLLOW-UP EXAMINATION AFTER COMPLETED TREATMENT FOR CONDITIONS OTHER THAN MALIGNANT NEOPLASM: ICD-10-CM

## 2021-05-14 PROCEDURE — G0463: CPT

## 2021-05-14 PROCEDURE — 99024 POSTOP FOLLOW-UP VISIT: CPT

## 2021-05-15 NOTE — HISTORY OF PRESENT ILLNESS
[FreeTextEntry1] : Pt seen s/p right 4th metatarsal plantar condylectomy for right foot plantar 4th metatarsal head ulceration down to skin, subcutaneous tissue, fat, tendon, and bone. Denies any other complaints.

## 2021-05-15 NOTE — PHYSICAL EXAM
[4 x 4] : 4 x 4  [Abdominal Pad] : Abdominal Pad [JVD] : no jugular venous distention  [0] : left 0 [Ankle Swelling (On Exam)] : present [Ankle Swelling Bilaterally] : bilaterally  [Varicose Veins Of Lower Extremities] : bilaterally [] : bilaterally [Ankle Swelling On The Left] : moderate [Skin Ulcer] : ulcer [de-identified] : A&Ox3, NAD [de-identified] : 4/5 strength in all quadrants bilaterally [de-identified] : right foot dorsal incision site with sutures intact, no purulence, no dehiscence, no malodor, no proximal streaking. right submet 4th ulcer down to skin, subcutaneous tissue, fat, tendon, bone (not necrotic). [de-identified] : callus  [de-identified] : Right anterior leg - No open wounds [de-identified] : Cleansed with NS\par  [FreeTextEntry1] : Left anterior leg - intact blister  [FreeTextEntry2] : 0.7 [FreeTextEntry3] : 1.1 [FreeTextEntry4] : 0.1 [de-identified] : Expressed comfort post ACE application. [de-identified] : Silver alginate [de-identified] : Cleansed with NS\par Kerlix  [FreeTextEntry7] : Right dorsal foot - Post op cpndylectomy of fourth met incision site - Sutures intact (NEW) [de-identified] : sanguinous [de-identified] : Expressed comfort post ACE application. [de-identified] : Adaptic touch, Silver alginate [de-identified] : Cleansed with NS\par Kerlix  [de-identified] : 100% [de-identified] : None [de-identified] : Weekly [de-identified] : Primary Dressing [de-identified] : None [TWNoteComboBox4] : None [de-identified] : Normal [de-identified] : None [de-identified] : None [de-identified] : 100% [de-identified] : No [de-identified] : Weekly [de-identified] : Ace wraps [de-identified] : Primary Dressing [de-identified] : Moderate [de-identified] : Normal [de-identified] : None [de-identified] : No [de-identified] : None [de-identified] : Ace wraps [de-identified] : Weekly [de-identified] : Primary Dressing

## 2021-05-15 NOTE — REVIEW OF SYSTEMS
[Fever] : no fever [Eye Pain] : no eye pain [Earache] : no earache [Chest Pain] : no chest pain [Shortness Of Breath] : no shortness of breath [Abdominal Pain] : no abdominal pain [Skin Wound] : skin wound [de-identified] : right foot dorsal incision site with sutures intact, no purulence, no dehiscence, no malodor, no proximal streaking. right submet 4th ulcer down to skin, subcutaneous tissue, fat, tendon, bone (not necrotic). [de-identified] : type 2 diabetes [de-identified] : diabetic neuropathy

## 2021-05-15 NOTE — PLAN
[FreeTextEntry1] : Patient examined and evaluated at this time.\par Continue local wound care and offloading.\par Pt remains a high risk for limb loss, sepsis, and death.\par Spent 20 minutes for patient care and medical decision making.\par

## 2021-05-15 NOTE — ASSESSMENT
[Verbal] : Verbal [Written] : Written [Demo] : Demo [Patient] : Patient [Family member] : Family member [Good - alert, interested, motivated] : Good - alert, interested, motivated [Verbalizes knowledge/Understanding] : Verbalizes knowledge/understanding [Dressing changes] : dressing changes [Skin Care] : skin care [Foot Care] : foot care [Signs and symptoms of infection] : sign and symptoms of infection [Venous Disease] : venous disease [Nutrition] : nutrition [How and When to Call] : how and when to call [Pain Management] : pain management [Off-loading] : off-loading [Compression Therapy] : compression therapy [Patient responsibility to plan of care] : patient responsibility to plan of care [Stable] : stable [Home] : Home [Wheelchair] : Wheelchair [Not Applicable - Long Term Care/Home Health Agency] : Long Term Care/Home Health Agency: Not Applicable [] : No [FreeTextEntry2] : Infection prevention\par Localized wound care \par Goal of remaining pain free regarding wounds.\par Compression therapy \par Weight loss  [FreeTextEntry4] : Patient had a right plantar foot Condylectomy of fourth met on 5/6/21 and was discharged from [facility] 5/11/21. \par Follow up in 1 week

## 2021-05-16 DIAGNOSIS — M48.07 SPINAL STENOSIS, LUMBOSACRAL REGION: ICD-10-CM

## 2021-05-16 DIAGNOSIS — I83.893 VARICOSE VEINS OF BILATERAL LOWER EXTREMITIES WITH OTHER COMPLICATIONS: ICD-10-CM

## 2021-05-16 DIAGNOSIS — E11.621 TYPE 2 DIABETES MELLITUS WITH FOOT ULCER: ICD-10-CM

## 2021-05-16 DIAGNOSIS — L97.412 NON-PRESSURE CHRONIC ULCER OF RIGHT HEEL AND MIDFOOT WITH FAT LAYER EXPOSED: ICD-10-CM

## 2021-05-16 DIAGNOSIS — E11.59 TYPE 2 DIABETES MELLITUS WITH OTHER CIRCULATORY COMPLICATIONS: ICD-10-CM

## 2021-05-16 DIAGNOSIS — Z87.01 PERSONAL HISTORY OF PNEUMONIA (RECURRENT): ICD-10-CM

## 2021-05-16 DIAGNOSIS — E11.40 TYPE 2 DIABETES MELLITUS WITH DIABETIC NEUROPATHY, UNSPECIFIED: ICD-10-CM

## 2021-05-16 DIAGNOSIS — Z98.84 BARIATRIC SURGERY STATUS: ICD-10-CM

## 2021-05-16 DIAGNOSIS — Z82.49 FAMILY HISTORY OF ISCHEMIC HEART DISEASE AND OTHER DISEASES OF THE CIRCULATORY SYSTEM: ICD-10-CM

## 2021-05-16 DIAGNOSIS — Z87.891 PERSONAL HISTORY OF NICOTINE DEPENDENCE: ICD-10-CM

## 2021-05-16 DIAGNOSIS — G47.33 OBSTRUCTIVE SLEEP APNEA (ADULT) (PEDIATRIC): ICD-10-CM

## 2021-05-16 DIAGNOSIS — G47.10 HYPERSOMNIA, UNSPECIFIED: ICD-10-CM

## 2021-05-16 DIAGNOSIS — E05.90 THYROTOXICOSIS, UNSPECIFIED WITHOUT THYROTOXIC CRISIS OR STORM: ICD-10-CM

## 2021-05-16 DIAGNOSIS — E55.9 VITAMIN D DEFICIENCY, UNSPECIFIED: ICD-10-CM

## 2021-05-16 DIAGNOSIS — M47.817 SPONDYLOSIS WITHOUT MYELOPATHY OR RADICULOPATHY, LUMBOSACRAL REGION: ICD-10-CM

## 2021-05-16 DIAGNOSIS — I47.1 SUPRAVENTRICULAR TACHYCARDIA: ICD-10-CM

## 2021-05-16 DIAGNOSIS — Z83.49 FAMILY HISTORY OF OTHER ENDOCRINE, NUTRITIONAL AND METABOLIC DISEASES: ICD-10-CM

## 2021-05-16 DIAGNOSIS — I11.9 HYPERTENSIVE HEART DISEASE WITHOUT HEART FAILURE: ICD-10-CM

## 2021-05-16 DIAGNOSIS — M43.16 SPONDYLOLISTHESIS, LUMBAR REGION: ICD-10-CM

## 2021-05-16 DIAGNOSIS — I83.12 VARICOSE VEINS OF LEFT LOWER EXTREMITY WITH INFLAMMATION: ICD-10-CM

## 2021-05-16 DIAGNOSIS — Z98.890 OTHER SPECIFIED POSTPROCEDURAL STATES: ICD-10-CM

## 2021-05-16 DIAGNOSIS — Z87.81 PERSONAL HISTORY OF (HEALED) TRAUMATIC FRACTURE: ICD-10-CM

## 2021-05-16 DIAGNOSIS — Z79.899 OTHER LONG TERM (CURRENT) DRUG THERAPY: ICD-10-CM

## 2021-05-16 DIAGNOSIS — I34.1 NONRHEUMATIC MITRAL (VALVE) PROLAPSE: ICD-10-CM

## 2021-05-16 DIAGNOSIS — Z88.5 ALLERGY STATUS TO NARCOTIC AGENT: ICD-10-CM

## 2021-05-16 DIAGNOSIS — I83.11 VARICOSE VEINS OF RIGHT LOWER EXTREMITY WITH INFLAMMATION: ICD-10-CM

## 2021-05-16 DIAGNOSIS — Z79.82 LONG TERM (CURRENT) USE OF ASPIRIN: ICD-10-CM

## 2021-05-16 DIAGNOSIS — E66.01 MORBID (SEVERE) OBESITY DUE TO EXCESS CALORIES: ICD-10-CM

## 2021-05-16 DIAGNOSIS — Z95.4 PRESENCE OF OTHER HEART-VALVE REPLACEMENT: ICD-10-CM

## 2021-05-16 DIAGNOSIS — Z79.4 LONG TERM (CURRENT) USE OF INSULIN: ICD-10-CM

## 2021-05-19 ENCOUNTER — OUTPATIENT (OUTPATIENT)
Dept: OUTPATIENT SERVICES | Facility: HOSPITAL | Age: 66
LOS: 1 days | Discharge: HOME | End: 2021-05-19
Payer: MEDICARE

## 2021-05-19 ENCOUNTER — APPOINTMENT (OUTPATIENT)
Dept: WOUND CARE | Facility: HOSPITAL | Age: 66
End: 2021-05-19
Payer: MEDICARE

## 2021-05-19 VITALS
TEMPERATURE: 97.7 F | HEIGHT: 63 IN | RESPIRATION RATE: 18 BRPM | WEIGHT: 293 LBS | BODY MASS INDEX: 51.91 KG/M2 | OXYGEN SATURATION: 98 % | DIASTOLIC BLOOD PRESSURE: 53 MMHG | HEART RATE: 65 BPM | SYSTOLIC BLOOD PRESSURE: 120 MMHG

## 2021-05-19 DIAGNOSIS — I83.11 VARICOSE VEINS OF RIGHT LOWER EXTREMITY WITH INFLAMMATION: ICD-10-CM

## 2021-05-19 DIAGNOSIS — Z83.49 FAMILY HISTORY OF OTHER ENDOCRINE, NUTRITIONAL AND METABOLIC DISEASES: ICD-10-CM

## 2021-05-19 DIAGNOSIS — Z87.01 PERSONAL HISTORY OF PNEUMONIA (RECURRENT): ICD-10-CM

## 2021-05-19 DIAGNOSIS — Z79.4 LONG TERM (CURRENT) USE OF INSULIN: ICD-10-CM

## 2021-05-19 DIAGNOSIS — E66.01 MORBID (SEVERE) OBESITY DUE TO EXCESS CALORIES: ICD-10-CM

## 2021-05-19 DIAGNOSIS — M43.16 SPONDYLOLISTHESIS, LUMBAR REGION: ICD-10-CM

## 2021-05-19 DIAGNOSIS — E11.621 TYPE 2 DIABETES MELLITUS WITH FOOT ULCER: ICD-10-CM

## 2021-05-19 DIAGNOSIS — E05.90 THYROTOXICOSIS, UNSPECIFIED WITHOUT THYROTOXIC CRISIS OR STORM: ICD-10-CM

## 2021-05-19 DIAGNOSIS — Z79.899 OTHER LONG TERM (CURRENT) DRUG THERAPY: ICD-10-CM

## 2021-05-19 DIAGNOSIS — G47.33 OBSTRUCTIVE SLEEP APNEA (ADULT) (PEDIATRIC): ICD-10-CM

## 2021-05-19 DIAGNOSIS — Z87.81 PERSONAL HISTORY OF (HEALED) TRAUMATIC FRACTURE: ICD-10-CM

## 2021-05-19 DIAGNOSIS — Z79.82 LONG TERM (CURRENT) USE OF ASPIRIN: ICD-10-CM

## 2021-05-19 DIAGNOSIS — I83.893 VARICOSE VEINS OF BILATERAL LOWER EXTREMITIES WITH OTHER COMPLICATIONS: ICD-10-CM

## 2021-05-19 DIAGNOSIS — Z88.5 ALLERGY STATUS TO NARCOTIC AGENT: ICD-10-CM

## 2021-05-19 DIAGNOSIS — G47.10 HYPERSOMNIA, UNSPECIFIED: ICD-10-CM

## 2021-05-19 DIAGNOSIS — Z95.2 PRESENCE OF PROSTHETIC HEART VALVE: Chronic | ICD-10-CM

## 2021-05-19 DIAGNOSIS — Z82.49 FAMILY HISTORY OF ISCHEMIC HEART DISEASE AND OTHER DISEASES OF THE CIRCULATORY SYSTEM: ICD-10-CM

## 2021-05-19 DIAGNOSIS — E55.9 VITAMIN D DEFICIENCY, UNSPECIFIED: ICD-10-CM

## 2021-05-19 DIAGNOSIS — I83.12 VARICOSE VEINS OF LEFT LOWER EXTREMITY WITH INFLAMMATION: ICD-10-CM

## 2021-05-19 DIAGNOSIS — I47.1 SUPRAVENTRICULAR TACHYCARDIA: ICD-10-CM

## 2021-05-19 DIAGNOSIS — E11.40 TYPE 2 DIABETES MELLITUS WITH DIABETIC NEUROPATHY, UNSPECIFIED: ICD-10-CM

## 2021-05-19 DIAGNOSIS — Z98.890 OTHER SPECIFIED POSTPROCEDURAL STATES: ICD-10-CM

## 2021-05-19 DIAGNOSIS — E11.59 TYPE 2 DIABETES MELLITUS WITH OTHER CIRCULATORY COMPLICATIONS: ICD-10-CM

## 2021-05-19 DIAGNOSIS — Z95.4 PRESENCE OF OTHER HEART-VALVE REPLACEMENT: ICD-10-CM

## 2021-05-19 DIAGNOSIS — L97.412 NON-PRESSURE CHRONIC ULCER OF RIGHT HEEL AND MIDFOOT WITH FAT LAYER EXPOSED: ICD-10-CM

## 2021-05-19 DIAGNOSIS — M48.07 SPINAL STENOSIS, LUMBOSACRAL REGION: ICD-10-CM

## 2021-05-19 DIAGNOSIS — I34.1 NONRHEUMATIC MITRAL (VALVE) PROLAPSE: ICD-10-CM

## 2021-05-19 DIAGNOSIS — I11.9 HYPERTENSIVE HEART DISEASE WITHOUT HEART FAILURE: ICD-10-CM

## 2021-05-19 DIAGNOSIS — Z98.84 BARIATRIC SURGERY STATUS: ICD-10-CM

## 2021-05-19 DIAGNOSIS — Z87.891 PERSONAL HISTORY OF NICOTINE DEPENDENCE: ICD-10-CM

## 2021-05-19 DIAGNOSIS — M47.817 SPONDYLOSIS WITHOUT MYELOPATHY OR RADICULOPATHY, LUMBOSACRAL REGION: ICD-10-CM

## 2021-05-19 PROCEDURE — 99024 POSTOP FOLLOW-UP VISIT: CPT

## 2021-05-19 PROCEDURE — G0463: CPT

## 2021-05-19 NOTE — ASSESSMENT
[Verbal] : Verbal [Written] : Written [Demo] : Demo [Patient] : Patient [Family member] : Family member [Good - alert, interested, motivated] : Good - alert, interested, motivated [Verbalizes knowledge/Understanding] : Verbalizes knowledge/understanding [Dressing changes] : dressing changes [Foot Care] : foot care [Skin Care] : skin care [Signs and symptoms of infection] : sign and symptoms of infection [Venous Disease] : venous disease [How and When to Call] : how and when to call [Nutrition] : nutrition [Pain Management] : pain management [Off-loading] : off-loading [Compression Therapy] : compression therapy [Patient responsibility to plan of care] : patient responsibility to plan of care [] : Yes [Stable] : stable [Home] : Home [Wheelchair] : Wheelchair [Not Applicable - Long Term Care/Home Health Agency] : Long Term Care/Home Health Agency: Not Applicable [FreeTextEntry2] : Infection prevention\par Localized wound care \par Goal of remaining pain free regarding wounds.\par Compression therapy \par Weight loss  [FreeTextEntry4] : Follow up in 1 week

## 2021-05-19 NOTE — PLAN
[FreeTextEntry1] : Patient examined and evaluated at this time.\par Continue local wound care and offloading.\par Pt remains a high risk for limb loss, sepsis, and death.\par Spent 20 minutes for patient care and medical decision making.\par Pt to follow up in 1 week.

## 2021-05-19 NOTE — PHYSICAL EXAM
[Abdominal Pad] : Abdominal Pad [4 x 4] : 4 x 4  [JVD] : no jugular venous distention  [0] : left 0 [Ankle Swelling (On Exam)] : present [Ankle Swelling Bilaterally] : bilaterally  [Varicose Veins Of Lower Extremities] : bilaterally [] : bilaterally [Ankle Swelling On The Left] : moderate [Skin Ulcer] : ulcer [de-identified] : A&Ox3, NAD [de-identified] : 4/5 strength in all quadrants bilaterally [de-identified] : right foot dorsal incision site with sutures intact, no purulence, no dehiscence, no malodor, no proximal streaking. right submet 4th ulcer down to skin, subcutaneous tissue, fat, tendon, bone (not necrotic). [de-identified] : callus  [de-identified] : Right anterior leg - No open wounds [de-identified] : Cleansed with Normal Saline\par  [FreeTextEntry1] : Left anterior leg - Closed [de-identified] : Expressed comfort post ACE application. [de-identified] : No treatment necessary [de-identified] : Cleansed with Normal Saline\par \par  [FreeTextEntry7] : Right dorsal foot - Post op cpndylectomy of fourth met incision site - Sutures intact  [de-identified] : sanguinous [de-identified] : Expressed comfort post ACE application. [de-identified] : Silver alginate [de-identified] : Cleansed with Normal Saline\par Kerlix  [de-identified] : None [de-identified] : 100% [de-identified] : Weekly [de-identified] : Primary Dressing [de-identified] : None [TWNoteComboBox4] : None [de-identified] : Normal [de-identified] : None [de-identified] : None [de-identified] : 100% [de-identified] : No [de-identified] : Ace wraps [de-identified] : Weekly [de-identified] : Small [de-identified] : Normal [de-identified] : None [de-identified] : None [de-identified] : No [de-identified] : Ace wraps [de-identified] : Weekly

## 2021-05-19 NOTE — REVIEW OF SYSTEMS
[Fever] : no fever [Eye Pain] : no eye pain [Earache] : no earache [Chest Pain] : no chest pain [Shortness Of Breath] : no shortness of breath [Abdominal Pain] : no abdominal pain [Skin Wound] : skin wound [de-identified] : right foot dorsal incision site with sutures intact, no purulence, no dehiscence, no malodor, no proximal streaking. right submet 4th ulcer down to skin, subcutaneous tissue, fat, tendon, bone (not necrotic). [de-identified] : diabetic neuropathy [de-identified] : type 2 diabetes

## 2021-05-20 DIAGNOSIS — Z01.818 ENCOUNTER FOR OTHER PREPROCEDURAL EXAMINATION: ICD-10-CM

## 2021-05-21 ENCOUNTER — APPOINTMENT (OUTPATIENT)
Dept: WOUND CARE | Facility: HOSPITAL | Age: 66
End: 2021-05-21

## 2021-05-21 ENCOUNTER — APPOINTMENT (OUTPATIENT)
Dept: DISASTER EMERGENCY | Facility: CLINIC | Age: 66
End: 2021-05-21

## 2021-05-22 LAB — SARS-COV-2 N GENE NPH QL NAA+PROBE: NOT DETECTED

## 2021-05-24 ENCOUNTER — OUTPATIENT (OUTPATIENT)
Dept: OUTPATIENT SERVICES | Facility: HOSPITAL | Age: 66
LOS: 1 days | End: 2021-05-24
Payer: MEDICARE

## 2021-05-24 ENCOUNTER — APPOINTMENT (OUTPATIENT)
Dept: CV DIAGNOSITCS | Facility: HOSPITAL | Age: 66
End: 2021-05-24

## 2021-05-24 DIAGNOSIS — Z95.2 PRESENCE OF PROSTHETIC HEART VALVE: Chronic | ICD-10-CM

## 2021-05-24 DIAGNOSIS — Z95.2 PRESENCE OF PROSTHETIC HEART VALVE: ICD-10-CM

## 2021-05-24 LAB — GLUCOSE BLDC GLUCOMTR-MCNC: 256 MG/DL — HIGH (ref 70–99)

## 2021-05-24 PROCEDURE — 82962 GLUCOSE BLOOD TEST: CPT

## 2021-05-24 PROCEDURE — 93306 TTE W/DOPPLER COMPLETE: CPT

## 2021-05-24 PROCEDURE — 93312 ECHO TRANSESOPHAGEAL: CPT

## 2021-05-24 PROCEDURE — 93312 ECHO TRANSESOPHAGEAL: CPT | Mod: 26

## 2021-05-24 PROCEDURE — 93306 TTE W/DOPPLER COMPLETE: CPT | Mod: 26

## 2021-05-26 ENCOUNTER — OUTPATIENT (OUTPATIENT)
Dept: OUTPATIENT SERVICES | Facility: HOSPITAL | Age: 66
LOS: 1 days | Discharge: HOME | End: 2021-05-26
Payer: MEDICARE

## 2021-05-26 ENCOUNTER — APPOINTMENT (OUTPATIENT)
Dept: PLASTIC SURGERY | Facility: HOSPITAL | Age: 66
End: 2021-05-26
Payer: MEDICARE

## 2021-05-26 VITALS
TEMPERATURE: 97.3 F | BODY MASS INDEX: 51.91 KG/M2 | HEIGHT: 63 IN | OXYGEN SATURATION: 97 % | SYSTOLIC BLOOD PRESSURE: 128 MMHG | DIASTOLIC BLOOD PRESSURE: 66 MMHG | WEIGHT: 293 LBS | RESPIRATION RATE: 18 BRPM | HEART RATE: 66 BPM

## 2021-05-26 DIAGNOSIS — E11.621 TYPE 2 DIABETES MELLITUS WITH FOOT ULCER: ICD-10-CM

## 2021-05-26 DIAGNOSIS — Z95.2 PRESENCE OF PROSTHETIC HEART VALVE: Chronic | ICD-10-CM

## 2021-05-26 PROCEDURE — 99213 OFFICE O/P EST LOW 20 MIN: CPT

## 2021-05-26 PROCEDURE — G0463: CPT

## 2021-05-27 DIAGNOSIS — Z88.5 ALLERGY STATUS TO NARCOTIC AGENT: ICD-10-CM

## 2021-05-27 DIAGNOSIS — M47.817 SPONDYLOSIS WITHOUT MYELOPATHY OR RADICULOPATHY, LUMBOSACRAL REGION: ICD-10-CM

## 2021-05-27 DIAGNOSIS — Z83.49 FAMILY HISTORY OF OTHER ENDOCRINE, NUTRITIONAL AND METABOLIC DISEASES: ICD-10-CM

## 2021-05-27 DIAGNOSIS — Z82.49 FAMILY HISTORY OF ISCHEMIC HEART DISEASE AND OTHER DISEASES OF THE CIRCULATORY SYSTEM: ICD-10-CM

## 2021-05-27 DIAGNOSIS — Z95.4 PRESENCE OF OTHER HEART-VALVE REPLACEMENT: ICD-10-CM

## 2021-05-27 DIAGNOSIS — Z98.84 BARIATRIC SURGERY STATUS: ICD-10-CM

## 2021-05-27 DIAGNOSIS — Z79.4 LONG TERM (CURRENT) USE OF INSULIN: ICD-10-CM

## 2021-05-27 DIAGNOSIS — I83.12 VARICOSE VEINS OF LEFT LOWER EXTREMITY WITH INFLAMMATION: ICD-10-CM

## 2021-05-27 DIAGNOSIS — Z87.891 PERSONAL HISTORY OF NICOTINE DEPENDENCE: ICD-10-CM

## 2021-05-27 DIAGNOSIS — E55.9 VITAMIN D DEFICIENCY, UNSPECIFIED: ICD-10-CM

## 2021-05-27 DIAGNOSIS — I47.1 SUPRAVENTRICULAR TACHYCARDIA: ICD-10-CM

## 2021-05-27 DIAGNOSIS — E11.40 TYPE 2 DIABETES MELLITUS WITH DIABETIC NEUROPATHY, UNSPECIFIED: ICD-10-CM

## 2021-05-27 DIAGNOSIS — E11.621 TYPE 2 DIABETES MELLITUS WITH FOOT ULCER: ICD-10-CM

## 2021-05-27 DIAGNOSIS — G47.33 OBSTRUCTIVE SLEEP APNEA (ADULT) (PEDIATRIC): ICD-10-CM

## 2021-05-27 DIAGNOSIS — E66.01 MORBID (SEVERE) OBESITY DUE TO EXCESS CALORIES: ICD-10-CM

## 2021-05-27 DIAGNOSIS — E05.90 THYROTOXICOSIS, UNSPECIFIED WITHOUT THYROTOXIC CRISIS OR STORM: ICD-10-CM

## 2021-05-27 DIAGNOSIS — G47.10 HYPERSOMNIA, UNSPECIFIED: ICD-10-CM

## 2021-05-27 DIAGNOSIS — M48.07 SPINAL STENOSIS, LUMBOSACRAL REGION: ICD-10-CM

## 2021-05-27 DIAGNOSIS — Z87.01 PERSONAL HISTORY OF PNEUMONIA (RECURRENT): ICD-10-CM

## 2021-05-27 DIAGNOSIS — Z87.81 PERSONAL HISTORY OF (HEALED) TRAUMATIC FRACTURE: ICD-10-CM

## 2021-05-27 DIAGNOSIS — Z98.890 OTHER SPECIFIED POSTPROCEDURAL STATES: ICD-10-CM

## 2021-05-27 DIAGNOSIS — M43.16 SPONDYLOLISTHESIS, LUMBAR REGION: ICD-10-CM

## 2021-05-27 DIAGNOSIS — I11.9 HYPERTENSIVE HEART DISEASE WITHOUT HEART FAILURE: ICD-10-CM

## 2021-05-27 DIAGNOSIS — Z79.82 LONG TERM (CURRENT) USE OF ASPIRIN: ICD-10-CM

## 2021-05-27 DIAGNOSIS — I83.11 VARICOSE VEINS OF RIGHT LOWER EXTREMITY WITH INFLAMMATION: ICD-10-CM

## 2021-05-27 DIAGNOSIS — E11.59 TYPE 2 DIABETES MELLITUS WITH OTHER CIRCULATORY COMPLICATIONS: ICD-10-CM

## 2021-05-27 DIAGNOSIS — I34.1 NONRHEUMATIC MITRAL (VALVE) PROLAPSE: ICD-10-CM

## 2021-05-27 DIAGNOSIS — Z79.899 OTHER LONG TERM (CURRENT) DRUG THERAPY: ICD-10-CM

## 2021-05-27 DIAGNOSIS — I83.893 VARICOSE VEINS OF BILATERAL LOWER EXTREMITIES WITH OTHER COMPLICATIONS: ICD-10-CM

## 2021-05-27 DIAGNOSIS — L97.412 NON-PRESSURE CHRONIC ULCER OF RIGHT HEEL AND MIDFOOT WITH FAT LAYER EXPOSED: ICD-10-CM

## 2021-05-27 NOTE — ASSESSMENT
[Verbal] : Verbal [Written] : Written [Demo] : Demo [Patient] : Patient [Family member] : Family member [Good - alert, interested, motivated] : Good - alert, interested, motivated [Verbalizes knowledge/Understanding] : Verbalizes knowledge/understanding [Dressing changes] : dressing changes [Foot Care] : foot care [Skin Care] : skin care [Signs and symptoms of infection] : sign and symptoms of infection [Venous Disease] : venous disease [Nutrition] : nutrition [How and When to Call] : how and when to call [Pain Management] : pain management [Off-loading] : off-loading [Compression Therapy] : compression therapy [Patient responsibility to plan of care] : patient responsibility to plan of care [Stable] : stable [Home] : Home [Wheelchair] : Wheelchair [Not Applicable - Long Term Care/Home Health Agency] : Long Term Care/Home Health Agency: Not Applicable [] : No [FreeTextEntry2] : Infection prevention\par Localized wound care \par Goal of remaining pain free regarding wounds.\par Compression therapy \par Weight loss  [FreeTextEntry4] : Follow up in 1 week

## 2021-05-27 NOTE — PHYSICAL EXAM
[4 x 4] : 4 x 4  [Abdominal Pad] : Abdominal Pad [0] : left 0 [Ankle Swelling (On Exam)] : present [Ankle Swelling Bilaterally] : bilaterally  [Varicose Veins Of Lower Extremities] : bilaterally [] : bilaterally [Ankle Swelling On The Left] : moderate [Skin Ulcer] : ulcer [JVD] : no jugular venous distention  [de-identified] : A&Ox3, NAD [de-identified] : 4/5 strength in all quadrants bilaterally [de-identified] : right foot dorsal incision site with sutures intact, no purulence, no dehiscence, no malodor, no proximal streaking. right submet 4th ulcer down to skin, subcutaneous tissue, fat, tendon, bone (not necrotic). [de-identified] : callus  [FreeTextEntry1] : Left anterior leg - Closed [de-identified] : Expressed comfort post ACE application. [de-identified] : No treatment necessary [de-identified] : Cleansed with Normal Saline\par \par  [FreeTextEntry7] : Right dorsal foot - Post op cpndylectomy of fourth met incision site - Sutures intact  [de-identified] : serosanguineous [de-identified] : Expressed comfort post ACE application. [de-identified] : Silver alginate [de-identified] : Cleansed with Normal Saline\par Kerlix  [de-identified] : Foot 4th digit - blood blister [de-identified] : Silver Alginate [de-identified] : Cleansed with Normal Saline\par  [de-identified] : None [de-identified] : 100% [de-identified] : Weekly [de-identified] : Primary Dressing [de-identified] : None [TWNoteComboBox4] : None [de-identified] : Normal [de-identified] : None [de-identified] : None [de-identified] : No [de-identified] : Ace wraps [de-identified] : Small [de-identified] : Normal [de-identified] : None [de-identified] : None [de-identified] : No [de-identified] : Ace wraps [de-identified] : Weekly [de-identified] : Left [de-identified] : Weekly

## 2021-05-27 NOTE — REVIEW OF SYSTEMS
[Skin Wound] : skin wound [Fever] : no fever [Eye Pain] : no eye pain [Earache] : no earache [Chest Pain] : no chest pain [Shortness Of Breath] : no shortness of breath [Abdominal Pain] : no abdominal pain [de-identified] : diabetic neuropathy [de-identified] : right foot dorsal incision site with sutures intact, no purulence, no dehiscence, no malodor, no proximal streaking. right submet 4th ulcer down to skin, subcutaneous tissue, fat, tendon, bone (not necrotic). [de-identified] : type 2 diabetes

## 2021-05-27 NOTE — PLAN
[FreeTextEntry1] : Patient examined and evaluated at this time.\par Continue local wound care and offloading.\par AgAlginate and Ace wraps 3X/week\par Pt remains a high risk for limb loss, sepsis, and death.\par Spent 20 minutes for patient care and medical decision making.\par Pt to follow up in 1 week.

## 2021-05-27 NOTE — HISTORY OF PRESENT ILLNESS
[FreeTextEntry1] : Pt seen s/p right 4th metatarsal plantar condylectomy for right foot plantar 4th metatarsal head ulceration down to skin, subcutaneous tissue, fat, tendon, and bone. Denies any other complaints.\par 5/26/21 right foot healing well

## 2021-06-02 ENCOUNTER — OUTPATIENT (OUTPATIENT)
Dept: OUTPATIENT SERVICES | Facility: HOSPITAL | Age: 66
LOS: 1 days | Discharge: HOME | End: 2021-06-02
Payer: MEDICARE

## 2021-06-02 ENCOUNTER — APPOINTMENT (OUTPATIENT)
Dept: WOUND CARE | Facility: HOSPITAL | Age: 66
End: 2021-06-02
Payer: MEDICARE

## 2021-06-02 VITALS
TEMPERATURE: 98.2 F | DIASTOLIC BLOOD PRESSURE: 48 MMHG | OXYGEN SATURATION: 98 % | RESPIRATION RATE: 18 BRPM | SYSTOLIC BLOOD PRESSURE: 123 MMHG | HEIGHT: 63 IN | BODY MASS INDEX: 51.91 KG/M2 | HEART RATE: 66 BPM | WEIGHT: 293 LBS

## 2021-06-02 DIAGNOSIS — I11.9 HYPERTENSIVE HEART DISEASE WITHOUT HEART FAILURE: ICD-10-CM

## 2021-06-02 DIAGNOSIS — Z98.890 OTHER SPECIFIED POSTPROCEDURAL STATES: ICD-10-CM

## 2021-06-02 DIAGNOSIS — M47.817 SPONDYLOSIS WITHOUT MYELOPATHY OR RADICULOPATHY, LUMBOSACRAL REGION: ICD-10-CM

## 2021-06-02 DIAGNOSIS — Z79.4 LONG TERM (CURRENT) USE OF INSULIN: ICD-10-CM

## 2021-06-02 DIAGNOSIS — Z83.49 FAMILY HISTORY OF OTHER ENDOCRINE, NUTRITIONAL AND METABOLIC DISEASES: ICD-10-CM

## 2021-06-02 DIAGNOSIS — Z88.5 ALLERGY STATUS TO NARCOTIC AGENT: ICD-10-CM

## 2021-06-02 DIAGNOSIS — M48.07 SPINAL STENOSIS, LUMBOSACRAL REGION: ICD-10-CM

## 2021-06-02 DIAGNOSIS — L97.412 NON-PRESSURE CHRONIC ULCER OF RIGHT HEEL AND MIDFOOT WITH FAT LAYER EXPOSED: ICD-10-CM

## 2021-06-02 DIAGNOSIS — G47.33 OBSTRUCTIVE SLEEP APNEA (ADULT) (PEDIATRIC): ICD-10-CM

## 2021-06-02 DIAGNOSIS — E05.90 THYROTOXICOSIS, UNSPECIFIED WITHOUT THYROTOXIC CRISIS OR STORM: ICD-10-CM

## 2021-06-02 DIAGNOSIS — G47.10 HYPERSOMNIA, UNSPECIFIED: ICD-10-CM

## 2021-06-02 DIAGNOSIS — Z87.81 PERSONAL HISTORY OF (HEALED) TRAUMATIC FRACTURE: ICD-10-CM

## 2021-06-02 DIAGNOSIS — E11.621 TYPE 2 DIABETES MELLITUS WITH FOOT ULCER: ICD-10-CM

## 2021-06-02 DIAGNOSIS — Z87.01 PERSONAL HISTORY OF PNEUMONIA (RECURRENT): ICD-10-CM

## 2021-06-02 DIAGNOSIS — Z95.4 PRESENCE OF OTHER HEART-VALVE REPLACEMENT: ICD-10-CM

## 2021-06-02 DIAGNOSIS — E11.40 TYPE 2 DIABETES MELLITUS WITH DIABETIC NEUROPATHY, UNSPECIFIED: ICD-10-CM

## 2021-06-02 DIAGNOSIS — M43.16 SPONDYLOLISTHESIS, LUMBAR REGION: ICD-10-CM

## 2021-06-02 DIAGNOSIS — Z98.84 BARIATRIC SURGERY STATUS: ICD-10-CM

## 2021-06-02 DIAGNOSIS — E66.01 MORBID (SEVERE) OBESITY DUE TO EXCESS CALORIES: ICD-10-CM

## 2021-06-02 DIAGNOSIS — I34.1 NONRHEUMATIC MITRAL (VALVE) PROLAPSE: ICD-10-CM

## 2021-06-02 DIAGNOSIS — E55.9 VITAMIN D DEFICIENCY, UNSPECIFIED: ICD-10-CM

## 2021-06-02 DIAGNOSIS — I83.893 VARICOSE VEINS OF BILATERAL LOWER EXTREMITIES WITH OTHER COMPLICATIONS: ICD-10-CM

## 2021-06-02 DIAGNOSIS — Z87.891 PERSONAL HISTORY OF NICOTINE DEPENDENCE: ICD-10-CM

## 2021-06-02 DIAGNOSIS — Z79.899 OTHER LONG TERM (CURRENT) DRUG THERAPY: ICD-10-CM

## 2021-06-02 DIAGNOSIS — E11.59 TYPE 2 DIABETES MELLITUS WITH OTHER CIRCULATORY COMPLICATIONS: ICD-10-CM

## 2021-06-02 DIAGNOSIS — Z82.49 FAMILY HISTORY OF ISCHEMIC HEART DISEASE AND OTHER DISEASES OF THE CIRCULATORY SYSTEM: ICD-10-CM

## 2021-06-02 DIAGNOSIS — I47.1 SUPRAVENTRICULAR TACHYCARDIA: ICD-10-CM

## 2021-06-02 DIAGNOSIS — Z95.2 PRESENCE OF PROSTHETIC HEART VALVE: Chronic | ICD-10-CM

## 2021-06-02 DIAGNOSIS — I83.11 VARICOSE VEINS OF RIGHT LOWER EXTREMITY WITH INFLAMMATION: ICD-10-CM

## 2021-06-02 DIAGNOSIS — I83.12 VARICOSE VEINS OF LEFT LOWER EXTREMITY WITH INFLAMMATION: ICD-10-CM

## 2021-06-02 DIAGNOSIS — Z79.82 LONG TERM (CURRENT) USE OF ASPIRIN: ICD-10-CM

## 2021-06-02 PROCEDURE — G0463: CPT

## 2021-06-02 PROCEDURE — 99024 POSTOP FOLLOW-UP VISIT: CPT

## 2021-06-02 NOTE — PLAN
[FreeTextEntry1] : Patient examined and evaluated at this time.\par Sutures removed and steri-strips applied.\par Continue local wound care and offloading.\par Pt remains a high risk for limb loss, sepsis, and death.\par Spent 20 minutes for patient care and medical decision making.\par Pt to follow up in 1 week.

## 2021-06-02 NOTE — ASSESSMENT
[Verbal] : Verbal [Patient] : Patient [Good - alert, interested, motivated] : Good - alert, interested, motivated [Verbalizes knowledge/Understanding] : Verbalizes knowledge/understanding [Dressing changes] : dressing changes [Foot Care] : foot care [Signs and symptoms of infection] : sign and symptoms of infection [Venous Disease] : venous disease [How and When to Call] : how and when to call [Off-loading] : off-loading [Compression Therapy] : compression therapy [Patient responsibility to plan of care] : patient responsibility to plan of care [Glycemic Control] : glycemic control [Written] : Written [Demo] : Demo [Family member] : Family member [Skin Care] : skin care [Nutrition] : nutrition [Pain Management] : pain management [] : Yes [Stable] : stable [Home] : Home [Wheelchair] : Wheelchair [Not Applicable - Long Term Care/Home Health Agency] : Long Term Care/Home Health Agency: Not Applicable [FreeTextEntry2] : Infection prevention\par Localized wound care \par Goal of remaining pain free regarding wounds.\par Compression therapy \par Weight loss  [FreeTextEntry4] : Submitted pre auth for nail avulsion to right great toe next visit\par Follow up in 1 week

## 2021-06-02 NOTE — REVIEW OF SYSTEMS
[Fever] : no fever [Eye Pain] : no eye pain [Earache] : no earache [Chest Pain] : no chest pain [Shortness Of Breath] : no shortness of breath [Abdominal Pain] : no abdominal pain [Skin Wound] : skin wound [de-identified] : right foot dorsal incision site with sutures intact, no purulence, no dehiscence, no malodor, no proximal streaking. right submet 4th ulcer down to skin, subcutaneous tissue, fat, tendon, bone (not necrotic). [de-identified] : diabetic neuropathy [de-identified] : type 2 diabetes

## 2021-06-02 NOTE — VITALS
[] : No [de-identified] : Pain scale:  0/10 - Patient reports no c/o pains or discomforts at present.

## 2021-06-02 NOTE — PHYSICAL EXAM
[4 x 4] : 4 x 4  [Abdominal Pad] : Abdominal Pad [JVD] : no jugular venous distention  [0] : left 0 [Ankle Swelling (On Exam)] : present [Ankle Swelling Bilaterally] : bilaterally  [Varicose Veins Of Lower Extremities] : bilaterally [] : bilaterally [Ankle Swelling On The Left] : moderate [Skin Ulcer] : ulcer [de-identified] : A&Ox3, NAD [de-identified] : 4/5 strength in all quadrants bilaterally [de-identified] : right foot dorsal incision site with sutures intact, no purulence, no dehiscence, no malodor, no proximal streaking. right submet 4th ulcer down to skin, subcutaneous tissue, fat, tendon, bone (not necrotic), healing well. [FreeTextEntry1] : Right plantar foot - sub met 4th - dry eschar [de-identified] : callus  [de-identified] : Pt's compression socks [de-identified] : Silver Alginate [FreeTextEntry7] : Right dorsal foot - Post op condylectomy of fourth met incision site - Sutures removed this visit and steri-strips applied by DPM  [de-identified] : serosanguineous [de-identified] : Pt's compression socks [de-identified] : Silver Alginate [de-identified] : Cleansed with Normal Saline\par  [de-identified] : \par  [de-identified] : Foot 4th digit - closed [de-identified] : No treatment necessary [de-identified] : Cleansed with Normal Saline\par  [de-identified] : None [de-identified] : None [de-identified] : Weekly [de-identified] : None [de-identified] : 100% [de-identified] : 3x Weekly [TWNoteComboBox4] : None [de-identified] : Normal [de-identified] : None [de-identified] : None [de-identified] : No [de-identified] : Ace wraps [de-identified] : Small [de-identified] : Normal [de-identified] : None [de-identified] : None [de-identified] : No [de-identified] : Ace wraps [de-identified] : Weekly [de-identified] : Left [de-identified] : Weekly

## 2021-06-03 ENCOUNTER — OUTPATIENT (OUTPATIENT)
Dept: OUTPATIENT SERVICES | Facility: HOSPITAL | Age: 66
LOS: 1 days | End: 2021-06-03
Payer: MEDICARE

## 2021-06-03 ENCOUNTER — APPOINTMENT (OUTPATIENT)
Dept: CARDIOLOGY | Facility: CLINIC | Age: 66
End: 2021-06-03

## 2021-06-03 DIAGNOSIS — Z95.2 PRESENCE OF PROSTHETIC HEART VALVE: Chronic | ICD-10-CM

## 2021-06-03 DIAGNOSIS — Z00.00 ENCOUNTER FOR GENERAL ADULT MEDICAL EXAMINATION WITHOUT ABNORMAL FINDINGS: ICD-10-CM

## 2021-06-03 PROCEDURE — G1004: CPT

## 2021-06-03 PROCEDURE — 75574 CT ANGIO HRT W/3D IMAGE: CPT | Mod: 26,ME

## 2021-06-03 PROCEDURE — 75574 CT ANGIO HRT W/3D IMAGE: CPT

## 2021-06-09 ENCOUNTER — RESULT REVIEW (OUTPATIENT)
Age: 66
End: 2021-06-09

## 2021-06-09 ENCOUNTER — APPOINTMENT (OUTPATIENT)
Dept: WOUND CARE | Facility: HOSPITAL | Age: 66
End: 2021-06-09
Payer: MEDICARE

## 2021-06-09 ENCOUNTER — OUTPATIENT (OUTPATIENT)
Dept: OUTPATIENT SERVICES | Facility: HOSPITAL | Age: 66
LOS: 1 days | Discharge: HOME | End: 2021-06-09
Payer: MEDICARE

## 2021-06-09 VITALS
SYSTOLIC BLOOD PRESSURE: 96 MMHG | WEIGHT: 293 LBS | HEART RATE: 90 BPM | RESPIRATION RATE: 18 BRPM | BODY MASS INDEX: 51.91 KG/M2 | DIASTOLIC BLOOD PRESSURE: 63 MMHG | OXYGEN SATURATION: 96 % | TEMPERATURE: 97 F | HEIGHT: 63 IN

## 2021-06-09 DIAGNOSIS — L03.115 CELLULITIS OF RIGHT LOWER LIMB: ICD-10-CM

## 2021-06-09 DIAGNOSIS — L03.116 CELLULITIS OF RIGHT LOWER LIMB: ICD-10-CM

## 2021-06-09 DIAGNOSIS — Z95.2 PRESENCE OF PROSTHETIC HEART VALVE: Chronic | ICD-10-CM

## 2021-06-09 DIAGNOSIS — E11.621 TYPE 2 DIABETES MELLITUS WITH FOOT ULCER: ICD-10-CM

## 2021-06-09 DIAGNOSIS — L97.412 NON-PRESSURE CHRONIC ULCER OF RIGHT HEEL AND MIDFOOT WITH FAT LAYER EXPOSED: ICD-10-CM

## 2021-06-09 PROCEDURE — G0463: CPT

## 2021-06-09 PROCEDURE — 99024 POSTOP FOLLOW-UP VISIT: CPT

## 2021-06-09 RX ORDER — CIPROFLOXACIN HYDROCHLORIDE 500 MG/1
500 TABLET, FILM COATED ORAL TWICE DAILY
Qty: 14 | Refills: 0 | Status: COMPLETED | COMMUNITY
Start: 2021-06-09 | End: 2021-06-16

## 2021-06-09 NOTE — PHYSICAL EXAM
[0] : left 0 [Ankle Swelling (On Exam)] : present [Ankle Swelling Bilaterally] : bilaterally  [Varicose Veins Of Lower Extremities] : bilaterally [] : bilaterally [Ankle Swelling On The Left] : moderate [Skin Ulcer] : ulcer [de-identified] : A&Ox3, NAD [de-identified] : 4/5 strength in all quadrants bilaterally [de-identified] : right foot dorsal incision site dehiscence with cellulitis, no purulence, no malodor, no proximal streaking. right submet 4th ulcer down to skin, subcutaneous tissue, fat, tendon, bone (not necrotic), healing well. [FreeTextEntry1] : Right Plantar Foot- Sub Met 4th- Dry Eschar [de-identified] : Callus [de-identified] : No Treatment [de-identified] : Cleansed with Normal Saline\par  [FreeTextEntry7] : Right Dorsal Foot- Incision Line [FreeTextEntry8] : 3.9 [FreeTextEntry9] : 0.2 [de-identified] : 0.2 [de-identified] : Serosanguineous [de-identified] : Silver Alginate [de-identified] : Cleansed with Normal Saline\par  [TWNoteComboBox4] : None [TWNoteComboBox5] : No [de-identified] : No [de-identified] : Moderate [de-identified] : No [de-identified] : No [de-identified] : Erythema [de-identified] : None [de-identified] : None [de-identified] : 100% [de-identified] : No [de-identified] : 3x Weekly

## 2021-06-09 NOTE — REVIEW OF SYSTEMS
[Skin Wound] : skin wound [Fever] : no fever [Eye Pain] : no eye pain [Earache] : no earache [Chest Pain] : no chest pain [Shortness Of Breath] : no shortness of breath [Abdominal Pain] : no abdominal pain [de-identified] : right foot dorsal incision site dehiscence with cellulitis, no purulence, no malodor, no proximal streaking. right submet 4th ulcer down to skin, subcutaneous tissue, fat, tendon, bone (not necrotic), healing well. [de-identified] : diabetic neuropathy [de-identified] : type 2 diabetes

## 2021-06-09 NOTE — PLAN
[FreeTextEntry1] : Patient examined and evaluated at this time.\par Continue local wound care and offloading.\par Rx for antibiotics sent to pharmacy\par Pt remains a high risk for limb loss, sepsis, and death.\par Spent 30 minutes for patient care and medical decision making.\par Pt to follow up in 1 week.

## 2021-06-09 NOTE — ASSESSMENT
[Verbal] : Verbal [Patient] : Patient [Good - alert, interested, motivated] : Good - alert, interested, motivated [Verbalizes knowledge/Understanding] : Verbalizes knowledge/understanding [Dressing changes] : dressing changes [Foot Care] : foot care [Skin Care] : skin care [Signs and symptoms of infection] : sign and symptoms of infection [How and When to Call] : how and when to call [Compression Therapy] : compression therapy [Patient responsibility to plan of care] : patient responsibility to plan of care [Stable] : stable [Home] : Home [Ambulatory] : Ambulatory [Not Applicable - Long Term Care/Home Health Agency] : Long Term Care/Home Health Agency: Not Applicable [] : No [FreeTextEntry2] : Restore Skin Integrity\par Infection Control\par Localized wound care\par Maintain acceptable pain levels at satisfactory relief.\par Demonstrates use of both nonpharmacological and pharmacological pain relief strategies [FreeTextEntry4] : F/U to [facility] in 1 week\par [facility] initiated today/

## 2021-06-09 NOTE — HISTORY OF PRESENT ILLNESS
[FreeTextEntry1] : Pt seen s/p right 4th metatarsal plantar condylectomy for right foot plantar 4th metatarsal head ulceration down to skin, subcutaneous tissue, fat, tendon, and bone. Dorsal incision site dehiscence. Denies any other complaints.

## 2021-06-09 NOTE — REVIEW OF SYSTEMS
[Skin Wound] : skin wound [Fever] : no fever [Eye Pain] : no eye pain [Earache] : no earache [Chest Pain] : no chest pain [Shortness Of Breath] : no shortness of breath [Abdominal Pain] : no abdominal pain [de-identified] : right foot dorsal incision site dehiscence with cellulitis, no purulence, no malodor, no proximal streaking. right submet 4th ulcer down to skin, subcutaneous tissue, fat, tendon, bone (not necrotic), healing well. [de-identified] : diabetic neuropathy [de-identified] : type 2 diabetes

## 2021-06-09 NOTE — ASSESSMENT
[Verbal] : Verbal [Patient] : Patient [Good - alert, interested, motivated] : Good - alert, interested, motivated [Verbalizes knowledge/Understanding] : Verbalizes knowledge/understanding [Dressing changes] : dressing changes [Foot Care] : foot care [Skin Care] : skin care [Signs and symptoms of infection] : sign and symptoms of infection [How and When to Call] : how and when to call [Compression Therapy] : compression therapy [Patient responsibility to plan of care] : patient responsibility to plan of care [Stable] : stable [Home] : Home [Ambulatory] : Ambulatory [Not Applicable - Long Term Care/Home Health Agency] : Long Term Care/Home Health Agency: Not Applicable [] : No [FreeTextEntry2] : Restore Skin Integrity\par Infection Control\par Localized wound care\par Maintain acceptable pain levels at satisfactory relief.\par Demonstrates use of both nonpharmacological and pharmacological pain relief strategies [FreeTextEntry4] : F/U to [facility] in 1 week\par [facility] initiated today/

## 2021-06-09 NOTE — REVIEW OF SYSTEMS
[Skin Wound] : skin wound [Fever] : no fever [Eye Pain] : no eye pain [Earache] : no earache [Chest Pain] : no chest pain [Shortness Of Breath] : no shortness of breath [Abdominal Pain] : no abdominal pain [de-identified] : right foot dorsal incision site dehiscence with cellulitis, no purulence, no malodor, no proximal streaking. right submet 4th ulcer down to skin, subcutaneous tissue, fat, tendon, bone (not necrotic), healing well. [de-identified] : diabetic neuropathy [de-identified] : type 2 diabetes

## 2021-06-09 NOTE — PHYSICAL EXAM
[0] : left 0 [Ankle Swelling (On Exam)] : present [Ankle Swelling Bilaterally] : bilaterally  [Varicose Veins Of Lower Extremities] : bilaterally [] : bilaterally [Ankle Swelling On The Left] : moderate [Skin Ulcer] : ulcer [de-identified] : A&Ox3, NAD [de-identified] : 4/5 strength in all quadrants bilaterally [de-identified] : right foot dorsal incision site dehiscence with cellulitis, no purulence, no malodor, no proximal streaking. right submet 4th ulcer down to skin, subcutaneous tissue, fat, tendon, bone (not necrotic), healing well. [FreeTextEntry1] : Right Plantar Foot- Sub Met 4th- Dry Eschar [de-identified] : Callus [de-identified] : No Treatment [de-identified] : Cleansed with Normal Saline\par  [FreeTextEntry7] : Right Dorsal Foot- Incision Line [FreeTextEntry8] : 3.9 [FreeTextEntry9] : 0.2 [de-identified] : 0.2 [de-identified] : Serosanguineous [de-identified] : Silver Alginate [de-identified] : Cleansed with Normal Saline\par  [TWNoteComboBox4] : None [TWNoteComboBox5] : No [de-identified] : No [de-identified] : Moderate [de-identified] : No [de-identified] : No [de-identified] : Erythema [de-identified] : None [de-identified] : None [de-identified] : 100% [de-identified] : No [de-identified] : 3x Weekly

## 2021-06-09 NOTE — ASSESSMENT
[Verbal] : Verbal [Patient] : Patient [Good - alert, interested, motivated] : Good - alert, interested, motivated [Verbalizes knowledge/Understanding] : Verbalizes knowledge/understanding [Dressing changes] : dressing changes [Foot Care] : foot care [Skin Care] : skin care [Signs and symptoms of infection] : sign and symptoms of infection [How and When to Call] : how and when to call [Compression Therapy] : compression therapy [Patient responsibility to plan of care] : patient responsibility to plan of care [Stable] : stable [Home] : Home [Ambulatory] : Ambulatory [Not Applicable - Long Term Care/Home Health Agency] : Long Term Care/Home Health Agency: Not Applicable [] : No [FreeTextEntry2] : Restore Skin Integrity\par Infection Control\par Localized wound care\par Maintain acceptable pain levels at satisfactory relief.\par Demonstrates use of both nonpharmacological and pharmacological pain relief strategies [FreeTextEntry4] : F/U to [facility] in 1 week\par [facility] initiated today/

## 2021-06-09 NOTE — PLAN
[FreeTextEntry1] : Patient examined and evaluated at this time.\par Continue local wound care and offloading.\par Rx for antibiotics sent to pharmacy\par Pt remains a high risk for limb loss, sepsis, and death.\par Spent 30 minutes for patient care and medical decision making.\par Pt to follow up in 1 week. ,[email or web address]

## 2021-06-09 NOTE — PHYSICAL EXAM
[0] : left 0 [Ankle Swelling (On Exam)] : present [Ankle Swelling Bilaterally] : bilaterally  [Varicose Veins Of Lower Extremities] : bilaterally [] : bilaterally [Ankle Swelling On The Left] : moderate [Skin Ulcer] : ulcer [de-identified] : A&Ox3, NAD [de-identified] : 4/5 strength in all quadrants bilaterally [de-identified] : right foot dorsal incision site dehiscence with cellulitis, no purulence, no malodor, no proximal streaking. right submet 4th ulcer down to skin, subcutaneous tissue, fat, tendon, bone (not necrotic), healing well. [FreeTextEntry1] : Right Plantar Foot- Sub Met 4th- Dry Eschar [de-identified] : Callus [de-identified] : No Treatment [de-identified] : Cleansed with Normal Saline\par  [FreeTextEntry7] : Right Dorsal Foot- Incision Line [FreeTextEntry8] : 3.9 [FreeTextEntry9] : 0.2 [de-identified] : 0.2 [de-identified] : Serosanguineous [de-identified] : Silver Alginate [de-identified] : Cleansed with Normal Saline\par  [TWNoteComboBox4] : None [TWNoteComboBox5] : No [de-identified] : No [de-identified] : Moderate [de-identified] : No [de-identified] : No [de-identified] : Erythema [de-identified] : None [de-identified] : None [de-identified] : 100% [de-identified] : No [de-identified] : 3x Weekly

## 2021-06-10 DIAGNOSIS — E55.9 VITAMIN D DEFICIENCY, UNSPECIFIED: ICD-10-CM

## 2021-06-10 DIAGNOSIS — Z79.899 OTHER LONG TERM (CURRENT) DRUG THERAPY: ICD-10-CM

## 2021-06-10 DIAGNOSIS — M48.07 SPINAL STENOSIS, LUMBOSACRAL REGION: ICD-10-CM

## 2021-06-10 DIAGNOSIS — Z95.4 PRESENCE OF OTHER HEART-VALVE REPLACEMENT: ICD-10-CM

## 2021-06-10 DIAGNOSIS — I34.1 NONRHEUMATIC MITRAL (VALVE) PROLAPSE: ICD-10-CM

## 2021-06-10 DIAGNOSIS — L97.416 NON-PRESSURE CHRONIC ULCER OF RIGHT HEEL AND MIDFOOT WITH BONE INVOLVEMENT WITHOUT EVIDENCE OF NECROSIS: ICD-10-CM

## 2021-06-10 DIAGNOSIS — I47.1 SUPRAVENTRICULAR TACHYCARDIA: ICD-10-CM

## 2021-06-10 DIAGNOSIS — E11.40 TYPE 2 DIABETES MELLITUS WITH DIABETIC NEUROPATHY, UNSPECIFIED: ICD-10-CM

## 2021-06-10 DIAGNOSIS — Z87.81 PERSONAL HISTORY OF (HEALED) TRAUMATIC FRACTURE: ICD-10-CM

## 2021-06-10 DIAGNOSIS — L84 CORNS AND CALLOSITIES: ICD-10-CM

## 2021-06-10 DIAGNOSIS — Z88.5 ALLERGY STATUS TO NARCOTIC AGENT: ICD-10-CM

## 2021-06-10 DIAGNOSIS — Z83.49 FAMILY HISTORY OF OTHER ENDOCRINE, NUTRITIONAL AND METABOLIC DISEASES: ICD-10-CM

## 2021-06-10 DIAGNOSIS — E05.90 THYROTOXICOSIS, UNSPECIFIED WITHOUT THYROTOXIC CRISIS OR STORM: ICD-10-CM

## 2021-06-10 DIAGNOSIS — Z82.49 FAMILY HISTORY OF ISCHEMIC HEART DISEASE AND OTHER DISEASES OF THE CIRCULATORY SYSTEM: ICD-10-CM

## 2021-06-10 DIAGNOSIS — Z98.890 OTHER SPECIFIED POSTPROCEDURAL STATES: ICD-10-CM

## 2021-06-10 DIAGNOSIS — Z87.01 PERSONAL HISTORY OF PNEUMONIA (RECURRENT): ICD-10-CM

## 2021-06-10 DIAGNOSIS — M43.16 SPONDYLOLISTHESIS, LUMBAR REGION: ICD-10-CM

## 2021-06-10 DIAGNOSIS — I11.9 HYPERTENSIVE HEART DISEASE WITHOUT HEART FAILURE: ICD-10-CM

## 2021-06-10 DIAGNOSIS — I83.893 VARICOSE VEINS OF BILATERAL LOWER EXTREMITIES WITH OTHER COMPLICATIONS: ICD-10-CM

## 2021-06-10 DIAGNOSIS — G47.10 HYPERSOMNIA, UNSPECIFIED: ICD-10-CM

## 2021-06-10 DIAGNOSIS — Z79.82 LONG TERM (CURRENT) USE OF ASPIRIN: ICD-10-CM

## 2021-06-10 DIAGNOSIS — E11.621 TYPE 2 DIABETES MELLITUS WITH FOOT ULCER: ICD-10-CM

## 2021-06-10 DIAGNOSIS — Z87.891 PERSONAL HISTORY OF NICOTINE DEPENDENCE: ICD-10-CM

## 2021-06-10 DIAGNOSIS — M47.816 SPONDYLOSIS WITHOUT MYELOPATHY OR RADICULOPATHY, LUMBAR REGION: ICD-10-CM

## 2021-06-10 DIAGNOSIS — I83.11 VARICOSE VEINS OF RIGHT LOWER EXTREMITY WITH INFLAMMATION: ICD-10-CM

## 2021-06-10 DIAGNOSIS — Z98.84 BARIATRIC SURGERY STATUS: ICD-10-CM

## 2021-06-10 DIAGNOSIS — E66.01 MORBID (SEVERE) OBESITY DUE TO EXCESS CALORIES: ICD-10-CM

## 2021-06-10 DIAGNOSIS — L03.115 CELLULITIS OF RIGHT LOWER LIMB: ICD-10-CM

## 2021-06-10 DIAGNOSIS — Z79.4 LONG TERM (CURRENT) USE OF INSULIN: ICD-10-CM

## 2021-06-10 DIAGNOSIS — G47.33 OBSTRUCTIVE SLEEP APNEA (ADULT) (PEDIATRIC): ICD-10-CM

## 2021-06-10 DIAGNOSIS — I83.12 VARICOSE VEINS OF LEFT LOWER EXTREMITY WITH INFLAMMATION: ICD-10-CM

## 2021-06-16 ENCOUNTER — APPOINTMENT (OUTPATIENT)
Dept: WOUND CARE | Facility: HOSPITAL | Age: 66
End: 2021-06-16
Payer: MEDICARE

## 2021-06-16 ENCOUNTER — OUTPATIENT (OUTPATIENT)
Dept: OUTPATIENT SERVICES | Facility: HOSPITAL | Age: 66
LOS: 1 days | Discharge: HOME | End: 2021-06-16
Payer: MEDICARE

## 2021-06-16 VITALS
SYSTOLIC BLOOD PRESSURE: 122 MMHG | DIASTOLIC BLOOD PRESSURE: 66 MMHG | RESPIRATION RATE: 18 BRPM | BODY MASS INDEX: 51.91 KG/M2 | TEMPERATURE: 97.4 F | HEART RATE: 67 BPM | WEIGHT: 293 LBS | HEIGHT: 63 IN | OXYGEN SATURATION: 95 %

## 2021-06-16 DIAGNOSIS — Z95.2 PRESENCE OF PROSTHETIC HEART VALVE: Chronic | ICD-10-CM

## 2021-06-16 DIAGNOSIS — E11.621 TYPE 2 DIABETES MELLITUS WITH FOOT ULCER: ICD-10-CM

## 2021-06-16 PROCEDURE — 99024 POSTOP FOLLOW-UP VISIT: CPT

## 2021-06-16 PROCEDURE — G0463: CPT

## 2021-06-16 NOTE — ASSESSMENT
[Verbal] : Verbal [Written] : Written [Demo] : Demo [Patient] : Patient [Family member] : Family member [Good - alert, interested, motivated] : Good - alert, interested, motivated [Verbalizes knowledge/Understanding] : Verbalizes knowledge/understanding [Dressing changes] : dressing changes [Foot Care] : foot care [Skin Care] : skin care [Signs and symptoms of infection] : sign and symptoms of infection [Venous Disease] : venous disease [Nutrition] : nutrition [How and When to Call] : how and when to call [Pain Management] : pain management [Off-loading] : off-loading [Compression Therapy] : compression therapy [Patient responsibility to plan of care] : patient responsibility to plan of care [Glycemic Control] : glycemic control [] : Yes [Stable] : stable [Home] : Home [Wheelchair] : Wheelchair [Not Applicable - Long Term Care/Home Health Agency] : Long Term Care/Home Health Agency: Not Applicable [FreeTextEntry2] : Infection prevention\par Localized wound care \par Goal of remaining pain free regarding wounds.\par Compression therapy \par Weight loss  [FreeTextEntry4] : Follow up in 1 week

## 2021-06-16 NOTE — PHYSICAL EXAM
[4 x 4] : 4 x 4  [0] : left 0 [Ankle Swelling (On Exam)] : present [Ankle Swelling Bilaterally] : bilaterally  [Varicose Veins Of Lower Extremities] : bilaterally [] : bilaterally [Ankle Swelling On The Left] : moderate [Skin Ulcer] : ulcer [de-identified] : A&Ox3, NAD [de-identified] : 4/5 strength in all quadrants bilaterally [de-identified] : right foot dorsal incision site dehiscence down to skin, subcutaneous tissue, fat, no purulence, no malodor, no proximal streaking. right submet 4th ulcer down to skin, subcutaneous tissue, fat, tendon, bone (not necrotic), healed [FreeTextEntry1] : Right plantar foot - sub met 4th - closed [de-identified] : callus  [de-identified] : None [de-identified] : Cleansed with Normal Saline\par  [FreeTextEntry7] : Right dorsal foot - S/P condylectomy of fourth met  [FreeTextEntry8] : 2.0 [FreeTextEntry9] : 0.3 [de-identified] : 0.1-0.2 [de-identified] : serosanguineous [de-identified] : <25% [de-identified] : Pt's compression socks [de-identified] : Silver Alginate [de-identified] : Cleansed with Normal Saline\par Cloth Tape\par Kerlix [de-identified] : Right anterior leg - No open wounds [de-identified] : Pt's compression socks [de-identified] : Cleansed with Normal Saline\par  [TWNoteComboBox4] : None [de-identified] : other [de-identified] : None [de-identified] : None [de-identified] : None [de-identified] : No [de-identified] : Other [de-identified] : Weekly [de-identified] : Small [de-identified] : Normal [de-identified] : None [de-identified] : None [de-identified] : >75% [de-identified] : Yes [de-identified] : Other [de-identified] : 3x Weekly [de-identified] : Other

## 2021-06-16 NOTE — REVIEW OF SYSTEMS
[Fever] : no fever [Eye Pain] : no eye pain [Earache] : no earache [Chest Pain] : no chest pain [Shortness Of Breath] : no shortness of breath [Abdominal Pain] : no abdominal pain [Skin Wound] : skin wound [de-identified] : right foot dorsal incision site dehiscence with cellulitis, no purulence, no malodor, no proximal streaking. right submet 4th ulcer down to skin, subcutaneous tissue, fat, tendon, bone (not necrotic), healing well. [de-identified] : diabetic neuropathy [de-identified] : type 2 diabetes

## 2021-06-18 DIAGNOSIS — G47.10 HYPERSOMNIA, UNSPECIFIED: ICD-10-CM

## 2021-06-18 DIAGNOSIS — Z87.891 PERSONAL HISTORY OF NICOTINE DEPENDENCE: ICD-10-CM

## 2021-06-18 DIAGNOSIS — M47.817 SPONDYLOSIS WITHOUT MYELOPATHY OR RADICULOPATHY, LUMBOSACRAL REGION: ICD-10-CM

## 2021-06-18 DIAGNOSIS — Z82.49 FAMILY HISTORY OF ISCHEMIC HEART DISEASE AND OTHER DISEASES OF THE CIRCULATORY SYSTEM: ICD-10-CM

## 2021-06-18 DIAGNOSIS — Z79.82 LONG TERM (CURRENT) USE OF ASPIRIN: ICD-10-CM

## 2021-06-18 DIAGNOSIS — Z98.890 OTHER SPECIFIED POSTPROCEDURAL STATES: ICD-10-CM

## 2021-06-18 DIAGNOSIS — E05.90 THYROTOXICOSIS, UNSPECIFIED WITHOUT THYROTOXIC CRISIS OR STORM: ICD-10-CM

## 2021-06-18 DIAGNOSIS — Z88.5 ALLERGY STATUS TO NARCOTIC AGENT: ICD-10-CM

## 2021-06-18 DIAGNOSIS — Z79.899 OTHER LONG TERM (CURRENT) DRUG THERAPY: ICD-10-CM

## 2021-06-18 DIAGNOSIS — E11.59 TYPE 2 DIABETES MELLITUS WITH OTHER CIRCULATORY COMPLICATIONS: ICD-10-CM

## 2021-06-18 DIAGNOSIS — E11.621 TYPE 2 DIABETES MELLITUS WITH FOOT ULCER: ICD-10-CM

## 2021-06-18 DIAGNOSIS — G47.33 OBSTRUCTIVE SLEEP APNEA (ADULT) (PEDIATRIC): ICD-10-CM

## 2021-06-18 DIAGNOSIS — L97.416 NON-PRESSURE CHRONIC ULCER OF RIGHT HEEL AND MIDFOOT WITH BONE INVOLVEMENT WITHOUT EVIDENCE OF NECROSIS: ICD-10-CM

## 2021-06-18 DIAGNOSIS — I34.1 NONRHEUMATIC MITRAL (VALVE) PROLAPSE: ICD-10-CM

## 2021-06-18 DIAGNOSIS — I83.11 VARICOSE VEINS OF RIGHT LOWER EXTREMITY WITH INFLAMMATION: ICD-10-CM

## 2021-06-18 DIAGNOSIS — E55.9 VITAMIN D DEFICIENCY, UNSPECIFIED: ICD-10-CM

## 2021-06-18 DIAGNOSIS — Z87.01 PERSONAL HISTORY OF PNEUMONIA (RECURRENT): ICD-10-CM

## 2021-06-18 DIAGNOSIS — E66.01 MORBID (SEVERE) OBESITY DUE TO EXCESS CALORIES: ICD-10-CM

## 2021-06-18 DIAGNOSIS — Z87.81 PERSONAL HISTORY OF (HEALED) TRAUMATIC FRACTURE: ICD-10-CM

## 2021-06-18 DIAGNOSIS — M48.07 SPINAL STENOSIS, LUMBOSACRAL REGION: ICD-10-CM

## 2021-06-18 DIAGNOSIS — I83.12 VARICOSE VEINS OF LEFT LOWER EXTREMITY WITH INFLAMMATION: ICD-10-CM

## 2021-06-18 DIAGNOSIS — I47.1 SUPRAVENTRICULAR TACHYCARDIA: ICD-10-CM

## 2021-06-18 DIAGNOSIS — M43.16 SPONDYLOLISTHESIS, LUMBAR REGION: ICD-10-CM

## 2021-06-18 DIAGNOSIS — I11.9 HYPERTENSIVE HEART DISEASE WITHOUT HEART FAILURE: ICD-10-CM

## 2021-06-18 DIAGNOSIS — E11.40 TYPE 2 DIABETES MELLITUS WITH DIABETIC NEUROPATHY, UNSPECIFIED: ICD-10-CM

## 2021-06-18 DIAGNOSIS — Z79.4 LONG TERM (CURRENT) USE OF INSULIN: ICD-10-CM

## 2021-06-18 DIAGNOSIS — I83.893 VARICOSE VEINS OF BILATERAL LOWER EXTREMITIES WITH OTHER COMPLICATIONS: ICD-10-CM

## 2021-06-18 DIAGNOSIS — Z95.4 PRESENCE OF OTHER HEART-VALVE REPLACEMENT: ICD-10-CM

## 2021-06-18 DIAGNOSIS — Z83.49 FAMILY HISTORY OF OTHER ENDOCRINE, NUTRITIONAL AND METABOLIC DISEASES: ICD-10-CM

## 2021-06-18 DIAGNOSIS — Z98.84 BARIATRIC SURGERY STATUS: ICD-10-CM

## 2021-06-21 DIAGNOSIS — I50.9 HEART FAILURE, UNSPECIFIED: ICD-10-CM

## 2021-06-22 ENCOUNTER — APPOINTMENT (OUTPATIENT)
Dept: CARDIOTHORACIC SURGERY | Facility: CLINIC | Age: 66
End: 2021-06-22
Payer: MEDICARE

## 2021-06-22 PROCEDURE — 99215 OFFICE O/P EST HI 40 MIN: CPT

## 2021-06-22 NOTE — ASSESSMENT
[FreeTextEntry1] : La is a 65 year old female who underwent a LAMBERT on 5/24/2021 which demonstrated heavily calcified posterior mitrla valve annulus with thickened mitral leaflets with mildly decreased opening. Midl to moderate MR, peak mitral valve gradient equals 12 mmHg, mean transmitral valve gradient equals 5-8 mmHg, consistent with mild to moderate MS. TAVR color flow turbulence is seen at the level of the TAVR consistent with stenosis at the level of the valve. One of the TAVR leaflets appears heavily calcified with decreased mobility. Peak transaortic valve gradient equals 69 mmHg, mean transaortic valve gradient equals 43 mmHg, which is elevated even in the presence of a transcatheter aortic valve replacement. DVI= 0.31. Mild to moderate paravalvular aortic regurgitation. Mild intravalvular aortic regurgitation. Normal size aortic root, arch and descending thoracic aorta. Severe atheroma noted in the aortic arch and descending aorta. Independently mobile echodensity seen in the proximal descending aorta measuring approximately 1.0 cm x 0.2 cm consistent with mobile atheroma. Hyperdynamic left ventricular systolic function. Moderate concentric left ventricular hypertrophy. EF >75%. She returns today for consultation. 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  Would continue Xarelto post op and increase dose of lipitor for increased valve longevity.  Patient is not a candidate for sternotomy due to morbid obesity but can undergo repeat TAVR. Would obtain Cath prior to scheduling.

## 2021-06-22 NOTE — HISTORY OF PRESENT ILLNESS
[FreeTextEntry1] : La is a 65 year old female who underwent a LAMBERT on 5/24/2021 which demonstrated heavily calcified posterior mitrla valve annulus with thickened mitral leaflets with mildly decreased opening. Mild to moderate MR, peak mitral valve gradient equals 12 mmHg, mean transmitral valve gradient equals 5-8 mmHg, consistent with mild to moderate MS. TAVR color flow turbulence is seen at the level of the TAVR consistent with stenosis at the level of the valve. One of the TAVR leaflets appears heavily calcified with decreased mobility. Peak transaortic valve gradient equals 69 mmHg, mean transaortic valve gradient equals 43 mmHg, which is elevated even in the presence of a transcatheter aortic valve replacement. DVI= 0.31. Mild to moderate paravalvular aortic regurgitation. Mild intravalvular aortic regurgitation. Normal size aortic root, arch and descending thoracic aorta. Severe atheroma noted in the aortic arch and descending aorta. Independently mobile echodensity seen in the proximal descending aorta measuring approximately 1.0 cm x 0.2 cm consistent with mobile atheroma. Hyperdynamic left ventricular systolic function. Moderate concentric left ventricular hypertrophy. EF >75%. She returns today for consultation.

## 2021-06-22 NOTE — CONSULT LETTER
[Dear  ___] : Dear ~ALEYDA, [Courtesy Letter:] : I had the pleasure of seeing your patient, [unfilled], in my office today. [Please see my note below.] : Please see my note below. [Consult Closing:] : Thank you very much for allowing me to participate in the care of this patient.  If you have any questions, please do not hesitate to contact me. [Sincerely,] : Sincerely, [FreeTextEntry2] : Jason Hduson MD\par [address] [facility]\par [place] [id number] [FreeTextEntry3] : Reji Deng MD\par \par Cardiovascular & Thoracic Surgery\par Professor\par [facility]\par \par

## 2021-06-22 NOTE — DATA REVIEWED
[FreeTextEntry1] : LAMBERT on 5/24/2021 which demonstrated heavily calcified posterior mitrla valve annulus with thickened mitral leaflets with mildly decreased opening. Mild to moderate MR, peak mitral valve gradient equals 12 mmHg, mean transmitral valve gradient equals 5-8 mmHg, consistent with mild to moderate MS. TAVR color flow turbulence is seen at the level of the TAVR consistent with stenosis at the level of the valve. One of the TAVR leaflets appears heavily calcified with decreased mobility. Peak transaortic valve gradient equals 69 mmHg, mean transaortic valve gradient equals 43 mmHg, which is elevated even in the presence of a transcatheter aortic valve replacement. DVI= 0.31. Mild to moderate paravalvular aortic regurgitation. Mild intra valvular aortic regurgitation. Normal size aortic root, arch and descending thoracic aorta. Severe atheroma noted in the aortic arch and descending aorta. Independently mobile echodensity seen in the proximal descending aorta measuring approximately 1.0 cm x 0.2 cm consistent with mobile atheroma. Hyperdynamic left ventricular systolic function. Moderate concentric left ventricular hypertrophy. EF >75%.\par \par CTA of Heart Structural with IV contrast on 6/3/2021\par Aortic stent in her diameter 18.4 x 8.7 mm.\par LVOT minimum diameter (systole, 21.9)\par LVOT 90 cross (systole, mm): 28.7\par LVOT calcification: Minimal\par

## 2021-06-23 ENCOUNTER — OUTPATIENT (OUTPATIENT)
Dept: OUTPATIENT SERVICES | Facility: HOSPITAL | Age: 66
LOS: 1 days | Discharge: HOME | End: 2021-06-23
Payer: MEDICARE

## 2021-06-23 ENCOUNTER — APPOINTMENT (OUTPATIENT)
Dept: WOUND CARE | Facility: HOSPITAL | Age: 66
End: 2021-06-23
Payer: MEDICARE

## 2021-06-23 VITALS
BODY MASS INDEX: 51.91 KG/M2 | HEART RATE: 66 BPM | RESPIRATION RATE: 18 BRPM | DIASTOLIC BLOOD PRESSURE: 52 MMHG | WEIGHT: 293 LBS | OXYGEN SATURATION: 95 % | HEIGHT: 63 IN | SYSTOLIC BLOOD PRESSURE: 117 MMHG | TEMPERATURE: 97.8 F

## 2021-06-23 DIAGNOSIS — E11.621 TYPE 2 DIABETES MELLITUS WITH FOOT ULCER: ICD-10-CM

## 2021-06-23 DIAGNOSIS — Z95.2 PRESENCE OF PROSTHETIC HEART VALVE: Chronic | ICD-10-CM

## 2021-06-23 PROCEDURE — 99024 POSTOP FOLLOW-UP VISIT: CPT

## 2021-06-23 PROCEDURE — G0463: CPT

## 2021-06-24 DIAGNOSIS — Z95.4 PRESENCE OF OTHER HEART-VALVE REPLACEMENT: ICD-10-CM

## 2021-06-24 DIAGNOSIS — Z79.4 LONG TERM (CURRENT) USE OF INSULIN: ICD-10-CM

## 2021-06-24 DIAGNOSIS — I11.9 HYPERTENSIVE HEART DISEASE WITHOUT HEART FAILURE: ICD-10-CM

## 2021-06-24 DIAGNOSIS — I34.1 NONRHEUMATIC MITRAL (VALVE) PROLAPSE: ICD-10-CM

## 2021-06-24 DIAGNOSIS — E11.59 TYPE 2 DIABETES MELLITUS WITH OTHER CIRCULATORY COMPLICATIONS: ICD-10-CM

## 2021-06-24 DIAGNOSIS — I47.1 SUPRAVENTRICULAR TACHYCARDIA: ICD-10-CM

## 2021-06-24 DIAGNOSIS — M48.07 SPINAL STENOSIS, LUMBOSACRAL REGION: ICD-10-CM

## 2021-06-24 DIAGNOSIS — E66.01 MORBID (SEVERE) OBESITY DUE TO EXCESS CALORIES: ICD-10-CM

## 2021-06-24 DIAGNOSIS — Z87.81 PERSONAL HISTORY OF (HEALED) TRAUMATIC FRACTURE: ICD-10-CM

## 2021-06-24 DIAGNOSIS — Z88.5 ALLERGY STATUS TO NARCOTIC AGENT: ICD-10-CM

## 2021-06-24 DIAGNOSIS — Z82.49 FAMILY HISTORY OF ISCHEMIC HEART DISEASE AND OTHER DISEASES OF THE CIRCULATORY SYSTEM: ICD-10-CM

## 2021-06-24 DIAGNOSIS — M47.817 SPONDYLOSIS WITHOUT MYELOPATHY OR RADICULOPATHY, LUMBOSACRAL REGION: ICD-10-CM

## 2021-06-24 DIAGNOSIS — E11.40 TYPE 2 DIABETES MELLITUS WITH DIABETIC NEUROPATHY, UNSPECIFIED: ICD-10-CM

## 2021-06-24 DIAGNOSIS — I83.893 VARICOSE VEINS OF BILATERAL LOWER EXTREMITIES WITH OTHER COMPLICATIONS: ICD-10-CM

## 2021-06-24 DIAGNOSIS — Z87.891 PERSONAL HISTORY OF NICOTINE DEPENDENCE: ICD-10-CM

## 2021-06-24 DIAGNOSIS — Z83.49 FAMILY HISTORY OF OTHER ENDOCRINE, NUTRITIONAL AND METABOLIC DISEASES: ICD-10-CM

## 2021-06-24 DIAGNOSIS — Z87.01 PERSONAL HISTORY OF PNEUMONIA (RECURRENT): ICD-10-CM

## 2021-06-24 DIAGNOSIS — Z79.899 OTHER LONG TERM (CURRENT) DRUG THERAPY: ICD-10-CM

## 2021-06-24 DIAGNOSIS — M43.16 SPONDYLOLISTHESIS, LUMBAR REGION: ICD-10-CM

## 2021-06-24 DIAGNOSIS — E11.621 TYPE 2 DIABETES MELLITUS WITH FOOT ULCER: ICD-10-CM

## 2021-06-24 DIAGNOSIS — E05.90 THYROTOXICOSIS, UNSPECIFIED WITHOUT THYROTOXIC CRISIS OR STORM: ICD-10-CM

## 2021-06-24 DIAGNOSIS — I83.11 VARICOSE VEINS OF RIGHT LOWER EXTREMITY WITH INFLAMMATION: ICD-10-CM

## 2021-06-24 DIAGNOSIS — L84 CORNS AND CALLOSITIES: ICD-10-CM

## 2021-06-24 DIAGNOSIS — I83.12 VARICOSE VEINS OF LEFT LOWER EXTREMITY WITH INFLAMMATION: ICD-10-CM

## 2021-06-24 DIAGNOSIS — Z98.84 BARIATRIC SURGERY STATUS: ICD-10-CM

## 2021-06-24 DIAGNOSIS — Z79.82 LONG TERM (CURRENT) USE OF ASPIRIN: ICD-10-CM

## 2021-06-24 DIAGNOSIS — G47.10 HYPERSOMNIA, UNSPECIFIED: ICD-10-CM

## 2021-06-24 DIAGNOSIS — E55.9 VITAMIN D DEFICIENCY, UNSPECIFIED: ICD-10-CM

## 2021-06-24 DIAGNOSIS — G47.33 OBSTRUCTIVE SLEEP APNEA (ADULT) (PEDIATRIC): ICD-10-CM

## 2021-06-24 DIAGNOSIS — Z98.890 OTHER SPECIFIED POSTPROCEDURAL STATES: ICD-10-CM

## 2021-06-24 DIAGNOSIS — L97.416 NON-PRESSURE CHRONIC ULCER OF RIGHT HEEL AND MIDFOOT WITH BONE INVOLVEMENT WITHOUT EVIDENCE OF NECROSIS: ICD-10-CM

## 2021-07-01 ENCOUNTER — NON-APPOINTMENT (OUTPATIENT)
Age: 66
End: 2021-07-01

## 2021-07-07 ENCOUNTER — OUTPATIENT (OUTPATIENT)
Dept: OUTPATIENT SERVICES | Facility: HOSPITAL | Age: 66
LOS: 1 days | Discharge: HOME | End: 2021-07-07
Payer: MEDICARE

## 2021-07-07 ENCOUNTER — APPOINTMENT (OUTPATIENT)
Dept: WOUND CARE | Facility: HOSPITAL | Age: 66
End: 2021-07-07
Payer: MEDICARE

## 2021-07-07 VITALS
BODY MASS INDEX: 51.91 KG/M2 | TEMPERATURE: 98.2 F | DIASTOLIC BLOOD PRESSURE: 56 MMHG | RESPIRATION RATE: 16 BRPM | SYSTOLIC BLOOD PRESSURE: 110 MMHG | HEART RATE: 81 BPM | WEIGHT: 293 LBS | HEIGHT: 63 IN | OXYGEN SATURATION: 95 %

## 2021-07-07 DIAGNOSIS — Z95.2 PRESENCE OF PROSTHETIC HEART VALVE: Chronic | ICD-10-CM

## 2021-07-07 DIAGNOSIS — E11.621 TYPE 2 DIABETES MELLITUS WITH FOOT ULCER: ICD-10-CM

## 2021-07-07 PROCEDURE — G0463: CPT

## 2021-07-07 PROCEDURE — 99213 OFFICE O/P EST LOW 20 MIN: CPT | Mod: 24

## 2021-07-07 NOTE — ASSESSMENT
[Verbal] : Verbal [Written] : Written [Patient] : Patient [Good - alert, interested, motivated] : Good - alert, interested, motivated [Verbalizes knowledge/Understanding] : Verbalizes knowledge/understanding [Dressing changes] : dressing changes [Foot Care] : foot care [Skin Care] : skin care [Pressure relief] : pressure relief [Signs and symptoms of infection] : sign and symptoms of infection [Nutrition] : nutrition [How and When to Call] : how and when to call [Off-loading] : off-loading [Compression Therapy] : compression therapy [Patient responsibility to plan of care] : patient responsibility to plan of care [Glycemic Control] : glycemic control [Stable] : stable [Home] : Home [Cane] : Cane [Faxed - Long Term Care/Home Health Agency] : Long Term Care/Home Health Agency: Faxed [] : No [FreeTextEntry2] : Infection Prevention\par Localized Wound Care \par Offloading / Pressure Relief\par Promote Optimal Skin Integrity\par Maintain acceptable pain level with use of pharmacological and nonpharmacological interventions\par Daily Foot care - proper footwear \par \par  [FreeTextEntry3] : new wounds present [FreeTextEntry4] : F/U to [facility] for an assessment in One Week\par Pt have angiogram on Friday 7/9/2021 and TAVR procedure 7/2/2021\par Pt re-educated on the importance of wearing proper footwear to protect feet; pt verbalized understanding  [FreeTextEntry1] : [facility]

## 2021-07-07 NOTE — VITALS
[] : No [de-identified] : Pt rates pain 0/10.  Patient denies any pain or discomfort at the present in regards to wounds

## 2021-07-07 NOTE — HISTORY OF PRESENT ILLNESS
[FreeTextEntry1] : Pt seen s/p right 4th metatarsal plantar condylectomy for right foot plantar 4th metatarsal head ulceration down to skin, subcutaneous tissue, fat, tendon, and bone with dorsal incision site dehiscence, healed at this time. Denies any other complaints.

## 2021-07-07 NOTE — REVIEW OF SYSTEMS
[Skin Wound] : skin wound [Fever] : no fever [Eye Pain] : no eye pain [Earache] : no earache [Chest Pain] : no chest pain [Shortness Of Breath] : no shortness of breath [Abdominal Pain] : no abdominal pain [de-identified] : right foot dorsal incision site dehiscence with no cellulitis, no purulence, no malodor, no proximal streaking. right submet 4th ulcer down to skin, subcutaneous tissue, fat, tendon, bone (not necrotic), healed [de-identified] : diabetic neuropathy [de-identified] : type 2 diabetes

## 2021-07-07 NOTE — PHYSICAL EXAM
[0] : left 0 [Ankle Swelling (On Exam)] : present [Ankle Swelling Bilaterally] : bilaterally  [Varicose Veins Of Lower Extremities] : bilaterally [] : bilaterally [Ankle Swelling On The Left] : moderate [Skin Ulcer] : ulcer [2 x 2] : 2 x 2  [de-identified] : A&Ox3, NAD [de-identified] : 4/5 strength in all quadrants bilaterally [de-identified] : right foot dorsal incision site dehiscence healed. right submet 4th ulcer down to skin, subcutaneous tissue, fat, tendon, bone (not necrotic), healed [de-identified] : 100% [de-identified] : Left 4th toe - proximal nailbed - Dry Eschar [de-identified] : 0.3 [de-identified] : 0.2 [de-identified] : intact [de-identified] : 100% [de-identified] : No Product  [de-identified] : Cleansed with Normal Saline \par  [FreeTextEntry1] : Right Anterior Leg - NEW [FreeTextEntry2] : 0.6 [FreeTextEntry3] : 0.8 [FreeTextEntry4] : 0.1 [de-identified] : Serosanguineous  [de-identified] : Mild  [de-identified] : Calcium Alginate  [de-identified] : Cleansed with Normal Saline \par Cloth Tape [FreeTextEntry7] : Left Foot - 2nd Digit - NEW [FreeTextEntry8] : 0.4 [FreeTextEntry9] : 0.4 [de-identified] : 0.1 [de-identified] : Scant Serosanguineous  [de-identified] : Intact  [de-identified] : Calcium Alginate [de-identified] : Cleansed with Normal Saline \par Toe Sock [de-identified] : None [de-identified] : None [de-identified] : None [de-identified] : No [de-identified] : Small [de-identified] : Normal [de-identified] : None [de-identified] : None [de-identified] : >75% [de-identified] : Other [de-identified] : 3x Weekly [de-identified] : None [de-identified] : Other [de-identified] : 3x Weekly [TWNoteComboBox4] : Small [de-identified] : Erythema [de-identified] : 100% [de-identified] : 3x Weekly [de-identified] : Traumatic [de-identified] : 100% [de-identified] : 3x Weekly

## 2021-07-08 DIAGNOSIS — I83.11 VARICOSE VEINS OF RIGHT LOWER EXTREMITY WITH INFLAMMATION: ICD-10-CM

## 2021-07-08 DIAGNOSIS — Z88.5 ALLERGY STATUS TO NARCOTIC AGENT: ICD-10-CM

## 2021-07-08 DIAGNOSIS — I83.893 VARICOSE VEINS OF BILATERAL LOWER EXTREMITIES WITH OTHER COMPLICATIONS: ICD-10-CM

## 2021-07-08 DIAGNOSIS — L97.416 NON-PRESSURE CHRONIC ULCER OF RIGHT HEEL AND MIDFOOT WITH BONE INVOLVEMENT WITHOUT EVIDENCE OF NECROSIS: ICD-10-CM

## 2021-07-08 DIAGNOSIS — E11.40 TYPE 2 DIABETES MELLITUS WITH DIABETIC NEUROPATHY, UNSPECIFIED: ICD-10-CM

## 2021-07-08 DIAGNOSIS — Z82.49 FAMILY HISTORY OF ISCHEMIC HEART DISEASE AND OTHER DISEASES OF THE CIRCULATORY SYSTEM: ICD-10-CM

## 2021-07-08 DIAGNOSIS — Z98.84 BARIATRIC SURGERY STATUS: ICD-10-CM

## 2021-07-08 DIAGNOSIS — Z87.01 PERSONAL HISTORY OF PNEUMONIA (RECURRENT): ICD-10-CM

## 2021-07-08 DIAGNOSIS — G47.33 OBSTRUCTIVE SLEEP APNEA (ADULT) (PEDIATRIC): ICD-10-CM

## 2021-07-08 DIAGNOSIS — Z79.899 OTHER LONG TERM (CURRENT) DRUG THERAPY: ICD-10-CM

## 2021-07-08 DIAGNOSIS — I11.9 HYPERTENSIVE HEART DISEASE WITHOUT HEART FAILURE: ICD-10-CM

## 2021-07-08 DIAGNOSIS — E11.59 TYPE 2 DIABETES MELLITUS WITH OTHER CIRCULATORY COMPLICATIONS: ICD-10-CM

## 2021-07-08 DIAGNOSIS — E11.621 TYPE 2 DIABETES MELLITUS WITH FOOT ULCER: ICD-10-CM

## 2021-07-08 DIAGNOSIS — E05.90 THYROTOXICOSIS, UNSPECIFIED WITHOUT THYROTOXIC CRISIS OR STORM: ICD-10-CM

## 2021-07-08 DIAGNOSIS — L84 CORNS AND CALLOSITIES: ICD-10-CM

## 2021-07-08 DIAGNOSIS — Z87.891 PERSONAL HISTORY OF NICOTINE DEPENDENCE: ICD-10-CM

## 2021-07-08 DIAGNOSIS — M43.16 SPONDYLOLISTHESIS, LUMBAR REGION: ICD-10-CM

## 2021-07-08 DIAGNOSIS — Z95.4 PRESENCE OF OTHER HEART-VALVE REPLACEMENT: ICD-10-CM

## 2021-07-08 DIAGNOSIS — Z79.82 LONG TERM (CURRENT) USE OF ASPIRIN: ICD-10-CM

## 2021-07-08 DIAGNOSIS — E66.01 MORBID (SEVERE) OBESITY DUE TO EXCESS CALORIES: ICD-10-CM

## 2021-07-08 DIAGNOSIS — Z79.4 LONG TERM (CURRENT) USE OF INSULIN: ICD-10-CM

## 2021-07-08 DIAGNOSIS — G47.10 HYPERSOMNIA, UNSPECIFIED: ICD-10-CM

## 2021-07-08 DIAGNOSIS — I34.1 NONRHEUMATIC MITRAL (VALVE) PROLAPSE: ICD-10-CM

## 2021-07-08 DIAGNOSIS — Z98.890 OTHER SPECIFIED POSTPROCEDURAL STATES: ICD-10-CM

## 2021-07-08 DIAGNOSIS — I47.1 SUPRAVENTRICULAR TACHYCARDIA: ICD-10-CM

## 2021-07-08 DIAGNOSIS — Z87.81 PERSONAL HISTORY OF (HEALED) TRAUMATIC FRACTURE: ICD-10-CM

## 2021-07-08 DIAGNOSIS — I83.12 VARICOSE VEINS OF LEFT LOWER EXTREMITY WITH INFLAMMATION: ICD-10-CM

## 2021-07-08 DIAGNOSIS — Z83.49 FAMILY HISTORY OF OTHER ENDOCRINE, NUTRITIONAL AND METABOLIC DISEASES: ICD-10-CM

## 2021-07-08 DIAGNOSIS — E55.9 VITAMIN D DEFICIENCY, UNSPECIFIED: ICD-10-CM

## 2021-07-08 DIAGNOSIS — M47.817 SPONDYLOSIS WITHOUT MYELOPATHY OR RADICULOPATHY, LUMBOSACRAL REGION: ICD-10-CM

## 2021-07-08 DIAGNOSIS — M48.07 SPINAL STENOSIS, LUMBOSACRAL REGION: ICD-10-CM

## 2021-07-09 ENCOUNTER — OUTPATIENT (OUTPATIENT)
Dept: OUTPATIENT SERVICES | Facility: HOSPITAL | Age: 66
LOS: 1 days | End: 2021-07-09
Payer: MEDICARE

## 2021-07-09 VITALS — RESPIRATION RATE: 14 BRPM | SYSTOLIC BLOOD PRESSURE: 119 MMHG | HEART RATE: 88 BPM | OXYGEN SATURATION: 95 %

## 2021-07-09 VITALS
HEART RATE: 88 BPM | SYSTOLIC BLOOD PRESSURE: 167 MMHG | OXYGEN SATURATION: 98 % | HEIGHT: 63 IN | RESPIRATION RATE: 16 BRPM | WEIGHT: 293 LBS | TEMPERATURE: 98 F | DIASTOLIC BLOOD PRESSURE: 88 MMHG

## 2021-07-09 DIAGNOSIS — Z95.2 PRESENCE OF PROSTHETIC HEART VALVE: Chronic | ICD-10-CM

## 2021-07-09 DIAGNOSIS — I50.9 HEART FAILURE, UNSPECIFIED: ICD-10-CM

## 2021-07-09 DIAGNOSIS — I10 ESSENTIAL (PRIMARY) HYPERTENSION: ICD-10-CM

## 2021-07-09 DIAGNOSIS — I35.0 NONRHEUMATIC AORTIC (VALVE) STENOSIS: ICD-10-CM

## 2021-07-09 LAB
ALBUMIN SERPL ELPH-MCNC: 3.9 G/DL — SIGNIFICANT CHANGE UP (ref 3.3–5)
ALP SERPL-CCNC: 128 U/L — HIGH (ref 40–120)
ALT FLD-CCNC: 18 U/L — SIGNIFICANT CHANGE UP (ref 10–45)
ANION GAP SERPL CALC-SCNC: 14 MMOL/L — SIGNIFICANT CHANGE UP (ref 5–17)
AST SERPL-CCNC: 34 U/L — SIGNIFICANT CHANGE UP (ref 10–40)
BILIRUB SERPL-MCNC: 0.4 MG/DL — SIGNIFICANT CHANGE UP (ref 0.2–1.2)
BUN SERPL-MCNC: 20 MG/DL — SIGNIFICANT CHANGE UP (ref 7–23)
CALCIUM SERPL-MCNC: 9.5 MG/DL — SIGNIFICANT CHANGE UP (ref 8.4–10.5)
CHLORIDE SERPL-SCNC: 96 MMOL/L — SIGNIFICANT CHANGE UP (ref 96–108)
CO2 SERPL-SCNC: 25 MMOL/L — SIGNIFICANT CHANGE UP (ref 22–31)
CREAT SERPL-MCNC: 0.87 MG/DL — SIGNIFICANT CHANGE UP (ref 0.5–1.3)
GLUCOSE BLDC GLUCOMTR-MCNC: 311 MG/DL — HIGH (ref 70–99)
GLUCOSE SERPL-MCNC: 307 MG/DL — HIGH (ref 70–99)
HCT VFR BLD CALC: 41.3 % — SIGNIFICANT CHANGE UP (ref 34.5–45)
HGB BLD-MCNC: 12.7 G/DL — SIGNIFICANT CHANGE UP (ref 11.5–15.5)
MCHC RBC-ENTMCNC: 26.1 PG — LOW (ref 27–34)
MCHC RBC-ENTMCNC: 30.8 GM/DL — LOW (ref 32–36)
MCV RBC AUTO: 85 FL — SIGNIFICANT CHANGE UP (ref 80–100)
NRBC # BLD: 0 /100 WBCS — SIGNIFICANT CHANGE UP (ref 0–0)
PLATELET # BLD AUTO: 235 K/UL — SIGNIFICANT CHANGE UP (ref 150–400)
POTASSIUM SERPL-MCNC: 4.7 MMOL/L — SIGNIFICANT CHANGE UP (ref 3.5–5.3)
POTASSIUM SERPL-SCNC: 4.7 MMOL/L — SIGNIFICANT CHANGE UP (ref 3.5–5.3)
PROT SERPL-MCNC: 7.7 G/DL — SIGNIFICANT CHANGE UP (ref 6–8.3)
RBC # BLD: 4.86 M/UL — SIGNIFICANT CHANGE UP (ref 3.8–5.2)
RBC # FLD: 15.6 % — HIGH (ref 10.3–14.5)
SODIUM SERPL-SCNC: 135 MMOL/L — SIGNIFICANT CHANGE UP (ref 135–145)
WBC # BLD: 12.36 K/UL — HIGH (ref 3.8–10.5)
WBC # FLD AUTO: 12.36 K/UL — HIGH (ref 3.8–10.5)

## 2021-07-09 PROCEDURE — C1769: CPT

## 2021-07-09 PROCEDURE — 99152 MOD SED SAME PHYS/QHP 5/>YRS: CPT

## 2021-07-09 PROCEDURE — 93454 CORONARY ARTERY ANGIO S&I: CPT

## 2021-07-09 PROCEDURE — 85027 COMPLETE CBC AUTOMATED: CPT

## 2021-07-09 PROCEDURE — C1887: CPT

## 2021-07-09 PROCEDURE — 93005 ELECTROCARDIOGRAM TRACING: CPT

## 2021-07-09 PROCEDURE — 93454 CORONARY ARTERY ANGIO S&I: CPT | Mod: 26

## 2021-07-09 PROCEDURE — 99153 MOD SED SAME PHYS/QHP EA: CPT

## 2021-07-09 PROCEDURE — C1894: CPT

## 2021-07-09 PROCEDURE — 80053 COMPREHEN METABOLIC PANEL: CPT

## 2021-07-09 PROCEDURE — 82962 GLUCOSE BLOOD TEST: CPT

## 2021-07-09 PROCEDURE — 93010 ELECTROCARDIOGRAM REPORT: CPT

## 2021-07-09 RX ORDER — RIVAROXABAN 15 MG-20MG
1 KIT ORAL
Qty: 0 | Refills: 0 | DISCHARGE

## 2021-07-09 RX ORDER — L.ACIDOPH/B.ANIMALIS/B.LONGUM 15B CELL
1 CAPSULE ORAL
Qty: 0 | Refills: 0 | DISCHARGE

## 2021-07-09 RX ORDER — POTASSIUM CHLORIDE 20 MEQ
1 PACKET (EA) ORAL
Qty: 0 | Refills: 0 | DISCHARGE

## 2021-07-09 RX ORDER — IBUPROFEN 200 MG
1 TABLET ORAL
Qty: 0 | Refills: 0 | DISCHARGE

## 2021-07-09 RX ORDER — LOSARTAN POTASSIUM 100 MG/1
1 TABLET, FILM COATED ORAL
Qty: 0 | Refills: 0 | DISCHARGE

## 2021-07-09 RX ORDER — ALBUTEROL 90 UG/1
2 AEROSOL, METERED ORAL
Qty: 0 | Refills: 0 | DISCHARGE

## 2021-07-09 RX ORDER — INSULIN LISPRO 100/ML
35 VIAL (ML) SUBCUTANEOUS
Qty: 0 | Refills: 0 | DISCHARGE

## 2021-07-09 RX ORDER — OMEPRAZOLE 10 MG/1
1 CAPSULE, DELAYED RELEASE ORAL
Qty: 0 | Refills: 0 | DISCHARGE

## 2021-07-09 RX ORDER — NYSTATIN CREAM 100000 [USP'U]/G
1 CREAM TOPICAL
Qty: 0 | Refills: 0 | DISCHARGE

## 2021-07-09 RX ORDER — INSULIN LISPRO 100/ML
30 VIAL (ML) SUBCUTANEOUS
Qty: 0 | Refills: 0 | DISCHARGE

## 2021-07-09 NOTE — ASU DISCHARGE PLAN (ADULT/PEDIATRIC) - ASU DC SPECIAL INSTRUCTIONSFT

## 2021-07-09 NOTE — ASU DISCHARGE PLAN (ADULT/PEDIATRIC) - CARE PROVIDER_API CALL
Reji Deng)  Thoracic and Cardiac Surgery  [address] [place] [address]  Phone: [phone]  Fax: [phone]  Established Patient  Follow Up Time:

## 2021-07-14 ENCOUNTER — OUTPATIENT (OUTPATIENT)
Dept: OUTPATIENT SERVICES | Facility: HOSPITAL | Age: 66
LOS: 1 days | Discharge: HOME | End: 2021-07-14
Payer: MEDICARE

## 2021-07-14 ENCOUNTER — APPOINTMENT (OUTPATIENT)
Dept: WOUND CARE | Facility: HOSPITAL | Age: 66
End: 2021-07-14
Payer: MEDICARE

## 2021-07-14 VITALS
DIASTOLIC BLOOD PRESSURE: 44 MMHG | SYSTOLIC BLOOD PRESSURE: 81 MMHG | HEIGHT: 63 IN | RESPIRATION RATE: 16 BRPM | WEIGHT: 293 LBS | TEMPERATURE: 98.3 F | HEART RATE: 71 BPM | OXYGEN SATURATION: 95 % | BODY MASS INDEX: 51.91 KG/M2

## 2021-07-14 VITALS — DIASTOLIC BLOOD PRESSURE: 58 MMHG | SYSTOLIC BLOOD PRESSURE: 100 MMHG

## 2021-07-14 DIAGNOSIS — E11.621 TYPE 2 DIABETES MELLITUS WITH FOOT ULCER: ICD-10-CM

## 2021-07-14 DIAGNOSIS — Z95.2 PRESENCE OF PROSTHETIC HEART VALVE: Chronic | ICD-10-CM

## 2021-07-14 PROCEDURE — 99214 OFFICE O/P EST MOD 30 MIN: CPT | Mod: 24

## 2021-07-14 PROCEDURE — G0463: CPT

## 2021-07-15 DIAGNOSIS — Z95.4 PRESENCE OF OTHER HEART-VALVE REPLACEMENT: ICD-10-CM

## 2021-07-15 DIAGNOSIS — G47.33 OBSTRUCTIVE SLEEP APNEA (ADULT) (PEDIATRIC): ICD-10-CM

## 2021-07-15 DIAGNOSIS — L97.812 NON-PRESSURE CHRONIC ULCER OF OTHER PART OF RIGHT LOWER LEG WITH FAT LAYER EXPOSED: ICD-10-CM

## 2021-07-15 DIAGNOSIS — M48.07 SPINAL STENOSIS, LUMBOSACRAL REGION: ICD-10-CM

## 2021-07-15 DIAGNOSIS — Z87.891 PERSONAL HISTORY OF NICOTINE DEPENDENCE: ICD-10-CM

## 2021-07-15 DIAGNOSIS — M43.16 SPONDYLOLISTHESIS, LUMBAR REGION: ICD-10-CM

## 2021-07-15 DIAGNOSIS — I83.218 VARICOSE VEINS OF RIGHT LOWER EXTREMITY WITH BOTH ULCER OF OTHER PART OF LOWER EXTREMITY AND INFLAMMATION: ICD-10-CM

## 2021-07-15 DIAGNOSIS — I47.1 SUPRAVENTRICULAR TACHYCARDIA: ICD-10-CM

## 2021-07-15 DIAGNOSIS — Z79.82 LONG TERM (CURRENT) USE OF ASPIRIN: ICD-10-CM

## 2021-07-15 DIAGNOSIS — L84 CORNS AND CALLOSITIES: ICD-10-CM

## 2021-07-15 DIAGNOSIS — Z98.84 BARIATRIC SURGERY STATUS: ICD-10-CM

## 2021-07-15 DIAGNOSIS — G47.10 HYPERSOMNIA, UNSPECIFIED: ICD-10-CM

## 2021-07-15 DIAGNOSIS — E66.01 MORBID (SEVERE) OBESITY DUE TO EXCESS CALORIES: ICD-10-CM

## 2021-07-15 DIAGNOSIS — E11.40 TYPE 2 DIABETES MELLITUS WITH DIABETIC NEUROPATHY, UNSPECIFIED: ICD-10-CM

## 2021-07-15 DIAGNOSIS — Z79.899 OTHER LONG TERM (CURRENT) DRUG THERAPY: ICD-10-CM

## 2021-07-15 DIAGNOSIS — I34.1 NONRHEUMATIC MITRAL (VALVE) PROLAPSE: ICD-10-CM

## 2021-07-15 DIAGNOSIS — Z87.81 PERSONAL HISTORY OF (HEALED) TRAUMATIC FRACTURE: ICD-10-CM

## 2021-07-15 DIAGNOSIS — L97.416 NON-PRESSURE CHRONIC ULCER OF RIGHT HEEL AND MIDFOOT WITH BONE INVOLVEMENT WITHOUT EVIDENCE OF NECROSIS: ICD-10-CM

## 2021-07-15 DIAGNOSIS — Z79.4 LONG TERM (CURRENT) USE OF INSULIN: ICD-10-CM

## 2021-07-15 DIAGNOSIS — E11.621 TYPE 2 DIABETES MELLITUS WITH FOOT ULCER: ICD-10-CM

## 2021-07-15 DIAGNOSIS — Z88.5 ALLERGY STATUS TO NARCOTIC AGENT: ICD-10-CM

## 2021-07-15 DIAGNOSIS — E05.90 THYROTOXICOSIS, UNSPECIFIED WITHOUT THYROTOXIC CRISIS OR STORM: ICD-10-CM

## 2021-07-15 DIAGNOSIS — I83.12 VARICOSE VEINS OF LEFT LOWER EXTREMITY WITH INFLAMMATION: ICD-10-CM

## 2021-07-15 DIAGNOSIS — I83.893 VARICOSE VEINS OF BILATERAL LOWER EXTREMITIES WITH OTHER COMPLICATIONS: ICD-10-CM

## 2021-07-15 DIAGNOSIS — E55.9 VITAMIN D DEFICIENCY, UNSPECIFIED: ICD-10-CM

## 2021-07-15 DIAGNOSIS — Z82.49 FAMILY HISTORY OF ISCHEMIC HEART DISEASE AND OTHER DISEASES OF THE CIRCULATORY SYSTEM: ICD-10-CM

## 2021-07-15 DIAGNOSIS — Z87.01 PERSONAL HISTORY OF PNEUMONIA (RECURRENT): ICD-10-CM

## 2021-07-15 DIAGNOSIS — Z83.49 FAMILY HISTORY OF OTHER ENDOCRINE, NUTRITIONAL AND METABOLIC DISEASES: ICD-10-CM

## 2021-07-15 DIAGNOSIS — I11.9 HYPERTENSIVE HEART DISEASE WITHOUT HEART FAILURE: ICD-10-CM

## 2021-07-15 DIAGNOSIS — M47.817 SPONDYLOSIS WITHOUT MYELOPATHY OR RADICULOPATHY, LUMBOSACRAL REGION: ICD-10-CM

## 2021-07-15 DIAGNOSIS — E11.59 TYPE 2 DIABETES MELLITUS WITH OTHER CIRCULATORY COMPLICATIONS: ICD-10-CM

## 2021-07-19 NOTE — PHYSICAL EXAM
[2 x 2] : 2 x 2  [0] : left 0 [Ankle Swelling (On Exam)] : present [Ankle Swelling Bilaterally] : bilaterally  [Varicose Veins Of Lower Extremities] : bilaterally [] : bilaterally [Ankle Swelling On The Left] : moderate [Skin Ulcer] : ulcer [4 x 4] : 4 x 4  [de-identified] : A&Ox3, NAD [de-identified] : 4/5 strength in all quadrants bilaterally [de-identified] : new right lower leg ulceration down to skin, subcutaneous tissue, and fat. right foot dorsal incision site dehiscence healed at this time. right submet 4th ulcer down to skin, subcutaneous tissue, fat, tendon, bone (not necrotic), healed [de-identified] : callus  [de-identified] : none [de-identified] : intact [FreeTextEntry1] : Right Anterior Leg  [FreeTextEntry2] : 1.4 [FreeTextEntry3] : 0.8 [FreeTextEntry4] : 0.1 [de-identified] : Serosanguineous  [de-identified] : Adaptic touch and Calcium Alginate  [de-identified] : Cleansed with Normal Saline \par Cloth Tape [FreeTextEntry8] : 0.4 [FreeTextEntry7] : Left dorsal Foot - 2nd Digit- scab [FreeTextEntry9] : 0.4 [de-identified] : 0.1 [de-identified] : Scant Serosanguineous  [de-identified] : Calcium Alginate [de-identified] : Intact  [de-identified] : Cleansed with Normal Saline \par Toe Sock [de-identified] : Left Plantar Foot 2nd digit- Blood Blister [de-identified] : 0.4 [de-identified] : 0.7 [de-identified] : Dry Dressing [de-identified] : Cleansed with NS\par  [de-identified] : Left Leg- dry eschar/scab [de-identified] : 0.3 [de-identified] : 0.4 [de-identified] : Adaptic Touch and Calcium alginate [de-identified] : Cleansed with NS\par  [de-identified] : Small [de-identified] : Other [de-identified] : 3x Weekly [de-identified] : Other [de-identified] : 3x Weekly [TWNoteComboBox4] : Small [TWNoteComboBox5] : No [de-identified] : Normal [de-identified] : No [de-identified] : None [de-identified] : None [de-identified] : 100% [de-identified] : No [de-identified] : Primary Dressing [de-identified] : 3x Weekly [de-identified] : No [de-identified] : Traumatic [de-identified] : No [de-identified] : Normal [de-identified] : None [de-identified] : None [de-identified] : None [de-identified] : No [de-identified] : 3x Weekly [de-identified] : Primary Dressing [de-identified] : None [de-identified] : No [de-identified] : Traumatic [de-identified] : No [de-identified] : Normal [de-identified] : None [de-identified] : None [de-identified] : None [de-identified] : No [de-identified] : 3x Weekly [de-identified] : Primary Dressing [de-identified] : None [de-identified] : No [de-identified] : No [de-identified] : Normal [de-identified] : None [de-identified] : None [de-identified] : None [de-identified] : No [de-identified] : 3x Weekly [de-identified] : Primary Dressing

## 2021-07-19 NOTE — ASSESSMENT
[Verbal] : Verbal [Written] : Written [Patient] : Patient [Good - alert, interested, motivated] : Good - alert, interested, motivated [Verbalizes knowledge/Understanding] : Verbalizes knowledge/understanding [Dressing changes] : dressing changes [Foot Care] : foot care [Skin Care] : skin care [Pressure relief] : pressure relief [Signs and symptoms of infection] : sign and symptoms of infection [Nutrition] : nutrition [How and When to Call] : how and when to call [Off-loading] : off-loading [Compression Therapy] : compression therapy [Patient responsibility to plan of care] : patient responsibility to plan of care [Glycemic Control] : glycemic control [Stable] : stable [Home] : Home [Cane] : Cane [Faxed - Long Term Care/Home Health Agency] : Long Term Care/Home Health Agency: Faxed [Home Health] : home health [] : Yes [FreeTextEntry2] : Infection Prevention\par Promote Skin Integrity\par Offloading\par Elevation\par Compression Compliance\par Low Na+ Diet\par Maintain acceptable levels of pain\par Demonstrates use of both pharmacological and nonpharmacological pain management interventions\par Weight Reduction Strategies\par Encourage Glycemic Control\par Pressure Relief [FreeTextEntry3] : new wounds present [FreeTextEntry4] : F/U to [facility] for an assessment in One Week\par Pt to have TAVR procedure 7/28/21 [FreeTextEntry1] : [facility]

## 2021-07-19 NOTE — REVIEW OF SYSTEMS
[Skin Wound] : skin wound [Fever] : no fever [Eye Pain] : no eye pain [Earache] : no earache [Chest Pain] : no chest pain [Shortness Of Breath] : no shortness of breath [Abdominal Pain] : no abdominal pain [de-identified] : new right lower leg ulceration down to skin, subcutaneous tissue, and fat. right foot dorsal incision site dehiscence healed at this time. right submet 4th ulcer down to skin, subcutaneous tissue, fat, tendon, bone (not necrotic), healed [de-identified] : diabetic neuropathy [de-identified] : type 2 diabetes

## 2021-07-19 NOTE — PLAN
[FreeTextEntry1] : Patient examined and evaluated at this time.\par Patient to have TAVR on 7/28/21.\par Continue local wound care and offloading.\par Spent 30 minutes for patient care and medical decision making.\par Pt to follow up in 1 week.

## 2021-07-19 NOTE — HISTORY OF PRESENT ILLNESS
[FreeTextEntry1] : Pt seen for a new right lower leg ulceration down to skin, subcutaneous tissue, and fat. pt also has bilateral stasis dermatitis with edema.

## 2021-07-20 NOTE — VITALS
[] : No [de-identified] : Pain scale:  0/10 - Patient reports no c/o pains or discomforts at present.

## 2021-07-20 NOTE — ASSESSMENT
[Verbal] : Verbal [Written] : Written [Demo] : Demo [Patient] : Patient [Family member] : Family member [Good - alert, interested, motivated] : Good - alert, interested, motivated [Verbalizes knowledge/Understanding] : Verbalizes knowledge/understanding [Foot Care] : foot care [Dressing changes] : dressing changes [Skin Care] : skin care [Signs and symptoms of infection] : sign and symptoms of infection [Nutrition] : nutrition [Venous Disease] : venous disease [How and When to Call] : how and when to call [Pain Management] : pain management [Off-loading] : off-loading [Compression Therapy] : compression therapy [Patient responsibility to plan of care] : patient responsibility to plan of care [Glycemic Control] : glycemic control [] : Yes [Home] : Home [Stable] : stable [Wheelchair] : Wheelchair [FreeTextEntry2] : Promote optimal skin integrity, offloading, infection prevention, edema control, weight loss\par  [FreeTextEntry4] : Patient reports that she is scheduled to have an angiogram on 7/9/21 and TAVR procedure on 7/28/21\par f/u 1 week [FreeTextEntry1] : [facility]

## 2021-07-20 NOTE — PHYSICAL EXAM
[4 x 4] : 4 x 4  [0] : left 0 [Ankle Swelling (On Exam)] : present [Ankle Swelling Bilaterally] : bilaterally  [Varicose Veins Of Lower Extremities] : bilaterally [] : bilaterally [Skin Ulcer] : ulcer [Ankle Swelling On The Left] : moderate [de-identified] : A&Ox3, NAD [de-identified] : 4/5 strength in all quadrants bilaterally [de-identified] : right foot dorsal incision site dehiscence down to skin, subcutaneous tissue, fat, no purulence, no malodor, no proximal streaking. right submet 4th ulcer down to skin, subcutaneous tissue, fat, tendon, bone (not necrotic), healed [FreeTextEntry1] : Right plantar foot - sub met 4th - closed [de-identified] : callus  [de-identified] : None [de-identified] : NSC\par  [FreeTextEntry7] : Right dorsal foot - S/P condylectomy of fourth met  [FreeTextEntry8] : 0.9 [FreeTextEntry9] : 0.2 [de-identified] : 0.1 [de-identified] : serosanguineous [de-identified] : 100% [de-identified] : Pt's compression socks [de-identified] : Silver Alginate [de-identified] : NSC\par  [de-identified] : Left 4th toe - proximal nailbed (new) [de-identified] : 0.3 [de-identified] : 0.2 [de-identified] : small serosanguineous [de-identified] : intact [de-identified] : 100% [de-identified] : Pt's compression socks [de-identified] : silver alginate [de-identified] : NSC\par  [TWNoteComboBox4] : None [de-identified] : other [de-identified] : None [de-identified] : None [de-identified] : None [de-identified] : No [de-identified] : Small [de-identified] : Normal [de-identified] : None [de-identified] : None [de-identified] : >75% [de-identified] : Yes [de-identified] : Other [de-identified] : 3x Weekly [de-identified] : Other [de-identified] : 3x Weekly

## 2021-07-20 NOTE — REVIEW OF SYSTEMS
[Skin Wound] : skin wound [Fever] : no fever [Eye Pain] : no eye pain [Earache] : no earache [Chest Pain] : no chest pain [Shortness Of Breath] : no shortness of breath [Abdominal Pain] : no abdominal pain [de-identified] : right foot dorsal incision site dehiscence with no cellulitis, no purulence, no malodor, no proximal streaking. right submet 4th ulcer down to skin, subcutaneous tissue, fat, tendon, bone (not necrotic), healed [de-identified] : diabetic neuropathy [de-identified] : type 2 diabetes

## 2021-07-21 ENCOUNTER — OUTPATIENT (OUTPATIENT)
Dept: OUTPATIENT SERVICES | Facility: HOSPITAL | Age: 66
LOS: 1 days | End: 2021-07-21
Payer: MEDICARE

## 2021-07-21 ENCOUNTER — APPOINTMENT (OUTPATIENT)
Dept: WOUND CARE | Facility: HOSPITAL | Age: 66
End: 2021-07-21

## 2021-07-21 ENCOUNTER — APPOINTMENT (OUTPATIENT)
Dept: ULTRASOUND IMAGING | Facility: HOSPITAL | Age: 66
End: 2021-07-21

## 2021-07-21 VITALS
SYSTOLIC BLOOD PRESSURE: 119 MMHG | TEMPERATURE: 97 F | WEIGHT: 293 LBS | HEART RATE: 66 BPM | RESPIRATION RATE: 20 BRPM | DIASTOLIC BLOOD PRESSURE: 81 MMHG | OXYGEN SATURATION: 99 % | HEIGHT: 63 IN

## 2021-07-21 DIAGNOSIS — Z95.2 PRESENCE OF PROSTHETIC HEART VALVE: Chronic | ICD-10-CM

## 2021-07-21 DIAGNOSIS — I35.0 NONRHEUMATIC AORTIC (VALVE) STENOSIS: ICD-10-CM

## 2021-07-21 DIAGNOSIS — Z01.818 ENCOUNTER FOR OTHER PREPROCEDURAL EXAMINATION: ICD-10-CM

## 2021-07-21 DIAGNOSIS — E11.9 TYPE 2 DIABETES MELLITUS WITHOUT COMPLICATIONS: ICD-10-CM

## 2021-07-21 DIAGNOSIS — I48.91 UNSPECIFIED ATRIAL FIBRILLATION: ICD-10-CM

## 2021-07-21 DIAGNOSIS — Z98.890 OTHER SPECIFIED POSTPROCEDURAL STATES: Chronic | ICD-10-CM

## 2021-07-21 DIAGNOSIS — I26.99 OTHER PULMONARY EMBOLISM WITHOUT ACUTE COR PULMONALE: ICD-10-CM

## 2021-07-21 LAB
A1C WITH ESTIMATED AVERAGE GLUCOSE RESULT: 10.3 % — HIGH (ref 4–5.6)
ALBUMIN SERPL ELPH-MCNC: 4 G/DL — SIGNIFICANT CHANGE UP (ref 3.3–5)
ALP SERPL-CCNC: 142 U/L — HIGH (ref 40–120)
ALT FLD-CCNC: 20 U/L — SIGNIFICANT CHANGE UP (ref 10–45)
ANION GAP SERPL CALC-SCNC: 12 MMOL/L — SIGNIFICANT CHANGE UP (ref 5–17)
AST SERPL-CCNC: 22 U/L — SIGNIFICANT CHANGE UP (ref 10–40)
BILIRUB SERPL-MCNC: 0.4 MG/DL — SIGNIFICANT CHANGE UP (ref 0.2–1.2)
BLD GP AB SCN SERPL QL: NEGATIVE — SIGNIFICANT CHANGE UP
BUN SERPL-MCNC: 23 MG/DL — SIGNIFICANT CHANGE UP (ref 7–23)
CALCIUM SERPL-MCNC: 10.5 MG/DL — SIGNIFICANT CHANGE UP (ref 8.4–10.5)
CHLORIDE SERPL-SCNC: 99 MMOL/L — SIGNIFICANT CHANGE UP (ref 96–108)
CO2 SERPL-SCNC: 26 MMOL/L — SIGNIFICANT CHANGE UP (ref 22–31)
CREAT SERPL-MCNC: 0.94 MG/DL — SIGNIFICANT CHANGE UP (ref 0.5–1.3)
ESTIMATED AVERAGE GLUCOSE: 249 MG/DL — HIGH (ref 68–114)
GLUCOSE SERPL-MCNC: 169 MG/DL — HIGH (ref 70–99)
HCT VFR BLD CALC: 39.8 % — SIGNIFICANT CHANGE UP (ref 34.5–45)
HGB BLD-MCNC: 12 G/DL — SIGNIFICANT CHANGE UP (ref 11.5–15.5)
MCHC RBC-ENTMCNC: 26.2 PG — LOW (ref 27–34)
MCHC RBC-ENTMCNC: 30.2 GM/DL — LOW (ref 32–36)
MCV RBC AUTO: 86.9 FL — SIGNIFICANT CHANGE UP (ref 80–100)
NRBC # BLD: 0 /100 WBCS — SIGNIFICANT CHANGE UP (ref 0–0)
PLATELET # BLD AUTO: 254 K/UL — SIGNIFICANT CHANGE UP (ref 150–400)
POTASSIUM SERPL-MCNC: 4.7 MMOL/L — SIGNIFICANT CHANGE UP (ref 3.5–5.3)
POTASSIUM SERPL-SCNC: 4.7 MMOL/L — SIGNIFICANT CHANGE UP (ref 3.5–5.3)
PROT SERPL-MCNC: 7.5 G/DL — SIGNIFICANT CHANGE UP (ref 6–8.3)
RBC # BLD: 4.58 M/UL — SIGNIFICANT CHANGE UP (ref 3.8–5.2)
RBC # FLD: 16.2 % — HIGH (ref 10.3–14.5)
RH IG SCN BLD-IMP: POSITIVE — SIGNIFICANT CHANGE UP
SODIUM SERPL-SCNC: 137 MMOL/L — SIGNIFICANT CHANGE UP (ref 135–145)
WBC # BLD: 11.42 K/UL — HIGH (ref 3.8–10.5)
WBC # FLD AUTO: 11.42 K/UL — HIGH (ref 3.8–10.5)

## 2021-07-21 PROCEDURE — 87641 MR-STAPH DNA AMP PROBE: CPT

## 2021-07-21 PROCEDURE — 86901 BLOOD TYPING SEROLOGIC RH(D): CPT

## 2021-07-21 PROCEDURE — 86923 COMPATIBILITY TEST ELECTRIC: CPT

## 2021-07-21 PROCEDURE — 71046 X-RAY EXAM CHEST 2 VIEWS: CPT | Mod: 26

## 2021-07-21 PROCEDURE — 86850 RBC ANTIBODY SCREEN: CPT

## 2021-07-21 PROCEDURE — 86900 BLOOD TYPING SEROLOGIC ABO: CPT

## 2021-07-21 PROCEDURE — 93880 EXTRACRANIAL BILAT STUDY: CPT | Mod: 26

## 2021-07-21 PROCEDURE — 87640 STAPH A DNA AMP PROBE: CPT

## 2021-07-21 PROCEDURE — 71046 X-RAY EXAM CHEST 2 VIEWS: CPT

## 2021-07-21 PROCEDURE — 93880 EXTRACRANIAL BILAT STUDY: CPT

## 2021-07-21 PROCEDURE — G0463: CPT

## 2021-07-21 PROCEDURE — 85027 COMPLETE CBC AUTOMATED: CPT

## 2021-07-21 PROCEDURE — 80053 COMPREHEN METABOLIC PANEL: CPT

## 2021-07-21 PROCEDURE — 83036 HEMOGLOBIN GLYCOSYLATED A1C: CPT

## 2021-07-21 RX ORDER — INSULIN GLARGINE 100 [IU]/ML
50 INJECTION, SOLUTION SUBCUTANEOUS
Qty: 0 | Refills: 0 | DISCHARGE

## 2021-07-21 RX ORDER — VANCOMYCIN HCL 1 G
1500 VIAL (EA) INTRAVENOUS ONCE
Refills: 0 | Status: COMPLETED | OUTPATIENT
Start: 2021-07-28 | End: 2021-07-28

## 2021-07-21 NOTE — H&P PST ADULT - NSICDXPROBLEM_GEN_ALL_CORE_FT
PROBLEM DIAGNOSES  Problem: Aortic stenosis  Assessment and Plan: TAVR    Problem: Fibrillation, atrial  Assessment and Plan: Will continue aspirin    Problem: Pulmonary embolism  Assessment and Plan: Will contact Dr. Bear regarding PE, instructed to hold xarelto 3 days prior until further notice    Problem: DM (diabetes mellitus)  Assessment and Plan: Instructed to hold humalog the morning of surgery and to half tresiba dose, as patient takes sliding scale dosage

## 2021-07-21 NOTE — H&P PST ADULT - VASCULAR DETAILS
small ulceration of the right lower extremity, skin reddened bilaterally due to venous stasis changes

## 2021-07-21 NOTE — H&P PST ADULT - ASSESSMENT
BARBIEI VTE 2.0 SCORE [CLOT updated 2019]    AGE RELATED RISK FACTORS                                                       MOBILITY RELATED FACTORS  [ ] Age 41-60 years                                            (1 Point)                    [ ] Bed rest                                                        (1 Point)  [ x] Age: 61-74 years                                           (2 Points)                  [ ] Plaster cast                                                   (2 Points)  [ ] Age= 75 years                                              (3 Points)                    [ ] Bed bound for more than 72 hours                 (2 Points)    DISEASE RELATED RISK FACTORS                                               GENDER SPECIFIC FACTORS  [x ] Edema in the lower extremities                       (1 Point)              [ ] Pregnancy                                                     (1 Point)  [ ] Varicose veins                                               (1 Point)                     [ ] Post-partum < 6 weeks                                   (1 Point)             [x ] BMI > 25 Kg/m2                                            (1 Point)                     [ ] Hormonal therapy  or oral contraception          (1 Point)                 [ ] Sepsis (in the previous month)                        (1 Point)               [ ] History of pregnancy complications                 (1 point)  [ ] Pneumonia or serious lung disease                                               [ ] Unexplained or recurrent                     (1 Point)           (in the previous month)                               (1 Point)  [ ] Abnormal pulmonary function test                     (1 Point)                 SURGERY RELATED RISK FACTORS  [ ] Acute myocardial infarction                              (1 Point)               [ ]  Section                                             (1 Point)  [ ] Congestive heart failure (in the previous month)  (1 Point)      [ ] Minor surgery                                                  (1 Point)   [ ] Inflammatory bowel disease                             (1 Point)               [ ] Arthroscopic surgery                                        (2 Points)  [ ] Central venous access                                      (2 Points)                [ x] General surgery lasting more than 45 minutes (2 points)  [ ] Malignancy- Present or previous                   (2 Points)                [ ] Elective arthroplasty                                         (5 points)    [ ] Stroke (in the previous month)                          (5 Points)                                                                                                                                                           HEMATOLOGY RELATED FACTORS                                                 TRAUMA RELATED RISK FACTORS  [x ] Prior episodes of VTE                                     (3 Points)                [ ] Fracture of the hip, pelvis, or leg                       (5 Points)  [ ] Positive family history for VTE                         (3 Points)             [ ] Acute spinal cord injury (in the previous month)  (5 Points)  [ ] Prothrombin 89734 A                                     (3 Points)               [ ] Paralysis  (less than 1 month)                             (5 Points)  [ ] Factor V Leiden                                             (3 Points)                  [ ] Multiple Trauma within 1 month                        (5 Points)  [ ] Lupus anticoagulants                                     (3 Points)                                                           [ ] Anticardiolipin antibodies                               (3 Points)                                                       [ ] High homocysteine in the blood                      (3 Points)                                             [ ] Other congenital or acquired thrombophilia      (3 Points)                                                [ ] Heparin induced thrombocytopenia                  (3 Points)                                     Total Score [    9      ]

## 2021-07-21 NOTE — H&P PST ADULT - HISTORY OF PRESENT ILLNESS
This is a 66 y/o female PMH type 2 diabetes with H/O gastroparesis, pulmonary embolism and right lower extremity DVT, is on xarelto, hypertension, aortic stenosis. S/P TAVR 2015, c/o increasing dyspnea after surgery, found to have calcified aortic valve as per patient.  Presents today for TAVR. This is a 64 y/o female PMH type 2 diabetes with H/O gastroparesis, pulmonary embolism and right lower extremity DVT 8/2020, is on xarelto, hypertension, aortic stenosis. S/P TAVR 2015, c/o increasing dyspnea after surgery, found to have calcified aortic valve as per patient.  Presents today for TAVR. This is a 64 y/o female PMH type 2 diabetes with H/O gastroparesis, pulmonary embolism and right lower extremity DVT 8/2020, is on xarelto, has not had a follow CT scan since, PVD with a small ulceration of the right shin region, hypertension, aortic stenosis. S/P TAVR 2015, c/o increasing dyspnea after surgery, found to have calcified aortic valve as per patient.  Presents today for TAVR.

## 2021-07-21 NOTE — H&P PST ADULT - NSICDXPASTMEDICALHX_GEN_ALL_CORE_FT
PAST MEDICAL HISTORY:  Cholesterol serum elevated     Depression     Diabetes type 2, uncontrolled     Gastroparesis due to DM     GERD (gastroesophageal reflux disease)     Herniated disc     History of diabetic retinopathy     Hypertension     Hypothyroid not on medication    IBS (irritable bowel syndrome)     MVP (mitral valve prolapse)     Obesity     Peripheral neuropathy     Spinal stenosis chronic back pain

## 2021-07-21 NOTE — H&P PST ADULT - NSICDXPASTSURGICALHX_GEN_ALL_CORE_FT
PAST SURGICAL HISTORY:  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    Cataract B/L cataracts    Cervix abnormality Ablation of cervix    right eye torn retina     S/P bariatric surgery lap band surgery two times due to erosion, S/P removal    S/P D&C (status post dilation and curettage) D&C when she was 18    S/P foot surgery May 2021    S/P TAVR (transcatheter aortic valve replacement) 2015    S/P tonsillectomy and adenoidectomy

## 2021-07-22 PROBLEM — G62.9 POLYNEUROPATHY, UNSPECIFIED: Chronic | Status: ACTIVE | Noted: 2021-07-21

## 2021-07-22 LAB
MRSA PCR RESULT.: SIGNIFICANT CHANGE UP
S AUREUS DNA NOSE QL NAA+PROBE: SIGNIFICANT CHANGE UP

## 2021-07-23 ENCOUNTER — APPOINTMENT (OUTPATIENT)
Dept: WOUND CARE | Facility: HOSPITAL | Age: 66
End: 2021-07-23
Payer: MEDICARE

## 2021-07-23 ENCOUNTER — OUTPATIENT (OUTPATIENT)
Dept: OUTPATIENT SERVICES | Facility: HOSPITAL | Age: 66
LOS: 1 days | Discharge: HOME | End: 2021-07-23
Payer: MEDICARE

## 2021-07-23 VITALS
TEMPERATURE: 98.1 F | RESPIRATION RATE: 18 BRPM | DIASTOLIC BLOOD PRESSURE: 63 MMHG | HEART RATE: 80 BPM | SYSTOLIC BLOOD PRESSURE: 121 MMHG | OXYGEN SATURATION: 95 % | BODY MASS INDEX: 51.91 KG/M2 | HEIGHT: 63 IN | WEIGHT: 293 LBS

## 2021-07-23 DIAGNOSIS — Z98.84 BARIATRIC SURGERY STATUS: ICD-10-CM

## 2021-07-23 DIAGNOSIS — I83.218 VARICOSE VEINS OF RIGHT LOWER EXTREMITY WITH BOTH ULCER OF OTHER PART OF LOWER EXTREMITY AND INFLAMMATION: ICD-10-CM

## 2021-07-23 DIAGNOSIS — E11.621 TYPE 2 DIABETES MELLITUS WITH FOOT ULCER: ICD-10-CM

## 2021-07-23 DIAGNOSIS — M48.07 SPINAL STENOSIS, LUMBOSACRAL REGION: ICD-10-CM

## 2021-07-23 DIAGNOSIS — I34.1 NONRHEUMATIC MITRAL (VALVE) PROLAPSE: ICD-10-CM

## 2021-07-23 DIAGNOSIS — E55.9 VITAMIN D DEFICIENCY, UNSPECIFIED: ICD-10-CM

## 2021-07-23 DIAGNOSIS — I11.9 HYPERTENSIVE HEART DISEASE WITHOUT HEART FAILURE: ICD-10-CM

## 2021-07-23 DIAGNOSIS — L97.416 NON-PRESSURE CHRONIC ULCER OF RIGHT HEEL AND MIDFOOT WITH BONE INVOLVEMENT WITHOUT EVIDENCE OF NECROSIS: ICD-10-CM

## 2021-07-23 DIAGNOSIS — Z87.81 PERSONAL HISTORY OF (HEALED) TRAUMATIC FRACTURE: ICD-10-CM

## 2021-07-23 DIAGNOSIS — Z79.899 OTHER LONG TERM (CURRENT) DRUG THERAPY: ICD-10-CM

## 2021-07-23 DIAGNOSIS — G47.10 HYPERSOMNIA, UNSPECIFIED: ICD-10-CM

## 2021-07-23 DIAGNOSIS — I83.12 VARICOSE VEINS OF LEFT LOWER EXTREMITY WITH INFLAMMATION: ICD-10-CM

## 2021-07-23 DIAGNOSIS — Z79.82 LONG TERM (CURRENT) USE OF ASPIRIN: ICD-10-CM

## 2021-07-23 DIAGNOSIS — E05.90 THYROTOXICOSIS, UNSPECIFIED WITHOUT THYROTOXIC CRISIS OR STORM: ICD-10-CM

## 2021-07-23 DIAGNOSIS — Z95.4 PRESENCE OF OTHER HEART-VALVE REPLACEMENT: ICD-10-CM

## 2021-07-23 DIAGNOSIS — E11.59 TYPE 2 DIABETES MELLITUS WITH OTHER CIRCULATORY COMPLICATIONS: ICD-10-CM

## 2021-07-23 DIAGNOSIS — I83.893 VARICOSE VEINS OF BILATERAL LOWER EXTREMITIES WITH OTHER COMPLICATIONS: ICD-10-CM

## 2021-07-23 DIAGNOSIS — Z79.4 LONG TERM (CURRENT) USE OF INSULIN: ICD-10-CM

## 2021-07-23 DIAGNOSIS — I47.1 SUPRAVENTRICULAR TACHYCARDIA: ICD-10-CM

## 2021-07-23 DIAGNOSIS — E11.40 TYPE 2 DIABETES MELLITUS WITH DIABETIC NEUROPATHY, UNSPECIFIED: ICD-10-CM

## 2021-07-23 DIAGNOSIS — Z88.5 ALLERGY STATUS TO NARCOTIC AGENT: ICD-10-CM

## 2021-07-23 DIAGNOSIS — Z87.891 PERSONAL HISTORY OF NICOTINE DEPENDENCE: ICD-10-CM

## 2021-07-23 DIAGNOSIS — Z83.49 FAMILY HISTORY OF OTHER ENDOCRINE, NUTRITIONAL AND METABOLIC DISEASES: ICD-10-CM

## 2021-07-23 DIAGNOSIS — M43.16 SPONDYLOLISTHESIS, LUMBAR REGION: ICD-10-CM

## 2021-07-23 DIAGNOSIS — G47.33 OBSTRUCTIVE SLEEP APNEA (ADULT) (PEDIATRIC): ICD-10-CM

## 2021-07-23 DIAGNOSIS — Z87.01 PERSONAL HISTORY OF PNEUMONIA (RECURRENT): ICD-10-CM

## 2021-07-23 DIAGNOSIS — Z95.2 PRESENCE OF PROSTHETIC HEART VALVE: Chronic | ICD-10-CM

## 2021-07-23 DIAGNOSIS — M47.817 SPONDYLOSIS WITHOUT MYELOPATHY OR RADICULOPATHY, LUMBOSACRAL REGION: ICD-10-CM

## 2021-07-23 DIAGNOSIS — L97.812 NON-PRESSURE CHRONIC ULCER OF OTHER PART OF RIGHT LOWER LEG WITH FAT LAYER EXPOSED: ICD-10-CM

## 2021-07-23 DIAGNOSIS — E66.01 MORBID (SEVERE) OBESITY DUE TO EXCESS CALORIES: ICD-10-CM

## 2021-07-23 DIAGNOSIS — Z98.890 OTHER SPECIFIED POSTPROCEDURAL STATES: Chronic | ICD-10-CM

## 2021-07-23 DIAGNOSIS — Z82.49 FAMILY HISTORY OF ISCHEMIC HEART DISEASE AND OTHER DISEASES OF THE CIRCULATORY SYSTEM: ICD-10-CM

## 2021-07-23 PROCEDURE — 99213 OFFICE O/P EST LOW 20 MIN: CPT | Mod: 24

## 2021-07-23 PROCEDURE — G0463: CPT

## 2021-07-23 NOTE — ASSESSMENT
[Verbal] : Verbal [Written] : Written [Patient] : Patient [Good - alert, interested, motivated] : Good - alert, interested, motivated [Verbalizes knowledge/Understanding] : Verbalizes knowledge/understanding [Dressing changes] : dressing changes [Foot Care] : foot care [Skin Care] : skin care [Pressure relief] : pressure relief [Signs and symptoms of infection] : sign and symptoms of infection [Nutrition] : nutrition [How and When to Call] : how and when to call [Off-loading] : off-loading [Compression Therapy] : compression therapy [Home Health] : home health [Patient responsibility to plan of care] : patient responsibility to plan of care [Glycemic Control] : glycemic control [] : Yes [Stable] : stable [Home] : Home [Cane] : Cane [Faxed - Long Term Care/Home Health Agency] : Long Term Care/Home Health Agency: Faxed [FreeTextEntry4] : Pattient is scheduled to have TAVR procedure 7/28/21, DPM aware\par F/U on 7/27/21 for dressing change [FreeTextEntry2] : Infection Prevention\par Promote Skin Integrity\par Offloading\par Elevation\par Compression Compliance\par Low Na+ Diet\par Maintain acceptable levels of pain\par Demonstrates use of both pharmacological and nonpharmacological pain management interventions\par Weight Reduction Strategies\par Encourage Glycemic Control\par Pressure Relief\par Surgical Intervention\par F/U  [FreeTextEntry1] : [facility]

## 2021-07-23 NOTE — PHYSICAL EXAM
[4 x 4] : 4 x 4  [2 x 2] : 2 x 2  [0] : left 0 [Ankle Swelling Bilaterally] : bilaterally  [Ankle Swelling (On Exam)] : present [Varicose Veins Of Lower Extremities] : bilaterally [] : bilaterally [Ankle Swelling On The Left] : moderate [Skin Ulcer] : ulcer [de-identified] : A&Ox3, NAD [de-identified] : 4/5 strength in all quadrants bilaterally [de-identified] : right lower leg ulceration down to skin, subcutaneous tissue, and fat, epithelialized. right foot dorsal incision site dehiscence healed at this time. right submet 4th ulcer down to skin, subcutaneous tissue, fat, tendon, bone (not necrotic), healed [de-identified] : callus  [de-identified] : none [de-identified] : intact [FreeTextEntry1] : Right Anterior Leg  [FreeTextEntry2] : 1.4 [FreeTextEntry3] : 0.8 [de-identified] : alginate  [FreeTextEntry4] : 0.1 [de-identified] : Cleansed with Normal Saline \par allevyn border [FreeTextEntry7] : Left dorsal Foot - 2nd Digit- scab [FreeTextEntry8] : 0.4 [FreeTextEntry9] : 0.4 [de-identified] : 0.1 [de-identified] : NONE [de-identified] : Intact  [de-identified] : NONE [de-identified] : Cleansed with Normal Saline \par  [de-identified] : Left Plantar Foot 2nd digit- Blood Blister [de-identified] : 0.4 [de-identified] : 0.7 [de-identified] : NONE [de-identified] : Cleansed with Normal saline\par  [de-identified] : Left Leg- dry eschar/scab [de-identified] : 0.3 [de-identified] : 0.4 [de-identified] : Alginate  [de-identified] : Cleansed with Normal saline\par allevyn border [TWNoteComboBox1] : Right [TWNoteComboBox9] : Right [de-identified] : Small [de-identified] : Other [de-identified] : 3x Weekly [de-identified] : Left [de-identified] : Other [de-identified] : 3x Weekly [de-identified] : Primary Dressing [TWNoteComboBox4] : None [TWNoteComboBox5] : No [de-identified] : No [de-identified] : Normal [de-identified] : None [de-identified] : None [de-identified] : 100% [de-identified] : No [de-identified] : 2x Weekly [de-identified] : Primary Dressing [de-identified] : No [de-identified] : Traumatic [de-identified] : No [de-identified] : Normal [de-identified] : None [de-identified] : None [de-identified] : None [de-identified] : No [de-identified] : 3x Weekly [de-identified] : Primary Dressing [de-identified] : No [de-identified] : None [de-identified] : Traumatic [de-identified] : No [de-identified] : Normal [de-identified] : None [de-identified] : None [de-identified] : None [de-identified] : No [de-identified] : 3x Weekly [de-identified] : None [de-identified] : No [de-identified] : No [de-identified] : Normal [de-identified] : None [de-identified] : None [de-identified] : None [de-identified] : 2x Weekly [de-identified] : No [de-identified] : Primary Dressing

## 2021-07-23 NOTE — VITALS
[Pain related to present condition?] : The patient's  pain is not related to present condition. [de-identified] : 0 [] : No

## 2021-07-23 NOTE — PLAN
[FreeTextEntry1] : Patient examined and evaluated at this time.\par Patient to have TAVR on 7/28/21.\par Wounds healed at this time.\par Spent 20 minutes for patient care and medical decision making.\par Pt to follow up after TAVR procedure.

## 2021-07-23 NOTE — REVIEW OF SYSTEMS
[Fever] : no fever [Eye Pain] : no eye pain [Earache] : no earache [Chest Pain] : no chest pain [Shortness Of Breath] : no shortness of breath [Abdominal Pain] : no abdominal pain [Skin Wound] : skin wound [de-identified] : right lower leg ulceration down to skin, subcutaneous tissue, and fat, epithelialized. right foot dorsal incision site dehiscence healed at this time. right submet 4th ulcer down to skin, subcutaneous tissue, fat, tendon, bone (not necrotic), healed [de-identified] : diabetic neuropathy [de-identified] : type 2 diabetes

## 2021-07-27 ENCOUNTER — OUTPATIENT (OUTPATIENT)
Dept: OUTPATIENT SERVICES | Facility: HOSPITAL | Age: 66
LOS: 1 days | Discharge: HOME | End: 2021-07-27
Payer: MEDICARE

## 2021-07-27 ENCOUNTER — APPOINTMENT (OUTPATIENT)
Dept: WOUND CARE | Facility: HOSPITAL | Age: 66
End: 2021-07-27
Payer: MEDICARE

## 2021-07-27 VITALS
HEIGHT: 63 IN | WEIGHT: 293 LBS | TEMPERATURE: 97.5 F | RESPIRATION RATE: 18 BRPM | OXYGEN SATURATION: 95 % | SYSTOLIC BLOOD PRESSURE: 135 MMHG | BODY MASS INDEX: 51.91 KG/M2 | DIASTOLIC BLOOD PRESSURE: 70 MMHG | HEART RATE: 81 BPM

## 2021-07-27 DIAGNOSIS — Z98.890 OTHER SPECIFIED POSTPROCEDURAL STATES: Chronic | ICD-10-CM

## 2021-07-27 DIAGNOSIS — E11.621 TYPE 2 DIABETES MELLITUS WITH FOOT ULCER: ICD-10-CM

## 2021-07-27 DIAGNOSIS — Z95.2 PRESENCE OF PROSTHETIC HEART VALVE: Chronic | ICD-10-CM

## 2021-07-27 PROBLEM — I35.0 NONRHEUMATIC AORTIC (VALVE) STENOSIS: Chronic | Status: ACTIVE | Noted: 2021-07-21

## 2021-07-27 PROBLEM — I48.91 UNSPECIFIED ATRIAL FIBRILLATION: Chronic | Status: ACTIVE | Noted: 2021-07-21

## 2021-07-27 PROBLEM — I42.1 OBSTRUCTIVE HYPERTROPHIC CARDIOMYOPATHY: Chronic | Status: ACTIVE | Noted: 2021-07-21

## 2021-07-27 PROCEDURE — G0463: CPT

## 2021-07-27 PROCEDURE — ZZZZZ: CPT

## 2021-07-28 ENCOUNTER — APPOINTMENT (OUTPATIENT)
Dept: CARDIOTHORACIC SURGERY | Facility: HOSPITAL | Age: 66
End: 2021-07-28

## 2021-07-28 ENCOUNTER — INPATIENT (INPATIENT)
Facility: HOSPITAL | Age: 66
LOS: 1 days | Discharge: HOME | DRG: 267 | End: 2021-07-30
Attending: THORACIC SURGERY (CARDIOTHORACIC VASCULAR SURGERY) | Admitting: THORACIC SURGERY (CARDIOTHORACIC VASCULAR SURGERY)
Payer: MEDICARE

## 2021-07-28 VITALS
RESPIRATION RATE: 18 BRPM | TEMPERATURE: 98 F | WEIGHT: 293 LBS | HEIGHT: 63 IN | DIASTOLIC BLOOD PRESSURE: 72 MMHG | OXYGEN SATURATION: 99 % | HEART RATE: 84 BPM | SYSTOLIC BLOOD PRESSURE: 147 MMHG

## 2021-07-28 DIAGNOSIS — E11.621 TYPE 2 DIABETES MELLITUS WITH FOOT ULCER: ICD-10-CM

## 2021-07-28 DIAGNOSIS — I83.218 VARICOSE VEINS OF RIGHT LOWER EXTREMITY WITH BOTH ULCER OF OTHER PART OF LOWER EXTREMITY AND INFLAMMATION: ICD-10-CM

## 2021-07-28 DIAGNOSIS — Z98.890 OTHER SPECIFIED POSTPROCEDURAL STATES: Chronic | ICD-10-CM

## 2021-07-28 DIAGNOSIS — I83.12 VARICOSE VEINS OF LEFT LOWER EXTREMITY WITH INFLAMMATION: ICD-10-CM

## 2021-07-28 DIAGNOSIS — I35.0 NONRHEUMATIC AORTIC (VALVE) STENOSIS: ICD-10-CM

## 2021-07-28 DIAGNOSIS — L97.416 NON-PRESSURE CHRONIC ULCER OF RIGHT HEEL AND MIDFOOT WITH BONE INVOLVEMENT WITHOUT EVIDENCE OF NECROSIS: ICD-10-CM

## 2021-07-28 DIAGNOSIS — Z95.2 PRESENCE OF PROSTHETIC HEART VALVE: Chronic | ICD-10-CM

## 2021-07-28 DIAGNOSIS — L97.812 NON-PRESSURE CHRONIC ULCER OF OTHER PART OF RIGHT LOWER LEG WITH FAT LAYER EXPOSED: ICD-10-CM

## 2021-07-28 DIAGNOSIS — I83.893 VARICOSE VEINS OF BILATERAL LOWER EXTREMITIES WITH OTHER COMPLICATIONS: ICD-10-CM

## 2021-07-28 LAB
ALBUMIN SERPL ELPH-MCNC: 4 G/DL — SIGNIFICANT CHANGE UP (ref 3.3–5)
ALP SERPL-CCNC: 133 U/L — HIGH (ref 40–120)
ALT FLD-CCNC: 10 U/L — SIGNIFICANT CHANGE UP (ref 10–45)
ANION GAP SERPL CALC-SCNC: 15 MMOL/L — SIGNIFICANT CHANGE UP (ref 5–17)
APTT BLD: 28.2 SEC — SIGNIFICANT CHANGE UP (ref 27.5–35.5)
AST SERPL-CCNC: 13 U/L — SIGNIFICANT CHANGE UP (ref 10–40)
BASOPHILS # BLD AUTO: 0.05 K/UL — SIGNIFICANT CHANGE UP (ref 0–0.2)
BASOPHILS NFR BLD AUTO: 0.2 % — SIGNIFICANT CHANGE UP (ref 0–2)
BILIRUB SERPL-MCNC: 0.4 MG/DL — SIGNIFICANT CHANGE UP (ref 0.2–1.2)
BUN SERPL-MCNC: 17 MG/DL — SIGNIFICANT CHANGE UP (ref 7–23)
CALCIUM SERPL-MCNC: 9.1 MG/DL — SIGNIFICANT CHANGE UP (ref 8.4–10.5)
CHLORIDE SERPL-SCNC: 99 MMOL/L — SIGNIFICANT CHANGE UP (ref 96–108)
CO2 SERPL-SCNC: 24 MMOL/L — SIGNIFICANT CHANGE UP (ref 22–31)
CREAT SERPL-MCNC: 0.81 MG/DL — SIGNIFICANT CHANGE UP (ref 0.5–1.3)
EOSINOPHIL # BLD AUTO: 0.11 K/UL — SIGNIFICANT CHANGE UP (ref 0–0.5)
EOSINOPHIL NFR BLD AUTO: 0.5 % — SIGNIFICANT CHANGE UP (ref 0–6)
GLUCOSE BLDC GLUCOMTR-MCNC: 193 MG/DL — HIGH (ref 70–99)
GLUCOSE BLDC GLUCOMTR-MCNC: 241 MG/DL — HIGH (ref 70–99)
GLUCOSE BLDC GLUCOMTR-MCNC: 261 MG/DL — HIGH (ref 70–99)
GLUCOSE BLDC GLUCOMTR-MCNC: 283 MG/DL — HIGH (ref 70–99)
GLUCOSE BLDC GLUCOMTR-MCNC: 289 MG/DL — HIGH (ref 70–99)
GLUCOSE BLDC GLUCOMTR-MCNC: 298 MG/DL — HIGH (ref 70–99)
GLUCOSE BLDC GLUCOMTR-MCNC: 318 MG/DL — HIGH (ref 70–99)
GLUCOSE BLDC GLUCOMTR-MCNC: 331 MG/DL — HIGH (ref 70–99)
GLUCOSE SERPL-MCNC: 283 MG/DL — HIGH (ref 70–99)
HCT VFR BLD CALC: 35.4 % — SIGNIFICANT CHANGE UP (ref 34.5–45)
HGB BLD-MCNC: 11.3 G/DL — LOW (ref 11.5–15.5)
IMM GRANULOCYTES NFR BLD AUTO: 2.6 % — HIGH (ref 0–1.5)
INR BLD: 1.14 RATIO — SIGNIFICANT CHANGE UP (ref 0.88–1.16)
LYMPHOCYTES # BLD AUTO: 1.56 K/UL — SIGNIFICANT CHANGE UP (ref 1–3.3)
LYMPHOCYTES # BLD AUTO: 7.6 % — LOW (ref 13–44)
MAGNESIUM SERPL-MCNC: 1.8 MG/DL — SIGNIFICANT CHANGE UP (ref 1.6–2.6)
MCHC RBC-ENTMCNC: 27.2 PG — SIGNIFICANT CHANGE UP (ref 27–34)
MCHC RBC-ENTMCNC: 31.9 GM/DL — LOW (ref 32–36)
MCV RBC AUTO: 85.3 FL — SIGNIFICANT CHANGE UP (ref 80–100)
MONOCYTES # BLD AUTO: 0.68 K/UL — SIGNIFICANT CHANGE UP (ref 0–0.9)
MONOCYTES NFR BLD AUTO: 3.3 % — SIGNIFICANT CHANGE UP (ref 2–14)
NEUTROPHILS # BLD AUTO: 17.66 K/UL — HIGH (ref 1.8–7.4)
NEUTROPHILS NFR BLD AUTO: 85.8 % — HIGH (ref 43–77)
NRBC # BLD: 0 /100 WBCS — SIGNIFICANT CHANGE UP (ref 0–0)
PLATELET # BLD AUTO: 208 K/UL — SIGNIFICANT CHANGE UP (ref 150–400)
POTASSIUM SERPL-MCNC: 3.3 MMOL/L — LOW (ref 3.5–5.3)
POTASSIUM SERPL-MCNC: 4.2 MMOL/L — SIGNIFICANT CHANGE UP (ref 3.5–5.3)
POTASSIUM SERPL-SCNC: 3.3 MMOL/L — LOW (ref 3.5–5.3)
POTASSIUM SERPL-SCNC: 4.2 MMOL/L — SIGNIFICANT CHANGE UP (ref 3.5–5.3)
PROT SERPL-MCNC: 6.8 G/DL — SIGNIFICANT CHANGE UP (ref 6–8.3)
PROTHROM AB SERPL-ACNC: 13.6 SEC — SIGNIFICANT CHANGE UP (ref 10.6–13.6)
RBC # BLD: 4.15 M/UL — SIGNIFICANT CHANGE UP (ref 3.8–5.2)
RBC # FLD: 16.5 % — HIGH (ref 10.3–14.5)
SODIUM SERPL-SCNC: 138 MMOL/L — SIGNIFICANT CHANGE UP (ref 135–145)
WBC # BLD: 20.59 K/UL — HIGH (ref 3.8–10.5)
WBC # FLD AUTO: 20.59 K/UL — HIGH (ref 3.8–10.5)

## 2021-07-28 PROCEDURE — 99291 CRITICAL CARE FIRST HOUR: CPT

## 2021-07-28 PROCEDURE — 99223 1ST HOSP IP/OBS HIGH 75: CPT

## 2021-07-28 PROCEDURE — 93010 ELECTROCARDIOGRAM REPORT: CPT

## 2021-07-28 PROCEDURE — 93355 ECHO TRANSESOPHAGEAL (TEE): CPT

## 2021-07-28 PROCEDURE — 71045 X-RAY EXAM CHEST 1 VIEW: CPT | Mod: 26

## 2021-07-28 PROCEDURE — 33361 REPLACE AORTIC VALVE PERQ: CPT | Mod: Q0,62

## 2021-07-28 PROCEDURE — 33361 REPLACE AORTIC VALVE PERQ: CPT | Mod: 62,Q0

## 2021-07-28 RX ORDER — MAGNESIUM OXIDE 400 MG ORAL TABLET 241.3 MG
400 TABLET ORAL ONCE
Refills: 0 | Status: COMPLETED | OUTPATIENT
Start: 2021-07-28 | End: 2021-07-28

## 2021-07-28 RX ORDER — FUROSEMIDE 40 MG
40 TABLET ORAL ONCE
Refills: 0 | Status: COMPLETED | OUTPATIENT
Start: 2021-07-28 | End: 2021-07-28

## 2021-07-28 RX ORDER — CEFUROXIME AXETIL 250 MG
1500 TABLET ORAL EVERY 8 HOURS
Refills: 0 | Status: COMPLETED | OUTPATIENT
Start: 2021-07-28 | End: 2021-07-29

## 2021-07-28 RX ORDER — ACETAMINOPHEN 500 MG
1000 TABLET ORAL ONCE
Refills: 0 | Status: COMPLETED | OUTPATIENT
Start: 2021-07-28 | End: 2021-07-28

## 2021-07-28 RX ORDER — CHLORHEXIDINE GLUCONATE 213 G/1000ML
1 SOLUTION TOPICAL ONCE
Refills: 0 | Status: DISCONTINUED | OUTPATIENT
Start: 2021-07-28 | End: 2021-07-28

## 2021-07-28 RX ORDER — DEXTROSE 50 % IN WATER 50 %
25 SYRINGE (ML) INTRAVENOUS
Refills: 0 | Status: DISCONTINUED | OUTPATIENT
Start: 2021-07-28 | End: 2021-07-30

## 2021-07-28 RX ORDER — DEXTROSE 50 % IN WATER 50 %
50 SYRINGE (ML) INTRAVENOUS
Refills: 0 | Status: DISCONTINUED | OUTPATIENT
Start: 2021-07-28 | End: 2021-07-30

## 2021-07-28 RX ORDER — RIVAROXABAN 15 MG-20MG
20 KIT ORAL DAILY
Refills: 0 | Status: DISCONTINUED | OUTPATIENT
Start: 2021-07-29 | End: 2021-07-30

## 2021-07-28 RX ORDER — SODIUM CHLORIDE 9 MG/ML
3 INJECTION INTRAMUSCULAR; INTRAVENOUS; SUBCUTANEOUS EVERY 8 HOURS
Refills: 0 | Status: DISCONTINUED | OUTPATIENT
Start: 2021-07-28 | End: 2021-07-28

## 2021-07-28 RX ORDER — POTASSIUM CHLORIDE 20 MEQ
20 PACKET (EA) ORAL ONCE
Refills: 0 | Status: COMPLETED | OUTPATIENT
Start: 2021-07-28 | End: 2021-07-28

## 2021-07-28 RX ORDER — ATORVASTATIN CALCIUM 80 MG/1
80 TABLET, FILM COATED ORAL AT BEDTIME
Refills: 0 | Status: DISCONTINUED | OUTPATIENT
Start: 2021-07-28 | End: 2021-07-30

## 2021-07-28 RX ORDER — POTASSIUM CHLORIDE 20 MEQ
10 PACKET (EA) ORAL
Refills: 0 | Status: COMPLETED | OUTPATIENT
Start: 2021-07-28 | End: 2021-07-28

## 2021-07-28 RX ORDER — SODIUM CHLORIDE 9 MG/ML
3 INJECTION INTRAMUSCULAR; INTRAVENOUS; SUBCUTANEOUS EVERY 4 HOURS
Refills: 0 | Status: DISCONTINUED | OUTPATIENT
Start: 2021-07-28 | End: 2021-07-30

## 2021-07-28 RX ORDER — ASPIRIN/CALCIUM CARB/MAGNESIUM 324 MG
81 TABLET ORAL DAILY
Refills: 0 | Status: DISCONTINUED | OUTPATIENT
Start: 2021-07-28 | End: 2021-07-30

## 2021-07-28 RX ORDER — LIDOCAINE HCL 20 MG/ML
0.2 VIAL (ML) INJECTION ONCE
Refills: 0 | Status: DISCONTINUED | OUTPATIENT
Start: 2021-07-28 | End: 2021-07-28

## 2021-07-28 RX ORDER — PANTOPRAZOLE SODIUM 20 MG/1
40 TABLET, DELAYED RELEASE ORAL
Refills: 0 | Status: DISCONTINUED | OUTPATIENT
Start: 2021-07-28 | End: 2021-07-30

## 2021-07-28 RX ORDER — IPRATROPIUM/ALBUTEROL SULFATE 18-103MCG
3 AEROSOL WITH ADAPTER (GRAM) INHALATION ONCE
Refills: 0 | Status: COMPLETED | OUTPATIENT
Start: 2021-07-28 | End: 2021-07-28

## 2021-07-28 RX ORDER — INSULIN HUMAN 100 [IU]/ML
10 INJECTION, SOLUTION SUBCUTANEOUS
Qty: 100 | Refills: 0 | Status: DISCONTINUED | OUTPATIENT
Start: 2021-07-28 | End: 2021-07-29

## 2021-07-28 RX ADMIN — MAGNESIUM OXIDE 400 MG ORAL TABLET 400 MILLIGRAM(S): 241.3 TABLET ORAL at 23:08

## 2021-07-28 RX ADMIN — Medication 40 MILLIGRAM(S): at 12:17

## 2021-07-28 RX ADMIN — Medication 20 MILLIEQUIVALENT(S): at 22:46

## 2021-07-28 RX ADMIN — Medication 1000 MILLIGRAM(S): at 22:30

## 2021-07-28 RX ADMIN — Medication 400 MILLIGRAM(S): at 11:50

## 2021-07-28 RX ADMIN — Medication 50 MILLIEQUIVALENT(S): at 13:55

## 2021-07-28 RX ADMIN — Medication 1000 MILLIGRAM(S): at 12:59

## 2021-07-28 RX ADMIN — INSULIN HUMAN 4 UNIT(S)/HR: 100 INJECTION, SOLUTION SUBCUTANEOUS at 18:23

## 2021-07-28 RX ADMIN — SODIUM CHLORIDE 3 MILLILITER(S): 9 INJECTION INTRAMUSCULAR; INTRAVENOUS; SUBCUTANEOUS at 21:15

## 2021-07-28 RX ADMIN — Medication 400 MILLIGRAM(S): at 21:57

## 2021-07-28 RX ADMIN — ATORVASTATIN CALCIUM 80 MILLIGRAM(S): 80 TABLET, FILM COATED ORAL at 21:58

## 2021-07-28 RX ADMIN — Medication 3 MILLILITER(S): at 12:13

## 2021-07-28 RX ADMIN — Medication 100 MILLIGRAM(S): at 15:12

## 2021-07-28 RX ADMIN — Medication 50 MILLIEQUIVALENT(S): at 12:37

## 2021-07-28 RX ADMIN — Medication 81 MILLIGRAM(S): at 15:12

## 2021-07-28 RX ADMIN — INSULIN HUMAN 4 UNIT(S)/HR: 100 INJECTION, SOLUTION SUBCUTANEOUS at 21:57

## 2021-07-28 NOTE — CHART NOTE - NSCHARTNOTEFT_GEN_A_CORE
PT became Acutely tachypneic and SOB with congested coughing noted RR 26-28 breaths /Min O2 sats decreased to 84% B/P 148/65, 166/71, 137/70, 100/53 , HR 84-96  Lungs with coarse bs bilateral wheezing and crackles noted. % placed and IV Lasix 40mg IV x 1 CXR stat , Duoneb x 1 given stat, Resp BIPAP ordered , ( Pt refused BIPAP and Watts catheter to be placed) Pt only voided 10cc . Pt sitting upright . Bilateral groins with dsg intact small ooze noted to left groin no hematoma noted right groin intact no bleeding noted +PPX 4 .   MD Villar at bedside agrees with pt having fluid overload . CXR done pending results . MD Bear made aware and Dr. Deng at bedside . MD Bear aware pt Pt is stable at this time . Will transfer to CTU Bed 16 . Report given to Provider Frandy on floor PT became Acutely tachypneic and SOB with congested coughing noted RR 26-28 breaths /Min O2 sats decreased to 84% B/P 148/65, 166/71, 137/70, 100/53 , HR 84-96  Lungs with coarse bs bilateral wheezing and crackles noted. % placed and IV Lasix 40mg IV x 1 CXR stat , Duoneb x 1 given stat, Resp BIPAP ordered , ( Pt refused BIPAP and Watts catheter to be placed) Pt only voided 10cc . Pt sitting upright . Bilateral groins with dsg intact small ooze noted to left groin no hematoma noted right groin intact no bleeding noted +PPX 4 .   MD Villar at bedside agrees with pt having fluid overload . CXR done pending results . MD Bear made aware and Dr. Deng at bedside . MD Bear aware pt Pt is stable at this time . Will transfer to CTU Bed 16 . Report given to Provider Frandy on floor pt stable for transfer at this time     Labs:  WBC 20.50 Hgb 11.3 Hct 35.4 Plt 208 Na 138 K 3.3 CHL 99 Co2 24 Bun 17 Creat 0.81 Glucose 283. Supplement KCL 10meq IV x 2 doses ( infuse 1 hour each ) IV Tylenol given for "back pain " pt has hx Spinal Stenosis and restless leg syndrome. MD Bear Aware .     Ericka Romero NP   Cardiology   [phone]

## 2021-07-28 NOTE — BRIEF OPERATIVE NOTE - NSICDXBRIEFPREOP_GEN_ALL_CORE_FT
PRE-OP DIAGNOSIS:  Severe aortic stenosis 28-Jul-2021 10:11:52 Prior TAVR valve prosthetic stenosis Gilberto Leggett

## 2021-07-28 NOTE — CONSULT NOTE ADULT - SUBJECTIVE AND OBJECTIVE BOX
Vascular Cardiology Consult Note    SERVICE CONSULT: [phone]              EMAIL [email or web address]   OFFICE [phone]    CC:  For TAVR    HPI:  66 y/o F w/ PMHX AS s/p TAVR 2015, HOCM, h/o Paroxysmal Afib, HTN, Venous Insufficiency h/o B/L LE Venous Ulcerations, JOSR (does not tolerate CPAP, on 2L O2 at home), with history of PE in R below knee DVT in 8/2020 (on Xarelto 20 mg daily at home), who is s/p TAVR today, seen in CTU, with pulmonary edema after case requiring diuresis, currently seen comfortable in bed, in chair position. BP and HR stable. On NC. Vascular Cardiology consulted.      Allergies  amoxicillin (Unknown)  Cipro (Unknown)  codeine (Vomiting)  shellfish (Stomach Upset)  	  MEDICATIONS:  aspirin enteric coated 81 milliGRAM(s) Oral daily  cefuroxime  IVPB 1500 milliGRAM(s) IV Intermittent every 8 hours  vancomycin  IVPB 1500 milliGRAM(s) IV Intermittent once  pantoprazole    Tablet 40 milliGRAM(s) Oral before breakfast  atorvastatin 80 milliGRAM(s) Oral at bedtime    PAST MEDICAL & SURGICAL HISTORY:  Hypertension  Gastroparesis due to DM  Depression  Herniated disc  Spinal stenosis  chronic back pain  Cholesterol serum elevated  MVP (mitral valve prolapse)  Hypothyroid  Obesity  IBS (irritable bowel syndrome)  GERD (gastroesophageal reflux disease)  Diabetes type 2, uncontrolled  History of diabetic retinopathy  Peripheral neuropathy  HOCM (hypertrophic obstructive cardiomyopathy)  Atrial fibrillation  Aortic stenosis  S/P bariatric surgery  S/P tonsillectomy and adenoidectomy  Cataract  S/P D&C (status post dilation and curettage)  Cervix abnormality  Ablation of cervix  Breast anomaly  Biopsy of both breast benign lesions  right eye torn retina  S/P TAVR (transcatheter aortic valve replacement)  S/P foot surgery    FAMILY HISTORY:  Family history of aortic stenosis  Family hx of structural heart dx  FHx: diabetes mellitus  FH: CAD (coronary artery disease)  Family history of anxiety disorder    SOCIAL HISTORY:  unchanged    REVIEW OF SYSTEMS:  CONSTITUTIONAL: No fever  EYES: No eye pain  ENT:  No hroat pain  NECK: No pain  RESPIRATORY: Prior SOB  CARDIOVASCULAR: No C/P  GASTROINTESTINAL: No abdominal pain  GENITOURINARY: No hematuria  NEUROLOGICAL: No memory loss  SKIN: No rash  LYMPH Nodes: No enlarged glands noted  ENDOCRINE: No heat or cold intolerance noted  MUSCULOSKELETAL:   PSYCHIATRIC: No depression, anxiety  HEME/LYMPH: No  bleeding gums  ALLERGY AND IMMUNOLOGIC: No hives    [ x] All others negative	    PHYSICAL EXAM:  T(C): 36.4 (07-28-21 @ 13:45), Max: 36.8 (07-28-21 @ 05:50)  HR: 82 (07-28-21 @ 16:00) (72 - 96)  BP: 11/54 (07-28-21 @ 16:00) (11/54 - 166/64)  RR: 25 (07-28-21 @ 16:00) (16 - 28)  SpO2: 94% (07-28-21 @ 16:00) (91% - 100%)  I&O's Summary    28 Jul 2021 07:01  -  28 Jul 2021 17:29  --------------------------------------------------------  IN: 100 mL / OUT: 350 mL / NET: -250 mL    Appearance: NAD, seated in bed	  HEENT:  NC/AT, on NC  Cardiovascular: RRR, 2/6 CHARLES    Respiratory: CTA B/L  Psychiatry:  AAO x 3  Gastrointestinal:  Soft, Non-tender, + BS	  Skin: No rashes, No ecchymoses, No cyanosis	  Neurologic: no focal deficits noted  Extremities: chronic 1+ pitting edema B/L,  venous stasis changes B/L,  small venous ulceration known to R shin    LABS:	 	    CBC Full  -  ( 28 Jul 2021 11:08 )  WBC Count : 20.59 K/uL  Hemoglobin : 11.3 g/dL  Hematocrit : 35.4 %  Platelet Count - Automated : 208 K/uL  Mean Cell Volume : 85.3 fl  Mean Cell Hemoglobin : 27.2 pg  Mean Cell Hemoglobin Concentration : 31.9 gm/dL  Auto Neutrophil # : 17.66 K/uL  Auto Lymphocyte # : 1.56 K/uL  Auto Monocyte # : 0.68 K/uL  Auto Eosinophil # : 0.11 K/uL  Auto Basophil # : 0.05 K/uL  Auto Neutrophil % : 85.8 %  Auto Lymphocyte % : 7.6 %  Auto Monocyte % : 3.3 %  Auto Eosinophil % : 0.5 %  Auto Basophil % : 0.2 %    07-28    138  |  99  |  17  ----------------------------<  283<H>  3.3<L>   |  24  |  0.81    Ca    9.1      28 Jul 2021 11:08    TPro  6.8  /  Alb  4.0  /  TBili  0.4  /  DBili  x   /  AST  13  /  ALT  10  /  AlkPhos  133<H>  07-28      Assessment:  1. AS s/p Valve in Valve TAVR  2. H/o PE 8/2020  3. R Below Knee DVT 8/2020  4. HTN  5. Venous Insufficiency / LE Venous Ulcerations  6. HOCM   7. 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 Feeling better post TAVR. Currently talkative and in pleasant mood in CTU.  2. Groins currently stable post procedure.  3. Plan to resume Xarelto 20 mg this evening if continues to do well.  4. Further care as per CTU. Continue excellent CT surgical care.     Thank you  MADELEINE Jordan, MPAS  Vascular Cardiology Service    Please call with any questions:   Service Line: [phone]  Office [phone]  email:  [email or web address]

## 2021-07-28 NOTE — BRIEF OPERATIVE NOTE - NSICDXBRIEFPROCEDURE_GEN_ALL_CORE_FT
PROCEDURES:  Percutaneous transcatheter aortic valve replacement (TAVR) 28-Jul-2021 10:11:17 #23 Gilberto Walls

## 2021-07-28 NOTE — PROGRESS NOTE ADULT - SUBJECTIVE AND OBJECTIVE BOX
This patient has been implanted with a Transcatheter Aortic Valve Implant under the following NCT/EMILIA:    TAVR:  Commercial Implant NCT [id number], EMILIA N/A and will be placed into the TVT Registry.        MADELEINE Germain  [id number]

## 2021-07-28 NOTE — CHART NOTE - NSCHARTNOTEFT_GEN_A_CORE
Received pt from OR to PACU s/p Successful TAVR Ramon #23  valve in valve via LFA 14french sheath perclose x 2 now with dsg dry intact , RFA via perclose x 1 6fr and 8Fr venous dsg dry intact no bleeding or hematoma noted. RRA line d/C in PACU right wrist intact. Pt Awake alert x 3 MAEx 4 PERRLA . non focal deficit noted. CV S1 S2 RRR no MRG no JVD noted . GI obese soft nt nd +bsx4  no void  at this time. PV generalized edema +PPx 4. EKG NSR with 1 AVB with occasional PVCs . B/P 166/64 Temp 97.4  HR 88 RR 16 O2 sats NC 4liters 95% . Resume Xarelto on 7/29/21 , ASA 81mg po x 1 today Zinacef 1500mg IVPB x 2 more doses q8hr first dose in OR at 0800 . No acute distress noted . No complaints of any CP no sob no palpitations noted at this time. Will continue to Monitor closely .     Ericka Romero NP   [phone]  Cardiology

## 2021-07-28 NOTE — PRE-ANESTHESIA EVALUATION ADULT - NSANTHPMHFT_GEN_ALL_CORE
66 yo morbidly obese F with hx of TAVR, hypothyroidism, spinal stenosis, DM, HTN, neuropathy, IBS, GERD, and depression presenting for valve in valve TAVR. NPO, denies any CP, SOB, cough, colds, or fevers. Spoke to patient at length about MAC vs. GA and that based on her body habitus and nature of procedure we will proceed with GA. Patient expressed understanding; all question and concerns addressed.

## 2021-07-28 NOTE — CONSULT NOTE ADULT - ATTENDING COMMENTS
History of PE on A/C and heterozygous prothrombin gene mutation  Resume Xarelto when ok with CTsx team    Aurea [phone]

## 2021-07-28 NOTE — PROGRESS NOTE ADULT - SUBJECTIVE AND OBJECTIVE BOX
MARIA D ROBLEDO   MRN#: [id number]     The patient is a 65y Female who was seen, evaluated, & examined with the ICU staff with a multidisciplinary care plan formulated & implemented. All available clinical, laboratory, radiographic, pharmacologic, and electrocardiographic data were reviewed & analyzed.      The patient was in the ICU in critical condition secondary to:     persistent cardiopulmonary dysfunction    For support and evaluation & prevention of further decompensation secondary to persistent cardiopulmonary dysfunction, respiratory status required:     supplemental oxygen with Bipap/non-rebreather  continuous pulse oximetry monitoring  following ABGs with A-line monitoring    Continuous hemodynamic monitoring was required to ensure adequate cardiovascular support and to evaluate for & help prevent decompensation.    In addition:  S/p TAVR with fluid resuscitation during the case  Flash edema requiring Bipap - patient refused Bipap and was placed on a non-rebreather  Wean to nasal cannula as able with diuresis

## 2021-07-28 NOTE — PACU DISCHARGE NOTE - COMMENTS
Patient doing well post op. Per PACU RN/ PA went into flash Pulm edema- plan now to transfer to CCU for Bipap. Patient currently on face mask; Spo2 100%

## 2021-07-29 ENCOUNTER — FORM ENCOUNTER (OUTPATIENT)
Age: 66
End: 2021-07-29

## 2021-07-29 LAB
ALBUMIN SERPL ELPH-MCNC: 4 G/DL — SIGNIFICANT CHANGE UP (ref 3.3–5)
ALP SERPL-CCNC: 117 U/L — SIGNIFICANT CHANGE UP (ref 40–120)
ALT FLD-CCNC: 11 U/L — SIGNIFICANT CHANGE UP (ref 10–45)
ANION GAP SERPL CALC-SCNC: 13 MMOL/L — SIGNIFICANT CHANGE UP (ref 5–17)
AST SERPL-CCNC: 15 U/L — SIGNIFICANT CHANGE UP (ref 10–40)
BASOPHILS # BLD AUTO: 0.03 K/UL — SIGNIFICANT CHANGE UP (ref 0–0.2)
BASOPHILS NFR BLD AUTO: 0.2 % — SIGNIFICANT CHANGE UP (ref 0–2)
BILIRUB SERPL-MCNC: 0.6 MG/DL — SIGNIFICANT CHANGE UP (ref 0.2–1.2)
BUN SERPL-MCNC: 15 MG/DL — SIGNIFICANT CHANGE UP (ref 7–23)
CALCIUM SERPL-MCNC: 9.6 MG/DL — SIGNIFICANT CHANGE UP (ref 8.4–10.5)
CHLORIDE SERPL-SCNC: 98 MMOL/L — SIGNIFICANT CHANGE UP (ref 96–108)
CO2 SERPL-SCNC: 25 MMOL/L — SIGNIFICANT CHANGE UP (ref 22–31)
CREAT SERPL-MCNC: 0.84 MG/DL — SIGNIFICANT CHANGE UP (ref 0.5–1.3)
EOSINOPHIL # BLD AUTO: 0.1 K/UL — SIGNIFICANT CHANGE UP (ref 0–0.5)
EOSINOPHIL NFR BLD AUTO: 0.7 % — SIGNIFICANT CHANGE UP (ref 0–6)
GLUCOSE BLDC GLUCOMTR-MCNC: 131 MG/DL — HIGH (ref 70–99)
GLUCOSE BLDC GLUCOMTR-MCNC: 133 MG/DL — HIGH (ref 70–99)
GLUCOSE BLDC GLUCOMTR-MCNC: 155 MG/DL — HIGH (ref 70–99)
GLUCOSE BLDC GLUCOMTR-MCNC: 155 MG/DL — HIGH (ref 70–99)
GLUCOSE BLDC GLUCOMTR-MCNC: 156 MG/DL — HIGH (ref 70–99)
GLUCOSE BLDC GLUCOMTR-MCNC: 170 MG/DL — HIGH (ref 70–99)
GLUCOSE BLDC GLUCOMTR-MCNC: 187 MG/DL — HIGH (ref 70–99)
GLUCOSE BLDC GLUCOMTR-MCNC: 192 MG/DL — HIGH (ref 70–99)
GLUCOSE BLDC GLUCOMTR-MCNC: 210 MG/DL — HIGH (ref 70–99)
GLUCOSE BLDC GLUCOMTR-MCNC: 236 MG/DL — HIGH (ref 70–99)
GLUCOSE BLDC GLUCOMTR-MCNC: 246 MG/DL — HIGH (ref 70–99)
GLUCOSE BLDC GLUCOMTR-MCNC: 321 MG/DL — HIGH (ref 70–99)
GLUCOSE BLDC GLUCOMTR-MCNC: 331 MG/DL — HIGH (ref 70–99)
GLUCOSE BLDC GLUCOMTR-MCNC: 372 MG/DL — HIGH (ref 70–99)
GLUCOSE SERPL-MCNC: 137 MG/DL — HIGH (ref 70–99)
HCT VFR BLD CALC: 36 % — SIGNIFICANT CHANGE UP (ref 34.5–45)
HGB BLD-MCNC: 11.3 G/DL — LOW (ref 11.5–15.5)
IMM GRANULOCYTES NFR BLD AUTO: 0.5 % — SIGNIFICANT CHANGE UP (ref 0–1.5)
LYMPHOCYTES # BLD AUTO: 0.87 K/UL — LOW (ref 1–3.3)
LYMPHOCYTES # BLD AUTO: 5.7 % — LOW (ref 13–44)
MAGNESIUM SERPL-MCNC: 1.8 MG/DL — SIGNIFICANT CHANGE UP (ref 1.6–2.6)
MCHC RBC-ENTMCNC: 26.9 PG — LOW (ref 27–34)
MCHC RBC-ENTMCNC: 31.4 GM/DL — LOW (ref 32–36)
MCV RBC AUTO: 85.7 FL — SIGNIFICANT CHANGE UP (ref 80–100)
MONOCYTES # BLD AUTO: 0.96 K/UL — HIGH (ref 0–0.9)
MONOCYTES NFR BLD AUTO: 6.3 % — SIGNIFICANT CHANGE UP (ref 2–14)
NEUTROPHILS # BLD AUTO: 13.13 K/UL — HIGH (ref 1.8–7.4)
NEUTROPHILS NFR BLD AUTO: 86.6 % — HIGH (ref 43–77)
NRBC # BLD: 0 /100 WBCS — SIGNIFICANT CHANGE UP (ref 0–0)
PHOSPHATE SERPL-MCNC: 2.9 MG/DL — SIGNIFICANT CHANGE UP (ref 2.5–4.5)
PLATELET # BLD AUTO: 196 K/UL — SIGNIFICANT CHANGE UP (ref 150–400)
POTASSIUM SERPL-MCNC: 3.7 MMOL/L — SIGNIFICANT CHANGE UP (ref 3.5–5.3)
POTASSIUM SERPL-SCNC: 3.7 MMOL/L — SIGNIFICANT CHANGE UP (ref 3.5–5.3)
PROT SERPL-MCNC: 7.2 G/DL — SIGNIFICANT CHANGE UP (ref 6–8.3)
RBC # BLD: 4.2 M/UL — SIGNIFICANT CHANGE UP (ref 3.8–5.2)
RBC # FLD: 16.8 % — HIGH (ref 10.3–14.5)
SODIUM SERPL-SCNC: 136 MMOL/L — SIGNIFICANT CHANGE UP (ref 135–145)
WBC # BLD: 15.16 K/UL — HIGH (ref 3.8–10.5)
WBC # FLD AUTO: 15.16 K/UL — HIGH (ref 3.8–10.5)

## 2021-07-29 PROCEDURE — 93010 ELECTROCARDIOGRAM REPORT: CPT

## 2021-07-29 PROCEDURE — 71045 X-RAY EXAM CHEST 1 VIEW: CPT | Mod: 26

## 2021-07-29 PROCEDURE — 99024 POSTOP FOLLOW-UP VISIT: CPT

## 2021-07-29 PROCEDURE — 99233 SBSQ HOSP IP/OBS HIGH 50: CPT

## 2021-07-29 PROCEDURE — 99232 SBSQ HOSP IP/OBS MODERATE 35: CPT

## 2021-07-29 PROCEDURE — 93306 TTE W/DOPPLER COMPLETE: CPT | Mod: 26

## 2021-07-29 PROCEDURE — 76775 US EXAM ABDO BACK WALL LIM: CPT | Mod: 26

## 2021-07-29 RX ORDER — MONTELUKAST 4 MG/1
10 TABLET, CHEWABLE ORAL AT BEDTIME
Refills: 0 | Status: DISCONTINUED | OUTPATIENT
Start: 2021-07-29 | End: 2021-07-30

## 2021-07-29 RX ORDER — INSULIN LISPRO 100/ML
30 VIAL (ML) SUBCUTANEOUS
Refills: 0 | Status: DISCONTINUED | OUTPATIENT
Start: 2021-07-29 | End: 2021-07-30

## 2021-07-29 RX ORDER — HUMAN INSULIN 100 [IU]/ML
10 INJECTION, SUSPENSION SUBCUTANEOUS ONCE
Refills: 0 | Status: COMPLETED | OUTPATIENT
Start: 2021-07-29 | End: 2021-07-29

## 2021-07-29 RX ORDER — BUMETANIDE 0.25 MG/ML
1 INJECTION INTRAMUSCULAR; INTRAVENOUS
Refills: 0 | Status: DISCONTINUED | OUTPATIENT
Start: 2021-07-29 | End: 2021-07-30

## 2021-07-29 RX ORDER — LOSARTAN POTASSIUM 100 MG/1
25 TABLET, FILM COATED ORAL DAILY
Refills: 0 | Status: DISCONTINUED | OUTPATIENT
Start: 2021-07-29 | End: 2021-07-30

## 2021-07-29 RX ORDER — SODIUM CHLORIDE 9 MG/ML
3 INJECTION INTRAMUSCULAR; INTRAVENOUS; SUBCUTANEOUS EVERY 8 HOURS
Refills: 0 | Status: DISCONTINUED | OUTPATIENT
Start: 2021-07-29 | End: 2021-07-30

## 2021-07-29 RX ORDER — POTASSIUM CHLORIDE 20 MEQ
40 PACKET (EA) ORAL ONCE
Refills: 0 | Status: COMPLETED | OUTPATIENT
Start: 2021-07-29 | End: 2021-07-29

## 2021-07-29 RX ORDER — INSULIN LISPRO 100/ML
VIAL (ML) SUBCUTANEOUS AT BEDTIME
Refills: 0 | Status: DISCONTINUED | OUTPATIENT
Start: 2021-07-29 | End: 2021-07-30

## 2021-07-29 RX ORDER — INSULIN HUMAN 100 [IU]/ML
3 INJECTION, SOLUTION SUBCUTANEOUS ONCE
Refills: 0 | Status: COMPLETED | OUTPATIENT
Start: 2021-07-29 | End: 2021-07-29

## 2021-07-29 RX ORDER — METOPROLOL TARTRATE 50 MG
50 TABLET ORAL
Refills: 0 | Status: DISCONTINUED | OUTPATIENT
Start: 2021-07-29 | End: 2021-07-30

## 2021-07-29 RX ORDER — INSULIN LISPRO 100/ML
VIAL (ML) SUBCUTANEOUS
Refills: 0 | Status: DISCONTINUED | OUTPATIENT
Start: 2021-07-29 | End: 2021-07-30

## 2021-07-29 RX ORDER — FUROSEMIDE 40 MG
40 TABLET ORAL ONCE
Refills: 0 | Status: COMPLETED | OUTPATIENT
Start: 2021-07-29 | End: 2021-07-29

## 2021-07-29 RX ORDER — ACETAMINOPHEN 500 MG
650 TABLET ORAL EVERY 6 HOURS
Refills: 0 | Status: DISCONTINUED | OUTPATIENT
Start: 2021-07-29 | End: 2021-07-30

## 2021-07-29 RX ORDER — CALCIUM CARBONATE 500(1250)
1 TABLET ORAL THREE TIMES A DAY
Refills: 0 | Status: DISCONTINUED | OUTPATIENT
Start: 2021-07-29 | End: 2021-07-30

## 2021-07-29 RX ORDER — INSULIN GLARGINE 100 [IU]/ML
40 INJECTION, SOLUTION SUBCUTANEOUS AT BEDTIME
Refills: 0 | Status: DISCONTINUED | OUTPATIENT
Start: 2021-07-29 | End: 2021-07-30

## 2021-07-29 RX ADMIN — SODIUM CHLORIDE 3 MILLILITER(S): 9 INJECTION INTRAMUSCULAR; INTRAVENOUS; SUBCUTANEOUS at 11:09

## 2021-07-29 RX ADMIN — Medication 50 MILLIGRAM(S): at 17:15

## 2021-07-29 RX ADMIN — BUMETANIDE 1 MILLIGRAM(S): 0.25 INJECTION INTRAMUSCULAR; INTRAVENOUS at 18:46

## 2021-07-29 RX ADMIN — Medication 30 UNIT(S): at 08:26

## 2021-07-29 RX ADMIN — Medication 650 MILLIGRAM(S): at 17:36

## 2021-07-29 RX ADMIN — SODIUM CHLORIDE 3 MILLILITER(S): 9 INJECTION INTRAMUSCULAR; INTRAVENOUS; SUBCUTANEOUS at 11:52

## 2021-07-29 RX ADMIN — Medication 650 MILLIGRAM(S): at 09:30

## 2021-07-29 RX ADMIN — Medication 30 UNIT(S): at 11:54

## 2021-07-29 RX ADMIN — INSULIN GLARGINE 40 UNIT(S): 100 INJECTION, SOLUTION SUBCUTANEOUS at 21:58

## 2021-07-29 RX ADMIN — Medication 650 MILLIGRAM(S): at 08:50

## 2021-07-29 RX ADMIN — Medication 81 MILLIGRAM(S): at 11:07

## 2021-07-29 RX ADMIN — Medication 40 MILLIEQUIVALENT(S): at 06:54

## 2021-07-29 RX ADMIN — INSULIN HUMAN 3 UNIT(S): 100 INJECTION, SOLUTION SUBCUTANEOUS at 02:00

## 2021-07-29 RX ADMIN — SODIUM CHLORIDE 3 MILLILITER(S): 9 INJECTION INTRAMUSCULAR; INTRAVENOUS; SUBCUTANEOUS at 17:08

## 2021-07-29 RX ADMIN — SODIUM CHLORIDE 3 MILLILITER(S): 9 INJECTION INTRAMUSCULAR; INTRAVENOUS; SUBCUTANEOUS at 22:55

## 2021-07-29 RX ADMIN — Medication 100 MILLIGRAM(S): at 00:51

## 2021-07-29 RX ADMIN — LOSARTAN POTASSIUM 25 MILLIGRAM(S): 100 TABLET, FILM COATED ORAL at 05:10

## 2021-07-29 RX ADMIN — SODIUM CHLORIDE 3 MILLILITER(S): 9 INJECTION INTRAMUSCULAR; INTRAVENOUS; SUBCUTANEOUS at 22:00

## 2021-07-29 RX ADMIN — Medication 650 MILLIGRAM(S): at 18:52

## 2021-07-29 RX ADMIN — Medication 50 MILLIGRAM(S): at 08:27

## 2021-07-29 RX ADMIN — HUMAN INSULIN 10 UNIT(S): 100 INJECTION, SUSPENSION SUBCUTANEOUS at 22:55

## 2021-07-29 RX ADMIN — ATORVASTATIN CALCIUM 80 MILLIGRAM(S): 80 TABLET, FILM COATED ORAL at 21:58

## 2021-07-29 RX ADMIN — PANTOPRAZOLE SODIUM 40 MILLIGRAM(S): 20 TABLET, DELAYED RELEASE ORAL at 05:10

## 2021-07-29 RX ADMIN — RIVAROXABAN 20 MILLIGRAM(S): KIT at 11:07

## 2021-07-29 RX ADMIN — Medication 40 MILLIGRAM(S): at 03:32

## 2021-07-29 RX ADMIN — Medication 30 UNIT(S): at 17:36

## 2021-07-29 RX ADMIN — MONTELUKAST 10 MILLIGRAM(S): 4 TABLET, CHEWABLE ORAL at 21:58

## 2021-07-29 NOTE — PROGRESS NOTE ADULT - ATTENDING COMMENTS
Appreciate CTICU care  bilateral groins appear stable.  The left dressing was changed together with the nurse  Xarelto 20mg daily for history of PE and prothrombin gene mutation    Aurea [phone]

## 2021-07-29 NOTE — PROGRESS NOTE ADULT - SUBJECTIVE AND OBJECTIVE BOX
CRITICAL CARE ATTENDING - CTICU    MEDICATIONS  (STANDING):  aspirin enteric coated 81 milliGRAM(s) Oral daily  atorvastatin 80 milliGRAM(s) Oral at bedtime  dextrose 50% Injectable 50 milliLiter(s) IV Push every 15 minutes  dextrose 50% Injectable 25 milliLiter(s) IV Push every 15 minutes  insulin regular Infusion 10 Unit(s)/Hr (10 mL/Hr) IV Continuous <Continuous>  losartan 25 milliGRAM(s) Oral daily  pantoprazole    Tablet 40 milliGRAM(s) Oral before breakfast  potassium chloride   Solution 40 milliEquivalent(s) Oral once  rivaroxaban 20 milliGRAM(s) Oral daily  sodium chloride 0.9% for Nebulization 3 milliLiter(s) Nebulizer every 4 hours                                    11.3   15.16 )-----------( 196      ( 2021 05:16 )             36.0           136  |  98  |  15  ----------------------------<  137<H>  3.7   |  25  |  0.84    Ca    9.6      2021 05:16  Phos  2.9       Mg     1.8         TPro  7.2  /  Alb  4.0  /  TBili  0.6  /  DBili  x   /  AST  15  /  ALT  11  /  AlkPhos  117        PT/INR - ( 2021 11:08 )   PT: 13.6 sec;   INR: 1.14 ratio         PTT - ( 2021 11:08 )  PTT:28.2 sec        Daily Height in cm: 160.02 (2021 07:09)    Daily Weight in k.2 (2021 20:00)       @ 07:  -   @ 06:49  --------------------------------------------------------  IN: 1093 mL / OUT: 800 mL / NET: 293 mL        Critically Ill patient  : [ ] preoperative ,   [x ] post operative    Requires :  [ ] Arterial Line   [ ] Central Line  [ ] PA catheter  [ ] IABP  [ ] ECMO  [ ] LVAD  [ ] Ventilator  [ ] pacemaker [ ] Impella.                      [x ] ABG's     [ x] Pulse Oxymetry Monitoring  Bedside evaluation , monitoring , treatment of hemodynamics , fluids , IVP/ IVCD meds.        Diagnosis:     POD 1 - Valve in valve TAVR Ramon # 23     Pulmonary edema     Requires chest PT, pulmonary toilet, suctioning to maintain SaO2,  patent airway and treat atelectasis.     Requires bedside physical therapy, mobilization and total residential care.     DM type 2 - IVCD insulin     ECG             By signing my name below, I, Trudy Camara, attest that this documentation has been prepared under the direction and in the presence of Jared Gustafson MD.   Electronically Signed: Florecita Gamble 07-29-21 @ 06:49      Discussed with CT surgeon, Physician Assistant - Nurse Practitioner- Critical care medicine team.   Discussed at  AM / PM rounds.   Chart, labs , films reviewed.    Cumulative Critical Care Time Given Today:  CRITICAL CARE ATTENDING - CTICU    MEDICATIONS  (STANDING):  aspirin enteric coated 81 milliGRAM(s) Oral daily  atorvastatin 80 milliGRAM(s) Oral at bedtime  dextrose 50% Injectable 50 milliLiter(s) IV Push every 15 minutes  dextrose 50% Injectable 25 milliLiter(s) IV Push every 15 minutes  insulin regular Infusion 10 Unit(s)/Hr (10 mL/Hr) IV Continuous <Continuous>  losartan 25 milliGRAM(s) Oral daily  pantoprazole    Tablet 40 milliGRAM(s) Oral before breakfast  potassium chloride   Solution 40 milliEquivalent(s) Oral once  rivaroxaban 20 milliGRAM(s) Oral daily  sodium chloride 0.9% for Nebulization 3 milliLiter(s) Nebulizer every 4 hours                                    11.3   15.16 )-----------( 196      ( 2021 05:16 )             36.0           136  |  98  |  15  ----------------------------<  137<H>  3.7   |  25  |  0.84    Ca    9.6      2021 05:16  Phos  2.9       Mg     1.8         TPro  7.2  /  Alb  4.0  /  TBili  0.6  /  DBili  x   /  AST  15  /  ALT  11  /  AlkPhos  117        PT/INR - ( 2021 11:08 )   PT: 13.6 sec;   INR: 1.14 ratio         PTT - ( 2021 11:08 )  PTT:28.2 sec        Daily Height in cm: 160.02 (2021 07:09)    Daily Weight in k.2 (2021 20:00)       @ 07:  -   @ 06:49  --------------------------------------------------------  IN: 1093 mL / OUT: 800 mL / NET: 293 mL        Critically Ill patient  : [ ] preoperative ,   [x ] post operative    Requires :  [ ] Arterial Line   [ ] Central Line  [ ] PA catheter  [ ] IABP  [ ] ECMO  [ ] LVAD  [ ] Ventilator  [ ] pacemaker [ ] Impella.                      [x ] ABG's     [ x] Pulse Oxymetry Monitoring  Bedside evaluation , monitoring , treatment of hemodynamics , fluids , IVP/ IVCD meds.        Diagnosis:     POD 1 - Valve in valve TAVR Ramon # 23     Pulmonary edema     Requires chest PT, pulmonary toilet, suctioning to maintain SaO2,  patent airway and treat atelectasis.     Requires bedside physical therapy, mobilization and total senior living care.     DM type 2 - IVCD insulin     ECG     Diuresis    Fluid Restriction     Lopressor / ASA     H/O - DVT/PE            By signing my name below, ITrudy, attest that this documentation has been prepared under the direction and in the presence of Jared Gustafson MD.   Electronically Signed: Florecita Gamble - @ 06:49    I, Jared Gustafson, personally performed the services described in this documentation. All medical record entries made by the scribe were at my direction and in my presence. I have reviewed the chart and agree that the record reflects my personal performance and is accurate and complete.   Jared Gustafson MD.       Discussed with CT surgeon, Physician Assistant - Nurse Practitioner- Critical care medicine team.   Discussed at  AM / PM rounds.   Chart, labs , films reviewed.    Cumulative Critical Care Time Given Today:  20 min

## 2021-07-29 NOTE — PROGRESS NOTE ADULT - SUBJECTIVE AND OBJECTIVE BOX
Vascular Cardiology  Progress note    SERVICE LINE [phone]     EMAIL [email or web address]     CC:  admitted for TAVR    INTERVAL HISTORY:    Feeling okay. SOB stable, no chest pain.          Allergies  amoxicillin (Unknown)  Cipro (Unknown)  codeine (Vomiting)  shellfish (Stomach Upset)    Intolerances  fish (Stomach Upset)  	    MEDICATIONS:  aspirin enteric coated 81 milliGRAM(s) Oral daily  buMETAnide Injectable 1 milliGRAM(s) IV Push two times a day  losartan 25 milliGRAM(s) Oral daily  metoprolol tartrate 50 milliGRAM(s) Oral two times a day  rivaroxaban 20 milliGRAM(s) Oral daily  sodium chloride 0.9% for Nebulization 3 milliLiter(s) Nebulizer every 4 hours  acetaminophen   Tablet .. 650 milliGRAM(s) Oral every 6 hours PRN  pantoprazole    Tablet 40 milliGRAM(s) Oral before breakfast  atorvastatin 80 milliGRAM(s) Oral at bedtime  dextrose 50% Injectable 50 milliLiter(s) IV Push every 15 minutes  dextrose 50% Injectable 25 milliLiter(s) IV Push every 15 minutes  insulin glargine Injectable (LANTUS) 40 Unit(s) SubCutaneous at bedtime  insulin lispro Injectable (ADMELOG) 30 Unit(s) SubCutaneous three times a day before meals        PAST MEDICAL & SURGICAL HISTORY:  Hypertension    Gastroparesis due to DM    Depression    Herniated disc    Spinal stenosis  chronic back pain    Cholesterol serum elevated    MVP (mitral valve prolapse)    Hypothyroid  not on medication    Obesity    IBS (irritable bowel syndrome)    GERD (gastroesophageal reflux disease)    Diabetes type 2, uncontrolled    History of diabetic retinopathy    Peripheral neuropathy    HOCM (hypertrophic obstructive cardiomyopathy)    Atrial fibrillation    Aortic stenosis    S/P bariatric surgery  lap band surgery two times due to erosion, S/P removal    S/P tonsillectomy and adenoidectomy    Cataract  B/L cataracts    S/P D&amp;C (status post dilation and curettage)  D&amp;C when she was 18    Cervix abnormality  Ablation of cervix    Breast anomaly  Biopsy of both breast benign lesions    right eye torn retina    S/P TAVR (transcatheter aortic valve replacement)  2015    S/P foot surgery  May 2021        FAMILY HISTORY:  Family history of aortic stenosis  Family hx of structural heart dx    FHx: diabetes mellitus    FH: CAD (coronary artery disease)    Family history of anxiety disorder        SOCIAL HISTORY:  unchanged    REVIEW OF SYSTEMS:  CONSTITUTIONAL: No fever, weight loss, or fatigue  EYES: No eye pain, visual disturbances, or discharge  ENMT:  No difficulty hearing, tinnitus, vertigo; No sinus or throat pain  NECK: No pain or stiffness  RESPIRATORY: No cough, wheezing, chills or hemoptysis; No Shortness of Breath  CARDIOVASCULAR: No chest pain, palpitations, passing out, dizziness, or leg swelling  GASTROINTESTINAL: No abdominal or epigastric pain. No nausea, vomiting, or hematemesis; No diarrhea or constipation. No melena or hematochezia.  GENITOURINARY: No dysuria, frequency, hematuria, or incontinence  NEUROLOGICAL: No headaches, memory loss, loss of strength, numbness, or tremors  SKIN: No itching, burning, rashes, or lesions   LYMPH Nodes: No enlarged glands  ENDOCRINE: No heat or cold intolerance; No hair loss  MUSCULOSKELETAL: No joint pain or swelling; No muscle, back, or extremity pain  PSYCHIATRIC: No depression, anxiety, mood swings, or difficulty sleeping  HEME/LYMPH: No easy bruising, or bleeding gums  ALLERY AND IMMUNOLOGIC: No hives or eczema	    [ x] All others negative	  [ ] Unable to obtain    PHYSICAL EXAM:  T(C): 37.4 (07-29-21 @ 08:00), Max: 37.6 (07-29-21 @ 04:00)  HR: 78 (07-29-21 @ 10:00) (72 - 112)  BP: 126/57 (07-29-21 @ 10:00) (100/53 - 163/70)  RR: 28 (07-29-21 @ 10:00) (18 - 32)  SpO2: 93% (07-29-21 @ 10:00) (82% - 100%)  Wt(kg): --  I&O's Summary    28 Jul 2021 07:01  -  29 Jul 2021 07:00  --------------------------------------------------------  IN: 1309 mL / OUT: 800 mL / NET: 509 mL    29 Jul 2021 07:01  -  29 Jul 2021 10:57  --------------------------------------------------------  IN: 9 mL / OUT: 100 mL / NET: -91 mL        Appearance: Normal, sitting in chair 	  HEENT:   Normal oral mucosa, PERRL, EOMI	  Cardiovascular: Normal S1 S2, No JVD, No murmurs, No edema  Respiratory: Lungs clear to auscultation	  Psychiatry: A & O x 3, Mood & affect appropriate  Gastrointestinal:  Soft, Non-tender, + BS	  Skin: No rashes, No ecchymoses, No cyanosis	  Neurologic: Non-focal  Extremities: Normal range of motion, No clubbing, cyanosis.  Vascular:   Right Femoral:  Palpable   Left Femoral:  Palpable, no hematoma        LABS:	 	    CBC Full  -  ( 29 Jul 2021 05:16 )  WBC Count : 15.16 K/uL  Hemoglobin : 11.3 g/dL  Hematocrit : 36.0 %  Platelet Count - Automated : 196 K/uL  Mean Cell Volume : 85.7 fl  Mean Cell Hemoglobin : 26.9 pg  Mean Cell Hemoglobin Concentration : 31.4 gm/dL  Auto Neutrophil # : 13.13 K/uL  Auto Lymphocyte # : 0.87 K/uL  Auto Monocyte # : 0.96 K/uL  Auto Eosinophil # : 0.10 K/uL  Auto Basophil # : 0.03 K/uL  Auto Neutrophil % : 86.6 %  Auto Lymphocyte % : 5.7 %  Auto Monocyte % : 6.3 %  Auto Eosinophil % : 0.7 %  Auto Basophil % : 0.2 %    07-29    136  |  98  |  15  ----------------------------<  137<H>  3.7   |  25  |  0.84  07-28    x   |  x   |  x   ----------------------------<  x   4.2   |  x   |  x     Ca    9.6      29 Jul 2021 05:16  Ca    9.1      28 Jul 2021 11:08  Phos  2.9     07-29  Mg     1.8     07-29  Mg     1.8     07-28    TPro  7.2  /  Alb  4.0  /  TBili  0.6  /  DBili  x   /  AST  15  /  ALT  11  /  AlkPhos  117  07-29  TPro  6.8  /  Alb  4.0  /  TBili  0.4  /  DBili  x   /  AST  13  /  ALT  10  /  AlkPhos  133<H>  07-28

## 2021-07-29 NOTE — PROGRESS NOTE ADULT - SUBJECTIVE AND OBJECTIVE BOX
VITAL SIGNS    Telemetry:  NSR   80    Vital Signs Last 24 Hrs  T(C): 37.4 (21 @ 08:00), Max: 37.6 (21 @ 04:00)  T(F): 99.3 (21 @ 08:00), Max: 99.6 (21 @ 04:00)  HR: 78 (21 @ 11:00) (78 - 112)  BP: 120/64 (21 @ 11:00) (100/53 - 163/70)  RR: 28 (21 @ 11:00) (18 - 32)  SpO2: 93% (21 @ 11:00) (82% - 100%)                   Daily     Daily Weight in k.2 (2021 20:00)      Bilirubin Total, Serum: 0.6 mg/dL ( @ 05:16)    CAPILLARY BLOOD GLUCOSE  192 (2021 09:00)  155 (2021 08:00)  133 (2021 07:00)  131 (2021 06:00)  156 (2021 05:00)  155 (2021 04:00)  170 (2021 03:00)  210 (2021 02:00)  236 (2021 01:00)  246 (2021 00:00)  241 (2021 23:00)  261 (2021 22:00)  298 (2021 21:00)  283 (2021 20:00)      POCT Blood Glucose.: 192 mg/dL (2021 08:56)  POCT Blood Glucose.: 155 mg/dL (2021 08:04)  POCT Blood Glucose.: 133 mg/dL (2021 06:51)  POCT Blood Glucose.: 131 mg/dL (2021 06:03)  POCT Blood Glucose.: 156 mg/dL (2021 05:11)  POCT Blood Glucose.: 155 mg/dL (2021 04:24)  POCT Blood Glucose.: 170 mg/dL (2021 03:00)  POCT Blood Glucose.: 210 mg/dL (2021 01:53)  POCT Blood Glucose.: 236 mg/dL (2021 01:13)  POCT Blood Glucose.: 246 mg/dL (2021 00:16)  POCT Blood Glucose.: 241 mg/dL (2021 23:24)  POCT Blood Glucose.: 261 mg/dL (2021 22:02)  POCT Blood Glucose.: 298 mg/dL (2021 21:21)  POCT Blood Glucose.: 283 mg/dL (2021 20:20)  POCT Blood Glucose.: 331 mg/dL (2021 18:51)  POCT Blood Glucose.: 318 mg/dL (2021 18:00)      Coumadin    [ ] YES          [  ]      NO         Reason:                         PHYSICAL EXAM    Neurology: alert and oriented x 3, moves all extremities with no deficits  CV :  RRR  Lungs:   CTA B/L  Abdomen: soft,   obese, nontender, nondistended, positive bowel sounds,   Extremities:   groins     benign

## 2021-07-29 NOTE — PROGRESS NOTE ADULT - SUBJECTIVE AND OBJECTIVE BOX
Structural Heart Team    Ms Olivo is lying in bed.  She has no active complaints and denies chest pain/pressure, sob and dizziness as well as groin pain.         REVIEW OF SYSTEMS:    CONSTITUTIONAL: No weakness, fevers or chills  EYES/ENT: No visual changes;  No vertigo or throat pain   NECK: No pain or stiffness  RESPIRATORY: No cough, wheezing, hemoptysis; No shortness of breath  CARDIOVASCULAR: No chest pain or palpitations  GASTROINTESTINAL: No abdominal or epigastric pain. No nausea, vomiting, or hematemesis; No diarrhea or constipation. No melena or hematochezia.  GENITOURINARY: No dysuria, frequency or hematuria  NEUROLOGICAL: No numbness or weakness  SKIN: No itching, rashes      Allergies    amoxicillin (Unknown)  Cipro (Unknown)  codeine (Vomiting)  shellfish (Stomach Upset)    Intolerances    fish (Stomach Upset)    Vital Signs Last 24 Hrs  T(C): 37.4 (29 Jul 2021 08:00), Max: 37.6 (29 Jul 2021 04:00)  T(F): 99.3 (29 Jul 2021 08:00), Max: 99.6 (29 Jul 2021 04:00)  HR: 78 (29 Jul 2021 11:00) (78 - 112)  BP: 120/64 (29 Jul 2021 11:00) (111/54 - 163/70)  BP(mean): 83 (29 Jul 2021 11:00) (73 - 109)  RR: 28 (29 Jul 2021 11:00) (18 - 32)  SpO2: 93% (29 Jul 2021 11:00) (82% - 100%)    MEDICATIONS  (STANDING):  aspirin enteric coated 81 milliGRAM(s) Oral daily  atorvastatin 80 milliGRAM(s) Oral at bedtime  buMETAnide Injectable 1 milliGRAM(s) IV Push two times a day  dextrose 50% Injectable 50 milliLiter(s) IV Push every 15 minutes  dextrose 50% Injectable 25 milliLiter(s) IV Push every 15 minutes  insulin glargine Injectable (LANTUS) 40 Unit(s) SubCutaneous at bedtime  insulin lispro Injectable (ADMELOG) 30 Unit(s) SubCutaneous three times a day before meals  losartan 25 milliGRAM(s) Oral daily  metoprolol tartrate 50 milliGRAM(s) Oral two times a day  montelukast 10 milliGRAM(s) Oral at bedtime  pantoprazole    Tablet 40 milliGRAM(s) Oral before breakfast  rivaroxaban 20 milliGRAM(s) Oral daily  sodium chloride 0.9% for Nebulization 3 milliLiter(s) Nebulizer every 4 hours  sodium chloride 0.9% lock flush 3 milliLiter(s) IV Push every 8 hours      Exam-  General: NAD, WDWN, appropriate affect  Cor: s1s2, RR,   EKG/Tele:   Pulm: Clear, no wheezes, rales, or rhonchi, no use of accessory muscles  Gastrointestinal: soft, nontender, nondistended, +bowel sounds  Extremities:   Groin: dressings intact, nontender, clean and dry, soft, no hematoma b/l  Neuro: A&Ox3, nonfocal                          11.3   15.16 )-----------( 196      ( 29 Jul 2021 05:16 )             36.0   07-29    136  |  98  |  15  ----------------------------<  137<H>  3.7   |  25  |  0.84    Ca    9.6      29 Jul 2021 05:16  Phos  2.9     07-29  Mg     1.8     07-29    TPro  7.2  /  Alb  4.0  /  TBili  0.6  /  DBili  x   /  AST  15  /  ALT  11  /  AlkPhos  117  07-29  PT/INR - ( 28 Jul 2021 11:08 )   PT: 13.6 sec;   INR: 1.14 ratio         PTT - ( 28 Jul 2021 11:08 )  PTT:28.2 sec  I&O's Summary    28 Jul 2021 07:01  -  29 Jul 2021 07:00  --------------------------------------------------------  IN: 1309 mL / OUT: 800 mL / NET: 509 mL    29 Jul 2021 07:01  -  29 Jul 2021 13:10  --------------------------------------------------------  IN: 9 mL / OUT: 500 mL / NET: -491 mL              Assessment/Plan:        - Discharge plan: follow up with   in one week and follow up with Structural Heart Team in one month.  Echo will be done at 1 month follow up visit.  Plan discussed with patient     MADELEINE Banks  [phone]     Structural Heart Team    Ms Olivo is lying in bed.  She has no active complaints and denies chest pain/pressure, sob and dizziness as well as groin pain.         REVIEW OF SYSTEMS:    CONSTITUTIONAL: No weakness, fevers or chills  EYES/ENT: No visual changes;  No vertigo or throat pain   NECK: No pain or stiffness  RESPIRATORY: No cough, wheezing, hemoptysis; No shortness of breath  CARDIOVASCULAR: No chest pain or palpitations  GASTROINTESTINAL: No abdominal or epigastric pain. No nausea, vomiting, or hematemesis; No diarrhea or constipation. No melena or hematochezia.  GENITOURINARY: No dysuria, frequency or hematuria  NEUROLOGICAL: No numbness or weakness  SKIN: No itching, rashes      Allergies    amoxicillin (Unknown)  Cipro (Unknown)  codeine (Vomiting)  shellfish (Stomach Upset)    Intolerances    fish (Stomach Upset)    Vital Signs Last 24 Hrs  T(C): 37.4 (29 Jul 2021 08:00), Max: 37.6 (29 Jul 2021 04:00)  T(F): 99.3 (29 Jul 2021 08:00), Max: 99.6 (29 Jul 2021 04:00)  HR: 78 (29 Jul 2021 11:00) (78 - 112)  BP: 120/64 (29 Jul 2021 11:00) (111/54 - 163/70)  BP(mean): 83 (29 Jul 2021 11:00) (73 - 109)  RR: 28 (29 Jul 2021 11:00) (18 - 32)  SpO2: 93% (29 Jul 2021 11:00) (82% - 100%)    MEDICATIONS  (STANDING):  aspirin enteric coated 81 milliGRAM(s) Oral daily  atorvastatin 80 milliGRAM(s) Oral at bedtime  buMETAnide Injectable 1 milliGRAM(s) IV Push two times a day  dextrose 50% Injectable 50 milliLiter(s) IV Push every 15 minutes  dextrose 50% Injectable 25 milliLiter(s) IV Push every 15 minutes  insulin glargine Injectable (LANTUS) 40 Unit(s) SubCutaneous at bedtime  insulin lispro Injectable (ADMELOG) 30 Unit(s) SubCutaneous three times a day before meals  losartan 25 milliGRAM(s) Oral daily  metoprolol tartrate 50 milliGRAM(s) Oral two times a day  montelukast 10 milliGRAM(s) Oral at bedtime  pantoprazole    Tablet 40 milliGRAM(s) Oral before breakfast  rivaroxaban 20 milliGRAM(s) Oral daily  sodium chloride 0.9% for Nebulization 3 milliLiter(s) Nebulizer every 4 hours  sodium chloride 0.9% lock flush 3 milliLiter(s) IV Push every 8 hours      Exam-  General: NAD, morbidly obese, appropriate affect  Cor: s1s2, RRR, no murmurs, rubs or gallops   Tele:  80-90  Pulm: Clear, no wheezes, rales, or rhonchi, no use of accessory muscles  Gastrointestinal: soft, nontender, nondistended, +bowel sounds  Extremities: mild edema, 1+ DP pulses   Groin: dressings intact, nontender, clean and dry, soft, no hematoma b/l  Neuro: A&Ox3, nonfocal                          11.3   15.16 )-----------( 196      ( 29 Jul 2021 05:16 )             36.0   07-29    136  |  98  |  15  ----------------------------<  137<H>  3.7   |  25  |  0.84    Ca    9.6      29 Jul 2021 05:16  Phos  2.9     07-29  Mg     1.8     07-29    TPro  7.2  /  Alb  4.0  /  TBili  0.6  /  DBili  x   /  AST  15  /  ALT  11  /  AlkPhos  117  07-29  PT/INR - ( 28 Jul 2021 11:08 )   PT: 13.6 sec;   INR: 1.14 ratio         PTT - ( 28 Jul 2021 11:08 )  PTT:28.2 sec  I&O's Summary    28 Jul 2021 07:01  -  29 Jul 2021 07:00  --------------------------------------------------------  IN: 1309 mL / OUT: 800 mL / NET: 509 mL    29 Jul 2021 07:01  -  29 Jul 2021 13:10  --------------------------------------------------------  IN: 9 mL / OUT: 500 mL / NET: -491 mL              Assessment/Plan:  Ms Olivo is POD 1 s/p Suzy TAVR (23 S3) for severe symptomatic AS  - asa and xarelto  - post TAVR TTE to be done today  - daily EKG's  - PT and ambulating as tolerated  - resume preop meds  - when stable, will d/c home with an MCOT to monitor for post TAVR conduction delays and arrhythmias for 30 days    - Discharge plan: follow up with Dr. Deng in one week and follow up with Structural Heart Team in one month.  Echo will be done at 1 month follow up visit.  Plan discussed with patient     MADELEINE Banks  [phone]

## 2021-07-30 ENCOUNTER — TRANSCRIPTION ENCOUNTER (OUTPATIENT)
Age: 66
End: 2021-07-30

## 2021-07-30 VITALS
OXYGEN SATURATION: 94 % | SYSTOLIC BLOOD PRESSURE: 110 MMHG | DIASTOLIC BLOOD PRESSURE: 78 MMHG | TEMPERATURE: 98 F | HEART RATE: 80 BPM | RESPIRATION RATE: 17 BRPM

## 2021-07-30 DIAGNOSIS — E66.01 MORBID (SEVERE) OBESITY DUE TO EXCESS CALORIES: ICD-10-CM

## 2021-07-30 LAB
ANION GAP SERPL CALC-SCNC: 12 MMOL/L — SIGNIFICANT CHANGE UP (ref 5–17)
BASOPHILS # BLD AUTO: 0.03 K/UL — SIGNIFICANT CHANGE UP (ref 0–0.2)
BASOPHILS NFR BLD AUTO: 0.3 % — SIGNIFICANT CHANGE UP (ref 0–2)
BUN SERPL-MCNC: 16 MG/DL — SIGNIFICANT CHANGE UP (ref 7–23)
CALCIUM SERPL-MCNC: 9.8 MG/DL — SIGNIFICANT CHANGE UP (ref 8.4–10.5)
CHLORIDE SERPL-SCNC: 100 MMOL/L — SIGNIFICANT CHANGE UP (ref 96–108)
CO2 SERPL-SCNC: 27 MMOL/L — SIGNIFICANT CHANGE UP (ref 22–31)
CREAT SERPL-MCNC: 0.72 MG/DL — SIGNIFICANT CHANGE UP (ref 0.5–1.3)
EOSINOPHIL # BLD AUTO: 0.29 K/UL — SIGNIFICANT CHANGE UP (ref 0–0.5)
EOSINOPHIL NFR BLD AUTO: 2.5 % — SIGNIFICANT CHANGE UP (ref 0–6)
GLUCOSE BLDC GLUCOMTR-MCNC: 103 MG/DL — HIGH (ref 70–99)
GLUCOSE BLDC GLUCOMTR-MCNC: 124 MG/DL — HIGH (ref 70–99)
GLUCOSE BLDC GLUCOMTR-MCNC: 139 MG/DL — HIGH (ref 70–99)
GLUCOSE BLDC GLUCOMTR-MCNC: 147 MG/DL — HIGH (ref 70–99)
GLUCOSE BLDC GLUCOMTR-MCNC: 149 MG/DL — HIGH (ref 70–99)
GLUCOSE BLDC GLUCOMTR-MCNC: 156 MG/DL — HIGH (ref 70–99)
GLUCOSE BLDC GLUCOMTR-MCNC: 169 MG/DL — HIGH (ref 70–99)
GLUCOSE BLDC GLUCOMTR-MCNC: 179 MG/DL — HIGH (ref 70–99)
GLUCOSE BLDC GLUCOMTR-MCNC: 195 MG/DL — HIGH (ref 70–99)
GLUCOSE BLDC GLUCOMTR-MCNC: 243 MG/DL — HIGH (ref 70–99)
GLUCOSE BLDC GLUCOMTR-MCNC: 249 MG/DL — HIGH (ref 70–99)
GLUCOSE BLDC GLUCOMTR-MCNC: 293 MG/DL — HIGH (ref 70–99)
GLUCOSE BLDC GLUCOMTR-MCNC: 98 MG/DL — SIGNIFICANT CHANGE UP (ref 70–99)
GLUCOSE SERPL-MCNC: 137 MG/DL — HIGH (ref 70–99)
HCT VFR BLD CALC: 34.1 % — LOW (ref 34.5–45)
HGB BLD-MCNC: 10.5 G/DL — LOW (ref 11.5–15.5)
IMM GRANULOCYTES NFR BLD AUTO: 0.6 % — SIGNIFICANT CHANGE UP (ref 0–1.5)
LYMPHOCYTES # BLD AUTO: 1.04 K/UL — SIGNIFICANT CHANGE UP (ref 1–3.3)
LYMPHOCYTES # BLD AUTO: 9 % — LOW (ref 13–44)
MCHC RBC-ENTMCNC: 27 PG — SIGNIFICANT CHANGE UP (ref 27–34)
MCHC RBC-ENTMCNC: 30.8 GM/DL — LOW (ref 32–36)
MCV RBC AUTO: 87.7 FL — SIGNIFICANT CHANGE UP (ref 80–100)
MONOCYTES # BLD AUTO: 1.04 K/UL — HIGH (ref 0–0.9)
MONOCYTES NFR BLD AUTO: 9 % — SIGNIFICANT CHANGE UP (ref 2–14)
NEUTROPHILS # BLD AUTO: 9.08 K/UL — HIGH (ref 1.8–7.4)
NEUTROPHILS NFR BLD AUTO: 78.6 % — HIGH (ref 43–77)
NRBC # BLD: 0 /100 WBCS — SIGNIFICANT CHANGE UP (ref 0–0)
PLATELET # BLD AUTO: 180 K/UL — SIGNIFICANT CHANGE UP (ref 150–400)
POTASSIUM SERPL-MCNC: 4.1 MMOL/L — SIGNIFICANT CHANGE UP (ref 3.5–5.3)
POTASSIUM SERPL-SCNC: 4.1 MMOL/L — SIGNIFICANT CHANGE UP (ref 3.5–5.3)
RBC # BLD: 3.89 M/UL — SIGNIFICANT CHANGE UP (ref 3.8–5.2)
RBC # FLD: 17.1 % — HIGH (ref 10.3–14.5)
SODIUM SERPL-SCNC: 139 MMOL/L — SIGNIFICANT CHANGE UP (ref 135–145)
WBC # BLD: 11.55 K/UL — HIGH (ref 3.8–10.5)
WBC # FLD AUTO: 11.55 K/UL — HIGH (ref 3.8–10.5)

## 2021-07-30 PROCEDURE — C1889: CPT

## 2021-07-30 PROCEDURE — 76775 US EXAM ABDO BACK WALL LIM: CPT

## 2021-07-30 PROCEDURE — 94640 AIRWAY INHALATION TREATMENT: CPT

## 2021-07-30 PROCEDURE — 83735 ASSAY OF MAGNESIUM: CPT

## 2021-07-30 PROCEDURE — C1769: CPT

## 2021-07-30 PROCEDURE — 93010 ELECTROCARDIOGRAM REPORT: CPT

## 2021-07-30 PROCEDURE — 80053 COMPREHEN METABOLIC PANEL: CPT

## 2021-07-30 PROCEDURE — 84100 ASSAY OF PHOSPHORUS: CPT

## 2021-07-30 PROCEDURE — 71045 X-RAY EXAM CHEST 1 VIEW: CPT

## 2021-07-30 PROCEDURE — 82962 GLUCOSE BLOOD TEST: CPT

## 2021-07-30 PROCEDURE — 76000 FLUOROSCOPY <1 HR PHYS/QHP: CPT

## 2021-07-30 PROCEDURE — 99238 HOSP IP/OBS DSCHRG MGMT 30/<: CPT

## 2021-07-30 PROCEDURE — 84132 ASSAY OF SERUM POTASSIUM: CPT

## 2021-07-30 PROCEDURE — 93355 ECHO TRANSESOPHAGEAL (TEE): CPT

## 2021-07-30 PROCEDURE — C8929: CPT

## 2021-07-30 PROCEDURE — 85610 PROTHROMBIN TIME: CPT

## 2021-07-30 PROCEDURE — C1894: CPT

## 2021-07-30 PROCEDURE — 85025 COMPLETE CBC W/AUTO DIFF WBC: CPT

## 2021-07-30 PROCEDURE — 80048 BASIC METABOLIC PNL TOTAL CA: CPT

## 2021-07-30 PROCEDURE — 85730 THROMBOPLASTIN TIME PARTIAL: CPT

## 2021-07-30 PROCEDURE — C1887: CPT

## 2021-07-30 PROCEDURE — C9399: CPT

## 2021-07-30 PROCEDURE — P9047: CPT

## 2021-07-30 PROCEDURE — C1760: CPT

## 2021-07-30 PROCEDURE — 93005 ELECTROCARDIOGRAM TRACING: CPT

## 2021-07-30 RX ORDER — POTASSIUM CHLORIDE 20 MEQ
1 PACKET (EA) ORAL
Qty: 0 | Refills: 0 | DISCHARGE

## 2021-07-30 RX ORDER — INSULIN HUMAN 100 [IU]/ML
4 INJECTION, SOLUTION SUBCUTANEOUS
Qty: 100 | Refills: 0 | Status: DISCONTINUED | OUTPATIENT
Start: 2021-07-30 | End: 2021-07-30

## 2021-07-30 RX ORDER — INSULIN LISPRO 100/ML
32 VIAL (ML) SUBCUTANEOUS
Refills: 0 | Status: DISCONTINUED | OUTPATIENT
Start: 2021-07-30 | End: 2021-07-30

## 2021-07-30 RX ORDER — INSULIN HUMAN 100 [IU]/ML
4 INJECTION, SOLUTION SUBCUTANEOUS
Qty: 50 | Refills: 0 | Status: DISCONTINUED | OUTPATIENT
Start: 2021-07-30 | End: 2021-07-30

## 2021-07-30 RX ORDER — INSULIN DEGLUDEC 100 U/ML
40 INJECTION, SOLUTION SUBCUTANEOUS
Qty: 0 | Refills: 0 | DISCHARGE

## 2021-07-30 RX ORDER — METOPROLOL TARTRATE 50 MG
1 TABLET ORAL
Qty: 0 | Refills: 0 | DISCHARGE

## 2021-07-30 RX ORDER — POTASSIUM CHLORIDE 20 MEQ
1 PACKET (EA) ORAL
Qty: 60 | Refills: 0
Start: 2021-07-30 | End: 2021-08-28

## 2021-07-30 RX ORDER — INSULIN LISPRO 100/ML
0 VIAL (ML) SUBCUTANEOUS
Qty: 0 | Refills: 0 | DISCHARGE

## 2021-07-30 RX ADMIN — SODIUM CHLORIDE 3 MILLILITER(S): 9 INJECTION INTRAMUSCULAR; INTRAVENOUS; SUBCUTANEOUS at 01:31

## 2021-07-30 RX ADMIN — INSULIN HUMAN 4 UNIT(S)/HR: 100 INJECTION, SOLUTION SUBCUTANEOUS at 01:30

## 2021-07-30 RX ADMIN — SODIUM CHLORIDE 3 MILLILITER(S): 9 INJECTION INTRAMUSCULAR; INTRAVENOUS; SUBCUTANEOUS at 10:00

## 2021-07-30 RX ADMIN — SODIUM CHLORIDE 3 MILLILITER(S): 9 INJECTION INTRAMUSCULAR; INTRAVENOUS; SUBCUTANEOUS at 05:12

## 2021-07-30 RX ADMIN — Medication 50 MILLIGRAM(S): at 05:08

## 2021-07-30 RX ADMIN — Medication 81 MILLIGRAM(S): at 11:15

## 2021-07-30 RX ADMIN — RIVAROXABAN 20 MILLIGRAM(S): KIT at 11:15

## 2021-07-30 RX ADMIN — BUMETANIDE 1 MILLIGRAM(S): 0.25 INJECTION INTRAMUSCULAR; INTRAVENOUS at 05:09

## 2021-07-30 RX ADMIN — Medication 650 MILLIGRAM(S): at 01:30

## 2021-07-30 RX ADMIN — SODIUM CHLORIDE 3 MILLILITER(S): 9 INJECTION INTRAMUSCULAR; INTRAVENOUS; SUBCUTANEOUS at 05:08

## 2021-07-30 RX ADMIN — PANTOPRAZOLE SODIUM 40 MILLIGRAM(S): 20 TABLET, DELAYED RELEASE ORAL at 05:08

## 2021-07-30 RX ADMIN — SODIUM CHLORIDE 3 MILLILITER(S): 9 INJECTION INTRAMUSCULAR; INTRAVENOUS; SUBCUTANEOUS at 13:00

## 2021-07-30 RX ADMIN — INSULIN HUMAN 4 UNIT(S)/HR: 100 INJECTION, SOLUTION SUBCUTANEOUS at 08:00

## 2021-07-30 RX ADMIN — LOSARTAN POTASSIUM 25 MILLIGRAM(S): 100 TABLET, FILM COATED ORAL at 05:08

## 2021-07-30 RX ADMIN — Medication 32 UNIT(S): at 11:14

## 2021-07-30 RX ADMIN — Medication 650 MILLIGRAM(S): at 02:43

## 2021-07-30 RX ADMIN — SODIUM CHLORIDE 3 MILLILITER(S): 9 INJECTION INTRAMUSCULAR; INTRAVENOUS; SUBCUTANEOUS at 14:00

## 2021-07-30 NOTE — DISCHARGE NOTE PROVIDER - DETAILS OF MALNUTRITION DIAGNOSIS/DIAGNOSES
This patient has been assessed with a concern for Malnutrition and was treated during this hospitalization for the following Nutrition diagnosis/diagnoses:     -  07/30/2021: Morbid obesity (BMI > 40)

## 2021-07-30 NOTE — PROGRESS NOTE ADULT - ASSESSMENT
65 yr old female sp  TAVR 7/28/21    H morbid obesity,    DVT on eliquis,  IDDM  post op    SOB, pulm edema   trf ctu from CRS  7/29   trf too floor     lantus  premeal   bumex 1 mg q12,   cozaar    NSR   groins stable
65 yr old female sp  TAVR 7/28/21    H morbid obesity,    DVT on eliquis,  IDDM  post op    SOB, pulm edema   trf ctu from CRS  7/29   trf too floor     lantus  premeal   bumex 1 mg q12,   cozaar    NSR   groins stable    7/30 Renal ultrasound demonstrates No hydronephrosis and right simple cysts normal pole measures 2.5 x 2.2 x 2.6 cm. Insulin gtt discontinued. Basal bolus insulin adjusted. ch< from: TTE with Doppler demonstrates Hyperdynamic left ventricle Transcatheter aortic valve replacement, within previous. TAVR, with normal function. Mild-moderate mitral stenosis Mild-moderate pulmonary hypertension. Insulin administration teaching in progress.  Nutrition consult for diabetic education and weight loss loss directed diet
Assessment:  1. AS s/p Valve in Valve TAVR 7/28/21  2. H/o PE 8/2020  3. R Below Knee DVT 8/2020  4. HTN  5. Venous Insufficiency / LE Venous Ulcerations  6. HOCM   7. 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 Groins remain stable post procedure  2. Resume Xarelto 20 mg daily when okay with CT surgery team  3. On Aspirin 81 mg daily    SE Friend MD  Vascular Cardiology Fellow  [phone]  All cardiology service information may be found 24/7 on [email or web address], password: [email or web address]

## 2021-07-30 NOTE — PROGRESS NOTE ADULT - NUTRITIONAL ASSESSMENT
This patient has been assessed with a concern for Malnutrition and has been determined to have a diagnosis/diagnoses of Morbid obesity (BMI > 40).    This patient is being managed with:   Diet Consistent Carbohydrate/No Snacks-  1000mL Fluid Restriction ([id number])  Entered: Jul 29 2021  8:23AM

## 2021-07-30 NOTE — DIETITIAN INITIAL EVALUATION ADULT. - PERTINENT MEDS FT
atorvastatin 80 milliGRAM(s) Oral at bedtime  buMETAnide Injectable 1 milliGRAM(s) IV Push two times a day  calcium carbonate    500 mG (Tums) Chewable 1 Tablet(s) Chew three times a day  insulin glargine Injectable (LANTUS) 40 Unit(s) SubCutaneous at bedtime  insulin lispro (ADMELOG) corrective regimen sliding scale   SubCutaneous three times a day before meals  insulin lispro (ADMELOG) corrective regimen sliding scale   SubCutaneous at bedtime  insulin lispro Injectable (ADMELOG) 32 Unit(s) SubCutaneous three times a day before meals  insulin regular Infusion 4 Unit(s)/Hr (4 mL/Hr) IV Continuous <Continuous>  pantoprazole    Tablet 40 milliGRAM(s) Oral before breakfast  rivaroxaban 20 milliGRAM(s) Oral daily  sodium chloride 0.9% for Nebulization 3 milliLiter(s) Nebulizer every 4 hours  sodium chloride 0.9% lock flush 3 milliLiter(s) IV Push every 8 hours

## 2021-07-30 NOTE — DISCHARGE NOTE PROVIDER - NSDCPNSUBOBJ_GEN_ALL_CORE
VITAL SIGNS    Telemetry:    Vital Signs Last 24 Hrs  T(C): 36.2 (21 @ 12:22), Max: 37.2 (21 @ 20:09)  T(F): 97.1 (21 @ 12:22), Max: 99 (21 @ 20:09)  HR: 95 (21 @ 12:22) (72 - 95)  BP: 115/71 (21 @ 12:22) (108/65 - 134/74)  RR: 18 (21 @ 12:22) (17 - 20)  SpO2: 93% (21 @ 12:22) (93% - 98%)             @ 07:01  -   @ 07:00  --------------------------------------------------------  IN: 433 mL / OUT: 1150 mL / NET: -717 mL     @ 07:01  -   @ 14:22  --------------------------------------------------------  IN: 456.5 mL / OUT: 300 mL / NET: 156.5 mL       Daily     Daily Weight in k.2 (2021 08:18)  Admit Wt: Drug Dosing Weight  Height (cm): 160 (2021 07:09)  Weight (kg): 140.4 (2021 07:09)  BMI (kg/m2): 54.8 (2021 07:09)  BSA (m2): 2.33 (2021 07:09)      CAPILLARY BLOOD GLUCOSE      POCT Blood Glucose.: 98 mg/dL (2021 14:06)  POCT Blood Glucose.: 103 mg/dL (2021 11:59)  POCT Blood Glucose.: 124 mg/dL (2021 11:01)  POCT Blood Glucose.: 179 mg/dL (2021 10:01)  POCT Blood Glucose.: 149 mg/dL (2021 09:02)  POCT Blood Glucose.: 147 mg/dL (2021 07:59)  POCT Blood Glucose.: 139 mg/dL (2021 06:54)  POCT Blood Glucose.: 156 mg/dL (2021 06:03)  POCT Blood Glucose.: 169 mg/dL (2021 05:03)  POCT Blood Glucose.: 195 mg/dL (2021 04:11)  POCT Blood Glucose.: 243 mg/dL (2021 03:02)  POCT Blood Glucose.: 249 mg/dL (2021 02:02)  POCT Blood Glucose.: 293 mg/dL (2021 00:41)  POCT Blood Glucose.: 331 mg/dL (2021 22:49)  POCT Blood Glucose.: 372 mg/dL (2021 21:35)  POCT Blood Glucose.: 321 mg/dL (2021 17:22)          MEDICATIONS  acetaminophen   Tablet .. 650 milliGRAM(s) Oral every 6 hours PRN  aspirin enteric coated 81 milliGRAM(s) Oral daily  atorvastatin 80 milliGRAM(s) Oral at bedtime  buMETAnide Injectable 1 milliGRAM(s) IV Push two times a day  calcium carbonate    500 mG (Tums) Chewable 1 Tablet(s) Chew three times a day  dextrose 50% Injectable 50 milliLiter(s) IV Push every 15 minutes  dextrose 50% Injectable 25 milliLiter(s) IV Push every 15 minutes  insulin glargine Injectable (LANTUS) 40 Unit(s) SubCutaneous at bedtime  insulin lispro (ADMELOG) corrective regimen sliding scale   SubCutaneous three times a day before meals  insulin lispro (ADMELOG) corrective regimen sliding scale   SubCutaneous at bedtime  insulin lispro Injectable (ADMELOG) 32 Unit(s) SubCutaneous three times a day before meals  insulin regular Infusion 4 Unit(s)/Hr IV Continuous <Continuous>  losartan 25 milliGRAM(s) Oral daily  metoprolol tartrate 50 milliGRAM(s) Oral two times a day  montelukast 10 milliGRAM(s) Oral at bedtime  pantoprazole    Tablet 40 milliGRAM(s) Oral before breakfast  rivaroxaban 20 milliGRAM(s) Oral daily  sodium chloride 0.9% for Nebulization 3 milliLiter(s) Nebulizer every 4 hours  sodium chloride 0.9% lock flush 3 milliLiter(s) IV Push every 8 hours      >>> <<<  PHYSICAL EXAM  Subjective: NAD  Neurology: alert and oriented x 3, nonfocal, no gross deficits  CV : s1s2  Sternal Wound :  CDI , Stable  Lungs:CTA b/l  Abdomen: soft, NT,obese (+ )BM  :  voiding  Extremities:   +1 edema    LABS      139  |  100  |  16  ----------------------------<  137<H>  4.1   |  27  |  0.72    Ca    9.8      2021 09:18  Phos  2.9       Mg     1.8         TPro  7.2  /  Alb  4.0  /  TBili  0.6  /  DBili  x   /  AST  15  /  ALT  11  /  AlkPhos  117                                   10.5   11.55 )-----------( 180      ( 2021 09:19 )             34.1                 PAST MEDICAL & SURGICAL HISTORY:  Hypertension    Gastroparesis due to DM    Depression    Herniated disc    Spinal stenosis  chronic back pain    Cholesterol serum elevated    MVP (mitral valve prolapse)    Hypothyroid  not on medication    Obesity    IBS (irritable bowel syndrome)    GERD (gastroesophageal reflux disease)    Diabetes type 2, uncontrolled    History of diabetic retinopathy    Peripheral neuropathy    HOCM (hypertrophic obstructive cardiomyopathy)    Atrial fibrillation    Aortic stenosis    S/P bariatric surgery  lap band surgery two times due to erosion, S/P removal    S/P tonsillectomy and adenoidectomy    Cataract  B/L cataracts    S/P D&amp;C (status post dilation and curettage)  D&amp;C when she was 18    Cervix abnormality  Ablation of cervix    Breast anomaly  Biopsy of both breast benign lesions    right eye torn retina    S/P TAVR (transcatheter aortic valve replacement)      S/P foot surgery  May 2021

## 2021-07-30 NOTE — DIETITIAN NUTRITION RISK NOTIFICATION - TREATMENT: THE FOLLOWING DIET HAS BEEN RECOMMENDED
Diet, Consistent Carbohydrate/No Snacks:   1000mL Fluid Restriction ([id number]) (07-29-21 @ 08:27) [Active]

## 2021-07-30 NOTE — PROGRESS NOTE ADULT - PROVIDER SPECIALTY LIST ADULT
Structural Heart
Structural Heart
Vascular Cardiology
Critical Care
Critical Care
CT Surgery
CT Surgery

## 2021-07-30 NOTE — DISCHARGE NOTE PROVIDER - PROVIDER TOKENS
PROVIDER:[TOKEN:[[id number]],SCHEDULEDAPPT:[08/02/2021],SCHEDULEDAPPTTIME:[08:15 AM]],PROVIDER:[TOKEN:[[id number]],FOLLOWUP:[1 month]],PROVIDER:[TOKEN:[[id number]],FOLLOWUP:[Routine]]

## 2021-07-30 NOTE — DIETITIAN INITIAL EVALUATION ADULT. - ORAL INTAKE PTA/DIET HISTORY
Pt was eating well with no changes in appetite. Pt was trying to follow a consistent carbohydrate diet. Pt reports recently eating mostly convenience foods i.e. preprepared meals. Confirms allergy to certain shellfish resulting in vomiting. Denies Hx of chewing or swallowing issues. Denies oral nutrient supplement use. Took Vitamin D, C, and chromium (?).

## 2021-07-30 NOTE — PROVIDER CONTACT NOTE (OTHER) - ACTION/TREATMENT ORDERED:
40 units of lantus given as per order at this time, plan for NPH 10 units no amelog as per ordered. NPH ordered at 22:41 FS at this time 331.

## 2021-07-30 NOTE — DIETITIAN INITIAL EVALUATION ADULT. - OTHER INFO
Pt with Hx of Type 2 diabetes; manages with Humalog and Tresiba. Checks blood sugars x4 daily; normal range 300 mg/dL. Recent A1C 10.3% (7/21), indicating poor glycemic control.    Dosing wt: 309.5 lbs. Daily wt in lbs: 291.4 (7/30). Reports UBW of 300 lbs, denies any recent changes in wt. RD will continue to trend as new wts available/able.     Pt with fair appetite during admission 2/2 current medical course. Denies recent N/V, diarrhea, or constipation. Last BM 7/28.    Provided verbal education on heart healthy nutrition for people with diabetes and wt loss. Emphasis on pairing carbohydrates with protein for glycemic control; portion sizes; choosing whole grains vs refined carbohydrates; limiting saturated fat + sodium intake. Provided Heart-Healthy Consistent Carbohydrate Nutrition Therapy. Good comprehension noted. Pt made aware RD to remain available for any questions.

## 2021-07-30 NOTE — DISCHARGE NOTE PROVIDER - NSDCFUADDINST_GEN_ALL_CORE_FT
Resume activity as tolerated  Resume low cholesterol low sodium diet  Shower daily and wash all incisions with soap and water  Ambulate at least four times daily  Use incentive spirometer every hour during the day  Record daily weight and report changes greater than 3lbs  Please follow up with your cardiologist in 7-10 days  Please follow up with Dr Deng 8/2 at 8:15am  Please follow up with Dr Bear in 1 month for repeat echo  Please follow up with Dr Stefany Pugh; endocrinologist for glucose management

## 2021-07-30 NOTE — DISCHARGE NOTE PROVIDER - NSDCCPTREATMENT_GEN_ALL_CORE_FT
PRINCIPAL PROCEDURE  Procedure: Percutaneous transcatheter aortic valve replacement (TAVR)  Findings and Treatment: #23 Cancino Ramon

## 2021-07-30 NOTE — DISCHARGE NOTE PROVIDER - NSDCMRMEDTOKEN_GEN_ALL_CORE_FT
aspirin 81 mg oral delayed release tablet: 1 tab(s) orally once a day  atorvastatin 80 mg oral tablet: 1 tab(s) orally once a day (at bedtime)  Cozaar 25 mg oral tablet: 1 tab(s) orally once a day  FLUoxetine 40 mg oral capsule: 1 cap(s) orally once a day  furosemide 80 mg oral tablet: 1 tab(s) orally once a day  HumaLOG 100 units/mL subcutaneous solution: 30  subcutaneous 3 times a day (before meals)  omeprazole 40 mg oral delayed release capsule: 1 cap(s) orally 2 times a day  potassium chloride 20 mEq oral tablet, extended release: 1 tab(s) orally 2 times a day   Singulair 10 mg oral tablet: 1 tab(s) orally once a day  Slow Release Iron (as elemental iron) 45 mg oral tablet, extended release: 1 tab(s) orally once a day  Tresiba 100 units/mL subcutaneous solution: 40 unit(s) subcutaneous once a day (at bedtime)  Vitamin D3: 5000 international unit(s) orally once a day  Xarelto 20 mg oral tablet: 1 tab(s) orally once a day (in the evening)  RESUME TOMORROW 7/10/21 IN THE EVENING

## 2021-07-30 NOTE — DISCHARGE NOTE PROVIDER - HOSPITAL COURSE
65 yr old female sp  TAVR 7/28/21    H morbid obesity,    DVT on eliquis,  IDDM  post op    SOB, pulm edema   trf ctu from [facility]  7/29   trf too floor     lantus  premeal   bumex 1 mg q12,   cozaar    NSR   groins stable    7/30 Renal ultrasound demonstrates No hydronephrosis and right simple cysts normal pole measures 2.5 x 2.2 x 2.6 cm. Insulin gtt discontinued. Basal bolus insulin adjusted. ch< from: TTE with Doppler demonstrates Hyperdynamic left ventricle Transcatheter aortic valve replacement, within previous. TAVR, with normal function. Mild-moderate mitral stenosis Mild-moderate pulmonary hypertension.   Nutrition consult for diabetic education and weight loss directed diet  Discharge home with [facility]

## 2021-07-30 NOTE — DIETITIAN INITIAL EVALUATION ADULT. - CHIEF COMPLAINT
Pt is a "65 yr old female sp  TAVR 7/28/21    H morbid obesity,    DVT on eliquis,  IDDM  post op    SOB, pulm edema   trf ctu from [facility]"

## 2021-07-30 NOTE — DISCHARGE NOTE PROVIDER - CARE PROVIDER_API CALL
Reji Deng (MD)  Thoracic and Cardiac Surgery  [address] [place] [address]  Phone: [phone]  Fax: [phone]  Scheduled Appointment: 08/02/2021 08:15 AM    Harley Bear)  Interventional Cardiology  [address] [place] [address]  Phone: [phone]  Fax: [phone]  Follow Up Time: 1 month    DAMASO DOVER  [id number]  [address] [place] [address]  Phone: ()-  Fax: ()-  Follow Up Time: Routine

## 2021-07-30 NOTE — DIETITIAN INITIAL EVALUATION ADULT. - ADD RECOMMEND
2) Reinforce nutrition education as able. 3) BMI >40 alert placed. 4) Continue to trend labs, weight, skin integrity, and intake.

## 2021-07-30 NOTE — PROGRESS NOTE ADULT - PROBLEM SELECTOR PLAN 1
TTE today  ekg today  OOb   to chair  diuresis bumex 1 mg q12
TTE today  ekg today  OOb   to chair  diuresis bumex 1 mg q12

## 2021-07-30 NOTE — PROGRESS NOTE ADULT - SUBJECTIVE AND OBJECTIVE BOX
VITAL SIGNS    Telemetry:  SR 80's  Vital Signs Last 24 Hrs  T(C): 36.2 (21 @ 12:22), Max: 37.2 (21 @ 20:09)  T(F): 97.1 (21 @ 12:22), Max: 99 (21 @ 20:09)  HR: 95 (21 @ 12:22) (72 - 95)  BP: 115/71 (21 @ 12:22) (108/65 - 134/74)  RR: 18 (21 @ 12:22) (17 - 20)  SpO2: 93% (21 @ 12:22) (93% - 98%)             @ 07:01  -   @ 07:00  --------------------------------------------------------  IN: 433 mL / OUT: 1150 mL / NET: -717 mL     @ 07:01  -   @ 12:57  --------------------------------------------------------  IN: 456.5 mL / OUT: 300 mL / NET: 156.5 mL       Daily     Daily Weight in k.2 (2021 08:18)  Admit Wt: Drug Dosing Weight  Height (cm): 160 (2021 07:09)  Weight (kg): 140.4 (2021 07:09)  BMI (kg/m2): 54.8 (2021 07:09)  BSA (m2): 2.33 (2021 07:09)      CAPILLARY BLOOD GLUCOSE      POCT Blood Glucose.: 103 mg/dL (2021 11:59)  POCT Blood Glucose.: 124 mg/dL (2021 11:01)  POCT Blood Glucose.: 179 mg/dL (2021 10:01)  POCT Blood Glucose.: 149 mg/dL (2021 09:02)  POCT Blood Glucose.: 147 mg/dL (2021 07:59)  POCT Blood Glucose.: 139 mg/dL (2021 06:54)  POCT Blood Glucose.: 156 mg/dL (2021 06:03)  POCT Blood Glucose.: 169 mg/dL (2021 05:03)  POCT Blood Glucose.: 195 mg/dL (2021 04:11)  POCT Blood Glucose.: 243 mg/dL (2021 03:02)  POCT Blood Glucose.: 249 mg/dL (2021 02:02)  POCT Blood Glucose.: 293 mg/dL (2021 00:41)  POCT Blood Glucose.: 331 mg/dL (2021 22:49)  POCT Blood Glucose.: 372 mg/dL (2021 21:35)  POCT Blood Glucose.: 321 mg/dL (2021 17:22)          MEDICATIONS  acetaminophen   Tablet .. 650 milliGRAM(s) Oral every 6 hours PRN  aspirin enteric coated 81 milliGRAM(s) Oral daily  atorvastatin 80 milliGRAM(s) Oral at bedtime  buMETAnide Injectable 1 milliGRAM(s) IV Push two times a day  calcium carbonate    500 mG (Tums) Chewable 1 Tablet(s) Chew three times a day  dextrose 50% Injectable 50 milliLiter(s) IV Push every 15 minutes  dextrose 50% Injectable 25 milliLiter(s) IV Push every 15 minutes  insulin glargine Injectable (LANTUS) 40 Unit(s) SubCutaneous at bedtime  insulin lispro (ADMELOG) corrective regimen sliding scale   SubCutaneous three times a day before meals  insulin lispro (ADMELOG) corrective regimen sliding scale   SubCutaneous at bedtime  insulin lispro Injectable (ADMELOG) 32 Unit(s) SubCutaneous three times a day before meals  insulin regular Infusion 4 Unit(s)/Hr IV Continuous <Continuous>  losartan 25 milliGRAM(s) Oral daily  metoprolol tartrate 50 milliGRAM(s) Oral two times a day  montelukast 10 milliGRAM(s) Oral at bedtime  pantoprazole    Tablet 40 milliGRAM(s) Oral before breakfast  rivaroxaban 20 milliGRAM(s) Oral daily  sodium chloride 0.9% for Nebulization 3 milliLiter(s) Nebulizer every 4 hours  sodium chloride 0.9% lock flush 3 milliLiter(s) IV Push every 8 hours      >>> <<<  PHYSICAL EXAM  Subjective: NAD  Neurology: alert and oriented x 3, nonfocal, no gross deficits  CV : s1s2  Sternal Wound :  CDI , Stable  Lungs: CTA b/l  Abdomen: soft, NT,ND, ( +)BM  :  voiding  Extremities:  -c/c/e no hematoma to b/l groin     LABS      139  |  100  |  16  ----------------------------<  137<H>  4.1   |  27  |  0.72    Ca    9.8      2021 09:18  Phos  2.9       Mg     1.8         TPro  7.2  /  Alb  4.0  /  TBili  0.6  /  DBili  x   /  AST  15  /  ALT  11  /  AlkPhos  117                                   10.5   11.55 )-----------( 180      ( 2021 09:19 )             34.1                 PAST MEDICAL & SURGICAL HISTORY:  Hypertension    Gastroparesis due to DM    Depression    Herniated disc    Spinal stenosis  chronic back pain    Cholesterol serum elevated    MVP (mitral valve prolapse)    Hypothyroid  not on medication    Obesity    IBS (irritable bowel syndrome)    GERD (gastroesophageal reflux disease)    Diabetes type 2, uncontrolled    History of diabetic retinopathy    Peripheral neuropathy    HOCM (hypertrophic obstructive cardiomyopathy)    Atrial fibrillation    Aortic stenosis    S/P bariatric surgery  lap band surgery two times due to erosion, S/P removal    S/P tonsillectomy and adenoidectomy    Cataract  B/L cataracts    S/P D&amp;C (status post dilation and curettage)  D&amp;C when she was 18    Cervix abnormality  Ablation of cervix    Breast anomaly  Biopsy of both breast benign lesions    right eye torn retina    S/P TAVR (transcatheter aortic valve replacement)      S/P foot surgery  May 2021       < from: US Renal (21 @ 16:46) >  FINDINGS:    Right kidney: 13.2 cm. No hydronephrosis. Simple cysts normal pole measures 2.5 x 2.2 x 2.6 cm.    Left kidney: 12.0 cm. No renal mass, hydronephrosis or calculi.    Urinarybladder: Poorly visualized.    IMPRESSION:    Simple right renal cyst corresponding to lesion seen on cardiac CT.    < end of copied text >

## 2021-07-30 NOTE — DISCHARGE NOTE NURSING/CASE MANAGEMENT/SOCIAL WORK - PATIENT PORTAL LINK FT
You can access the FollowMyHealth Patient Portal offered by [facility] by registering at the following website: [email or web address]. By joining [facility]’s FollowMyHealth portal, you will also be able to view your health information using other applications (apps) compatible with our system.

## 2021-07-31 ENCOUNTER — TRANSCRIPTION ENCOUNTER (OUTPATIENT)
Age: 66
End: 2021-07-31

## 2021-08-02 RX ORDER — ASPIRIN 81 MG
81 TABLET, DELAYED RELEASE (ENTERIC COATED) ORAL DAILY
Refills: 0 | Status: ACTIVE | COMMUNITY

## 2021-08-03 ENCOUNTER — NON-APPOINTMENT (OUTPATIENT)
Age: 66
End: 2021-08-03

## 2021-08-03 ENCOUNTER — APPOINTMENT (OUTPATIENT)
Dept: CARDIOTHORACIC SURGERY | Facility: CLINIC | Age: 66
End: 2021-08-03
Payer: MEDICARE

## 2021-08-03 VITALS
RESPIRATION RATE: 14 BRPM | TEMPERATURE: 97.9 F | HEIGHT: 63 IN | BODY MASS INDEX: 51.91 KG/M2 | OXYGEN SATURATION: 96 % | DIASTOLIC BLOOD PRESSURE: 77 MMHG | HEART RATE: 86 BPM | WEIGHT: 293 LBS | SYSTOLIC BLOOD PRESSURE: 140 MMHG

## 2021-08-03 PROCEDURE — 99214 OFFICE O/P EST MOD 30 MIN: CPT

## 2021-08-04 ENCOUNTER — APPOINTMENT (OUTPATIENT)
Dept: CARE COORDINATION | Facility: HOME HEALTH | Age: 66
End: 2021-08-04

## 2021-08-04 VITALS
RESPIRATION RATE: 16 BRPM | SYSTOLIC BLOOD PRESSURE: 138 MMHG | HEART RATE: 82 BPM | WEIGHT: 293 LBS | OXYGEN SATURATION: 96 % | DIASTOLIC BLOOD PRESSURE: 82 MMHG | BODY MASS INDEX: 53.14 KG/M2

## 2021-08-04 NOTE — HISTORY OF PRESENT ILLNESS
[FreeTextEntry1] : 65F s/p SYLVIA TAVR Dr. Deng\par lives alone, sister lives nearby\par failed lapband x2, morbidly obese\par [facility] in place\par

## 2021-08-04 NOTE — PHYSICAL EXAM
[Respiration, Rhythm And Depth] : normal respiratory rhythm and effort [Auscultation Breath Sounds / Voice Sounds] : lungs were clear to auscultation bilaterally [Heart Rate And Rhythm] : heart rate was normal and rhythm regular [Heart Sounds] : normal S1 and S2 [FreeTextEntry1] : groin site c/d/i, MCOT in place [Bowel Sounds] : normal bowel sounds [Abdomen Soft] : soft [Abdomen Tenderness] : non-tender

## 2021-08-04 NOTE — REVIEW OF SYSTEMS
[SOB on Exertion] : shortness of breath during exertion [Negative] : Gastrointestinal [FreeTextEntry2] : difficulty sleeping

## 2021-08-17 NOTE — PATIENT PROFILE ADULT - NSPROEDALEARNER_GEN_A_NUR
family Epidermal Autograft Text: The defect edges were debeveled with a #15 scalpel blade.  Given the location of the defect, shape of the defect and the proximity to free margins an epidermal autograft was deemed most appropriate.  Using a sterile surgical marker, the primary defect shape was transferred to the donor site. The epidermal graft was then harvested.  The skin graft was then placed in the primary defect and oriented appropriately.

## 2021-08-24 ENCOUNTER — APPOINTMENT (OUTPATIENT)
Dept: WOUND CARE | Facility: HOSPITAL | Age: 66
End: 2021-08-24
Payer: MEDICARE

## 2021-08-24 ENCOUNTER — OUTPATIENT (OUTPATIENT)
Dept: OUTPATIENT SERVICES | Facility: HOSPITAL | Age: 66
LOS: 1 days | Discharge: HOME | End: 2021-08-24
Payer: MEDICARE

## 2021-08-24 VITALS
DIASTOLIC BLOOD PRESSURE: 54 MMHG | HEIGHT: 63 IN | BODY MASS INDEX: 51.91 KG/M2 | RESPIRATION RATE: 20 BRPM | WEIGHT: 293 LBS | OXYGEN SATURATION: 97 % | TEMPERATURE: 98.2 F | HEART RATE: 58 BPM | SYSTOLIC BLOOD PRESSURE: 109 MMHG

## 2021-08-24 DIAGNOSIS — S91.111A LACERATION W/OUT FOREIGN BODY OF RIGHT GREAT TOE W/OUT DAMAGE TO NAIL, INITIAL ENCOUNTER: ICD-10-CM

## 2021-08-24 DIAGNOSIS — E11.621 TYPE 2 DIABETES MELLITUS WITH FOOT ULCER: ICD-10-CM

## 2021-08-24 DIAGNOSIS — Z95.2 PRESENCE OF PROSTHETIC HEART VALVE: Chronic | ICD-10-CM

## 2021-08-24 DIAGNOSIS — S91.111A LACERATION WITHOUT FOREIGN BODY OF RIGHT GREAT TOE WITHOUT DAMAGE TO NAIL, INITIAL ENCOUNTER: ICD-10-CM

## 2021-08-24 DIAGNOSIS — Z98.890 OTHER SPECIFIED POSTPROCEDURAL STATES: Chronic | ICD-10-CM

## 2021-08-24 PROCEDURE — 99213 OFFICE O/P EST LOW 20 MIN: CPT

## 2021-08-24 PROCEDURE — G0463: CPT

## 2021-08-24 RX ORDER — METOPROLOL TARTRATE 50 MG/1
50 TABLET, FILM COATED ORAL
Qty: 60 | Refills: 0 | Status: DISCONTINUED | COMMUNITY
Start: 2021-04-06 | End: 2021-08-24

## 2021-08-26 PROBLEM — S91.111A: Status: ACTIVE | Noted: 2021-08-26

## 2021-08-26 NOTE — ASSESSMENT
[Verbal] : Verbal [Written] : Written [Demo] : Demo [Good - alert, interested, motivated] : Good - alert, interested, motivated [Verbalizes knowledge/Understanding] : Verbalizes knowledge/understanding [Dressing changes] : dressing changes [Foot Care] : foot care [Skin Care] : skin care [Signs and symptoms of infection] : sign and symptoms of infection [Venous Disease] : venous disease [Nutrition] : nutrition [How and When to Call] : how and when to call [Off-loading] : off-loading [Patient responsibility to plan of care] : patient responsibility to plan of care [Glycemic Control] : glycemic control [Stable] : stable [Home] : Home [Wheelchair] : Wheelchair [Faxed - Long Term Care/Home Health Agency] : Long Term Care/Home Health Agency: Faxed [] : No [FreeTextEntry2] : Infection Prevention\par Pressure Relief\par Encourage Glycemic Control\par elevation\par offloading\par low Na+ Diet\par weight reduction strategies [FreeTextEntry3] : New Wound [FreeTextEntry4] : F/U 1 week for assessment\par Pt had TAVR procedure and is continuing to F/U with surgeon\par  [FreeTextEntry1] : [facility]

## 2021-08-26 NOTE — REVIEW OF SYSTEMS
[Skin Wound] : skin wound [Fever] : no fever [Eye Pain] : no eye pain [Earache] : no earache [Chest Pain] : no chest pain [Shortness Of Breath] : no shortness of breath [Abdominal Pain] : no abdominal pain [de-identified] : right lower leg ulceration down to skin, subcutaneous tissue, and fat, epithelialized. right foot dorsal incision site dehiscence healed at this time. right submet 4th ulcer down to skin, subcutaneous tissue, fat, tendon, bone (not necrotic), healed [de-identified] : diabetic neuropathy [de-identified] : type 2 diabetes

## 2021-08-26 NOTE — HISTORY OF PRESENT ILLNESS
[FreeTextEntry1] : 67 yo WF, here for a new laceration to her right hallux. Pt last seen here about 1 month ago. Since then pt states she underwent AVR heart surgery. Pt somehow stubbed her right hallux last night causing a great deal of bleeding since pt is on a blood thinner. Does not remember how she did it, but suddenly started bleeding heavily from her right hallux. The bleeding finally stopped according to the pt. Also has some old blood blisters on her right 3rd toe/left halllux.

## 2021-08-26 NOTE — PHYSICAL EXAM
[Normal Thyroid] : the thyroid was normal [Normal Breath Sounds] : Normal breath sounds [Normal Heart Sounds] : normal heart sounds [Normal Rate and Rhythm] : normal rate and rhythm [Alert] : alert [Oriented to Person] : oriented to person [Oriented to Place] : oriented to place [Oriented to Time] : oriented to time [Calm] : calm [4 x 4] : 4 x 4  [JVD] : no jugular venous distention  [Abdomen Masses] : No abdominal massess [Abdomen Tenderness] : ~T ~M No abdominal tenderness [Tender] : nontender [Enlarged] : not enlarged [de-identified] : obese, WF, NAD, alert, Ox3. [de-identified] : mild erythema. [FreeTextEntry7] : Right Hallux- skin tear- flap intact- NEW  [FreeTextEntry8] : 0.8 [FreeTextEntry9] : 0.5 [de-identified] : 0.1 [de-identified] : small serosanguineous [de-identified] : mild erythema [de-identified] : silver alginate [de-identified] : Cleansed with NS\par paper tape\par  [de-identified] : Right 3rd digit- Blood blister- NEW [de-identified] : dry dressing [de-identified] : Cleansed with NS\par paper tape [FreeTextEntry1] : Right Anterior Lower LEg- intact Blister [FreeTextEntry2] : 0.7 [FreeTextEntry3] : 0.7 [de-identified] : silver alginate [de-identified] : Cleansed with NS\par Allevyn Border [de-identified] : No [de-identified] : None [de-identified] : None [de-identified] : 3x Weekly [de-identified] : Primary Dressing [de-identified] : No [de-identified] : Traumatic [de-identified] : No [de-identified] : Erythema [de-identified] : None [de-identified] : None [de-identified] : 100% [de-identified] : No [de-identified] : 3x Weekly [de-identified] : Primary Dressing [de-identified] : 3x Weekly [de-identified] : Primary Dressing [TWNoteComboBox4] : None [TWNoteComboBox5] : No [TWNoteComboBox6] : Venous [de-identified] : No [de-identified] : Normal [de-identified] : None [de-identified] : None [de-identified] : None [de-identified] : No [de-identified] : 3x Weekly [de-identified] : Primary Dressing

## 2021-08-26 NOTE — PLAN
[FreeTextEntry1] : ag alginate, Dd to right hallux\par DD to both blood blisters\par f/u 1 wk\par \par time spent 25 mins.

## 2021-08-27 ENCOUNTER — APPOINTMENT (OUTPATIENT)
Dept: WOUND CARE | Facility: HOSPITAL | Age: 66
End: 2021-08-27

## 2021-08-30 ENCOUNTER — TRANSCRIPTION ENCOUNTER (OUTPATIENT)
Age: 66
End: 2021-08-30

## 2021-09-01 ENCOUNTER — APPOINTMENT (OUTPATIENT)
Dept: WOUND CARE | Facility: HOSPITAL | Age: 66
End: 2021-09-01

## 2021-09-02 PROCEDURE — 93228 REMOTE 30 DAY ECG REV/REPORT: CPT

## 2021-09-08 NOTE — PRE-ANESTHESIA EVALUATION ADULT - HEIGHT IN INCHES
Date of Visit:  9/8/2021  Reason for Visit:  Workers Compensation Visit  Employer:  [employer] ( [employer])   Date of Injury:  1/21/2021    HPI:    Augustus Corrales is a 51 year old male, who presents for follow up of an injury to his neck pain and headache that occurred at work on 1/21/2021.      He reports that symptoms have improved about 50% since he has started PT/dry needling and chiropractic.  His pain is located midline of the mid to the lower cervical spine and bilateral trapezius/cervical paraspinals.  Pain is aggravated looking side to side and extension for a period of time.  No radicular pain.    With regard to this injury, he is taking the following medications:  excedrin    Functional status:  He has been working with restrictions.     ROS:  Review of systems is performed and findings relevant to this injury are included in the HPI.    TOBACCO HX:  The patient  reports that he quit smoking about 5 years ago. His smoking use included cigarettes. He has a 21.75 pack-year smoking history. He has never used smokeless tobacco.  CURRENT MEDICATIONS and ALLERGIES are reviewed in the record.  PROBLEM LIST is reviewed in the record.    PHYSICAL EXAM: Augustus is a well-developed male, who appears in no acute distress.    He is awake, alert and cooperative and pleasant.   There were no vitals filed for this visit.  Inspection/Palpation: tender to the cervical paraspinals, spinous processes and trapezius bilaterally. Limited rotation to the left.     MMT: RUE D 5/5 B 5/5  WE 5/5  T 5/5  FF 5/5  HI 5/5   LUE D 5/5 B 5/5  WE 5/5  T 5/5  FF 5/5  HI 5/5   RLE HF 5/5 KE 5/5 DF 5/5 EHL 5/5 PF 5/5    LLE HF 5/5 KE 5/5 DF 5/5 EHL5/5 PF 5/5     Sensation: Grossly intact  0/4 deep tendon reflexes  Absent Babinski  Negative Roe  Cerebellar signs: Normal  Gait: Normal  Facet loading negative  Straight leg test negative  Tito's test negative  Spurling's test negative        ASSESSMENT:    Neck pain  Myofacial  pain of the cervical region and trapezius bilaterally.     PLAN:  Referral for C4-7 facet injections  Consider bilateral tarpezius and levator scapulae trigger point injections.   Return to work:  9/8/2021      =  See Return to Work Report for further information.       3

## 2021-09-09 ENCOUNTER — NON-APPOINTMENT (OUTPATIENT)
Age: 66
End: 2021-09-09

## 2021-09-09 ENCOUNTER — APPOINTMENT (OUTPATIENT)
Dept: CARDIOTHORACIC SURGERY | Facility: CLINIC | Age: 66
End: 2021-09-09
Payer: MEDICARE

## 2021-09-09 ENCOUNTER — OUTPATIENT (OUTPATIENT)
Dept: OUTPATIENT SERVICES | Facility: HOSPITAL | Age: 66
LOS: 1 days | End: 2021-09-09
Payer: MEDICARE

## 2021-09-09 VITALS
TEMPERATURE: 98.1 F | BODY MASS INDEX: 51.91 KG/M2 | RESPIRATION RATE: 18 BRPM | OXYGEN SATURATION: 97 % | DIASTOLIC BLOOD PRESSURE: 84 MMHG | WEIGHT: 293 LBS | HEIGHT: 63 IN | SYSTOLIC BLOOD PRESSURE: 136 MMHG | HEART RATE: 88 BPM

## 2021-09-09 VITALS — SYSTOLIC BLOOD PRESSURE: 111 MMHG | DIASTOLIC BLOOD PRESSURE: 76 MMHG

## 2021-09-09 DIAGNOSIS — Z95.2 PRESENCE OF PROSTHETIC HEART VALVE: Chronic | ICD-10-CM

## 2021-09-09 DIAGNOSIS — I35.0 NONRHEUMATIC AORTIC (VALVE) STENOSIS: ICD-10-CM

## 2021-09-09 DIAGNOSIS — F32.9 MAJOR DEPRESSIVE DISORDER, SINGLE EPISODE, UNSPECIFIED: ICD-10-CM

## 2021-09-09 DIAGNOSIS — Z98.890 OTHER SPECIFIED POSTPROCEDURAL STATES: Chronic | ICD-10-CM

## 2021-09-09 PROCEDURE — 93000 ELECTROCARDIOGRAM COMPLETE: CPT

## 2021-09-09 PROCEDURE — 99213 OFFICE O/P EST LOW 20 MIN: CPT

## 2021-09-09 PROCEDURE — 93306 TTE W/DOPPLER COMPLETE: CPT | Mod: 26

## 2021-09-09 PROCEDURE — 93306 TTE W/DOPPLER COMPLETE: CPT

## 2021-09-09 RX ORDER — MULTIVIT-MIN/FOLIC/VIT K/LYCOP 400-300MCG
250 TABLET ORAL DAILY
Refills: 0 | Status: ACTIVE | COMMUNITY
Start: 2021-09-09

## 2021-09-09 RX ORDER — LOSARTAN POTASSIUM 25 MG/1
25 TABLET, FILM COATED ORAL DAILY
Refills: 0 | Status: ACTIVE | COMMUNITY
Start: 2021-04-06

## 2021-09-13 LAB
ANION GAP SERPL CALC-SCNC: 13 MMOL/L
BUN SERPL-MCNC: 19 MG/DL
CALCIUM SERPL-MCNC: 10.2 MG/DL
CHLORIDE SERPL-SCNC: 98 MMOL/L
CO2 SERPL-SCNC: 28 MMOL/L
CREAT SERPL-MCNC: 0.83 MG/DL
GLUCOSE SERPL-MCNC: 312 MG/DL
POTASSIUM SERPL-SCNC: 4.8 MMOL/L
SODIUM SERPL-SCNC: 138 MMOL/L

## 2021-09-13 NOTE — DISCUSSION/SUMMARY
[FreeTextEntry1] : Plan:\par 1) Stable 6 weeks s/p PATSY (7/28/2021: PATSY using a 23 mm Ramon 3 Ultra bioprosthesis)\par 2) BP at goal - Medication changes: None.\par 3) MCOT received and demonstrated 5 episodes of SVT with fastest run at 170 BPM, 1 episode of VT occurred with fastest run at 108 BPM, 2% PAC burden and 8% PVC burden. There was no AF, heart block, or pauses. There was no ECG tracings after 8/11/2021. Was previously on metoprolol tartrate 50 mg BID but was discontinued due to hypotension per patient report. \par 4) TTE done today - results pending.  \par 5) To follow-up regularly with cardiology as scheduled (Dr. Jamal Espinal - to make appt.)\par 6) To follow-up regularly with PCP as scheduled (Dr. Jason Hudson - sees as needed)\par 7) To follow-up with structural heart clinic yearly with TTE or sooner if needed.\par 8) Antibiotic prophylaxis discussed.\par 9) 6/3/2021 CT HEART W/O CORONARIES done pre-TAVR demonstrated a right renal cyst which measured above fluid density, ultrasound needed for complete evaluation. 12/15/2019 RUQ u/s demonstrated the right kidney: 12.7 cm. No hydronephrosis. A lower pole cyst measures 2.7 x 2.4 x 2.9 cm. Pt. states had ultrasound in the hospital and no further studies were needed as per patient. Reports that "everything seems to be OK".

## 2021-09-13 NOTE — REVIEW OF SYSTEMS
[Dyspnea on exertion] : dyspnea during exertion [Lower Ext Edema] : lower extremity edema [Negative] : Heme/Lymph [Fever] : no fever [Chills] : no chills [SOB] : no shortness of breath [Chest Discomfort] : no chest discomfort [Syncope] : no syncope [Cough] : no cough [Change in Appetite] : no change in appetite [Dizziness] : no dizziness

## 2021-09-13 NOTE — PHYSICAL EXAM
[Well Developed] : well developed [Well Nourished] : well nourished [No Acute Distress] : no acute distress [Obese] : obese [Normal Conjunctiva] : normal conjunctiva [Normal Venous Pressure] : normal venous pressure [No Carotid Bruit] : no carotid bruit [Normal Rate] : normal [Heart Rate ___] : [unfilled] bpm [Rhythm Regular] : regular [Normal S1] : normal S1 [Normal S2] : normal S2 [No Gallop] : no gallop heard [Prosthetic Aortic Valve] : prosthetic aortic valve heard [I] : a grade 1 [___ +] : bilateral [unfilled]U+ pretibial pitting edema [1+] : left 1+ [No Abnormalities] : the abdominal aorta was not enlarged and no bruit was heard [Clear Lung Fields] : clear lung fields [Good Air Entry] : good air entry [No Respiratory Distress] : no respiratory distress  [Soft] : abdomen soft [Non Tender] : non-tender [No Masses/organomegaly] : no masses/organomegaly [Normal Bowel Sounds] : normal bowel sounds [No Edema] : no edema [No Cyanosis] : no cyanosis [No Clubbing] : no clubbing [No Varicosities] : no varicosities [Moves all extremities] : moves all extremities [No Focal Deficits] : no focal deficits [Normal Speech] : normal speech [Alert and Oriented] : alert and oriented [Normal memory] : normal memory [Click] : no click [Distant] : the heart sounds were ~L not distant [Pericardial Rub] : no pericardial rub [Right Carotid Bruit] : no bruit heard over the right carotid [Left Carotid Bruit] : no bruit heard over the left carotid [Bruit] : no bruit heard [de-identified] : Comes to the office in a wheelchair.  [de-identified] : pink colored skin discoloration just above the ankles bilaterally

## 2021-09-13 NOTE — HISTORY OF PRESENT ILLNESS
[FreeTextEntry1] : MARIA D ROLBEDO is a 66 year old female here on 08/23/2021, 6 weeks after undergoing a transfemoral transcatheter aortic valve implantation using a 23 mm Ramon 3 Ultra bioprosthesis on 7/28/2021.  Post-operative course was significant for shortness of breath requiring Bumex and she was discharged home on POD #2 with an MCOT. She wore the initial MCOT for 2 weeks and it was sent back yesterday. The MCOT demonstrated 5 episodes of SVT with fastest run at 170 BPM, 1 episode of VT occurred with fastest run at 108 BPM, 2% PAC burden and 8% PVC burden. There was no AF, heart block, or pauses. There was no ECG tracings after 8/11/2021. \par \par She comes to the office today with a new TTE reporting feeling "just tired" overall. She reports breathing "a little bit better". Prior to the TAVR, she would take 2 steps and not be able to breathe. Now she can walk around her house and not be gasping anymore. She hasn't been as active due to her foot (had foot surgery/amputation). she has some edema of her lower extremities. She reports sticking to her diet but hasn't been able to lose any weight. She reports feeling some episodes of AF and wore the heart monitor for 2 weeks and sent it back yesterday. She uses O2 at 2 LPM at night time. \par \par 5 meter walk: deferred, unable to complete walk. Comes to office in wheelchair. \par NYHA II\par PCP: Dr. Jason Hudson\par CARD: Dr. Jamal Espinal\par

## 2021-09-13 NOTE — ADDENDUM
[FreeTextEntry1] : 9/9/2021: TTE report received which shows well seated and functioning PATSY valve within previous PATSY valve. EF 75%. I called and left a message on her voicemail to call back to discuss. \par \par Patient called back. Updated on above and reviewed her labs. Follow-up in one year with TTE.

## 2021-09-17 ENCOUNTER — APPOINTMENT (OUTPATIENT)
Dept: CARDIOLOGY | Facility: CLINIC | Age: 66
End: 2021-09-17
Payer: MEDICARE

## 2021-09-17 VITALS
OXYGEN SATURATION: 100 % | DIASTOLIC BLOOD PRESSURE: 66 MMHG | WEIGHT: 291 LBS | BODY MASS INDEX: 51.56 KG/M2 | HEIGHT: 63 IN | HEART RATE: 88 BPM | SYSTOLIC BLOOD PRESSURE: 108 MMHG

## 2021-09-17 DIAGNOSIS — Z83.49 FAMILY HISTORY OF OTHER ENDOCRINE, NUTRITIONAL AND METABOLIC DISEASES: ICD-10-CM

## 2021-09-17 DIAGNOSIS — Z86.711 PERSONAL HISTORY OF PULMONARY EMBOLISM: ICD-10-CM

## 2021-09-17 DIAGNOSIS — S81.809A UNSPECIFIED OPEN WOUND, UNSPECIFIED LOWER LEG, INITIAL ENCOUNTER: ICD-10-CM

## 2021-09-17 DIAGNOSIS — Z95.2 PRESENCE OF PROSTHETIC HEART VALVE: ICD-10-CM

## 2021-09-17 DIAGNOSIS — Z87.891 PERSONAL HISTORY OF NICOTINE DEPENDENCE: ICD-10-CM

## 2021-09-17 DIAGNOSIS — L30.9 DERMATITIS, UNSPECIFIED: ICD-10-CM

## 2021-09-17 DIAGNOSIS — I73.9 PERIPHERAL VASCULAR DISEASE, UNSPECIFIED: ICD-10-CM

## 2021-09-17 PROCEDURE — 99214 OFFICE O/P EST MOD 30 MIN: CPT

## 2021-09-17 NOTE — HISTORY OF PRESENT ILLNESS
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 High-grade stenosis of DP.\par \par She reports that her 4th digit healed post-amp.\par Reports bilateral LE discomfort and also diffuse body pain taking NSAIDS PRN.\par Reports for knee discomfort had h/o steroid injections.\par When walking the first thing that bothers her is her back and then her knee.\par Reports her prior blisters to her legs resolved, she was seeing wound care.\par \par Reports new blister formation to R 5th digit within the past 2 weeks likely 2nd to trauma. \par \par Medications:\par ASA 81 mg daily\par Lipitor 80 mg QHS\par Cozaar 25 mg daily\par Lasix 80 mg daily\par Potassium\par Omeprazole\par Fluoxetine\par Xarelto 20 mg daily\par Insulin\par

## 2021-09-17 NOTE — PHYSICAL EXAM
[Respiration, Rhythm And Depth] : normal respiratory rhythm and effort [Auscultation Breath Sounds / Voice Sounds] : lungs were clear to auscultation bilaterally [Heart Rate And Rhythm] : heart rate and rhythm were normal [Heart Sounds] : normal S1 and S2 [Murmurs] : no murmurs present [Bowel Sounds] : normal bowel sounds [Abdomen Soft] : soft [Abdomen Tenderness] : non-tender [Cyanosis, Localized] : no localized cyanosis [] : no ischemic changes [Oriented To Time, Place, And Person] : oriented to person, place, and time [FreeTextEntry1] : see above

## 2021-09-17 NOTE — REVIEW OF SYSTEMS
[Negative] : Heme/Lymph [FreeTextEntry3] : see HPI [FreeTextEntry5] : see HPI [FreeTextEntry9] : see HPI [de-identified] : see HPI

## 2021-09-17 NOTE — ASSESSMENT
[FreeTextEntry1] : Assessment:\par 1. AS s/p Valve in Valve TAVR\par 2. H/o PE 8/2020\par 3. R Below Knee DVT 8/2020\par 4. HTN\par 5. Venous Insufficiency / history of LE Venous Ulcerations\par 6. HOCM \par 7. Heterozygous for the Prothrombin [id number] mutation\par     Negative for Factor 5 Leiden Gene Mutation \par  \par Plan:\par 1. Bounding distal pulses. Excellent MARQUIS and PVR.\par 2. Continue Xarelto 20 mg daily with evening meal for h/o VTE and Heterozygous for the Prothrombin Gene mutation.\par 3. Continue wound care.\par 4. Keep R 5th digit wound clean until completely healed.\par 5. Continue ASA and Statin.\par 7. On Furosemide. Continue leg elevation.\par 8. Continue excellent care with Dr. Espinal and Dr. Deng.\par 9. Follow-up as needed. Call with questions.

## 2021-09-20 ENCOUNTER — APPOINTMENT (OUTPATIENT)
Dept: WOUND CARE | Facility: HOSPITAL | Age: 66
End: 2021-09-20
Payer: MEDICARE

## 2021-09-20 ENCOUNTER — OUTPATIENT (OUTPATIENT)
Dept: OUTPATIENT SERVICES | Facility: HOSPITAL | Age: 66
LOS: 1 days | Discharge: HOME | End: 2021-09-20
Payer: MEDICARE

## 2021-09-20 VITALS
TEMPERATURE: 97.6 F | OXYGEN SATURATION: 98 % | SYSTOLIC BLOOD PRESSURE: 133 MMHG | WEIGHT: 291 LBS | DIASTOLIC BLOOD PRESSURE: 65 MMHG | HEART RATE: 96 BPM | RESPIRATION RATE: 20 BRPM | BODY MASS INDEX: 51.56 KG/M2 | HEIGHT: 63 IN

## 2021-09-20 DIAGNOSIS — S91.111D LACERATION WITHOUT FOREIGN BODY OF RIGHT GREAT TOE WITHOUT DAMAGE TO NAIL, SUBSEQUENT ENCOUNTER: ICD-10-CM

## 2021-09-20 DIAGNOSIS — Z95.2 PRESENCE OF PROSTHETIC HEART VALVE: Chronic | ICD-10-CM

## 2021-09-20 DIAGNOSIS — Z98.890 OTHER SPECIFIED POSTPROCEDURAL STATES: Chronic | ICD-10-CM

## 2021-09-20 PROCEDURE — 99214 OFFICE O/P EST MOD 30 MIN: CPT

## 2021-09-20 PROCEDURE — G0463: CPT

## 2021-09-20 NOTE — HISTORY OF PRESENT ILLNESS
[FreeTextEntry1] : 67 yo WF, here for a new right 5th digit diabetic ulcer down to skin, subcutaneous tissue, and fat. Pt also seen for left plantar midfoot ulcer down to skin, subcutaneous tissue, and fat. Pt relates that she underwent recent cardiac surgery (TAVR) and was discharged. Pt relates that she feels better at this time.

## 2021-09-20 NOTE — PHYSICAL EXAM
[2 x 2] : 2 x 2  [0] : left 0 [Ankle Swelling (On Exam)] : present [Ankle Swelling Bilaterally] : bilaterally  [Varicose Veins Of Lower Extremities] : bilaterally [] : bilaterally [Ankle Swelling On The Left] : moderate [Skin Ulcer] : ulcer [de-identified] : A&Ox3, NAD [de-identified] : 4/5 strength in all quadrants bilaterally [de-identified] : right 5th digit diabetic ulcer down to skin, subcutaneous tissue, and fat. left plantar midfoot ulcer down to skin, subcutaneous tissue, and fat [de-identified] : DPM Shaved callus [de-identified] : Callus [de-identified] : None [de-identified] : DPM Shaved callus [de-identified] : Callus [de-identified] : Right 5th digit [de-identified] : 0.7 [de-identified] : 0.7 [de-identified] : 0.1 [de-identified] : >75% [de-identified] : <25% [de-identified] : Silver Alginate [de-identified] : Cleansed with Normal Saline\par Allevyn Boarder [FreeTextEntry1] : Right Anterior Lower Leg- closed [de-identified] : Cleansed with Normal Saline [FreeTextEntry7] : Left Plantar Foot [FreeTextEntry8] : 0.6 [FreeTextEntry9] : 0.6 [de-identified] : 0.1 [de-identified] : serosanguineous [de-identified] : 100% [de-identified] : Silver Alginate [de-identified] : Cleansed with Normal Saline\par Allevyn Boarder [de-identified] : No [de-identified] : None [de-identified] : None [de-identified] : 3x Weekly [de-identified] : Primary Dressing [de-identified] : No [de-identified] : Traumatic [de-identified] : No [de-identified] : other [de-identified] : None [de-identified] : None [de-identified] : 100% [de-identified] : No [de-identified] : 3x Weekly [de-identified] : Primary Dressing [de-identified] : Small [de-identified] : Erythema [de-identified] : >75% [de-identified] : Yes [de-identified] : 3x Weekly [TWNoteComboBox4] : None [TWNoteComboBox5] : No [TWNoteComboBox6] : Venous [de-identified] : No [de-identified] : Normal [de-identified] : None [de-identified] : None [de-identified] : None [de-identified] : No [de-identified] : 3x Weekly [TWNoteComboBox9] : Left [de-identified] : Small [de-identified] : 100% [de-identified] : 3x Weekly

## 2021-09-20 NOTE — PLAN
[FreeTextEntry1] : Patient examined and evaluated at this time.\par Continue local wound care and offloading.\par Spent 30 minutes for patient care and medical decision making.\par Patient to follow up in 1 week.\par

## 2021-09-20 NOTE — ASSESSMENT
[Verbal] : Verbal [Written] : Written [Demo] : Demo [Good - alert, interested, motivated] : Good - alert, interested, motivated [Verbalizes knowledge/Understanding] : Verbalizes knowledge/understanding [Dressing changes] : dressing changes [Foot Care] : foot care [Skin Care] : skin care [Signs and symptoms of infection] : sign and symptoms of infection [Venous Disease] : venous disease [Nutrition] : nutrition [How and When to Call] : how and when to call [Off-loading] : off-loading [Patient responsibility to plan of care] : patient responsibility to plan of care [Glycemic Control] : glycemic control [] : Yes [Stable] : stable [Home] : Home [Wheelchair] : Wheelchair [Faxed - Long Term Care/Home Health Agency] : Long Term Care/Home Health Agency: Faxed [FreeTextEntry2] : Infection Prevention\par Pressure Relief\par Encourage Glycemic Control\par elevation\par offloading\par low Na+ Diet\par weight reduction strategies [FreeTextEntry4] : F/U 1 week for assessment\par  [FreeTextEntry1] : [facility]

## 2021-09-20 NOTE — REVIEW OF SYSTEMS
[Fever] : no fever [Eye Pain] : no eye pain [Earache] : no earache [Chest Pain] : no chest pain [Shortness Of Breath] : no shortness of breath [Abdominal Pain] : no abdominal pain [Skin Wound] : skin wound [de-identified] : right 5th digit diabetic ulcer down to skin, subcutaneous tissue, and fat. left plantar midfoot ulcer down to skin, subcutaneous tissue, and fat [de-identified] : diabetic neuropathy [de-identified] : type 2 diabetes

## 2021-09-21 LAB
BASOPHILS # BLD AUTO: 0.03 K/UL
BASOPHILS NFR BLD AUTO: 0.3 %
EOSINOPHIL # BLD AUTO: 0.18 K/UL
EOSINOPHIL NFR BLD AUTO: 2.1 %
HCT VFR BLD CALC: 41.2 %
HGB BLD-MCNC: 13 G/DL
IMM GRANULOCYTES NFR BLD AUTO: 0.3 %
LYMPHOCYTES # BLD AUTO: 1.21 K/UL
LYMPHOCYTES NFR BLD AUTO: 13.8 %
MAN DIFF?: NORMAL
MCHC RBC-ENTMCNC: 27.7 PG
MCHC RBC-ENTMCNC: 31.6 GM/DL
MCV RBC AUTO: 87.8 FL
MONOCYTES # BLD AUTO: 0.62 K/UL
MONOCYTES NFR BLD AUTO: 7.1 %
NEUTROPHILS # BLD AUTO: 6.7 K/UL
NEUTROPHILS NFR BLD AUTO: 76.4 %
PLATELET # BLD AUTO: 242 K/UL
RBC # BLD: 4.69 M/UL
RBC # FLD: 16 %
WBC # FLD AUTO: 8.77 K/UL

## 2021-09-22 DIAGNOSIS — I11.9 HYPERTENSIVE HEART DISEASE WITHOUT HEART FAILURE: ICD-10-CM

## 2021-09-22 DIAGNOSIS — Z87.01 PERSONAL HISTORY OF PNEUMONIA (RECURRENT): ICD-10-CM

## 2021-09-22 DIAGNOSIS — Z88.5 ALLERGY STATUS TO NARCOTIC AGENT: ICD-10-CM

## 2021-09-22 DIAGNOSIS — E11.621 TYPE 2 DIABETES MELLITUS WITH FOOT ULCER: ICD-10-CM

## 2021-09-22 DIAGNOSIS — M47.817 SPONDYLOSIS WITHOUT MYELOPATHY OR RADICULOPATHY, LUMBOSACRAL REGION: ICD-10-CM

## 2021-09-22 DIAGNOSIS — E55.9 VITAMIN D DEFICIENCY, UNSPECIFIED: ICD-10-CM

## 2021-09-22 DIAGNOSIS — M43.16 SPONDYLOLISTHESIS, LUMBAR REGION: ICD-10-CM

## 2021-09-22 DIAGNOSIS — Z82.49 FAMILY HISTORY OF ISCHEMIC HEART DISEASE AND OTHER DISEASES OF THE CIRCULATORY SYSTEM: ICD-10-CM

## 2021-09-22 DIAGNOSIS — Z87.81 PERSONAL HISTORY OF (HEALED) TRAUMATIC FRACTURE: ICD-10-CM

## 2021-09-22 DIAGNOSIS — G47.10 HYPERSOMNIA, UNSPECIFIED: ICD-10-CM

## 2021-09-22 DIAGNOSIS — Z83.49 FAMILY HISTORY OF OTHER ENDOCRINE, NUTRITIONAL AND METABOLIC DISEASES: ICD-10-CM

## 2021-09-22 DIAGNOSIS — E05.90 THYROTOXICOSIS, UNSPECIFIED WITHOUT THYROTOXIC CRISIS OR STORM: ICD-10-CM

## 2021-09-22 DIAGNOSIS — I47.1 SUPRAVENTRICULAR TACHYCARDIA: ICD-10-CM

## 2021-09-22 DIAGNOSIS — Z79.4 LONG TERM (CURRENT) USE OF INSULIN: ICD-10-CM

## 2021-09-22 DIAGNOSIS — M48.07 SPINAL STENOSIS, LUMBOSACRAL REGION: ICD-10-CM

## 2021-09-22 DIAGNOSIS — E11.59 TYPE 2 DIABETES MELLITUS WITH OTHER CIRCULATORY COMPLICATIONS: ICD-10-CM

## 2021-09-22 DIAGNOSIS — L97.512 NON-PRESSURE CHRONIC ULCER OF OTHER PART OF RIGHT FOOT WITH FAT LAYER EXPOSED: ICD-10-CM

## 2021-09-22 DIAGNOSIS — Z95.4 PRESENCE OF OTHER HEART-VALVE REPLACEMENT: ICD-10-CM

## 2021-09-22 DIAGNOSIS — Z98.84 BARIATRIC SURGERY STATUS: ICD-10-CM

## 2021-09-22 DIAGNOSIS — I34.1 NONRHEUMATIC MITRAL (VALVE) PROLAPSE: ICD-10-CM

## 2021-09-22 DIAGNOSIS — I83.893 VARICOSE VEINS OF BILATERAL LOWER EXTREMITIES WITH OTHER COMPLICATIONS: ICD-10-CM

## 2021-09-22 DIAGNOSIS — E66.01 MORBID (SEVERE) OBESITY DUE TO EXCESS CALORIES: ICD-10-CM

## 2021-09-22 DIAGNOSIS — Z87.891 PERSONAL HISTORY OF NICOTINE DEPENDENCE: ICD-10-CM

## 2021-09-22 DIAGNOSIS — E11.40 TYPE 2 DIABETES MELLITUS WITH DIABETIC NEUROPATHY, UNSPECIFIED: ICD-10-CM

## 2021-09-22 DIAGNOSIS — G47.33 OBSTRUCTIVE SLEEP APNEA (ADULT) (PEDIATRIC): ICD-10-CM

## 2021-09-22 DIAGNOSIS — L84 CORNS AND CALLOSITIES: ICD-10-CM

## 2021-09-22 DIAGNOSIS — Z79.82 LONG TERM (CURRENT) USE OF ASPIRIN: ICD-10-CM

## 2021-09-22 DIAGNOSIS — L97.422 NON-PRESSURE CHRONIC ULCER OF LEFT HEEL AND MIDFOOT WITH FAT LAYER EXPOSED: ICD-10-CM

## 2021-09-27 NOTE — ED ADULT TRIAGE NOTE - ADDITIONAL SAFETY/BANDS...
Airway  Performed by: Navi Love DO  Authorized by: Navi Love DO     Final Airway Type:  Endotracheal airway  Final Endotracheal Airway*:  ETT  ETT Size (mm)*:  7.0  Cuff*:  Regular  Technique Used for Successful ETT Placement:  Direct laryngoscopy  Devices/Methods Used in Placement*:  Oral ETT  Intubation Procedure*:  ETCO2, Preoxygenation, Atraumatic, Pharynx Clear and Dentition Unchanged  Insertion Site:  Oral  Blade Type*:  MAC  Blade Size*:  4  Initial Cuff Pressure (cm H2O):  20  Cuff Volume (mL):  7  Measured from*:  Gums  Placement Verified by: auscultation, capnometry and equal breath sounds    Glottic View*:  1 - full view of glottis  Attempts*:  1  Ventilation Between Attempts:  None  Number of Other Approaches Attempted:  0   Patient Identified, Procedure confirmed, Emergency equipment available and Safety protocols followed  Location:  OR  Urgency:  Elective  Difficult Airway: No    Indications for Airway Management:  Anesthesia  Spontaneous Ventilation: absent    Sedation Level:  Anesthetized  MILS Maintained Throughout: No    Mask Difficulty Assessment:  2 - vent by mask + OA or adjuvant +/- NMBA  Performed By:  Anesthesiologist  Anesthesiologist:  Navi Love DO         Additional Safety/Bands:

## 2021-09-28 ENCOUNTER — OUTPATIENT (OUTPATIENT)
Dept: OUTPATIENT SERVICES | Facility: HOSPITAL | Age: 66
LOS: 1 days | Discharge: HOME | End: 2021-09-28
Payer: MEDICARE

## 2021-09-28 ENCOUNTER — APPOINTMENT (OUTPATIENT)
Dept: WOUND CARE | Facility: HOSPITAL | Age: 66
End: 2021-09-28
Payer: MEDICARE

## 2021-09-28 VITALS
OXYGEN SATURATION: 100 % | HEIGHT: 63 IN | BODY MASS INDEX: 51.56 KG/M2 | RESPIRATION RATE: 18 BRPM | WEIGHT: 291 LBS | DIASTOLIC BLOOD PRESSURE: 70 MMHG | TEMPERATURE: 98.2 F | HEART RATE: 75 BPM | SYSTOLIC BLOOD PRESSURE: 122 MMHG

## 2021-09-28 DIAGNOSIS — Z98.890 OTHER SPECIFIED POSTPROCEDURAL STATES: Chronic | ICD-10-CM

## 2021-09-28 DIAGNOSIS — Z95.2 PRESENCE OF PROSTHETIC HEART VALVE: Chronic | ICD-10-CM

## 2021-09-28 DIAGNOSIS — E11.621 TYPE 2 DIABETES MELLITUS WITH FOOT ULCER: ICD-10-CM

## 2021-09-28 DIAGNOSIS — L97.422 NON-PRESSURE CHRONIC ULCER OF LEFT HEEL AND MIDFOOT WITH FAT LAYER EXPOSED: ICD-10-CM

## 2021-09-28 PROCEDURE — 99213 OFFICE O/P EST LOW 20 MIN: CPT

## 2021-09-28 PROCEDURE — G0463: CPT

## 2021-09-29 PROBLEM — L97.422 CHRONIC ULCER OF HEEL, LEFT, WITH FAT LAYER EXPOSED: Status: ACTIVE | Noted: 2021-09-20

## 2021-09-29 NOTE — PLAN
[FreeTextEntry1] : Patient examined and evaluated at this time.\par Continue local wound care and offloading.\par Spent 20 minutes for patient care and medical decision making.\par Patient to follow up in 1 week.\par

## 2021-09-29 NOTE — VITALS
[Pain related to present condition?] : The patient's  pain is not related to present condition. [] : No [de-identified] : 0

## 2021-09-29 NOTE — PHYSICAL EXAM
[2 x 2] : 2 x 2  [0] : left 0 [Ankle Swelling (On Exam)] : present [Ankle Swelling Bilaterally] : bilaterally  [Varicose Veins Of Lower Extremities] : bilaterally [] : bilaterally [Ankle Swelling On The Left] : moderate [Skin Ulcer] : ulcer [de-identified] : A&Ox3, NAD [de-identified] : 4/5 strength in all quadrants bilaterally [de-identified] : right 5th digit diabetic ulcer down to skin, subcutaneous tissue, and fat. left plantar midfoot ulcer down to skin, subcutaneous tissue, and fat, healed. [de-identified] : DPM Shaved callus [de-identified] : Callus [de-identified] : DPM Shaved callus [de-identified] : Callus [de-identified] : 0.5 [de-identified] : 0.5 [de-identified] : 0.1 [de-identified] : scant serosanguineous [de-identified] : callus [de-identified] : 25% [de-identified] : 75% [FreeTextEntry1] : Right Anterior Lower Leg- closed [de-identified] : No treatment required  [de-identified] : Cleansed with Normal Saline [FreeTextEntry7] : Left Plantar Foot- CLOSED [de-identified] : No  treatment required  [de-identified] : Cleansed with Normal Saline\par  [de-identified] : Right Foot 2nd Digit- blood blister- NEW [de-identified] : No treatment required  [de-identified] : Cleansed with Normal saline\par  [TWNoteComboBox1] : False [de-identified] : No [de-identified] : None [de-identified] : None [de-identified] : 3x Weekly [de-identified] : Primary Dressing [de-identified] : No [de-identified] : Traumatic [de-identified] : No [de-identified] : other [de-identified] : None [de-identified] : None [de-identified] : 100% [de-identified] : No [de-identified] : 3x Weekly [de-identified] : Primary Dressing [de-identified] : Small [de-identified] : Other [de-identified] : No [de-identified] : other [de-identified] : None [de-identified] : None [de-identified] : >75% [de-identified] : Yes [de-identified] : 3x Weekly [de-identified] : Primary Dressing [TWNoteComboBox4] : None [TWNoteComboBox5] : No [de-identified] : No [de-identified] : Normal [de-identified] : None [de-identified] : None [de-identified] : None [de-identified] : No [TWNoteComboBox9] : Left [de-identified] : 100% [de-identified] : Small [de-identified] : 3x Weekly [de-identified] : Primary Dressing

## 2021-09-29 NOTE — ASSESSMENT
[Verbal] : Verbal [Written] : Written [Demo] : Demo [Good - alert, interested, motivated] : Good - alert, interested, motivated [Verbalizes knowledge/Understanding] : Verbalizes knowledge/understanding [Dressing changes] : dressing changes [Foot Care] : foot care [Skin Care] : skin care [Signs and symptoms of infection] : sign and symptoms of infection [Venous Disease] : venous disease [Nutrition] : nutrition [How and When to Call] : how and when to call [Off-loading] : off-loading [Patient responsibility to plan of care] : patient responsibility to plan of care [Glycemic Control] : glycemic control [] : Yes [Stable] : stable [Home] : Home [Wheelchair] : Wheelchair [Faxed - Long Term Care/Home Health Agency] : Long Term Care/Home Health Agency: Faxed [FreeTextEntry2] : Infection Prevention\par Pressure Relief\par Encourage Glycemic Control\par elevation\par offloading\par low Na+ Diet\par weight reduction strategies\par F/U 1 week  [FreeTextEntry4] : Patient stated that she fell yesterday while feeding her dogs, DPM aware\par New [place] Precaution Assessment completed\par DPM ordered bilateral xrays of the feet. Patient provided with requisition to complete testing.\par F/U 1 week \par  [FreeTextEntry1] : [facility]

## 2021-09-29 NOTE — HISTORY OF PRESENT ILLNESS
[FreeTextEntry1] : P {t seen for right 5th digit diabetic ulcer down to skin, subcutaneous tissue, and fat. Patient relates that the dressings have been changed as advised.

## 2021-09-29 NOTE — REVIEW OF SYSTEMS
[Fever] : no fever [Eye Pain] : no eye pain [Earache] : no earache [Chest Pain] : no chest pain [Shortness Of Breath] : no shortness of breath [Abdominal Pain] : no abdominal pain [Skin Wound] : skin wound [de-identified] : diabetic neuropathy [de-identified] : right 5th digit diabetic ulcer down to skin, subcutaneous tissue, and fat. left plantar midfoot ulcer down to skin, subcutaneous tissue, and fat, healed. [de-identified] : type 2 diabetes

## 2021-09-30 DIAGNOSIS — Z95.4 PRESENCE OF OTHER HEART-VALVE REPLACEMENT: ICD-10-CM

## 2021-09-30 DIAGNOSIS — Z87.81 PERSONAL HISTORY OF (HEALED) TRAUMATIC FRACTURE: ICD-10-CM

## 2021-09-30 DIAGNOSIS — M48.07 SPINAL STENOSIS, LUMBOSACRAL REGION: ICD-10-CM

## 2021-09-30 DIAGNOSIS — Z87.891 PERSONAL HISTORY OF NICOTINE DEPENDENCE: ICD-10-CM

## 2021-09-30 DIAGNOSIS — G47.33 OBSTRUCTIVE SLEEP APNEA (ADULT) (PEDIATRIC): ICD-10-CM

## 2021-09-30 DIAGNOSIS — E66.01 MORBID (SEVERE) OBESITY DUE TO EXCESS CALORIES: ICD-10-CM

## 2021-09-30 DIAGNOSIS — I11.9 HYPERTENSIVE HEART DISEASE WITHOUT HEART FAILURE: ICD-10-CM

## 2021-09-30 DIAGNOSIS — M43.16 SPONDYLOLISTHESIS, LUMBAR REGION: ICD-10-CM

## 2021-09-30 DIAGNOSIS — E11.621 TYPE 2 DIABETES MELLITUS WITH FOOT ULCER: ICD-10-CM

## 2021-09-30 DIAGNOSIS — I83.893 VARICOSE VEINS OF BILATERAL LOWER EXTREMITIES WITH OTHER COMPLICATIONS: ICD-10-CM

## 2021-09-30 DIAGNOSIS — L97.512 NON-PRESSURE CHRONIC ULCER OF OTHER PART OF RIGHT FOOT WITH FAT LAYER EXPOSED: ICD-10-CM

## 2021-09-30 DIAGNOSIS — E05.90 THYROTOXICOSIS, UNSPECIFIED WITHOUT THYROTOXIC CRISIS OR STORM: ICD-10-CM

## 2021-09-30 DIAGNOSIS — I47.1 SUPRAVENTRICULAR TACHYCARDIA: ICD-10-CM

## 2021-09-30 DIAGNOSIS — Z98.84 BARIATRIC SURGERY STATUS: ICD-10-CM

## 2021-09-30 DIAGNOSIS — Z83.49 FAMILY HISTORY OF OTHER ENDOCRINE, NUTRITIONAL AND METABOLIC DISEASES: ICD-10-CM

## 2021-09-30 DIAGNOSIS — G47.10 HYPERSOMNIA, UNSPECIFIED: ICD-10-CM

## 2021-09-30 DIAGNOSIS — E11.59 TYPE 2 DIABETES MELLITUS WITH OTHER CIRCULATORY COMPLICATIONS: ICD-10-CM

## 2021-09-30 DIAGNOSIS — I34.1 NONRHEUMATIC MITRAL (VALVE) PROLAPSE: ICD-10-CM

## 2021-09-30 DIAGNOSIS — M47.817 SPONDYLOSIS WITHOUT MYELOPATHY OR RADICULOPATHY, LUMBOSACRAL REGION: ICD-10-CM

## 2021-09-30 DIAGNOSIS — Z79.82 LONG TERM (CURRENT) USE OF ASPIRIN: ICD-10-CM

## 2021-09-30 DIAGNOSIS — E55.9 VITAMIN D DEFICIENCY, UNSPECIFIED: ICD-10-CM

## 2021-09-30 DIAGNOSIS — Z82.49 FAMILY HISTORY OF ISCHEMIC HEART DISEASE AND OTHER DISEASES OF THE CIRCULATORY SYSTEM: ICD-10-CM

## 2021-09-30 DIAGNOSIS — Z88.5 ALLERGY STATUS TO NARCOTIC AGENT: ICD-10-CM

## 2021-09-30 DIAGNOSIS — Z79.4 LONG TERM (CURRENT) USE OF INSULIN: ICD-10-CM

## 2021-09-30 DIAGNOSIS — E11.40 TYPE 2 DIABETES MELLITUS WITH DIABETIC NEUROPATHY, UNSPECIFIED: ICD-10-CM

## 2021-09-30 DIAGNOSIS — L84 CORNS AND CALLOSITIES: ICD-10-CM

## 2021-09-30 DIAGNOSIS — Z87.01 PERSONAL HISTORY OF PNEUMONIA (RECURRENT): ICD-10-CM

## 2021-10-05 ENCOUNTER — APPOINTMENT (OUTPATIENT)
Dept: WOUND CARE | Facility: HOSPITAL | Age: 66
End: 2021-10-05

## 2021-10-08 ENCOUNTER — APPOINTMENT (OUTPATIENT)
Dept: WOUND CARE | Facility: HOSPITAL | Age: 66
End: 2021-10-08
Payer: MEDICARE

## 2021-10-08 ENCOUNTER — OUTPATIENT (OUTPATIENT)
Dept: OUTPATIENT SERVICES | Facility: HOSPITAL | Age: 66
LOS: 1 days | Discharge: HOME | End: 2021-10-08
Payer: MEDICARE

## 2021-10-08 VITALS
TEMPERATURE: 97.1 F | HEIGHT: 63 IN | OXYGEN SATURATION: 96 % | RESPIRATION RATE: 18 BRPM | WEIGHT: 291 LBS | HEART RATE: 79 BPM | DIASTOLIC BLOOD PRESSURE: 81 MMHG | SYSTOLIC BLOOD PRESSURE: 138 MMHG | BODY MASS INDEX: 51.56 KG/M2

## 2021-10-08 DIAGNOSIS — S91.111D LACERATION WITHOUT FOREIGN BODY OF RIGHT GREAT TOE WITHOUT DAMAGE TO NAIL, SUBSEQUENT ENCOUNTER: ICD-10-CM

## 2021-10-08 DIAGNOSIS — Z98.890 OTHER SPECIFIED POSTPROCEDURAL STATES: Chronic | ICD-10-CM

## 2021-10-08 DIAGNOSIS — Z95.2 PRESENCE OF PROSTHETIC HEART VALVE: Chronic | ICD-10-CM

## 2021-10-08 PROCEDURE — 99214 OFFICE O/P EST MOD 30 MIN: CPT

## 2021-10-08 PROCEDURE — 73630 X-RAY EXAM OF FOOT: CPT | Mod: 26,50

## 2021-10-08 PROCEDURE — 73630 X-RAY EXAM OF FOOT: CPT

## 2021-10-08 PROCEDURE — G0463: CPT

## 2021-10-08 NOTE — REVIEW OF SYSTEMS
[Skin Wound] : skin wound [Fever] : no fever [Eye Pain] : no eye pain [Earache] : no earache [Chest Pain] : no chest pain [Shortness Of Breath] : no shortness of breath [Abdominal Pain] : no abdominal pain [de-identified] : right 5th digit diabetic ulcer down to skin, subcutaneous tissue, and fat. left lower leg ulcer down to skin, subcutaneous tissue, and fat. [de-identified] : diabetic neuropathy [de-identified] : type 2 diabetes

## 2021-10-08 NOTE — PLAN
[FreeTextEntry1] : Patient examined and evaluated at this time.\par Continue local wound care and offloading.\par Patient to obtain radiographs.\par Spent 30 minutes for patient care and medical decision making.\par Patient to follow up in 1 week.\par

## 2021-10-08 NOTE — HISTORY OF PRESENT ILLNESS
[FreeTextEntry1] : P  {t seen for right 5th digit diabetic ulcer down to skin, subcutaneous tissue, and fat. Pt also presents with new left lower leg ulcer down to skin, subcutnaeous tissue, and fat. Pt complains of pain to the left 5th digit.

## 2021-10-08 NOTE — VITALS
[Pain related to present condition?] : The patient's  pain is not related to present condition. [] : No [de-identified] : 0

## 2021-10-08 NOTE — ASSESSMENT
[Verbal] : Verbal [Written] : Written [Demo] : Demo [Good - alert, interested, motivated] : Good - alert, interested, motivated [Verbalizes knowledge/Understanding] : Verbalizes knowledge/understanding [Dressing changes] : dressing changes [Foot Care] : foot care [Skin Care] : skin care [Signs and symptoms of infection] : sign and symptoms of infection [Venous Disease] : venous disease [Nutrition] : nutrition [How and When to Call] : how and when to call [Off-loading] : off-loading [Patient responsibility to plan of care] : patient responsibility to plan of care [Glycemic Control] : glycemic control [] : Yes [Stable] : stable [Home] : Home [Wheelchair] : Wheelchair [Faxed - Long Term Care/Home Health Agency] : Long Term Care/Home Health Agency: Faxed [FreeTextEntry2] : Infection Prevention\par Pressure Relief\par Encourage Glycemic Control\par elevation\par offloading\par low Na+ Diet\par weight reduction strategies\par Home care\par F/U 1 week  [FreeTextEntry4] : Home care called to inform DPM of discharge of care today. DPM ordered that home care be re-instated due to new wound after patient assessment.  informed of situation and will contact home care manager to re-instate care. \par Patient will complete xray of feet post WCC visit\par F/U 1 week \par  [FreeTextEntry1] : [facility]

## 2021-10-11 DIAGNOSIS — Z98.84 BARIATRIC SURGERY STATUS: ICD-10-CM

## 2021-10-11 DIAGNOSIS — I34.1 NONRHEUMATIC MITRAL (VALVE) PROLAPSE: ICD-10-CM

## 2021-10-11 DIAGNOSIS — E66.01 MORBID (SEVERE) OBESITY DUE TO EXCESS CALORIES: ICD-10-CM

## 2021-10-11 DIAGNOSIS — Z87.01 PERSONAL HISTORY OF PNEUMONIA (RECURRENT): ICD-10-CM

## 2021-10-11 DIAGNOSIS — L97.512 NON-PRESSURE CHRONIC ULCER OF OTHER PART OF RIGHT FOOT WITH FAT LAYER EXPOSED: ICD-10-CM

## 2021-10-11 DIAGNOSIS — Z79.82 LONG TERM (CURRENT) USE OF ASPIRIN: ICD-10-CM

## 2021-10-11 DIAGNOSIS — Z87.891 PERSONAL HISTORY OF NICOTINE DEPENDENCE: ICD-10-CM

## 2021-10-11 DIAGNOSIS — M43.16 SPONDYLOLISTHESIS, LUMBAR REGION: ICD-10-CM

## 2021-10-11 DIAGNOSIS — Z83.49 FAMILY HISTORY OF OTHER ENDOCRINE, NUTRITIONAL AND METABOLIC DISEASES: ICD-10-CM

## 2021-10-11 DIAGNOSIS — Z79.4 LONG TERM (CURRENT) USE OF INSULIN: ICD-10-CM

## 2021-10-11 DIAGNOSIS — Z95.4 PRESENCE OF OTHER HEART-VALVE REPLACEMENT: ICD-10-CM

## 2021-10-11 DIAGNOSIS — M48.07 SPINAL STENOSIS, LUMBOSACRAL REGION: ICD-10-CM

## 2021-10-11 DIAGNOSIS — M47.817 SPONDYLOSIS WITHOUT MYELOPATHY OR RADICULOPATHY, LUMBOSACRAL REGION: ICD-10-CM

## 2021-10-11 DIAGNOSIS — E11.40 TYPE 2 DIABETES MELLITUS WITH DIABETIC NEUROPATHY, UNSPECIFIED: ICD-10-CM

## 2021-10-11 DIAGNOSIS — E05.90 THYROTOXICOSIS, UNSPECIFIED WITHOUT THYROTOXIC CRISIS OR STORM: ICD-10-CM

## 2021-10-11 DIAGNOSIS — I83.11 VARICOSE VEINS OF RIGHT LOWER EXTREMITY WITH INFLAMMATION: ICD-10-CM

## 2021-10-11 DIAGNOSIS — E11.621 TYPE 2 DIABETES MELLITUS WITH FOOT ULCER: ICD-10-CM

## 2021-10-11 DIAGNOSIS — Z88.5 ALLERGY STATUS TO NARCOTIC AGENT: ICD-10-CM

## 2021-10-11 DIAGNOSIS — G47.33 OBSTRUCTIVE SLEEP APNEA (ADULT) (PEDIATRIC): ICD-10-CM

## 2021-10-11 DIAGNOSIS — I47.1 SUPRAVENTRICULAR TACHYCARDIA: ICD-10-CM

## 2021-10-11 DIAGNOSIS — I11.9 HYPERTENSIVE HEART DISEASE WITHOUT HEART FAILURE: ICD-10-CM

## 2021-10-11 DIAGNOSIS — E55.9 VITAMIN D DEFICIENCY, UNSPECIFIED: ICD-10-CM

## 2021-10-11 DIAGNOSIS — E11.59 TYPE 2 DIABETES MELLITUS WITH OTHER CIRCULATORY COMPLICATIONS: ICD-10-CM

## 2021-10-11 DIAGNOSIS — Z82.49 FAMILY HISTORY OF ISCHEMIC HEART DISEASE AND OTHER DISEASES OF THE CIRCULATORY SYSTEM: ICD-10-CM

## 2021-10-11 DIAGNOSIS — G47.10 HYPERSOMNIA, UNSPECIFIED: ICD-10-CM

## 2021-10-11 DIAGNOSIS — I83.893 VARICOSE VEINS OF BILATERAL LOWER EXTREMITIES WITH OTHER COMPLICATIONS: ICD-10-CM

## 2021-10-11 DIAGNOSIS — L84 CORNS AND CALLOSITIES: ICD-10-CM

## 2021-10-11 DIAGNOSIS — Z87.81 PERSONAL HISTORY OF (HEALED) TRAUMATIC FRACTURE: ICD-10-CM

## 2021-10-15 ENCOUNTER — APPOINTMENT (OUTPATIENT)
Dept: WOUND CARE | Facility: HOSPITAL | Age: 66
End: 2021-10-15
Payer: MEDICARE

## 2021-10-15 ENCOUNTER — OUTPATIENT (OUTPATIENT)
Dept: OUTPATIENT SERVICES | Facility: HOSPITAL | Age: 66
LOS: 1 days | Discharge: HOME | End: 2021-10-15
Payer: MEDICARE

## 2021-10-15 VITALS
DIASTOLIC BLOOD PRESSURE: 77 MMHG | WEIGHT: 291 LBS | HEART RATE: 54 BPM | HEIGHT: 63 IN | TEMPERATURE: 97.6 F | BODY MASS INDEX: 51.56 KG/M2 | SYSTOLIC BLOOD PRESSURE: 157 MMHG | RESPIRATION RATE: 20 BRPM | OXYGEN SATURATION: 97 %

## 2021-10-15 DIAGNOSIS — S91.111D LACERATION WITHOUT FOREIGN BODY OF RIGHT GREAT TOE WITHOUT DAMAGE TO NAIL, SUBSEQUENT ENCOUNTER: ICD-10-CM

## 2021-10-15 DIAGNOSIS — Z98.890 OTHER SPECIFIED POSTPROCEDURAL STATES: Chronic | ICD-10-CM

## 2021-10-15 DIAGNOSIS — Z95.2 PRESENCE OF PROSTHETIC HEART VALVE: Chronic | ICD-10-CM

## 2021-10-15 PROCEDURE — G0463: CPT

## 2021-10-15 PROCEDURE — 99214 OFFICE O/P EST MOD 30 MIN: CPT

## 2021-10-18 NOTE — PHYSICAL EXAM
[2 x 2] : 2 x 2  [0] : left 0 [Ankle Swelling (On Exam)] : present [Ankle Swelling Bilaterally] : bilaterally  [Varicose Veins Of Lower Extremities] : bilaterally [] : bilaterally [Ankle Swelling On The Left] : moderate [Skin Ulcer] : ulcer [de-identified] : A&Ox3, NAD [de-identified] : 4/5 strength in all quadrants bilaterally [de-identified] : right 5th digit diabetic ulcer down to skin, subcutaneous tissue, and fat, epithelialized. left lower leg ulcer down to skin, subcutaneous tissue, and fat. left ankle ulcer down to skin, subcutaneous tissue, and fat. [FreeTextEntry1] : LEft lateral ankle (New)  [FreeTextEntry2] : 1.4 [FreeTextEntry3] : 1.2 [FreeTextEntry4] : 0.1 [de-identified] : Serous/sanguinous [de-identified] : Silver alginate, Allevyn border  [de-identified] : Cleansed with Normal Saline\par \par \par  [de-identified] : DPM Shaved callus [de-identified] : 0.3 [de-identified] : 0.3 [de-identified] : Callus  [de-identified] : \par  [de-identified] : Left Foot 2nd Digit- closed  [de-identified] : Cleansed with Normal saline\par  [de-identified] : Left Medial Leg [de-identified] : 1.4 [de-identified] : 1.2 [de-identified] : 0.1 [de-identified] : Scant Serous/sanguinous [de-identified] : Silver Alginate, Allevyn border  [de-identified] : Cleansed with Normal saline\par  [de-identified] : None [de-identified] : Primary Dressing [de-identified] : None [de-identified] : Other [de-identified] : No [de-identified] : other [de-identified] : None [de-identified] : None [de-identified] : 50% [de-identified] : No [de-identified] : 3x Weekly [TWNoteComboBox4] : None [TWNoteComboBox5] : No [de-identified] : No [de-identified] : Normal [de-identified] : None [de-identified] : None [de-identified] : None [de-identified] : No [de-identified] : 100% [de-identified] : Left [de-identified] : Normal [de-identified] : None [de-identified] : None [de-identified] : 100% [de-identified] : No [de-identified] : 2x Weekly [de-identified] : Primary Dressing

## 2021-10-18 NOTE — REVIEW OF SYSTEMS
[Skin Wound] : skin wound [Fever] : no fever [Eye Pain] : no eye pain [Earache] : no earache [Chest Pain] : no chest pain [Shortness Of Breath] : no shortness of breath [Abdominal Pain] : no abdominal pain [de-identified] : right 5th digit diabetic ulcer down to skin, subcutaneous tissue, and fat, epithelialized. left lower leg ulcer down to skin, subcutaneous tissue, and fat. [de-identified] : diabetic neuropathy [de-identified] : type 2 diabetes

## 2021-10-18 NOTE — HISTORY OF PRESENT ILLNESS
[FreeTextEntry1] : P    {t seen for right 5th digit diabetic ulcer down to skin, subcutaneous tissue, and fat. Pt also presents with left lower leg ulcer down to skin, subcutnaeous tissue, and fat. Pt also seen for new left ankle ulcer down to skin, subcutaneous tissue, and fat.

## 2021-10-18 NOTE — ASSESSMENT
[Verbal] : Verbal [Written] : Written [Demo] : Demo [Patient] : Patient [Verbalizes knowledge/Understanding] : Verbalizes knowledge/understanding [Dressing changes] : dressing changes [Skin Care] : skin care [Signs and symptoms of infection] : sign and symptoms of infection [Venous Disease] : venous disease [Nutrition] : nutrition [Labs and Tests] : labs and tests [Fair - mild discomfort, physical impairment, low acceptance] : Fair - mild discomfort, physical impairment, low acceptance [How and When to Call] : how and when to call [Pain Management] : pain management [Patient responsibility to plan of care] : patient responsibility to plan of care [Stable] : stable [Home] : Home [Wheelchair] : Wheelchair [Not Applicable - Long Term Care/Home Health Agency] : Long Term Care/Home Health Agency: Not Applicable [] : No [FreeTextEntry2] : Infection prevention\par Localized wound care \par Goal of remaining pain free regarding wounds.\par Weight loss  [FreeTextEntry3] : Presents with new wound  [FreeTextEntry4] : x - rays reviewed by DPSINA with patient \par Dressing change 10/19/21\par Pt to follow up with her vascular MD to discuss possible venous intervention \par Assessment in 1 week

## 2021-10-19 ENCOUNTER — OUTPATIENT (OUTPATIENT)
Dept: OUTPATIENT SERVICES | Facility: HOSPITAL | Age: 66
LOS: 1 days | Discharge: HOME | End: 2021-10-19
Payer: MEDICARE

## 2021-10-19 ENCOUNTER — APPOINTMENT (OUTPATIENT)
Dept: WOUND CARE | Facility: HOSPITAL | Age: 66
End: 2021-10-19
Payer: MEDICARE

## 2021-10-19 VITALS
BODY MASS INDEX: 51.56 KG/M2 | TEMPERATURE: 97.6 F | SYSTOLIC BLOOD PRESSURE: 147 MMHG | HEIGHT: 63 IN | HEART RATE: 61 BPM | OXYGEN SATURATION: 98 % | DIASTOLIC BLOOD PRESSURE: 84 MMHG | WEIGHT: 291 LBS | RESPIRATION RATE: 20 BRPM

## 2021-10-19 DIAGNOSIS — S81.802A UNSPECIFIED OPEN WOUND, LEFT LOWER LEG, INITIAL ENCOUNTER: ICD-10-CM

## 2021-10-19 DIAGNOSIS — L97.322 NON-PRESSURE CHRONIC ULCER OF LEFT ANKLE WITH FAT LAYER EXPOSED: ICD-10-CM

## 2021-10-19 DIAGNOSIS — S91.111D LACERATION WITHOUT FOREIGN BODY OF RIGHT GREAT TOE WITHOUT DAMAGE TO NAIL, SUBSEQUENT ENCOUNTER: ICD-10-CM

## 2021-10-19 DIAGNOSIS — I83.223 VARICOSE VEINS OF LEFT LOWER EXTREMITY WITH BOTH ULCER OF ANKLE AND INFLAMMATION: ICD-10-CM

## 2021-10-19 DIAGNOSIS — Z95.2 PRESENCE OF PROSTHETIC HEART VALVE: Chronic | ICD-10-CM

## 2021-10-19 DIAGNOSIS — Z98.890 OTHER SPECIFIED POSTPROCEDURAL STATES: Chronic | ICD-10-CM

## 2021-10-19 DIAGNOSIS — M79.606 PAIN IN LEG, UNSPECIFIED: ICD-10-CM

## 2021-10-19 PROCEDURE — ZZZZZ: CPT

## 2021-10-19 PROCEDURE — G0463: CPT

## 2021-10-22 ENCOUNTER — APPOINTMENT (OUTPATIENT)
Dept: WOUND CARE | Facility: HOSPITAL | Age: 66
End: 2021-10-22
Payer: MEDICARE

## 2021-10-22 ENCOUNTER — OUTPATIENT (OUTPATIENT)
Dept: OUTPATIENT SERVICES | Facility: HOSPITAL | Age: 66
LOS: 1 days | Discharge: HOME | End: 2021-10-22
Payer: MEDICARE

## 2021-10-22 VITALS
WEIGHT: 291 LBS | HEIGHT: 63 IN | TEMPERATURE: 98.5 F | RESPIRATION RATE: 20 BRPM | SYSTOLIC BLOOD PRESSURE: 113 MMHG | HEART RATE: 88 BPM | OXYGEN SATURATION: 97 % | BODY MASS INDEX: 51.56 KG/M2 | DIASTOLIC BLOOD PRESSURE: 67 MMHG

## 2021-10-22 DIAGNOSIS — Z95.2 PRESENCE OF PROSTHETIC HEART VALVE: Chronic | ICD-10-CM

## 2021-10-22 DIAGNOSIS — S91.111D LACERATION WITHOUT FOREIGN BODY OF RIGHT GREAT TOE WITHOUT DAMAGE TO NAIL, SUBSEQUENT ENCOUNTER: ICD-10-CM

## 2021-10-22 DIAGNOSIS — Z98.890 OTHER SPECIFIED POSTPROCEDURAL STATES: Chronic | ICD-10-CM

## 2021-10-22 PROCEDURE — G0463: CPT

## 2021-10-22 PROCEDURE — 99213 OFFICE O/P EST LOW 20 MIN: CPT

## 2021-10-22 NOTE — ASSESSMENT
[Verbal] : Verbal [Written] : Written [Demo] : Demo [Patient] : Patient [Good - alert, interested, motivated] : Good - alert, interested, motivated [Verbalizes knowledge/Understanding] : Verbalizes knowledge/understanding [Dressing changes] : dressing changes [Foot Care] : foot care [Skin Care] : skin care [Signs and symptoms of infection] : sign and symptoms of infection [Venous Disease] : venous disease [Nutrition] : nutrition [How and When to Call] : how and when to call [Pain Management] : pain management [Patient responsibility to plan of care] : patient responsibility to plan of care [] : Yes [Stable] : stable [Home] : Home [Wheelchair] : Wheelchair [Not Applicable - Long Term Care/Home Health Agency] : Long Term Care/Home Health Agency: Not Applicable [FreeTextEntry2] : Infection prevention\par Localized wound care \par Goal of remaining pain free regarding wounds.\par Compression therapy  [FreeTextEntry4] : PAtient having vascular studies preformed on 10/25/21 from outside vascular MD \par Dressing change 10/26/21\par Assessment 10/29/21

## 2021-10-22 NOTE — HISTORY OF PRESENT ILLNESS
[FreeTextEntry1] : Pt seen for right 5th digit diabetic ulcer down to skin, subcutaneous tissue, and fat. Pt also presents with left lower leg ulcer down to skin, subcutnaeous tissue, and fat. Pt also seen for left ankle ulcer down to skin, subcutaneous tissue, and fat.

## 2021-10-22 NOTE — REVIEW OF SYSTEMS
[Fever] : no fever [Eye Pain] : no eye pain [Earache] : no earache [Chest Pain] : no chest pain [Shortness Of Breath] : no shortness of breath [Abdominal Pain] : no abdominal pain [Skin Wound] : skin wound [de-identified] : right 5th digit diabetic ulcer down to skin, subcutaneous tissue, and fat, epithelialized. left lower leg ulcer down to skin, subcutaneous tissue, and fat. [de-identified] : diabetic neuropathy [de-identified] : type 2 diabetes

## 2021-10-22 NOTE — PHYSICAL EXAM
[0] : left 0 [Ankle Swelling (On Exam)] : present [Ankle Swelling Bilaterally] : bilaterally  [Varicose Veins Of Lower Extremities] : bilaterally [] : bilaterally [Ankle Swelling On The Left] : moderate [Skin Ulcer] : ulcer [de-identified] : 4/5 strength in all quadrants bilaterally [de-identified] : A&Ox3, NAD [de-identified] : right 5th digit diabetic ulcer down to skin, subcutaneous tissue, and fat, epithelialized. left lower leg ulcer down to skin, subcutaneous tissue, and fat. left ankle ulcer down to skin, subcutaneous tissue, and fat. [FreeTextEntry1] : LEft lateral ankle  [FreeTextEntry2] : 0.7 [FreeTextEntry3] : 0.8 [FreeTextEntry4] : 0.1 [de-identified] : Serous/sanguinous [de-identified] : 5% [de-identified] : Silver alginate, Allevyn border  [de-identified] : Cleansed with Normal Saline\par \par \par \par  [de-identified] : Right 5th digit- callus area  [de-identified] : 0.3 [de-identified] : 0.3 [de-identified] : Callus  [de-identified] : \par  [de-identified] : Cleansed with Normal saline\par  [de-identified] : Left Medial Leg [de-identified] : 0.5 [de-identified] : 0.3 [de-identified] : 0.1 [de-identified] : Scant Serous/sanguinous [de-identified] : Silver Alginate, Allevyn border  [de-identified] : Cleansed with Normal saline\par  [TWNoteComboBox4] : Small [de-identified] : Normal [de-identified] : None [de-identified] : None [de-identified] : Yes [de-identified] : 2x Weekly [de-identified] : Primary Dressing [de-identified] : None [de-identified] : other [de-identified] : None [de-identified] : None [de-identified] : No [de-identified] : Secondary Dressing [de-identified] : Normal [de-identified] : None [de-identified] : None [de-identified] : 100% [de-identified] : No [de-identified] : 2x Weekly [de-identified] : Primary Dressing

## 2021-10-24 DIAGNOSIS — I83.11 VARICOSE VEINS OF RIGHT LOWER EXTREMITY WITH INFLAMMATION: ICD-10-CM

## 2021-10-24 DIAGNOSIS — Z87.01 PERSONAL HISTORY OF PNEUMONIA (RECURRENT): ICD-10-CM

## 2021-10-24 DIAGNOSIS — E11.621 TYPE 2 DIABETES MELLITUS WITH FOOT ULCER: ICD-10-CM

## 2021-10-24 DIAGNOSIS — E05.90 THYROTOXICOSIS, UNSPECIFIED WITHOUT THYROTOXIC CRISIS OR STORM: ICD-10-CM

## 2021-10-24 DIAGNOSIS — I83.223 VARICOSE VEINS OF LEFT LOWER EXTREMITY WITH BOTH ULCER OF ANKLE AND INFLAMMATION: ICD-10-CM

## 2021-10-24 DIAGNOSIS — E11.51 TYPE 2 DIABETES MELLITUS WITH DIABETIC PERIPHERAL ANGIOPATHY WITHOUT GANGRENE: ICD-10-CM

## 2021-10-24 DIAGNOSIS — M48.07 SPINAL STENOSIS, LUMBOSACRAL REGION: ICD-10-CM

## 2021-10-24 DIAGNOSIS — Z82.49 FAMILY HISTORY OF ISCHEMIC HEART DISEASE AND OTHER DISEASES OF THE CIRCULATORY SYSTEM: ICD-10-CM

## 2021-10-24 DIAGNOSIS — Z79.4 LONG TERM (CURRENT) USE OF INSULIN: ICD-10-CM

## 2021-10-24 DIAGNOSIS — I11.9 HYPERTENSIVE HEART DISEASE WITHOUT HEART FAILURE: ICD-10-CM

## 2021-10-24 DIAGNOSIS — M43.16 SPONDYLOLISTHESIS, LUMBAR REGION: ICD-10-CM

## 2021-10-24 DIAGNOSIS — G47.33 OBSTRUCTIVE SLEEP APNEA (ADULT) (PEDIATRIC): ICD-10-CM

## 2021-10-24 DIAGNOSIS — E11.40 TYPE 2 DIABETES MELLITUS WITH DIABETIC NEUROPATHY, UNSPECIFIED: ICD-10-CM

## 2021-10-24 DIAGNOSIS — M47.817 SPONDYLOSIS WITHOUT MYELOPATHY OR RADICULOPATHY, LUMBOSACRAL REGION: ICD-10-CM

## 2021-10-24 DIAGNOSIS — Z95.4 PRESENCE OF OTHER HEART-VALVE REPLACEMENT: ICD-10-CM

## 2021-10-24 DIAGNOSIS — G47.10 HYPERSOMNIA, UNSPECIFIED: ICD-10-CM

## 2021-10-24 DIAGNOSIS — Z79.82 LONG TERM (CURRENT) USE OF ASPIRIN: ICD-10-CM

## 2021-10-24 DIAGNOSIS — I47.1 SUPRAVENTRICULAR TACHYCARDIA: ICD-10-CM

## 2021-10-24 DIAGNOSIS — Z83.49 FAMILY HISTORY OF OTHER ENDOCRINE, NUTRITIONAL AND METABOLIC DISEASES: ICD-10-CM

## 2021-10-24 DIAGNOSIS — I83.893 VARICOSE VEINS OF BILATERAL LOWER EXTREMITIES WITH OTHER COMPLICATIONS: ICD-10-CM

## 2021-10-24 DIAGNOSIS — L97.322 NON-PRESSURE CHRONIC ULCER OF LEFT ANKLE WITH FAT LAYER EXPOSED: ICD-10-CM

## 2021-10-24 DIAGNOSIS — Z87.81 PERSONAL HISTORY OF (HEALED) TRAUMATIC FRACTURE: ICD-10-CM

## 2021-10-24 DIAGNOSIS — I34.1 NONRHEUMATIC MITRAL (VALVE) PROLAPSE: ICD-10-CM

## 2021-10-24 DIAGNOSIS — E66.01 MORBID (SEVERE) OBESITY DUE TO EXCESS CALORIES: ICD-10-CM

## 2021-10-24 DIAGNOSIS — Z87.891 PERSONAL HISTORY OF NICOTINE DEPENDENCE: ICD-10-CM

## 2021-10-24 DIAGNOSIS — Z98.84 BARIATRIC SURGERY STATUS: ICD-10-CM

## 2021-10-24 DIAGNOSIS — L97.512 NON-PRESSURE CHRONIC ULCER OF OTHER PART OF RIGHT FOOT WITH FAT LAYER EXPOSED: ICD-10-CM

## 2021-10-24 DIAGNOSIS — L84 CORNS AND CALLOSITIES: ICD-10-CM

## 2021-10-24 DIAGNOSIS — Z88.5 ALLERGY STATUS TO NARCOTIC AGENT: ICD-10-CM

## 2021-10-24 DIAGNOSIS — E55.9 VITAMIN D DEFICIENCY, UNSPECIFIED: ICD-10-CM

## 2021-10-25 ENCOUNTER — APPOINTMENT (OUTPATIENT)
Dept: ULTRASOUND IMAGING | Facility: CLINIC | Age: 66
End: 2021-10-25
Payer: MEDICARE

## 2021-10-25 PROCEDURE — 93925 LOWER EXTREMITY STUDY: CPT

## 2021-10-26 ENCOUNTER — APPOINTMENT (OUTPATIENT)
Dept: WOUND CARE | Facility: HOSPITAL | Age: 66
End: 2021-10-26
Payer: MEDICARE

## 2021-10-26 ENCOUNTER — OUTPATIENT (OUTPATIENT)
Dept: OUTPATIENT SERVICES | Facility: HOSPITAL | Age: 66
LOS: 1 days | Discharge: HOME | End: 2021-10-26
Payer: MEDICARE

## 2021-10-26 VITALS
OXYGEN SATURATION: 98 % | HEIGHT: 63 IN | RESPIRATION RATE: 18 BRPM | HEART RATE: 85 BPM | SYSTOLIC BLOOD PRESSURE: 131 MMHG | BODY MASS INDEX: 51.56 KG/M2 | TEMPERATURE: 98 F | WEIGHT: 291 LBS | DIASTOLIC BLOOD PRESSURE: 74 MMHG

## 2021-10-26 DIAGNOSIS — Z95.2 PRESENCE OF PROSTHETIC HEART VALVE: Chronic | ICD-10-CM

## 2021-10-26 DIAGNOSIS — S91.111D LACERATION WITHOUT FOREIGN BODY OF RIGHT GREAT TOE WITHOUT DAMAGE TO NAIL, SUBSEQUENT ENCOUNTER: ICD-10-CM

## 2021-10-26 DIAGNOSIS — Z98.890 OTHER SPECIFIED POSTPROCEDURAL STATES: Chronic | ICD-10-CM

## 2021-10-26 PROCEDURE — ZZZZZ: CPT

## 2021-10-26 PROCEDURE — G0463: CPT

## 2021-10-27 PROBLEM — I73.9 PAD (PERIPHERAL ARTERY DISEASE): Status: ACTIVE | Noted: 2021-10-27

## 2021-10-27 PROBLEM — S81.809A WOUND OF LOWER EXTREMITY: Status: ACTIVE | Noted: 2021-10-27

## 2021-10-28 ENCOUNTER — APPOINTMENT (OUTPATIENT)
Dept: CT IMAGING | Facility: CLINIC | Age: 66
End: 2021-10-28
Payer: MEDICARE

## 2021-10-28 DIAGNOSIS — I83.223 VARICOSE VEINS OF LEFT LOWER EXTREMITY WITH BOTH ULCER OF ANKLE AND INFLAMMATION: ICD-10-CM

## 2021-10-28 DIAGNOSIS — L97.322 NON-PRESSURE CHRONIC ULCER OF LEFT ANKLE WITH FAT LAYER EXPOSED: ICD-10-CM

## 2021-10-28 PROCEDURE — 75635 CT ANGIO ABDOMINAL ARTERIES: CPT | Mod: ME

## 2021-10-28 PROCEDURE — G1004: CPT

## 2021-10-29 ENCOUNTER — OUTPATIENT (OUTPATIENT)
Dept: OUTPATIENT SERVICES | Facility: HOSPITAL | Age: 66
LOS: 1 days | Discharge: HOME | End: 2021-10-29
Payer: MEDICARE

## 2021-10-29 ENCOUNTER — APPOINTMENT (OUTPATIENT)
Dept: WOUND CARE | Facility: HOSPITAL | Age: 66
End: 2021-10-29
Payer: MEDICARE

## 2021-10-29 VITALS
OXYGEN SATURATION: 100 % | TEMPERATURE: 97.1 F | DIASTOLIC BLOOD PRESSURE: 75 MMHG | HEIGHT: 63 IN | SYSTOLIC BLOOD PRESSURE: 135 MMHG | RESPIRATION RATE: 18 BRPM | WEIGHT: 291 LBS | HEART RATE: 53 BPM | BODY MASS INDEX: 51.56 KG/M2

## 2021-10-29 DIAGNOSIS — Z95.2 PRESENCE OF PROSTHETIC HEART VALVE: Chronic | ICD-10-CM

## 2021-10-29 DIAGNOSIS — E11.621 TYPE 2 DIABETES MELLITUS WITH FOOT ULCER: ICD-10-CM

## 2021-10-29 DIAGNOSIS — Z98.890 OTHER SPECIFIED POSTPROCEDURAL STATES: Chronic | ICD-10-CM

## 2021-10-29 PROCEDURE — G0463: CPT

## 2021-10-29 PROCEDURE — 99213 OFFICE O/P EST LOW 20 MIN: CPT

## 2021-10-29 NOTE — PLAN
[FreeTextEntry1] : Patient examined and evaluated at this time.\par Continue local wound care and offloading.\par Encouraged leg elevation as much as possible. Patient relates she will attempt at home.\par Spent 20 minutes for patient care and medical decision making.\par Patient to follow up in 1 week.\par

## 2021-10-29 NOTE — HISTORY OF PRESENT ILLNESS
[FreeTextEntry1] : Pt seen for right 5th digit diabetic ulcer down to skin, subcutaneous tissue, and fat, healed. Pt also presents with left lower leg ulcer down to skin, subcutnaeous tissue, and fat. Pt also seen for left ankle ulcer down to skin, subcutaneous tissue, and fat.

## 2021-10-29 NOTE — PHYSICAL EXAM
[0] : left 0 [Ankle Swelling (On Exam)] : present [Ankle Swelling Bilaterally] : bilaterally  [Varicose Veins Of Lower Extremities] : bilaterally [] : bilaterally [Skin Ulcer] : ulcer [Ankle Swelling On The Left] : moderate [de-identified] : A&Ox3, NAD [de-identified] : 4/5 strength in all quadrants bilaterally [de-identified] : right 5th digit diabetic ulcer down to skin, subcutaneous tissue, and fat, epithelialized. left lower leg ulcer down to skin, subcutaneous tissue, and fat. left ankle ulcer down to skin, subcutaneous tissue, and fat. [FreeTextEntry1] : LEft lateral ankle  [FreeTextEntry2] : 0.7 [FreeTextEntry3] : 0.8 [FreeTextEntry4] : 0.1 [de-identified] : serosanguineous [de-identified] : 5% [de-identified] : Cleansed with Normal Saline\par \par \par \par  [de-identified] : Silver alginate, Allevyn border  [de-identified] : Right 5th digit- callus area  [de-identified] : 0.3 [de-identified] : 0.3 [de-identified] : Callus  [de-identified] : None [de-identified] : \par  [de-identified] : Cleansed with Normal saline\par  [de-identified] : Left Medial Leg [de-identified] : 0.5 [de-identified] : 0.3 [de-identified] : 0.1 [de-identified] : Scant serosanguineous [de-identified] : Silver Alginate, Allevyn border  [de-identified] : Cleansed with Normal saline\par  [TWNoteComboBox4] : Small [de-identified] : Normal [de-identified] : None [de-identified] : None [de-identified] : Yes [de-identified] : Primary Dressing [de-identified] : 2x Weekly [de-identified] : None [de-identified] : other [de-identified] : None [de-identified] : None [de-identified] : Secondary Dressing [de-identified] : No [de-identified] : Normal [de-identified] : None [de-identified] : None [de-identified] : No [de-identified] : 100% [de-identified] : 2x Weekly

## 2021-10-29 NOTE — ASSESSMENT
[Verbal] : Verbal [Written] : Written [Demo] : Demo [Patient] : Patient [Good - alert, interested, motivated] : Good - alert, interested, motivated [Verbalizes knowledge/Understanding] : Verbalizes knowledge/understanding [Dressing changes] : dressing changes [Foot Care] : foot care [Skin Care] : skin care [Signs and symptoms of infection] : sign and symptoms of infection [Venous Disease] : venous disease [Nutrition] : nutrition [How and When to Call] : how and when to call [Pain Management] : pain management [Patient responsibility to plan of care] : patient responsibility to plan of care [Stable] : stable [Home] : Home [Wheelchair] : Wheelchair [Not Applicable - Long Term Care/Home Health Agency] : Long Term Care/Home Health Agency: Not Applicable [] : No [FreeTextEntry2] : Infection prevention\par Localized wound care \par Goal of remaining pain free regarding wounds.\par Compression therapy  [FreeTextEntry4] : Patient to f/u in 1 week

## 2021-10-29 NOTE — REVIEW OF SYSTEMS
[Fever] : no fever [Eye Pain] : no eye pain [Earache] : no earache [Chest Pain] : no chest pain [Shortness Of Breath] : no shortness of breath [Abdominal Pain] : no abdominal pain [Skin Wound] : skin wound [de-identified] : right 5th digit diabetic ulcer down to skin, subcutaneous tissue, and fat, epithelialized. left lower leg ulcer down to skin, subcutaneous tissue, and fat. [de-identified] : diabetic neuropathy [de-identified] : type 2 diabetes

## 2021-11-01 DIAGNOSIS — L97.322 NON-PRESSURE CHRONIC ULCER OF LEFT ANKLE WITH FAT LAYER EXPOSED: ICD-10-CM

## 2021-11-01 DIAGNOSIS — Z88.5 ALLERGY STATUS TO NARCOTIC AGENT: ICD-10-CM

## 2021-11-01 DIAGNOSIS — E11.621 TYPE 2 DIABETES MELLITUS WITH FOOT ULCER: ICD-10-CM

## 2021-11-01 DIAGNOSIS — I34.1 NONRHEUMATIC MITRAL (VALVE) PROLAPSE: ICD-10-CM

## 2021-11-01 DIAGNOSIS — Z87.891 PERSONAL HISTORY OF NICOTINE DEPENDENCE: ICD-10-CM

## 2021-11-01 DIAGNOSIS — I47.1 SUPRAVENTRICULAR TACHYCARDIA: ICD-10-CM

## 2021-11-01 DIAGNOSIS — Z83.49 FAMILY HISTORY OF OTHER ENDOCRINE, NUTRITIONAL AND METABOLIC DISEASES: ICD-10-CM

## 2021-11-01 DIAGNOSIS — G47.33 OBSTRUCTIVE SLEEP APNEA (ADULT) (PEDIATRIC): ICD-10-CM

## 2021-11-01 DIAGNOSIS — Z79.82 LONG TERM (CURRENT) USE OF ASPIRIN: ICD-10-CM

## 2021-11-01 DIAGNOSIS — G47.10 HYPERSOMNIA, UNSPECIFIED: ICD-10-CM

## 2021-11-01 DIAGNOSIS — I83.223 VARICOSE VEINS OF LEFT LOWER EXTREMITY WITH BOTH ULCER OF ANKLE AND INFLAMMATION: ICD-10-CM

## 2021-11-01 DIAGNOSIS — Z87.81 PERSONAL HISTORY OF (HEALED) TRAUMATIC FRACTURE: ICD-10-CM

## 2021-11-01 DIAGNOSIS — Z87.01 PERSONAL HISTORY OF PNEUMONIA (RECURRENT): ICD-10-CM

## 2021-11-01 DIAGNOSIS — Z79.4 LONG TERM (CURRENT) USE OF INSULIN: ICD-10-CM

## 2021-11-01 DIAGNOSIS — L84 CORNS AND CALLOSITIES: ICD-10-CM

## 2021-11-01 DIAGNOSIS — E05.90 THYROTOXICOSIS, UNSPECIFIED WITHOUT THYROTOXIC CRISIS OR STORM: ICD-10-CM

## 2021-11-01 DIAGNOSIS — Z82.49 FAMILY HISTORY OF ISCHEMIC HEART DISEASE AND OTHER DISEASES OF THE CIRCULATORY SYSTEM: ICD-10-CM

## 2021-11-01 DIAGNOSIS — I11.9 HYPERTENSIVE HEART DISEASE WITHOUT HEART FAILURE: ICD-10-CM

## 2021-11-01 DIAGNOSIS — E66.01 MORBID (SEVERE) OBESITY DUE TO EXCESS CALORIES: ICD-10-CM

## 2021-11-01 DIAGNOSIS — L97.512 NON-PRESSURE CHRONIC ULCER OF OTHER PART OF RIGHT FOOT WITH FAT LAYER EXPOSED: ICD-10-CM

## 2021-11-01 DIAGNOSIS — E11.59 TYPE 2 DIABETES MELLITUS WITH OTHER CIRCULATORY COMPLICATIONS: ICD-10-CM

## 2021-11-01 DIAGNOSIS — M48.07 SPINAL STENOSIS, LUMBOSACRAL REGION: ICD-10-CM

## 2021-11-01 DIAGNOSIS — I83.893 VARICOSE VEINS OF BILATERAL LOWER EXTREMITIES WITH OTHER COMPLICATIONS: ICD-10-CM

## 2021-11-01 DIAGNOSIS — M43.16 SPONDYLOLISTHESIS, LUMBAR REGION: ICD-10-CM

## 2021-11-01 DIAGNOSIS — M47.817 SPONDYLOSIS WITHOUT MYELOPATHY OR RADICULOPATHY, LUMBOSACRAL REGION: ICD-10-CM

## 2021-11-01 DIAGNOSIS — Z98.84 BARIATRIC SURGERY STATUS: ICD-10-CM

## 2021-11-01 DIAGNOSIS — Z95.4 PRESENCE OF OTHER HEART-VALVE REPLACEMENT: ICD-10-CM

## 2021-11-01 DIAGNOSIS — E55.9 VITAMIN D DEFICIENCY, UNSPECIFIED: ICD-10-CM

## 2021-11-01 DIAGNOSIS — I83.11 VARICOSE VEINS OF RIGHT LOWER EXTREMITY WITH INFLAMMATION: ICD-10-CM

## 2021-11-01 DIAGNOSIS — E11.40 TYPE 2 DIABETES MELLITUS WITH DIABETIC NEUROPATHY, UNSPECIFIED: ICD-10-CM

## 2021-11-02 ENCOUNTER — APPOINTMENT (OUTPATIENT)
Dept: WOUND CARE | Facility: HOSPITAL | Age: 66
End: 2021-11-02
Payer: MEDICARE

## 2021-11-02 ENCOUNTER — OUTPATIENT (OUTPATIENT)
Dept: OUTPATIENT SERVICES | Facility: HOSPITAL | Age: 66
LOS: 1 days | Discharge: HOME | End: 2021-11-02
Payer: MEDICARE

## 2021-11-02 ENCOUNTER — NON-APPOINTMENT (OUTPATIENT)
Age: 66
End: 2021-11-02

## 2021-11-02 VITALS
TEMPERATURE: 97 F | DIASTOLIC BLOOD PRESSURE: 84 MMHG | BODY MASS INDEX: 51.56 KG/M2 | OXYGEN SATURATION: 97 % | HEIGHT: 63 IN | RESPIRATION RATE: 18 BRPM | WEIGHT: 291 LBS | HEART RATE: 95 BPM | SYSTOLIC BLOOD PRESSURE: 150 MMHG

## 2021-11-02 DIAGNOSIS — I83.223 VARICOSE VEINS OF LEFT LOWER EXTREMITY WITH BOTH ULCER OF ANKLE AND INFLAMMATION: ICD-10-CM

## 2021-11-02 DIAGNOSIS — Z98.890 OTHER SPECIFIED POSTPROCEDURAL STATES: Chronic | ICD-10-CM

## 2021-11-02 DIAGNOSIS — Z95.2 PRESENCE OF PROSTHETIC HEART VALVE: Chronic | ICD-10-CM

## 2021-11-02 DIAGNOSIS — S91.111D LACERATION WITHOUT FOREIGN BODY OF RIGHT GREAT TOE WITHOUT DAMAGE TO NAIL, SUBSEQUENT ENCOUNTER: ICD-10-CM

## 2021-11-02 DIAGNOSIS — L97.322 NON-PRESSURE CHRONIC ULCER OF LEFT ANKLE WITH FAT LAYER EXPOSED: ICD-10-CM

## 2021-11-02 PROCEDURE — ZZZZZ: CPT

## 2021-11-02 PROCEDURE — G0463: CPT

## 2021-11-05 ENCOUNTER — OUTPATIENT (OUTPATIENT)
Dept: OUTPATIENT SERVICES | Facility: HOSPITAL | Age: 66
LOS: 1 days | Discharge: HOME | End: 2021-11-05
Payer: MEDICARE

## 2021-11-05 ENCOUNTER — APPOINTMENT (OUTPATIENT)
Dept: WOUND CARE | Facility: HOSPITAL | Age: 66
End: 2021-11-05
Payer: MEDICARE

## 2021-11-05 VITALS
OXYGEN SATURATION: 96 % | TEMPERATURE: 97.6 F | HEIGHT: 63 IN | DIASTOLIC BLOOD PRESSURE: 80 MMHG | BODY MASS INDEX: 51.56 KG/M2 | RESPIRATION RATE: 18 BRPM | HEART RATE: 101 BPM | WEIGHT: 291 LBS | SYSTOLIC BLOOD PRESSURE: 180 MMHG

## 2021-11-05 DIAGNOSIS — S91.111D LACERATION WITHOUT FOREIGN BODY OF RIGHT GREAT TOE WITHOUT DAMAGE TO NAIL, SUBSEQUENT ENCOUNTER: ICD-10-CM

## 2021-11-05 DIAGNOSIS — Z98.890 OTHER SPECIFIED POSTPROCEDURAL STATES: Chronic | ICD-10-CM

## 2021-11-05 DIAGNOSIS — Z95.2 PRESENCE OF PROSTHETIC HEART VALVE: Chronic | ICD-10-CM

## 2021-11-05 PROCEDURE — 99213 OFFICE O/P EST LOW 20 MIN: CPT

## 2021-11-05 PROCEDURE — G0463: CPT

## 2021-11-05 NOTE — ED PROVIDER NOTE - ATTESTATION, MLM
Systolic murmur noted on exam.  Daughter-in-law denies anyone mentioning this in previous visits. Daughter-in-law reports that she will occasionally complain of feeling dizzy in the morning.   Given her history of recent dyspnea on exertion and vasovagal symptoms, recommend checking echo I have reviewed and confirmed nurses' notes for patient's medications, allergies, medical history, and surgical history.

## 2021-11-07 DIAGNOSIS — G47.10 HYPERSOMNIA, UNSPECIFIED: ICD-10-CM

## 2021-11-07 DIAGNOSIS — Z82.49 FAMILY HISTORY OF ISCHEMIC HEART DISEASE AND OTHER DISEASES OF THE CIRCULATORY SYSTEM: ICD-10-CM

## 2021-11-07 DIAGNOSIS — Z83.49 FAMILY HISTORY OF OTHER ENDOCRINE, NUTRITIONAL AND METABOLIC DISEASES: ICD-10-CM

## 2021-11-07 DIAGNOSIS — L84 CORNS AND CALLOSITIES: ICD-10-CM

## 2021-11-07 DIAGNOSIS — L97.512 NON-PRESSURE CHRONIC ULCER OF OTHER PART OF RIGHT FOOT WITH FAT LAYER EXPOSED: ICD-10-CM

## 2021-11-07 DIAGNOSIS — I34.1 NONRHEUMATIC MITRAL (VALVE) PROLAPSE: ICD-10-CM

## 2021-11-07 DIAGNOSIS — Z88.5 ALLERGY STATUS TO NARCOTIC AGENT: ICD-10-CM

## 2021-11-07 DIAGNOSIS — M47.817 SPONDYLOSIS WITHOUT MYELOPATHY OR RADICULOPATHY, LUMBOSACRAL REGION: ICD-10-CM

## 2021-11-07 DIAGNOSIS — Z87.891 PERSONAL HISTORY OF NICOTINE DEPENDENCE: ICD-10-CM

## 2021-11-07 DIAGNOSIS — E55.9 VITAMIN D DEFICIENCY, UNSPECIFIED: ICD-10-CM

## 2021-11-07 DIAGNOSIS — Z87.01 PERSONAL HISTORY OF PNEUMONIA (RECURRENT): ICD-10-CM

## 2021-11-07 DIAGNOSIS — E11.621 TYPE 2 DIABETES MELLITUS WITH FOOT ULCER: ICD-10-CM

## 2021-11-07 DIAGNOSIS — Z87.81 PERSONAL HISTORY OF (HEALED) TRAUMATIC FRACTURE: ICD-10-CM

## 2021-11-07 DIAGNOSIS — Z95.4 PRESENCE OF OTHER HEART-VALVE REPLACEMENT: ICD-10-CM

## 2021-11-07 DIAGNOSIS — E11.59 TYPE 2 DIABETES MELLITUS WITH OTHER CIRCULATORY COMPLICATIONS: ICD-10-CM

## 2021-11-07 DIAGNOSIS — I83.223 VARICOSE VEINS OF LEFT LOWER EXTREMITY WITH BOTH ULCER OF ANKLE AND INFLAMMATION: ICD-10-CM

## 2021-11-07 DIAGNOSIS — E05.90 THYROTOXICOSIS, UNSPECIFIED WITHOUT THYROTOXIC CRISIS OR STORM: ICD-10-CM

## 2021-11-07 DIAGNOSIS — I11.9 HYPERTENSIVE HEART DISEASE WITHOUT HEART FAILURE: ICD-10-CM

## 2021-11-07 DIAGNOSIS — G47.33 OBSTRUCTIVE SLEEP APNEA (ADULT) (PEDIATRIC): ICD-10-CM

## 2021-11-07 DIAGNOSIS — E66.01 MORBID (SEVERE) OBESITY DUE TO EXCESS CALORIES: ICD-10-CM

## 2021-11-07 DIAGNOSIS — I83.11 VARICOSE VEINS OF RIGHT LOWER EXTREMITY WITH INFLAMMATION: ICD-10-CM

## 2021-11-07 DIAGNOSIS — Z98.84 BARIATRIC SURGERY STATUS: ICD-10-CM

## 2021-11-07 DIAGNOSIS — Z79.82 LONG TERM (CURRENT) USE OF ASPIRIN: ICD-10-CM

## 2021-11-07 DIAGNOSIS — L97.322 NON-PRESSURE CHRONIC ULCER OF LEFT ANKLE WITH FAT LAYER EXPOSED: ICD-10-CM

## 2021-11-07 DIAGNOSIS — E11.40 TYPE 2 DIABETES MELLITUS WITH DIABETIC NEUROPATHY, UNSPECIFIED: ICD-10-CM

## 2021-11-07 DIAGNOSIS — Z79.4 LONG TERM (CURRENT) USE OF INSULIN: ICD-10-CM

## 2021-11-07 DIAGNOSIS — M43.16 SPONDYLOLISTHESIS, LUMBAR REGION: ICD-10-CM

## 2021-11-07 DIAGNOSIS — I83.893 VARICOSE VEINS OF BILATERAL LOWER EXTREMITIES WITH OTHER COMPLICATIONS: ICD-10-CM

## 2021-11-07 DIAGNOSIS — M48.07 SPINAL STENOSIS, LUMBOSACRAL REGION: ICD-10-CM

## 2021-11-07 DIAGNOSIS — I47.1 SUPRAVENTRICULAR TACHYCARDIA: ICD-10-CM

## 2021-11-09 ENCOUNTER — APPOINTMENT (OUTPATIENT)
Dept: WOUND CARE | Facility: HOSPITAL | Age: 66
End: 2021-11-09

## 2021-11-12 ENCOUNTER — APPOINTMENT (OUTPATIENT)
Dept: WOUND CARE | Facility: HOSPITAL | Age: 66
End: 2021-11-12

## 2021-11-16 NOTE — H&P ADULT - PROBLEM SELECTOR PLAN 6
2/2 SOB  c/w home fluoxetine 40 daily. Reports that anxiety is 2/2 SOB  c/w home fluoxetine 40 daily. Bcc Pigmented Histology Text: The dermis contains distinctive tumor cell masses of various shapes and sizes composed of cells with large oval or elongated nuclei with relatively little cytoplasm.  The nuclei are homogenous.  There is no pronounced variation in size or intensity of staining and no significant anaplastic appearance.  The cells at the outer aspect of the tumor masses show palisading of nuclei.  Tumor masses are surrounded by a connective tissue stroma and in some areas there is retraction artifact of the tumor nodule away from the surrounding stroma.  Pigmented tumor cells are noted.

## 2021-11-23 ENCOUNTER — APPOINTMENT (OUTPATIENT)
Dept: ULTRASOUND IMAGING | Facility: CLINIC | Age: 66
End: 2021-11-23
Payer: MEDICARE

## 2021-11-23 ENCOUNTER — APPOINTMENT (OUTPATIENT)
Dept: MAMMOGRAPHY | Facility: CLINIC | Age: 66
End: 2021-11-23
Payer: MEDICARE

## 2021-11-23 PROCEDURE — 77063 BREAST TOMOSYNTHESIS BI: CPT

## 2021-11-23 PROCEDURE — 77067 SCR MAMMO BI INCL CAD: CPT

## 2021-11-23 PROCEDURE — 76641 ULTRASOUND BREAST COMPLETE: CPT | Mod: 50

## 2021-11-28 NOTE — ED ADULT NURSE NOTE - NS ED NURSE RECORD ANOTHER HT AND WT
H/O Hypothyroidism. Will check TSH or collect collateral from prescriber  - C/W Home Levothyroxine 75ug Yes

## 2021-12-02 NOTE — ASSESSMENT
[Verbal] : Verbal [Demo] : Demo [Patient] : Patient [Good - alert, interested, motivated] : Good - alert, interested, motivated [Foot Care] : foot care [Skin Care] : skin care [Pressure relief] : pressure relief [Signs and symptoms of infection] : sign and symptoms of infection [How and When to Call] : how and when to call [Off-loading] : off-loading [Patient responsibility to plan of care] : patient responsibility to plan of care [] : Yes [Stable] : stable [Home] : Home [Wheelchair] : Wheelchair [FreeTextEntry2] : Maintain optimal skin integrity\par  [FreeTextEntry4] : Dr Palencia/ Photos taken\par F/U to [facility] in 1 month

## 2021-12-02 NOTE — PLAN
[FreeTextEntry1] : Patient examined and evaluated at this time.\par Continue local wound care and offloading.\par Patient advised that this is a chronic condition. Patient has multiple co-morbidities remains at risk for infection, limb loss, sepsis, and death. Patient likely to have recurrent ulcerations in the future. Encouraged leg elevation as much as possible. \par Spent 20 minutes for patient care and medical decision making.\par Patient to follow up in 1 week.\par

## 2021-12-02 NOTE — HISTORY OF PRESENT ILLNESS
[FreeTextEntry1] : Pt seen for right 5th digit diabetic ulcer down to skin, subcutaneous tissue, and fat, healed. Pt also presents with left lower leg ulcer down to skin, subcutnaeous tissue, and fat, healed. Pt also seen for left ankle ulcer down to skin, subcutaneous tissue, and fat, healed.

## 2021-12-02 NOTE — PHYSICAL EXAM
[0] : left 0 [Ankle Swelling (On Exam)] : present [Ankle Swelling Bilaterally] : bilaterally  [Varicose Veins Of Lower Extremities] : bilaterally [] : bilaterally [Ankle Swelling On The Left] : moderate [de-identified] : A&Ox3, NAD [de-identified] : 4/5 strength in all quadrants bilaterally [de-identified] : right 5th digit diabetic ulcer down to skin, subcutaneous tissue, and fat, epithelialized. left lower leg ulcer down to skin, subcutaneous tissue, and fat, epithelialized. left ankle ulcer down to skin, subcutaneous tissue, and fat, epithelialized. [FreeTextEntry1] : Left Lateral Ankle- Closed [de-identified] : No Dressing [FreeTextEntry7] : Left Medial Leg (formerly identified as wound #7)- Closed [de-identified] : No Dressing [de-identified] : Cleansed with Normal saline\par  [de-identified] : Right Foot 5th Digit- callus [de-identified] : No Dressing

## 2021-12-02 NOTE — REVIEW OF SYSTEMS
[Skin Wound] : skin wound [Fever] : no fever [Eye Pain] : no eye pain [Earache] : no earache [Chest Pain] : no chest pain [Shortness Of Breath] : no shortness of breath [Abdominal Pain] : no abdominal pain [de-identified] : right 5th digit diabetic ulcer down to skin, subcutaneous tissue, and fat, epithelialized. left lower leg ulcer down to skin, subcutaneous tissue, and fat. [de-identified] : diabetic neuropathy [de-identified] : type 2 diabetes

## 2021-12-20 NOTE — ED PROVIDER NOTE - CONTEXT
3 = Minimally improved - slightly better with little or no clinically meaningful reduction of symptoms.  Represents very little change in basic clinical status, level of care, or functional capacity. unknown

## 2021-12-28 ENCOUNTER — OUTPATIENT (OUTPATIENT)
Dept: OUTPATIENT SERVICES | Facility: HOSPITAL | Age: 66
LOS: 1 days | Discharge: HOME | End: 2021-12-28
Payer: MEDICARE

## 2021-12-28 ENCOUNTER — APPOINTMENT (OUTPATIENT)
Dept: WOUND CARE | Facility: HOSPITAL | Age: 66
End: 2021-12-28
Payer: MEDICARE

## 2021-12-28 VITALS
HEIGHT: 63 IN | BODY MASS INDEX: 51.56 KG/M2 | RESPIRATION RATE: 18 BRPM | SYSTOLIC BLOOD PRESSURE: 155 MMHG | DIASTOLIC BLOOD PRESSURE: 74 MMHG | WEIGHT: 291 LBS | TEMPERATURE: 97.4 F | HEART RATE: 103 BPM | OXYGEN SATURATION: 100 %

## 2021-12-28 DIAGNOSIS — L97.322 NON-PRESSURE CHRONIC ULCER OF LEFT ANKLE WITH FAT LAYER EXPOSED: ICD-10-CM

## 2021-12-28 DIAGNOSIS — I83.11 VARICOSE VEINS OF RIGHT LOWER EXTREMITY WITH INFLAMMATION: ICD-10-CM

## 2021-12-28 DIAGNOSIS — Z95.2 PRESENCE OF PROSTHETIC HEART VALVE: Chronic | ICD-10-CM

## 2021-12-28 DIAGNOSIS — E11.621 TYPE 2 DIABETES MELLITUS WITH FOOT ULCER: ICD-10-CM

## 2021-12-28 DIAGNOSIS — Z00.00 ENCOUNTER FOR GENERAL ADULT MEDICAL EXAMINATION WITHOUT ABNORMAL FINDINGS: ICD-10-CM

## 2021-12-28 DIAGNOSIS — Z98.890 OTHER SPECIFIED POSTPROCEDURAL STATES: Chronic | ICD-10-CM

## 2021-12-28 DIAGNOSIS — I83.223 VARICOSE VEINS OF LEFT LOWER EXTREMITY WITH BOTH ULCER OF ANKLE AND INFLAMMATION: ICD-10-CM

## 2021-12-28 DIAGNOSIS — L84 CORNS AND CALLOSITIES: ICD-10-CM

## 2021-12-28 DIAGNOSIS — L97.329 VARICOSE VEINS OF LEFT LOWER EXTREMITY WITH BOTH ULCER OF ANKLE AND INFLAMMATION: ICD-10-CM

## 2021-12-28 PROCEDURE — G0463: CPT

## 2021-12-28 PROCEDURE — 99213 OFFICE O/P EST LOW 20 MIN: CPT

## 2022-01-03 DIAGNOSIS — Z88.5 ALLERGY STATUS TO NARCOTIC AGENT: ICD-10-CM

## 2022-01-03 DIAGNOSIS — Z83.49 FAMILY HISTORY OF OTHER ENDOCRINE, NUTRITIONAL AND METABOLIC DISEASES: ICD-10-CM

## 2022-01-03 DIAGNOSIS — Z79.4 LONG TERM (CURRENT) USE OF INSULIN: ICD-10-CM

## 2022-01-03 DIAGNOSIS — Z87.81 PERSONAL HISTORY OF (HEALED) TRAUMATIC FRACTURE: ICD-10-CM

## 2022-01-03 DIAGNOSIS — E55.9 VITAMIN D DEFICIENCY, UNSPECIFIED: ICD-10-CM

## 2022-01-03 DIAGNOSIS — I50.9 HEART FAILURE, UNSPECIFIED: ICD-10-CM

## 2022-01-03 DIAGNOSIS — Z95.4 PRESENCE OF OTHER HEART-VALVE REPLACEMENT: ICD-10-CM

## 2022-01-03 DIAGNOSIS — M43.16 SPONDYLOLISTHESIS, LUMBAR REGION: ICD-10-CM

## 2022-01-03 DIAGNOSIS — M48.07 SPINAL STENOSIS, LUMBOSACRAL REGION: ICD-10-CM

## 2022-01-03 DIAGNOSIS — Z79.82 LONG TERM (CURRENT) USE OF ASPIRIN: ICD-10-CM

## 2022-01-03 DIAGNOSIS — E11.621 TYPE 2 DIABETES MELLITUS WITH FOOT ULCER: ICD-10-CM

## 2022-01-03 DIAGNOSIS — I11.0 HYPERTENSIVE HEART DISEASE WITH HEART FAILURE: ICD-10-CM

## 2022-01-03 DIAGNOSIS — G47.10 HYPERSOMNIA, UNSPECIFIED: ICD-10-CM

## 2022-01-03 DIAGNOSIS — Z98.84 BARIATRIC SURGERY STATUS: ICD-10-CM

## 2022-01-03 DIAGNOSIS — I83.893 VARICOSE VEINS OF BILATERAL LOWER EXTREMITIES WITH OTHER COMPLICATIONS: ICD-10-CM

## 2022-01-03 DIAGNOSIS — I83.223 VARICOSE VEINS OF LEFT LOWER EXTREMITY WITH BOTH ULCER OF ANKLE AND INFLAMMATION: ICD-10-CM

## 2022-01-03 DIAGNOSIS — E66.01 MORBID (SEVERE) OBESITY DUE TO EXCESS CALORIES: ICD-10-CM

## 2022-01-03 DIAGNOSIS — E11.59 TYPE 2 DIABETES MELLITUS WITH OTHER CIRCULATORY COMPLICATIONS: ICD-10-CM

## 2022-01-03 DIAGNOSIS — E11.40 TYPE 2 DIABETES MELLITUS WITH DIABETIC NEUROPATHY, UNSPECIFIED: ICD-10-CM

## 2022-01-03 DIAGNOSIS — E05.90 THYROTOXICOSIS, UNSPECIFIED WITHOUT THYROTOXIC CRISIS OR STORM: ICD-10-CM

## 2022-01-03 DIAGNOSIS — L97.512 NON-PRESSURE CHRONIC ULCER OF OTHER PART OF RIGHT FOOT WITH FAT LAYER EXPOSED: ICD-10-CM

## 2022-01-03 DIAGNOSIS — Z87.01 PERSONAL HISTORY OF PNEUMONIA (RECURRENT): ICD-10-CM

## 2022-01-03 DIAGNOSIS — I47.1 SUPRAVENTRICULAR TACHYCARDIA: ICD-10-CM

## 2022-01-03 DIAGNOSIS — I83.11 VARICOSE VEINS OF RIGHT LOWER EXTREMITY WITH INFLAMMATION: ICD-10-CM

## 2022-01-03 DIAGNOSIS — Z82.49 FAMILY HISTORY OF ISCHEMIC HEART DISEASE AND OTHER DISEASES OF THE CIRCULATORY SYSTEM: ICD-10-CM

## 2022-01-03 DIAGNOSIS — I34.1 NONRHEUMATIC MITRAL (VALVE) PROLAPSE: ICD-10-CM

## 2022-01-03 DIAGNOSIS — L97.322 NON-PRESSURE CHRONIC ULCER OF LEFT ANKLE WITH FAT LAYER EXPOSED: ICD-10-CM

## 2022-01-03 DIAGNOSIS — M47.817 SPONDYLOSIS WITHOUT MYELOPATHY OR RADICULOPATHY, LUMBOSACRAL REGION: ICD-10-CM

## 2022-01-03 DIAGNOSIS — Z87.891 PERSONAL HISTORY OF NICOTINE DEPENDENCE: ICD-10-CM

## 2022-01-03 DIAGNOSIS — G47.33 OBSTRUCTIVE SLEEP APNEA (ADULT) (PEDIATRIC): ICD-10-CM

## 2022-01-03 PROBLEM — L84 CALLUS: Status: ACTIVE | Noted: 2020-07-13

## 2022-01-03 NOTE — ASSESSMENT
[Verbal] : Verbal [Written] : Written [Demo] : Demo [Patient] : Patient [Fair - mild discomfort, physical impairment, low acceptance] : Fair - mild discomfort, physical impairment, low acceptance [Needs reinforcement] : needs reinforcement [Dressing changes] : dressing changes [Foot Care] : foot care [Skin Care] : skin care [Signs and symptoms of infection] : sign and symptoms of infection [Venous Disease] : venous disease [Nutrition] : nutrition [How and When to Call] : how and when to call [Pain Management] : pain management [Patient responsibility to plan of care] : patient responsibility to plan of care [Stable] : stable [Home] : Home [Wheelchair] : Wheelchair [Not Applicable - Long Term Care/Home Health Agency] : Long Term Care/Home Health Agency: Not Applicable [] : No [FreeTextEntry2] : Infection prevention\par Localized wound care \par Goal remaining pain free regarding wounds\par weight loss \par  [FreeTextEntry3] : Pt with new wounds  [FreeTextEntry4] : Follow up in 1 week \par \par Rapid assessment preformed today due to infection control measures related to covid - 19 protocol.\par

## 2022-01-03 NOTE — HISTORY OF PRESENT ILLNESS
[FreeTextEntry1] : Pt seen for right 5th digit diabetic ulcer down to skin, subcutaneous tissue, and fat, healed. Pt also presents with left lower leg ulcer down to skin, subcutnaeous tissue, and fat, healed. Pt also seen for left ankle ulcer down to skin, subcutaneous tissue, and fat, healed. Pt relates that she has calluses to her right and left big toes, as well as elongated nails to her toes.

## 2022-01-03 NOTE — REVIEW OF SYSTEMS
[Skin Wound] : skin wound [Fever] : no fever [Eye Pain] : no eye pain [Earache] : no earache [Chest Pain] : no chest pain [Shortness Of Breath] : no shortness of breath [Abdominal Pain] : no abdominal pain [de-identified] : hyperkeratotic lesions plantar right and left halluxes. thickened elongated nails 1-5 bilaterally. right 5th digit diabetic ulcer down to skin, subcutaneous tissue, and fat, epithelialized. left lower leg ulcer down to skin, subcutaneous tissue, and fat. [de-identified] : diabetic neuropathy [de-identified] : type 2 diabetes

## 2022-01-03 NOTE — PLAN
[FreeTextEntry1] : Patient examined and evaluated at this time.\par Continue local wound care and offloading.\par Patient advised that this is a chronic condition. Patient has multiple co-morbidities remains at risk for infection, limb loss, sepsis, and death. Patient likely to have recurrent ulcerations in the future. Encouraged leg elevation as much as possible.\par Hyperkeratotic lesions plantar hallux bilaterally debrided with a #15 blade without incident. Nails 1-5 bilaterally debrided without incident.\par Spent 20 minutes for patient care and medical decision making.\par Patient to follow up in 1 week.\par

## 2022-01-03 NOTE — PHYSICAL EXAM
[0] : left 0 [Ankle Swelling (On Exam)] : present [Ankle Swelling Bilaterally] : bilaterally  [Varicose Veins Of Lower Extremities] : bilaterally [] : bilaterally [Ankle Swelling On The Left] : moderate [de-identified] : A&Ox3, NAD [de-identified] : 4/5 strength in all quadrants bilaterally [de-identified] : hyperkeratotic lesions plantar right and left halluxes. thickened elongated nails 1-5 bilaterally. right 5th digit diabetic ulcer down to skin, subcutaneous tissue, and fat, epithelialized. left lower leg ulcer down to skin, subcutaneous tissue, and fat, epithelialized. left ankle ulcer down to skin, subcutaneous tissue, and fat, epithelialized. [FreeTextEntry1] : Right anterior leg - A few small scattered open areas (New)  [de-identified] : scant Serous/sanguinous [de-identified] : Alginate  [de-identified] : Cleansed with NS\par Allevyn border\par \par ** dressing applied by DPM  [FreeTextEntry7] : Right foot 2nd digit - 2 dried blood blisters (New)  [de-identified] : Cleansed with NS\par No other treatment  [de-identified] : Normal [de-identified] : None [de-identified] : None [de-identified] : 100% [de-identified] : No [de-identified] : Weekly [de-identified] : Primary Dressing

## 2022-01-23 ENCOUNTER — NON-APPOINTMENT (OUTPATIENT)
Age: 67
End: 2022-01-23

## 2022-01-24 ENCOUNTER — OUTPATIENT (OUTPATIENT)
Dept: OUTPATIENT SERVICES | Facility: HOSPITAL | Age: 67
LOS: 1 days | Discharge: HOME | End: 2022-01-24
Payer: MEDICARE

## 2022-01-24 ENCOUNTER — APPOINTMENT (OUTPATIENT)
Dept: WOUND CARE | Facility: HOSPITAL | Age: 67
End: 2022-01-24
Payer: MEDICARE

## 2022-01-24 VITALS
HEART RATE: 94 BPM | SYSTOLIC BLOOD PRESSURE: 142 MMHG | WEIGHT: 291 LBS | RESPIRATION RATE: 18 BRPM | BODY MASS INDEX: 51.56 KG/M2 | DIASTOLIC BLOOD PRESSURE: 74 MMHG | OXYGEN SATURATION: 93 % | HEIGHT: 63 IN | TEMPERATURE: 97.2 F

## 2022-01-24 DIAGNOSIS — E11.621 TYPE 2 DIABETES MELLITUS WITH FOOT ULCER: ICD-10-CM

## 2022-01-24 DIAGNOSIS — Z95.2 PRESENCE OF PROSTHETIC HEART VALVE: Chronic | ICD-10-CM

## 2022-01-24 DIAGNOSIS — Z98.890 OTHER SPECIFIED POSTPROCEDURAL STATES: Chronic | ICD-10-CM

## 2022-01-24 PROCEDURE — 99213 OFFICE O/P EST LOW 20 MIN: CPT

## 2022-01-24 PROCEDURE — G0463: CPT

## 2022-01-25 DIAGNOSIS — Z87.81 PERSONAL HISTORY OF (HEALED) TRAUMATIC FRACTURE: ICD-10-CM

## 2022-01-25 DIAGNOSIS — I83.218 VARICOSE VEINS OF RIGHT LOWER EXTREMITY WITH BOTH ULCER OF OTHER PART OF LOWER EXTREMITY AND INFLAMMATION: ICD-10-CM

## 2022-01-25 DIAGNOSIS — L97.822 NON-PRESSURE CHRONIC ULCER OF OTHER PART OF LEFT LOWER LEG WITH FAT LAYER EXPOSED: ICD-10-CM

## 2022-01-25 DIAGNOSIS — Z79.4 LONG TERM (CURRENT) USE OF INSULIN: ICD-10-CM

## 2022-01-25 DIAGNOSIS — I11.0 HYPERTENSIVE HEART DISEASE WITH HEART FAILURE: ICD-10-CM

## 2022-01-25 DIAGNOSIS — I50.9 HEART FAILURE, UNSPECIFIED: ICD-10-CM

## 2022-01-25 DIAGNOSIS — E11.621 TYPE 2 DIABETES MELLITUS WITH FOOT ULCER: ICD-10-CM

## 2022-01-25 DIAGNOSIS — Z83.49 FAMILY HISTORY OF OTHER ENDOCRINE, NUTRITIONAL AND METABOLIC DISEASES: ICD-10-CM

## 2022-01-25 DIAGNOSIS — Z95.4 PRESENCE OF OTHER HEART-VALVE REPLACEMENT: ICD-10-CM

## 2022-01-25 DIAGNOSIS — G47.33 OBSTRUCTIVE SLEEP APNEA (ADULT) (PEDIATRIC): ICD-10-CM

## 2022-01-25 DIAGNOSIS — Z88.5 ALLERGY STATUS TO NARCOTIC AGENT: ICD-10-CM

## 2022-01-25 DIAGNOSIS — I34.1 NONRHEUMATIC MITRAL (VALVE) PROLAPSE: ICD-10-CM

## 2022-01-25 DIAGNOSIS — M48.07 SPINAL STENOSIS, LUMBOSACRAL REGION: ICD-10-CM

## 2022-01-25 DIAGNOSIS — E55.9 VITAMIN D DEFICIENCY, UNSPECIFIED: ICD-10-CM

## 2022-01-25 DIAGNOSIS — I47.1 SUPRAVENTRICULAR TACHYCARDIA: ICD-10-CM

## 2022-01-25 DIAGNOSIS — Z87.891 PERSONAL HISTORY OF NICOTINE DEPENDENCE: ICD-10-CM

## 2022-01-25 DIAGNOSIS — Z87.01 PERSONAL HISTORY OF PNEUMONIA (RECURRENT): ICD-10-CM

## 2022-01-25 DIAGNOSIS — E11.59 TYPE 2 DIABETES MELLITUS WITH OTHER CIRCULATORY COMPLICATIONS: ICD-10-CM

## 2022-01-25 DIAGNOSIS — Z82.49 FAMILY HISTORY OF ISCHEMIC HEART DISEASE AND OTHER DISEASES OF THE CIRCULATORY SYSTEM: ICD-10-CM

## 2022-01-25 DIAGNOSIS — I83.228 VARICOSE VEINS OF LEFT LOWER EXTREMITY WITH BOTH ULCER OF OTHER PART OF LOWER EXTREMITY AND INFLAMMATION: ICD-10-CM

## 2022-01-25 DIAGNOSIS — E11.40 TYPE 2 DIABETES MELLITUS WITH DIABETIC NEUROPATHY, UNSPECIFIED: ICD-10-CM

## 2022-01-25 DIAGNOSIS — Z79.82 LONG TERM (CURRENT) USE OF ASPIRIN: ICD-10-CM

## 2022-01-25 DIAGNOSIS — G47.10 HYPERSOMNIA, UNSPECIFIED: ICD-10-CM

## 2022-01-25 DIAGNOSIS — Z98.84 BARIATRIC SURGERY STATUS: ICD-10-CM

## 2022-01-25 DIAGNOSIS — M43.16 SPONDYLOLISTHESIS, LUMBAR REGION: ICD-10-CM

## 2022-01-25 DIAGNOSIS — L97.812 NON-PRESSURE CHRONIC ULCER OF OTHER PART OF RIGHT LOWER LEG WITH FAT LAYER EXPOSED: ICD-10-CM

## 2022-01-25 DIAGNOSIS — L97.512 NON-PRESSURE CHRONIC ULCER OF OTHER PART OF RIGHT FOOT WITH FAT LAYER EXPOSED: ICD-10-CM

## 2022-01-25 DIAGNOSIS — E05.90 THYROTOXICOSIS, UNSPECIFIED WITHOUT THYROTOXIC CRISIS OR STORM: ICD-10-CM

## 2022-01-25 DIAGNOSIS — M47.817 SPONDYLOSIS WITHOUT MYELOPATHY OR RADICULOPATHY, LUMBOSACRAL REGION: ICD-10-CM

## 2022-01-25 DIAGNOSIS — I83.893 VARICOSE VEINS OF BILATERAL LOWER EXTREMITIES WITH OTHER COMPLICATIONS: ICD-10-CM

## 2022-01-25 NOTE — HISTORY OF PRESENT ILLNESS
[FreeTextEntry1] : Pt seen for right 5th digit diabetic ulcer down to skin, subcutaneous tissue, and fat, healed. Pt also presents with left and right lower leg ulcer down to skin, subcutnaeous tissue, and fat. Pt relates that she has calluses to her right and left big toes.

## 2022-01-25 NOTE — PLAN
[FreeTextEntry1] : Patient examined and evaluated at this time.\par Continue local wound care and offloading.\par Patient advised that this is a chronic condition. Patient has multiple co-morbidities remains at risk for infection, limb loss, sepsis, and death. Patient likely to have recurrent ulcerations in the future. Encouraged leg elevation as much as possible.\par Hyperkeratotic lesions plantar hallux bilaterally debrided with a #15 blade without incident.\par Spent 20 minutes for patient care and medical decision making.\par Patient to follow up in 1 week.\par

## 2022-01-25 NOTE — PHYSICAL EXAM
[0] : left 0 [Ankle Swelling (On Exam)] : present [Ankle Swelling Bilaterally] : bilaterally  [Varicose Veins Of Lower Extremities] : bilaterally [] : bilaterally [Ankle Swelling On The Left] : moderate [de-identified] : A&Ox3, NAD [de-identified] : 4/5 strength in all quadrants bilaterally [de-identified] : hyperkeratotic lesions plantar right and left halluxes. right 5th digit diabetic ulcer down to skin, subcutaneous tissue, and fat, epithelialized. left and right lower leg ulcer down to skin, subcutaneous tissue, and fat. [de-identified] : blister expressed by DPM [FreeTextEntry1] : Right anterior leg- blister [de-identified] : scant Serous [de-identified] : marilee wang [de-identified] : Cleansed with NS\par \par \par  [FreeTextEntry7] : Right foot 2nd digit -RESOLVED [de-identified] : Cleansed with NS\par  [de-identified] : blister expressed by DPM [de-identified] : Left Lateral Leg- blister [de-identified] : 0.8 [de-identified] : 2.1 [de-identified] : <0.1 [de-identified] : scant serous [de-identified] : allevyn border [de-identified] : Cleansed with NS\par  [TWNoteComboBox6] : Venous [de-identified] : No [de-identified] : Normal [de-identified] : None [de-identified] : None [de-identified] : 100% [de-identified] : No [de-identified] : Every other day [de-identified] : Primary Dressing [de-identified] : None [de-identified] : No [de-identified] : No [de-identified] : Normal [de-identified] : None [de-identified] : None [de-identified] : None [de-identified] : No [de-identified] : No [de-identified] : Venous [de-identified] : No [de-identified] : Normal [de-identified] : None [de-identified] : None [de-identified] : None [de-identified] : No [de-identified] : Every other day [de-identified] : Primary Dressing

## 2022-01-25 NOTE — ASSESSMENT
[Verbal] : Verbal [Written] : Written [Demo] : Demo [Patient] : Patient [Fair - mild discomfort, physical impairment, low acceptance] : Fair - mild discomfort, physical impairment, low acceptance [Needs reinforcement] : needs reinforcement [Dressing changes] : dressing changes [Foot Care] : foot care [Skin Care] : skin care [Signs and symptoms of infection] : sign and symptoms of infection [Venous Disease] : venous disease [Nutrition] : nutrition [How and When to Call] : how and when to call [Pain Management] : pain management [Patient responsibility to plan of care] : patient responsibility to plan of care [Stable] : stable [Home] : Home [Wheelchair] : Wheelchair [Not Applicable - Long Term Care/Home Health Agency] : Long Term Care/Home Health Agency: Not Applicable [Pressure relief] : pressure relief [Glycemic Control] : glycemic control [] : No [FreeTextEntry2] : Infection prevention\par Localized wound care \par Goal remaining pain free regarding wounds\par weight loss \par elevation\par low Na+ diet\par Encourage Glycemic Control\par Keep Pressure off areas at high risk for injury/skin breakdown\par  [FreeTextEntry3] : Pt with new wounds  [FreeTextEntry4] : Follow up in 3 weeks\par Pt seeing cardiology for hypertrophic cardiomyopathy and progressive mitral valve stenosis, cardiologist prescribed pt lopressor at 50 mg BID and she will closely be monitored for changes.

## 2022-01-25 NOTE — REVIEW OF SYSTEMS
[Skin Wound] : skin wound [Fever] : no fever [Eye Pain] : no eye pain [Earache] : no earache [Chest Pain] : no chest pain [Shortness Of Breath] : no shortness of breath [Abdominal Pain] : no abdominal pain [de-identified] : hyperkeratotic lesions plantar right and left halluxes. right 5th digit diabetic ulcer down to skin, subcutaneous tissue, and fat, epithelialized. left and right lower leg ulcer down to skin, subcutaneous tissue, and fat. [de-identified] : diabetic neuropathy [de-identified] : type 2 diabetes

## 2022-02-04 ENCOUNTER — NON-APPOINTMENT (OUTPATIENT)
Age: 67
End: 2022-02-04

## 2022-02-05 ENCOUNTER — OUTPATIENT (OUTPATIENT)
Dept: OUTPATIENT SERVICES | Facility: HOSPITAL | Age: 67
LOS: 1 days | Discharge: HOME | End: 2022-02-05
Payer: MEDICARE

## 2022-02-05 ENCOUNTER — APPOINTMENT (OUTPATIENT)
Dept: WOUND CARE | Facility: HOSPITAL | Age: 67
End: 2022-02-05
Payer: MEDICARE

## 2022-02-05 VITALS
RESPIRATION RATE: 18 BRPM | DIASTOLIC BLOOD PRESSURE: 66 MMHG | SYSTOLIC BLOOD PRESSURE: 134 MMHG | BODY MASS INDEX: 51.56 KG/M2 | WEIGHT: 291 LBS | OXYGEN SATURATION: 95 % | HEART RATE: 64 BPM | TEMPERATURE: 98.1 F | HEIGHT: 63 IN

## 2022-02-05 DIAGNOSIS — Z95.2 PRESENCE OF PROSTHETIC HEART VALVE: Chronic | ICD-10-CM

## 2022-02-05 DIAGNOSIS — E11.621 TYPE 2 DIABETES MELLITUS WITH FOOT ULCER: ICD-10-CM

## 2022-02-05 DIAGNOSIS — Z98.890 OTHER SPECIFIED POSTPROCEDURAL STATES: Chronic | ICD-10-CM

## 2022-02-05 PROCEDURE — G0463: CPT

## 2022-02-05 PROCEDURE — 99213 OFFICE O/P EST LOW 20 MIN: CPT

## 2022-02-07 DIAGNOSIS — E11.40 TYPE 2 DIABETES MELLITUS WITH DIABETIC NEUROPATHY, UNSPECIFIED: ICD-10-CM

## 2022-02-07 DIAGNOSIS — Z98.84 BARIATRIC SURGERY STATUS: ICD-10-CM

## 2022-02-07 DIAGNOSIS — E11.51 TYPE 2 DIABETES MELLITUS WITH DIABETIC PERIPHERAL ANGIOPATHY WITHOUT GANGRENE: ICD-10-CM

## 2022-02-07 DIAGNOSIS — L97.812 NON-PRESSURE CHRONIC ULCER OF OTHER PART OF RIGHT LOWER LEG WITH FAT LAYER EXPOSED: ICD-10-CM

## 2022-02-07 DIAGNOSIS — G47.10 HYPERSOMNIA, UNSPECIFIED: ICD-10-CM

## 2022-02-07 DIAGNOSIS — Z87.01 PERSONAL HISTORY OF PNEUMONIA (RECURRENT): ICD-10-CM

## 2022-02-07 DIAGNOSIS — I50.9 HEART FAILURE, UNSPECIFIED: ICD-10-CM

## 2022-02-07 DIAGNOSIS — I83.218 VARICOSE VEINS OF RIGHT LOWER EXTREMITY WITH BOTH ULCER OF OTHER PART OF LOWER EXTREMITY AND INFLAMMATION: ICD-10-CM

## 2022-02-07 DIAGNOSIS — M43.16 SPONDYLOLISTHESIS, LUMBAR REGION: ICD-10-CM

## 2022-02-07 DIAGNOSIS — M48.07 SPINAL STENOSIS, LUMBOSACRAL REGION: ICD-10-CM

## 2022-02-07 DIAGNOSIS — Z95.4 PRESENCE OF OTHER HEART-VALVE REPLACEMENT: ICD-10-CM

## 2022-02-07 DIAGNOSIS — I83.893 VARICOSE VEINS OF BILATERAL LOWER EXTREMITIES WITH OTHER COMPLICATIONS: ICD-10-CM

## 2022-02-07 DIAGNOSIS — Z88.5 ALLERGY STATUS TO NARCOTIC AGENT: ICD-10-CM

## 2022-02-07 DIAGNOSIS — Z82.49 FAMILY HISTORY OF ISCHEMIC HEART DISEASE AND OTHER DISEASES OF THE CIRCULATORY SYSTEM: ICD-10-CM

## 2022-02-07 DIAGNOSIS — I47.1 SUPRAVENTRICULAR TACHYCARDIA: ICD-10-CM

## 2022-02-07 DIAGNOSIS — E11.621 TYPE 2 DIABETES MELLITUS WITH FOOT ULCER: ICD-10-CM

## 2022-02-07 DIAGNOSIS — Z87.891 PERSONAL HISTORY OF NICOTINE DEPENDENCE: ICD-10-CM

## 2022-02-07 DIAGNOSIS — I83.228 VARICOSE VEINS OF LEFT LOWER EXTREMITY WITH BOTH ULCER OF OTHER PART OF LOWER EXTREMITY AND INFLAMMATION: ICD-10-CM

## 2022-02-07 DIAGNOSIS — G47.33 OBSTRUCTIVE SLEEP APNEA (ADULT) (PEDIATRIC): ICD-10-CM

## 2022-02-07 DIAGNOSIS — E05.90 THYROTOXICOSIS, UNSPECIFIED WITHOUT THYROTOXIC CRISIS OR STORM: ICD-10-CM

## 2022-02-07 DIAGNOSIS — I34.1 NONRHEUMATIC MITRAL (VALVE) PROLAPSE: ICD-10-CM

## 2022-02-07 DIAGNOSIS — Z79.82 LONG TERM (CURRENT) USE OF ASPIRIN: ICD-10-CM

## 2022-02-07 DIAGNOSIS — E55.9 VITAMIN D DEFICIENCY, UNSPECIFIED: ICD-10-CM

## 2022-02-07 DIAGNOSIS — L97.822 NON-PRESSURE CHRONIC ULCER OF OTHER PART OF LEFT LOWER LEG WITH FAT LAYER EXPOSED: ICD-10-CM

## 2022-02-07 DIAGNOSIS — L97.512 NON-PRESSURE CHRONIC ULCER OF OTHER PART OF RIGHT FOOT WITH FAT LAYER EXPOSED: ICD-10-CM

## 2022-02-07 DIAGNOSIS — Z79.4 LONG TERM (CURRENT) USE OF INSULIN: ICD-10-CM

## 2022-02-07 DIAGNOSIS — Z87.81 PERSONAL HISTORY OF (HEALED) TRAUMATIC FRACTURE: ICD-10-CM

## 2022-02-07 DIAGNOSIS — E66.01 MORBID (SEVERE) OBESITY DUE TO EXCESS CALORIES: ICD-10-CM

## 2022-02-07 DIAGNOSIS — I11.0 HYPERTENSIVE HEART DISEASE WITH HEART FAILURE: ICD-10-CM

## 2022-02-07 DIAGNOSIS — M47.817 SPONDYLOSIS WITHOUT MYELOPATHY OR RADICULOPATHY, LUMBOSACRAL REGION: ICD-10-CM

## 2022-02-07 DIAGNOSIS — Z83.49 FAMILY HISTORY OF OTHER ENDOCRINE, NUTRITIONAL AND METABOLIC DISEASES: ICD-10-CM

## 2022-02-07 NOTE — PHYSICAL EXAM
[2 x 2] : 2 x 2  [0] : left 0 [Ankle Swelling (On Exam)] : present [Ankle Swelling Bilaterally] : bilaterally  [Varicose Veins Of Lower Extremities] : bilaterally [] : bilaterally [Ankle Swelling On The Left] : moderate [de-identified] : A&Ox3, NAD [de-identified] : 4/5 strength in all quadrants bilaterally [de-identified] : hyperkeratotic lesions plantar right and left halluxes. right 5th digit diabetic ulcer down to skin, subcutaneous tissue, and fat, epithelialized. left and right lower leg ulcer down to skin, subcutaneous tissue, and fat. [de-identified] : small Serous [FreeTextEntry7] : Left Foot 2nd digit-laceration (NEW) [FreeTextEntry8] : 0.1 [FreeTextEntry9] : 0.5 [de-identified] : <0.1 [de-identified] : none [de-identified] : Cleansed with NS\par  [de-identified] : Left Lateral Leg- eschar/scab [de-identified] : 1.1 [de-identified] : 1.4 [de-identified] : <0.1 [de-identified] : scant serous/purulent [de-identified] : alginate [de-identified] : Cleansed with NS\par allevyn border [de-identified] : blister expressed by DPM, small serous drainage [FreeTextEntry1] : Left anterior leg- intact blister (NEW) [FreeTextEntry2] : 2.0 [FreeTextEntry3] : 3.5 [FreeTextEntry4] : raised 0.2 cm  [de-identified] : alginate/allevyn border [de-identified] : Cleansed with NS\par  [de-identified] : No [de-identified] : None [de-identified] : 100% [de-identified] : 3x Weekly [de-identified] : Primary Dressing [de-identified] : None [de-identified] : No [de-identified] : No [de-identified] : Normal [de-identified] : None [de-identified] : None [de-identified] : None [de-identified] : No [de-identified] : No [de-identified] : Venous [de-identified] : No [de-identified] : Normal [de-identified] : None [de-identified] : None [de-identified] : None [de-identified] : No [de-identified] : 3x Weekly [de-identified] : Primary Dressing [TWNoteComboBox4] : None [TWNoteComboBox5] : No [TWNoteComboBox6] : Venous [de-identified] : No [de-identified] : Normal [de-identified] : None [de-identified] : None [de-identified] : None [de-identified] : No [de-identified] : 3x Weekly [de-identified] : Primary Dressing

## 2022-02-07 NOTE — VITALS
[Pain related to present condition?] : The patient's  pain is related to present condition. [Burning] : burning [Occasional] : occasional [] : No [de-identified] : 5 [FreeTextEntry3] : Bilateral legs [FreeTextEntry1] : nothing, pain is intermittent [FreeTextEntry5] : Lopressor 100 mg BID

## 2022-02-07 NOTE — ASSESSMENT
[Verbal] : Verbal [Written] : Written [Demo] : Demo [Patient] : Patient [Fair - mild discomfort, physical impairment, low acceptance] : Fair - mild discomfort, physical impairment, low acceptance [Needs reinforcement] : needs reinforcement [Dressing changes] : dressing changes [Foot Care] : foot care [Skin Care] : skin care [Pressure relief] : pressure relief [Signs and symptoms of infection] : sign and symptoms of infection [Venous Disease] : venous disease [Nutrition] : nutrition [How and When to Call] : how and when to call [Pain Management] : pain management [Patient responsibility to plan of care] : patient responsibility to plan of care [Glycemic Control] : glycemic control [Stable] : stable [Home] : Home [Wheelchair] : Wheelchair [Not Applicable - Long Term Care/Home Health Agency] : Long Term Care/Home Health Agency: Not Applicable [] : No [FreeTextEntry2] : Infection prevention\par Localized wound care \par Goal remaining pain free regarding wounds\par weight loss \par elevation\par low Na+ diet\par Encourage Glycemic Control\par Keep Pressure off areas at high risk for injury/skin breakdown\par  [FreeTextEntry3] : Pt with new wounds  [FreeTextEntry4] : F/U 1 week\par Pt cardiologist changed lopressor dosage to 100mg BID and will monitor for any status changes (mitral valve stenosis and hypertrophic cardiomyopathy) \par [facility] to be initated

## 2022-02-07 NOTE — REVIEW OF SYSTEMS
[Fever] : no fever [Eye Pain] : no eye pain [Earache] : no earache [Chest Pain] : no chest pain [Shortness Of Breath] : no shortness of breath [Abdominal Pain] : no abdominal pain [Skin Wound] : skin wound [de-identified] : hyperkeratotic lesions plantar right and left halluxes. right 5th digit diabetic ulcer down to skin, subcutaneous tissue, and fat, epithelialized. left and right lower leg ulcer down to skin, subcutaneous tissue, and fat. [de-identified] : diabetic neuropathy [de-identified] : type 2 diabetes

## 2022-02-11 ENCOUNTER — OUTPATIENT (OUTPATIENT)
Dept: OUTPATIENT SERVICES | Facility: HOSPITAL | Age: 67
LOS: 1 days | Discharge: HOME | End: 2022-02-11
Payer: MEDICARE

## 2022-02-11 ENCOUNTER — APPOINTMENT (OUTPATIENT)
Dept: WOUND CARE | Facility: HOSPITAL | Age: 67
End: 2022-02-11
Payer: MEDICARE

## 2022-02-11 VITALS
HEART RATE: 64 BPM | OXYGEN SATURATION: 93 % | WEIGHT: 291 LBS | HEIGHT: 63 IN | DIASTOLIC BLOOD PRESSURE: 50 MMHG | BODY MASS INDEX: 51.56 KG/M2 | RESPIRATION RATE: 22 BRPM | SYSTOLIC BLOOD PRESSURE: 139 MMHG | TEMPERATURE: 97.9 F

## 2022-02-11 DIAGNOSIS — Z95.2 PRESENCE OF PROSTHETIC HEART VALVE: Chronic | ICD-10-CM

## 2022-02-11 DIAGNOSIS — Z98.890 OTHER SPECIFIED POSTPROCEDURAL STATES: Chronic | ICD-10-CM

## 2022-02-11 DIAGNOSIS — L97.801 NON-PRESSURE CHRONIC ULCER OF OTHER PART OF UNSPECIFIED LOWER LEG LIMITED TO BREAKDOWN OF SKIN: ICD-10-CM

## 2022-02-11 DIAGNOSIS — E11.621 TYPE 2 DIABETES MELLITUS WITH FOOT ULCER: ICD-10-CM

## 2022-02-11 PROCEDURE — 99213 OFFICE O/P EST LOW 20 MIN: CPT

## 2022-02-11 PROCEDURE — G0463: CPT

## 2022-02-14 DIAGNOSIS — I11.0 HYPERTENSIVE HEART DISEASE WITH HEART FAILURE: ICD-10-CM

## 2022-02-14 DIAGNOSIS — Z98.84 BARIATRIC SURGERY STATUS: ICD-10-CM

## 2022-02-14 DIAGNOSIS — E11.51 TYPE 2 DIABETES MELLITUS WITH DIABETIC PERIPHERAL ANGIOPATHY WITHOUT GANGRENE: ICD-10-CM

## 2022-02-14 DIAGNOSIS — E55.9 VITAMIN D DEFICIENCY, UNSPECIFIED: ICD-10-CM

## 2022-02-14 DIAGNOSIS — G47.10 HYPERSOMNIA, UNSPECIFIED: ICD-10-CM

## 2022-02-14 DIAGNOSIS — Z83.49 FAMILY HISTORY OF OTHER ENDOCRINE, NUTRITIONAL AND METABOLIC DISEASES: ICD-10-CM

## 2022-02-14 DIAGNOSIS — L97.822 NON-PRESSURE CHRONIC ULCER OF OTHER PART OF LEFT LOWER LEG WITH FAT LAYER EXPOSED: ICD-10-CM

## 2022-02-14 DIAGNOSIS — M43.16 SPONDYLOLISTHESIS, LUMBAR REGION: ICD-10-CM

## 2022-02-14 DIAGNOSIS — I83.228 VARICOSE VEINS OF LEFT LOWER EXTREMITY WITH BOTH ULCER OF OTHER PART OF LOWER EXTREMITY AND INFLAMMATION: ICD-10-CM

## 2022-02-14 DIAGNOSIS — G47.33 OBSTRUCTIVE SLEEP APNEA (ADULT) (PEDIATRIC): ICD-10-CM

## 2022-02-14 DIAGNOSIS — Z88.5 ALLERGY STATUS TO NARCOTIC AGENT: ICD-10-CM

## 2022-02-14 DIAGNOSIS — I83.218 VARICOSE VEINS OF RIGHT LOWER EXTREMITY WITH BOTH ULCER OF OTHER PART OF LOWER EXTREMITY AND INFLAMMATION: ICD-10-CM

## 2022-02-14 DIAGNOSIS — Z87.81 PERSONAL HISTORY OF (HEALED) TRAUMATIC FRACTURE: ICD-10-CM

## 2022-02-14 DIAGNOSIS — E11.40 TYPE 2 DIABETES MELLITUS WITH DIABETIC NEUROPATHY, UNSPECIFIED: ICD-10-CM

## 2022-02-14 DIAGNOSIS — Z79.82 LONG TERM (CURRENT) USE OF ASPIRIN: ICD-10-CM

## 2022-02-14 DIAGNOSIS — E11.621 TYPE 2 DIABETES MELLITUS WITH FOOT ULCER: ICD-10-CM

## 2022-02-14 DIAGNOSIS — Z87.01 PERSONAL HISTORY OF PNEUMONIA (RECURRENT): ICD-10-CM

## 2022-02-14 DIAGNOSIS — I83.893 VARICOSE VEINS OF BILATERAL LOWER EXTREMITIES WITH OTHER COMPLICATIONS: ICD-10-CM

## 2022-02-14 DIAGNOSIS — L97.812 NON-PRESSURE CHRONIC ULCER OF OTHER PART OF RIGHT LOWER LEG WITH FAT LAYER EXPOSED: ICD-10-CM

## 2022-02-14 DIAGNOSIS — M47.817 SPONDYLOSIS WITHOUT MYELOPATHY OR RADICULOPATHY, LUMBOSACRAL REGION: ICD-10-CM

## 2022-02-14 DIAGNOSIS — E05.90 THYROTOXICOSIS, UNSPECIFIED WITHOUT THYROTOXIC CRISIS OR STORM: ICD-10-CM

## 2022-02-14 DIAGNOSIS — I34.1 NONRHEUMATIC MITRAL (VALVE) PROLAPSE: ICD-10-CM

## 2022-02-14 DIAGNOSIS — L97.512 NON-PRESSURE CHRONIC ULCER OF OTHER PART OF RIGHT FOOT WITH FAT LAYER EXPOSED: ICD-10-CM

## 2022-02-14 DIAGNOSIS — Z87.891 PERSONAL HISTORY OF NICOTINE DEPENDENCE: ICD-10-CM

## 2022-02-14 DIAGNOSIS — M48.07 SPINAL STENOSIS, LUMBOSACRAL REGION: ICD-10-CM

## 2022-02-14 DIAGNOSIS — Z82.49 FAMILY HISTORY OF ISCHEMIC HEART DISEASE AND OTHER DISEASES OF THE CIRCULATORY SYSTEM: ICD-10-CM

## 2022-02-14 DIAGNOSIS — I50.9 HEART FAILURE, UNSPECIFIED: ICD-10-CM

## 2022-02-14 DIAGNOSIS — Z79.4 LONG TERM (CURRENT) USE OF INSULIN: ICD-10-CM

## 2022-02-14 DIAGNOSIS — Z88.0 ALLERGY STATUS TO PENICILLIN: ICD-10-CM

## 2022-02-14 DIAGNOSIS — E66.01 MORBID (SEVERE) OBESITY DUE TO EXCESS CALORIES: ICD-10-CM

## 2022-02-14 DIAGNOSIS — I47.1 SUPRAVENTRICULAR TACHYCARDIA: ICD-10-CM

## 2022-02-14 DIAGNOSIS — Z95.4 PRESENCE OF OTHER HEART-VALVE REPLACEMENT: ICD-10-CM

## 2022-02-14 NOTE — PHYSICAL EXAM
[2 x 2] : 2 x 2  [0] : left 0 [Ankle Swelling (On Exam)] : present [Ankle Swelling Bilaterally] : bilaterally  [Varicose Veins Of Lower Extremities] : bilaterally [] : bilaterally [Ankle Swelling On The Left] : moderate [de-identified] : A&Ox3, NAD [de-identified] : 4/5 strength in all quadrants bilaterally [de-identified] : hyperkeratotic lesions plantar right and left halluxes. right 5th digit diabetic ulcer down to skin, subcutaneous tissue, and fat, epithelialized. left and right lower leg ulcer down to skin, subcutaneous tissue, and fat. [de-identified] : small Serous [FreeTextEntry7] : Left Foot 2nd digit-laceration (NEW) [FreeTextEntry8] : 0.1 [FreeTextEntry9] : 0.4 [de-identified] : <0.1 [de-identified] : none [de-identified] : Left Lateral Leg- eschar/scab [de-identified] : Cleansed with NS\par  [de-identified] : 1.1 [de-identified] : 1.4 [de-identified] : <0.1 [de-identified] : scant serous/purulent [de-identified] : alginate [de-identified] : Cleansed with NS\par allevyn border [de-identified] : blister expressed by DPM, small serous drainage [FreeTextEntry1] : Left anterior leg- intact blister (NEW) [FreeTextEntry3] : 3.5 [FreeTextEntry2] : 2.0 [FreeTextEntry4] : raised 0.2 cm  [de-identified] : alginate/allevyn border [de-identified] : Cleansed with NS\par  [de-identified] : No [de-identified] : None [de-identified] : 100% [de-identified] : 3x Weekly [de-identified] : Primary Dressing [de-identified] : None [de-identified] : No [de-identified] : No [de-identified] : Normal [de-identified] : None [de-identified] : None [de-identified] : No [de-identified] : None [de-identified] : No [de-identified] : Venous [de-identified] : No [de-identified] : Normal [de-identified] : None [de-identified] : None [de-identified] : None [de-identified] : No [de-identified] : Primary Dressing [de-identified] : 3x Weekly [TWNoteComboBox4] : None [TWNoteComboBox5] : No [TWNoteComboBox6] : Venous [de-identified] : No [de-identified] : Normal [de-identified] : None [de-identified] : None [de-identified] : None [de-identified] : No [de-identified] : 3x Weekly

## 2022-02-14 NOTE — ASSESSMENT
[Verbal] : Verbal [Written] : Written [Demo] : Demo [Patient] : Patient [Fair - mild discomfort, physical impairment, low acceptance] : Fair - mild discomfort, physical impairment, low acceptance [Needs reinforcement] : needs reinforcement [Dressing changes] : dressing changes [Foot Care] : foot care [Skin Care] : skin care [Pressure relief] : pressure relief [Signs and symptoms of infection] : sign and symptoms of infection [Venous Disease] : venous disease [Nutrition] : nutrition [How and When to Call] : how and when to call [Pain Management] : pain management [Patient responsibility to plan of care] : patient responsibility to plan of care [Glycemic Control] : glycemic control [Stable] : stable [Home] : Home [Wheelchair] : Wheelchair [Not Applicable - Long Term Care/Home Health Agency] : Long Term Care/Home Health Agency: Not Applicable [] : No [FreeTextEntry2] : Infection prevention \par Wound care (dressing changes)\par Maintain optimal skin integrity to high pressure areas\par Compression therapy\par Elevation and low sodium compliance.\par Weight reduction strategies [FreeTextEntry4] : Pt had a fall on 2/9/22. Fall risk implemented. DPM advised.\par F/U 1 week\par

## 2022-02-14 NOTE — REVIEW OF SYSTEMS
[Skin Wound] : skin wound [Fever] : no fever [Eye Pain] : no eye pain [Earache] : no earache [Chest Pain] : no chest pain [Shortness Of Breath] : no shortness of breath [Abdominal Pain] : no abdominal pain [de-identified] : hyperkeratotic lesions plantar right and left halluxes. right 5th digit diabetic ulcer down to skin, subcutaneous tissue, and fat, epithelialized. left and right lower leg ulcer down to skin, subcutaneous tissue, and fat. [de-identified] : type 2 diabetes [de-identified] : diabetic neuropathy

## 2022-02-17 ENCOUNTER — NON-APPOINTMENT (OUTPATIENT)
Age: 67
End: 2022-02-17

## 2022-02-18 ENCOUNTER — OUTPATIENT (OUTPATIENT)
Dept: OUTPATIENT SERVICES | Facility: HOSPITAL | Age: 67
LOS: 1 days | Discharge: HOME | End: 2022-02-18
Payer: MEDICARE

## 2022-02-18 ENCOUNTER — APPOINTMENT (OUTPATIENT)
Dept: WOUND CARE | Facility: HOSPITAL | Age: 67
End: 2022-02-18
Payer: MEDICARE

## 2022-02-18 VITALS
DIASTOLIC BLOOD PRESSURE: 57 MMHG | WEIGHT: 291 LBS | BODY MASS INDEX: 51.56 KG/M2 | OXYGEN SATURATION: 96 % | HEART RATE: 82 BPM | SYSTOLIC BLOOD PRESSURE: 113 MMHG | TEMPERATURE: 97.7 F | HEIGHT: 63 IN | RESPIRATION RATE: 22 BRPM

## 2022-02-18 DIAGNOSIS — Z95.2 PRESENCE OF PROSTHETIC HEART VALVE: Chronic | ICD-10-CM

## 2022-02-18 DIAGNOSIS — I83.228 VARICOSE VEINS OF LEFT LOWER EXTREMITY WITH BOTH ULCER OF OTHER PART OF LOWER EXTREMITY AND INFLAMMATION: ICD-10-CM

## 2022-02-18 DIAGNOSIS — L97.812 NON-PRESSURE CHRONIC ULCER OF OTHER PART OF RIGHT LOWER LEG WITH FAT LAYER EXPOSED: ICD-10-CM

## 2022-02-18 DIAGNOSIS — I83.218 VARICOSE VEINS OF RIGHT LOWER EXTREMITY WITH BOTH ULCER OF OTHER PART OF LOWER EXTREMITY AND INFLAMMATION: ICD-10-CM

## 2022-02-18 DIAGNOSIS — L97.822 NON-PRESSURE CHRONIC ULCER OF OTHER PART OF LEFT LOWER LEG WITH FAT LAYER EXPOSED: ICD-10-CM

## 2022-02-18 DIAGNOSIS — I83.893 VARICOSE VEINS OF BILATERAL LOWER EXTREMITIES WITH OTHER COMPLICATIONS: ICD-10-CM

## 2022-02-18 DIAGNOSIS — I42.2 OTHER HYPERTROPHIC CARDIOMYOPATHY: ICD-10-CM

## 2022-02-18 DIAGNOSIS — L97.919 VARICOSE VEINS OF RIGHT LOWER EXTREMITY WITH BOTH ULCER OF OTHER PART OF LOWER EXTREMITY AND INFLAMMATION: ICD-10-CM

## 2022-02-18 DIAGNOSIS — L97.512 NON-PRESSURE CHRONIC ULCER OF OTHER PART OF RIGHT FOOT WITH FAT LAYER EXPOSED: ICD-10-CM

## 2022-02-18 DIAGNOSIS — Z98.890 OTHER SPECIFIED POSTPROCEDURAL STATES: Chronic | ICD-10-CM

## 2022-02-18 DIAGNOSIS — L97.829 VARICOSE VEINS OF LEFT LOWER EXTREMITY WITH BOTH ULCER OF OTHER PART OF LOWER EXTREMITY AND INFLAMMATION: ICD-10-CM

## 2022-02-18 DIAGNOSIS — E11.621 TYPE 2 DIABETES MELLITUS WITH FOOT ULCER: ICD-10-CM

## 2022-02-18 DIAGNOSIS — L97.509 TYPE 2 DIABETES MELLITUS WITH FOOT ULCER: ICD-10-CM

## 2022-02-18 DIAGNOSIS — S91.111D LACERATION WITHOUT FOREIGN BODY OF RIGHT GREAT TOE WITHOUT DAMAGE TO NAIL, SUBSEQUENT ENCOUNTER: ICD-10-CM

## 2022-02-18 PROCEDURE — 99213 OFFICE O/P EST LOW 20 MIN: CPT

## 2022-02-18 PROCEDURE — G0463: CPT

## 2022-02-20 DIAGNOSIS — M47.817 SPONDYLOSIS WITHOUT MYELOPATHY OR RADICULOPATHY, LUMBOSACRAL REGION: ICD-10-CM

## 2022-02-20 DIAGNOSIS — Z79.4 LONG TERM (CURRENT) USE OF INSULIN: ICD-10-CM

## 2022-02-20 DIAGNOSIS — L84 CORNS AND CALLOSITIES: ICD-10-CM

## 2022-02-20 DIAGNOSIS — M43.16 SPONDYLOLISTHESIS, LUMBAR REGION: ICD-10-CM

## 2022-02-20 DIAGNOSIS — I47.1 SUPRAVENTRICULAR TACHYCARDIA: ICD-10-CM

## 2022-02-20 DIAGNOSIS — I83.218 VARICOSE VEINS OF RIGHT LOWER EXTREMITY WITH BOTH ULCER OF OTHER PART OF LOWER EXTREMITY AND INFLAMMATION: ICD-10-CM

## 2022-02-20 DIAGNOSIS — E11.51 TYPE 2 DIABETES MELLITUS WITH DIABETIC PERIPHERAL ANGIOPATHY WITHOUT GANGRENE: ICD-10-CM

## 2022-02-20 DIAGNOSIS — Z82.49 FAMILY HISTORY OF ISCHEMIC HEART DISEASE AND OTHER DISEASES OF THE CIRCULATORY SYSTEM: ICD-10-CM

## 2022-02-20 DIAGNOSIS — L97.512 NON-PRESSURE CHRONIC ULCER OF OTHER PART OF RIGHT FOOT WITH FAT LAYER EXPOSED: ICD-10-CM

## 2022-02-20 DIAGNOSIS — L97.812 NON-PRESSURE CHRONIC ULCER OF OTHER PART OF RIGHT LOWER LEG WITH FAT LAYER EXPOSED: ICD-10-CM

## 2022-02-20 DIAGNOSIS — Z88.5 ALLERGY STATUS TO NARCOTIC AGENT: ICD-10-CM

## 2022-02-20 DIAGNOSIS — I34.1 NONRHEUMATIC MITRAL (VALVE) PROLAPSE: ICD-10-CM

## 2022-02-20 DIAGNOSIS — Z87.891 PERSONAL HISTORY OF NICOTINE DEPENDENCE: ICD-10-CM

## 2022-02-20 DIAGNOSIS — Z98.84 BARIATRIC SURGERY STATUS: ICD-10-CM

## 2022-02-20 DIAGNOSIS — M48.07 SPINAL STENOSIS, LUMBOSACRAL REGION: ICD-10-CM

## 2022-02-20 DIAGNOSIS — I11.0 HYPERTENSIVE HEART DISEASE WITH HEART FAILURE: ICD-10-CM

## 2022-02-20 DIAGNOSIS — I83.893 VARICOSE VEINS OF BILATERAL LOWER EXTREMITIES WITH OTHER COMPLICATIONS: ICD-10-CM

## 2022-02-20 DIAGNOSIS — Z79.82 LONG TERM (CURRENT) USE OF ASPIRIN: ICD-10-CM

## 2022-02-20 DIAGNOSIS — G47.33 OBSTRUCTIVE SLEEP APNEA (ADULT) (PEDIATRIC): ICD-10-CM

## 2022-02-20 DIAGNOSIS — E11.621 TYPE 2 DIABETES MELLITUS WITH FOOT ULCER: ICD-10-CM

## 2022-02-20 DIAGNOSIS — Z95.4 PRESENCE OF OTHER HEART-VALVE REPLACEMENT: ICD-10-CM

## 2022-02-20 DIAGNOSIS — I50.9 HEART FAILURE, UNSPECIFIED: ICD-10-CM

## 2022-02-20 DIAGNOSIS — Z87.81 PERSONAL HISTORY OF (HEALED) TRAUMATIC FRACTURE: ICD-10-CM

## 2022-02-20 DIAGNOSIS — Z88.0 ALLERGY STATUS TO PENICILLIN: ICD-10-CM

## 2022-02-20 DIAGNOSIS — Z83.49 FAMILY HISTORY OF OTHER ENDOCRINE, NUTRITIONAL AND METABOLIC DISEASES: ICD-10-CM

## 2022-02-20 DIAGNOSIS — E55.9 VITAMIN D DEFICIENCY, UNSPECIFIED: ICD-10-CM

## 2022-02-20 DIAGNOSIS — G47.10 HYPERSOMNIA, UNSPECIFIED: ICD-10-CM

## 2022-02-20 DIAGNOSIS — Z87.01 PERSONAL HISTORY OF PNEUMONIA (RECURRENT): ICD-10-CM

## 2022-02-20 DIAGNOSIS — E66.01 MORBID (SEVERE) OBESITY DUE TO EXCESS CALORIES: ICD-10-CM

## 2022-02-20 DIAGNOSIS — E11.40 TYPE 2 DIABETES MELLITUS WITH DIABETIC NEUROPATHY, UNSPECIFIED: ICD-10-CM

## 2022-02-20 DIAGNOSIS — E05.90 THYROTOXICOSIS, UNSPECIFIED WITHOUT THYROTOXIC CRISIS OR STORM: ICD-10-CM

## 2022-02-20 PROBLEM — L97.822 NON-PRS CHRONIC ULCER OTH PRT L LOW LEG W FAT LAYER EXPOSED: Status: ACTIVE | Noted: 2020-06-09

## 2022-02-20 PROBLEM — I83.228 VARICOSE VEINS OF LEFT LOWER EXTREMITY WITH INFLAMMATION, WITH ULCER OF OTHER PART OF LOWER LEG, UNSPECIFIED ULCER STAGE: Status: ACTIVE | Noted: 2020-06-09

## 2022-02-20 NOTE — ASSESSMENT
[Verbal] : Verbal [Written] : Written [Demo] : Demo [Patient] : Patient [Fair - mild discomfort, physical impairment, low acceptance] : Fair - mild discomfort, physical impairment, low acceptance [Needs reinforcement] : needs reinforcement [Dressing changes] : dressing changes [Foot Care] : foot care [Skin Care] : skin care [Pressure relief] : pressure relief [Signs and symptoms of infection] : sign and symptoms of infection [Venous Disease] : venous disease [Nutrition] : nutrition [How and When to Call] : how and when to call [Pain Management] : pain management [Patient responsibility to plan of care] : patient responsibility to plan of care [Glycemic Control] : glycemic control [Stable] : stable [Home] : Home [Wheelchair] : Wheelchair [Not Applicable - Long Term Care/Home Health Agency] : Long Term Care/Home Health Agency: Not Applicable [] : Yes [FreeTextEntry2] : Infection prevention \par Wound care (dressing changes)\par Maintain optimal skin integrity to high pressure areas\par Compression therapy\par Elevation and low sodium compliance.\par Weight reduction strategies [FreeTextEntry4] : F/U 2 weeks

## 2022-02-20 NOTE — PHYSICAL EXAM
[0] : left 0 [Ankle Swelling (On Exam)] : present [Ankle Swelling Bilaterally] : bilaterally  [Varicose Veins Of Lower Extremities] : bilaterally [] : bilaterally [Ankle Swelling On The Left] : moderate [de-identified] : A&Ox3, NAD [de-identified] : hyperkeratotic lesions plantar right and left halluxes. right 5th digit diabetic ulcer down to skin, subcutaneous tissue, and fat, epithelialized. left and right lower leg ulcer down to skin, subcutaneous tissue, and fat. [de-identified] : 4/5 strength in all quadrants bilaterally [FreeTextEntry7] : Left Foot 2nd digit- scab [FreeTextEntry9] : 0.4 [FreeTextEntry8] : 0.1 [de-identified] : <0.1 [de-identified] : Cleansed with NS\par  [de-identified] : none [de-identified] : Left Lateral Leg- eschar/scab [de-identified] : 1.1 [de-identified] : 1.4 [de-identified] : <0.1 [de-identified] : scant serous [de-identified] : Cleansed with NS\par allevyn border [de-identified] : alginate [FreeTextEntry2] : 1.3 [FreeTextEntry1] : Left anterior leg- expressed blister [FreeTextEntry3] : 1.8 [FreeTextEntry4] : <0.1 [de-identified] : alginate/Allevyn border [de-identified] : Cleansed with NS\par  [de-identified] : No [de-identified] : None [de-identified] : None [de-identified] : 3x Weekly [de-identified] : Primary Dressing [de-identified] : None [de-identified] : Traumatic [de-identified] : No [de-identified] : No [de-identified] : None [de-identified] : Normal [de-identified] : None [de-identified] : None [de-identified] : No [de-identified] : No [de-identified] : Venous [de-identified] : No [de-identified] : Normal [de-identified] : None [de-identified] : None [de-identified] : None [de-identified] : No [de-identified] : 3x Weekly [de-identified] : Primary Dressing [TWNoteComboBox4] : None [TWNoteComboBox5] : No [TWNoteComboBox6] : Venous [de-identified] : No [de-identified] : Normal [de-identified] : None [de-identified] : None [de-identified] : None [de-identified] : No [de-identified] : Primary Dressing [de-identified] : 3x Weekly

## 2022-02-20 NOTE — PLAN
[FreeTextEntry1] : Patient examined and evaluated at this time.\par Continue local wound care and offloading.\par Patient advised that this is a chronic condition. Patient has multiple co-morbidities remains at risk for infection, limb loss, sepsis, and death. Patient likely to have recurrent ulcerations in the future. Encouraged leg elevation as much as possible.\par Spent 20 minutes for patient care and medical decision making.\par Patient to follow up in 2 weeks.\par

## 2022-02-20 NOTE — REVIEW OF SYSTEMS
[Fever] : no fever [Eye Pain] : no eye pain [Earache] : no earache [Chest Pain] : no chest pain [Shortness Of Breath] : no shortness of breath [Abdominal Pain] : no abdominal pain [Skin Wound] : skin wound [de-identified] : diabetic neuropathy [de-identified] : hyperkeratotic lesions plantar right and left halluxes. right 5th digit diabetic ulcer down to skin, subcutaneous tissue, and fat, epithelialized. left and right lower leg ulcer down to skin, subcutaneous tissue, and fat. [de-identified] : type 2 diabetes

## 2022-02-26 ENCOUNTER — INPATIENT (INPATIENT)
Facility: HOSPITAL | Age: 67
LOS: 11 days | Discharge: NURSING, REHAB OR LONG-TERM CARE | DRG: 853 | End: 2022-03-10
Attending: STUDENT IN AN ORGANIZED HEALTH CARE EDUCATION/TRAINING PROGRAM | Admitting: HOSPITALIST
Payer: MEDICARE

## 2022-02-26 VITALS
SYSTOLIC BLOOD PRESSURE: 119 MMHG | HEIGHT: 63 IN | RESPIRATION RATE: 20 BRPM | OXYGEN SATURATION: 98 % | TEMPERATURE: 99 F | HEART RATE: 85 BPM | DIASTOLIC BLOOD PRESSURE: 60 MMHG | WEIGHT: 293 LBS

## 2022-02-26 DIAGNOSIS — L08.9 LOCAL INFECTION OF THE SKIN AND SUBCUTANEOUS TISSUE, UNSPECIFIED: ICD-10-CM

## 2022-02-26 DIAGNOSIS — R06.02 SHORTNESS OF BREATH: ICD-10-CM

## 2022-02-26 DIAGNOSIS — I10 ESSENTIAL (PRIMARY) HYPERTENSION: ICD-10-CM

## 2022-02-26 DIAGNOSIS — Z98.890 OTHER SPECIFIED POSTPROCEDURAL STATES: Chronic | ICD-10-CM

## 2022-02-26 DIAGNOSIS — Z95.2 PRESENCE OF PROSTHETIC HEART VALVE: Chronic | ICD-10-CM

## 2022-02-26 DIAGNOSIS — A41.9 SEPSIS, UNSPECIFIED ORGANISM: ICD-10-CM

## 2022-02-26 DIAGNOSIS — I48.0 PAROXYSMAL ATRIAL FIBRILLATION: ICD-10-CM

## 2022-02-26 DIAGNOSIS — Z29.9 ENCOUNTER FOR PROPHYLACTIC MEASURES, UNSPECIFIED: ICD-10-CM

## 2022-02-26 DIAGNOSIS — E11.9 TYPE 2 DIABETES MELLITUS WITHOUT COMPLICATIONS: ICD-10-CM

## 2022-02-26 LAB
ALBUMIN SERPL ELPH-MCNC: 3.3 G/DL — SIGNIFICANT CHANGE UP (ref 3.3–5)
ALBUMIN SERPL ELPH-MCNC: 3.9 G/DL — SIGNIFICANT CHANGE UP (ref 3.3–5)
ALP SERPL-CCNC: 164 U/L — HIGH (ref 40–120)
ALP SERPL-CCNC: 170 U/L — HIGH (ref 40–120)
ALT FLD-CCNC: 15 U/L — SIGNIFICANT CHANGE UP (ref 10–45)
ALT FLD-CCNC: 17 U/L — SIGNIFICANT CHANGE UP (ref 10–45)
ANION GAP SERPL CALC-SCNC: 16 MMOL/L — SIGNIFICANT CHANGE UP (ref 5–17)
ANION GAP SERPL CALC-SCNC: 19 MMOL/L — HIGH (ref 5–17)
APPEARANCE UR: CLEAR — SIGNIFICANT CHANGE UP
APTT BLD: 28.7 SEC — SIGNIFICANT CHANGE UP (ref 27.5–35.5)
AST SERPL-CCNC: 22 U/L — SIGNIFICANT CHANGE UP (ref 10–40)
AST SERPL-CCNC: 23 U/L — SIGNIFICANT CHANGE UP (ref 10–40)
BACTERIA # UR AUTO: NEGATIVE — SIGNIFICANT CHANGE UP
BASE EXCESS BLDV CALC-SCNC: 1.9 MMOL/L — SIGNIFICANT CHANGE UP (ref -2–2)
BASE EXCESS BLDV CALC-SCNC: 3.2 MMOL/L — HIGH (ref -2–2)
BASOPHILS # BLD AUTO: 0 K/UL — SIGNIFICANT CHANGE UP (ref 0–0.2)
BASOPHILS # BLD AUTO: 0 K/UL — SIGNIFICANT CHANGE UP (ref 0–0.2)
BASOPHILS NFR BLD AUTO: 0 % — SIGNIFICANT CHANGE UP (ref 0–2)
BASOPHILS NFR BLD AUTO: 0 % — SIGNIFICANT CHANGE UP (ref 0–2)
BILIRUB SERPL-MCNC: 1.2 MG/DL — SIGNIFICANT CHANGE UP (ref 0.2–1.2)
BILIRUB SERPL-MCNC: 1.3 MG/DL — HIGH (ref 0.2–1.2)
BILIRUB UR-MCNC: NEGATIVE — SIGNIFICANT CHANGE UP
BUN SERPL-MCNC: 36 MG/DL — HIGH (ref 7–23)
BUN SERPL-MCNC: 38 MG/DL — HIGH (ref 7–23)
CA-I SERPL-SCNC: 1.16 MMOL/L — SIGNIFICANT CHANGE UP (ref 1.15–1.33)
CA-I SERPL-SCNC: 1.22 MMOL/L — SIGNIFICANT CHANGE UP (ref 1.15–1.33)
CALCIUM SERPL-MCNC: 9.6 MG/DL — SIGNIFICANT CHANGE UP (ref 8.4–10.5)
CALCIUM SERPL-MCNC: 9.9 MG/DL — SIGNIFICANT CHANGE UP (ref 8.4–10.5)
CHLORIDE BLDV-SCNC: 95 MMOL/L — LOW (ref 96–108)
CHLORIDE BLDV-SCNC: 95 MMOL/L — LOW (ref 96–108)
CHLORIDE SERPL-SCNC: 93 MMOL/L — LOW (ref 96–108)
CHLORIDE SERPL-SCNC: 94 MMOL/L — LOW (ref 96–108)
CO2 BLDV-SCNC: 28 MMOL/L — HIGH (ref 22–26)
CO2 BLDV-SCNC: 29 MMOL/L — HIGH (ref 22–26)
CO2 SERPL-SCNC: 20 MMOL/L — LOW (ref 22–31)
CO2 SERPL-SCNC: 25 MMOL/L — SIGNIFICANT CHANGE UP (ref 22–31)
COLOR SPEC: YELLOW — SIGNIFICANT CHANGE UP
CREAT SERPL-MCNC: 1.28 MG/DL — SIGNIFICANT CHANGE UP (ref 0.5–1.3)
CREAT SERPL-MCNC: 1.28 MG/DL — SIGNIFICANT CHANGE UP (ref 0.5–1.3)
DACRYOCYTES BLD QL SMEAR: SLIGHT — SIGNIFICANT CHANGE UP
DIFF PNL FLD: NEGATIVE — SIGNIFICANT CHANGE UP
ELLIPTOCYTES BLD QL SMEAR: SLIGHT — SIGNIFICANT CHANGE UP
EOSINOPHIL # BLD AUTO: 0 K/UL — SIGNIFICANT CHANGE UP (ref 0–0.5)
EOSINOPHIL # BLD AUTO: 0 K/UL — SIGNIFICANT CHANGE UP (ref 0–0.5)
EOSINOPHIL NFR BLD AUTO: 0 % — SIGNIFICANT CHANGE UP (ref 0–6)
EOSINOPHIL NFR BLD AUTO: 0 % — SIGNIFICANT CHANGE UP (ref 0–6)
EPI CELLS # UR: 4 /HPF — SIGNIFICANT CHANGE UP
GAS PNL BLDV: 131 MMOL/L — LOW (ref 136–145)
GAS PNL BLDV: 133 MMOL/L — LOW (ref 136–145)
GAS PNL BLDV: SIGNIFICANT CHANGE UP
GLUCOSE BLDV-MCNC: 203 MG/DL — HIGH (ref 70–99)
GLUCOSE BLDV-MCNC: 270 MG/DL — HIGH (ref 70–99)
GLUCOSE SERPL-MCNC: 205 MG/DL — HIGH (ref 70–99)
GLUCOSE SERPL-MCNC: 220 MG/DL — HIGH (ref 70–99)
GLUCOSE UR QL: ABNORMAL
HCO3 BLDV-SCNC: 27 MMOL/L — SIGNIFICANT CHANGE UP (ref 22–29)
HCO3 BLDV-SCNC: 28 MMOL/L — SIGNIFICANT CHANGE UP (ref 22–29)
HCT VFR BLD CALC: 31.9 % — LOW (ref 34.5–45)
HCT VFR BLD CALC: 32.9 % — LOW (ref 34.5–45)
HCT VFR BLDA CALC: 32 % — LOW (ref 34.5–46.5)
HCT VFR BLDA CALC: 32 % — LOW (ref 34.5–46.5)
HGB BLD CALC-MCNC: 10.5 G/DL — LOW (ref 11.7–16.1)
HGB BLD CALC-MCNC: 10.7 G/DL — LOW (ref 11.7–16.1)
HGB BLD-MCNC: 10.1 G/DL — LOW (ref 11.5–15.5)
HGB BLD-MCNC: 9.8 G/DL — LOW (ref 11.5–15.5)
HIV 1+2 AB+HIV1 P24 AG SERPL QL IA: SIGNIFICANT CHANGE UP
HYALINE CASTS # UR AUTO: 6 /LPF — HIGH (ref 0–2)
INR BLD: 2.08 RATIO — HIGH (ref 0.88–1.16)
KETONES UR-MCNC: NEGATIVE — SIGNIFICANT CHANGE UP
LACTATE BLDV-MCNC: 2.8 MMOL/L — HIGH (ref 0.7–2)
LACTATE BLDV-MCNC: 3.7 MMOL/L — HIGH (ref 0.7–2)
LEUKOCYTE ESTERASE UR-ACNC: ABNORMAL
LYMPHOCYTES # BLD AUTO: 0 % — LOW (ref 13–44)
LYMPHOCYTES # BLD AUTO: 0 K/UL — LOW (ref 1–3.3)
LYMPHOCYTES # BLD AUTO: 0.54 K/UL — LOW (ref 1–3.3)
LYMPHOCYTES # BLD AUTO: 2.6 % — LOW (ref 13–44)
MANUAL SMEAR VERIFICATION: SIGNIFICANT CHANGE UP
MCHC RBC-ENTMCNC: 25.2 PG — LOW (ref 27–34)
MCHC RBC-ENTMCNC: 25.2 PG — LOW (ref 27–34)
MCHC RBC-ENTMCNC: 30.7 GM/DL — LOW (ref 32–36)
MCHC RBC-ENTMCNC: 30.7 GM/DL — LOW (ref 32–36)
MCV RBC AUTO: 82 FL — SIGNIFICANT CHANGE UP (ref 80–100)
MCV RBC AUTO: 82 FL — SIGNIFICANT CHANGE UP (ref 80–100)
MONOCYTES # BLD AUTO: 0.37 K/UL — SIGNIFICANT CHANGE UP (ref 0–0.9)
MONOCYTES # BLD AUTO: 0.67 K/UL — SIGNIFICANT CHANGE UP (ref 0–0.9)
MONOCYTES NFR BLD AUTO: 1.7 % — LOW (ref 2–14)
MONOCYTES NFR BLD AUTO: 1.8 % — LOW (ref 2–14)
NEUTROPHILS # BLD AUTO: 19.72 K/UL — HIGH (ref 1.8–7.4)
NEUTROPHILS # BLD AUTO: 38.64 K/UL — HIGH (ref 1.8–7.4)
NEUTROPHILS NFR BLD AUTO: 94.9 % — HIGH (ref 43–77)
NEUTROPHILS NFR BLD AUTO: 95.6 % — HIGH (ref 43–77)
NEUTS BAND # BLD: 3.4 % — SIGNIFICANT CHANGE UP (ref 0–8)
NITRITE UR-MCNC: NEGATIVE — SIGNIFICANT CHANGE UP
NT-PROBNP SERPL-SCNC: 7759 PG/ML — HIGH (ref 0–300)
PCO2 BLDV: 41 MMHG — SIGNIFICANT CHANGE UP (ref 39–42)
PCO2 BLDV: 42 MMHG — SIGNIFICANT CHANGE UP (ref 39–42)
PH BLDV: 7.42 — SIGNIFICANT CHANGE UP (ref 7.32–7.43)
PH BLDV: 7.43 — SIGNIFICANT CHANGE UP (ref 7.32–7.43)
PH UR: 5.5 — SIGNIFICANT CHANGE UP (ref 5–8)
PLAT MORPH BLD: NORMAL — SIGNIFICANT CHANGE UP
PLATELET # BLD AUTO: 264 K/UL — SIGNIFICANT CHANGE UP (ref 150–400)
PLATELET # BLD AUTO: 265 K/UL — SIGNIFICANT CHANGE UP (ref 150–400)
PO2 BLDV: 20 MMHG — LOW (ref 25–45)
PO2 BLDV: 43 MMHG — SIGNIFICANT CHANGE UP (ref 25–45)
POIKILOCYTOSIS BLD QL AUTO: SLIGHT — SIGNIFICANT CHANGE UP
POLYCHROMASIA BLD QL SMEAR: SLIGHT — SIGNIFICANT CHANGE UP
POTASSIUM BLDV-SCNC: 3.5 MMOL/L — SIGNIFICANT CHANGE UP (ref 3.5–5.1)
POTASSIUM BLDV-SCNC: 3.6 MMOL/L — SIGNIFICANT CHANGE UP (ref 3.5–5.1)
POTASSIUM SERPL-MCNC: 3.5 MMOL/L — SIGNIFICANT CHANGE UP (ref 3.5–5.3)
POTASSIUM SERPL-MCNC: 3.9 MMOL/L — SIGNIFICANT CHANGE UP (ref 3.5–5.3)
POTASSIUM SERPL-SCNC: 3.5 MMOL/L — SIGNIFICANT CHANGE UP (ref 3.5–5.3)
POTASSIUM SERPL-SCNC: 3.9 MMOL/L — SIGNIFICANT CHANGE UP (ref 3.5–5.3)
PROT SERPL-MCNC: 6.8 G/DL — SIGNIFICANT CHANGE UP (ref 6–8.3)
PROT SERPL-MCNC: 7.4 G/DL — SIGNIFICANT CHANGE UP (ref 6–8.3)
PROT UR-MCNC: ABNORMAL
PROTHROM AB SERPL-ACNC: 25.1 SEC — HIGH (ref 10.5–13.4)
RAPID RVP RESULT: SIGNIFICANT CHANGE UP
RBC # BLD: 3.89 M/UL — SIGNIFICANT CHANGE UP (ref 3.8–5.2)
RBC # BLD: 4.01 M/UL — SIGNIFICANT CHANGE UP (ref 3.8–5.2)
RBC # FLD: 15.5 % — HIGH (ref 10.3–14.5)
RBC # FLD: 15.6 % — HIGH (ref 10.3–14.5)
RBC BLD AUTO: ABNORMAL
RBC CASTS # UR COMP ASSIST: 1 /HPF — SIGNIFICANT CHANGE UP (ref 0–4)
SAO2 % BLDV: 30.1 % — LOW (ref 67–88)
SAO2 % BLDV: 76.9 % — SIGNIFICANT CHANGE UP (ref 67–88)
SARS-COV-2 RNA SPEC QL NAA+PROBE: SIGNIFICANT CHANGE UP
SODIUM SERPL-SCNC: 133 MMOL/L — LOW (ref 135–145)
SODIUM SERPL-SCNC: 134 MMOL/L — LOW (ref 135–145)
SP GR SPEC: 1.03 — HIGH (ref 1.01–1.02)
TROPONIN T, HIGH SENSITIVITY RESULT: 23 NG/L — SIGNIFICANT CHANGE UP (ref 0–51)
TSH SERPL-MCNC: 0.63 UIU/ML — SIGNIFICANT CHANGE UP (ref 0.27–4.2)
UROBILINOGEN FLD QL: ABNORMAL
WBC # BLD: 20.63 K/UL — HIGH (ref 3.8–10.5)
WBC # BLD: 39.31 K/UL — HIGH (ref 3.8–10.5)
WBC # FLD AUTO: 20.63 K/UL — HIGH (ref 3.8–10.5)
WBC # FLD AUTO: 39.31 K/UL — HIGH (ref 3.8–10.5)
WBC UR QL: 34 /HPF — HIGH (ref 0–5)

## 2022-02-26 PROCEDURE — 99285 EMERGENCY DEPT VISIT HI MDM: CPT | Mod: GC

## 2022-02-26 PROCEDURE — 99223 1ST HOSP IP/OBS HIGH 75: CPT | Mod: GC

## 2022-02-26 PROCEDURE — 99223 1ST HOSP IP/OBS HIGH 75: CPT

## 2022-02-26 PROCEDURE — 71045 X-RAY EXAM CHEST 1 VIEW: CPT | Mod: 26,77

## 2022-02-26 PROCEDURE — 93010 ELECTROCARDIOGRAM REPORT: CPT

## 2022-02-26 PROCEDURE — 99222 1ST HOSP IP/OBS MODERATE 55: CPT

## 2022-02-26 PROCEDURE — 74177 CT ABD & PELVIS W/CONTRAST: CPT | Mod: 26,MA

## 2022-02-26 PROCEDURE — 99497 ADVNCD CARE PLAN 30 MIN: CPT | Mod: 25

## 2022-02-26 PROCEDURE — 71045 X-RAY EXAM CHEST 1 VIEW: CPT | Mod: 26

## 2022-02-26 PROCEDURE — 99285 EMERGENCY DEPT VISIT HI MDM: CPT | Mod: 25,GC

## 2022-02-26 RX ORDER — LIDOCAINE HCL 20 MG/ML
20 VIAL (ML) INJECTION ONCE
Refills: 0 | Status: COMPLETED | OUTPATIENT
Start: 2022-02-26 | End: 2022-02-26

## 2022-02-26 RX ORDER — METRONIDAZOLE 500 MG
500 TABLET ORAL ONCE
Refills: 0 | Status: DISCONTINUED | OUTPATIENT
Start: 2022-02-26 | End: 2022-02-26

## 2022-02-26 RX ORDER — ACETAMINOPHEN 500 MG
325 TABLET ORAL ONCE
Refills: 0 | Status: COMPLETED | OUTPATIENT
Start: 2022-02-26 | End: 2022-02-26

## 2022-02-26 RX ORDER — ONDANSETRON 8 MG/1
4 TABLET, FILM COATED ORAL ONCE
Refills: 0 | Status: COMPLETED | OUTPATIENT
Start: 2022-02-26 | End: 2022-02-26

## 2022-02-26 RX ORDER — METRONIDAZOLE 500 MG
500 TABLET ORAL ONCE
Refills: 0 | Status: COMPLETED | OUTPATIENT
Start: 2022-02-26 | End: 2022-02-26

## 2022-02-26 RX ORDER — VANCOMYCIN HCL 1 G
1000 VIAL (EA) INTRAVENOUS ONCE
Refills: 0 | Status: DISCONTINUED | OUTPATIENT
Start: 2022-02-26 | End: 2022-02-26

## 2022-02-26 RX ORDER — VANCOMYCIN HCL 1 G
2000 VIAL (EA) INTRAVENOUS ONCE
Refills: 0 | Status: DISCONTINUED | OUTPATIENT
Start: 2022-02-26 | End: 2022-02-26

## 2022-02-26 RX ORDER — CEFEPIME 1 G/1
2000 INJECTION, POWDER, FOR SOLUTION INTRAMUSCULAR; INTRAVENOUS ONCE
Refills: 0 | Status: COMPLETED | OUTPATIENT
Start: 2022-02-26 | End: 2022-02-26

## 2022-02-26 RX ORDER — ACETAMINOPHEN 500 MG
650 TABLET ORAL ONCE
Refills: 0 | Status: COMPLETED | OUTPATIENT
Start: 2022-02-26 | End: 2022-02-26

## 2022-02-26 RX ORDER — SODIUM CHLORIDE 9 MG/ML
250 INJECTION, SOLUTION INTRAVENOUS ONCE
Refills: 0 | Status: DISCONTINUED | OUTPATIENT
Start: 2022-02-26 | End: 2022-02-26

## 2022-02-26 RX ORDER — VANCOMYCIN HCL 1 G
1500 VIAL (EA) INTRAVENOUS ONCE
Refills: 0 | Status: COMPLETED | OUTPATIENT
Start: 2022-02-26 | End: 2022-02-26

## 2022-02-26 RX ORDER — NOREPINEPHRINE BITARTRATE/D5W 8 MG/250ML
0.1 PLASTIC BAG, INJECTION (ML) INTRAVENOUS
Qty: 8 | Refills: 0 | Status: DISCONTINUED | OUTPATIENT
Start: 2022-02-26 | End: 2022-02-26

## 2022-02-26 RX ORDER — VANCOMYCIN HCL 1 G
1500 VIAL (EA) INTRAVENOUS ONCE
Refills: 0 | Status: DISCONTINUED | OUTPATIENT
Start: 2022-02-26 | End: 2022-02-26

## 2022-02-26 RX ORDER — SODIUM CHLORIDE 9 MG/ML
250 INJECTION INTRAMUSCULAR; INTRAVENOUS; SUBCUTANEOUS ONCE
Refills: 0 | Status: COMPLETED | OUTPATIENT
Start: 2022-02-26 | End: 2022-02-26

## 2022-02-26 RX ADMIN — Medication 650 MILLIGRAM(S): at 15:35

## 2022-02-26 RX ADMIN — Medication 650 MILLIGRAM(S): at 14:40

## 2022-02-26 RX ADMIN — Medication 300 MILLIGRAM(S): at 20:12

## 2022-02-26 RX ADMIN — SODIUM CHLORIDE 250 MILLILITER(S): 9 INJECTION INTRAMUSCULAR; INTRAVENOUS; SUBCUTANEOUS at 20:22

## 2022-02-26 RX ADMIN — CEFEPIME 2000 MILLIGRAM(S): 1 INJECTION, POWDER, FOR SOLUTION INTRAMUSCULAR; INTRAVENOUS at 15:35

## 2022-02-26 RX ADMIN — Medication 20 MILLILITER(S): at 18:27

## 2022-02-26 RX ADMIN — SODIUM CHLORIDE 250 MILLILITER(S): 9 INJECTION INTRAMUSCULAR; INTRAVENOUS; SUBCUTANEOUS at 18:18

## 2022-02-26 RX ADMIN — Medication 0.1 MICROGRAM(S)/KG/MIN: at 20:11

## 2022-02-26 RX ADMIN — Medication 1500 MILLIGRAM(S): at 21:35

## 2022-02-26 RX ADMIN — Medication 300 MILLIGRAM(S): at 16:36

## 2022-02-26 RX ADMIN — Medication 325 MILLIGRAM(S): at 17:50

## 2022-02-26 RX ADMIN — Medication 325 MILLIGRAM(S): at 20:10

## 2022-02-26 RX ADMIN — CEFEPIME 100 MILLIGRAM(S): 1 INJECTION, POWDER, FOR SOLUTION INTRAMUSCULAR; INTRAVENOUS at 15:34

## 2022-02-26 RX ADMIN — ONDANSETRON 4 MILLIGRAM(S): 8 TABLET, FILM COATED ORAL at 15:33

## 2022-02-26 RX ADMIN — Medication 25.5 MICROGRAM(S)/KG/MIN: at 18:27

## 2022-02-26 RX ADMIN — Medication 500 MILLIGRAM(S): at 19:03

## 2022-02-26 NOTE — H&P ADULT - NSHPPHYSICALEXAM_GEN_ALL_CORE
ICU Vital Signs Last 24 Hrs  T(C): 36.8 (26 Feb 2022 21:56), Max: 38.6 (26 Feb 2022 13:10)  T(F): 98.2 (26 Feb 2022 21:56), Max: 101.5 (26 Feb 2022 13:10)  HR: 98 (26 Feb 2022 21:56) (84 - 140)  BP: 99/51 (26 Feb 2022 21:56) (63/56 - 122/93)  BP(mean): 97 (26 Feb 2022 19:46) (58 - 97)  RR: 20 (26 Feb 2022 21:56) (18 - 28)  SpO2: 94% (26 Feb 2022 21:56) (87% - 100%)    PHYSICAL EXAM:  GENERAL: NAD, lying in bed comfortably on 3L NC, obese   HEAD:  Atraumatic, Normocephalic  EYES: conjunctiva and sclera clear  ENT: Moist mucous membranes  CHEST/LUNG: Clear to auscultation bilaterally; No rales, rhonchi, wheezing, or rubs. Unlabored respirations on 3L NC   HEART: S1S2 appreciated, muffled heart sounds   ABDOMEN: Bowel sounds present; Soft, Nontender, Nondistended. No hepatomegally  EXTREMITIES:  2+ Peripheral Pulses, LE 2+ pitting edema b/l up to knees   NERVOUS SYSTEM:  Alert & Oriented X3, speech clear. No deficits   MSK: FROM all 4 extremities, full and equal strength  SKIN: venous stasis dermatitis bilaterally. ICU Vital Signs Last 24 Hrs  T(C): 36.8 (26 Feb 2022 21:56), Max: 38.6 (26 Feb 2022 13:10)  T(F): 98.2 (26 Feb 2022 21:56), Max: 101.5 (26 Feb 2022 13:10)  HR: 98 (26 Feb 2022 21:56) (84 - 140)  BP: 99/51 (26 Feb 2022 21:56) (63/56 - 122/93)  BP(mean): 97 (26 Feb 2022 19:46) (58 - 97)  RR: 20 (26 Feb 2022 21:56) (18 - 28)  SpO2: 94% (26 Feb 2022 21:56) (87% - 100%)    PHYSICAL EXAM:  GENERAL: NAD, lying in bed comfortably on 3L NC, obese   HEAD:  Atraumatic, Normocephalic  EYES: conjunctiva and sclera clear  ENT: Moist mucous membranes  CHEST/LUNG: Clear to auscultation bilaterally; No rales, rhonchi, wheezing, or rubs. Unlabored respirations on 3L NC   HEART: S1S2 appreciated, muffled heart sounds   ABDOMEN: Bowel sounds present; Soft, Nontender, Nondistended. No hepatomegally  EXTREMITIES:  2+ Peripheral Pulses, LE 2+ pitting edema b/l up to knees   NERVOUS SYSTEM:  Alert & Oriented X3, speech clear. No deficits   MSK: FROM all 4 extremities, full and equal strength  SKIN: venous stasis dermatitis bilaterally. No other lesions or rashes.

## 2022-02-26 NOTE — ED PROVIDER NOTE - PHYSICAL EXAMINATION
Gen: NAD, non-toxic appearing  Head: normal appearing  HEENT: normal conjunctiva  Lung: no respiratory distress, becomes short of breath when she lays flat and while moving around the bed. normal pulse ox on RA    CV: regular rate and rhythm, no murmurs, bilateral 1+ pitting edema w/ associated dermatitis and skin breakdown  Abd: soft, non distended, non tender   Rectal/Sacrum: Chaperone: Rishaard. The lower margin of the L gluteal crease is red, there is a focal lesion that is black. The area around this is erythematous. No vesicular lesions. There is mild tenderness to palpation. No crepitus. The anus is unremarkable.     MSK: no visible deformities  Neuro: alert and grossly oriented, no gross motor deficits  Skin: No anjali rashes

## 2022-02-26 NOTE — ED PROVIDER NOTE - ADMIT DISPOSITION PRESENT ON ADMISSION SEPSIS Q1 - RE-EVALUATED PATIENT FLUID AND VITAL SIGNS
Quality 410: Psoriasis Clinical Response To Oral Systemic Or Biologic Mediations: Patient has been on biologic or system therapy for less than 6 months
Detail Level: Detailed
Quality 337: Tuberculosis Prevention For Psoriasis And Psoriatic Arthritis Patients On A Biological Immune Response Modifier: Patient has a documented negative annual TB screening.
I have re-evaluated the patient's fluid status and reviewed vital signs. Clinical perfusion assessment was performed.

## 2022-02-26 NOTE — CONSULT NOTE ADULT - TIME BILLING
due to body habitus with BMI of 53, and severe underlying cardiac and pulmonary condition there would be definite risks of surgery under general anesthesia with minimal benefit in that the wound is accessible / superficial by CT and approachable as noted by debridement    will plan for surgery follow up to further assess wound and aid with wound care management

## 2022-02-26 NOTE — H&P ADULT - NSHPSOCIALHISTORY_GEN_ALL_CORE
Sister and fiance live upstairs. Remote hx of smoking 5 pack year history. Social drinker. NO rec drug use. Bedbound this past year due to worsening SOB. no HHA. Ambulates to bathroom with great difficulty

## 2022-02-26 NOTE — H&P ADULT - NSHPREVIEWOFSYSTEMS_GEN_ALL_CORE
CONSTITUTIONAL:  (+) fever (+) chills No weight loss, weakness or fatigue.  Ears, Nose, Throat:  No sneezing, congestion, runny nose or sore throat.  SKIN:  (+) sacral ulcer   CARDIOVASCULAR:  No chest pain, chest pressure or chest discomfort. No palpitations.  RESPIRATORY:  (+) SOB No cough or sputum.  GASTROINTESTINAL:  (+) nausea (+) vomiting No anorexia, or diarrhea. No abdominal pain or blood.  GENITOURINARY:  Denies hematuria, dysuria.   NEUROLOGICAL:  No headache, dizziness, syncope, paralysis, ataxia, numbness or tingling in the extremities. No change in bowel or bladder control.  HEMATOLOGIC:  No anemia, bleeding or bruising.  PSYCHIATRIC:  (+) anxiety No history of depression   ENDOCRINOLOGIC:  No reports of sweating, cold or heat intolerance. No polyuria or polydipsia.  ALLERGIES:  No history of asthma, hives, eczema or rhinitis.

## 2022-02-26 NOTE — H&P ADULT - ATTENDING COMMENTS
Pt was seen and examined during key portion of E/M service. Case discussed with resident. H&P reviewed and edited where appropriate. Other than the following, I agree with the above history, exam, assessment, and plan.  67 yo F, PMHx AS s/p TAVR 2015 and 2021, HOCM, pAFib on Xarelto, JOSR (does not tolerate CPAP, on 2L O2 at home qhs), anxiety, morbid obesity, DM, HTN, asthma (on albuterol), pw sepsis, n/v, likely 2/2 sacral decubitus ulcer/necrotic wound on left ischium with associated cellulitis  Off bipap, off levophed. IVF. broad abx. afib c rvr in setting of reported hx of HOCM. cont xarelto for now. morphine pain control.

## 2022-02-26 NOTE — CONSULT NOTE ADULT - SUBJECTIVE AND OBJECTIVE BOX
CHIEF COMPLAINT:    HPI:    PAST MEDICAL & SURGICAL HISTORY:  Hypertension    Gastroparesis due to DM    Depression    Herniated disc    Spinal stenosis  chronic back pain    Cholesterol serum elevated    MVP (mitral valve prolapse)    Hypothyroid  not on medication    Obesity    IBS (irritable bowel syndrome)    GERD (gastroesophageal reflux disease)    Diabetes type 2, uncontrolled    History of diabetic retinopathy    Peripheral neuropathy    HOCM (hypertrophic obstructive cardiomyopathy)    Atrial fibrillation    Aortic stenosis    S/P bariatric surgery  lap band surgery two times due to erosion, S/P removal    S/P tonsillectomy and adenoidectomy    Cataract  B/L cataracts    S/P D&amp;C (status post dilation and curettage)  D&amp;C when she was 18    Cervix abnormality  Ablation of cervix    Breast anomaly  Biopsy of both breast benign lesions    right eye torn retina    S/P TAVR (transcatheter aortic valve replacement)      S/P foot surgery  May 2021        FAMILY HISTORY:  Family history of aortic stenosis  Family hx of structural heart dx    FHx: diabetes mellitus    FH: CAD (coronary artery disease)    Family history of anxiety disorder        SOCIAL HISTORY:  Smoking: __ packs x ___ years  EtOH Use:  Marital Status:  Occupation:  Recent Travel:  Country of Birth:  Advance Directives:    Allergies    amoxicillin (Unknown)  Cipro (Unknown)  codeine (Vomiting)  shellfish (Stomach Upset)    Intolerances    fish (Stomach Upset)      HOME MEDICATIONS:    REVIEW OF SYSTEMS:  Constitutional:   Eyes:  ENT:  CV:  Resp:  GI:  :  MSK:  Integumentary:  Neurological:  Psychiatric:  Endocrine:  Hematologic/Lymphatic:  Allergic/Immunologic:  [ ] All other systems negative  [ ] Unable to assess ROS because ________    OBJECTIVE:  ICU Vital Signs Last 24 Hrs  T(C): 37 (2022 14:46), Max: 38.6 (2022 13:10)  T(F): 98.6 (2022 14:46), Max: 101.5 (2022 13:10)  HR: 84 (2022 14:46) (84 - 88)  BP: 118/89 (2022 14:46) (108/67 - 119/60)  BP(mean): --  ABP: --  ABP(mean): --  RR: 18 (2022 14:46) (18 - 20)  SpO2: 99% (2022 14:46) (98% - 99%)        CAPILLARY BLOOD GLUCOSE  POCT Blood Glucose.: 244 mg/dL (2022 16:49)        HOSPITAL MEDICATIONS:  MEDICATIONS  (STANDING):  lactated ringers Bolus 250 milliLiter(s) IV Bolus once  lactated ringers Bolus 250 milliLiter(s) IV Bolus once    MEDICATIONS  (PRN):      LABS:                        10.1   39.31 )-----------( 265      ( 2022 13:46 )             32.9         134<L>  |  93<L>  |  36<H>  ----------------------------<  205<H>  3.5   |  25  |  1.28    Ca    9.9      2022 13:46  Phos  2.5       Mg     2.0         TPro  7.4  /  Alb  3.9  /  TBili  1.3<H>  /  DBili  x   /  AST  22  /  ALT  17  /  AlkPhos  164<H>        Urinalysis Basic - ( 2022 15:18 )    Color: Yellow / Appearance: Clear / S.029 / pH: x  Gluc: x / Ketone: Negative  / Bili: Negative / Urobili: 2 mg/dL   Blood: x / Protein: 30 mg/dL / Nitrite: Negative   Leuk Esterase: Large / RBC: 1 /hpf / WBC 34 /HPF   Sq Epi: x / Non Sq Epi: 4 /hpf / Bacteria: Negative    Venous Blood Gas:   @ 13:45  7.43/42/20/28/30.1  VBG Lactate: 3.7    MICROBIOLOGY:     RADIOLOGY:  [ ] Reviewed and interpreted by me    EKG: CHIEF COMPLAINT: fever/chills    HPI:   65 yo F, PMHx AS s/p TAVR , HOCM, pAFib, JOSR (does not tolerate CPAP, on 2L O2 at home qhs), anxiety, morbid obesity, DM, HTN presents to ED with fever, chills, n/v, that has been ongoing for the past 4 days. Pt has known sacral ulcer for a few months which has been in more pain and significant odor, over the past week. No trauma or other significant injuries. She has associated SOB, acute on chronic. This is worsened w/ laying down and w/ exertion. She could not walk to her mail box without getting short of breath earlier today, prompting her arrival here. She denies associated cough, neck pain, confusion, weakness, hemoptysis, chest pain, abdominal pain, dysuria, vaginal discharge. She has chronic immobility 2/2 to morbid obesity. She denies any recent changes to medications. She has a allergy to penicillin and ciprofloxacin, but cannot remember her reaction to these.    PAST MEDICAL & SURGICAL HISTORY:  Hypertension  Gastroparesis due to DM  Depression  Herniated disc  Spinal stenosis  chronic back pain  Cholesterol serum elevated  MVP (mitral valve prolapse)  Hypothyroid  not on medication  Obesity  IBS (irritable bowel syndrome)  GERD (gastroesophageal reflux disease)  Diabetes type 2, uncontrolled  History of diabetic retinopathy  Peripheral neuropathy  HOCM (hypertrophic obstructive cardiomyopathy)  Atrial fibrillation  Aortic stenosis  S/P bariatric surgery  lap band surgery two times due to erosion, S/P removal  S/P tonsillectomy and adenoidectomy  Cataract  B/L cataracts  S/P D&amp;C (status post dilation and curettage)  D&amp;C when she was 18  Cervix abnormality  Ablation of cervix  Breast anomaly  Biopsy of both breast benign lesions  right eye torn retina  S/P TAVR (trnscatheter aortic valve replacement)    S/P foot surgery  May 2021    FAMILY HISTORY:  Family history of aortic stenosis  Family hx of structural heart dx  FHx: diabetes mellitus  FH: CAD (coronary artery disease)  Family history of anxiety disorder    SOCIAL HISTORY:  Smoking: __ packs x ___ years  EtOH Use:  Marital Status:  Occupation:  Recent Travel:  Country of Birth:  Advance Directives:    Allergies    amoxicillin (Unknown)  Cipro (Unknown)  codeine (Vomiting)  shellfish (Stomach Upset)    Intolerances    fish (Stomach Upset)    HOME MEDICATIONS:  aspirin 81 mg oral delayed release tablet: 1 tab(s) orally once a day (2021 05:47)  atorvastatin 80 mg oral tablet: 1 tab(s) orally once a day (at bedtime) (2021 05:47)  Cozaar 25 mg oral tablet: 1 tab(s) orally once a day (2021 05:47)  FLUoxetine 40 mg oral capsule: 1 cap(s) orally once a day (2021 05:47)  HumaLOG 100 units/mL subcutaneous solution: 30  subcutaneous 3 times a day (before meals) (2021 14:12)  omeprazole 40 mg oral delayed release capsule: 1 cap(s) orally 2 times a day (2021 05:47)  Singulair 10 mg oral tablet: 1 tab(s) orally once a day (:47)  Slow Release Iron (as elemental iron) 45 mg oral tablet, extended release: 1 tab(s) orally once a day (2021 05:47)  Tresiba 100 units/mL subcutaneous solution: 40 unit(s) subcutaneous once a day (at bedtime) (2021 14:12)  Vitamin D3: 5000 international unit(s) orally once a day (2021 05:47)  Xarelto 20 mg oral tablet: 1 tab(s) orally once a day (in the evening)  RESUME TOMORROW 7/10/21 IN THE EVENING  (2021 05:47)      REVIEW OF SYSTEMS:  CONSTITUTIONAL: +weakness/fevers/chills over the past   EYES/ENT: No visual changes;  No vertigo or throat pain   NECK: No pain or stiffness  RESPIRATORY: No cough, wheezing, hemoptysis; No shortness of breath  CARDIOVASCULAR: No chest pain or palpitations  GASTROINTESTINAL: No abdominal or epigastric pain. No nausea, vomiting, or hematemesis; No diarrhea or constipation. No melena or hematochezia.  GENITOURINARY: No dysuria, frequency or hematuria  NEUROLOGICAL: No numbness or weakness  SKIN: No itching, rashes    OBJECTIVE:  ICU Vital Signs Last 24 Hrs  T(C): 37 (2022 14:46), Max: 38.6 (2022 13:10)  T(F): 98.6 (2022 14:46), Max: 101.5 (2022 13:10)  HR: 84 (2022 14:46) (84 - 88)  BP: 118/89 (2022 14:46) (108/67 - 119/60)  RR: 18 (2022 14:46) (18 - 20)  SpO2: 99% (2022 14:46) (98% - 99%)    CAPILLARY BLOOD GLUCOSE  POCT Blood Glucose.: 244 mg/dL (2022 16:49)    HOSPITAL MEDICATIONS:  MEDICATIONS  (STANDING):  lactated ringers Bolus 250 milliLiter(s) IV Bolus once  lactated ringers Bolus 250 milliLiter(s) IV Bolus once    MEDICATIONS  (PRN):      LABS:                        10.1   39.31 )-----------( 265      ( 2022 13:46 )             32.9         134<L>  |  93<L>  |  36<H>  ----------------------------<  205<H>  3.5   |  25  |  1.28    Ca    9.9      2022 13:46  Phos  2.5       Mg     2.0         TPro  7.4  /  Alb  3.9  /  TBili  1.3<H>  /  DBili  x   /  AST  22  /  ALT  17  /  AlkPhos  164<H>    Urinalysis Basic - ( 2022 15:18 )    Color: Yellow / Appearance: Clear / S.029 / pH: x  Gluc: x / Ketone: Negative  / Bili: Negative / Urobili: 2 mg/dL   Blood: x / Protein: 30 mg/dL / Nitrite: Negative   Leuk Esterase: Large / RBC: 1 /hpf / WBC 34 /HPF   Sq Epi: x / Non Sq Epi: 4 /hpf / Bacteria: Negative    Venous Blood Gas:   @ 13:45  7.43/42/20/28/30.1  VBG Lactate: 3.7    MICROBIOLOGY:     RADIOLOGY:  [ ] Reviewed and interpreted by me    EKG: CHIEF COMPLAINT: fever/chills    HPI:   67 yo F, PMHx AS s/p TAVR , HOCM, pAFib, JOSR (does not tolerate CPAP, on 2L O2 at home qhs), anxiety, morbid obesity, DM, HTN presents to ED with fever, chills, n/v, that has been ongoing for the past 4 days. Pt has known sacral ulcer for a few months, and pt states she has had more pain and significant odor in the region over the past week. No trauma or other significant injuries. She has long standing chronic SOB with exertion (>2 steps), has communicated concerns to outpatient cardiology. Pt currently in room with 2L NC states that she is fine. She denies associated cough, sputum production, confusion, weakness, hemoptysis, chest pain, abdominal pain, dysuria, vaginal discharge. She has chronic immobility 2/2 to morbid obesity. She denies any recent changes to medications. She has a allergy to penicillin and ciprofloxacin, but cannot remember her reaction to these.    PAST MEDICAL & SURGICAL HISTORY:  Hypertension  Gastroparesis due to DM  Depression  Herniated disc  Spinal stenosis  chronic back pain  Cholesterol serum elevated  MVP (mitral valve prolapse)  Hypothyroid  not on medication  Obesity  IBS (irritable bowel syndrome)  GERD (gastroesophageal reflux disease)  Diabetes type 2, uncontrolled  History of diabetic retinopathy  Peripheral neuropathy  HOCM (hypertrophic obstructive cardiomyopathy)  Atrial fibrillation  Aortic stenosis  S/P bariatric surgery  lap band surgery two times due to erosion, S/P removal  S/P tonsillectomy and adenoidectomy  Cataract  B/L cataracts  S/P D&amp;C (status post dilation and curettage)  D&amp;C when she was 18  Cervix abnormality  Ablation of cervix  Breast anomaly  Biopsy of both breast benign lesions  right eye torn retina  S/P TAVR (trnscatheter aortic valve replacement)    S/P foot surgery  May 2021    FAMILY HISTORY:  Family history of aortic stenosis  Family hx of structural heart dx  FHx: diabetes mellitus  FH: CAD (coronary artery disease)  Family history of anxiety disorder    SOCIAL HISTORY:  Smoking: __ packs x ___ years  EtOH Use:  Marital Status:  Occupation:  Recent Travel:  Country of Birth:  Advance Directives:    Allergies    amoxicillin (Unknown)  Cipro (Unknown)  codeine (Vomiting)  shellfish (Stomach Upset)    Intolerances    fish (Stomach Upset)    HOME MEDICATIONS:  aspirin 81 mg oral delayed release tablet: 1 tab(s) orally once a day (2021 05:47)  atorvastatin 80 mg oral tablet: 1 tab(s) orally once a day (at bedtime) (2021 05:47)  Cozaar 25 mg oral tablet: 1 tab(s) orally once a day (2021 05:47)  FLUoxetine 40 mg oral capsule: 1 cap(s) orally once a day (2021 05:47)  HumaLOG 100 units/mL subcutaneous solution: 30  subcutaneous 3 times a day (before meals) (2021 14:12)  omeprazole 40 mg oral delayed release capsule: 1 cap(s) orally 2 times a day (2021 05:47)  Singulair 10 mg oral tablet: 1 tab(s) orally once a day (:47)  Slow Release Iron (as elemental iron) 45 mg oral tablet, extended release: 1 tab(s) orally once a day (2021 05:47)  Tresiba 100 units/mL subcutaneous solution: 40 unit(s) subcutaneous once a day (at bedtime) (2021 14:12)  Vitamin D3: 5000 international unit(s) orally once a day (2021 05:47)  Xarelto 20 mg oral tablet: 1 tab(s) orally once a day (in the evening)  RESUME TOMORROW 7/10/21 IN THE EVENING  (2021 05:47)      REVIEW OF SYSTEMS:  CONSTITUTIONAL: +weakness/fevers/chills over the past 4 days  EYES/ENT: No visual changes;  No throat pain. No lightheadedness/dizziness  NECK: No pain or stiffness  RESPIRATORY: No cough, wheezing, hemoptysis; +chronic shortness of breath  CARDIOVASCULAR: No chest pain or palpitations  GASTROINTESTINAL: No abdominal or epigastric pain. No nausea, vomiting, or hematemesis. No melena or hematochezia.  GENITOURINARY: No dysuria, frequency or hematuria  NEUROLOGICAL: No numbness or weakness  SKIN: No itching, rashes    OBJECTIVE:  ICU Vital Signs Last 24 Hrs  T(C): 37 (2022 14:46), Max: 38.6 (2022 13:10)  T(F): 98.6 (2022 14:46), Max: 101.5 (2022 13:10)  HR: 84 (2022 14:46) (84 - 88)  BP: 118/89 (2022 14:46) (108/67 - 119/60)  RR: 18 (2022 14:46) (18 - 20)  SpO2: 99% (2022 14:46) (98% - 99%)    CAPILLARY BLOOD GLUCOSE  POCT Blood Glucose.: 244 mg/dL (2022 16:49)    HOSPITAL MEDICATIONS:  MEDICATIONS  (STANDING):  lactated ringers Bolus 250 milliLiter(s) IV Bolus once  lactated ringers Bolus 250 milliLiter(s) IV Bolus once    MEDICATIONS  (PRN):      LABS:                        10.1   39.31 )-----------( 265      ( 2022 13:46 )             32.9         134<L>  |  93<L>  |  36<H>  ----------------------------<  205<H>  3.5   |  25  |  1.28    Ca    9.9      2022 13:46  Phos  2.5       Mg     2.0         TPro  7.4  /  Alb  3.9  /  TBili  1.3<H>  /  DBili  x   /  AST  22  /  ALT  17  /  AlkPhos  164<H>    Urinalysis Basic - ( 2022 15:18 )    Color: Yellow / Appearance: Clear / S.029 / pH: x  Gluc: x / Ketone: Negative  / Bili: Negative / Urobili: 2 mg/dL   Blood: x / Protein: 30 mg/dL / Nitrite: Negative   Leuk Esterase: Large / RBC: 1 /hpf / WBC 34 /HPF   Sq Epi: x / Non Sq Epi: 4 /hpf / Bacteria: Negative    Venous Blood Gas:   @ 13:45  7.43/42/20/28/30.1  VBG Lactate: 3.7    MICROBIOLOGY:     RADIOLOGY:  [ ] Reviewed and interpreted by me    EKG: CHIEF COMPLAINT: fever/chills    HPI:   65 yo F, PMHx AS s/p TAVR , HOCM, pAFib, JOSR (does not tolerate CPAP, on 2L O2 at home qhs), anxiety, morbid obesity, DM, HTN presents to ED with fever, chills, n/v, that has been ongoing for the past 4 days. Pt has known sacral ulcer for a few months, and pt states she has had more pain and significant odor in the region over the past week. No trauma or other significant injuries. She has long standing chronic SOB with exertion (>2 steps), has communicated concerns to outpatient cardiology. Pt currently in room with 2L NC states that she is fine. She denies associated cough, sputum production, confusion, weakness, hemoptysis, chest pain, abdominal pain, dysuria, vaginal discharge. She has chronic immobility 2/2 to morbid obesity. She denies any recent changes to medications. She has a allergy to penicillin and ciprofloxacin, but cannot remember her reaction to these.    PAST MEDICAL & SURGICAL HISTORY:  Hypertension  Gastroparesis due to DM  Depression  Herniated disc  Spinal stenosis  chronic back pain  Cholesterol serum elevated  MVP (mitral valve prolapse)  Hypothyroid  not on medication  Obesity  IBS (irritable bowel syndrome)  GERD (gastroesophageal reflux disease)  Diabetes type 2, uncontrolled  History of diabetic retinopathy  Peripheral neuropathy  HOCM (hypertrophic obstructive cardiomyopathy)  Atrial fibrillation  Aortic stenosis  S/P bariatric surgery  lap band surgery two times due to erosion, S/P removal  S/P tonsillectomy and adenoidectomy  Cataract  B/L cataracts  S/P D&amp;C (status post dilation and curettage)  D&amp;C when she was 18  Cervix abnormality  Ablation of cervix  Breast anomaly  Biopsy of both breast benign lesions  right eye torn retina  S/P TAVR (trnscatheter aortic valve replacement)    S/P foot surgery  May 2021    FAMILY HISTORY:  Family history of aortic stenosis  Family hx of structural heart dx  FHx: diabetes mellitus  FH: CAD (coronary artery disease)  Family history of anxiety disorder    SOCIAL HISTORY:  Denies active smoking, alcohol, illicit drug use    Allergies    amoxicillin (Unknown)  Cipro (Unknown)  codeine (Vomiting)  shellfish (Stomach Upset)    Intolerances    fish (Stomach Upset)    HOME MEDICATIONS:  aspirin 81 mg oral delayed release tablet: 1 tab(s) orally once a day (2021 05:47)  atorvastatin 80 mg oral tablet: 1 tab(s) orally once a day (at bedtime) (2021 05:47)  Cozaar 25 mg oral tablet: 1 tab(s) orally once a day (2021 05:47)  FLUoxetine 40 mg oral capsule: 1 cap(s) orally once a day (2021 05:47)  HumaLOG 100 units/mL subcutaneous solution: 30  subcutaneous 3 times a day (before meals) (2021 14:12)  omeprazole 40 mg oral delayed release capsule: 1 cap(s) orally 2 times a day (2021 05:47)  Singulair 10 mg oral tablet: 1 tab(s) orally once a day (2021 05:47)  Slow Release Iron (as elemental iron) 45 mg oral tablet, extended release: 1 tab(s) orally once a day (2021 05:47)  Tresiba 100 units/mL subcutaneous solution: 40 unit(s) subcutaneous once a day (at bedtime) (2021 14:12)  Vitamin D3: 5000 international unit(s) orally once a day (2021 05:47)  Xarelto 20 mg oral tablet: 1 tab(s) orally once a day (in the evening)  RESUME TOMORROW 7/10/21 IN THE EVENING  (2021 05:47)      REVIEW OF SYSTEMS:  CONSTITUTIONAL: +weakness/fevers/chills over the past 4 days  EYES/ENT: No visual changes;  No throat pain. No lightheadedness/dizziness  NECK: No pain or stiffness  RESPIRATORY: No cough, wheezing, hemoptysis; +chronic shortness of breath  CARDIOVASCULAR: No chest pain or palpitations  GASTROINTESTINAL: No abdominal or epigastric pain. No nausea, vomiting, or hematemesis. No melena or hematochezia.  GENITOURINARY: No dysuria, frequency or hematuria  NEUROLOGICAL: No numbness or weakness  SKIN: No itching, rashes    OBJECTIVE:  ICU Vital Signs Last 24 Hrs  T(C): 37 (2022 14:46), Max: 38.6 (2022 13:10)  T(F): 98.6 (2022 14:46), Max: 101.5 (2022 13:10)  HR: 84 (2022 14:46) (84 - 88)  BP: 118/89 (2022 14:46) (108/67 - 119/60)  RR: 18 (2022 14:46) (18 - 20)  SpO2: 99% (2022 14:46) (98% - 99%)    GENERAL: NAD, lying in bed on side to avoid aggravating recently debrided decubitus ulcer. Speaking in full sentences on 2L NC, body habitus with BMI >40  HEAD:  Atraumatic, Normocephalic  EYES: EOMI, conjunctiva and sclera clear  ENT: Moist mucous membranes  NECK: Supple, No JVD  CHEST/LUNG: Clear to auscultation bilaterally; No rales, rhonchi, wheezing, or rubs. Unlabored respirations  HEART: Regular rate and rhythm; No murmurs, rubs, or gallops  ABDOMEN: Bowel sounds present; Soft, Nontender, Nondistended.  EXTREMITIES:  2+ Peripheral Pulses, brisk capillary refill. No clubbing, cyanosis. Trace pitting edema b/l.   NERVOUS SYSTEM:  Alert & Oriented X3, speech clear. No deficits   MSK: FROM all 4 extremities.  SKIN: +black infected left gluteal fold decubitus ulcer, when drained by surgery, malodorous purulent fluid released, red eschar around ulcer, and warm to touch.     CAPILLARY BLOOD GLUCOSE  POCT Blood Glucose.: 244 mg/dL (2022 16:49)    HOSPITAL MEDICATIONS:  MEDICATIONS  (STANDING):  lactated ringers Bolus 250 milliLiter(s) IV Bolus once  lactated ringers Bolus 250 milliLiter(s) IV Bolus once    MEDICATIONS  (PRN):      LABS:                        10.1   39.31 )-----------( 265      ( 2022 13:46 )             32.9     02-    134<L>  |  93<L>  |  36<H>  ----------------------------<  205<H>  3.5   |  25  |  1.28    Ca    9.9      2022 13:46  Phos  2.5       Mg     2.0     -    TPro  7.4  /  Alb  3.9  /  TBili  1.3<H>  /  DBili  x   /  AST  22  /  ALT  17  /  AlkPhos  164<H>    Urinalysis Basic - ( 2022 15:18 )    Color: Yellow / Appearance: Clear / S.029 / pH: x  Gluc: x / Ketone: Negative  / Bili: Negative / Urobili: 2 mg/dL   Blood: x / Protein: 30 mg/dL / Nitrite: Negative   Leuk Esterase: Large / RBC: 1 /hpf / WBC 34 /HPF   Sq Epi: x / Non Sq Epi: 4 /hpf / Bacteria: Negative    Venous Blood Gas:   @ 13:45  7.43/42/20/28/30.1  VBG Lactate: 3.7    MICROBIOLOGY:     RADIOLOGY:  [ ] Reviewed and interpreted by me    EKG: CHIEF COMPLAINT: fever/chills    HPI:   67 yo F, PMHx AS s/p TAVR , HOCM, pAFib, JOSR (does not tolerate CPAP, on 2L O2 at home qhs), anxiety, morbid obesity, DM, HTN presents to ED with fever, chills, n/v, that has been ongoing for the past 4 days. Pt has known sacral ulcer for a few months, and pt states she has had more pain and significant odor in the region over the past week. No trauma or other significant injuries. She has long standing chronic SOB with exertion (>2 steps), has communicated concerns to outpatient cardiology. Pt currently in room with 2L NC states that she is fine. She denies associated cough, sputum production, confusion, weakness, hemoptysis, chest pain, abdominal pain, dysuria, vaginal discharge. She has chronic immobility 2/2 to morbid obesity. She denies any recent changes to medications. She has a allergy to penicillin and ciprofloxacin, but cannot remember her reaction to these.    PAST MEDICAL & SURGICAL HISTORY:  Hypertension  Gastroparesis due to DM  Depression  Herniated disc  Spinal stenosis  chronic back pain  Cholesterol serum elevated  MVP (mitral valve prolapse)  Hypothyroid  not on medication  Obesity  IBS (irritable bowel syndrome)  GERD (gastroesophageal reflux disease)  Diabetes type 2, uncontrolled  History of diabetic retinopathy  Peripheral neuropathy  HOCM (hypertrophic obstructive cardiomyopathy)  Atrial fibrillation  Aortic stenosis  S/P bariatric surgery  lap band surgery two times due to erosion, S/P removal  S/P tonsillectomy and adenoidectomy  Cataract  B/L cataracts  S/P D&amp;C (status post dilation and curettage)  D&amp;C when she was 18  Cervix abnormality  Ablation of cervix  Breast anomaly  Biopsy of both breast benign lesions  right eye torn retina  S/P TAVR (trnscatheter aortic valve replacement)    S/P foot surgery  May 2021    FAMILY HISTORY:  Family history of aortic stenosis  Family hx of structural heart dx  FHx: diabetes mellitus  FH: CAD (coronary artery disease)  Family history of anxiety disorder    SOCIAL HISTORY:  Denies active smoking, alcohol, illicit drug use    Allergies    amoxicillin (Unknown)  Cipro (Unknown)  codeine (Vomiting)  shellfish (Stomach Upset)    Intolerances    fish (Stomach Upset)    HOME MEDICATIONS:  aspirin 81 mg oral delayed release tablet: 1 tab(s) orally once a day (2021 05:47)  atorvastatin 80 mg oral tablet: 1 tab(s) orally once a day (at bedtime) (2021 05:47)  Cozaar 25 mg oral tablet: 1 tab(s) orally once a day (2021 05:47)  FLUoxetine 40 mg oral capsule: 1 cap(s) orally once a day (2021 05:47)  HumaLOG 100 units/mL subcutaneous solution: 30  subcutaneous 3 times a day (before meals) (2021 14:12)  omeprazole 40 mg oral delayed release capsule: 1 cap(s) orally 2 times a day (2021 05:47)  Singulair 10 mg oral tablet: 1 tab(s) orally once a day (2021 05:47)  Slow Release Iron (as elemental iron) 45 mg oral tablet, extended release: 1 tab(s) orally once a day (2021 05:47)  Tresiba 100 units/mL subcutaneous solution: 40 unit(s) subcutaneous once a day (at bedtime) (2021 14:12)  Vitamin D3: 5000 international unit(s) orally once a day (2021 05:47)  Xarelto 20 mg oral tablet: 1 tab(s) orally once a day (in the evening)  RESUME TOMORROW 7/10/21 IN THE EVENING  (2021 05:47)      REVIEW OF SYSTEMS:  CONSTITUTIONAL: +weakness/fevers/chills over the past 4 days  EYES/ENT: No visual changes;  No throat pain. No lightheadedness/dizziness  NECK: No pain or stiffness  RESPIRATORY: No cough, wheezing, hemoptysis; +chronic shortness of breath  CARDIOVASCULAR: No chest pain or palpitations  GASTROINTESTINAL: No abdominal or epigastric pain. No nausea, vomiting, or hematemesis. No melena or hematochezia.  GENITOURINARY: No dysuria, frequency or hematuria  NEUROLOGICAL: No numbness or weakness  SKIN: No itching, rashes    OBJECTIVE:  ICU Vital Signs Last 24 Hrs  T(C): 37 (2022 14:46), Max: 38.6 (2022 13:10)  T(F): 98.6 (2022 14:46), Max: 101.5 (2022 13:10)  HR: 84 (2022 14:46) (84 - 88)  BP: 118/89 (2022 14:46) (108/67 - 119/60)  RR: 18 (2022 14:46) (18 - 20)  SpO2: 99% (2022 14:46) (98% - 99%)    GENERAL: NAD, lying in bed on side to avoid aggravating recently debrided decubitus ulcer. Speaking in full sentences on 2L NC, body habitus with BMI >40  HEAD:  Atraumatic, Normocephalic  EYES: EOMI, conjunctiva and sclera clear  ENT: Moist mucous membranes  NECK: Supple, No JVD  CHEST/LUNG: Clear to auscultation bilaterally; No rales, rhonchi, wheezing, or rubs. Unlabored respirations  HEART: Regular rate and rhythm; No murmurs, rubs, or gallops  ABDOMEN: Bowel sounds present; Soft, Nontender, Nondistended.  EXTREMITIES:  2+ Peripheral Pulses, brisk capillary refill. No clubbing, cyanosis. Trace pitting edema b/l.   NERVOUS SYSTEM:  Alert & Oriented X3, speech clear. No deficits   MSK: FROM all 4 extremities.  SKIN: +black infected left gluteal fold decubitus ulcer, when drained by surgery, malodorous purulent fluid released, red eschar around ulcer, and warm to touch.     CAPILLARY BLOOD GLUCOSE  POCT Blood Glucose.: 244 mg/dL (2022 16:49)    HOSPITAL MEDICATIONS:  MEDICATIONS  (STANDING):  lactated ringers Bolus 250 milliLiter(s) IV Bolus once  lactated ringers Bolus 250 milliLiter(s) IV Bolus once    MEDICATIONS  (PRN):      LABS:                        10.1   39.31 )-----------( 265      ( 2022 13:46 )             32.9     02-    134<L>  |  93<L>  |  36<H>  ----------------------------<  205<H>  3.5   |  25  |  1.28    Ca    9.9      2022 13:46  Phos  2.5       Mg     2.0     -    TPro  7.4  /  Alb  3.9  /  TBili  1.3<H>  /  DBili  x   /  AST  22  /  ALT  17  /  AlkPhos  164<H>    Urinalysis Basic - ( 2022 15:18 )    Color: Yellow / Appearance: Clear / S.029 / pH: x  Gluc: x / Ketone: Negative  / Bili: Negative / Urobili: 2 mg/dL   Blood: x / Protein: 30 mg/dL / Nitrite: Negative   Leuk Esterase: Large / RBC: 1 /hpf / WBC 34 /HPF   Sq Epi: x / Non Sq Epi: 4 /hpf / Bacteria: Negative    Venous Blood Gas:   @ 13:45  7.43/42/20/28/30.1  VBG Lactate: 3.7    MICROBIOLOGY:     RADIOLOGY:  [x ] Reviewed and interpreted by me    < from: Xray Chest 1 View- PORTABLE-Urgent (Xray Chest 1 View- PORTABLE-Urgent .) (22 @ 17:20) >    INTERPRETATION:  No focal consolidations.    < end of copied text >  < from: CT Abdomen and Pelvis w/ IV Cont (22 @ 16:37) >    IMPRESSION:    Multiple foci of subcutaneous gas at the left gluteal fold and perineum   with mild associated inflammatory changes. No fluid collection   identified. Correlate for necrotizing soft tissue infection.    < end of copied text >

## 2022-02-26 NOTE — H&P ADULT - ASSESSMENT
67 yo F, PMHx AS s/p TAVR 2015 and 2021, HOCM, pAFib on Xarelto, JOSR (does not tolerate CPAP, on 2L O2 at home qhs), anxiety, morbid obesity, DM, HTN, asthma (on albuterol), presents to ED with fever (Tmax 100.8), chills, n/v, that has been ongoing for the past 4 days 2/2 sepsis from sacral decubitus ulcer/necrotic wound on left ischium with associated cellulitis   67 yo F, PMHx AS s/p TAVR 2015 and 2021, HOCM, pAFib on Xarelto, JOSR (does not tolerate CPAP, on 2L O2 at home qhs), anxiety, morbid obesity, DM, HTN, asthma (on albuterol), pw sepsis, n/v, likely 2/2 sacral decubitus ulcer/necrotic wound on left ischium with associated cellulitis

## 2022-02-26 NOTE — H&P ADULT - PROBLEM SELECTOR PLAN 2
Hx of AS s/p TAVR 2015 and 2021, HOCM, hx of pAFIb on Xarelto (however patient has not taken it past 2 days) and DVT  Hx of JOSR on 2L NC at home for bedtime   CXR clear   pro-BNP 7000s, was 300-600 in past  Could be decompensated HF, PE given hx and immobility, didn't take xarelto past 2 days  Currently on 3L NC SatO2 97 %     - TTE  - CTA chest?  - would benefit from cards and pulm consult   - PT consult Hx of AS s/p TAVR 2015 and 2021, HOCM, hx of pAFIb on Xarelto (however patient has not taken it past 2 days) and DVT  Hx of JOSR on 2L NC at home for bedtime   CXR clear   pro-BNP 7000s, was 300-600 in past  Could be decompensated HF, PE given hx and immobility, didn't take xarelto past 2 days  Currently on 3L NC SatO2 97 %   Home meds include Lasix 80mg daily     - TTE  - CTA chest?  - would benefit from cards and pulm consult   - PT consult  - Hold diuretic in setting of hypotension Hypoxic to 87 % , temporarily required BiPAP   Could be 2/2 flash pulm edema now improved  - continue to monitor   - currently on 3l nc, wean as tolerated

## 2022-02-26 NOTE — ED PROVIDER NOTE - ATTENDING CONTRIBUTION TO CARE
RGUJRAL 67yo f hx listed presents with fever x 4 days. Patient has a malodorous sacral wound for months. Denies any cough, URI, n/v/d, abd pain, diarrhea.   On exam, Patient is awake, alert and oriented x 3.  Patient is morbidly obese.   NCAT  Neck is supple  Lungs are CTA B/L,+S1S2   Abdomen:Soft nd/nt+bs no rebound or guarding.  Back: L buttock with eschar wound that is malodorous, surrounding erythema and tenderness.   Neuro CN3-12 intact. Strength 5/5 in upper and lower extremities. Nml Sensation.  Check labs, cultures, CT to ro abscess. IV antibiotics and surgical consult. RGUJRAL 65yo f hx listed presents with fever x 4 days. Patient has a sacral wound that has recently become malodorous. Denies any n/v/d, abd pain, diarrhea. Denies any cough, URI.   On exam, Patient is awake, alert and oriented x 3.  Patient is morbidly obese.   NCAT, Neck is supple  Lungs are CTA B/L,+S1S2   Abdomen:Soft nd/nt+bs no rebound or guarding.  Back: L buttock with eschar wound that is malodorous, surrounding erythema and tenderness.   Neuro CN3-12 intact. Strength 5/5 in upper and lower extremities. Nml Sensation.  Check labs, cultures, CT to ro abscess. IV antibiotics and surgical consult.   Patient's allergy reviewed with patient and past medical records. Start IV abx, pharmacy has concern for Vancomycin dosing and recommend 2gram, will consult ID as patient was on daptomycin in the past due to her weight based dosing.

## 2022-02-26 NOTE — ED ADULT NURSE NOTE - OBJECTIVE STATEMENT
66y f pt with extensive medical history c/o fever for last few days and increasing sob; aox3; no resp distress; no chest pain; no abd pain; no n/v/d; + fever/chills; occasional dry cough; no congestion; pt has malodorous wound to sacral area stage II; pt states uses furniture in apt to ambulate; pt states if have to walk to far "just doesn't go"; ekg done; pt on cardiac monitor in nsr; iv placed; labs drawn; pt swabbed for covid; adv pt need urine sample; safety/comfort maintained; sister at bedside

## 2022-02-26 NOTE — ED PROVIDER NOTE - NSICDXPASTMEDICALHX_GEN_ALL_CORE_FT
PAST MEDICAL HISTORY:  Aortic stenosis     Atrial fibrillation     Cholesterol serum elevated     Depression     Diabetes type 2, uncontrolled     Gastroparesis due to DM     GERD (gastroesophageal reflux disease)     Herniated disc     History of diabetic retinopathy     HOCM (hypertrophic obstructive cardiomyopathy)     Hypertension     Hypothyroid not on medication    IBS (irritable bowel syndrome)     MVP (mitral valve prolapse)     Obesity     Peripheral neuropathy     Spinal stenosis chronic back pain

## 2022-02-26 NOTE — ED PROVIDER NOTE - OBJECTIVE STATEMENT
CC per patient in the room = sob and fever, somewhat different than what is documented in the chart    67 yo F, hx of htn, dm, hypothyroidism, LVH, MS, afib on eliquis, AS, AS s/p tavr, presenting w/ a fever that has been ongoing for the past 4 days. This is associated w/ a sacral wound that is malodorous. She has associated SOB, acute on chronic. This is worsened w/ laying down and w/ exertion. She could not walk to her mail box without getting short of breath earlier today, prompting her arrival here. She denies associated cough, neck pain, confusion, weakness, hemoptysis, chest pain, abdominal pain, dysuria, vaginal discharge. She has chronic immobility 2/2 to morbid obesity. She denies any recent changes to medications. She has a allergy to penicillin and ciprofloxacin, but cannot remember her reaction to these.

## 2022-02-26 NOTE — ED ADULT NURSE REASSESSMENT NOTE - NS ED NURSE REASSESS COMMENT FT1
pt was receiving vanco when she felt faint and as per her sister was "unreachable"  pt assisted to bed and vanco turned off.  BP noted to be very low.  placed on a non rebreather and then bipap m pt could not tolerate bipap so non rebreather restarted.  norepinephrine started at .3 mg per hr.  BP responded with an increase.  pt has no c/o pain but c/o SOB  second PIV placed and bloods redrawn  Dr chi in room

## 2022-02-26 NOTE — H&P ADULT - PROBLEM SELECTOR PLAN 3
On metoprolol tartate 100mg bid and Xarelto    - continue metoprolol tartate 100mg bid, On metoprolol tartate 100mg bid and Xarelto    - continue metoprolol tartate 100mg bid, and Xarelto Lactate 4-->2.8  s/p 250 cc units and levo gtt for 4 hours  Likely 2/2 sepsis    - will continue to trend Lactate 4-->2.8  s/p 250 cc units and levo gtt for 4 hours  Likely 2/2 sepsis    - will continue to trend  - 500 cc LR

## 2022-02-26 NOTE — ED PROVIDER NOTE - CLINICAL SUMMARY MEDICAL DECISION MAKING FREE TEXT BOX
elderly female pt w/ multiple comorbidities including multiple mechanical abnormalities of the heart and baseline immunosuppression 2/2 diabetes presenting w/ fever and exertional and positional acute on chronic dyspnea. vss w/out e/o sepsis, and non-toxic appearing. febrile on rectal temp here. work up for fever of unknown origin. consider sacral soft tissue infection vs nec fasc in pt w/ diabetes and sacral rash w/ associated fever. assess for pnx, uti, bacteremia. suspect superimposed heart strain w/ fluid back up in the context of fever in a pt w/ known valvulopathy/cardiomyopathy.     iv insert, cardiac monitor, routine vitals  UA, CXR, VBG w/ Lactate, CBC, Cx, CMP, Mg, Phosph, +/- CT A/P w. focus on recto-sacreal region.   Fever Control: tylenol   hold off on fluid resuscitation given balance between febrile process and suspected HF process w/ associated dyspnea

## 2022-02-26 NOTE — ED ADULT NURSE REASSESSMENT NOTE - NS ED NURSE REASSESS COMMENT FT1
Pt turned, repositioned, cushion placed under pt. Pt placed in position of comfort, placed on airtap. Safety maintained, will continue to monitor.

## 2022-02-26 NOTE — H&P ADULT - PROBLEM SELECTOR PLAN 9
DVT ppx: Xarelto  Diet: DASH/CC  GOC: DNR/DNI  PT consult DVT ppx: Xarelto  Diet: DASH/CC  PT consult

## 2022-02-26 NOTE — H&P ADULT - NSHPLABSRESULTS_GEN_ALL_CORE
9.8    20.63 )-----------( 264      ( 2022 17:38 )             31.9     Auto Eosinophil # 0.00  / Auto Eosinophil % 0.0   / Auto Neutrophil # 19.72 / Auto Neutrophil % 95.6  / BANDS % x                            10.1   39.31 )-----------( 265      ( 2022 13:46 )             32.9     Auto Eosinophil # 0.00  / Auto Eosinophil % 0.0   / Auto Neutrophil # 38.64 / Auto Neutrophil % 94.9  / BANDS % 3.4          133<L>  |  94<L>  |  38<H>  ----------------------------<  220<H>  3.9   |  20<L>  |  1.28      134<L>  |  93<L>  |  36<H>  ----------------------------<  205<H>  3.5   |  25  |  1.28    Ca    9.6      2022 17:38  Mg     2.0       Phos  2.5       TPro  6.8  /  Alb  3.3  /  TBili  1.2  /  DBili  x   /  AST  23  /  ALT  15  /  AlkPhos  170<H>    TPro  7.4  /  Alb  3.9  /  TBili  1.3<H>  /  DBili  x   /  AST  22  /  ALT  17  /  AlkPhos  164<H>      PT/INR - ( 2022 17:38 )   PT: 25.1 sec;   INR: 2.08 ratio         PTT - ( 2022 17:38 )  PTT:28.7 sec      Urinalysis Basic - ( 2022 15:18 )    Color: Yellow / Appearance: Clear / S.029 / pH: x  Gluc: x / Ketone: Negative  / Bili: Negative / Urobili: 2 mg/dL   Blood: x / Protein: 30 mg/dL / Nitrite: Negative   Leuk Esterase: Large / RBC: 1 /hpf / WBC 34 /HPF   Sq Epi: x / Non Sq Epi: 4 /hpf / Bacteria: Negative    CXR: INTERPRETATION:  No focal consolidations.    ACC: [id number] EXAM:  CT ABDOMEN AND PELVIS IC                          PROCEDURE DATE:  2022          INTERPRETATION:  CLINICAL INFORMATION: Rash and necrotic foci at the left   gluteal fold.    COMPARISON: CT abdomen pelvis 10/20/2021.    CONTRAST/COMPLICATIONS:  IV Contrast: Omnipaque 350  125 cc administered   25 cc discarded  Oral Contrast: NONE  Complications: None reported at time of study completion    PROCEDURE:  CT of the Abdomen and Pelvis was performed.  Sagittal and coronal reformats were performed.    FINDINGS:  LOWER CHEST: Within normal limits.    LIVER: Within normal limits.  BILE DUCTS: Normal caliber.  GALLBLADDER: Cholelithiasis.  SPLEEN: Within normal limits.  PANCREAS: Atrophic  ADRENALS: Within normal limits.  KIDNEYS/URETERS: No renal stones or hydronephrosis.    BLADDER: Within normal limits.  REPRODUCTIVE ORGANS: Enlarged uterus with multiple leiomyomata.    BOWEL: No bowel obstruction. Appendix is normal.  PERITONEUM: No ascites.  VESSELS: Atherosclerotic changes.  RETROPERITONEUM/LYMPH NODES: No lymphadenopathy.  ABDOMINAL WALL: Multiple foci of subcutaneous gas in the left gluteal   subcutaneous tissues with mild associated inflammatory stranding. No   fluid collection. Large fat-containing umbilical hernia.  BONES: Within normal limits.    IMPRESSION:    Multiple foci of subcutaneous gas at the left gluteal fold and perineum   with mild associated inflammatory changes. No fluid collection   identified. Correlate for necrotizing soft tissue infection.    --- End of Report ---    TTE 2021: mod mitral stenosis, mild aortic regurg, mild LVH, normal LV and RV systolic function Personally reviewed imaging, labs.    9.8    20.63 )-----------( 264      ( 2022 17:38 )             31.9     Auto Eosinophil # 0.00  / Auto Eosinophil % 0.0   / Auto Neutrophil # 19.72 / Auto Neutrophil % 95.6  / BANDS % x                            10.1   39.31 )-----------( 265      ( 2022 13:46 )             32.9     Auto Eosinophil # 0.00  / Auto Eosinophil % 0.0   / Auto Neutrophil # 38.64 / Auto Neutrophil % 94.9  / BANDS % 3.4          133<L>  |  94<L>  |  38<H>  ----------------------------<  220<H>  3.9   |  20<L>  |  1.28      134<L>  |  93<L>  |  36<H>  ----------------------------<  205<H>  3.5   |  25  |  1.28    Ca    9.6      2022 17:38  Mg     2.0       Phos  2.5       TPro  6.8  /  Alb  3.3  /  TBili  1.2  /  DBili  x   /  AST  23  /  ALT  15  /  AlkPhos  170<H>    TPro  7.4  /  Alb  3.9  /  TBili  1.3<H>  /  DBili  x   /  AST  22  /  ALT  17  /  AlkPhos  164<H>      PT/INR - ( 2022 17:38 )   PT: 25.1 sec;   INR: 2.08 ratio         PTT - ( 2022 17:38 )  PTT:28.7 sec      Urinalysis Basic - ( 2022 15:18 )    Color: Yellow / Appearance: Clear / S.029 / pH: x  Gluc: x / Ketone: Negative  / Bili: Negative / Urobili: 2 mg/dL   Blood: x / Protein: 30 mg/dL / Nitrite: Negative   Leuk Esterase: Large / RBC: 1 /hpf / WBC 34 /HPF   Sq Epi: x / Non Sq Epi: 4 /hpf / Bacteria: Negative    CXR: INTERPRETATION:  No focal consolidations.    ACC: [id number] EXAM:  CT ABDOMEN AND PELVIS IC                          PROCEDURE DATE:  2022          INTERPRETATION:  CLINICAL INFORMATION: Rash and necrotic foci at the left   gluteal fold.    COMPARISON: CT abdomen pelvis 10/20/2021.    CONTRAST/COMPLICATIONS:  IV Contrast: Omnipaque 350  125 cc administered   25 cc discarded  Oral Contrast: NONE  Complications: None reported at time of study completion    PROCEDURE:  CT of the Abdomen and Pelvis was performed.  Sagittal and coronal reformats were performed.    FINDINGS:  LOWER CHEST: Within normal limits.    LIVER: Within normal limits.  BILE DUCTS: Normal caliber.  GALLBLADDER: Cholelithiasis.  SPLEEN: Within normal limits.  PANCREAS: Atrophic  ADRENALS: Within normal limits.  KIDNEYS/URETERS: No renal stones or hydronephrosis.    BLADDER: Within normal limits.  REPRODUCTIVE ORGANS: Enlarged uterus with multiple leiomyomata.    BOWEL: No bowel obstruction. Appendix is normal.  PERITONEUM: No ascites.  VESSELS: Atherosclerotic changes.  RETROPERITONEUM/LYMPH NODES: No lymphadenopathy.  ABDOMINAL WALL: Multiple foci of subcutaneous gas in the left gluteal   subcutaneous tissues with mild associated inflammatory stranding. No   fluid collection. Large fat-containing umbilical hernia.  BONES: Within normal limits.    IMPRESSION:    Multiple foci of subcutaneous gas at the left gluteal fold and perineum   with mild associated inflammatory changes. No fluid collection   identified. Correlate for necrotizing soft tissue infection.    --- End of Report ---    TTE 2021: mod mitral stenosis, mild aortic regurg, mild LVH, normal LV and RV systolic function details… detailed exam

## 2022-02-26 NOTE — CONSULT NOTE ADULT - ASSESSMENT
67 y/o F with multiple severe comorbidities presenting with fever of unknown origin and found to have ischial pressure ulcer    -Given patient's extremely poor functional status and chronic pulmonary and cardiac issues that have resulted in the patient being immobile for the last year she is extremely high risk for operative intervention. Specifically if intubated there is an extremely high likelihood that she will require prolonged intubation and even possible tracheostomy. This was discussed with the patient and her sister and they both stated that this is not something that the patient would want.     -Additionally, given review of imaging it appears that infection is contained to the subcutaneous tissue and does not extend deeply. Given this, plan is for bedside debridement of ischial pressure ulcer under local analgesia (see procedure note)    -Recommend daily packing changes. To be done by surgical team  -Recommend wound care team to see patient as well  -Patient will require broad spectrum antibiotics. Would appreciate ID recommendations  -Given patient's worsening pulmonary function and hypoxia would recommend pulmonary evaluation.  -Patient followed by vascular cardiology previously given TAVR, would recommend consultation given chronically worsening status  -Recommend ICU for close hemodynamic and respiratory monitoring.  -Seen and examined with Dr. Ornelas, chief resident on call, and Dr. Stein, attending surgeon on call

## 2022-02-26 NOTE — CONSULT NOTE ADULT - ASSESSMENT
MICU recommendations:     MICU recommendations:    65 yo F, PMHx AS s/p TAVR 2015, HOCM, pAFib, JOSR (does not tolerate CPAP, on 2L O2 at home qhs), anxiety, morbid obesity, DM, HTN presents to ED with infected decubitus ulcer s/p debridement by surgery.    -Pt had reaction to either antibiotics or IV contrast during CT abd/pelvis scan, complaining of nausea/dry heaving associated with decreased BPs- would monitor reaction to medications closely   -Recommend IV cefepime, flagyl, and vanc per ID recs- monitor for side effects  -Initial CXR in ED showed signs of mild b/l pulmonary vascular congestion, no evidence of pleural effusions, would give IVF as needed with close monitoring of O2  -Pt initially started on levophed at around 5:30pm, held at 6pm for MAP >100, reevaluated 1 hour later pt is hemodynamically stable with MAP >70  -Not a MICU candidate at this time, please reconsult as needed    Rosa Pickett MD  MICU Resident  Case discussed with Dr. Becerra, Attending Physician  MICU recommendations:    67 yo F, PMHx AS s/p TAVR 2015, diastolic and valvular CHF, pAFib, JOSR (does not tolerate CPAP, on 2L O2 at home qhs), anxiety, morbid obesity, DM, HTN presents to ED with worsening buttock pain and odor, and fever c/w sepsis due to infected decubitus ulcer.  ER course complicated by hypoTN, now improved, and patient alert and complaining only of back pain.   S/p I&D of abscess by surgery.    -In ER, concern for contrast vs abx allergic reaction, though no respiratory or skin sx at that time.  Vanco re-attempted without incident.  Would consider pre-treatment for future contrast CTs.   -hypoTN now resolved, with SBP consistently >90.  Patient alert and conversant.  Sat>90% on NC.  -Consider evaluation by ultrasound for any recurrent hypoTN to assess for pulmonary edema; CXR will not be reliable in a patient of this habitus  -Follow cx, will defer to ID for abx choice   -Sister at bedside and patient identifies her as surrogate decision maker  -FULL CODE (patient states she would not want resuscitation/intubation if she "was a vegetable")      Rosa Pickett MD  MICU Resident  Case discussed with Dr. Becerra, Attending Physician

## 2022-02-26 NOTE — ED ADULT TRIAGE NOTE - ARRIVAL FROM
Patient Instructions by Kerry Ward at 06/07/18 12:02 PM     Author:  Russell Carpio CMA Service:  (none) Author Type:  Certified Medical Assistant     Filed:  06/07/18 12:03 PM Encounter Date:  6/7/2018 Status:  Signed     :  Kerry Ward (Certified Medical Assistant)            Next visit with Jonnie Cronin is on 09/05/2018 at  8:45 AM in [address] [place] [address]  Next visit with INTERNAL MEDICINE is on 09/05/2018 at  8:45 AM in [facility] today      Fasting Future Labs  Please come prior to your next appointment to recheck fasting labs. Â· Please come fasting (at least 8 hours) to check labs. Â· Please drink plenty of water before your appointment. Â· You can take any prescribed or routine medicine with water before your appointment. Â· You can brush your teeth even if you are fasting. Radiology Please call [phone] to schedule the CT of the chest.      Additional Educational Resources: For additional resources regarding your symptoms, diagnosis, or further health information, please visit the Health Resources section on [email or web address] or the Online Health Resources section in [email or web address].           Revision History        User Key Date/Time User Provider Type Action    > [N/A] 06/07/18 12:03 PM Kerry Ward [employer] Certified Medical Assistant Sign
Home

## 2022-02-26 NOTE — H&P ADULT - PROBLEM SELECTOR PLAN 5
on Losartan 25 and Lopressor     - hold Losartan given hypotension from sepsis on Humalog 30 TID and Tresiba 30 bedtime     - check A1c  - Insulin 24 pre-meal and 24 long-acting bedtime Positive UA  - follow urine cx  - asx at this time

## 2022-02-26 NOTE — CONSULT NOTE ADULT - ASSESSMENT
Ms Olivo is a 66 y/o lady with PMH type 2 diabetes with H/O gastroparesis, pulmonary embolism and right lower extremity DVT 8/2020, PVD, hypertension, aortic stenosis  S/P TAVR 2015 and redo TAVR in 2021 presented to ED today with fever since 4 days. Pt also endorses acute on chronic shortness of breath. This is worsened w/ laying down and w/ exertion. She could not walk to her mail box without getting short of breath earlier today, prompting her arrival here. She denies associated cough, neck pain, confusion, weakness, hemoptysis, chest pain, abdominal pain, dysuria, vaginal discharge. She has chronic immobility 2/2 to morbid obesity. She denies any recent changes to medications. Pt with leukocytosis to 39 k with fever to 101.5 s/p vanc and cefepime in ER. CTAP with IV con: Multiple foci of subcutaneous gas at the left gluteal fold with mild associated inflammatory changes. Pt with intermittent Boderline SBP in 60s.    WORKUP  BNP: 7759  ESR: 50,   RVP: negative.  MRSA PCR Result.: NotDetec (07-21-21 @ 19:23)  UA: 34 WBC, Large LE, negative nitrates and bactetria  CTAP with IV con: Multiple foci of subcutaneous gas at the left gluteal fold with mild associated inflammatory changes  Reported allergy to penicillin and ciprofloxacin, but cannot remember her reaction to these.    IMPRESSION  ·	Fever  ·	Sacral Decubitus ulcer  ·	Asymptomatic bacteruria vs UTI  ·	Leukocytosis    RECOMMENDATIONS  Leukocytosis to 39 k with fever to 101.5  s/p vanc and cefepime in ER      PT TO BE SEEN. PRELIM NOTE  PENDING RECS. PLEASE WAIT FOR FINAL RECS AFTER DISCUSSION WITH ATTENDINGHaresh Mcmahan MD, PGY4   ID fellow  [employer] Teams Preferred/ Pager: [phone]  [employer] pager ID: [phone] (would prefer to text page for any new consult or question, please include name/location and best call back number)  After 5pm/weekends call [phone]    Ms Olivo is a 64 y/o lady with PMH type 2 diabetes with H/O gastroparesis, pulmonary embolism and right lower extremity DVT 8/2020, PVD, hypertension, aortic stenosis  S/P TAVR 2015 and redo TAVR in 2021 presented to ED today with fever since 4 days. Pt also endorses acute on chronic shortness of breath. This is worsened w/ laying down and w/ exertion. She could not walk to her mail box without getting short of breath earlier today, prompting her arrival here. She denies associated cough, neck pain, confusion, weakness, hemoptysis, chest pain, abdominal pain, dysuria, vaginal discharge. She has chronic immobility 2/2 to morbid obesity. She denies any recent changes to medications. Pt with leukocytosis to 39 k with fever to 101.5 s/p vanc and cefepime in ER. CTAP with IV con: Multiple foci of subcutaneous gas at the left gluteal fold with mild associated inflammatory changes. Pt with intermittent Boderline SBP in 60s.    WORKUP  BNP: 7759  ESR: 50,   RVP: negative.  MRSA PCR Result.: Jatinder (07-21-21 @ 19:23)  UA: 34 WBC, Large LE, negative nitrates and bactetria  CTAP with IV con: Multiple foci of subcutaneous gas at the left gluteal fold with mild associated inflammatory changes  Reported allergy to penicillin and ciprofloxacin, but cannot remember her reaction to these.    IMPRESSION  ·	Fever  ·	Sacral Decubitus ulcer  ·	Asymptomatic bacteruria   ·	Leukocytosis  ·	Hypoxic respiratory failure    RECOMMENDATIONS  Leukocytosis to 39 k with fever to 101.5  s/p vanc and cefepime in ER  Recommend Vanc 1.5 gm Q12 and Cefepime 2 gm q12  Please check Vancomycin trough before 4th sequential dose. Target trough levels 15-20  Check blood cx and wound cx  Surgery evaluating pt    Pt seen and examined. Case d/w attending and ED team    Krzysztof Mcmahan MD, PGY4   ID fellow  [employer] Teams Preferred/ Pager: [phone]  [employer] pager ID: [phone] (would prefer to text page for any new consult or question, please include name/location and best call back number)  After 5pm/weekends call [phone]

## 2022-02-26 NOTE — ED ADULT NURSE NOTE - BRAND OF COVID-19 VACCINATION
Stacy Young is a 5 year old 7  month old male who was brought in for his  School Physical (Room 2) visit.   Subjective   History was provided by grandmother  HPI:   Patient presents for:  Patient presents with:  School Physical: Room 2      Past Me Cardiovascular: regular rate and rhythm, no murmur  Vascular: well perfused and peripheral pulses equal  Abdomen: non distended, normal bowel sounds, no hepatosplenomegaly, no masses  Genitourinary: normal male, testes descended bilaterally, Lyle  2  S Menveo) Health Maintenance states pt is due      TdaP (Boostrix/Adacel) Vaccine (> 7 Y)      HPV (Gardisil 9) Vaccine Age 5 to 32      Immunization Admin Counseling, Additional Component, <18 years      Immunization Admin Counseling, 1st Component, <18 yea Pfizer dose 1, 2, and 3

## 2022-02-26 NOTE — H&P ADULT - PROBLEM SELECTOR PLAN 7
DVT ppx: Xarelto  Diet: DASH/CC  GOC: Patient stated given poor prognosis if intubated, would not want to be intubated on Humalog 30 TID and Tresiba 30 bedtime     - check A1c  - Insulin 24 pre-meal and 24 long-acting bedtime

## 2022-02-26 NOTE — CONSULT NOTE ADULT - SUBJECTIVE AND OBJECTIVE BOX
Patient is a 66y old  Female who presents with a chief complaint of     HPI: Ms Olivo is a 66 y/o lady with PMH type 2 diabetes with H/O gastroparesis, pulmonary embolism and right lower extremity DVT 2020, PVD, hypertension, aortic stenosis  S/P TAVR  and redo TAVR in  presented to ED today with fever since 4 days. Pt also endorses acute on chronic shortness of breath. This is worsened w/ laying down and w/ exertion. She could not walk to her mail box without getting short of breath earlier today, prompting her arrival here. She denies associated cough, neck pain, confusion, weakness, hemoptysis, chest pain, abdominal pain, dysuria, vaginal discharge. She has chronic immobility 2/2 to morbid obesity. She denies any recent changes to medications. Pt with leukocytosis to 39 k with fever to 101.5 s/p vanc and cefepime in ER. CTAP with IV con: Multiple foci of subcutaneous gas at the left gluteal fold with mild associated inflammatory changes. Pt with intermittent Boderline SBP in 60s.           REVIEW OF SYSTEMS  [  ] ROS unobtainable because:    [ x ] All other systems negative except as noted below    Constitutional:  [ ] fever [ ] chills  [ ] weight loss  [ ]night sweat  [ ]poor appetite/PO intake [ ]fatigue   Skin:  [ ] rash [ ] phlebitis	  Eyes: [ ] icterus [ ] pain  [ ] discharge	  ENMT: [ ] sore throat  [ ] thrush [ ] ulcers [ ] exudates [ ]anosmia  Respiratory: [ ] dyspnea [ ] hemoptysis [ ] cough [ ] sputum	  Cardiovascular:  [ ] chest pain [ ] palpitations [ ] edema	  Gastrointestinal:  [ ] nausea [ ] vomiting [ ] diarrhea [ ] constipation [ ] pain	  Genitourinary:  [ ] dysuria [ ] frequency [ ] hematuria [ ] discharge [ ] flank pain  [ ] incontinence  Musculoskeletal:  [ ] myalgias [ ] arthralgias [ ] arthritis  [ ] back pain  Neurological:  [ ] headache [ ] weakness [ ] seizures  [ ] confusion/altered mental status    prior hospital charts reviewed [V]  primary team notes reviewed [V]  other consultant notes reviewed [V]    PAST MEDICAL & SURGICAL HISTORY:  Hypertension  Gastroparesis due to DM  Depression  Herniated disc  Spinal stenosis chronic back pain  Cholesterol serum elevated  MVP (mitral valve prolapse)  Hypothyroid not on medication  Obesity  IBS (irritable bowel syndrome)  GERD (gastroesophageal reflux disease)  Diabetes type 2, uncontrolled  History of diabetic retinopathy  Peripheral neuropathy  HOCM (hypertrophic obstructive cardiomyopathy)  Atrial fibrillation  Aortic stenosis  S/P bariatric surgery lap band surgery two times due to erosion, S/P removal  S/P tonsillectomy and adenoidectomy  Cataract B/L cataracts  S/P D&amp;C (status post dilation and curettage)D&amp;C when she was 18  Cervix abnormality Ablation of cervix  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  right eye torn retina  S/P TAVR (transcatheter aortic valve replacement)   S/P foot surgery May 2021        SOCIAL HISTORY:  - Denied smoking/vaping/alcohol/recreational drug use    FAMILY HISTORY:  Family history of aortic stenosis  Family hx of structural heart dx    FHx: diabetes mellitus    FH: CAD (coronary artery disease)    Family history of anxiety disorder        Allergies  amoxicillin (Unknown)  Cipro (Unknown)  codeine (Vomiting)  shellfish (Stomach Upset)        ANTIMICROBIALS:      ANTIMICROBIALS (past 90 days):  MEDICATIONS  (STANDING):  cefepime   IVPB   100 mL/Hr IV Intermittent (22 @ 15:34)    vancomycin  IVPB   300 mL/Hr IV Intermittent (22 @ 16:36)        OTHER MEDS:   MEDICATIONS  (STANDING):      VITALS:  Vital Signs Last 24 Hrs  T(F): 98.6 (22 @ 14:46), Max: 101.5 (22 @ 13:10)    Vital Signs Last 24 Hrs  HR: 110 (22 @ 17:13) (84 - 110)  BP: 118/89 (22 @ 14:46) (108/67 - 119/60)  RR: 18 (22 @ 14:46)  SpO2: 100% (22 @ 17:13) (98% - 100%)  Wt(kg): --    EXAM:    GA: NAD, AOx3  HEENT: oral cavity no lesion  CV: nl S1/S2, no RMG  Lungs: CTAB, No distress  Abd: BS+, soft, nontender, no rebounding pain  Ext: no edema  Neuro: No focal deficits  Skin: Intact  IV: no phlebitis    Labs:                        10.1   39.31 )-----------( 265      ( 2022 13:46 )             32.9         134<L>  |  93<L>  |  36<H>  ----------------------------<  205<H>  3.5   |  25  |  1.28    Ca    9.9      2022 13:46  Phos  2.5       Mg     2.0         TPro  7.4  /  Alb  3.9  /  TBili  1.3<H>  /  DBili  x   /  AST  22  /  ALT  17  /  AlkPhos  164<H>        WBC Trend:  WBC Count: 39.31 (22 @ 13:46)      Auto Neutrophil #: 38.64 K/uL (22 @ 13:46)  Band Neutrophils %: 3.4 % (22 @ 13:46)  Auto Neutrophil #: 9.08 K/uL (21 @ 09:19)  Auto Neutrophil #: 13.13 K/uL (21 @ 05:16)  Auto Neutrophil #: 17.66 K/uL (21 @ 11:08)      Creatine Trend:  Creatinine, Serum: 1.28 ()      Liver Biochemical Testing Trend:  Alanine Aminotransferase (ALT/SGPT): 17 ()  Alanine Aminotransferase (ALT/SGPT): 11 ()  Alanine Aminotransferase (ALT/SGPT): 10 ()  Alanine Aminotransferase (ALT/SGPT): 20 ()  Alanine Aminotransferase (ALT/SGPT): 18 ()  Aspartate Aminotransferase (AST/SGOT): 22 (22 @ 13:46)  Aspartate Aminotransferase (AST/SGOT): 15 (21 @ 05:16)  Aspartate Aminotransferase (AST/SGOT): 13 (21 @ 11:08)  Aspartate Aminotransferase (AST/SGOT): 22 (21 @ 16:39)  Aspartate Aminotransferase (AST/SGOT): 34 (21 @ 08:22)  Bilirubin Total, Serum: 1.3 ()  Bilirubin Total, Serum: 0.6 ()  Bilirubin Total, Serum: 0.4 ()  Bilirubin Total, Serum: 0.4 ()  Bilirubin Total, Serum: 0.4 ()      Trend LDH      Auto Eosinophil %: 0.0 % (22 @ 13:46)      Urinalysis Basic - ( 2022 15:18 )    Color: Yellow / Appearance: Clear / S.029 / pH: x  Gluc: x / Ketone: Negative  / Bili: Negative / Urobili: 2 mg/dL   Blood: x / Protein: 30 mg/dL / Nitrite: Negative   Leuk Esterase: Large / RBC: 1 /hpf / WBC 34 /HPF   Sq Epi: x / Non Sq Epi: 4 /hpf / Bacteria: Negative        MICROBIOLOGY:    MRSA PCR Result.: NotDetec (21 @ 19:23)  MRSA PCR Result.: NotDetec (21 @ 18:51)      Culture - Tissue with Gram Stain (collected 06 May 2021 13:23)  Source: .Tissue Other, right [id number] metatarsal condy  Final Report:    No growth at 5 days    Culture - Urine (collected 05 May 2021 06:28)  Source: .Urine Clean Catch (Midstream)  Final Report:    No growth    Culture - Tissue with Gram Stain (collected 05 May 2021 03:37)  Source: .Tissue Other, Right foot 4th Metatarsal  Final Report:    Rare Coag Negative Staphylococcus    Rare Coag Negative Staphylococcus #2    Moderate Anaerobic Gram Negative Nicolas    Most closely resembling    Prevotella species "Susceptibilities not performed"  Organism: Coag Negative Staphylococcus  Coag Negative Staphylococcus  Organism: Coag Negative Staphylococcus    Sensitivities:      -  Ampicillin/Sulbactam: R <=8/4      -  Cefazolin: R <=4      -  Clindamycin: R >4      -  Erythromycin: R >4      -  Gentamicin: S <=1 Should not be used as monotherapy      -  Oxacillin: R >2      -  Penicillin: R >8      -  RIF- Rifampin: S <=1 Should not be used as monotherapy      -  Tetra/Doxy: S <=1      -  Trimethoprim/Sulfamethoxazole: S       -  Vancomycin: S 2      Method Type: RAMÓN  Organism: Coag Negative Staphylococcus    Sensitivities:      -  Ampicillin/Sulbactam: R <=8/4      -  Cefazolin: R <=4      -  Clindamycin: R >4      -  Erythromycin: R >4      -  Gentamicin: R >8 Should not be used as monotherapy      -  Oxacillin: R >2      -  Penicillin: R >8      -  RIF- Rifampin: S <=1 Should not be used as monotherapy      -  Tetra/Doxy: R >8      -  Trimethoprim/Sulfamethoxazole: R >2/38      -  Vancomycin: S 1      Method Type: RAMÓN    Culture - Blood (collected 05 May 2021 00:36)  Source: .Blood Blood  Final Report:    No Growth Final    Culture - Blood (collected 05 May 2021 00:36)  Source: .Blood Blood  Final Report:    No Growth Final                        Rapid RVP Result: NotDetec ( @ 13:45)      COVID-19 Tucker Domain AB Interp: Positive (21 @ 10:51)                Serum Pro-Brain Natriuretic Peptide: 7759 ()    Troponin T, High Sensitivity Result: 23 ()    Blood Gas Venous - Lactate: 3.7 ( @ 13:45)      Sedimentation Rate, Erythrocyte: 50 mm/hr (21 @ 20:03)    CSF:                  RADIOLOGY:  imaging below personally reviewed    < from: Xray Chest 1 View- PORTABLE-Urgent (Xray Chest 1 View- PORTABLE-Urgent .) (22 @ 13:53) >  Mild pulmonary vascular congestion. Right basilaratelectasis and/or pneumonia.  < end of copied text >          CT Abdomen and Pelvis w/ IV Cont:   PROCEDURE DATE:  2022    IMPRESSION:  Multiple foci of subcutaneous gas at the left gluteal fold with mild associated inflammatory changes. No fluid collection identified.  No acute intra-abdominal pathology.   Patient is a 66y old  Female who presents with a chief complaint of     HPI: Ms Olivo is a 64 y/o lady with PMH type 2 diabetes with H/O gastroparesis, pulmonary embolism and right lower extremity DVT 2020, PVD, hypertension, aortic stenosis  S/P TAVR  and redo TAVR in  presented to ED today with fever since 4 days. Pt also endorses acute on chronic shortness of breath. This is worsened w/ laying down and w/ exertion. She could not walk to her mail box without getting short of breath earlier today, prompting her arrival here. She denies associated cough, neck pain, confusion, weakness, hemoptysis, chest pain, abdominal pain, dysuria, vaginal discharge. She has chronic immobility 2/2 to morbid obesity. She denies any recent changes to medications. Pt with leukocytosis to 39 k with fever to 101.5 s/p vanc and cefepime in ER. CTAP with IV con: Multiple foci of subcutaneous gas at the left gluteal fold with mild associated inflammatory changes. ID consulted for the same.       REVIEW OF SYSTEMS  [  ] ROS unobtainable because:    [ x ] All other systems negative except as noted below    Constitutional:  [ ] fever [ ] chills  [ ] weight loss  [ ]night sweat  [ ]poor appetite/PO intake [ ]fatigue   Skin:  [ ] rash [ ] phlebitis	  Eyes: [ ] icterus [ ] pain  [ ] discharge	  ENMT: [ ] sore throat  [ ] thrush [ ] ulcers [ ] exudates [ ]anosmia  Respiratory: [ ] dyspnea [ ] hemoptysis [ ] cough [ ] sputum	  Cardiovascular:  [ ] chest pain [ ] palpitations [ ] edema	  Gastrointestinal:  [ ] nausea [ ] vomiting [ ] diarrhea [ ] constipation [ ] pain	  Genitourinary:  [ ] dysuria [ ] frequency [ ] hematuria [ ] discharge [ ] flank pain  [ ] incontinence  Musculoskeletal:  [ ] myalgias [ ] arthralgias [ ] arthritis  [ ] back pain  Neurological:  [ ] headache [ ] weakness [ ] seizures  [ ] confusion/altered mental status    prior hospital charts reviewed [V]  primary team notes reviewed [V]  other consultant notes reviewed [V]    PAST MEDICAL & SURGICAL HISTORY:  Hypertension  Gastroparesis due to DM  Depression  Herniated disc  Spinal stenosis chronic back pain  Cholesterol serum elevated  MVP (mitral valve prolapse)  Hypothyroid not on medication  Obesity  IBS (irritable bowel syndrome)  GERD (gastroesophageal reflux disease)  Diabetes type 2, uncontrolled  History of diabetic retinopathy  Peripheral neuropathy  HOCM (hypertrophic obstructive cardiomyopathy)  Atrial fibrillation  Aortic stenosis  S/P bariatric surgery lap band surgery two times due to erosion, S/P removal  S/P tonsillectomy and adenoidectomy  Cataract B/L cataracts  S/P D&amp;C (status post dilation and curettage)D&amp;C when she was 18  Cervix abnormality Ablation of cervix  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  right eye torn retina  S/P TAVR (transcatheter aortic valve replacement)   S/P foot surgery May 2021        SOCIAL HISTORY:  - Denied smoking/vaping/alcohol/recreational drug use    FAMILY HISTORY:  Family history of aortic stenosis  Family hx of structural heart dx    FHx: diabetes mellitus    FH: CAD (coronary artery disease)    Family history of anxiety disorder        Allergies  amoxicillin (Unknown)  Cipro (Unknown)  codeine (Vomiting)  shellfish (Stomach Upset)        ANTIMICROBIALS:      ANTIMICROBIALS (past 90 days):  MEDICATIONS  (STANDING):  cefepime   IVPB   100 mL/Hr IV Intermittent (22 @ 15:34)    vancomycin  IVPB   300 mL/Hr IV Intermittent (22 @ 16:36)        OTHER MEDS:   MEDICATIONS  (STANDING):      VITALS:  Vital Signs Last 24 Hrs  T(F): 98.6 (22 @ 14:46), Max: 101.5 (22 @ 13:10)    Vital Signs Last 24 Hrs  HR: 110 (22 @ 17:13) (84 - 110)  BP: 118/89 (22 @ 14:46) (108/67 - 119/60)  RR: 18 (22 @ 14:46)  SpO2: 100% (22 @ 17:13) (98% - 100%)  Wt(kg): --    EXAM:    PHYSICAL EXAM:  General:  non-toxic, AOx3  HEAD/EYES: PERRL, white sclera   ENT:  normal, No thrush and no pharyngeal exudate  Neck: Supple  Cardiovascular:   No murmur,  normal S1 and S2  Respiratory: clear to ausculation bilaterally, tachyapnic and in distress  GI:  soft, non-tender, normal bowel sounds  : no dunn, no CVA tenderness   Musculoskeletal:  no synovitis  Neurologic: non-focal exam   Skin: left buttock in intergluteal space with 3 x 2 cm devitalised ulcer without purulence  Lymph:  no lymphadenopathy  Psychiatric: Cooperative, appropriate affect, alert & oriented  Lines:  no phlebitis         Labs:                        10.1   39.31 )-----------( 265      ( 2022 13:46 )             32.9         134<L>  |  93<L>  |  36<H>  ----------------------------<  205<H>  3.5   |  25  |  1.28    Ca    9.9      2022 13:46  Phos  2.5       Mg     2.0         TPro  7.4  /  Alb  3.9  /  TBili  1.3<H>  /  DBili  x   /  AST  22  /  ALT  17  /  AlkPhos  164<H>        WBC Trend:  WBC Count: 39.31 (22 @ 13:46)      Auto Neutrophil #: 38.64 K/uL (22 @ 13:46)  Band Neutrophils %: 3.4 % (22 @ 13:46)  Auto Neutrophil #: 9.08 K/uL (21 @ 09:19)  Auto Neutrophil #: 13.13 K/uL (21 @ 05:16)  Auto Neutrophil #: 17.66 K/uL (21 @ 11:08)      Creatine Trend:  Creatinine, Serum: 1.28 ()      Liver Biochemical Testing Trend:  Alanine Aminotransferase (ALT/SGPT): 17 ()  Alanine Aminotransferase (ALT/SGPT): 11 ()  Alanine Aminotransferase (ALT/SGPT): 10 ()  Alanine Aminotransferase (ALT/SGPT): 20 ()  Alanine Aminotransferase (ALT/SGPT): 18 ()  Aspartate Aminotransferase (AST/SGOT): 22 (22 @ 13:46)  Aspartate Aminotransferase (AST/SGOT): 15 (21 @ 05:16)  Aspartate Aminotransferase (AST/SGOT): 13 (21 @ 11:08)  Aspartate Aminotransferase (AST/SGOT): 22 (21 @ 16:39)  Aspartate Aminotransferase (AST/SGOT): 34 (21 @ 08:22)  Bilirubin Total, Serum: 1.3 ()  Bilirubin Total, Serum: 0.6 ()  Bilirubin Total, Serum: 0.4 ()  Bilirubin Total, Serum: 0.4 ()  Bilirubin Total, Serum: 0.4 ()      Trend LDH      Auto Eosinophil %: 0.0 % (22 @ 13:46)      Urinalysis Basic - ( 2022 15:18 )    Color: Yellow / Appearance: Clear / S.029 / pH: x  Gluc: x / Ketone: Negative  / Bili: Negative / Urobili: 2 mg/dL   Blood: x / Protein: 30 mg/dL / Nitrite: Negative   Leuk Esterase: Large / RBC: 1 /hpf / WBC 34 /HPF   Sq Epi: x / Non Sq Epi: 4 /hpf / Bacteria: Negative        MICROBIOLOGY:    MRSA PCR Result.: NotDetec (21 @ 19:23)  MRSA PCR Result.: NotDetec (21 @ 18:51)      Culture - Tissue with Gram Stain (collected 06 May 2021 13:23)  Source: .Tissue Other, right [id number] metatarsal condy  Final Report:    No growth at 5 days    Culture - Urine (collected 05 May 2021 06:28)  Source: .Urine Clean Catch (Midstream)  Final Report:    No growth    Culture - Tissue with Gram Stain (collected 05 May 2021 03:37)  Source: .Tissue Other, Right foot 4th Metatarsal  Final Report:    Rare Coag Negative Staphylococcus    Rare Coag Negative Staphylococcus #2    Moderate Anaerobic Gram Negative Nicolas    Most closely resembling    Prevotella species "Susceptibilities not performed"  Organism: Coag Negative Staphylococcus  Coag Negative Staphylococcus  Organism: Coag Negative Staphylococcus    Sensitivities:      -  Ampicillin/Sulbactam: R <=8/4      -  Cefazolin: R <=4      -  Clindamycin: R >4      -  Erythromycin: R >4      -  Gentamicin: S <=1 Should not be used as monotherapy      -  Oxacillin: R >2      -  Penicillin: R >8      -  RIF- Rifampin: S <=1 Should not be used as monotherapy      -  Tetra/Doxy: S <=1      -  Trimethoprim/Sulfamethoxazole: S       -  Vancomycin: S 2      Method Type: RAMÓN  Organism: Coag Negative Staphylococcus    Sensitivities:      -  Ampicillin/Sulbactam: R <=8/4      -  Cefazolin: R <=4      -  Clindamycin: R >4      -  Erythromycin: R >4      -  Gentamicin: R >8 Should not be used as monotherapy      -  Oxacillin: R >2      -  Penicillin: R >8      -  RIF- Rifampin: S <=1 Should not be used as monotherapy      -  Tetra/Doxy: R >8      -  Trimethoprim/Sulfamethoxazole: R >2/38      -  Vancomycin: S 1      Method Type: RAMÓN    Culture - Blood (collected 05 May 2021 00:36)  Source: .Blood Blood  Final Report:    No Growth Final    Culture - Blood (collected 05 May 2021 00:36)  Source: .Blood Blood  Final Report:    No Growth Final                        Rapid RVP Result: NotDetec ( @ 13:45)      COVID-19 Tucker Domain AB Interp: Positive (21 @ 10:51)                Serum Pro-Brain Natriuretic Peptide: 7759 ()    Troponin T, High Sensitivity Result: 23 ()    Blood Gas Venous - Lactate: 3.7 ( @ 13:45)      Sedimentation Rate, Erythrocyte: 50 mm/hr (21 @ 20:03)    CSF:                  RADIOLOGY:  imaging below personally reviewed    < from: Xray Chest 1 View- PORTABLE-Urgent (Xray Chest 1 View- PORTABLE-Urgent .) (22 @ 13:53) >  Mild pulmonary vascular congestion. Right basilaratelectasis and/or pneumonia.  < end of copied text >          CT Abdomen and Pelvis w/ IV Cont:   PROCEDURE DATE:  2022    IMPRESSION:  Multiple foci of subcutaneous gas at the left gluteal fold with mild associated inflammatory changes. No fluid collection identified.  No acute intra-abdominal pathology.

## 2022-02-26 NOTE — H&P ADULT - HISTORY OF PRESENT ILLNESS
67 yo F, PMHx AS s/p TAVR 2015 and 2021, HOCM, pAFib on Xarelto, JOSR (does not tolerate CPAP, on 2L O2 at home qhs), anxiety, morbid obesity, DM, HTN, asthma (on albuterol), presents to ED with fever (Tmax 100.8), chills, n/v, that has been ongoing for the past 4 days. Pt has known sacral ulcer for a few months, follows with wound care, and pt states she has had more pain and significant odor in the region over the past two weeks. No trauma or other significant injuries. She has long standing chronic SOB with exertion (>2 steps), has communicated concerns to outpatient cardiology. She denies associated cough, sputum production, confusion, weakness, hemoptysis, chest pain, abdominal pain, dysuria, vaginal discharge. She has chronic immobility 2/2 to morbid obesity. She denies any recent changes to medications. She has a allergy to penicillin and ciprofloxacin, but cannot remember her reaction to these. Patient states her SOB started since July 2021 and has been worsening.     ED course:   Tmax 101.5F, HR 80s-140s, currently 100, BP dropped to 60/50s. RR 18 SPO2 99 % on NC  Labs significant for: WBC 39.9 neutrophil predominance, Hgb 10, plt 265. INR 2. TSH 0.63. VBG lactate- 3-4.   CT a/p: Multiple foci of subcutaneous gas at the left gluteal fold and perineum with mild associated inflammatory changes. No fluid collection identified. Correlate for necrotizing soft tissue infection.  CXR: clear lungs   WBC: leuk esterase, proteins, WBCs, specific gravity 1.029  Patient was temporarily placed on levo gtt and BiPAP both weaned off.   Patient given GLOVER, Cefepime 2g, Vanco x2,  cc.   Blood cx, urine cx, MRSA PCR, and sacral ulcer cx pending   MICU consulted, not a candidate for MICU at this time. ID consulted recommended continuing Vancomycin / Cefepime / Metronidazole and MRSA PCR. Surgery consulted, completed I&D.          65 yo F, PMHx AS s/p TAVR 2015 and 2021, HOCM, pAFib on Xarelto, JOSR (does not tolerate CPAP, on 2L O2 at home qhs), anxiety, morbid obesity, DM, HTN, asthma (on albuterol), heterozygous prothrombin gene mutation, fourth toe resection, DVT 8/2020 presents to ED with fever (Tmax 100.8), chills, n/v, that has been ongoing for the past 4 days. Pt has known sacral ulcer for a few months, follows with wound care, and pt states she has had more pain and significant odor in the region over the past two weeks. No trauma or other significant injuries. She has long standing chronic SOB with exertion (>2 steps), has communicated concerns to outpatient cardiology. She denies associated cough, sputum production, confusion, weakness, hemoptysis, chest pain, abdominal pain, dysuria, vaginal discharge. She has chronic immobility 2/2 to morbid obesity. She denies any recent changes to medications. She has a allergy to penicillin and ciprofloxacin, but cannot remember her reaction to these. Patient states her SOB started since July 2021 and has been worsening.     ED course:   Tmax 101.5F, HR 80s-140s, currently 100, BP dropped to 60/50s. RR 18 SPO2 99 % on NC  Labs significant for: WBC 39.9 neutrophil predominance, Hgb 10, plt 265. INR 2. TSH 0.63. VBG lactate- 3-4.   CT a/p: Multiple foci of subcutaneous gas at the left gluteal fold and perineum with mild associated inflammatory changes. No fluid collection identified. Correlate for necrotizing soft tissue infection.  CXR: clear lungs   WBC: leuk esterase, proteins, WBCs, specific gravity 1.029  Patient was temporarily placed on levo gtt and BiPAP both weaned off.   Patient given GLOVER, Cefepime 2g, Vanco x2,  cc.   Blood cx, urine cx, MRSA PCR, and sacral ulcer cx pending   MICU consulted, not a candidate for MICU at this time. ID consulted recommended continuing Vancomycin / Cefepime / Metronidazole and MRSA PCR. Surgery consulted, completed I&D.          65 yo F, PMHx AS s/p TAVR 2015 and 2021, HOCM, pAFib on Xarelto, JOSR (does not tolerate CPAP, on 2L O2 at home qhs), anxiety, morbid obesity, DM, HTN, asthma (on albuterol), heterozygous prothrombin gene mutation, fourth toe resection, bedbound for 1 year 2/2 morbid obesity and worsening SOB,  DVT 8/2020 presents to ED with fever (Tmax 100.8), chills, n/v, that has been ongoing for the past 4 days. Pt has known sacral ulcer for a few months, follows with wound care, and pt states she has had more pain and significant odor in the region over the past two weeks. No trauma or other significant injuries. She has long standing chronic SOB with exertion (>2 steps), has communicated concerns to outpatient cardiology. She denies associated cough, sputum production, confusion, weakness, hemoptysis, chest pain, abdominal pain, dysuria, vaginal discharge. She has chronic immobility 2/2 to morbid obesity. She denies any recent changes to medications. She has a allergy to penicillin and ciprofloxacin, but cannot remember her reaction to these. Patient states her SOB started since July 2021 and has been worsening.     ED course:   Tmax 101.5F, HR 80s-140s, currently 100, BP dropped to 60/50s. RR 18 SPO2 99 % on NC  Labs significant for: WBC 39.9 neutrophil predominance, Hgb 10, plt 265. INR 2. TSH 0.63. VBG lactate- 3-4.   CT a/p: Multiple foci of subcutaneous gas at the left gluteal fold and perineum with mild associated inflammatory changes. No fluid collection identified. Correlate for necrotizing soft tissue infection.  CXR: clear lungs   WBC: leuk esterase, proteins, WBCs, specific gravity 1.029  Patient was temporarily placed on levo gtt and BiPAP both weaned off.   Patient given GLOVER, Cefepime 2g, Vanco x2,  cc.   Blood cx, urine cx, MRSA PCR, and sacral ulcer cx pending   MICU consulted, not a candidate for MICU at this time. ID consulted recommended continuing Vancomycin / Cefepime / Metronidazole and MRSA PCR. Surgery consulted, completed I&D.          67 yo F, PMHx AS s/p TAVR 2015 and 2021, HOCM, pAFib on Xarelto, JOSR (does not tolerate CPAP, on 2L O2 at home qhs), anxiety, morbid obesity, DM, HTN, asthma (on albuterol), heterozygous prothrombin gene mutation, fourth toe resection, bedbound for 1 year 2/2 morbid obesity and worsening SOB,  DVT 8/2020 presents to ED with fever (Tmax 100.8), chills, n/v, that has been ongoing for the past 4 days. Pt has known sacral ulcer for a few months, follows with wound care, and pt states she has had more pain and significant odor in the region over the past two weeks. No trauma or other significant injuries. She has long standing chronic SOB with exertion (>2 steps), has communicated concerns to outpatient cardiology. She denies associated cough, sputum production, confusion, weakness, hemoptysis, chest pain, abdominal pain, dysuria, vaginal discharge. She has chronic immobility 2/2 to morbid obesity. She denies any recent changes to medications. She has a allergy to penicillin and ciprofloxacin, but cannot remember her reaction to these. Patient states her SOB started since July 2021 and has been worsening. Pt denies any relieving or exacerbating factors.     ED course:   Tmax 101.5F, HR 80s-140s, currently 100, BP dropped to 60/50s. RR 18 SPO2 99 % on NC  Labs significant for: WBC 39.9 neutrophil predominance, Hgb 10, plt 265. INR 2. TSH 0.63. VBG lactate- 3-4.   CT a/p: Multiple foci of subcutaneous gas at the left gluteal fold and perineum with mild associated inflammatory changes. No fluid collection identified. Correlate for necrotizing soft tissue infection.  CXR: clear lungs   WBC: leuk esterase, proteins, WBCs, specific gravity 1.029  Patient was temporarily placed on levo gtt and BiPAP both weaned off.   Patient given GLOVER, Cefepime 2g, Vanco x2,  cc.   Blood cx, urine cx, MRSA PCR, and sacral ulcer cx pending   MICU consulted, not a candidate for MICU at this time. ID consulted recommended continuing Vancomycin / Cefepime / Metronidazole and MRSA PCR. Surgery consulted, completed I&D.

## 2022-02-26 NOTE — H&P ADULT - PROBLEM SELECTOR PLAN 1
Febrile, tachycardic, hypotensive  UA: sterile pyuria   Soft tissue infection, sacral ulcer ischial wound with associated cellulitis   CXR clear  s/p I&D and Vanco, Cefepime, and GLOVER   BP improved     - follow blood cx, and I&D cx   - Continue  Vanc 1.5 gm Q12 and Cefepime 2 gm q12 and GLOVER   -  check Vancomycin trough before 4th sequential dose. Target trough levels 15-20  - follow MRSA swab  -daily packing changes. To be done by surgical team  -wound care Febrile, tachycardic, hypotensive  UA: sterile pyuria   Soft tissue infection, sacral ulcer ischial wound with associated cellulitis   CXR clear  s/p I&D and Vanco, Cefepime, and GLOVER   BP improved     - follow blood cx, and I&D cx   - Continue  Vanc 1.5 gm Q12 and Cefepime 2 gm q12 and Flagyl   -  check Vancomycin trough before 4th sequential dose. Target trough levels 15-20  - follow MRSA swab  -daily packing changes. To be done by surgical team  -wound care  - 500 cc LR  - ID recs appreciated

## 2022-02-26 NOTE — H&P ADULT - PROBLEM SELECTOR PLAN 4
on Humalog 30 TID and Tresiba 30 bedtime     - check A1c  - Insulin 24 pre-meal and 24 long-acting bedtime On metoprolol tartate 100mg bid and Xarelto    - continue metoprolol tartate 100mg bid, and Xarelto Hx of AS s/p TAVR 2015 and 2021, HOCM, hx of pAFIb on Xarelto (however patient has not taken it past 2 days) and DVT  Hx of JOSR on 2L NC at home for bedtime   CXR clear   pro-BNP 7000s, was 300-600 in past  Could be decompensated HF, PE given hx and immobility, didn't take xarelto past 2 days  Currently on 3L NC SatO2 97 %   Home meds include Lasix 80mg daily   Given hypoxic and and shortness of breath, possible A fib RVR, could additionally be flash additionally pulm edeam   Lilyl 2/2 flash pulm edema     - would benefit from cards and pulm consult   - PT consult  - Hold diuretic in setting of hypotension  - Would consider CTA chest to rule out PE if not improved   - Hold off TTE for now

## 2022-02-26 NOTE — CONSULT NOTE ADULT - SUBJECTIVE AND OBJECTIVE BOX
GENERAL SURGERY CONSULT NOTE  Attending: Dr. Stein  Service: ACS  Contact: [phone]    HPI  67 yo F, PMHx AS s/p TAVR x2 ( & 2021), HOCM, pAFib, JOSR (does not tolerate CPAP, on 2L O2 at home qhs), anxiety, morbid obesity (BMI 53), DM, HTN presents to ED with fever, chills, n/v, that has been ongoing for the past 4 days. The patient has had progressively worsening dyspnea since her TAVR in July Patient has known sacral ulcer for a few months which has been in more pain and significant odor, over the past week. No trauma or other significant injuries. She has associated SOB, acute on chronic. This is worsened w/ laying down and w/ exertion. She could not walk to her mail box without getting short of breath earlier today, prompting her arrival here. She denies associated cough, neck pain, confusion, weakness, hemoptysis, chest pain, abdominal pain, dysuria, vaginal discharge. She has chronic immobility 2/2 to morbid obesity. She denies any recent changes to medications. She has a allergy to penicillin and ciprofloxacin, but cannot remember her reaction to these.    PMH/PSH  Diabetes mellitus    Hypertension    Gastroparesis due to DM    Depression    Herniated disc    Spinal stenosis    Cholesterol serum elevated    MVP (mitral valve prolapse)    Hypothyroid    Obesity    IBS (irritable bowel syndrome)    Neuropathy    GERD (gastroesophageal reflux disease)    Murmur, cardiac    Diabetes type 2, uncontrolled    History of diabetic retinopathy    Peripheral neuropathy    HOCM (hypertrophic obstructive cardiomyopathy)    Atrial fibrillation    Aortic stenosis      S/P bariatric surgery    S/P tonsillectomy and adenoidectomy    Cataract    S/P laminectomy    S/P D&amp;C (status post dilation and curettage)    Cervix abnormality    Breast anomaly    right eye torn retina    S/P TAVR (transcatheter aortic valve replacement)    S/P foot surgery        MEDICATIONS  metroNIDAZOLE  IVPB 500 milliGRAM(s) IV Intermittent once  norepinephrine Infusion 0.1 MICROgram(s)/kG/Min IV Continuous <Continuous>  vancomycin  IVPB 1500 milliGRAM(s) IV Intermittent Once      Allergies    amoxicillin (Unknown)  Cipro (Unknown)  codeine (Vomiting)  shellfish (Stomach Upset)    Intolerances    fish (Stomach Upset)      Social    Physical Exam  T(C): 37.4 (22 @ 17:00), Max: 38.6 (02-26-22 @ 13:10)  HR: 114 (22 @ 18:24) (84 - 140)  BP: 122/93 (22 @ 18:24) (63/56 - 122/93)  RR: 26 (22 @ 18:24) (18 - )  SpO2: 93% (22 @ 18:24) (87% - 100%)  Wt(kg): --  Tmax: T(C): , Max: 38.6 (22 @ 13:10)  Wt(kg): --      Gen: NAD  Neuro: AAOx3  HEENT: normocephalic, atraumatic, no scleral icterus  CV: S1, S2, RRR  Pulm: CTA B/L  Abd: Soft, ND, NT, no rebound, no guarding, no palpable organomegaly/masses  Ext: warm, no edema    LABS                        9.8    20.63 )-----------( 264      ( 2022 17:38 )             31.9         133<L>  |  94<L>  |  38<H>  ----------------------------<  220<H>  3.9   |  20<L>  |  1.28    Ca    9.6      2022 17:38  Phos  2.5       Mg     2.0         TPro  6.8  /  Alb  3.3  /  TBili  1.2  /  DBili  x   /  AST  23  /  ALT  15  /  AlkPhos  170<H>      PT/INR - ( 2022 17:38 )   PT: 25.1 sec;   INR: 2.08 ratio         PTT - ( 2022 17:38 )  PTT:28.7 sec  Urinalysis Basic - ( 2022 15:18 )    Color: Yellow / Appearance: Clear / S.029 / pH: x  Gluc: x / Ketone: Negative  / Bili: Negative / Urobili: 2 mg/dL   Blood: x / Protein: 30 mg/dL / Nitrite: Negative   Leuk Esterase: Large / RBC: 1 /hpf / WBC 34 /HPF   Sq Epi: x / Non Sq Epi: 4 /hpf / Bacteria: Negative            IMAGING   GENERAL SURGERY CONSULT NOTE  Attending: Dr. Stein  Service: ACS  Contact: [phone]    HPI  67 yo F, PMHx AS s/p TAVR x2 ( & 2021), HOCM, pAFib, DVT on Eliquis, JOSR (does not tolerate CPAP, on 2L O2 at home qhs), anxiety, morbid obesity (BMI 53), DM, HTN presents to ED with fever, chills, n/v, that has been ongoing for the past 4 days. The patient has had progressively worsening dyspnea since her TAVR in July and is incapable of walking more than 2 steps. She has been "chair-bound" for the last year. Patient has known ischial pressure ulcer, that the patient states has been there for approximately 2 weeks. She states that it  has been in more painful and has a significant worsening odor over the past week. She denies associated cough, chest pain, abdominal pain, dysuria, vaginal discharge. She denies any recent changes to medications. She has a allergy to penicillin and ciprofloxacin, but cannot remember her reaction to these.    Surgery was called to evaluate ischial pressure ulcer as source of patient's symptoms.    PMH/PSH  Diabetes mellitus    Hypertension    Gastroparesis due to DM    Depression    Herniated disc    Spinal stenosis    Cholesterol serum elevated    MVP (mitral valve prolapse)    Hypothyroid    Obesity    IBS (irritable bowel syndrome)    Neuropathy    GERD (gastroesophageal reflux disease)    Murmur, cardiac    Diabetes type 2, uncontrolled    History of diabetic retinopathy    Peripheral neuropathy    HOCM (hypertrophic obstructive cardiomyopathy)    Atrial fibrillation    Aortic stenosis      S/P bariatric surgery    S/P tonsillectomy and adenoidectomy    Cataract    S/P laminectomy    S/P D&amp;C (status post dilation and curettage)    Cervix abnormality    Breast anomaly    right eye torn retina    S/P TAVR (transcatheter aortic valve replacement)    S/P foot surgery        MEDICATIONS  metroNIDAZOLE  IVPB 500 milliGRAM(s) IV Intermittent once  norepinephrine Infusion 0.1 MICROgram(s)/kG/Min IV Continuous <Continuous>  vancomycin  IVPB 1500 milliGRAM(s) IV Intermittent Once      Allergies    amoxicillin (Unknown)  Cipro (Unknown)  codeine (Vomiting)  shellfish (Stomach Upset)    Intolerances    fish (Stomach Upset)      Social    Physical Exam  T(C): 37.4 (22 @ 17:00), Max: 38.6 (22 @ 13:10)  HR: 114 (22 @ 18:24) (84 - 140)  BP: 122/93 (22 @ 18:24) (63/56 - 122/93)  RR: 26 (22 @ 18:24) (18 - )  SpO2: 93% (22 @ 18:24) (87% - 100%)  Wt(kg): --  Tmax: T(C): , Max: 38.6 (22 @ 13:10)  Wt(kg): --    Gen: Obese  Neuro: AAOx3  HEENT: normocephalic, atraumatic, no scleral icterus  CV: S1, S2, RRR  Pulm: Tachypneic, increased work of breathing  Abd: Soft, ND, NT, no rebound, no guarding, no palpable organomegaly/masses  Ext: edematous  Back: approx 3x3cm necrotic area on left ischium with surrounding cellulitis, not indurated no crepitus.     LABS                        9.8    20.63 )-----------( 264      ( 2022 17:38 )             31.9         133<L>  |  94<L>  |  38<H>  ----------------------------<  220<H>  3.9   |  20<L>  |  1.28    Ca    9.6      2022 17:38  Phos  2.5       Mg     2.0         TPro  6.8  /  Alb  3.3  /  TBili  1.2  /  DBili  x   /  AST  23  /  ALT  15  /  AlkPhos  170<H>      PT/INR - ( 2022 17:38 )   PT: 25.1 sec;   INR: 2.08 ratio         PTT - ( 2022 17:38 )  PTT:28.7 sec  Urinalysis Basic - ( 2022 15:18 )    Color: Yellow / Appearance: Clear / S.029 / pH: x  Gluc: x / Ketone: Negative  / Bili: Negative / Urobili: 2 mg/dL   Blood: x / Protein: 30 mg/dL / Nitrite: Negative   Leuk Esterase: Large / RBC: 1 /hpf / WBC 34 /HPF   Sq Epi: x / Non Sq Epi: 4 /hpf / Bacteria: Negative            IMAGING     GENERAL SURGERY CONSULT NOTE  Attending: Dr. Stein  Service: ACS  Contact: [phone]    HPI  65 yo F, PMHx AS s/p TAVR x2 ( & 2021), HOCM, pAFib, DVT on Eliquis, JOSR (does not tolerate CPAP, on 2L O2 at home qhs), anxiety, morbid obesity (BMI 53), DM, HTN presents to ED with fever, chills, n/v, that has been ongoing for the past 4 days. The patient has had progressively worsening dyspnea since her TAVR in July and is incapable of walking more than 2 steps. She has been "chair-bound" for the last year. Patient has known ischial pressure ulcer, that the patient states has been there for approximately 2 weeks. She states that it  has been in more painful and has a significant worsening odor over the past week. She denies associated cough, chest pain, abdominal pain, dysuria, vaginal discharge. She denies any recent changes to medications. She has a allergy to penicillin and ciprofloxacin, but cannot remember her reaction to these.    Surgery was called to evaluate ischial pressure ulcer as source of patient's symptoms.    PMH/PSH  Diabetes mellitus    Hypertension    Gastroparesis due to DM    Depression    Herniated disc    Spinal stenosis    Cholesterol serum elevated    MVP (mitral valve prolapse)    Hypothyroid    Obesity    IBS (irritable bowel syndrome)    Neuropathy    GERD (gastroesophageal reflux disease)    Murmur, cardiac    Diabetes type 2, uncontrolled    History of diabetic retinopathy    Peripheral neuropathy    HOCM (hypertrophic obstructive cardiomyopathy)    Atrial fibrillation    Aortic stenosis      S/P bariatric surgery    S/P tonsillectomy and adenoidectomy    Cataract    S/P laminectomy    S/P D&amp;C (status post dilation and curettage)    Cervix abnormality    Breast anomaly    right eye torn retina    S/P TAVR (transcatheter aortic valve replacement)    S/P foot surgery        MEDICATIONS  metroNIDAZOLE  IVPB 500 milliGRAM(s) IV Intermittent once  norepinephrine Infusion 0.1 MICROgram(s)/kG/Min IV Continuous <Continuous>  vancomycin  IVPB 1500 milliGRAM(s) IV Intermittent Once      Allergies    amoxicillin (Unknown)  Cipro (Unknown)  codeine (Vomiting)  shellfish (Stomach Upset)    Intolerances    fish (Stomach Upset)      Social    Physical Exam  T(C): 37.4 (22 @ 17:00), Max: 38.6 (22 @ 13:10)  HR: 114 (22 @ 18:24) (84 - 140)  BP: 122/93 (22 @ 18:24) (63/56 - 122/93)  RR: 26 (22 @ 18:24) (18 - )  SpO2: 93% (22 @ 18:24) (87% - 100%)  Wt(kg): --  Tmax: T(C): , Max: 38.6 (22 @ 13:10)  Wt(kg): --    Gen: Obese  Neuro: AAOx3  HEENT: normocephalic, atraumatic, no scleral icterus  CV: S1, S2, RRR  Pulm: Tachypneic, increased work of breathing  Abd: Soft, ND, NT, no rebound, no guarding, no palpable organomegaly/masses  Ext: edematous  Back: approx 3x3cm necrotic area on left ischium with surrounding cellulitis, not indurated no crepitus.     LABS                        9.8    20.63 )-----------( 264      ( 2022 17:38 )             31.9         133<L>  |  94<L>  |  38<H>  ----------------------------<  220<H>  3.9   |  20<L>  |  1.28    Ca    9.6      2022 17:38  Phos  2.5       Mg     2.0         TPro  6.8  /  Alb  3.3  /  TBili  1.2  /  DBili  x   /  AST  23  /  ALT  15  /  AlkPhos  170<H>      PT/INR - ( 2022 17:38 )   PT: 25.1 sec;   INR: 2.08 ratio         PTT - ( 2022 17:38 )  PTT:28.7 sec  Urinalysis Basic - ( 2022 15:18 )    Color: Yellow / Appearance: Clear / S.029 / pH: x  Gluc: x / Ketone: Negative  / Bili: Negative / Urobili: 2 mg/dL   Blood: x / Protein: 30 mg/dL / Nitrite: Negative   Leuk Esterase: Large / RBC: 1 /hpf / WBC 34 /HPF   Sq Epi: x / Non Sq Epi: 4 /hpf / Bacteria: Negative            IMAGING  Multiple foci of subcutaneous gas at the left gluteal fold and perineum   with mild associated inflammatory changes. No fluid collection   identified. Correlate for necrotizing soft tissue infection.

## 2022-02-26 NOTE — ED PROVIDER NOTE - PROGRESS NOTE DETAILS
Erick Kim M.D. (PGY-1):  pt clinically stable. wbc of 40 w/ neutrophilia, anemia similar to prior, hyponatremia suspect to volume overload, reduced egfr w/ high BUN and uptrending Cr, pro-BNP of 7K elevated compared to prior suggesting acute overload, lactate of 3.2. Surgery paged, 2c. Empiric cefepime started. Vanco to start, question of dosing in obese female, ID consulted and awaiting final recs. Awaiting CT results. Erick Kim M.D. (PGY-1): ID recs = 1.5 g Vanco q12 ED SIgn Out, eval for sepsis complicated by buttock ssti, improving, pending CT and Surgery consult for tx, planned admission.   -- Faustino Valencia MD Erick Kim M.D. (PGY-1): patient w/ continued nausea s/p 8 mg zofran, qtc 450 msc, will hold off on additional doses, requesting food, will hold off on po intake pending surgery recs. offered pain medication, declined by pt. critical ill, respiratory distress w/ hypoxia, complicated by severe anxiety, per pt and her sister she does not want bipap, she does not want intubation or CPR, no signs allergic reaction, pt improved w/ positioning / NRB, mentating and answering questions, ICU for dispo, continued close reassessments.   - Faustino Valencia MD Erick Kim M.D. (PGY-1): attempting pain control. pt w/ allergic reaction to codeine, spoke w/ pharmacy, allergy to codeine = nausea, felt that fentanyl will be safe. discussed w/ patient, she prefers to not have opioids and would rather deal with pain. already got tylenol. hold off on NSAID therapy given potential surgical candidate. will cont to monitor pain level. Erick Kim M.D. (PGY-1): lactate trending up, obtained prior to first 250 cc bolus and pressor, will repeat vbg now and trend lactate. consider additional ivf if lactate trends up. levo requirement trending down, MAP goal of 65. spoke w/ ID regarding vanco. pt's decompensation started when infusion was started. no signs of hypersenstivity rxn after this. ID will come see the pt in 5 min and offer recs on abx therapy. Erick Kim M.D. (PGY-1): surgery evaluated, will debride at bedside. Erick Kim M.D. (PGY-1): ID doubts vanco infusion caused decompensation. recs = resume vanco and start flagyl. orders placed. awaiting micu. Erick Kim M.D. (PGY-1): micu resident in room. Erick Kim M.D. (PGY-1): pt titrated down to 4 L NC satting 94 w/ good waveform. Pressors off and MAP ~ 70s w/ this. micu attending in room. Erick Kim M.D. (PGY-1): pt titrated down to 4 L NC satting 94 w/ good waveform. Pressors off and MAP ~ 70s w/ this. micu attending in room. lactate downtrending after 250 cc of fluid. will get extra fluids with vanco and will hold off after this. metronidazole po to minimize excess volume. Erick Kim M.D. (PGY-1): micu declined admission. pt currently saturating well w/ 2 L NC and off pressors. plan = medicine floor w/ telemetry.

## 2022-02-26 NOTE — ED ADULT NURSE NOTE - NSIMPLEMENTINTERV_GEN_ALL_ED
Implemented All Fall with Harm Risk Interventions:  [place] to call system. Call bell, personal items and telephone within reach. Instruct patient to call for assistance. Room bathroom lighting operational. Non-slip footwear when patient is off stretcher. Physically safe environment: no spills, clutter or unnecessary equipment. Stretcher in lowest position, wheels locked, appropriate side rails in place. Provide visual cue, wrist band, yellow gown, etc. Monitor gait and stability. Monitor for mental status changes and reorient to person, place, and time. Review medications for side effects contributing to fall risk. Reinforce activity limits and safety measures with patient and family. Provide visual clues: red socks.

## 2022-02-26 NOTE — H&P ADULT - PROBLEM SELECTOR PLAN 10
- reported discussion between pt and surgical team was that pt wanted DNI.   - on further discussion with pt, pt states she requests to be full code, but only wants to be intubated if she is completely sedated.  - pt requests all medical therapy as necessary and indicated  - time spent 20 min

## 2022-02-26 NOTE — H&P ADULT - PROBLEM SELECTOR PLAN 6
DVT ppx: Xarelto  Diet: DASH/CC DVT ppx: Xarelto  Diet: DASH/CC  GOC: Patient stated given poor prognosis if intubated, would not want to be intubated on Losartan 25 and Lopressor     - hold Losartan given hypotension from sepsis On metoprolol tartate 100mg bid and Xarelto    - continue metoprolol tartate 100mg bid, and Xarelto  - replete K >4, Mg >2

## 2022-02-26 NOTE — ED PROVIDER NOTE - NS ED ROS FT
GENERAL: + fever  EYES: no eye pain  HEENT: no neck pain  CARDIAC: no chest pain  PULMONARY: + SOB  GI: no abdominal pain  : no dysuria  SKIN: + sacral rash  NEURO: no headache  MSK: no new joint pain

## 2022-02-27 DIAGNOSIS — J96.01 ACUTE RESPIRATORY FAILURE WITH HYPOXIA: ICD-10-CM

## 2022-02-27 DIAGNOSIS — E87.2 ACIDOSIS: ICD-10-CM

## 2022-02-27 DIAGNOSIS — N39.0 URINARY TRACT INFECTION, SITE NOT SPECIFIED: ICD-10-CM

## 2022-02-27 DIAGNOSIS — Z71.89 OTHER SPECIFIED COUNSELING: ICD-10-CM

## 2022-02-27 DIAGNOSIS — N17.9 ACUTE KIDNEY FAILURE, UNSPECIFIED: ICD-10-CM

## 2022-02-27 LAB
A1C WITH ESTIMATED AVERAGE GLUCOSE RESULT: 8.6 % — HIGH (ref 4–5.6)
ANION GAP SERPL CALC-SCNC: 15 MMOL/L — SIGNIFICANT CHANGE UP (ref 5–17)
ANION GAP SERPL CALC-SCNC: SIGNIFICANT CHANGE UP MMOL/L (ref 5–17)
BUN SERPL-MCNC: 27 MG/DL — HIGH (ref 7–23)
BUN SERPL-MCNC: 50 MG/DL — HIGH (ref 7–23)
CALCIUM SERPL-MCNC: 3.8 MG/DL — CRITICAL LOW (ref 8.4–10.5)
CALCIUM SERPL-MCNC: 9.2 MG/DL — SIGNIFICANT CHANGE UP (ref 8.4–10.5)
CHLORIDE SERPL-SCNC: 109 MMOL/L — HIGH (ref 96–108)
CHLORIDE SERPL-SCNC: 91 MMOL/L — LOW (ref 96–108)
CO2 SERPL-SCNC: 23 MMOL/L — SIGNIFICANT CHANGE UP (ref 22–31)
CO2 SERPL-SCNC: <10 MMOL/L — CRITICAL LOW (ref 22–31)
CREAT SERPL-MCNC: 0.92 MG/DL — SIGNIFICANT CHANGE UP (ref 0.5–1.3)
CREAT SERPL-MCNC: 2.13 MG/DL — HIGH (ref 0.5–1.3)
CULTURE RESULTS: SIGNIFICANT CHANGE UP
ESTIMATED AVERAGE GLUCOSE: 200 MG/DL — HIGH (ref 68–114)
GLUCOSE SERPL-MCNC: 2038 MG/DL — CRITICAL HIGH (ref 70–99)
GLUCOSE SERPL-MCNC: 359 MG/DL — HIGH (ref 70–99)
HCT VFR BLD CALC: 26.6 % — LOW (ref 34.5–45)
HCT VFR BLD CALC: 30.4 % — LOW (ref 34.5–45)
HGB BLD-MCNC: 7.7 G/DL — LOW (ref 11.5–15.5)
HGB BLD-MCNC: 9.3 G/DL — LOW (ref 11.5–15.5)
LACTATE BLDV-MCNC: 1.5 MMOL/L — SIGNIFICANT CHANGE UP (ref 0.7–2)
MAGNESIUM SERPL-MCNC: 1.1 MG/DL — LOW (ref 1.6–2.6)
MAGNESIUM SERPL-MCNC: 2.2 MG/DL — SIGNIFICANT CHANGE UP (ref 1.6–2.6)
MCHC RBC-ENTMCNC: 24.7 PG — LOW (ref 27–34)
MCHC RBC-ENTMCNC: 25.4 PG — LOW (ref 27–34)
MCHC RBC-ENTMCNC: 28.9 GM/DL — LOW (ref 32–36)
MCHC RBC-ENTMCNC: 30.6 GM/DL — LOW (ref 32–36)
MCV RBC AUTO: 80.9 FL — SIGNIFICANT CHANGE UP (ref 80–100)
MCV RBC AUTO: 87.8 FL — SIGNIFICANT CHANGE UP (ref 80–100)
MRSA PCR RESULT.: SIGNIFICANT CHANGE UP
NRBC # BLD: 0 /100 WBCS — SIGNIFICANT CHANGE UP (ref 0–0)
NRBC # BLD: 0 /100 WBCS — SIGNIFICANT CHANGE UP (ref 0–0)
PHOSPHATE SERPL-MCNC: 2.3 MG/DL — LOW (ref 2.5–4.5)
PHOSPHATE SERPL-MCNC: 4.9 MG/DL — HIGH (ref 2.5–4.5)
PLATELET # BLD AUTO: 202 K/UL — SIGNIFICANT CHANGE UP (ref 150–400)
PLATELET # BLD AUTO: 275 K/UL — SIGNIFICANT CHANGE UP (ref 150–400)
POTASSIUM SERPL-MCNC: 2 MMOL/L — CRITICAL LOW (ref 3.5–5.3)
POTASSIUM SERPL-MCNC: 4.3 MMOL/L — SIGNIFICANT CHANGE UP (ref 3.5–5.3)
POTASSIUM SERPL-SCNC: 2 MMOL/L — CRITICAL LOW (ref 3.5–5.3)
POTASSIUM SERPL-SCNC: 4.3 MMOL/L — SIGNIFICANT CHANGE UP (ref 3.5–5.3)
RBC # BLD: 3.03 M/UL — LOW (ref 3.8–5.2)
RBC # BLD: 3.76 M/UL — LOW (ref 3.8–5.2)
RBC # FLD: 15.9 % — HIGH (ref 10.3–14.5)
RBC # FLD: 16.9 % — HIGH (ref 10.3–14.5)
S AUREUS DNA NOSE QL NAA+PROBE: SIGNIFICANT CHANGE UP
SODIUM SERPL-SCNC: 129 MMOL/L — LOW (ref 135–145)
SODIUM SERPL-SCNC: <85 MMOL/L — CRITICAL LOW (ref 135–145)
SPECIMEN SOURCE: SIGNIFICANT CHANGE UP
WBC # BLD: 26.71 K/UL — HIGH (ref 3.8–10.5)
WBC # BLD: 31.58 K/UL — HIGH (ref 3.8–10.5)
WBC # FLD AUTO: 26.71 K/UL — HIGH (ref 3.8–10.5)
WBC # FLD AUTO: 31.58 K/UL — HIGH (ref 3.8–10.5)

## 2022-02-27 PROCEDURE — 99233 SBSQ HOSP IP/OBS HIGH 50: CPT

## 2022-02-27 PROCEDURE — 99233 SBSQ HOSP IP/OBS HIGH 50: CPT | Mod: GC

## 2022-02-27 RX ORDER — CEFEPIME 1 G/1
2000 INJECTION, POWDER, FOR SOLUTION INTRAMUSCULAR; INTRAVENOUS EVERY 12 HOURS
Refills: 0 | Status: DISCONTINUED | OUTPATIENT
Start: 2022-02-27 | End: 2022-02-27

## 2022-02-27 RX ORDER — GLUCAGON INJECTION, SOLUTION 0.5 MG/.1ML
1 INJECTION, SOLUTION SUBCUTANEOUS ONCE
Refills: 0 | Status: DISCONTINUED | OUTPATIENT
Start: 2022-02-27 | End: 2022-03-10

## 2022-02-27 RX ORDER — NYSTATIN CREAM 100000 [USP'U]/G
1 CREAM TOPICAL
Refills: 0 | Status: DISCONTINUED | OUTPATIENT
Start: 2022-02-27 | End: 2022-03-10

## 2022-02-27 RX ORDER — METRONIDAZOLE 500 MG
500 TABLET ORAL EVERY 8 HOURS
Refills: 0 | Status: DISCONTINUED | OUTPATIENT
Start: 2022-02-27 | End: 2022-02-27

## 2022-02-27 RX ORDER — ATORVASTATIN CALCIUM 80 MG/1
80 TABLET, FILM COATED ORAL AT BEDTIME
Refills: 0 | Status: DISCONTINUED | OUTPATIENT
Start: 2022-02-27 | End: 2022-03-10

## 2022-02-27 RX ORDER — PIPERACILLIN AND TAZOBACTAM 4; .5 G/20ML; G/20ML
3.38 INJECTION, POWDER, LYOPHILIZED, FOR SOLUTION INTRAVENOUS ONCE
Refills: 0 | Status: COMPLETED | OUTPATIENT
Start: 2022-02-28 | End: 2022-02-28

## 2022-02-27 RX ORDER — INSULIN GLARGINE 100 [IU]/ML
24 INJECTION, SOLUTION SUBCUTANEOUS AT BEDTIME
Refills: 0 | Status: DISCONTINUED | OUTPATIENT
Start: 2022-02-27 | End: 2022-02-28

## 2022-02-27 RX ORDER — OXYCODONE HYDROCHLORIDE 5 MG/1
5 TABLET ORAL EVERY 6 HOURS
Refills: 0 | Status: DISCONTINUED | OUTPATIENT
Start: 2022-02-27 | End: 2022-02-28

## 2022-02-27 RX ORDER — VANCOMYCIN HCL 1 G
1500 VIAL (EA) INTRAVENOUS EVERY 12 HOURS
Refills: 0 | Status: DISCONTINUED | OUTPATIENT
Start: 2022-02-27 | End: 2022-02-27

## 2022-02-27 RX ORDER — SODIUM CHLORIDE 9 MG/ML
1000 INJECTION, SOLUTION INTRAVENOUS
Refills: 0 | Status: DISCONTINUED | OUTPATIENT
Start: 2022-02-27 | End: 2022-03-10

## 2022-02-27 RX ORDER — SODIUM CHLORIDE 0.65 %
1 AEROSOL, SPRAY (ML) NASAL EVERY 6 HOURS
Refills: 0 | Status: DISCONTINUED | OUTPATIENT
Start: 2022-02-27 | End: 2022-03-10

## 2022-02-27 RX ORDER — DEXTROSE 50 % IN WATER 50 %
25 SYRINGE (ML) INTRAVENOUS ONCE
Refills: 0 | Status: DISCONTINUED | OUTPATIENT
Start: 2022-02-27 | End: 2022-03-10

## 2022-02-27 RX ORDER — INSULIN LISPRO 100/ML
24 VIAL (ML) SUBCUTANEOUS
Refills: 0 | Status: DISCONTINUED | OUTPATIENT
Start: 2022-02-27 | End: 2022-02-28

## 2022-02-27 RX ORDER — PIPERACILLIN AND TAZOBACTAM 4; .5 G/20ML; G/20ML
3.38 INJECTION, POWDER, LYOPHILIZED, FOR SOLUTION INTRAVENOUS EVERY 12 HOURS
Refills: 0 | Status: DISCONTINUED | OUTPATIENT
Start: 2022-02-28 | End: 2022-03-01

## 2022-02-27 RX ORDER — FLUOXETINE HCL 10 MG
40 CAPSULE ORAL DAILY
Refills: 0 | Status: DISCONTINUED | OUTPATIENT
Start: 2022-02-27 | End: 2022-03-10

## 2022-02-27 RX ORDER — METOPROLOL TARTRATE 50 MG
100 TABLET ORAL
Refills: 0 | Status: DISCONTINUED | OUTPATIENT
Start: 2022-02-27 | End: 2022-03-10

## 2022-02-27 RX ORDER — SODIUM CHLORIDE 9 MG/ML
1000 INJECTION INTRAMUSCULAR; INTRAVENOUS; SUBCUTANEOUS
Refills: 0 | Status: DISCONTINUED | OUTPATIENT
Start: 2022-02-27 | End: 2022-03-04

## 2022-02-27 RX ORDER — DEXTROSE 50 % IN WATER 50 %
15 SYRINGE (ML) INTRAVENOUS ONCE
Refills: 0 | Status: DISCONTINUED | OUTPATIENT
Start: 2022-02-27 | End: 2022-03-10

## 2022-02-27 RX ORDER — RIVAROXABAN 15 MG-20MG
20 KIT ORAL DAILY
Refills: 0 | Status: DISCONTINUED | OUTPATIENT
Start: 2022-02-27 | End: 2022-03-01

## 2022-02-27 RX ORDER — INSULIN LISPRO 100/ML
VIAL (ML) SUBCUTANEOUS
Refills: 0 | Status: DISCONTINUED | OUTPATIENT
Start: 2022-02-27 | End: 2022-03-10

## 2022-02-27 RX ORDER — SODIUM CHLORIDE 9 MG/ML
500 INJECTION, SOLUTION INTRAVENOUS ONCE
Refills: 0 | Status: COMPLETED | OUTPATIENT
Start: 2022-02-27 | End: 2022-02-27

## 2022-02-27 RX ORDER — DEXTROSE 50 % IN WATER 50 %
12.5 SYRINGE (ML) INTRAVENOUS ONCE
Refills: 0 | Status: DISCONTINUED | OUTPATIENT
Start: 2022-02-27 | End: 2022-03-10

## 2022-02-27 RX ORDER — POTASSIUM CHLORIDE 20 MEQ
10 PACKET (EA) ORAL ONCE
Refills: 0 | Status: COMPLETED | OUTPATIENT
Start: 2022-02-27 | End: 2022-02-27

## 2022-02-27 RX ORDER — SODIUM HYPOCHLORITE 0.125 %
1 SOLUTION, NON-ORAL MISCELLANEOUS ONCE
Refills: 0 | Status: COMPLETED | OUTPATIENT
Start: 2022-02-27 | End: 2022-02-27

## 2022-02-27 RX ORDER — INSULIN LISPRO 100/ML
VIAL (ML) SUBCUTANEOUS AT BEDTIME
Refills: 0 | Status: DISCONTINUED | OUTPATIENT
Start: 2022-02-27 | End: 2022-03-10

## 2022-02-27 RX ORDER — ACETAMINOPHEN 500 MG
650 TABLET ORAL EVERY 6 HOURS
Refills: 0 | Status: DISCONTINUED | OUTPATIENT
Start: 2022-02-27 | End: 2022-03-10

## 2022-02-27 RX ORDER — ASPIRIN/CALCIUM CARB/MAGNESIUM 324 MG
81 TABLET ORAL DAILY
Refills: 0 | Status: DISCONTINUED | OUTPATIENT
Start: 2022-02-27 | End: 2022-03-10

## 2022-02-27 RX ADMIN — Medication 650 MILLIGRAM(S): at 05:36

## 2022-02-27 RX ADMIN — Medication 100 MILLIGRAM(S): at 05:06

## 2022-02-27 RX ADMIN — Medication 100 MILLIGRAM(S): at 14:00

## 2022-02-27 RX ADMIN — Medication 40 MILLIGRAM(S): at 12:41

## 2022-02-27 RX ADMIN — Medication 24 UNIT(S): at 09:02

## 2022-02-27 RX ADMIN — CEFEPIME 100 MILLIGRAM(S): 1 INJECTION, POWDER, FOR SOLUTION INTRAMUSCULAR; INTRAVENOUS at 17:45

## 2022-02-27 RX ADMIN — Medication 1: at 17:44

## 2022-02-27 RX ADMIN — Medication 5: at 09:03

## 2022-02-27 RX ADMIN — Medication 650 MILLIGRAM(S): at 05:06

## 2022-02-27 RX ADMIN — OXYCODONE HYDROCHLORIDE 5 MILLIGRAM(S): 5 TABLET ORAL at 17:40

## 2022-02-27 RX ADMIN — INSULIN GLARGINE 24 UNIT(S): 100 INJECTION, SOLUTION SUBCUTANEOUS at 22:35

## 2022-02-27 RX ADMIN — Medication 650 MILLIGRAM(S): at 20:46

## 2022-02-27 RX ADMIN — Medication 4: at 12:43

## 2022-02-27 RX ADMIN — Medication 1 APPLICATION(S): at 17:06

## 2022-02-27 RX ADMIN — RIVAROXABAN 20 MILLIGRAM(S): KIT at 12:41

## 2022-02-27 RX ADMIN — NYSTATIN CREAM 1 APPLICATION(S): 100000 CREAM TOPICAL at 17:55

## 2022-02-27 RX ADMIN — ATORVASTATIN CALCIUM 80 MILLIGRAM(S): 80 TABLET, FILM COATED ORAL at 22:35

## 2022-02-27 RX ADMIN — Medication 100 MILLIGRAM(S): at 17:45

## 2022-02-27 RX ADMIN — Medication 81 MILLIGRAM(S): at 12:41

## 2022-02-27 RX ADMIN — Medication 1 SPRAY(S): at 17:45

## 2022-02-27 RX ADMIN — Medication 650 MILLIGRAM(S): at 18:57

## 2022-02-27 RX ADMIN — SODIUM CHLORIDE 60 MILLILITER(S): 9 INJECTION INTRAMUSCULAR; INTRAVENOUS; SUBCUTANEOUS at 22:34

## 2022-02-27 RX ADMIN — CEFEPIME 100 MILLIGRAM(S): 1 INJECTION, POWDER, FOR SOLUTION INTRAMUSCULAR; INTRAVENOUS at 05:07

## 2022-02-27 RX ADMIN — Medication 100 MILLIEQUIVALENT(S): at 03:29

## 2022-02-27 RX ADMIN — SODIUM CHLORIDE 500 MILLILITER(S): 9 INJECTION, SOLUTION INTRAVENOUS at 01:11

## 2022-02-27 RX ADMIN — Medication 300 MILLIGRAM(S): at 05:07

## 2022-02-27 RX ADMIN — Medication 24 UNIT(S): at 12:42

## 2022-02-27 NOTE — PROGRESS NOTE ADULT - ASSESSMENT
67 yo F, PMHx AS s/p TAVR 2015 and 2021, HOCM, pAFib on Xarelto, JOSR (does not tolerate CPAP, on 2L O2 at home qhs), anxiety, morbid obesity, DM, HTN, asthma (on albuterol), pw sepsis, n/v, likely 2/2 sacral decubitus ulcer/necrotic wound on left ischium with associated cellulitis

## 2022-02-27 NOTE — PHYSICAL THERAPY INITIAL EVALUATION ADULT - TRANSFER SAFETY CONCERNS NOTED: SIT/STAND, REHAB EVAL
Notified MD about low pulse. Patient states his pulse is always in the 40's. decreased weight-shifting ability

## 2022-02-27 NOTE — PROGRESS NOTE ADULT - ASSESSMENT
65 y/o F with multiple severe comorbidities presenting with fever of unknown origin and found to have ischial pressure ulcer    - Bedside debridement of necrotic tissue performed this afternoon.  - recommend once daily dressing change with kerlex or gauze soaked in 1/4 strength dakins with overlying gauze and abd pad    ACS/ATP   [id number]

## 2022-02-27 NOTE — PROGRESS NOTE ADULT - PROBLEM SELECTOR PLAN 7
on Humalog 30 TID and Tresiba 30 bedtime     - check A1c  - Insulin 24 pre-meal and 24 long-acting bedtime on Humalog 30 TID and Tresiba 30 bedtime     - A1c 8.6%  - C/w Insulin 24 pre-meal and 24 long-acting bedtime  - check FSG qAC and qhs  - adjust insulin as needed On metoprolol tartate 100mg bid and Xarelto    - continue metoprolol tartate 100mg bid, and Xarelto  - replete K >4, Mg >2

## 2022-02-27 NOTE — PROGRESS NOTE ADULT - PROBLEM SELECTOR PLAN 1
Febrile, tachycardic, hypotensive  UA: sterile pyuria   Soft tissue infection, sacral ulcer ischial wound with associated cellulitis   CXR clear  s/p I&D and Vanco, Cefepime, and GLOVER   BP improved     - follow blood cx, and I&D cx   - Continue Vanc 1.5 gm Q12 and Cefepime 2 gm q12 and Flagyl   - check Vancomycin trough before 4th sequential dose. Target trough levels 15-20  - follow MRSA swab  - daily packing changes. To be done by surgical team  - wound care  - 500 cc LR  - ID recs appreciated Fever, leukocytosis, hypotension on admission. Sepsis 2/2 likely sacral wound/cellulitis  - follow blood cx, and I&D cx   - Continue Vanc 1.5 gm Q12 and Cefepime 2 gm q12 and Flagyl   - check Vancomycin trough before 4th sequential dose. Target trough levels 15-20  - follow MRSA swab  - daily packing changes. To be done by surgical team  - wound care  - 500 cc LR  - ID recs appreciated

## 2022-02-27 NOTE — PROGRESS NOTE ADULT - ASSESSMENT
64 y/o lady with PMH type 2 diabetes with H/O gastroparesis, pulmonary embolism and right lower extremity DVT 8/2020, PVD, hypertension, aortic stenosis  S/P TAVR 2015 and redo TAVR in 2021 presented to ED today with fever since 4 days. Pt also endorses acute on chronic shortness of breath. This is worsened w/ laying down and w/ exertion. She could not walk to her mail box without getting short of breath earlier today, prompting her arrival here. She denies associated cough, neck pain, confusion, weakness, hemoptysis, chest pain, abdominal pain, dysuria, vaginal discharge. She has chronic immobility 2/2 to morbid obesity. She denies any recent changes to medications. Pt with leukocytosis to 39 k with fever to 101.5 s/p vanc and cefepime in ER. CTAP with IV con: Multiple foci of subcutaneous gas at the left gluteal fold with mild associated inflammatory changes. Pt with intermittent B[place]line SBP in 60s.    WORKUP  BNP: 7759  ESR: 50,   RVP: negative.  MRSA PCR Result.: Jatinder (07-21-21 @ 19:23)  UA: 34 WBC, Large LE, negative nitrates and bactetria  CTAP with IV con: Multiple foci of subcutaneous gas at the left gluteal fold with mild associated inflammatory changes      #Fever, Infected Decubitus Ulcer, Leukocytosis, Hypoxic Respiratory Failure  Discussed case with surgery - less concern for necrotizing fasciitis  Prelim Cultures with Streptococcus anginosis and Enterococcus  MRSA/MSSA Nasal PCR Negative  Tolerated Zosyn and Augmentin in 5/2021 at last admission    --Stop Cefepime/Metronidazole/Vancomycin  --Recommend Zosyn 3.375 mg IV Q12H (Cr trend appears to be worsening)  --Further debridement / wound packing per surgery  --Follow prelim wound cultures  --Follow blood cultures    I will continue to follow. Please feel free to contact me with any further questions.    Hao Torres M.D.  [facility] Division of Infectious Disease  8AM-5PM Monday - Friday: Available on [employer] Teams  After Hours and Holidays (or if no response on [employer] Teams): Please contact the Infectious Diseases Office at [phone]    The above assessment and plan were discussed with surgery team

## 2022-02-27 NOTE — PROGRESS NOTE ADULT - SUBJECTIVE AND OBJECTIVE BOX
Jose Lion MD    Internal Medicine Resident (PGY-3)  Please see "resident assignment" column on [place] for contact and coverage info  ___________________________________________________________________________________________________      MARIA D ROBLEDO 66y Female    Overnight events/subjective:     Vital Signs Last 24 Hrs  T(C): 36.9 (2022 04:50), Max: 38.6 (2022 13:10)  T(F): 98.4 (2022 04:50), Max: 101.5 (2022 13:10)  HR: 98 (2022 04:50) (84 - 140)  BP: 109/68 (2022 04:50) (63/56 - 122/93)  BP(mean): 97 (2022 19:46) (58 - 97)  RR: 20 (2022 04:50) (18 - 28)  SpO2: 99% (2022 04:50) (87% - 100%)    PHYSICAL EXAM:      HOSPITAL MEDICATIONS:  MEDICATIONS  (STANDING):  aspirin enteric coated 81 milliGRAM(s) Oral daily  atorvastatin 80 milliGRAM(s) Oral at bedtime  cefepime   IVPB 2000 milliGRAM(s) IV Intermittent every 12 hours  dextrose 40% Gel 15 Gram(s) Oral once  dextrose 5%. 1000 milliLiter(s) (50 mL/Hr) IV Continuous <Continuous>  dextrose 5%. 1000 milliLiter(s) (100 mL/Hr) IV Continuous <Continuous>  dextrose 50% Injectable 25 Gram(s) IV Push once  dextrose 50% Injectable 12.5 Gram(s) IV Push once  dextrose 50% Injectable 25 Gram(s) IV Push once  FLUoxetine 40 milliGRAM(s) Oral daily  glucagon  Injectable 1 milliGRAM(s) IntraMuscular once  insulin glargine Injectable (LANTUS) 24 Unit(s) SubCutaneous at bedtime  insulin lispro (ADMELOG) corrective regimen sliding scale   SubCutaneous three times a day before meals  insulin lispro (ADMELOG) corrective regimen sliding scale   SubCutaneous at bedtime  insulin lispro Injectable (ADMELOG) 24 Unit(s) SubCutaneous three times a day before meals  metoprolol tartrate 100 milliGRAM(s) Oral two times a day  metroNIDAZOLE  IVPB 500 milliGRAM(s) IV Intermittent every 8 hours  rivaroxaban 20 milliGRAM(s) Oral daily  vancomycin  IVPB 1500 milliGRAM(s) IV Intermittent every 12 hours    MEDICATIONS  (PRN):  acetaminophen     Tablet .. 650 milliGRAM(s) Oral every 6 hours PRN Mild Pain (1 - 3), Moderate Pain (4 - 6)      LABS:                        7.7    26.71 )-----------(       ( 2022 06:57 )             26.6     02    x   |  109<H>  |  27<H>  ----------------------------<  x   x    |  x   |  0.92    Ca    9.6      2022 17:38  Phos  2.3       Mg     1.1         TPro  6.8  /  Alb  3.3  /  TBili  1.2  /  DBili  x   /  AST  23  /  ALT  15  /  AlkPhos  170<H>  -    PT/INR - ( 2022 17:38 )   PT: 25.1 sec;   INR: 2.08 ratio         PTT - ( 2022 17:38 )  PTT:28.7 sec  Urinalysis Basic - ( 2022 15:18 )    Color: Yellow / Appearance: Clear / S.029 / pH: x  Gluc: x / Ketone: Negative  / Bili: Negative / Urobili: 2 mg/dL   Blood: x / Protein: 30 mg/dL / Nitrite: Negative   Leuk Esterase: Large / RBC: 1 /hpf / WBC 34 /HPF   Sq Epi: x / Non Sq Epi: 4 /hpf / Bacteria: Negative         Jose Lion MD    Internal Medicine Resident (PGY-3)  Please see "resident assignment" column on [place] for contact and coverage info  ___________________________________________________________________________________________________      MARIA D ROBLEDO 66y Female    Overnight events/subjective: No acute overnight events. Patient seen at bedside. No complaints. Denies fever, chills, chest pain, shortness of breath, abdominal pain.    Vital Signs Last 24 Hrs  T(C): 36.9 (2022 04:50), Max: 38.6 (2022 13:10)  T(F): 98.4 (2022 04:50), Max: 101.5 (2022 13:10)  HR: 98 (2022 04:50) (84 - 140)  BP: 109/68 (2022 04:50) (63/56 - 122/93)  BP(mean): 97 (2022 19:46) (58 - 97)  RR: 20 (2022 04:50) (18 - 28)  SpO2: 99% (2022 04:50) (87% - 100%)    PHYSICAL EXAM:  GENERAL: NAD, sitting in chair comfortably on 4L NC, obese   HEAD:  Atraumatic, Normocephalic  EYES: conjunctiva and sclera clear  ENT: Moist mucous membranes  CHEST/LUNG: Clear to auscultation bilaterally; No rales, rhonchi, wheezing, or rubs. Unlabored respirations  HEART: S1S2 appreciated, muffled heart sounds   ABDOMEN: Bowel sounds present; Soft, Nontender, Nondistended. No hepatomegally  EXTREMITIES:  2+ Peripheral Pulses, LE 2+ pitting edema b/l up to knees   NERVOUS SYSTEM:  Alert & Oriented X3, speech clear. No deficits   MSK: FROM all 4 extremities, full and equal strength  SKIN: venous stasis dermatitis bilaterally. No other lesions or rashes.    HOSPITAL MEDICATIONS:  MEDICATIONS  (STANDING):  aspirin enteric coated 81 milliGRAM(s) Oral daily  atorvastatin 80 milliGRAM(s) Oral at bedtime  cefepime   IVPB 2000 milliGRAM(s) IV Intermittent every 12 hours  dextrose 40% Gel 15 Gram(s) Oral once  dextrose 5%. 1000 milliLiter(s) (50 mL/Hr) IV Continuous <Continuous>  dextrose 5%. 1000 milliLiter(s) (100 mL/Hr) IV Continuous <Continuous>  dextrose 50% Injectable 25 Gram(s) IV Push once  dextrose 50% Injectable 12.5 Gram(s) IV Push once  dextrose 50% Injectable 25 Gram(s) IV Push once  FLUoxetine 40 milliGRAM(s) Oral daily  glucagon  Injectable 1 milliGRAM(s) IntraMuscular once  insulin glargine Injectable (LANTUS) 24 Unit(s) SubCutaneous at bedtime  insulin lispro (ADMELOG) corrective regimen sliding scale   SubCutaneous three times a day before meals  insulin lispro (ADMELOG) corrective regimen sliding scale   SubCutaneous at bedtime  insulin lispro Injectable (ADMELOG) 24 Unit(s) SubCutaneous three times a day before meals  metoprolol tartrate 100 milliGRAM(s) Oral two times a day  metroNIDAZOLE  IVPB 500 milliGRAM(s) IV Intermittent every 8 hours  rivaroxaban 20 milliGRAM(s) Oral daily  vancomycin  IVPB 1500 milliGRAM(s) IV Intermittent every 12 hours    MEDICATIONS  (PRN):  acetaminophen     Tablet .. 650 milliGRAM(s) Oral every 6 hours PRN Mild Pain (1 - 3), Moderate Pain (4 - 6)      LABS:                        7.7    26.71 )-----------(       ( 2022 06:57 )             26.6         x   |  109<H>  |  27<H>  ----------------------------<  x   x    |  x   |  0.92    Ca    9.6      2022 17:38  Phos  2.3       Mg     1.1         TPro  6.8  /  Alb  3.3  /  TBili  1.2  /  DBili  x   /  AST  23  /  ALT  15  /  AlkPhos  170<H>      PT/INR - ( 2022 17:38 )   PT: 25.1 sec;   INR: 2.08 ratio         PTT - ( 2022 17:38 )  PTT:28.7 sec  Urinalysis Basic - ( 2022 15:18 )    Color: Yellow / Appearance: Clear / S.029 / pH: x  Gluc: x / Ketone: Negative  / Bili: Negative / Urobili: 2 mg/dL   Blood: x / Protein: 30 mg/dL / Nitrite: Negative   Leuk Esterase: Large / RBC: 1 /hpf / WBC 34 /HPF   Sq Epi: x / Non Sq Epi: 4 /hpf / Bacteria: Negative

## 2022-02-27 NOTE — PATIENT PROFILE ADULT - FALL HARM RISK - HARM RISK INTERVENTIONS

## 2022-02-27 NOTE — PHYSICAL THERAPY INITIAL EVALUATION ADULT - GAIT DEVIATIONS NOTED, PT EVAL
decreased zandra/increased time in double stance/decreased velocity of limb motion/decreased stride length/decreased weight-shifting ability

## 2022-02-27 NOTE — PHYSICAL THERAPY INITIAL EVALUATION ADULT - ADDITIONAL COMMENTS
pt lives alone in private home, no steps to enter. Prior to admission, pt was I with all functional mobility and ADLs with rollator.

## 2022-02-27 NOTE — PATIENT PROFILE ADULT - FALL HARM RISK - FALLEN IN PAST
patient doesn't remember exact date in january. patient did not know how to lock rollator and fell./Accidental fall

## 2022-02-27 NOTE — PROGRESS NOTE ADULT - PROBLEM SELECTOR PLAN 10
- reported discussion between pt and surgical team was that pt wanted DNI.   - on further discussion with pt, pt states she requests to be full code, but only wants to be intubated if she is completely sedated.  - pt requests all medical therapy as necessary and indicated  - time spent 20 min DVT ppx: Xarelto  Diet: DASH/CC  PT consult

## 2022-02-27 NOTE — PROGRESS NOTE ADULT - PROBLEM SELECTOR PLAN 2
Hypoxic to 87 % , temporarily required BiPAP   Could be 2/2 flash pulm edema now improved  - continue to monitor   - currently on 4l nc, wean as tolerated

## 2022-02-27 NOTE — PROGRESS NOTE ADULT - PROBLEM SELECTOR PLAN 9
DVT ppx: Xarelto  Diet: DASH/CC  PT consult on Losartan 25 and Lopressor   - c/w lopressor  - hold Losartan given hypotension from sepsis

## 2022-02-27 NOTE — PROGRESS NOTE ADULT - PROBLEM SELECTOR PLAN 8
on Losartan 25 and Lopressor     - hold Losartan given hypotension from sepsis on Losartan 25 and Lopressor     - c/w lopressor  - hold Losartan given hypotension from sepsis on Humalog 30 TID and Tresiba 30 bedtime   - A1c 8.6%  - C/w Insulin 24 pre-meal and 24 long-acting bedtime  - check FSG qAC and qhs  - adjust insulin as needed

## 2022-02-27 NOTE — PROGRESS NOTE ADULT - PROBLEM SELECTOR PLAN 4
Hx of AS s/p TAVR 2015 and 2021, HOCM, hx of pAFIb on Xarelto (however patient has not taken it past 2 days) and DVT  Hx of JOSR on 2L NC at home for bedtime   CXR clear   pro-BNP 7000s, was 300-600 in past  Could be decompensated HF, PE given hx and immobility, didn't take xarelto past 2 days  Currently on 3L NC SatO2 97 %   Home meds include Lasix 80mg daily   Given hypoxic and and shortness of breath, possible A fib RVR, could additionally be flash additionally pulm edema    - PT consult  - Hold diuretic today in setting of hypotension  - Would consider CTA chest to rule out PE if not improving  - TTE Hx of AS s/p TAVR 2015 and 2021, HOCM, hx of pAFIb on Xarelto (however patient has not taken it past 2 days) and DVT, JOSR on 2L NC at home for bedtime   CXR w/ mild PVC, pro-BNP 7000s (was 300-600 in past)  Likely 2/2 fluid overload, less likely PE on xarelto  - PT consult  - Hold home diuretic (lasix 80mg qd)  today in setting of hypotension  - Would consider CTA chest to rule out PE if not improving  - TTE

## 2022-02-27 NOTE — PROGRESS NOTE ADULT - PROBLEM SELECTOR PLAN 3
Lactate 4-->2.8-->0.5  s/p 250 cc units and levo gtt for 4 hours  Likely 2/2 sepsis    - will continue to trend  - 500 cc LR Lactate 4-->2.8-->0.5  s/p 250 cc units and levo gtt for 4 hours  Likely 2/2 sepsis    - will continue to trend  - hold off additional IVF for now given hypoxia and possible CHF Likely 2/2 sepsis  - lactate improving  - will continue to trend  - hold off additional IVF for now given hypoxia and possible CHF

## 2022-02-27 NOTE — PROGRESS NOTE ADULT - ATTENDING COMMENTS
65 yo F, PMHx AS s/p TAVR 2015 and 2021, HOCM, pAFib on Xarelto, JOSR (does not tolerate CPAP, on 2L O2 at home qhs), anxiety, morbid obesity, DM, HTN, asthma (on albuterol), pw sepsis, n/v, likely 2/2 sacral decubitus ulcer/necrotic wound on left ischium with associated cellulitis  Off bipap, off levophed. IVF.   Pt was seen by the surgery team s/p debridement , f/up wound and blood CX .  CW current antibiotics.  Pain control.             Milly Michaels   Hospitalist  [phone]

## 2022-02-27 NOTE — PATIENT PROFILE ADULT - HEALTH LITERACY
OFFICE VISIT      Patient: Victorino Bullock Date of Service: 2019   : 1945 MRN: [id number]     SUBJECTIVE:   HISTORY OF PRESENT ILLNESS:  Victorino Bullock is a 73 year old male who presents today for EVAL  HAND AND LBP  CHRONIC RECURRENT  WORSE WITH ACTIVITY  SYMPTOMS ARE MILD IN NATURE  PT IS ABLE TO FUNCTION  CONDITION IS NOT PROGRESSIVE OVERTIMES  NOT GETTING BETTER  WILL IMAGE  KNOWN HTN CHOL ANXIETY   CANCER       Chief Complaint   Patient presents with   • Pain     in both hands        HPI    PAST MEDICAL HISTORY:  History reviewed. No pertinent past medical history.    MEDICATIONS:  Current Outpatient Medications   Medication Sig   • montelukast (SINGULAIR) 10 MG tablet Take 1 tablet by mouth every evening.   • ezetimibe (ZETIA) 10 MG tablet TAKE 1 TABLET BY MOUTH EVERY DAY   • sertraline (ZOLOFT) 100 MG tablet TAKE 1 TABLET BY MOUTH EVERY DAY   • amLODIPine (NORVASC) 5 MG tablet Take 1 tablet by mouth daily.   • ezetimibe (ZETIA) 10 MG tablet Take 1 tablet by mouth daily.   • sertraline (ZOLOFT) 100 MG tablet Take 1 tablet by mouth daily.   • fluticasone (FLONASE) 50 MCG/ACT nasal spray SHAKE LIQUID AND USE 2 SPRAYS IN EACH NOSTRIL EVERY DAY   • bicalutamide (CASODEX) 50 MG tablet Take 1 tablet by mouth daily.     No current facility-administered medications for this visit.        ALLERGIES:  ALLERGIES:  No Known Allergies    PAST SURGICAL HISTORY:  History reviewed. No pertinent surgical history.    FAMILY HISTORY:  History reviewed. No pertinent family history.    SOCIAL HISTORY:  Social History     Tobacco Use   • Smoking status: Never Smoker   • Smokeless tobacco: Never Used   Substance Use Topics   • Alcohol use: Not on file   • Drug use: Not on file       Review of Systems   Constitutional: Negative.    HENT: Negative.    Eyes: Negative.    Respiratory: Negative.    Cardiovascular: Negative.    Gastrointestinal: Negative.    Genitourinary: Negative.    Musculoskeletal: Negative for neck  pain.   Skin: Negative.    Neurological: Negative for speech change and focal weakness.   Endo/Heme/Allergies: Negative.    Psychiatric/Behavioral: Negative.    All other systems reviewed and are negative.      OBJECTIVE:     Physical Exam   Constitutional: He is oriented to person, place, and time and well-developed, well-nourished, and in no distress.   HENT:   Head: Normocephalic and atraumatic.   Right Ear: External ear normal.   Left Ear: External ear normal.   Nose: Nose normal.   Mouth/Throat: Oropharynx is clear and moist.   Eyes: Pupils are equal, round, and reactive to light. Conjunctivae and EOM are normal.   Neck: Normal range of motion. Neck supple.   Cardiovascular: Normal rate, regular rhythm, normal heart sounds and intact distal pulses.   Pulmonary/Chest: Effort normal and breath sounds normal.   Abdominal: Soft. Bowel sounds are normal.   Musculoskeletal: Normal range of motion.   THUMB AREA TENDER   FACET TENDER WITH PAINFUL ROM  DISTAL MOTOR INTACT  NORMAL GAIT  SLR NEGATIVE   Neurological: He is alert and oriented to person, place, and time. Gait normal.   Skin: Skin is warm and dry.   Psychiatric: Mood, affect and judgment normal.       Visit Vitals  /68   Pulse 64   Ht 5' 9\" (1.753 m)   Wt 80.3 kg (177 lb 0.5 oz)   BMI 26.14 kg/m²       DIAGNOSTIC STUDIES:   LAB RESULTS:  No visits with results within 1 Month(s) from this visit.   Latest known visit with results is:   Lab Services on 06/26/2019   Component Date Value Ref Range Status   • PSA, Total 06/26/2019 <0.01  <4.01 ng/mL Final       Assessment AND PLAN:   This is a 73 year old year-old male who presents with       Victorino was seen today for pain.    Diagnoses and all orders for this visit:    Bilateral hand pain  -     XR HAND MIN 3 VIEWS BILATERAL; Future    Osteoarthritis of lumbar spine, unspecified spinal osteoarthritis complication status  -     XR LUMBAR SPINE AP LAT OBLIQUES MIN 4 VIEWS; Future    KAILA (generalized anxiety  disorder)  -     CBC & AUTO DIFFERENTIAL; Future  -     COMPREHENSIVE METABOLIC PANEL; Future  -     LIPID PANEL WITH REFLEX; Future  -     THYROID STIMULATING HORMONE REFLEX; Future  -     VITAMIN B12 LEVEL; Future    Essential hypertension  -     CBC & AUTO DIFFERENTIAL; Future  -     COMPREHENSIVE METABOLIC PANEL; Future  -     LIPID PANEL WITH REFLEX; Future  -     THYROID STIMULATING HORMONE REFLEX; Future  -     VITAMIN B12 LEVEL; Future    Hyperlipidemia, unspecified hyperlipidemia type  -     CBC & AUTO DIFFERENTIAL; Future  -     COMPREHENSIVE METABOLIC PANEL; Future  -     LIPID PANEL WITH REFLEX; Future  -     THYROID STIMULATING HORMONE REFLEX; Future  -     VITAMIN B12 LEVEL; Future    Malignant neoplasm of prostate (CMS/HCC)      MONITOR PSA    Please take medications as directed.      Instructions provided as documented in the AVS.    No follow-ups on file.      The patient and wife indicated understanding of the diagnosis and agreed with the plan of care.         no

## 2022-02-27 NOTE — PHYSICAL THERAPY INITIAL EVALUATION ADULT - PRECAUTIONS/LIMITATIONS, REHAB EVAL
Pt has known sacral ulcer for a few months, follows with wound care, and pt states she has had more pain and significant odor in the region over the past two weeks. Patient states her SOB started since July 2021 and has been worsening. Had I&D of Decubitus ulcer, Necrotic tissue debrided, wound irrigated and packed in ED. Pt has known sacral ulcer for a few months, follows with wound care, and pt states she has had more pain and significant odor in the region over the past two weeks. Patient states her SOB started since July 2021 and has been worsening. Had I&D of Decubitus ulcer, Necrotic tissue debrided, wound irrigated and packed in ED./fall precautions

## 2022-02-27 NOTE — PROGRESS NOTE ADULT - PROBLEM SELECTOR PLAN 5
Positive UA  - follow urine cx  - asx at this time  - on abx for sacral wound/cellulitis as above Cr normal on admission, now increasing to 2.2  - likely ATN i/s/o hypotension requiring vasopressors  - BP improved s/p IVF, continue to monitor  - hold diuretics for today

## 2022-02-27 NOTE — PROGRESS NOTE ADULT - PROBLEM SELECTOR PLAN 6
On metoprolol tartate 100mg bid and Xarelto    - continue metoprolol tartate 100mg bid, and Xarelto  - replete K >4, Mg >2 Positive UA  - follow urine cx  - asx at this time  - on abx for sacral wound/cellulitis as above

## 2022-02-27 NOTE — PHYSICAL THERAPY INITIAL EVALUATION ADULT - PERTINENT HX OF CURRENT PROBLEM, REHAB EVAL
65 yo F, PMHx AS s/p TAVR 2015 and 2021, HOCM, pAFib on Xarelto, JOSR (does not tolerate CPAP, on 2L O2 at home qhs), anxiety, morbid obesity, DM, HTN, asthma (on albuterol), heterozygous prothrombin gene mutation, fourth toe resection, bedbound for 1 year 2/2 morbid obesity and worsening SOB,  DVT 8/2020 presents to ED with fever (Tmax 100.8), chills, n/v, that has been ongoing for the past 4 days.

## 2022-02-27 NOTE — PROGRESS NOTE ADULT - SUBJECTIVE AND OBJECTIVE BOX
Follow Up:      Interval History:    REVIEW OF SYSTEMS  [  ] ROS unobtainable because:    [  ] All other systems negative except as noted below    Constitutional:  [ ] fever [ ] chills  [ ] weight loss  [ ] weakness  Skin:  [ ] rash [ ] phlebitis	  Eyes: [ ] icterus [ ] pain  [ ] discharge	  ENMT: [ ] sore throat  [ ] thrush [ ] ulcers [ ] exudates  Respiratory: [ ] dyspnea [ ] hemoptysis [ ] cough [ ] sputum	  Cardiovascular:  [ ] chest pain [ ] palpitations [ ] edema	  Gastrointestinal:  [ ] nausea [ ] vomiting [ ] diarrhea [ ] constipation [ ] pain	  Genitourinary:  [ ] dysuria [ ] frequency [ ] hematuria [ ] discharge [ ] flank pain  [ ] incontinence  Musculoskeletal:  [ ] myalgias [ ] arthralgias [ ] arthritis  [ ] back pain  Neurological:  [ ] headache [ ] seizures  [ ] confusion/altered mental status    Allergies  amoxicillin (Unknown)  Cipro (Unknown)  codeine (Vomiting)  shellfish (Stomach Upset)        ANTIMICROBIALS:  cefepime   IVPB 2000 every 12 hours  metroNIDAZOLE  IVPB 500 every 8 hours      OTHER MEDS:  MEDICATIONS  (STANDING):  acetaminophen     Tablet .. 650 every 6 hours PRN  aspirin enteric coated 81 daily  atorvastatin 80 at bedtime  dextrose 40% Gel 15 once  dextrose 50% Injectable 25 once  dextrose 50% Injectable 12.5 once  dextrose 50% Injectable 25 once  FLUoxetine 40 daily  glucagon  Injectable 1 once  insulin glargine Injectable (LANTUS) 24 at bedtime  insulin lispro (ADMELOG) corrective regimen sliding scale  three times a day before meals  insulin lispro (ADMELOG) corrective regimen sliding scale  at bedtime  insulin lispro Injectable (ADMELOG) 24 three times a day before meals  metoprolol tartrate 100 two times a day  oxyCODONE    IR 5 every 6 hours PRN  rivaroxaban 20 daily      Vital Signs Last 24 Hrs  T(C): 36.9 (2022 12:13), Max: 37.2 (2022 20:28)  T(F): 98.4 (2022 12:13), Max: 98.9 (2022 20:28)  HR: 85 (2022 12:13) (85 - 125)  BP: 107/66 (2022 12:13) (80/50 - 122/93)  BP(mean): 97 (2022 19:46) (58 - 97)  RR: 18 (2022 12:13) (18 - 28)  SpO2: 97% (2022 12:13) (90% - 99%)    PHYSICAL EXAMINATION:  General: Alert and Awake, NAD  HEENT: PERRL, EOMI  Neck: Supple  Cardiac: RRR, No M/R/G  Resp: CTAB, No Wh/Rh/Ra  Abdomen: NBS, NT/ND, No HSM, No rigidity or guarding  MSK: No LE edema. No Calf tenderness  : No dunn  Skin: No rashes or lesions. Skin is warm and dry to the touch.   Neuro: Alert and Awake. CN 2-12 Grossly intact. Moves all four extremities spontaneously.  Psych: Calm, Pleasant, Cooperative                          9.3    31.58 )-----------( 275      ( 2022 09:54 )             30.4           129<L>  |  91<L>  |  50<H>  ----------------------------<  359<H>  4.3   |  23  |  2.13<H>    Ca    9.2      2022 09:54  Phos  4.9       Mg     2.2         TPro  6.8  /  Alb  3.3  /  TBili  1.2  /  DBili  x   /  AST  23  /  ALT  15  /  AlkPhos  170<H>        Urinalysis Basic - ( 2022 15:18 )    Color: Yellow / Appearance: Clear / S.029 / pH: x  Gluc: x / Ketone: Negative  / Bili: Negative / Urobili: 2 mg/dL   Blood: x / Protein: 30 mg/dL / Nitrite: Negative   Leuk Esterase: Large / RBC: 1 /hpf / WBC 34 /HPF   Sq Epi: x / Non Sq Epi: 4 /hpf / Bacteria: Negative        MICROBIOLOGY:  v  Clean Catch Clean Catch (Midstream)  22   <10,000 CFU/mL Normal Urogenital Gia  --  --          Rapid RVP Result: NotDetec ( @ 13:45)        RADIOLOGY:    <The imaging below has been reviewed and visualized by me independently. Findings as detailed in report below> Follow Up:  infected wound    Interval History: continued severe pain in buttocks. afebrile.     REVIEW OF SYSTEMS  [  ] ROS unobtainable because:    [ x ] All other systems negative except as noted below    Constitutional:  [ ] fever [ ] chills  [ ] weight loss  [ ] weakness  Skin:  [ ] rash [ ] phlebitis	  Eyes: [ ] icterus [ ] pain  [ ] discharge	  ENMT: [ ] sore throat  [ ] thrush [ ] ulcers [ ] exudates  Respiratory: [ ] dyspnea [ ] hemoptysis [ ] cough [ ] sputum	  Cardiovascular:  [ ] chest pain [ ] palpitations [ ] edema	  Gastrointestinal:  [ ] nausea [ ] vomiting [ ] diarrhea [ ] constipation [ ] pain	  Genitourinary:  [ ] dysuria [ ] frequency [ ] hematuria [ ] discharge [ ] flank pain  [ ] incontinence  Musculoskeletal:  [ ] myalgias [ ] arthralgias [ ] arthritis  [ ] back pain +buttock pain  Neurological:  [ ] headache [ ] seizures  [ ] confusion/altered mental status    Allergies  amoxicillin (Unknown)  Cipro (Unknown)  codeine (Vomiting)  shellfish (Stomach Upset)        ANTIMICROBIALS:  cefepime   IVPB 2000 every 12 hours  metroNIDAZOLE  IVPB 500 every 8 hours      OTHER MEDS:  MEDICATIONS  (STANDING):  acetaminophen     Tablet .. 650 every 6 hours PRN  aspirin enteric coated 81 daily  atorvastatin 80 at bedtime  dextrose 40% Gel 15 once  dextrose 50% Injectable 25 once  dextrose 50% Injectable 12.5 once  dextrose 50% Injectable 25 once  FLUoxetine 40 daily  glucagon  Injectable 1 once  insulin glargine Injectable (LANTUS) 24 at bedtime  insulin lispro (ADMELOG) corrective regimen sliding scale  three times a day before meals  insulin lispro (ADMELOG) corrective regimen sliding scale  at bedtime  insulin lispro Injectable (ADMELOG) 24 three times a day before meals  metoprolol tartrate 100 two times a day  oxyCODONE    IR 5 every 6 hours PRN  rivaroxaban 20 daily      Vital Signs Last 24 Hrs  T(C): 36.9 (2022 12:13), Max: 37.2 (2022 20:28)  T(F): 98.4 (2022 12:13), Max: 98.9 (2022 20:28)  HR: 85 (2022 12:13) (85 - 125)  BP: 107/66 (2022 12:13) (80/50 - 122/93)  BP(mean): 97 (2022 19:46) (58 - 97)  RR: 18 (2022 12:13) (18 - 28)  SpO2: 97% (2022 12:13) (90% - 99%)    PHYSICAL EXAMINATION:  General: Alert and Awake, NAD  HEENT: PERRL, EOMI  Cardiac: RRR, No M/R/G  Resp: CTAB, No Wh/Rh/Ra  Abdomen: NBS, NT/ND, No HSM, No rigidity or guarding  MSK: No LE edema. No Calf tenderness  : Dressing over gluteal wound  Skin: No rashes or lesions. Skin is warm and dry to the touch.   Neuro: Alert and Awake. CN 2-12 Grossly intact. Moves all four extremities spontaneously.  Psych: Calm, Pleasant, Cooperative                          9.3    31.58 )-----------( 275      ( 2022 09:54 )             30.4           129<L>  |  91<L>  |  50<H>  ----------------------------<  359<H>  4.3   |  23  |  2.13<H>    Ca    9.2      2022 09:54  Phos  4.9       Mg     2.2         TPro  6.8  /  Alb  3.3  /  TBili  1.2  /  DBili  x   /  AST  23  /  ALT  15  /  AlkPhos  170<H>        Urinalysis Basic - ( 2022 15:18 )    Color: Yellow / Appearance: Clear / S.029 / pH: x  Gluc: x / Ketone: Negative  / Bili: Negative / Urobili: 2 mg/dL   Blood: x / Protein: 30 mg/dL / Nitrite: Negative   Leuk Esterase: Large / RBC: 1 /hpf / WBC 34 /HPF   Sq Epi: x / Non Sq Epi: 4 /hpf / Bacteria: Negative        MICROBIOLOGY:    Clean Catch Clean Catch (Midstream)  22   <10,000 CFU/mL Normal Urogenital Gia  --  --    Rapid RVP Result: Pandatec ( @ 13:45)    RADIOLOGY:    <The imaging below has been reviewed and visualized by me independently. Findings as detailed in report below>    < from: CT Abdomen and Pelvis w/ IV Cont (22 @ 16:37) >  IMPRESSION:    Multiple foci of subcutaneous gas at the left gluteal fold and perineum   with mild associated inflammatory changes. No fluid collection   identified. Correlate for necrotizing soft tissue infection.    < end of copied text >

## 2022-02-28 DIAGNOSIS — J96.11 CHRONIC RESPIRATORY FAILURE WITH HYPOXIA: ICD-10-CM

## 2022-02-28 LAB
-  AMPICILLIN: SIGNIFICANT CHANGE UP
-  TETRACYCLINE: SIGNIFICANT CHANGE UP
-  VANCOMYCIN: SIGNIFICANT CHANGE UP
ANION GAP SERPL CALC-SCNC: 17 MMOL/L — SIGNIFICANT CHANGE UP (ref 5–17)
BUN SERPL-MCNC: 69 MG/DL — HIGH (ref 7–23)
CALCIUM SERPL-MCNC: 9.3 MG/DL — SIGNIFICANT CHANGE UP (ref 8.4–10.5)
CHLORIDE SERPL-SCNC: 91 MMOL/L — LOW (ref 96–108)
CO2 SERPL-SCNC: 21 MMOL/L — LOW (ref 22–31)
CREAT ?TM UR-MCNC: 140 MG/DL — SIGNIFICANT CHANGE UP
CREAT SERPL-MCNC: 3.3 MG/DL — HIGH (ref 0.5–1.3)
CULTURE RESULTS: SIGNIFICANT CHANGE UP
GLUCOSE SERPL-MCNC: 237 MG/DL — HIGH (ref 70–99)
HCT VFR BLD CALC: 29.6 % — LOW (ref 34.5–45)
HGB BLD-MCNC: 9.1 G/DL — LOW (ref 11.5–15.5)
MAGNESIUM SERPL-MCNC: 2.4 MG/DL — SIGNIFICANT CHANGE UP (ref 1.6–2.6)
MCHC RBC-ENTMCNC: 24.9 PG — LOW (ref 27–34)
MCHC RBC-ENTMCNC: 30.7 GM/DL — LOW (ref 32–36)
MCV RBC AUTO: 81.1 FL — SIGNIFICANT CHANGE UP (ref 80–100)
METHOD TYPE: SIGNIFICANT CHANGE UP
NRBC # BLD: 0 /100 WBCS — SIGNIFICANT CHANGE UP (ref 0–0)
ORGANISM # SPEC MICROSCOPIC CNT: SIGNIFICANT CHANGE UP
ORGANISM # SPEC MICROSCOPIC CNT: SIGNIFICANT CHANGE UP
OSMOLALITY UR: 368 MOS/KG — SIGNIFICANT CHANGE UP (ref 300–900)
PHOSPHATE SERPL-MCNC: 5.4 MG/DL — HIGH (ref 2.5–4.5)
PLATELET # BLD AUTO: 256 K/UL — SIGNIFICANT CHANGE UP (ref 150–400)
POTASSIUM SERPL-MCNC: 3.9 MMOL/L — SIGNIFICANT CHANGE UP (ref 3.5–5.3)
POTASSIUM SERPL-SCNC: 3.9 MMOL/L — SIGNIFICANT CHANGE UP (ref 3.5–5.3)
RBC # BLD: 3.65 M/UL — LOW (ref 3.8–5.2)
RBC # FLD: 15.8 % — HIGH (ref 10.3–14.5)
SODIUM SERPL-SCNC: 129 MMOL/L — LOW (ref 135–145)
SODIUM UR-SCNC: 8 MMOL/L — SIGNIFICANT CHANGE UP
SPECIMEN SOURCE: SIGNIFICANT CHANGE UP
UUN UR-MCNC: 282 MG/DL — SIGNIFICANT CHANGE UP
WBC # BLD: 22.61 K/UL — HIGH (ref 3.8–10.5)
WBC # FLD AUTO: 22.61 K/UL — HIGH (ref 3.8–10.5)

## 2022-02-28 PROCEDURE — 93306 TTE W/DOPPLER COMPLETE: CPT | Mod: 26

## 2022-02-28 PROCEDURE — 99232 SBSQ HOSP IP/OBS MODERATE 35: CPT

## 2022-02-28 PROCEDURE — 99233 SBSQ HOSP IP/OBS HIGH 50: CPT | Mod: GC

## 2022-02-28 RX ORDER — OXYCODONE HYDROCHLORIDE 5 MG/1
10 TABLET ORAL EVERY 6 HOURS
Refills: 0 | Status: DISCONTINUED | OUTPATIENT
Start: 2022-02-28 | End: 2022-03-01

## 2022-02-28 RX ORDER — INSULIN GLARGINE 100 [IU]/ML
28 INJECTION, SOLUTION SUBCUTANEOUS AT BEDTIME
Refills: 0 | Status: DISCONTINUED | OUTPATIENT
Start: 2022-02-28 | End: 2022-03-10

## 2022-02-28 RX ORDER — INSULIN LISPRO 100/ML
26 VIAL (ML) SUBCUTANEOUS
Refills: 0 | Status: DISCONTINUED | OUTPATIENT
Start: 2022-02-28 | End: 2022-03-07

## 2022-02-28 RX ORDER — FUROSEMIDE 40 MG
40 TABLET ORAL ONCE
Refills: 0 | Status: COMPLETED | OUTPATIENT
Start: 2022-02-28 | End: 2022-02-28

## 2022-02-28 RX ORDER — OXYCODONE HYDROCHLORIDE 5 MG/1
5 TABLET ORAL DAILY
Refills: 0 | Status: DISCONTINUED | OUTPATIENT
Start: 2022-02-28 | End: 2022-03-01

## 2022-02-28 RX ADMIN — Medication 26 UNIT(S): at 13:07

## 2022-02-28 RX ADMIN — ATORVASTATIN CALCIUM 80 MILLIGRAM(S): 80 TABLET, FILM COATED ORAL at 21:23

## 2022-02-28 RX ADMIN — Medication 4: at 13:06

## 2022-02-28 RX ADMIN — OXYCODONE HYDROCHLORIDE 5 MILLIGRAM(S): 5 TABLET ORAL at 08:56

## 2022-02-28 RX ADMIN — OXYCODONE HYDROCHLORIDE 5 MILLIGRAM(S): 5 TABLET ORAL at 14:54

## 2022-02-28 RX ADMIN — PIPERACILLIN AND TAZOBACTAM 200 GRAM(S): 4; .5 INJECTION, POWDER, LYOPHILIZED, FOR SOLUTION INTRAVENOUS at 02:10

## 2022-02-28 RX ADMIN — OXYCODONE HYDROCHLORIDE 5 MILLIGRAM(S): 5 TABLET ORAL at 16:23

## 2022-02-28 RX ADMIN — Medication 650 MILLIGRAM(S): at 16:45

## 2022-02-28 RX ADMIN — OXYCODONE HYDROCHLORIDE 5 MILLIGRAM(S): 5 TABLET ORAL at 02:10

## 2022-02-28 RX ADMIN — RIVAROXABAN 20 MILLIGRAM(S): KIT at 13:07

## 2022-02-28 RX ADMIN — Medication 650 MILLIGRAM(S): at 14:31

## 2022-02-28 RX ADMIN — Medication 40 MILLIGRAM(S): at 16:09

## 2022-02-28 RX ADMIN — NYSTATIN CREAM 1 APPLICATION(S): 100000 CREAM TOPICAL at 17:11

## 2022-02-28 RX ADMIN — Medication 81 MILLIGRAM(S): at 13:07

## 2022-02-28 RX ADMIN — Medication 100 MILLIGRAM(S): at 17:54

## 2022-02-28 RX ADMIN — OXYCODONE HYDROCHLORIDE 5 MILLIGRAM(S): 5 TABLET ORAL at 16:45

## 2022-02-28 RX ADMIN — Medication 26 UNIT(S): at 18:18

## 2022-02-28 RX ADMIN — Medication 1: at 18:19

## 2022-02-28 RX ADMIN — Medication 4: at 08:57

## 2022-02-28 RX ADMIN — INSULIN GLARGINE 28 UNIT(S): 100 INJECTION, SOLUTION SUBCUTANEOUS at 21:57

## 2022-02-28 RX ADMIN — PIPERACILLIN AND TAZOBACTAM 25 GRAM(S): 4; .5 INJECTION, POWDER, LYOPHILIZED, FOR SOLUTION INTRAVENOUS at 14:32

## 2022-02-28 RX ADMIN — Medication 26 UNIT(S): at 08:57

## 2022-02-28 RX ADMIN — NYSTATIN CREAM 1 APPLICATION(S): 100000 CREAM TOPICAL at 06:07

## 2022-02-28 RX ADMIN — OXYCODONE HYDROCHLORIDE 5 MILLIGRAM(S): 5 TABLET ORAL at 01:09

## 2022-02-28 RX ADMIN — Medication 40 MILLIGRAM(S): at 13:07

## 2022-02-28 RX ADMIN — OXYCODONE HYDROCHLORIDE 5 MILLIGRAM(S): 5 TABLET ORAL at 18:26

## 2022-02-28 RX ADMIN — Medication 100 MILLIGRAM(S): at 06:07

## 2022-02-28 NOTE — PROGRESS NOTE ADULT - PROBLEM SELECTOR PLAN 1
Fever, leukocytosis, hypotension on admission. Sepsis 2/2 likely sacral wound/cellulitis  - follow blood cx, and I&D cx   - Continue Vanc 1.5 gm Q12 and Cefepime 2 gm q12 and Flagyl   - check Vancomycin trough before 4th sequential dose. Target trough levels 15-20  - follow MRSA swab  - daily packing changes. To be done by surgical team  - wound care  - 500 cc LR  - ID recs appreciated Fever, leukocytosis, hypotension on admission. Sepsis 2/2 likely sacral wound/cellulitis  - s/p bedside debridement in ED   - BCx 2/26 NGTD  - Wound Cx growing E. avium and strep anginosus   - Zosyn (2/27- ) as per ID recs   - MRSA pcr negative   - daily packing changes. To be done by surgical team  - wound care Fever, leukocytosis, hypotension on admission. Sepsis 2/2 likely sacral wound/cellulitis  - s/p bedside debridement in ED   - BCx 2/26 NGTD  - Wound Cx growing E. avium and strep anginosus   - Zosyn (2/27- ) as per ID recs   - MRSA pcr negative   - daily packing changes. To be done by surgical team  - wound care deferring to surgery team

## 2022-02-28 NOTE — PROGRESS NOTE ADULT - PROBLEM SELECTOR PLAN 4
Hx of AS s/p TAVR 2015 and 2021, HOCM, hx of pAFIb on Xarelto (however patient has not taken it past 2 days) and DVT, JOSR on 2L NC at home for bedtime   CXR w/ mild PVC, pro-BNP 7000s (was 300-600 in past)  Likely 2/2 fluid overload, less likely PE on xarelto  - PT consult  - Hold home diuretic (lasix 80mg qd)  today in setting of hypotension  - Would consider CTA chest to rule out PE if not improving  - TTE Hypoxic to 87 % , temporarily required BiPAP   Could be 2/2 flash pulm edema now improved  - continue to monitor   - currently on 4l nc, wean as tolerated Positive UA  - UCx negative   - asx at this time  - on abx for sacral wound/cellulitis as above

## 2022-02-28 NOTE — CHART NOTE - NSCHARTNOTEFT_GEN_A_CORE
Pt s/p I&D by general surgery. d/w team who is agreeable and will defer to general surgery. Remain available as requested.

## 2022-02-28 NOTE — PROGRESS NOTE ADULT - PROBLEM SELECTOR PLAN 2
Hypoxic to 87 % , temporarily required BiPAP   Could be 2/2 flash pulm edema now improved  - continue to monitor   - currently on 4l nc, wean as tolerated Cr normal on admission, now increasing to 3.3  - likely ATN i/s/o hypotension requiring vasopressors  - BP improved s/p IVF, continue to monitor  - hold diuretics for today  - Check urine lytes, bladder scan Cr normal on admission, now increasing to 3.3  DDx: contrast induced / vanco both exposures occurred on 2/26. vs cardiorenal given elevated BNP vs less likely post obstructive   - Urine lytes suggestive of pre-renal etiology  - Bladder scan (inaccurate given habitus). Low threshold for dunn catheter   - Trial of lasix 40mg IV ONCE 2/28 to eval for cardiorenal etiology   - Avoid nephrotoxic agents  - Strict I/Os

## 2022-02-28 NOTE — PROGRESS NOTE ADULT - PROBLEM SELECTOR PLAN 3
Likely 2/2 sepsis  - lactate improving  - will continue to trend  - hold off additional IVF for now given hypoxia and possible CHF At home on 2L nasal cannula as needed. On admission hypoxic to 87 % , temporarily required BiPAP. CXR with pulm vascular congestion  - Likely multifactorial in s/o decompensated HF (BNP 15k), and chronically poor respiratory mechanism in s/o JOSR OHS   - TTE in Sept 2021 showed hyperdynamic LV systolic function, EF 75%, and mod pulm HTN  - Low suspicion for PE (on home xarelto for a.fib)  - HR <110, unlikely dysrhythmia driven   - f/u repeat TTE   - ICS  - Trial of IV lasix 40mg

## 2022-02-28 NOTE — PROGRESS NOTE ADULT - PROBLEM SELECTOR PLAN 8
on Humalog 30 TID and Tresiba 30 bedtime   - A1c 8.6%  - C/w Insulin 24 pre-meal and 24 long-acting bedtime  - check FSG qAC and qhs  - adjust insulin as needed on Humalog 30 TID and Tresiba 30 bedtime   - A1c 8.6%  - Increase to Insulin 26 pre-meal and 28 long-acting bedtime  - check FSG qAC and qhs  - adjust insulin as needed On metoprolol tartate 100mg bid and Xarelto    - continue metoprolol tartate 100mg bid, and Xarelto  - replete K >4, Mg >2 on Losartan 25 and Lopressor   - c/w lopressor  - hold Losartan given hypotension from sepsis & HEATHER

## 2022-02-28 NOTE — PROGRESS NOTE ADULT - SUBJECTIVE AND OBJECTIVE BOX
PROGRESS NOTE:   Authored by Dr. Georgia Paz MD (PGY-2). Available on TEAMS.    Patient is a 66y old  Female who presents with a chief complaint of sepsis 2/2 wound infection (2022 17:13)      SUBJECTIVE / OVERNIGHT EVENTS:  No acute events overnight.     ADDITIONAL REVIEW OF SYSTEMS:  Patient denies fevers, chills, chest pain, shortness of breath, nausea, abdominal pain, diarrhea, constipation, dysuria, leg swelling, headache, light headedness.    MEDICATIONS  (STANDING):  aspirin enteric coated 81 milliGRAM(s) Oral daily  atorvastatin 80 milliGRAM(s) Oral at bedtime  dextrose 40% Gel 15 Gram(s) Oral once  dextrose 5%. 1000 milliLiter(s) (50 mL/Hr) IV Continuous <Continuous>  dextrose 5%. 1000 milliLiter(s) (100 mL/Hr) IV Continuous <Continuous>  dextrose 50% Injectable 25 Gram(s) IV Push once  dextrose 50% Injectable 12.5 Gram(s) IV Push once  dextrose 50% Injectable 25 Gram(s) IV Push once  FLUoxetine 40 milliGRAM(s) Oral daily  glucagon  Injectable 1 milliGRAM(s) IntraMuscular once  insulin glargine Injectable (LANTUS) 24 Unit(s) SubCutaneous at bedtime  insulin lispro (ADMELOG) corrective regimen sliding scale   SubCutaneous three times a day before meals  insulin lispro (ADMELOG) corrective regimen sliding scale   SubCutaneous at bedtime  insulin lispro Injectable (ADMELOG) 24 Unit(s) SubCutaneous three times a day before meals  metoprolol tartrate 100 milliGRAM(s) Oral two times a day  nystatin Powder 1 Application(s) Topical two times a day  piperacillin/tazobactam IVPB.. 3.375 Gram(s) IV Intermittent every 12 hours  rivaroxaban 20 milliGRAM(s) Oral daily  sodium chloride 0.9%. 1000 milliLiter(s) (60 mL/Hr) IV Continuous <Continuous>    MEDICATIONS  (PRN):  acetaminophen     Tablet .. 650 milliGRAM(s) Oral every 6 hours PRN Mild Pain (1 - 3), Moderate Pain (4 - 6)  oxyCODONE    IR 5 milliGRAM(s) Oral every 6 hours PRN Severe Pain (7 - 10)  sodium chloride 0.65% Nasal 1 Spray(s) Both Nostrils every 6 hours PRN Nasal Congestion      CAPILLARY BLOOD GLUCOSE      POCT Blood Glucose.: 250 mg/dL (2022 21:58)  POCT Blood Glucose.: 196 mg/dL (2022 17:40)  POCT Blood Glucose.: 306 mg/dL (2022 12:41)  POCT Blood Glucose.: 377 mg/dL (2022 08:46)    I&O's Summary    2022 07:01  -  2022 05:41  --------------------------------------------------------  IN: 1040 mL / OUT: 0 mL / NET: 1040 mL        PHYSICAL EXAM:  Vital Signs Last 24 Hrs  T(C): 36.9 (2022 05:37), Max: 37.4 (2022 17:33)  T(F): 98.4 (2022 05:37), Max: 99.3 (2022 17:33)  HR: 87 (2022 05:37) (83 - 87)  BP: 117/73 (2022 05:37) (97/63 - 119/60)  BP(mean): --  RR: 18 (2022 05:37) (18 - 18)  SpO2: 95% (2022 05:37) (95% - 99%)    CONSTITUTIONAL: NAD, well-developed  RESPIRATORY: Normal respiratory effort; lungs are clear to auscultation bilaterally  CARDIOVASCULAR: Regular rate and rhythm, normal S1 and S2, no murmur/rub/gallop; No lower extremity edema; Peripheral pulses are 2+ bilaterally  ABDOMEN: Nontender to palpation, normoactive bowel sounds, no rebound/guarding; No hepatosplenomegaly  MUSCLOSKELETAL: no clubbing or cyanosis of digits; no joint swelling or tenderness to palpation  PSYCH: A+O to person, place, and time; affect appropriate    LABS:                        9.3    31.58 )-----------( 275      ( 2022 09:54 )             30.4         129<L>  |  91<L>  |  50<H>  ----------------------------<  359<H>  4.3   |  23  |  2.13<H>    Ca    9.2      2022 09:54  Phos  4.9       Mg     2.2         TPro  6.8  /  Alb  3.3  /  TBili  1.2  /  DBili  x   /  AST  23  /  ALT  15  /  AlkPhos  170<H>      PT/INR - ( 2022 17:38 )   PT: 25.1 sec;   INR: 2.08 ratio         PTT - ( 2022 17:38 )  PTT:28.7 sec      Urinalysis Basic - ( 2022 15:18 )    Color: Yellow / Appearance: Clear / S.029 / pH: x  Gluc: x / Ketone: Negative  / Bili: Negative / Urobili: 2 mg/dL   Blood: x / Protein: 30 mg/dL / Nitrite: Negative   Leuk Esterase: Large / RBC: 1 /hpf / WBC 34 /HPF   Sq Epi: x / Non Sq Epi: 4 /hpf / Bacteria: Negative        Culture - Other (collected 2022 22:04)  Source: Decub Decubitus  Preliminary Report (2022 19:19):    Moderate Enterococcus avium    Numerous Streptococcus anginosus Susceptibilites not performed.    Culture - Urine (collected 2022 18:47)  Source: Clean Catch Clean Catch (Midstream)  Final Report (2022 13:44):    <10,000 CFU/mL Normal Urogenital Gia    Culture - Blood (collected 2022 18:45)  Source: .Blood Blood  Preliminary Report (2022 19:01):    No growth to date.    Culture - Blood (collected 2022 18:45)  Source: .Blood Blood  Preliminary Report (2022 19:01):    No growth to date.        Tele Reviewed:    RADIOLOGY & ADDITIONAL TESTS:  Results Reviewed:   Imaging Personally Reviewed:  Electrocardiogram Personally Reviewed:     PROGRESS NOTE:   Authored by Dr. Georgia Paz MD (PGY-2). Available on TEAMS.    Patient is a 66y old  Female who presents with a chief complaint of sepsis 2/2 wound infection (2022 17:13)      SUBJECTIVE / OVERNIGHT EVENTS:  No acute events overnight. Pt seen and examined at bedside, she reports pain in the sacral wound area along with fear to urinate as she feels that it will cause a lot of pain.     ADDITIONAL REVIEW OF SYSTEMS:  Patient denies fevers, chills, chest pain, shortness of breath, nausea, abdominal pain, diarrhea, constipation, dysuria, leg swelling, headache, light headedness.    MEDICATIONS  (STANDING):  aspirin enteric coated 81 milliGRAM(s) Oral daily  atorvastatin 80 milliGRAM(s) Oral at bedtime  dextrose 40% Gel 15 Gram(s) Oral once  dextrose 5%. 1000 milliLiter(s) (50 mL/Hr) IV Continuous <Continuous>  dextrose 5%. 1000 milliLiter(s) (100 mL/Hr) IV Continuous <Continuous>  dextrose 50% Injectable 25 Gram(s) IV Push once  dextrose 50% Injectable 12.5 Gram(s) IV Push once  dextrose 50% Injectable 25 Gram(s) IV Push once  FLUoxetine 40 milliGRAM(s) Oral daily  glucagon  Injectable 1 milliGRAM(s) IntraMuscular once  insulin glargine Injectable (LANTUS) 24 Unit(s) SubCutaneous at bedtime  insulin lispro (ADMELOG) corrective regimen sliding scale   SubCutaneous three times a day before meals  insulin lispro (ADMELOG) corrective regimen sliding scale   SubCutaneous at bedtime  insulin lispro Injectable (ADMELOG) 24 Unit(s) SubCutaneous three times a day before meals  metoprolol tartrate 100 milliGRAM(s) Oral two times a day  nystatin Powder 1 Application(s) Topical two times a day  piperacillin/tazobactam IVPB.. 3.375 Gram(s) IV Intermittent every 12 hours  rivaroxaban 20 milliGRAM(s) Oral daily  sodium chloride 0.9%. 1000 milliLiter(s) (60 mL/Hr) IV Continuous <Continuous>    MEDICATIONS  (PRN):  acetaminophen     Tablet .. 650 milliGRAM(s) Oral every 6 hours PRN Mild Pain (1 - 3), Moderate Pain (4 - 6)  oxyCODONE    IR 5 milliGRAM(s) Oral every 6 hours PRN Severe Pain (7 - 10)  sodium chloride 0.65% Nasal 1 Spray(s) Both Nostrils every 6 hours PRN Nasal Congestion      CAPILLARY BLOOD GLUCOSE      POCT Blood Glucose.: 250 mg/dL (2022 21:58)  POCT Blood Glucose.: 196 mg/dL (2022 17:40)  POCT Blood Glucose.: 306 mg/dL (2022 12:41)  POCT Blood Glucose.: 377 mg/dL (2022 08:46)    I&O's Summary    2022 07:01  -  2022 05:41  --------------------------------------------------------  IN: 1040 mL / OUT: 0 mL / NET: 1040 mL        PHYSICAL EXAM:  Vital Signs Last 24 Hrs  T(C): 36.9 (2022 05:37), Max: 37.4 (2022 17:33)  T(F): 98.4 (2022 05:37), Max: 99.3 (2022 17:33)  HR: 87 (2022 05:37) (83 - 87)  BP: 117/73 (2022 05:37) (97/63 - 119/60)  BP(mean): --  RR: 18 (2022 05:37) (18 - 18)  SpO2: 95% (2022 05:37) (95% - 99%)    CONSTITUTIONAL: NAD, morbidly obese, laying on recliner chair, asleep  RESPIRATORY: Normal respiratory effort; lungs are clear to auscultation bilaterally, on 3.5L NC   CARDIOVASCULAR: Regular rate and rhythm, normal S1 and S2, no murmur/rub/gallop; 1+ b/l lower extremity edema; Peripheral pulses are 2+ bilaterally  ABDOMEN: Large panus, Nontender to palpation, normoactive bowel sounds, no rebound/guarding  MUSCLOSKELETAL: no clubbing or cyanosis of digits; no joint swelling or tenderness to palpation  PSYCH: A+O to person, place, and time; affect appropriate  SKIN: sacral wound with packing soaked in dried blood, no drainage. Gluteal folds very tender to palpation and some skin tears noted in the area.     LABS:                        9.3    31.58 )-----------( 275      ( 2022 09:54 )             30.4     02-27    129<L>  |  91<L>  |  50<H>  ----------------------------<  359<H>  4.3   |  23  |  2.13<H>    Ca    9.2      2022 09:54  Phos  4.9       Mg     2.2         TPro  6.8  /  Alb  3.3  /  TBili  1.2  /  DBili  x   /  AST  23  /  ALT  15  /  AlkPhos  170<H>      PT/INR - ( 2022 17:38 )   PT: 25.1 sec;   INR: 2.08 ratio         PTT - ( 2022 17:38 )  PTT:28.7 sec      Urinalysis Basic - ( 2022 15:18 )    Color: Yellow / Appearance: Clear / S.029 / pH: x  Gluc: x / Ketone: Negative  / Bili: Negative / Urobili: 2 mg/dL   Blood: x / Protein: 30 mg/dL / Nitrite: Negative   Leuk Esterase: Large / RBC: 1 /hpf / WBC 34 /HPF   Sq Epi: x / Non Sq Epi: 4 /hpf / Bacteria: Negative        Culture - Other (collected 2022 22:04)  Source: Decub Decubitus  Preliminary Report (2022 19:19):    Moderate Enterococcus avium    Numerous Streptococcus anginosus Susceptibilites not performed.    Culture - Urine (collected 2022 18:47)  Source: Clean Catch Clean Catch (Midstream)  Final Report (2022 13:44):    <10,000 CFU/mL Normal Urogenital Gia    Culture - Blood (collected 2022 18:45)  Source: .Blood Blood  Preliminary Report (2022 19:01):    No growth to date.    Culture - Blood (collected 2022 18:45)  Source: .Blood Blood  Preliminary Report (2022 19:01):    No growth to date.

## 2022-02-28 NOTE — PROGRESS NOTE ADULT - ASSESSMENT
66 y/o lady with PMH type 2 diabetes with H/O gastroparesis, pulmonary embolism and right lower extremity DVT 8/2020, PVD, hypertension, aortic stenosis  S/P TAVR 2015 and redo TAVR in 2021 presented to ED today with fever since 4 days. Pt also endorses acute on chronic shortness of breath. This is worsened w/ laying down and w/ exertion. She could not walk to her mail box without getting short of breath earlier today, prompting her arrival here. She denies associated cough, neck pain, confusion, weakness, hemoptysis, chest pain, abdominal pain, dysuria, vaginal discharge. She has chronic immobility 2/2 to morbid obesity. She denies any recent changes to medications. Pt with leukocytosis to 39 k with fever to 101.5 s/p vanc and cefepime in ER. CTAP with IV con: Multiple foci of subcutaneous gas at the left gluteal fold with mild associated inflammatory changes. Pt with intermittent Boderline SBP in 60s.    WORKUP  BNP: 7759  ESR: 50,   RVP: negative.  MRSA PCR Result.: Jatinder (07-21-21 @ 19:23)  UA: 34 WBC, Large LE, negative nitrates and bactetria  CTAP with IV con: Multiple foci of subcutaneous gas at the left gluteal fold with mild associated inflammatory changes      #Fever, Infected Decubitus Ulcer, Leukocytosis, Hypoxic Respiratory Failure  Discussed case with surgery - less concern for necrotizing fasciitis  Prelim Cultures with Streptococcus anginosis and Enterococcus  MRSA/MSSA Nasal PCR Negative  Tolerated Zosyn and Augmentin in 5/2021 at last admission    --Continue Zosyn 3.375 mg IV Q12H  --Further debridement / wound packing per surgery  --Follow prelim wound cultures  --Follow blood cultures    I will continue to follow. Please feel free to contact me with any further questions.    Hao Torres M.D.  [facility] Division of Infectious Disease  8AM-5PM Monday - Friday: Available on [employer] Teams  After Hours and Holidays (or if no response on [employer] Teams): Please contact the Infectious Diseases Office at [phone]

## 2022-02-28 NOTE — PROGRESS NOTE ADULT - ASSESSMENT
65 yo F, PMHx AS s/p TAVR 2015 and 2021, HOCM, pAFib on Xarelto, JOSR (does not tolerate CPAP, on 2L O2 at home qhs), anxiety, morbid obesity, DM, HTN, asthma (on albuterol), pw sepsis, n/v, likely 2/2 sacral decubitus ulcer/necrotic wound on left ischium with associated cellulitis   65 yo F, PMHx AS s/p TAVR 2015 and 2021, HOCM, pAFib on Xarelto, JOSR (does not tolerate CPAP, on 2L O2 at home qhs), anxiety, morbid obesity, DM, HTN, asthma (on albuterol), pw sepsis, n/v, likely 2/2 sacral decubitus ulcer/necrotic wound on left ischium with associated cellulitis.    67 yo F, PMHx AS s/p TAVR 2015 and 2021, HOCM, HFpEF, pAFib on Xarelto, JOSR (does not tolerate CPAP, on 2L O2 at home qhs), anxiety, morbid obesity, DM, HTN, asthma (on albuterol), pw sepsis, n/v, likely 2/2 sacral decubitus ulcer/necrotic wound on left ischium with associated cellulitis.    67 yo F, PMHx AS s/p TAVR 2015 and 2021, HOCM, HFpEF, pAFib on Xarelto, JOSR (does not tolerate CPAP, on 2L O2 at home qhs), anxiety, morbid obesity, DM, HTN, asthma (on albuterol), pw sepsis, n/v, likely 2/2 sacral decubitus ulcer/necrotic wound on left ischium with associated cellulitis. Also found to be in acute on chronic hypoxic respiratory failure on admission, with pulmonary vascular congestion on imaging concerning for decompensated HF. Course c/b worsening HEATHER, likely pre-renal in s/o hypervolemia given elevated BNP.

## 2022-02-28 NOTE — PROGRESS NOTE ADULT - PROBLEM SELECTOR PLAN 7
On metoprolol tartate 100mg bid and Xarelto    - continue metoprolol tartate 100mg bid, and Xarelto  - replete K >4, Mg >2 Hx of AS s/p TAVR 2015 and 2021, HOCM, hx of pAFIb on Xarelto (however patient has not taken it past 2 days) and DVT, JOSR on 2L NC at home for bedtime   CXR w/ mild PVC, pro-BNP 7000s (was 300-600 in past)  Likely 2/2 fluid overload, less likely PE on xarelto  - PT consult  - Hold home diuretic (lasix 80mg qd)  today in setting of hypotension  - Would consider CTA chest to rule out PE if not improving  - TTE on Humalog 30 TID and Tresiba 30 bedtime   - A1c 8.6%  - Increase to Insulin 26 pre-meal and 28 long-acting bedtime  - check FSG qAC and qhs  - adjust insulin as needed

## 2022-02-28 NOTE — PROGRESS NOTE ADULT - PROBLEM SELECTOR PLAN 5
Cr normal on admission, now increasing to 2.2  - likely ATN i/s/o hypotension requiring vasopressors  - BP improved s/p IVF, continue to monitor  - hold diuretics for today Cr normal on admission, now increasing to 3.3  - likely ATN i/s/o hypotension requiring vasopressors  - BP improved s/p IVF, continue to monitor  - hold diuretics for today  - Check urine lytes, bladder scan Positive UA  - UCx negative   - asx at this time  - on abx for sacral wound/cellulitis as above Likely 2/2 sepsis  - lactate improving  - will continue to trend  - hold off additional IVF for now given hypoxia and possible CHF

## 2022-02-28 NOTE — PROGRESS NOTE ADULT - PROBLEM SELECTOR PLAN 9
on Losartan 25 and Lopressor   - c/w lopressor  - hold Losartan given hypotension from sepsis on Humalog 30 TID and Tresiba 30 bedtime   - A1c 8.6%  - Increase to Insulin 26 pre-meal and 28 long-acting bedtime  - check FSG qAC and qhs  - adjust insulin as needed DVT ppx: Xarelto  Diet: DASH/CC  PT consult

## 2022-02-28 NOTE — PROGRESS NOTE ADULT - SUBJECTIVE AND OBJECTIVE BOX
Follow Up:      Interval History:    REVIEW OF SYSTEMS  [  ] ROS unobtainable because:    [  ] All other systems negative except as noted below    Constitutional:  [ ] fever [ ] chills  [ ] weight loss  [ ] weakness  Skin:  [ ] rash [ ] phlebitis	  Eyes: [ ] icterus [ ] pain  [ ] discharge	  ENMT: [ ] sore throat  [ ] thrush [ ] ulcers [ ] exudates  Respiratory: [ ] dyspnea [ ] hemoptysis [ ] cough [ ] sputum	  Cardiovascular:  [ ] chest pain [ ] palpitations [ ] edema	  Gastrointestinal:  [ ] nausea [ ] vomiting [ ] diarrhea [ ] constipation [ ] pain	  Genitourinary:  [ ] dysuria [ ] frequency [ ] hematuria [ ] discharge [ ] flank pain  [ ] incontinence  Musculoskeletal:  [ ] myalgias [ ] arthralgias [ ] arthritis  [ ] back pain  Neurological:  [ ] headache [ ] seizures  [ ] confusion/altered mental status    Allergies  amoxicillin (Unknown)  Cipro (Unknown)  codeine (Vomiting)  shellfish (Stomach Upset)        ANTIMICROBIALS:  piperacillin/tazobactam IVPB.. 3.375 every 12 hours      OTHER MEDS:  MEDICATIONS  (STANDING):  acetaminophen     Tablet .. 650 every 6 hours PRN  aspirin enteric coated 81 daily  atorvastatin 80 at bedtime  dextrose 40% Gel 15 once  dextrose 50% Injectable 25 once  dextrose 50% Injectable 12.5 once  dextrose 50% Injectable 25 once  FLUoxetine 40 daily  glucagon  Injectable 1 once  insulin glargine Injectable (LANTUS) 28 at bedtime  insulin lispro (ADMELOG) corrective regimen sliding scale  three times a day before meals  insulin lispro (ADMELOG) corrective regimen sliding scale  at bedtime  insulin lispro Injectable (ADMELOG) 26 three times a day with meals  metoprolol tartrate 100 two times a day  oxyCODONE    IR 5 every 6 hours PRN  oxyCODONE    IR 5 daily PRN  rivaroxaban 20 daily      Vital Signs Last 24 Hrs  T(C): 36.7 (28 Feb 2022 12:12), Max: 36.9 (27 Feb 2022 21:17)  T(F): 98.1 (28 Feb 2022 12:12), Max: 98.4 (27 Feb 2022 21:17)  HR: 72 (28 Feb 2022 12:12) (72 - 87)  BP: 116/66 (28 Feb 2022 12:12) (97/63 - 117/73)  BP(mean): --  RR: 18 (28 Feb 2022 12:12) (18 - 18)  SpO2: 94% (28 Feb 2022 12:12) (94% - 98%)    PHYSICAL EXAMINATION:  General: Alert and Awake, NAD  HEENT: PERRL, EOMI  Neck: Supple  Cardiac: RRR, No M/R/G  Resp: CTAB, No Wh/Rh/Ra  Abdomen: NBS, NT/ND, No HSM, No rigidity or guarding  MSK: No LE edema. No Calf tenderness  : No dunn  Skin: No rashes or lesions. Skin is warm and dry to the touch.   Neuro: Alert and Awake. CN 2-12 Grossly intact. Moves all four extremities spontaneously.  Psych: Calm, Pleasant, Cooperative                          9.1    22.61 )-----------( 256      ( 28 Feb 2022 06:49 )             29.6       02-28    129<L>  |  91<L>  |  69<H>  ----------------------------<  237<H>  3.9   |  21<L>  |  3.30<H>    Ca    9.3      28 Feb 2022 06:49  Phos  5.4     02-28  Mg     2.4     02-28            MICROBIOLOGY:  v  Decub Decubitus  02-26-22   Moderate Enterococcus avium  Numerous Streptococcus anginosus Susceptibilites not performed.  --  --      Clean Catch Clean Catch (Midstream)  02-26-22   <10,000 CFU/mL Normal Urogenital Gia  --  --      .Blood Blood  02-26-22   No growth to date.  --  --          Rapid RVP Result: NotDetec (02-26 @ 13:45)        RADIOLOGY:    <The imaging below has been reviewed and visualized by me independently. Findings as detailed in report below> Follow Up:  Infected wound    Interval History: afebrile. continued pain in the buttocks.     REVIEW OF SYSTEMS  [  ] ROS unobtainable because:    [ x ] All other systems negative except as noted below    Constitutional:  [ ] fever [ ] chills  [ ] weight loss  [ ] weakness  Skin:  [ ] rash [ ] phlebitis	  Eyes: [ ] icterus [ ] pain  [ ] discharge	  ENMT: [ ] sore throat  [ ] thrush [ ] ulcers [ ] exudates  Respiratory: [ ] dyspnea [ ] hemoptysis [ ] cough [ ] sputum	  Cardiovascular:  [ ] chest pain [ ] palpitations [ ] edema	  Gastrointestinal:  [ ] nausea [ ] vomiting [ ] diarrhea [ ] constipation [ ] pain	  Genitourinary:  [ ] dysuria [ ] frequency [ ] hematuria [ ] discharge [ ] flank pain  [ ] incontinence  Musculoskeletal:  [ ] myalgias [ ] arthralgias [ ] arthritis  [ ] back pain +buttock pain  Neurological:  [ ] headache [ ] seizures  [ ] confusion/altered mental status    Allergies  amoxicillin (Unknown)  Cipro (Unknown)  codeine (Vomiting)  shellfish (Stomach Upset)        ANTIMICROBIALS:  piperacillin/tazobactam IVPB.. 3.375 every 12 hours      OTHER MEDS:  MEDICATIONS  (STANDING):  acetaminophen     Tablet .. 650 every 6 hours PRN  aspirin enteric coated 81 daily  atorvastatin 80 at bedtime  dextrose 40% Gel 15 once  dextrose 50% Injectable 25 once  dextrose 50% Injectable 12.5 once  dextrose 50% Injectable 25 once  FLUoxetine 40 daily  glucagon  Injectable 1 once  insulin glargine Injectable (LANTUS) 28 at bedtime  insulin lispro (ADMELOG) corrective regimen sliding scale  three times a day before meals  insulin lispro (ADMELOG) corrective regimen sliding scale  at bedtime  insulin lispro Injectable (ADMELOG) 26 three times a day with meals  metoprolol tartrate 100 two times a day  oxyCODONE    IR 5 every 6 hours PRN  oxyCODONE    IR 5 daily PRN  rivaroxaban 20 daily      Vital Signs Last 24 Hrs  T(C): 36.7 (28 Feb 2022 12:12), Max: 36.9 (27 Feb 2022 21:17)  T(F): 98.1 (28 Feb 2022 12:12), Max: 98.4 (27 Feb 2022 21:17)  HR: 72 (28 Feb 2022 12:12) (72 - 87)  BP: 116/66 (28 Feb 2022 12:12) (97/63 - 117/73)  BP(mean): --  RR: 18 (28 Feb 2022 12:12) (18 - 18)  SpO2: 94% (28 Feb 2022 12:12) (94% - 98%)    PHYSICAL EXAMINATION:  General: Alert and Awake, NAD  HEENT: PERRL, EOMI  Cardiac: RRR, No M/R/G  Resp: CTAB, No Wh/Rh/Ra  Abdomen: NBS, NT/ND, No HSM, No rigidity or guarding  MSK: No LE edema. No Calf tenderness  : Dressing over gluteal wound  Skin: No rashes or lesions. Skin is warm and dry to the touch.   Neuro: Alert and Awake. CN 2-12 Grossly intact. Moves all four extremities spontaneously.  Psych: Calm, Pleasant, Cooperative                        9.1    22.61 )-----------( 256      ( 28 Feb 2022 06:49 )             29.6       02-28    129<L>  |  91<L>  |  69<H>  ----------------------------<  237<H>  3.9   |  21<L>  |  3.30<H>    Ca    9.3      28 Feb 2022 06:49  Phos  5.4     02-28  Mg     2.4     02-28            MICROBIOLOGY:  v  Decub Decubitus  02-26-22   Moderate Enterococcus avium  Numerous Streptococcus anginosus Susceptibilites not performed.  --  --      Clean Catch Clean Catch (Midstream)  02-26-22   <10,000 CFU/mL Normal Urogenital Gia  --  --    .Blood Blood  02-26-22   No growth to date.  --  --    Rapid RVP Result: NotDetec (02-26 @ 13:45)    RADIOLOGY:    <The imaging below has been reviewed and visualized by me independently. Findings as detailed in report below>    < from: Xray Chest 1 View- PORTABLE-Urgent (Xray Chest 1 View- PORTABLE-Urgent .) (02.26.22 @ 17:20) >  IMPRESSION:  Mild pulmonary vascular congestion.    < end of copied text >

## 2022-02-28 NOTE — PROGRESS NOTE ADULT - PROBLEM SELECTOR PLAN 6
Positive UA  - follow urine cx  - asx at this time  - on abx for sacral wound/cellulitis as above Positive UA  - UCx negative   - asx at this time  - on abx for sacral wound/cellulitis as above Likely 2/2 sepsis  - lactate improving  - will continue to trend  - hold off additional IVF for now given hypoxia and possible CHF On metoprolol tartate 100mg bid and Xarelto  - HR <110 at this time   - continue metoprolol tartate 100mg bid, and Xarelto  - replete K >4, Mg >2

## 2022-02-28 NOTE — PROGRESS NOTE ADULT - PROBLEM SELECTOR PLAN 10
DVT ppx: Xarelto  Diet: DASH/CC  PT consult on Losartan 25 and Lopressor   - c/w lopressor  - hold Losartan given hypotension from sepsis - reported discussion between pt and surgical team was that pt wanted DNI.   - on further discussion with pt, pt states she requests to be full code, but only wants to be intubated if she is completely sedated.  - pt requests all medical therapy as necessary and indicated  - time spent 20 min

## 2022-02-28 NOTE — PROGRESS NOTE ADULT - ATTENDING COMMENTS
65 yo F, PMHx AS s/p TAVR 2015 and 2021, HOCM, pAFib on Xarelto, JOSR (does not tolerate CPAP, on 2L O2 at home qhs), anxiety, morbid obesity, DM, HTN, asthma (on albuterol), pw sepsis, n/v, likely 2/2 sacral decubitus ulcer/necrotic wound on left ischium with associated cellulitis  Off bipap, off levophed. IVF.   Pt was seen by the surgery team s/p debridement , f/up wound and blood CX .  CW current antibiotics.  Pain control.             Milly Michaels   Hospitalist  [phone] .

## 2022-03-01 DIAGNOSIS — G93.41 METABOLIC ENCEPHALOPATHY: ICD-10-CM

## 2022-03-01 DIAGNOSIS — I35.0 NONRHEUMATIC AORTIC (VALVE) STENOSIS: ICD-10-CM

## 2022-03-01 DIAGNOSIS — I50.30 UNSPECIFIED DIASTOLIC (CONGESTIVE) HEART FAILURE: ICD-10-CM

## 2022-03-01 DIAGNOSIS — I50.9 HEART FAILURE, UNSPECIFIED: ICD-10-CM

## 2022-03-01 LAB
ALBUMIN SERPL ELPH-MCNC: 3 G/DL — LOW (ref 3.3–5)
ALBUMIN SERPL ELPH-MCNC: 3.5 G/DL — SIGNIFICANT CHANGE UP (ref 3.3–5)
ALP SERPL-CCNC: 148 U/L — HIGH (ref 40–120)
ALP SERPL-CCNC: 165 U/L — HIGH (ref 40–120)
ALT FLD-CCNC: 12 U/L — SIGNIFICANT CHANGE UP (ref 10–45)
ALT FLD-CCNC: 14 U/L — SIGNIFICANT CHANGE UP (ref 10–45)
ANION GAP SERPL CALC-SCNC: 18 MMOL/L — HIGH (ref 5–17)
ANION GAP SERPL CALC-SCNC: 18 MMOL/L — HIGH (ref 5–17)
ANION GAP SERPL CALC-SCNC: 19 MMOL/L — HIGH (ref 5–17)
AST SERPL-CCNC: 14 U/L — SIGNIFICANT CHANGE UP (ref 10–40)
AST SERPL-CCNC: 17 U/L — SIGNIFICANT CHANGE UP (ref 10–40)
BASE EXCESS BLDV CALC-SCNC: -3 MMOL/L — LOW (ref -2–2)
BASOPHILS # BLD AUTO: 0.05 K/UL — SIGNIFICANT CHANGE UP (ref 0–0.2)
BASOPHILS NFR BLD AUTO: 0.3 % — SIGNIFICANT CHANGE UP (ref 0–2)
BILIRUB SERPL-MCNC: 0.6 MG/DL — SIGNIFICANT CHANGE UP (ref 0.2–1.2)
BILIRUB SERPL-MCNC: 0.7 MG/DL — SIGNIFICANT CHANGE UP (ref 0.2–1.2)
BLD GP AB SCN SERPL QL: NEGATIVE — SIGNIFICANT CHANGE UP
BUN SERPL-MCNC: 89 MG/DL — HIGH (ref 7–23)
BUN SERPL-MCNC: 93 MG/DL — HIGH (ref 7–23)
BUN SERPL-MCNC: 94 MG/DL — HIGH (ref 7–23)
CA-I SERPL-SCNC: 1.17 MMOL/L — SIGNIFICANT CHANGE UP (ref 1.15–1.33)
CALCIUM SERPL-MCNC: 9.1 MG/DL — SIGNIFICANT CHANGE UP (ref 8.4–10.5)
CALCIUM SERPL-MCNC: 9.2 MG/DL — SIGNIFICANT CHANGE UP (ref 8.4–10.5)
CALCIUM SERPL-MCNC: 9.4 MG/DL — SIGNIFICANT CHANGE UP (ref 8.4–10.5)
CHLORIDE BLDV-SCNC: 92 MMOL/L — LOW (ref 96–108)
CHLORIDE SERPL-SCNC: 89 MMOL/L — LOW (ref 96–108)
CHLORIDE SERPL-SCNC: 90 MMOL/L — LOW (ref 96–108)
CHLORIDE SERPL-SCNC: 91 MMOL/L — LOW (ref 96–108)
CO2 BLDV-SCNC: 25 MMOL/L — SIGNIFICANT CHANGE UP (ref 22–26)
CO2 SERPL-SCNC: 20 MMOL/L — LOW (ref 22–31)
CO2 SERPL-SCNC: 21 MMOL/L — LOW (ref 22–31)
CO2 SERPL-SCNC: 21 MMOL/L — LOW (ref 22–31)
CREAT SERPL-MCNC: 4.29 MG/DL — HIGH (ref 0.5–1.3)
CREAT SERPL-MCNC: 4.52 MG/DL — HIGH (ref 0.5–1.3)
CREAT SERPL-MCNC: 4.67 MG/DL — HIGH (ref 0.5–1.3)
EGFR: 10 ML/MIN/1.73M2 — LOW
EGFR: 10 ML/MIN/1.73M2 — LOW
EGFR: 11 ML/MIN/1.73M2 — LOW
EOSINOPHIL # BLD AUTO: 0.22 K/UL — SIGNIFICANT CHANGE UP (ref 0–0.5)
EOSINOPHIL NFR BLD AUTO: 1.1 % — SIGNIFICANT CHANGE UP (ref 0–6)
GAS PNL BLDV: 126 MMOL/L — LOW (ref 136–145)
GAS PNL BLDV: SIGNIFICANT CHANGE UP
GAS PNL BLDV: SIGNIFICANT CHANGE UP
GLUCOSE BLDV-MCNC: 160 MG/DL — HIGH (ref 70–99)
GLUCOSE SERPL-MCNC: 109 MG/DL — HIGH (ref 70–99)
GLUCOSE SERPL-MCNC: 156 MG/DL — HIGH (ref 70–99)
GLUCOSE SERPL-MCNC: 177 MG/DL — HIGH (ref 70–99)
HCO3 BLDV-SCNC: 23 MMOL/L — SIGNIFICANT CHANGE UP (ref 22–29)
HCT VFR BLD CALC: 29.5 % — LOW (ref 34.5–45)
HCT VFR BLD CALC: 34.5 % — SIGNIFICANT CHANGE UP (ref 34.5–45)
HCT VFR BLDA CALC: 28 % — LOW (ref 34.5–46.5)
HGB BLD CALC-MCNC: 9.4 G/DL — LOW (ref 11.7–16.1)
HGB BLD-MCNC: 10.5 G/DL — LOW (ref 11.5–15.5)
HGB BLD-MCNC: 9 G/DL — LOW (ref 11.5–15.5)
HIV 1 & 2 AB SERPL IA.RAPID: SIGNIFICANT CHANGE UP
IMM GRANULOCYTES NFR BLD AUTO: 0.8 % — SIGNIFICANT CHANGE UP (ref 0–1.5)
LACTATE BLDV-MCNC: 1.7 MMOL/L — SIGNIFICANT CHANGE UP (ref 0.7–2)
LYMPHOCYTES # BLD AUTO: 1.02 K/UL — SIGNIFICANT CHANGE UP (ref 1–3.3)
LYMPHOCYTES # BLD AUTO: 5.3 % — LOW (ref 13–44)
MAGNESIUM SERPL-MCNC: 2.7 MG/DL — HIGH (ref 1.6–2.6)
MCHC RBC-ENTMCNC: 24.6 PG — LOW (ref 27–34)
MCHC RBC-ENTMCNC: 25 PG — LOW (ref 27–34)
MCHC RBC-ENTMCNC: 30.4 GM/DL — LOW (ref 32–36)
MCHC RBC-ENTMCNC: 30.5 GM/DL — LOW (ref 32–36)
MCV RBC AUTO: 81 FL — SIGNIFICANT CHANGE UP (ref 80–100)
MCV RBC AUTO: 81.9 FL — SIGNIFICANT CHANGE UP (ref 80–100)
MONOCYTES # BLD AUTO: 1.14 K/UL — HIGH (ref 0–0.9)
MONOCYTES NFR BLD AUTO: 5.9 % — SIGNIFICANT CHANGE UP (ref 2–14)
NEUTROPHILS # BLD AUTO: 16.82 K/UL — HIGH (ref 1.8–7.4)
NEUTROPHILS NFR BLD AUTO: 86.6 % — HIGH (ref 43–77)
NRBC # BLD: 0 /100 WBCS — SIGNIFICANT CHANGE UP (ref 0–0)
NRBC # BLD: 0 /100 WBCS — SIGNIFICANT CHANGE UP (ref 0–0)
NT-PROBNP SERPL-SCNC: HIGH PG/ML (ref 0–300)
PCO2 BLDV: 46 MMHG — HIGH (ref 39–42)
PH BLDV: 7.31 — LOW (ref 7.32–7.43)
PHOSPHATE SERPL-MCNC: 6.3 MG/DL — HIGH (ref 2.5–4.5)
PHOSPHATE SERPL-MCNC: 6.5 MG/DL — HIGH (ref 2.5–4.5)
PHOSPHATE SERPL-MCNC: 6.9 MG/DL — HIGH (ref 2.5–4.5)
PLATELET # BLD AUTO: 263 K/UL — SIGNIFICANT CHANGE UP (ref 150–400)
PLATELET # BLD AUTO: 282 K/UL — SIGNIFICANT CHANGE UP (ref 150–400)
PO2 BLDV: 45 MMHG — SIGNIFICANT CHANGE UP (ref 25–45)
POTASSIUM BLDV-SCNC: 3.9 MMOL/L — SIGNIFICANT CHANGE UP (ref 3.5–5.1)
POTASSIUM SERPL-MCNC: 3.9 MMOL/L — SIGNIFICANT CHANGE UP (ref 3.5–5.3)
POTASSIUM SERPL-MCNC: 4 MMOL/L — SIGNIFICANT CHANGE UP (ref 3.5–5.3)
POTASSIUM SERPL-MCNC: 4.1 MMOL/L — SIGNIFICANT CHANGE UP (ref 3.5–5.3)
POTASSIUM SERPL-SCNC: 3.9 MMOL/L — SIGNIFICANT CHANGE UP (ref 3.5–5.3)
POTASSIUM SERPL-SCNC: 4 MMOL/L — SIGNIFICANT CHANGE UP (ref 3.5–5.3)
POTASSIUM SERPL-SCNC: 4.1 MMOL/L — SIGNIFICANT CHANGE UP (ref 3.5–5.3)
PROT SERPL-MCNC: 6.7 G/DL — SIGNIFICANT CHANGE UP (ref 6–8.3)
PROT SERPL-MCNC: 7.2 G/DL — SIGNIFICANT CHANGE UP (ref 6–8.3)
RBC # BLD: 3.6 M/UL — LOW (ref 3.8–5.2)
RBC # BLD: 4.26 M/UL — SIGNIFICANT CHANGE UP (ref 3.8–5.2)
RBC # FLD: 15.9 % — HIGH (ref 10.3–14.5)
RBC # FLD: 16 % — HIGH (ref 10.3–14.5)
RH IG SCN BLD-IMP: POSITIVE — SIGNIFICANT CHANGE UP
SAO2 % BLDV: 74.1 % — SIGNIFICANT CHANGE UP (ref 67–88)
SODIUM SERPL-SCNC: 129 MMOL/L — LOW (ref 135–145)
VANCOMYCIN FLD-MCNC: 16.1 UG/ML — SIGNIFICANT CHANGE UP
WBC # BLD: 17.05 K/UL — HIGH (ref 3.8–10.5)
WBC # BLD: 19.4 K/UL — HIGH (ref 3.8–10.5)
WBC # FLD AUTO: 17.05 K/UL — HIGH (ref 3.8–10.5)
WBC # FLD AUTO: 19.4 K/UL — HIGH (ref 3.8–10.5)

## 2022-03-01 PROCEDURE — 99223 1ST HOSP IP/OBS HIGH 75: CPT | Mod: GC

## 2022-03-01 PROCEDURE — 99232 SBSQ HOSP IP/OBS MODERATE 35: CPT

## 2022-03-01 PROCEDURE — 76775 US EXAM ABDO BACK WALL LIM: CPT | Mod: 26

## 2022-03-01 PROCEDURE — 99233 SBSQ HOSP IP/OBS HIGH 50: CPT | Mod: GC

## 2022-03-01 PROCEDURE — 99222 1ST HOSP IP/OBS MODERATE 55: CPT

## 2022-03-01 PROCEDURE — 99221 1ST HOSP IP/OBS SF/LOW 40: CPT

## 2022-03-01 RX ORDER — AMPICILLIN SODIUM AND SULBACTAM SODIUM 250; 125 MG/ML; MG/ML
3 INJECTION, POWDER, FOR SUSPENSION INTRAMUSCULAR; INTRAVENOUS EVERY 12 HOURS
Refills: 0 | Status: DISCONTINUED | OUTPATIENT
Start: 2022-03-01 | End: 2022-03-03

## 2022-03-01 RX ORDER — FUROSEMIDE 40 MG
80 TABLET ORAL ONCE
Refills: 0 | Status: COMPLETED | OUTPATIENT
Start: 2022-03-01 | End: 2022-03-01

## 2022-03-01 RX ORDER — SEVELAMER CARBONATE 2400 MG/1
800 POWDER, FOR SUSPENSION ORAL EVERY 8 HOURS
Refills: 0 | Status: DISCONTINUED | OUTPATIENT
Start: 2022-03-01 | End: 2022-03-04

## 2022-03-01 RX ORDER — AMPICILLIN SODIUM AND SULBACTAM SODIUM 250; 125 MG/ML; MG/ML
3 INJECTION, POWDER, FOR SUSPENSION INTRAMUSCULAR; INTRAVENOUS EVERY 6 HOURS
Refills: 0 | Status: DISCONTINUED | OUTPATIENT
Start: 2022-03-01 | End: 2022-03-01

## 2022-03-01 RX ORDER — SENNA PLUS 8.6 MG/1
2 TABLET ORAL AT BEDTIME
Refills: 0 | Status: DISCONTINUED | OUTPATIENT
Start: 2022-03-01 | End: 2022-03-10

## 2022-03-01 RX ORDER — SODIUM HYPOCHLORITE 0.125 %
1 SOLUTION, NON-ORAL MISCELLANEOUS EVERY 8 HOURS
Refills: 0 | Status: DISCONTINUED | OUTPATIENT
Start: 2022-03-01 | End: 2022-03-10

## 2022-03-01 RX ORDER — POLYETHYLENE GLYCOL 3350 17 G/17G
17 POWDER, FOR SOLUTION ORAL
Refills: 0 | Status: DISCONTINUED | OUTPATIENT
Start: 2022-03-01 | End: 2022-03-10

## 2022-03-01 RX ADMIN — Medication 1 APPLICATION(S): at 15:55

## 2022-03-01 RX ADMIN — Medication 80 MILLIGRAM(S): at 16:18

## 2022-03-01 RX ADMIN — Medication 100 MILLIGRAM(S): at 18:08

## 2022-03-01 RX ADMIN — Medication 1: at 18:06

## 2022-03-01 RX ADMIN — NYSTATIN CREAM 1 APPLICATION(S): 100000 CREAM TOPICAL at 17:49

## 2022-03-01 RX ADMIN — AMPICILLIN SODIUM AND SULBACTAM SODIUM 200 GRAM(S): 250; 125 INJECTION, POWDER, FOR SUSPENSION INTRAMUSCULAR; INTRAVENOUS at 18:33

## 2022-03-01 RX ADMIN — OXYCODONE HYDROCHLORIDE 10 MILLIGRAM(S): 5 TABLET ORAL at 12:04

## 2022-03-01 RX ADMIN — Medication 40 MILLIGRAM(S): at 11:53

## 2022-03-01 RX ADMIN — OXYCODONE HYDROCHLORIDE 10 MILLIGRAM(S): 5 TABLET ORAL at 11:34

## 2022-03-01 RX ADMIN — SEVELAMER CARBONATE 800 MILLIGRAM(S): 2400 POWDER, FOR SUSPENSION ORAL at 22:12

## 2022-03-01 RX ADMIN — RIVAROXABAN 20 MILLIGRAM(S): KIT at 11:53

## 2022-03-01 RX ADMIN — Medication 100 MILLIGRAM(S): at 05:02

## 2022-03-01 RX ADMIN — Medication 1 APPLICATION(S): at 22:12

## 2022-03-01 RX ADMIN — NYSTATIN CREAM 1 APPLICATION(S): 100000 CREAM TOPICAL at 05:03

## 2022-03-01 RX ADMIN — ATORVASTATIN CALCIUM 80 MILLIGRAM(S): 80 TABLET, FILM COATED ORAL at 22:11

## 2022-03-01 RX ADMIN — INSULIN GLARGINE 28 UNIT(S): 100 INJECTION, SOLUTION SUBCUTANEOUS at 22:11

## 2022-03-01 RX ADMIN — PIPERACILLIN AND TAZOBACTAM 25 GRAM(S): 4; .5 INJECTION, POWDER, LYOPHILIZED, FOR SOLUTION INTRAVENOUS at 02:31

## 2022-03-01 RX ADMIN — OXYCODONE HYDROCHLORIDE 10 MILLIGRAM(S): 5 TABLET ORAL at 06:00

## 2022-03-01 RX ADMIN — Medication 81 MILLIGRAM(S): at 11:53

## 2022-03-01 RX ADMIN — OXYCODONE HYDROCHLORIDE 10 MILLIGRAM(S): 5 TABLET ORAL at 05:01

## 2022-03-01 RX ADMIN — SENNA PLUS 2 TABLET(S): 8.6 TABLET ORAL at 22:11

## 2022-03-01 RX ADMIN — Medication 26 UNIT(S): at 09:23

## 2022-03-01 RX ADMIN — POLYETHYLENE GLYCOL 3350 17 GRAM(S): 17 POWDER, FOR SOLUTION ORAL at 15:56

## 2022-03-01 RX ADMIN — Medication 1: at 13:30

## 2022-03-01 NOTE — CONSULT NOTE ADULT - SUBJECTIVE AND OBJECTIVE BOX
Wound SURGERY CONSULT NOTE    HPI:  65 yo F, PMHx AS s/p TAVR 2015 and 2021, HOCM, pAFib on Xarelto, JOSR (does not tolerate CPAP, on 2L O2 at home qhs), anxiety, morbid obesity, DM, HTN, asthma (on albuterol), heterozygous prothrombin gene mutation, fourth toe resection, bedbound for 1 year 2/2 morbid obesity and worsening SOB,  DVT 8/2020 presents to ED with fever (Tmax 100.8), chills, n/v, that has been ongoing for the past 4 days. Pt has known sacral ulcer for a few months, follows with wound care, and pt states she has had more pain and significant odor in the region over the past two weeks. No trauma or other significant injuries. She has long standing chronic SOB with exertion (>2 steps), has communicated concerns to outpatient cardiology. She denies associated cough, sputum production, confusion, weakness, hemoptysis, chest pain, abdominal pain, dysuria, vaginal discharge. She has chronic immobility 2/2 to morbid obesity. She denies any recent changes to medications. She has a allergy to penicillin and ciprofloxacin, but cannot remember her reaction to these. Patient states her SOB started since July 2021 and has been worsening. Pt denies any relieving or exacerbating factors.     ED course:   Tmax 101.5F, HR 80s-140s, currently 100, BP dropped to 60/50s. RR 18 SPO2 99 % on NC  Labs significant for: WBC 39.9 neutrophil predominance, Hgb 10, plt 265. INR 2. TSH 0.63. VBG lactate- 3-4.   CT a/p: Multiple foci of subcutaneous gas at the left gluteal fold and perineum with mild associated inflammatory changes. No fluid collection identified. Correlate for necrotizing soft tissue infection.  CXR: clear lungs   WBC: leuk esterase, proteins, WBCs, specific gravity 1.029  Patient was temporarily placed on levo gtt and BiPAP both weaned off.   Patient given GLOVER, Cefepime 2g, Vanco x2,  cc.   Blood cx, urine cx, MRSA PCR, and sacral ulcer cx pending   MICU consulted, not a candidate for MICU at this time. ID consulted recommended continuing Vancomycin / Cefepime / Metronidazole and MRSA PCR. Surgery consulted, completed I&D.          (26 Feb 2022 22:43)        N/V/D,  BM/ Flatus,   NGT,     palp/ sob/dyspnea/ cp,       F/C/S  Wound consult requested by team to assist w/ management of      wound/ pressure injury.   Pt (unable to)  c/o pain, drainage, odor, color change,  worsening swelling. Offloading and pericare initiated Increasingly sedentary 2/2 to illness. Pt is Incontinent of urine & stool. (+)dunn/ ostomy.   H/o falls, trauma.  (Pt seen by Wound RN and )TRIAD/ Aquacell/ Allevyn/ medihoney/ dakins/ Adaptic/ DSD used at home/ while awaiting consult.  Appetite good/ decreased.  weight loss.  S&S / RD consult appreciated All questions asked and answered to pt's and family's satisfaction.    Current Diet: Diet, DASH/TLC:   Sodium & Cholesterol Restricted  Consistent Carbohydrate Evening Snack (CSTCHOSN)  Supplement Feeding Modality:  Oral  Glucerna Shake Cans or Servings Per Day:  1       Frequency:  Two Times a day (02-28-22 @ 18:55)      PAST MEDICAL & SURGICAL HISTORY:  Hypertension    Gastroparesis due to DM    Depression    Herniated disc    Spinal stenosis  chronic back pain    Cholesterol serum elevated    MVP (mitral valve prolapse)    Hypothyroid  not on medication    Obesity    IBS (irritable bowel syndrome)    GERD (gastroesophageal reflux disease)    Diabetes type 2, uncontrolled    History of diabetic retinopathy    Peripheral neuropathy    HOCM (hypertrophic obstructive cardiomyopathy)    Atrial fibrillation    Aortic stenosis    S/P bariatric surgery  lap band surgery two times due to erosion, S/P removal    S/P tonsillectomy and adenoidectomy    Cataract  B/L cataracts    S/P D&amp;C (status post dilation and curettage)  D&amp;C when she was 18    Cervix abnormality  Ablation of cervix    Breast anomaly  Biopsy of both breast benign lesions    right eye torn retina    S/P TAVR (transcatheter aortic valve replacement)  2015    S/P foot surgery  May 2021        REVIEW OF SYSTEMS: Pt unable to offer  General/ Breast/ Skin/ Neuro/ MSK: see HPI  All other systems negative    MEDICATIONS  (STANDING):  aspirin enteric coated 81 milliGRAM(s) Oral daily  atorvastatin 80 milliGRAM(s) Oral at bedtime  Dakins Solution - 1/4 Strength 1 Application(s) Topical every 8 hours  dextrose 40% Gel 15 Gram(s) Oral once  dextrose 5%. 1000 milliLiter(s) (50 mL/Hr) IV Continuous <Continuous>  dextrose 5%. 1000 milliLiter(s) (100 mL/Hr) IV Continuous <Continuous>  dextrose 50% Injectable 25 Gram(s) IV Push once  dextrose 50% Injectable 12.5 Gram(s) IV Push once  dextrose 50% Injectable 25 Gram(s) IV Push once  FLUoxetine 40 milliGRAM(s) Oral daily  glucagon  Injectable 1 milliGRAM(s) IntraMuscular once  insulin glargine Injectable (LANTUS) 28 Unit(s) SubCutaneous at bedtime  insulin lispro (ADMELOG) corrective regimen sliding scale   SubCutaneous three times a day before meals  insulin lispro (ADMELOG) corrective regimen sliding scale   SubCutaneous at bedtime  insulin lispro Injectable (ADMELOG) 26 Unit(s) SubCutaneous three times a day with meals  metoprolol tartrate 100 milliGRAM(s) Oral two times a day  nystatin Powder 1 Application(s) Topical two times a day  piperacillin/tazobactam IVPB.. 3.375 Gram(s) IV Intermittent every 12 hours  rivaroxaban 20 milliGRAM(s) Oral daily  sodium chloride 0.9%. 1000 milliLiter(s) (60 mL/Hr) IV Continuous <Continuous>    MEDICATIONS  (PRN):  acetaminophen     Tablet .. 650 milliGRAM(s) Oral every 6 hours PRN Mild Pain (1 - 3), Moderate Pain (4 - 6)  oxyCODONE    IR 5 milliGRAM(s) Oral daily PRN Dressing change  oxyCODONE    IR 10 milliGRAM(s) Oral every 6 hours PRN Severe Pain (7 - 10)  sodium chloride 0.65% Nasal 1 Spray(s) Both Nostrils every 6 hours PRN Nasal Congestion      Allergies    amoxicillin (Unknown)  Cipro (Unknown)  codeine (Vomiting)  shellfish (Stomach Upset)    Intolerances    fish (Stomach Upset)      SOCIAL HISTORY:  / /single/ ; (+)HHA/ lives in SNF; Former smoker, No/ Denies smoking, ETOH, drugs    FAMILY HISTORY:  Family history of aortic stenosis  Family hx of structural heart dx    FHx: diabetes mellitus    FH: CAD (coronary artery disease)    Family history of anxiety disorder     no h/o PVD or wound healing or skin problems    PHYSICAL EXAM:  Vital Signs Last 24 Hrs  T(C): 36.5 (01 Mar 2022 04:41), Max: 36.9 (28 Feb 2022 21:36)  T(F): 97.7 (01 Mar 2022 04:41), Max: 98.5 (28 Feb 2022 21:36)  HR: 70 (01 Mar 2022 04:41) (70 - 81)  BP: 107/68 (01 Mar 2022 04:41) (100/66 - 116/66)  BP(mean): --  RR: 18 (01 Mar 2022 04:41) (18 - 18)  SpO2: 98% (01 Mar 2022 04:41) (94% - 99%)    NAD / Guarded but stable,  A&Ox3/ Alert/ Confused  cachectic/ MO/ Obese/ frail  WD/ WN/ WG/ Disheveled  Total Care Sport/ Versa Care P500 / Envella Progressa bed     HEENT:  NC/AT, PERRL, EOMI, mucosa moist, throat clear, trachea midline, neck supple  Cardiovascular: RRR (+)m  Respiratory: CTA  Gastrointestinal soft NT/ND (+)BS  (+)PEG (+)ostomy (+)NGT  Neurology:  weakened strength & sensation grossly intact/ paraesthesia  nonverbal, no follow commands/ paraplegic  Psych: calm/ appropriate/ flat affect/ easily aggitated/ restless  Musculoskeletal:  limited stiff / FROM, no deformities/ contractures  Vascular: BLE equally warm/ cool,  no cyanosis, clubbing, edema           >LE //BLE edema equal           BLE DP/PT pulses palpable           no acute ischemia noted          BLE hemosiderin staining  Skin:  moist w/ good turgor  pale, frail,  ecchymosis w/o hematoma  serosanguinous drainage  No odor, erythema, increased warmth, tenderness, induration, fluctuance    LABS/ CULTURES/ RADIOLOGY:                        9.0    19.40 )-----------( 282      ( 01 Mar 2022 06:38 )             29.5       129  |  90  |  89  ----------------------------<  109      [03-01-22 @ 06:38]  3.9   |  21  |  4.67        Ca     9.1     [03-01-22 @ 06:38]      Mg     2.7     [03-01-22 @ 06:38]      Phos  6.3     [03-01-22 @ 06:38]    TPro  6.7  /  Alb  3.0  /  TBili  0.7  /  DBili  x   /  AST  14  /  ALT  12  /  AlkPhos  148  [03-01-22 @ 06:38]              Culture - Blood (collected 02-26-22 @ 18:45)  Source: .Blood Blood  Preliminary Report (02-27-22 @ 19:01):    No growth to date.    Culture - Blood (collected 02-26-22 @ 18:45)  Source: .Blood Blood  Preliminary Report (02-27-22 @ 19:01):    No growth to date.            Culture - Other (collected 02-26-22 @ 22:04)  Source: Decub Decubitus  Final Report (02-28-22 @ 21:46):    Moderate Enterococcus avium    Numerous Streptococcus anginosus Susceptibilites not performed.  Organism: Enterococcus avium (02-28-22 @ 21:46)  Organism: Enterococcus avium (02-28-22 @ 21:46)      -  Ampicillin: S <=2 Predicts results to ampicillin/sulbactam, amoxacillin-clavulanate and  piperacillin-tazobactam.      -  Tetra/Doxy: R >8      -  Vancomycin: S 0.5      Method Type: RAMÓN              A/P:    Wound Consult requested to assist w/ management of      Consider MARQUIS/PVR, XRay, Duplex, MRI, CT  BLE elevation & Compression  Abx per Medicine/ ID  Moisturize intact skin w/ SWEEN cream BID  Nutrition Consult for optimization in pt w/ Severe Protein Calorie Malnutrition,        Inadequate PO intake, & Increased nutritional needs            encourage high quality protein, MVI & Vit C to promote wound healing  Hyperglycemia - improving w/ ADA diet and Lantus/ NPH,         FS w/ ISS q6h/ qmeal & qhs, consider Endo Consult, consider HgA1c  Anemia- improving transfusions, Fe studies, protonix  Continue turning and positioning w/ offloading assistive devices as per protocol  Eleazar/ TRIAD BID and prn soiling //       Continue w/ Pericare w/ dunn/ primafit care, attends underpads as per protocol  Waffle Cushion to chair when oob to chair  Continue w/ low air loss pressure redistribution bed surface   Care as per medicine, will follow w/ you  Upon discharge f/u as outpatient at [facility] [address] [place] [address] [phone]  Seen w/ attng & RN and D/w team & RN  Thank you for this consult  Cleo Paulino PA-C CWS [id number]     Wound SURGERY CONSULT NOTE    HPI:  65 yo F, PMHx AS s/p TAVR 2015 and 2021, HOCM, pAFib on Xarelto, JOSR (does not tolerate CPAP, on 2L O2 at home qhs), anxiety, morbid obesity, DM, HTN, asthma (on albuterol), heterozygous prothrombin gene mutation, fourth toe resection, bedbound for 1 year 2/2 morbid obesity and worsening SOB,  DVT 8/2020 presents to ED with fever (Tmax 100.8), chills, n/v, that has been ongoing for the past 4 days. Pt has known sacral ulcer for a few months, follows with wound care, and pt states she has had more pain and significant odor in the region over the past two weeks. No trauma or other significant injuries. She has long standing chronic SOB with exertion (>2 steps), has communicated concerns to outpatient cardiology. She denies associated cough, sputum production, confusion, weakness, hemoptysis, chest pain, abdominal pain, dysuria, vaginal discharge. She has chronic immobility 2/2 to morbid obesity. She denies any recent changes to medications. She has a allergy to penicillin and ciprofloxacin, but cannot remember her reaction to these. Patient states her SOB started since July 2021 and has been worsening. Pt denies any relieving or exacerbating factors.     ED course:   Tmax 101.5F, HR 80s-140s, currently 100, BP dropped to 60/50s. RR 18 SPO2 99 % on NC  Labs significant for: WBC 39.9 neutrophil predominance, Hgb 10, plt 265. INR 2. TSH 0.63. VBG lactate- 3-4.   CT a/p: Multiple foci of subcutaneous gas at the left gluteal fold and perineum with mild associated inflammatory changes. No fluid collection identified. Correlate for necrotizing soft tissue infection.  CXR: clear lungs   WBC: leuk esterase, proteins, WBCs, specific gravity 1.029  Patient was temporarily placed on levo gtt and BiPAP both weaned off.   Patient given GLOVER, Cefepime 2g, Vanco x2,  cc.   Blood cx, urine cx, MRSA PCR, and sacral ulcer cx pending   MICU consulted, not a candidate for MICU at this time. ID consulted recommended continuing Vancomycin / Cefepime / Metronidazole and MRSA PCR. Surgery consulted, completed I&D.          (26 Feb 2022 22:43)        N/V/D,  BM/ Flatus,   NGT,     palp/ sob/dyspnea/ cp,       F/C/S  Wound consult requested by team to assist w/ management of      wound/ pressure injury.   Pt (unable to)  c/o pain, drainage, odor, color change,  worsening swelling. Offloading and pericare initiated Increasingly sedentary 2/2 to illness. Pt is Incontinent of urine & stool. (+)dunn/ ostomy.   H/o falls, trauma.  (Pt seen by Wound RN and )TRIAD/ Aquacell/ Allevyn/ medihoney/ dakins/ Adaptic/ DSD used at home/ while awaiting consult.  Appetite good/ decreased.  weight loss.  S&S / RD consult appreciated All questions asked and answered to pt's and family's satisfaction.    Current Diet: Diet, DASH/TLC:   Sodium & Cholesterol Restricted  Consistent Carbohydrate Evening Snack (CSTCHOSN)  Supplement Feeding Modality:  Oral  Glucerna Shake Cans or Servings Per Day:  1       Frequency:  Two Times a day (02-28-22 @ 18:55)      PAST MEDICAL & SURGICAL HISTORY:  Hypertension    Gastroparesis due to DM    Depression    Herniated disc    Spinal stenosis  chronic back pain    Cholesterol serum elevated    MVP (mitral valve prolapse)    Hypothyroid  not on medication    Obesity    IBS (irritable bowel syndrome)    GERD (gastroesophageal reflux disease)    Diabetes type 2, uncontrolled    History of diabetic retinopathy    Peripheral neuropathy    HOCM (hypertrophic obstructive cardiomyopathy)    Atrial fibrillation    Aortic stenosis    S/P bariatric surgery  lap band surgery two times due to erosion, S/P removal    S/P tonsillectomy and adenoidectomy    Cataract  B/L cataracts    S/P D&amp;C (status post dilation and curettage)  D&amp;C when she was 18    Cervix abnormality  Ablation of cervix    Breast anomaly  Biopsy of both breast benign lesions    right eye torn retina    S/P TAVR (transcatheter aortic valve replacement)  2015    S/P foot surgery  May 2021        REVIEW OF SYSTEMS: Pt unable to offer  General/ Breast/ Skin/ Neuro/ MSK: see HPI  All other systems negative    MEDICATIONS  (STANDING):  aspirin enteric coated 81 milliGRAM(s) Oral daily  atorvastatin 80 milliGRAM(s) Oral at bedtime  Dakins Solution - 1/4 Strength 1 Application(s) Topical every 8 hours  dextrose 40% Gel 15 Gram(s) Oral once  dextrose 5%. 1000 milliLiter(s) (50 mL/Hr) IV Continuous <Continuous>  dextrose 5%. 1000 milliLiter(s) (100 mL/Hr) IV Continuous <Continuous>  dextrose 50% Injectable 25 Gram(s) IV Push once  dextrose 50% Injectable 12.5 Gram(s) IV Push once  dextrose 50% Injectable 25 Gram(s) IV Push once  FLUoxetine 40 milliGRAM(s) Oral daily  glucagon  Injectable 1 milliGRAM(s) IntraMuscular once  insulin glargine Injectable (LANTUS) 28 Unit(s) SubCutaneous at bedtime  insulin lispro (ADMELOG) corrective regimen sliding scale   SubCutaneous three times a day before meals  insulin lispro (ADMELOG) corrective regimen sliding scale   SubCutaneous at bedtime  insulin lispro Injectable (ADMELOG) 26 Unit(s) SubCutaneous three times a day with meals  metoprolol tartrate 100 milliGRAM(s) Oral two times a day  nystatin Powder 1 Application(s) Topical two times a day  piperacillin/tazobactam IVPB.. 3.375 Gram(s) IV Intermittent every 12 hours  rivaroxaban 20 milliGRAM(s) Oral daily  sodium chloride 0.9%. 1000 milliLiter(s) (60 mL/Hr) IV Continuous <Continuous>    MEDICATIONS  (PRN):  acetaminophen     Tablet .. 650 milliGRAM(s) Oral every 6 hours PRN Mild Pain (1 - 3), Moderate Pain (4 - 6)  oxyCODONE    IR 5 milliGRAM(s) Oral daily PRN Dressing change  oxyCODONE    IR 10 milliGRAM(s) Oral every 6 hours PRN Severe Pain (7 - 10)  sodium chloride 0.65% Nasal 1 Spray(s) Both Nostrils every 6 hours PRN Nasal Congestion      Allergies    amoxicillin (Unknown)  Cipro (Unknown)  codeine (Vomiting)  shellfish (Stomach Upset)    Intolerances    fish (Stomach Upset)      SOCIAL HISTORY:  / /single/ ; (+)HHA/ lives in SNF; Former smoker, No/ Denies smoking, ETOH, drugs    FAMILY HISTORY:FAMILY HISTORY:  no h/o PVD or wound healing or skin problems     (+) Family history of aortic stenosis, structural heart dx, diabetes mellitus, CAD, anxiety disorder      PHYSICAL EXAM:  Vital Signs Last 24 Hrs  T(C): 36.5 (01 Mar 2022 04:41), Max: 36.9 (28 Feb 2022 21:36)  T(F): 97.7 (01 Mar 2022 04:41), Max: 98.5 (28 Feb 2022 21:36)  HR: 70 (01 Mar 2022 04:41) (70 - 81)  BP: 107/68 (01 Mar 2022 04:41) (100/66 - 116/66)  BP(mean): --  RR: 18 (01 Mar 2022 04:41) (18 - 18)  SpO2: 98% (01 Mar 2022 04:41) (94% - 99%)            NAD,  A&Ox3, MO WD/ WG  Versa Care P500 bed  HEENT:  NC/AT, EOMI, mucosa moist, throat clear, trachea midline, neck supple  Respiratory: equal chest rise  Gastrointestinal soft NT/ND   Neurology:   strength & sensation grossly intact  Psych: anxious  Musculoskeletal: limited stiff / FROM, no deformities/ contractures  Vascular: BLE equally warm,  no cyanosis, clubbing, edema  Skin:  moist w/ good turgor    serosanguinous drainage  No odor, erythema, increased warmth, tenderness, induration, fluctuance      LABS/ CULTURES/ RADIOLOGY:                        9.0    19.40 )-----------( 282      ( 01 Mar 2022 06:38 )             29.5       129  |  90  |  89  ----------------------------<  109      [03-01-22 @ 06:38]  3.9   |  21  |  4.67        Ca     9.1     [03-01-22 @ 06:38]      Mg     2.7     [03-01-22 @ 06:38]      Phos  6.3     [03-01-22 @ 06:38]    TPro  6.7  /  Alb  3.0  /  TBili  0.7  /  DBili  x   /  AST  14  /  ALT  12  /  AlkPhos  148  [03-01-22 @ 06:38]      Culture - Blood (collected 02-26-22 @ 18:45)  Source: .Blood Blood  Preliminary Report (02-27-22 @ 19:01):    No growth to date.    Culture - Blood (collected 02-26-22 @ 18:45)  Source: .Blood Blood  Preliminary Report (02-27-22 @ 19:01):    No growth to date.            Culture - Other (collected 02-26-22 @ 22:04)  Source: Decub Decubitus  Final Report (02-28-22 @ 21:46):    Moderate Enterococcus avium    Numerous Streptococcus anginosus Susceptibilites not performed.  Organism: Enterococcus avium (02-28-22 @ 21:46)  Organism: Enterococcus avium (02-28-22 @ 21:46)      -  Ampicillin: S <=2 Predicts results to ampicillin/sulbactam, amoxacillin-clavulanate and  piperacillin-tazobactam.      -  Tetra/Doxy: R >8      -  Vancomycin: S 0.5      Method Type: RAMÓN              A/P:    Wound Consult requested to assist w/ management of      Consider MARQUIS/PVR, XRay, Duplex, MRI, CT  BLE elevation & Compression  Abx per Medicine/ ID  Moisturize intact skin w/ SWEEN cream BID  Nutrition Consult for optimization in pt w/ Severe Protein Calorie Malnutrition,        Inadequate PO intake, & Increased nutritional needs            encourage high quality protein, MVI & Vit C to promote wound healing  Hyperglycemia - improving w/ ADA diet and Lantus/ NPH,         FS w/ ISS q6h/ qmeal & qhs, consider Endo Consult, consider HgA1c  Anemia- improving transfusions, Fe studies, protonix  Continue turning and positioning w/ offloading assistive devices as per protocol  Eleazar/ TRIAD BID and prn soiling //       Continue w/ Pericare w/ dunn/ primafit care, attends underpads as per protocol  Waffle Cushion to chair when oob to chair  Continue w/ low air loss pressure redistribution bed surface   Care as per medicine, will follow w/ you  Upon discharge f/u as outpatient at [facility] [address] [facility] [phone]  Seen w/ attng & RN and D/w team & RN  Thank you for this consult  Cleo Paulino PA-C CWS [id number]     Wound SURGERY CONSULT NOTE    HPI:  67 yo F, PMHx AS s/p TAVR 2015 and 2021, HOCM, pAFib on Xarelto, JOSR (does not tolerate CPAP, on 2L O2 at home qhs), anxiety, morbid obesity, DM, HTN, asthma (on albuterol),DVT 8/2020, heterozygous prothrombin gene mutation, fourth toe resection, bedbound for 1 year 2/2 morbid obesity and worsening SOB, presented to ED with fever (Tmax 100.8), chills, n/v, that has been ongoing for the past 4 days. Pt has known sacral ulcer for a few months, follows with [facility] wound care, and pt states she has had more pain and significant odor in the region over the past two weeks. No trauma or other significant injuries. She has long standing chronic SOB with exertion (>2 steps), has communicated concerns to outpatient cardiology. She denies associated cough, sputum production, confusion, weakness, hemoptysis, chest pain, abdominal pain, dysuria, vaginal discharge. She has chronic immobility 2/2 to morbid obesity. She denies any recent changes to medications. She has a allergy to penicillin and ciprofloxacin, but cannot remember her reaction to these. Patient states her SOB started since July 2021 and has been worsening. Pt denies any relieving or exacerbating factors.     ED course:   Tmax 101.5F, HR 80s-140s, currently 100, BP dropped to 60/50s. RR 18 SPO2 99 % on NC  Labs significant for: WBC 39.9 neutrophil predominance, Hgb 10, plt 265. INR 2. TSH 0.63. VBG lactate- 3-4.   CT a/p: Multiple foci of subcutaneous gas at the left gluteal fold and perineum with mild associated inflammatory changes. No fluid collection identified. Correlate for necrotizing soft tissue infection.  CXR: clear lungs   WBC: leuk esterase, proteins, WBCs, specific gravity 1.029  Patient was temporarily placed on levo gtt and BiPAP both weaned off.   Patient given GLOVER, Cefepime 2g, Vanco x2,  cc.   Blood cx, urine cx, MRSA PCR, and sacral ulcer cx pending   MICU consulted, not a candidate for MICU at this time. ID consulted recommended continuing Vancomycin / Cefepime / Metronidazole and MRSA PCR. Surgery consulted, completed I&D.     Pt currently w/o N/V/D, no BM, palp/ sob/dyspnea/ cp, nor F/C/S.  Wound consult requested by team to assist w/ management of wound.  Pt's sister at bedside, helpful to sister, encouraged wound eval/ wound care.  Pt very anxious 2/2 pain.  Pain medication given.  Pt also requires miralax at home for BM. Pt c/o pain, when cleaned.  drainage and odor improving since procedure.  swelling in area also improving.  Offloading and pericare initiated. Pt seen by Wound team last year for leg wounds- now healed.  Dakins dressings initiated by surgery after debridement.  Appetite improving.      Current Diet: Diet, DASH/TLC:   Sodium & Cholesterol Restricted  Consistent Carbohydrate Evening Snack (CSTCHOSN)  Supplement Feeding Modality:  Oral  Glucerna Shake Cans or Servings Per Day:  1       Frequency:  Two Times a day (02-28-22 @ 18:55)      PAST MEDICAL & SURGICAL HISTORY:  Hypertension    Gastroparesis due to DM    Depression    Herniated disc    Spinal stenosis, chronic back pain    Cholesterol serum elevated    MVP (mitral valve prolapse)    Hypothyroid    Obesity    IBS (irritable bowel syndrome)    GERD (gastroesophageal reflux disease)    Diabetes type 2, uncontrolled    diabetic retinopathy    Peripheral neuropathy    HOCM (hypertrophic obstructive cardiomyopathy)    Atrial fibrillation    Aortic stenosis    S/P bariatric surgery  lap band surgery two times due to erosion, S/P removal    S/P tonsillectomy and adenoidectomy    Cataract, B/L cataracts    S/P D&C     Cervix abnormality, Ablation of cervix    s/p Biopsy of both breast, benign lesions    right eye torn retina    S/P TAVR (transcatheter aortic valve replacement)    S/P foot surgery      REVIEW OF SYSTEMS: General/ Skin: see HPI  All other systems negative    MEDICATIONS  (STANDING):  aspirin enteric coated 81 milliGRAM(s) Oral daily  atorvastatin 80 milliGRAM(s) Oral at bedtime  Dakins Solution - 1/4 Strength 1 Application(s) Topical every 8 hours  dextrose 40% Gel 15 Gram(s) Oral once  dextrose 5%. 1000 milliLiter(s) (50 mL/Hr) IV Continuous <Continuous>  dextrose 5%. 1000 milliLiter(s) (100 mL/Hr) IV Continuous <Continuous>  dextrose 50% Injectable 25 Gram(s) IV Push once  dextrose 50% Injectable 12.5 Gram(s) IV Push once  dextrose 50% Injectable 25 Gram(s) IV Push once  FLUoxetine 40 milliGRAM(s) Oral daily  glucagon  Injectable 1 milliGRAM(s) IntraMuscular once  insulin glargine Injectable (LANTUS) 28 Unit(s) SubCutaneous at bedtime  insulin lispro (ADMELOG) corrective regimen sliding scale   SubCutaneous three times a day before meals  insulin lispro (ADMELOG) corrective regimen sliding scale   SubCutaneous at bedtime  insulin lispro Injectable (ADMELOG) 26 Unit(s) SubCutaneous three times a day with meals  metoprolol tartrate 100 milliGRAM(s) Oral two times a day  nystatin Powder 1 Application(s) Topical two times a day  piperacillin/tazobactam IVPB.. 3.375 Gram(s) IV Intermittent every 12 hours  rivaroxaban 20 milliGRAM(s) Oral daily  sodium chloride 0.9%. 1000 milliLiter(s) (60 mL/Hr) IV Continuous <Continuous>    MEDICATIONS  (PRN):  acetaminophen     Tablet .. 650 milliGRAM(s) Oral every 6 hours PRN Mild Pain (1 - 3), Moderate Pain (4 - 6)  oxyCODONE    IR 5 milliGRAM(s) Oral daily PRN Dressing change  oxyCODONE    IR 10 milliGRAM(s) Oral every 6 hours PRN Severe Pain (7 - 10)  sodium chloride 0.65% Nasal 1 Spray(s) Both Nostrils every 6 hours PRN Nasal Congestion      Allergies  amoxicillin (Unknown)  Cipro (Unknown)  codeine (Vomiting)  shellfish (Stomach Upset)    Intolerances  fish (Stomach Upset)      SOCIAL HISTORY: single; lives in house w/ sister; Former smoker, social ETOH, denies drugs    FAMILY HISTORY:  no h/o PVD or wound healing or skin problems     (+) Family history of aortic stenosis, structural heart dx, diabetes mellitus, CAD, anxiety disorder      PHYSICAL EXAM:  Vital Signs Last 24 Hrs  T(C): 36.5 (01 Mar 2022 04:41), Max: 36.9 (28 Feb 2022 21:36)  T(F): 97.7 (01 Mar 2022 04:41), Max: 98.5 (28 Feb 2022 21:36)  HR: 70 (01 Mar 2022 04:41) (70 - 81)  BP: 107/68 (01 Mar 2022 04:41) (100/66 - 116/66)  BP(mean): --  RR: 18 (01 Mar 2022 04:41) (18 - 18)  SpO2: 98% (01 Mar 2022 04:41) (94% - 99%)      NAD,  A&Ox3, MO WD/ WG  Versa Care P500 bed  HEENT:  NC/AT, EOMI, mucosa moist, throat clear, trachea midline, neck supple  Respiratory: equal chest rise  Gastrointestinal soft NT/ND   Neurology:   strength & sensation grossly intact  Psych: anxious  Musculoskeletal: limited stiff / FROM, no deformities/ contractures  Vascular: BLE equally warm,  no cyanosis, clubbing, edema  Skin:  moist w/ good turgor  (+) tattoos      Lt labial/ perineal wound 4.5cm x 4cm x 3cm           w/ 1.5cm undermining @ 12 o'clock and 3.5cm at 6o'clock      40% nonviable tissue slough 60% granular      liquefaction necrosis /serosanguinous drainage      moisture dermatitis in periwound skin  No odor, erythema, increased warmth, tenderness, induration, fluctuance, nor crepitus      LABS/ CULTURES/ RADIOLOGY:                        9.0    19.40 )-----------( 282      ( 01 Mar 2022 06:38 )             29.5       129  |  90  |  89  ----------------------------<  109      [03-01-22 @ 06:38]  3.9   |  21  |  4.67        Ca     9.1     [03-01-22 @ 06:38]      Mg     2.7     [03-01-22 @ 06:38]      Phos  6.3     [03-01-22 @ 06:38]    TPro  6.7  /  Alb  3.0  /  TBili  0.7  /  DBili  x   /  AST  14  /  ALT  12  /  AlkPhos  148  [03-01-22 @ 06:38]  A1C with Estimated Average Glucose 8.6%(02.27.22 @ 08:18)   Culture - Blood (collected 02-26-22 @ 18:45)  Source: .Blood Blood  Preliminary Report (02-27-22 @ 19:01):    No growth to date.    Culture - Blood (collected 02-26-22 @ 18:45)  Source: .Blood Blood  Preliminary Report (02-27-22 @ 19:01):    No growth to date.    Culture - Other (collected 02-26-22 @ 22:04)  Source: Decub Decubitus  Final Report (02-28-22 @ 21:46):    Moderate Enterococcus avium    Numerous Streptococcus anginosus Susceptibilites not performed.  Organism: Enterococcus avium (02-28-22 @ 21:46)  Organism: Enterococcus avium (02-28-22 @ 21:46)      -  Ampicillin: S <=2 Predicts results to ampicillin/sulbactam, amoxacillin-clavulanate and  piperacillin-tazobactam.      -  Tetra/Doxy: R >8      -  Vancomycin: S 0.5      Method Type: RAMÓN      < from: CT Abdomen and Pelvis w/ IV Cont (02.26.22 @ 16:37) >  ACC: [id number] EXAM:  CT ABDOMEN AND PELVIS IC                          PROCEDURE DATE:  02/26/2022          INTERPRETATION:  CLINICAL INFORMATION: Rash and necrotic foci at the left   gluteal fold.    COMPARISON: CT abdomen pelvis 10/20/2021.    CONTRAST/COMPLICATIONS:  IV Contrast: Omnipaque 350  125 cc administered   25 cc discarded  Oral Contrast: NONE  Complications: None reported at time of study completion    PROCEDURE:  CT of the Abdomen and Pelvis was performed.  Sagittal and coronal reformats were performed.    FINDINGS:  LOWER CHEST: Within normal limits.    LIVER: Within normal limits.  BILE DUCTS: Normal caliber.  GALLBLADDER: Cholelithiasis.  SPLEEN: Within normal limits.  PANCREAS: Atrophic  ADRENALS: Within normal limits.  KIDNEYS/URETERS: No renal stones or hydronephrosis.    BLADDER: Within normal limits.  REPRODUCTIVE ORGANS: Enlarged uterus with multiple leiomyomata.    BOWEL: No bowel obstruction. Appendix is normal.  PERITONEUM: No ascites.  VESSELS: Atheroscleroticchanges.  RETROPERITONEUM/LYMPH NODES: No lymphadenopathy.  ABDOMINAL WALL: Multiple foci of subcutaneous gas in the left gluteal   subcutaneous tissues with mild associated inflammatory stranding. No   fluid collection. Large fat-containing umbilical hernia.  BONES: Within normal limits.    IMPRESSION:    Multiple foci of subcutaneous gas at the left gluteal fold and perineum   with mild associated inflammatory changes. No fluid collection   identified. Correlate for necrotizing soft tissue infection.      < end of copied text >     Wound SURGERY CONSULT NOTE    HPI:  67 yo F, PMHx AS s/p TAVR 2015 and 2021, HOCM, pAFib on Xarelto, JOSR (does not tolerate CPAP, on 2L O2 at home qhs), anxiety, morbid obesity, DM, HTN, asthma (on albuterol),DVT 8/2020, heterozygous prothrombin gene mutation, fourth toe resection, bedbound for 1 year 2/2 morbid obesity and worsening SOB, presented to ED with fever (Tmax 100.8), chills, n/v, that has been ongoing for the past 4 days. Pt has known sacral ulcer for a few months, follows with [facility] [facility], and pt states she has had more pain and significant odor in the region over the past two weeks. No trauma or other significant injuries. She has long standing chronic SOB with exertion (>2 steps), has communicated concerns to outpatient cardiology. She denies associated cough, sputum production, confusion, weakness, hemoptysis, chest pain, abdominal pain, dysuria, vaginal discharge. She has chronic immobility 2/2 to morbid obesity. She denies any recent changes to medications. She has a allergy to penicillin and ciprofloxacin, but cannot remember her reaction to these. Patient states her SOB started since July 2021 and has been worsening. Pt denies any relieving or exacerbating factors.     ED course:   Tmax 101.5F, HR 80s-140s, currently 100, BP dropped to 60/50s. RR 18 SPO2 99 % on NC  Labs significant for: WBC 39.9 neutrophil predominance, Hgb 10, plt 265. INR 2. TSH 0.63. VBG lactate- 3-4.   CT a/p: Multiple foci of subcutaneous gas at the left gluteal fold and perineum with mild associated inflammatory changes. No fluid collection identified. Correlate for necrotizing soft tissue infection.  CXR: clear lungs   WBC: leuk esterase, proteins, WBCs, specific gravity 1.029  Patient was temporarily placed on levo gtt and BiPAP both weaned off.   Patient given GLOVER, Cefepime 2g, Vanco x2,  cc.   Blood cx, urine cx, MRSA PCR, and sacral ulcer cx pending   MICU consulted, not a candidate for MICU at this time. ID consulted recommended continuing Vancomycin / Cefepime / Metronidazole and MRSA PCR. Surgery consulted, completed I&D.     Pt currently w/o N/V/D, no BM, palp/ sob/dyspnea/ cp, nor F/C/S.  Wound consult requested by team to assist w/ management of wound.  Pt's sister at bedside, helpful to sister, encouraged wound eval/ wound care.  Pt very anxious 2/2 pain.  Pain medication given.  Pt also requires miralax at home for BM. Pt c/o pain, when cleaned.  drainage and odor improving since procedure.  swelling in area also improving.  Offloading and pericare initiated. Pt seen by Wound team last year for leg wounds- now healed.  Dakins dressings initiated by surgery after debridement.  Appetite improving.      Current Diet: Diet, DASH/TLC:   Sodium & Cholesterol Restricted  Consistent Carbohydrate Evening Snack (CSTCHOSN)  Supplement Feeding Modality:  Oral  Glucerna Shake Cans or Servings Per Day:  1       Frequency:  Two Times a day (02-28-22 @ 18:55)      PAST MEDICAL & SURGICAL HISTORY:  Hypertension    Gastroparesis due to DM    Depression    Herniated disc    Spinal stenosis, chronic back pain    Cholesterol serum elevated    MVP (mitral valve prolapse)    Hypothyroid    Obesity    IBS (irritable bowel syndrome)    GERD (gastroesophageal reflux disease)    Diabetes type 2, uncontrolled    diabetic retinopathy    Peripheral neuropathy    HOCM (hypertrophic obstructive cardiomyopathy)    Atrial fibrillation    Aortic stenosis    S/P bariatric surgery  lap band surgery two times due to erosion, S/P removal    S/P tonsillectomy and adenoidectomy    Cataract, B/L cataracts    S/P D&C     Cervix abnormality, Ablation of cervix    s/p Biopsy of both breast, benign lesions    right eye torn retina    S/P TAVR (transcatheter aortic valve replacement)    S/P foot surgery      REVIEW OF SYSTEMS: General/ Skin: see HPI  All other systems negative    MEDICATIONS  (STANDING):  aspirin enteric coated 81 milliGRAM(s) Oral daily  atorvastatin 80 milliGRAM(s) Oral at bedtime  Dakins Solution - 1/4 Strength 1 Application(s) Topical every 8 hours  dextrose 40% Gel 15 Gram(s) Oral once  dextrose 5%. 1000 milliLiter(s) (50 mL/Hr) IV Continuous <Continuous>  dextrose 5%. 1000 milliLiter(s) (100 mL/Hr) IV Continuous <Continuous>  dextrose 50% Injectable 25 Gram(s) IV Push once  dextrose 50% Injectable 12.5 Gram(s) IV Push once  dextrose 50% Injectable 25 Gram(s) IV Push once  FLUoxetine 40 milliGRAM(s) Oral daily  glucagon  Injectable 1 milliGRAM(s) IntraMuscular once  insulin glargine Injectable (LANTUS) 28 Unit(s) SubCutaneous at bedtime  insulin lispro (ADMELOG) corrective regimen sliding scale   SubCutaneous three times a day before meals  insulin lispro (ADMELOG) corrective regimen sliding scale   SubCutaneous at bedtime  insulin lispro Injectable (ADMELOG) 26 Unit(s) SubCutaneous three times a day with meals  metoprolol tartrate 100 milliGRAM(s) Oral two times a day  nystatin Powder 1 Application(s) Topical two times a day  piperacillin/tazobactam IVPB.. 3.375 Gram(s) IV Intermittent every 12 hours  rivaroxaban 20 milliGRAM(s) Oral daily  sodium chloride 0.9%. 1000 milliLiter(s) (60 mL/Hr) IV Continuous <Continuous>    MEDICATIONS  (PRN):  acetaminophen     Tablet .. 650 milliGRAM(s) Oral every 6 hours PRN Mild Pain (1 - 3), Moderate Pain (4 - 6)  oxyCODONE    IR 5 milliGRAM(s) Oral daily PRN Dressing change  oxyCODONE    IR 10 milliGRAM(s) Oral every 6 hours PRN Severe Pain (7 - 10)  sodium chloride 0.65% Nasal 1 Spray(s) Both Nostrils every 6 hours PRN Nasal Congestion      Allergies  amoxicillin (Unknown)  Cipro (Unknown)  codeine (Vomiting)  shellfish (Stomach Upset)    Intolerances  fish (Stomach Upset)      SOCIAL HISTORY: single; lives in house w/ sister; Former smoker, social ETOH, denies drugs    FAMILY HISTORY:  no h/o PVD or wound healing or skin problems     (+) Family history of aortic stenosis, structural heart dx, diabetes mellitus, CAD, anxiety disorder      PHYSICAL EXAM:  Vital Signs Last 24 Hrs  T(C): 36.5 (01 Mar 2022 04:41), Max: 36.9 (28 Feb 2022 21:36)  T(F): 97.7 (01 Mar 2022 04:41), Max: 98.5 (28 Feb 2022 21:36)  HR: 70 (01 Mar 2022 04:41) (70 - 81)  BP: 107/68 (01 Mar 2022 04:41) (100/66 - 116/66)  BP(mean): --  RR: 18 (01 Mar 2022 04:41) (18 - 18)  SpO2: 98% (01 Mar 2022 04:41) (94% - 99%)      NAD,  A&Ox3, MO WD/ WG  Versa Care P500 bed  HEENT:  NC/AT, EOMI, mucosa moist, throat clear, trachea midline, neck supple  Respiratory: equal chest rise  Gastrointestinal soft NT/ND   Neurology:   strength & sensation grossly intact  Psych: anxious  Musculoskeletal: limited stiff ROM, no deformities/ contractures  Vascular: BLE equally warm,  no cyanosis, clubbing, edema  Skin:  moist w/ good turgor  (+) tattoos      Lt labial/ perineal wound 4.5cm x 4cm x 3cm           w/ 1.5cm undermining @ 12 o'clock and 3.5cm at 6o'clock      40% nonviable tissue slough 60% granular      liquefaction necrosis /serosanguinous drainage      moisture dermatitis in periwound skin  No odor, erythema, increased warmth, tenderness, induration, fluctuance, nor crepitus      LABS/ CULTURES/ RADIOLOGY:                        9.0    19.40 )-----------( 282      ( 01 Mar 2022 06:38 )             29.5       129  |  90  |  89  ----------------------------<  109      [03-01-22 @ 06:38]  3.9   |  21  |  4.67        Ca     9.1     [03-01-22 @ 06:38]      Mg     2.7     [03-01-22 @ 06:38]      Phos  6.3     [03-01-22 @ 06:38]    TPro  6.7  /  Alb  3.0  /  TBili  0.7  /  DBili  x   /  AST  14  /  ALT  12  /  AlkPhos  148  [03-01-22 @ 06:38]  A1C with Estimated Average Glucose 8.6%(02.27.22 @ 08:18)   Culture - Blood (collected 02-26-22 @ 18:45)  Source: .Blood Blood  Preliminary Report (02-27-22 @ 19:01):    No growth to date.    Culture - Blood (collected 02-26-22 @ 18:45)  Source: .Blood Blood  Preliminary Report (02-27-22 @ 19:01):    No growth to date.    Culture - Other (collected 02-26-22 @ 22:04)  Source: Decub Decubitus  Final Report (02-28-22 @ 21:46):    Moderate Enterococcus avium    Numerous Streptococcus anginosus Susceptibilites not performed.  Organism: Enterococcus avium (02-28-22 @ 21:46)  Organism: Enterococcus avium (02-28-22 @ 21:46)      -  Ampicillin: S <=2 Predicts results to ampicillin/sulbactam, amoxacillin-clavulanate and  piperacillin-tazobactam.      -  Tetra/Doxy: R >8      -  Vancomycin: S 0.5      Method Type: RAMÓN      < from: CT Abdomen and Pelvis w/ IV Cont (02.26.22 @ 16:37) >  ACC: [id number] EXAM:  CT ABDOMEN AND PELVIS IC                          PROCEDURE DATE:  02/26/2022          INTERPRETATION:  CLINICAL INFORMATION: Rash and necrotic foci at the left   gluteal fold.    COMPARISON: CT abdomen pelvis 10/20/2021.    CONTRAST/COMPLICATIONS:  IV Contrast: Omnipaque 350  125 cc administered   25 cc discarded  Oral Contrast: NONE  Complications: None reported at time of study completion    PROCEDURE:  CT of the Abdomen and Pelvis was performed.  Sagittal and coronal reformats were performed.    FINDINGS:  LOWER CHEST: Within normal limits.    LIVER: Within normal limits.  BILE DUCTS: Normal caliber.  GALLBLADDER: Cholelithiasis.  SPLEEN: Within normal limits.  PANCREAS: Atrophic  ADRENALS: Within normal limits.  KIDNEYS/URETERS: No renal stones or hydronephrosis.    BLADDER: Within normal limits.  REPRODUCTIVE ORGANS: Enlarged uterus with multiple leiomyomata.    BOWEL: No bowel obstruction. Appendix is normal.  PERITONEUM: No ascites.  VESSELS: Atheroscleroticchanges.  RETROPERITONEUM/LYMPH NODES: No lymphadenopathy.  ABDOMINAL WALL: Multiple foci of subcutaneous gas in the left gluteal   subcutaneous tissues with mild associated inflammatory stranding. No   fluid collection. Large fat-containing umbilical hernia.  BONES: Within normal limits.    IMPRESSION:    Multiple foci of subcutaneous gas at the left gluteal fold and perineum   with mild associated inflammatory changes. No fluid collection   identified. Correlate for necrotizing soft tissue infection.      < end of copied text >

## 2022-03-01 NOTE — CONSULT NOTE ADULT - SUBJECTIVE AND OBJECTIVE BOX
[facility] DIVISION OF KIDNEY DISEASES AND HYPERTENSION -- [phone]  -- INITIAL CONSULT NOTE  --------------------------------------------------------------------------------  HPI:   67 yo F, PMHx AS s/p TAVR 2015 and 2021, HOCM, pAFib on Xarelto, JOSR (does not tolerate CPAP, on 2L O2 at home qhs), anxiety, morbid obesity, DM, HTN, asthma (on albuterol), heterozygous prothrombin gene mutation, fourth toe resection, bedbound for 1 year 2/2 morbid obesity and worsening SOB,  DVT 8/2020 presents to ED with fever (Tmax 100.8), chills, n/v, that has been ongoing for the past 4 days. Pt has known sacral ulcer for a few months, follows with wound care, and pt states she has had more pain and significant odor in the region over the past two weeks. No trauma or other significant injuries. She has long standing chronic SOB with exertion (>2 steps), has communicated concerns to outpatient cardiology. She denies associated cough, sputum production, confusion, weakness, hemoptysis, chest pain, abdominal pain, dysuria, vaginal discharge. She has chronic immobility 2/2 to morbid obesity. She denies any recent changes to medications. She has a allergy to penicillin and ciprofloxacin, but cannot remember her reaction to these. Patient states her SOB started since July 2021 and has been worsening. Pt denies any relieving or exacerbating factors.     ED course:   Tmax 101.5F, HR 80s-140s, currently 100, BP dropped to 60/50s. RR 18 SPO2 99 % on NC  Labs significant for: WBC 39.9 neutrophil predominance, Hgb 10, plt 265. INR 2. TSH 0.63. VBG lactate- 3-4.   1x vancomycin   CT A/P     Nephro HPI: 67 yo F AS s/p TAVR 2015 and 2021, HOCM, pAFib on Xarelto, JOSR, anxiety, morbid obesity, DM, HTN, asthma, heterozygous prothrombin gene mutation, fourth toe resection, bedbound for 1 year 2/2 morbid obesity and worsening SOB,  DVT 8/2020. Presented on 2/26/22 with sepsis 2/2 infection of sacral decubitus ulcer now s/p I&D. Nephrology consulted for worsening creatinine w. rise from .8 baseline -> 4. In ED, she received CT A/P w/ contrast, 1x vanco, and presented with hypotension to 60/50s. Yesterday, 1x of lasix was attempted. She is now afebrile, normotensive, producing urine via dunn catheter. States sometime recently before admission her lopressor had been increased in dose. Full ROS listed below. Endorses confusion Denies hematuria, dysuria, frequency          PAST HISTORY  --------------------------------------------------------------------------------  PAST MEDICAL & SURGICAL HISTORY:  Hypertension    Gastroparesis due to DM    Depression    Herniated disc    Spinal stenosis  chronic back pain    Cholesterol serum elevated    MVP (mitral valve prolapse)    Hypothyroid  not on medication    Obesity    IBS (irritable bowel syndrome)    GERD (gastroesophageal reflux disease)    Diabetes type 2, uncontrolled    History of diabetic retinopathy    Peripheral neuropathy    HOCM (hypertrophic obstructive cardiomyopathy)    Atrial fibrillation    Aortic stenosis    S/P bariatric surgery  lap band surgery two times due to erosion, S/P removal    S/P tonsillectomy and adenoidectomy    Cataract  B/L cataracts    S/P D&amp;C (status post dilation and curettage)  D&amp;C when she was 18    Cervix abnormality  Ablation of cervix    Breast anomaly  Biopsy of both breast benign lesions    right eye torn retina    S/P TAVR (transcatheter aortic valve replacement)  2015    S/P foot surgery  May 2021      FAMILY HISTORY:  Family history of aortic stenosis  Family hx of structural heart dx    FHx: diabetes mellitus    FH: CAD (coronary artery disease)    Family history of anxiety disorder      PAST SOCIAL HISTORY:    ALLERGIES & MEDICATIONS  --------------------------------------------------------------------------------  Allergies    amoxicillin (Unknown)  Cipro (Unknown)  codeine (Vomiting)  shellfish (Stomach Upset)    Intolerances    fish (Stomach Upset)    Standing Inpatient Medications  aspirin enteric coated 81 milliGRAM(s) Oral daily  atorvastatin 80 milliGRAM(s) Oral at bedtime  dextrose 40% Gel 15 Gram(s) Oral once  dextrose 5%. 1000 milliLiter(s) IV Continuous <Continuous>  dextrose 5%. 1000 milliLiter(s) IV Continuous <Continuous>  dextrose 50% Injectable 25 Gram(s) IV Push once  dextrose 50% Injectable 12.5 Gram(s) IV Push once  dextrose 50% Injectable 25 Gram(s) IV Push once  FLUoxetine 40 milliGRAM(s) Oral daily  glucagon  Injectable 1 milliGRAM(s) IntraMuscular once  insulin glargine Injectable (LANTUS) 28 Unit(s) SubCutaneous at bedtime  insulin lispro (ADMELOG) corrective regimen sliding scale   SubCutaneous three times a day before meals  insulin lispro (ADMELOG) corrective regimen sliding scale   SubCutaneous at bedtime  insulin lispro Injectable (ADMELOG) 26 Unit(s) SubCutaneous three times a day with meals  metoprolol tartrate 100 milliGRAM(s) Oral two times a day  nystatin Powder 1 Application(s) Topical two times a day  piperacillin/tazobactam IVPB.. 3.375 Gram(s) IV Intermittent every 12 hours  rivaroxaban 20 milliGRAM(s) Oral daily  sodium chloride 0.9%. 1000 milliLiter(s) IV Continuous <Continuous>    PRN Inpatient Medications  acetaminophen     Tablet .. 650 milliGRAM(s) Oral every 6 hours PRN  oxyCODONE    IR 5 milliGRAM(s) Oral daily PRN  oxyCODONE    IR 10 milliGRAM(s) Oral every 6 hours PRN  sodium chloride 0.65% Nasal 1 Spray(s) Both Nostrils every 6 hours PRN      REVIEW OF SYSTEMS  --------------------------------------------------------------------------------  Gen: No fevers/chills  Respiratory: No dyspnea, cough  CV: No chest pain  GI: No abdominal pain, diarrhea,  nausea, vomiting  : No increased frequency, hematuria, c/o irritation with dunn  MSK:  no edema  Neuro: No dizziness/lightheadedness    All other systems were reviewed and are negative, except as noted.    VITALS/PHYSICAL EXAM  --------------------------------------------------------------------------------  T(C): 36.5 (03-01-22 @ 04:41), Max: 36.9 (02-28-22 @ 21:36)  HR: 70 (03-01-22 @ 04:41) (70 - 81)  BP: 107/68 (03-01-22 @ 04:41) (100/66 - 116/66)  RR: 18 (03-01-22 @ 04:41) (18 - 18)  SpO2: 98% (03-01-22 @ 04:41) (94% - 99%)  Wt(kg): --        02-28-22 @ 07:01  -  03-01-22 @ 07:00  --------------------------------------------------------  IN: 740 mL / OUT: 600 mL / NET: 140 mL      Physical Exam:  	Gen: NAD  	HEENT: MMM  	Pulm: CTA B/L   	CV: S1S2  	Abd: Soft, +BS   	Ext: 1+LE edema B/L, no asterixis  	Neuro: Awake, alert/oriented x2, trails off in middle of sentences due to confusion  	Skin: Warm and dry  	Vascular access:    LABS/STUDIES  --------------------------------------------------------------------------------              9.0    19.40 >-----------<  282      [03-01-22 @ 06:38]              29.5     129  |  90  |  89  ----------------------------<  109      [03-01-22 @ 06:38]  3.9   |  21  |  4.67        Ca     9.1     [03-01-22 @ 06:38]      Mg     2.7     [03-01-22 @ 06:38]      Phos  6.3     [03-01-22 @ 06:38]    TPro  6.7  /  Alb  3.0  /  TBili  0.7  /  DBili  x   /  AST  14  /  ALT  12  /  AlkPhos  148  [03-01-22 @ 06:38]          Creatinine Trend:  SCr 4.67 [03-01 @ 06:38]  SCr 3.30 [02-28 @ 06:49]  SCr 2.13 [02-27 @ 09:54]  SCr 0.92 [02-27 @ 06:55]  SCr 1.28 [02-26 @ 17:38]    Urinalysis - [02-26-22 @ 15:18]      Color Yellow / Appearance Clear / SG 1.029 / pH 5.5      Gluc 100 mg/dL / Ketone Negative  / Bili Negative / Urobili 2 mg/dL       Blood Negative / Protein 30 mg/dL / Leuk Est Large / Nitrite Negative      RBC 1 / WBC 34 / Hyaline 6 / Gran  / Sq Epi  / Non Sq Epi 4 / Bacteria Negative    Urine Creatinine 140      [02-28-22 @ 15:13]  Urine Sodium 8      [02-28-22 @ 15:13]  Urine Urea Nitrogen 282      [02-28-22 @ 18:30]  Urine Osmolality 368      [02-28-22 @ 15:13]    HbA1c 10.7      [02-21-19 @ 03:04]  TSH 0.63      [02-26-22 @ 19:55]    HCV 0.11, Nonreact      [08-28-20 @ 10:45]  HIV Nonreact      [02-26-22 @ 19:59]  HIV Nonreact      [03-01-22 @ 06:38]     [facility] DIVISION OF KIDNEY DISEASES AND HYPERTENSION -- [phone]  -- INITIAL CONSULT NOTE  --------------------------------------------------------------------------------  HPI:   67 yo F, PMHx AS s/p TAVR 2015 and 2021, HOCM, pAFib on Xarelto, JOSR (does not tolerate CPAP, on 2L O2 at home qhs), anxiety, morbid obesity, DM, HTN, asthma (on albuterol), heterozygous prothrombin gene mutation, fourth toe resection, bedbound for 1 year 2/2 morbid obesity and worsening SOB,  DVT 8/2020 presents to ED with fever (Tmax 100.8), chills, n/v, that has been ongoing for the past 4 days. Pt has known sacral ulcer for a few months, follows with wound care, and pt states she has had more pain and significant odor in the region over the past two weeks. No trauma or other significant injuries. She has long standing chronic SOB with exertion (>2 steps), has communicated concerns to outpatient cardiology. She denies associated cough, sputum production, confusion, weakness, hemoptysis, chest pain, abdominal pain, dysuria, vaginal discharge. She has chronic immobility 2/2 to morbid obesity. She denies any recent changes to medications. She has a allergy to penicillin and ciprofloxacin, but cannot remember her reaction to these. Patient states her SOB started since July 2021 and has been worsening. Pt denies any relieving or exacerbating factors.     ED course:   Tmax 101.5F, HR 80s-140s, currently 100, BP dropped to 60/50s. RR 18 SPO2 99 % on NC  Labs significant for: WBC 39.9 neutrophil predominance, Hgb 10, plt 265. INR 2. TSH 0.63. VBG lactate- 3-4.   1x vancomycin   CT A/P     Nephro HPI: 67 yo F AS s/p TAVR 2015 and 2021, HOCM, pAFib on Xarelto, JOSR, anxiety, morbid obesity, DM, HTN, asthma, heterozygous prothrombin gene mutation, fourth toe resection, bedbound for 1 year 2/2 morbid obesity and worsening SOB,  DVT 8/2020. Presented on 2/26/22 with sepsis 2/2 infection of sacral decubitus ulcer now s/p I&D. Nephrology consulted for worsening creatinine w. rise from .8 baseline -> 4. In ED, she received CT A/P w/ contrast, 1x vanco, and presented with hypotension to 60/50s. Yesterday, 1x of lasix was attempted. She is now afebrile, normotensive, producing urine via dunn catheter. States sometime recently before admission her lopressor had been increased in dose. Full ROS listed below. Endorses confusion Denies hematuria, dysuria, frequency  On interview in afternoon patient endorses hallucinations. As per family, she has hallucinations with oxycodone use.           PAST HISTORY  --------------------------------------------------------------------------------  PAST MEDICAL & SURGICAL HISTORY:  Hypertension    Gastroparesis due to DM    Depression    Herniated disc    Spinal stenosis  chronic back pain    Cholesterol serum elevated    MVP (mitral valve prolapse)    Hypothyroid  not on medication    Obesity    IBS (irritable bowel syndrome)    GERD (gastroesophageal reflux disease)    Diabetes type 2, uncontrolled    History of diabetic retinopathy    Peripheral neuropathy    HOCM (hypertrophic obstructive cardiomyopathy)    Atrial fibrillation    Aortic stenosis    S/P bariatric surgery  lap band surgery two times due to erosion, S/P removal    S/P tonsillectomy and adenoidectomy    Cataract  B/L cataracts    S/P D&amp;C (status post dilation and curettage)  D&amp;C when she was 18    Cervix abnormality  Ablation of cervix    Breast anomaly  Biopsy of both breast benign lesions    right eye torn retina    S/P TAVR (transcatheter aortic valve replacement)  2015    S/P foot surgery  May 2021      FAMILY HISTORY:  Family history of aortic stenosis  Family hx of structural heart dx    FHx: diabetes mellitus    FH: CAD (coronary artery disease)    Family history of anxiety disorder      PAST SOCIAL HISTORY:    ALLERGIES & MEDICATIONS  --------------------------------------------------------------------------------  Allergies    amoxicillin (Unknown)  Cipro (Unknown)  codeine (Vomiting)  shellfish (Stomach Upset)    Intolerances    fish (Stomach Upset)    Standing Inpatient Medications  aspirin enteric coated 81 milliGRAM(s) Oral daily  atorvastatin 80 milliGRAM(s) Oral at bedtime  dextrose 40% Gel 15 Gram(s) Oral once  dextrose 5%. 1000 milliLiter(s) IV Continuous <Continuous>  dextrose 5%. 1000 milliLiter(s) IV Continuous <Continuous>  dextrose 50% Injectable 25 Gram(s) IV Push once  dextrose 50% Injectable 12.5 Gram(s) IV Push once  dextrose 50% Injectable 25 Gram(s) IV Push once  FLUoxetine 40 milliGRAM(s) Oral daily  glucagon  Injectable 1 milliGRAM(s) IntraMuscular once  insulin glargine Injectable (LANTUS) 28 Unit(s) SubCutaneous at bedtime  insulin lispro (ADMELOG) corrective regimen sliding scale   SubCutaneous three times a day before meals  insulin lispro (ADMELOG) corrective regimen sliding scale   SubCutaneous at bedtime  insulin lispro Injectable (ADMELOG) 26 Unit(s) SubCutaneous three times a day with meals  metoprolol tartrate 100 milliGRAM(s) Oral two times a day  nystatin Powder 1 Application(s) Topical two times a day  piperacillin/tazobactam IVPB.. 3.375 Gram(s) IV Intermittent every 12 hours  rivaroxaban 20 milliGRAM(s) Oral daily  sodium chloride 0.9%. 1000 milliLiter(s) IV Continuous <Continuous>    PRN Inpatient Medications  acetaminophen     Tablet .. 650 milliGRAM(s) Oral every 6 hours PRN  oxyCODONE    IR 5 milliGRAM(s) Oral daily PRN  oxyCODONE    IR 10 milliGRAM(s) Oral every 6 hours PRN  sodium chloride 0.65% Nasal 1 Spray(s) Both Nostrils every 6 hours PRN      REVIEW OF SYSTEMS  --------------------------------------------------------------------------------  Gen: No fevers/chills  Respiratory: No dyspnea, cough  CV: No chest pain  GI: No abdominal pain, diarrhea,  nausea, vomiting  : No increased frequency, hematuria, c/o irritation with dunn  MSK:  no edema  Neuro: No dizziness/lightheadedness    All other systems were reviewed and are negative, except as noted.    VITALS/PHYSICAL EXAM  --------------------------------------------------------------------------------  T(C): 36.5 (03-01-22 @ 04:41), Max: 36.9 (02-28-22 @ 21:36)  HR: 70 (03-01-22 @ 04:41) (70 - 81)  BP: 107/68 (03-01-22 @ 04:41) (100/66 - 116/66)  RR: 18 (03-01-22 @ 04:41) (18 - 18)  SpO2: 98% (03-01-22 @ 04:41) (94% - 99%)  Wt(kg): --        02-28-22 @ 07:01  -  03-01-22 @ 07:00  --------------------------------------------------------  IN: 740 mL / OUT: 600 mL / NET: 140 mL      Physical Exam:  	Gen: NAD  	HEENT: MMM  	Pulm: CTA B/L   	CV: S1S2  	Abd: Soft, +BS   	Ext: 1+LE edema B/L, asterixis + at elbows  	Neuro: Awake, alert/oriented x2, trails off in middle of sentences due to confusion  	Skin: Warm and dry  	Vascular access:    LABS/STUDIES  --------------------------------------------------------------------------------              9.0    19.40 >-----------<  282      [03-01-22 @ 06:38]              29.5     129  |  90  |  89  ----------------------------<  109      [03-01-22 @ 06:38]  3.9   |  21  |  4.67        Ca     9.1     [03-01-22 @ 06:38]      Mg     2.7     [03-01-22 @ 06:38]      Phos  6.3     [03-01-22 @ 06:38]    TPro  6.7  /  Alb  3.0  /  TBili  0.7  /  DBili  x   /  AST  14  /  ALT  12  /  AlkPhos  148  [03-01-22 @ 06:38]          Creatinine Trend:  SCr 4.67 [03-01 @ 06:38]  SCr 3.30 [02-28 @ 06:49]  SCr 2.13 [02-27 @ 09:54]  SCr 0.92 [02-27 @ 06:55]  SCr 1.28 [02-26 @ 17:38]    Urinalysis - [02-26-22 @ 15:18]      Color Yellow / Appearance Clear / SG 1.029 / pH 5.5      Gluc 100 mg/dL / Ketone Negative  / Bili Negative / Urobili 2 mg/dL       Blood Negative / Protein 30 mg/dL / Leuk Est Large / Nitrite Negative      RBC 1 / WBC 34 / Hyaline 6 / Gran  / Sq Epi  / Non Sq Epi 4 / Bacteria Negative    Urine Creatinine 140      [02-28-22 @ 15:13]  Urine Sodium 8      [02-28-22 @ 15:13]  Urine Urea Nitrogen 282      [02-28-22 @ 18:30]  Urine Osmolality 368      [02-28-22 @ 15:13]    HbA1c 10.7      [02-21-19 @ 03:04]  TSH 0.63      [02-26-22 @ 19:55]    HCV 0.11, Nonreact      [08-28-20 @ 10:45]  HIV Nonreact      [02-26-22 @ 19:59]  HIV Nonreact      [03-01-22 @ 06:38]

## 2022-03-01 NOTE — DIETITIAN INITIAL EVALUATION ADULT. - PROBLEM SELECTOR PLAN 3
Lactate 4-->2.8  s/p 250 cc units and levo gtt for 4 hours  Likely 2/2 sepsis    - will continue to trend  - 500 cc LR

## 2022-03-01 NOTE — DIETITIAN INITIAL EVALUATION ADULT. - REASON INDICATOR FOR ASSESSMENT
Assessment.  "Dx: sepsis 2/2 sacral ulcer wound   67 yo F, PMHx AS s/p TAVR 2015 and 2021, HOCM, pAFib on Xarelto, JOSR on 2L O2 at home, anxiety, morbid obesity, DM, HTN, asthma,presents to ED with fever,, chills, n/v, that has been ongoing for the past 4 days 2/2 sepsis from sacral decubitus ulcer/necrotic wound on left ischium with associated cellulitis"

## 2022-03-01 NOTE — PROGRESS NOTE ADULT - ASSESSMENT
67 yo F, PMHx AS s/p TAVR 2015 and 2021, HOCM, HFpEF, pAFib on Xarelto, JOSR (does not tolerate CPAP, on 2L O2 at home qhs), anxiety, morbid obesity, DM, HTN, asthma (on albuterol), pw sepsis, n/v, likely 2/2 sacral decubitus ulcer/necrotic wound on left ischium with associated cellulitis. Also found to be in acute on chronic hypoxic respiratory failure on admission, with pulmonary vascular congestion on imaging concerning for decompensated HF. Course c/b worsening HEATHER, likely pre-renal in s/o hypervolemia given elevated BNP.    65 yo F, PMHx AS s/p TAVR 2015 and 2021, HOCM, HFpEF, pAFib on Xarelto, JOSR (does not tolerate CPAP, on 2L O2 at home qhs), anxiety, morbid obesity, DM, HTN, asthma (on albuterol), pw sepsis, n/v, likely 2/2 sacral decubitus ulcer/necrotic wound on left ischium with associated cellulitis. Also found to be in acute on chronic hypoxic respiratory failure on admission, with pulmonary vascular congestion on imaging concerning for decompensated HF. Course c/b worsening HEATHER and likely uremic encephalopathy.

## 2022-03-01 NOTE — CONSULT NOTE ADULT - ASSESSMENT
67 yo F, PMHx AS s/p TAVR 2015 and 2021, HOCM, HFpEF, pAFib on Xarelto, JOSR (does not tolerate CPAP, on 2L O2 at home qhs), anxiety, morbid obesity, DM, HTN, asthma (on albuterol), pw sepsis, n/v, likely 2/2 sacral decubitus ulcer/necrotic wound on left ischium with associated cellulitis s/p drainage. Nephrology consulted for worsening HEATHER.     Given that presented with hypotension, sepsis, elevated BNP, received 1x of vanco, and had a CT A/P w/ contrast multiple etiologies of HEATHER possible. Contrast induced vs pre-renal i/s/o current hypervolemia vs pre-renal i/s/o previous hypotension      Nephro Recs: To be discussed with attending physician      [ ] Renally dose medications  [ ] Avoid Nephrotoxic agents 67 yo F, PMHx AS s/p TAVR 2015 and 2021, HOCM, HFpEF, pAFib on Xarelto, JOSR (does not tolerate CPAP, on 2L O2 at home qhs), anxiety, morbid obesity, DM, HTN, asthma (on albuterol), pw sepsis, n/v, likely 2/2 sacral decubitus ulcer/necrotic wound on left ischium with associated cellulitis s/p drainage. Nephrology consulted for worsening HEATHER.     Given that presented with hypotension, sepsis, elevated BNP, received 1x of vanco, and had a CT A/P w/ contrast multiple etiologies of HEATHER possible. HEATHER likely related to combination of multiple etiologies as listed above. Encephalopathy possibly related to uremia or hypercapnia    Nephro Recs:     [ ] VBG for pH/pCO2 determination  [ ] Renally dose medications  [ ] Avoid Nephrotoxic agents

## 2022-03-01 NOTE — DIETITIAN NUTRITION RISK NOTIFICATION - TREATMENT: THE FOLLOWING DIET HAS BEEN RECOMMENDED
Diet, DASH/TLC:   Sodium & Cholesterol Restricted  Consistent Carbohydrate {Evening Snack} (CSTCHOSN)  Supplement Feeding Modality:  Oral  Glucerna Shake Cans or Servings Per Day:  1       Frequency:  Two Times a day (02-28-22 @ 18:55) [Active]

## 2022-03-01 NOTE — DIETITIAN INITIAL EVALUATION ADULT. - DIET TYPE
Phosphorous restricted/DASH/TLC (sodium and cholesterol restricted diet)/consistent carbohydrate renal with evening snacks

## 2022-03-01 NOTE — PROGRESS NOTE ADULT - ATTENDING COMMENTS
65 yo F, PMHx AS s/p TAVR 2015 and 2021, HOCM, pAFib on Xarelto, diastolic and valvular CHF, JOSR (does not tolerate CPAP, on 2L O2 at home qhs), anxiety, morbid obesity, DM with neuropathy, HTN, asthma (on albuterol), pw sepsis, n/v, likely 2/2 sacral decubitus ulcer/necrotic wound on left ischium with associated cellulitis.  Patient was seen by the surgery team s/p debridement ,wound with growth of enterococcus avium and strep angisnosus with neg blood CX so far.   SHe was seen by the MICU team due to hypotension which has resolved and was not considered a MICU candidate.  Pt was Cefepime, Vanco , metronidazole which were changed to Zosyn and now on Unasyn.  Patient was noted to have HEATHER with worsening Cr which did not improve with IV Lasix as Acute on chronic Heart failure was tought to be a contributing factor.  Her HEATHER is mulifactorial in nature with Sepsis ( fever , high WBC), IV contrast, Hypotension, acute on chronic heart failure and medications ( Vanco, ZOsyn), so Nephrology and HF consults were requested.  The Zosyn was changed to unasyn, will cont with diuretic and monitor response to intermittent diuretics.  Sister at bedside and patient identifies her as surrogate decision maker and she was updated.  Patient was noted to have somnolence but easily arousable , she was noted to have tremor , Left >Rt but sister and pt states that she has been dropping things at home and worse recently.  The Oxy was held and Nephro is on the case .  Sister is aware that patient might need HD if not improved and if uremic.  Pain will be managed with Tylenol and will add Tramadol if needed although tramadol can also cause drowsiness.    -FULL CODE           Milly Michaels   Hospitalist  [phone] .

## 2022-03-01 NOTE — DIETITIAN INITIAL EVALUATION ADULT. - OTHER INFO
patient noted with elevated renal indices, would consider change supplement from glucerna to nepro for lower mg, and phosphorous content.  Diet change to Consistent carbohydrate DASH, No caffeine, no chocolate noted

## 2022-03-01 NOTE — PROGRESS NOTE ADULT - PROBLEM SELECTOR PLAN 10
- reported discussion between pt and surgical team was that pt wanted DNI.   - on further discussion with pt, pt states she requests to be full code, but only wants to be intubated if she is completely sedated.  - pt requests all medical therapy as necessary and indicated  - time spent 20 min on Losartan 25 and Lopressor   - c/w lopressor  - hold Losartan given hypotension from sepsis & HEATHER

## 2022-03-01 NOTE — DIETITIAN INITIAL EVALUATION ADULT. - PERTINENT MEDS FT
MEDICATIONS  (STANDING):  ampicillin/sulbactam  IVPB 3 Gram(s) IV Intermittent every 6 hours  aspirin enteric coated 81 milliGRAM(s) Oral daily  atorvastatin 80 milliGRAM(s) Oral at bedtime  Dakins Solution - 1/4 Strength 1 Application(s) Topical every 8 hours  dextrose 40% Gel 15 Gram(s) Oral once  dextrose 5%. 1000 milliLiter(s) (50 mL/Hr) IV Continuous <Continuous>  dextrose 5%. 1000 milliLiter(s) (100 mL/Hr) IV Continuous <Continuous>  dextrose 50% Injectable 25 Gram(s) IV Push once  dextrose 50% Injectable 12.5 Gram(s) IV Push once  dextrose 50% Injectable 25 Gram(s) IV Push once  FLUoxetine 40 milliGRAM(s) Oral daily  glucagon  Injectable 1 milliGRAM(s) IntraMuscular once  insulin glargine Injectable (LANTUS) 28 Unit(s) SubCutaneous at bedtime  insulin lispro (ADMELOG) corrective regimen sliding scale   SubCutaneous three times a day before meals  insulin lispro (ADMELOG) corrective regimen sliding scale   SubCutaneous at bedtime  insulin lispro Injectable (ADMELOG) 26 Unit(s) SubCutaneous three times a day with meals  metoprolol tartrate 100 milliGRAM(s) Oral two times a day  nystatin Powder 1 Application(s) Topical two times a day  polyethylene glycol 3350 17 Gram(s) Oral two times a day  rivaroxaban 20 milliGRAM(s) Oral daily  senna 2 Tablet(s) Oral at bedtime  sodium chloride 0.9%. 1000 milliLiter(s) (60 mL/Hr) IV Continuous <Continuous>

## 2022-03-01 NOTE — CONSULT NOTE ADULT - ASSESSMENT
66F with PMH of severe AS s/p TAVR'15 (Ramon 23mm) and Suzy TAVR in 2021, HCM (no SHAILESH), pAF on Xarelto, JOSR (does not tolerate CPAP, on home 2L O2), MO s/p gastric band c/b gastric perforation, and below the knee DVT/PE who p/w sepsis 2/2 sacral ulcer requiring debridement in the ED. She was treated with IV antibiotic and Bcx has been negative thus far. HF team consulted for fluid status management.     TTE 2/2022   Hyperdynamic LV EF 75%, mod MS mean transmitral gradient 8 mmhg,  66F with PMH of severe AS s/p TAVR'15 (Ramon 23mm) and Suzy TAVR in 2021, HCM (no SHAILESH), pAF on Xarelto, JOSR (does not tolerate CPAP, on home 2L O2), MO s/p gastric band c/b gastric perforation, and below the knee DVT/PE who p/w sepsis 2/2 sacral ulcer requiring debridement in the ED. She was treated with IV antibiotic and Bcx has been negative thus far. HF team consulted for fluid status management.

## 2022-03-01 NOTE — PROGRESS NOTE ADULT - PROBLEM SELECTOR PLAN 8
on Losartan 25 and Lopressor   - c/w lopressor  - hold Losartan given hypotension from sepsis & HEATHER On metoprolol tartate 100mg bid and Xarelto  - HR <110 at this time   - continue metoprolol tartate 100mg bid  - hold AC in case of urgent HD

## 2022-03-01 NOTE — CONSULT NOTE ADULT - SUBJECTIVE AND OBJECTIVE BOX
Wound SURGERY CONSULT NOTE    HPI:  65 yo F, PMHx AS s/p TAVR 2015 and 2021, HOCM, pAFib on Xarelto, JOSR (does not tolerate CPAP, on 2L O2 at home qhs), anxiety, morbid obesity, DM, HTN, asthma (on albuterol), heterozygous prothrombin gene mutation, fourth toe resection, bedbound for 1 year 2/2 morbid obesity and worsening SOB,  DVT 8/2020 presents to ED with fever (Tmax 100.8), chills, n/v, that has been ongoing for the past 4 days. Pt has known sacral ulcer for a few months, follows with wound care, and pt states she has had more pain and significant odor in the region over the past two weeks. No trauma or other significant injuries. She has long standing chronic SOB with exertion (>2 steps), has communicated concerns to outpatient cardiology. She denies associated cough, sputum production, confusion, weakness, hemoptysis, chest pain, abdominal pain, dysuria, vaginal discharge. She has chronic immobility 2/2 to morbid obesity. She denies any recent changes to medications. She has a allergy to penicillin and ciprofloxacin, but cannot remember her reaction to these. Patient states her SOB started since July 2021 and has been worsening. Pt denies any relieving or exacerbating factors.     ED course:   Tmax 101.5F, HR 80s-140s, currently 100, BP dropped to 60/50s. RR 18 SPO2 99 % on NC  Labs significant for: WBC 39.9 neutrophil predominance, Hgb 10, plt 265. INR 2. TSH 0.63. VBG lactate- 3-4.   CT a/p: Multiple foci of subcutaneous gas at the left gluteal fold and perineum with mild associated inflammatory changes. No fluid collection identified. Correlate for necrotizing soft tissue infection.  CXR: clear lungs   WBC: leuk esterase, proteins, WBCs, specific gravity 1.029  Patient was temporarily placed on levo gtt and BiPAP both weaned off.   Patient given GLOVER, Cefepime 2g, Vanco x2,  cc.   Blood cx, urine cx, MRSA PCR, and sacral ulcer cx pending   MICU consulted, not a candidate for MICU at this time. ID consulted recommended continuing Vancomycin / Cefepime / Metronidazole and MRSA PCR. Surgery consulted, completed I&D.          (26 Feb 2022 22:43)        N/V/D,  BM/ Flatus,   NGT,     palp/ sob/dyspnea/ cp,       F/C/S  Wound consult requested by team to assist w/ management of      wound/ pressure injury.   Pt (unable to)  c/o pain, drainage, odor, color change,  worsening swelling. Offloading and pericare initiated Increasingly sedentary 2/2 to illness. Pt is Incontinent of urine & stool. (+)dunn/ ostomy.   H/o falls, trauma.  (Pt seen by Wound RN and )TRIAD/ Aquacell/ Allevyn/ medihoney/ dakins/ Adaptic/ DSD used at home/ while awaiting consult.  Appetite good/ decreased.  weight loss.  S&S / RD consult appreciated All questions asked and answered to pt's and family's satisfaction.    Current Diet: Diet, DASH/TLC:   Sodium & Cholesterol Restricted  Consistent Carbohydrate Evening Snack (CSTCHOSN)  Supplement Feeding Modality:  Oral  Glucerna Shake Cans or Servings Per Day:  1       Frequency:  Two Times a day (02-28-22 @ 18:55)      PAST MEDICAL & SURGICAL HISTORY:  Hypertension    Gastroparesis due to DM    Depression    Herniated disc    Spinal stenosis  chronic back pain    Cholesterol serum elevated    MVP (mitral valve prolapse)    Hypothyroid  not on medication    Obesity    IBS (irritable bowel syndrome)    GERD (gastroesophageal reflux disease)    Diabetes type 2, uncontrolled    History of diabetic retinopathy    Peripheral neuropathy    HOCM (hypertrophic obstructive cardiomyopathy)    Atrial fibrillation    Aortic stenosis    S/P bariatric surgery  lap band surgery two times due to erosion, S/P removal    S/P tonsillectomy and adenoidectomy    Cataract  B/L cataracts    S/P D&amp;C (status post dilation and curettage)  D&amp;C when she was 18    Cervix abnormality  Ablation of cervix    Breast anomaly  Biopsy of both breast benign lesions    right eye torn retina    S/P TAVR (transcatheter aortic valve replacement)  2015    S/P foot surgery  May 2021        REVIEW OF SYSTEMS: Pt unable to offer  General/ Breast/ Skin/ Neuro/ MSK: see HPI  All other systems negative    MEDICATIONS  (STANDING):  ampicillin/sulbactam  IVPB 3 Gram(s) IV Intermittent every 12 hours  aspirin enteric coated 81 milliGRAM(s) Oral daily  atorvastatin 80 milliGRAM(s) Oral at bedtime  Dakins Solution - 1/4 Strength 1 Application(s) Topical every 8 hours  dextrose 40% Gel 15 Gram(s) Oral once  dextrose 5%. 1000 milliLiter(s) (50 mL/Hr) IV Continuous <Continuous>  dextrose 5%. 1000 milliLiter(s) (100 mL/Hr) IV Continuous <Continuous>  dextrose 50% Injectable 25 Gram(s) IV Push once  dextrose 50% Injectable 12.5 Gram(s) IV Push once  dextrose 50% Injectable 25 Gram(s) IV Push once  FLUoxetine 40 milliGRAM(s) Oral daily  glucagon  Injectable 1 milliGRAM(s) IntraMuscular once  insulin glargine Injectable (LANTUS) 28 Unit(s) SubCutaneous at bedtime  insulin lispro (ADMELOG) corrective regimen sliding scale   SubCutaneous three times a day before meals  insulin lispro (ADMELOG) corrective regimen sliding scale   SubCutaneous at bedtime  insulin lispro Injectable (ADMELOG) 26 Unit(s) SubCutaneous three times a day with meals  metoprolol tartrate 100 milliGRAM(s) Oral two times a day  nystatin Powder 1 Application(s) Topical two times a day  polyethylene glycol 3350 17 Gram(s) Oral two times a day  senna 2 Tablet(s) Oral at bedtime  sevelamer carbonate 800 milliGRAM(s) Oral every 8 hours  sodium chloride 0.9%. 1000 milliLiter(s) (60 mL/Hr) IV Continuous <Continuous>    MEDICATIONS  (PRN):  acetaminophen     Tablet .. 650 milliGRAM(s) Oral every 6 hours PRN Mild Pain (1 - 3), Moderate Pain (4 - 6)  bisacodyl Suppository 10 milliGRAM(s) Rectal daily PRN Constipation  sodium chloride 0.65% Nasal 1 Spray(s) Both Nostrils every 6 hours PRN Nasal Congestion      Allergies  amoxicillin (Unknown)  Cipro (Unknown)  codeine (Vomiting)  shellfish (Stomach Upset)    Intolerances  fish (Stomach Upset)      SOCIAL HISTORY: / /single/ ; (+)HHA/ lives in SNF; Former smoker, No/ Denies smoking, ETOH, drugs    FAMILY HISTORY:  no h/o PVD or wound healing or skin problems     (+) Family history of aortic stenosis, structural heart dx, diabetes mellitus, CAD, anxiety disorder      PHYSICAL EXAM:  Vital Signs Last 24 Hrs  T(C): 36.9 (01 Mar 2022 12:21), Max: 36.9 (28 Feb 2022 21:36)  T(F): 98.4 (01 Mar 2022 12:21), Max: 98.5 (28 Feb 2022 21:36)  HR: 77 (01 Mar 2022 18:00) (59 - 81)  BP: 122/55 (01 Mar 2022 18:00) (100/66 - 122/55)  BP(mean): --  RR: 18 (01 Mar 2022 18:00) (18 - 18)  SpO2: 99% (01 Mar 2022 18:00) (96% - 100%)    NAD,  A&Ox3, MO WD/ WG  Versa Care P500 bed  HEENT:  NC/AT, EOMI, mucosa moist, throat clear, trachea midline, neck supple  Respiratory: equal chest rise  Gastrointestinal soft NT/ND   Neurology:   strength & sensation grossly intact  Psych: anxious  Musculoskeletal: limited stiff / FROM, no deformities/ contractures  Vascular: BLE equally warm,  no cyanosis, clubbing, edema  Skin:  moist w/ good turgor    serosanguinous drainage  No odor, erythema, increased warmth, tenderness, induration, fluctuance    LABS/ CULTURES/ RADIOLOGY:                        10.5   17.05 )-----------( 263      ( 01 Mar 2022 16:20 )             34.5       129  |  89  |  94  ----------------------------<  156      [03-01-22 @ 16:20]  4.0   |  21  |  4.52        Ca     9.4     [03-01-22 @ 16:20]      Mg     2.7     [03-01-22 @ 16:20]      Phos  6.5     [03-01-22 @ 16:20]    TPro  7.2  /  Alb  3.5  /  TBili  0.6  /  DBili  x   /  AST  17  /  ALT  14  /  AlkPhos  165  [03-01-22 @ 16:20]    A1C with Estimated Average Glucose 8.6%(02.27.22 @ 08:18)   Culture - Blood (collected 02-26-22 @ 18:45)  Source: .Blood Blood  Preliminary Report (02-27-22 @ 19:01):    No growth to date.    Culture - Blood (collected 02-26-22 @ 18:45)  Source: .Blood Blood  Preliminary Report (02-27-22 @ 19:01):    No growth to date.    Culture - Other (collected 02-26-22 @ 22:04)  Source: Decub Decubitus  Final Report (02-28-22 @ 21:46):    Moderate Enterococcus avium    Numerous Streptococcus anginosus Susceptibilites not performed.  Organism: Enterococcus avium (02-28-22 @ 21:46)  Organism: Enterococcus avium (02-28-22 @ 21:46)      -  Ampicillin: S <=2 Predicts results to ampicillin/sulbactam, amoxacillin-clavulanate and  piperacillin-tazobactam.      -  Tetra/Doxy: R >8      -  Vancomycin: S 0.5      Method Type: RAMÓN

## 2022-03-01 NOTE — PROGRESS NOTE ADULT - PROBLEM SELECTOR PLAN 7
on Humalog 30 TID and Tresiba 30 bedtime   - A1c 8.6%  - Increase to Insulin 26 pre-meal and 28 long-acting bedtime  - check FSG qAC and qhs  - adjust insulin as needed Likely 2/2 sepsis  - lactate improving  - will continue to trend  - hold off additional IVF for now given hypoxia and possible CHF

## 2022-03-01 NOTE — PROGRESS NOTE ADULT - PROBLEM SELECTOR PLAN 9
DVT ppx: Xarelto  Diet: DASH/CC  PT consult on Humalog 30 TID and Tresiba 30 bedtime   - A1c 8.6%  - Increase to Insulin 26 pre-meal and 28 long-acting bedtime  - check FSG qAC and qhs  - adjust insulin as needed

## 2022-03-01 NOTE — PROGRESS NOTE ADULT - SUBJECTIVE AND OBJECTIVE BOX
Follow Up:      Interval History:    REVIEW OF SYSTEMS  [  ] ROS unobtainable because:    [  ] All other systems negative except as noted below    Constitutional:  [ ] fever [ ] chills  [ ] weight loss  [ ] weakness  Skin:  [ ] rash [ ] phlebitis	  Eyes: [ ] icterus [ ] pain  [ ] discharge	  ENMT: [ ] sore throat  [ ] thrush [ ] ulcers [ ] exudates  Respiratory: [ ] dyspnea [ ] hemoptysis [ ] cough [ ] sputum	  Cardiovascular:  [ ] chest pain [ ] palpitations [ ] edema	  Gastrointestinal:  [ ] nausea [ ] vomiting [ ] diarrhea [ ] constipation [ ] pain	  Genitourinary:  [ ] dysuria [ ] frequency [ ] hematuria [ ] discharge [ ] flank pain  [ ] incontinence  Musculoskeletal:  [ ] myalgias [ ] arthralgias [ ] arthritis  [ ] back pain  Neurological:  [ ] headache [ ] seizures  [ ] confusion/altered mental status    Allergies  amoxicillin (Unknown)  Cipro (Unknown)  codeine (Vomiting)  shellfish (Stomach Upset)        ANTIMICROBIALS:  ampicillin/sulbactam  IVPB 3 every 12 hours      OTHER MEDS:  MEDICATIONS  (STANDING):  acetaminophen     Tablet .. 650 every 6 hours PRN  aspirin enteric coated 81 daily  atorvastatin 80 at bedtime  bisacodyl Suppository 10 daily PRN  dextrose 40% Gel 15 once  dextrose 50% Injectable 25 once  dextrose 50% Injectable 12.5 once  dextrose 50% Injectable 25 once  FLUoxetine 40 daily  glucagon  Injectable 1 once  insulin glargine Injectable (LANTUS) 28 at bedtime  insulin lispro (ADMELOG) corrective regimen sliding scale  three times a day before meals  insulin lispro (ADMELOG) corrective regimen sliding scale  at bedtime  insulin lispro Injectable (ADMELOG) 26 three times a day with meals  metoprolol tartrate 100 two times a day  polyethylene glycol 3350 17 two times a day  senna 2 at bedtime      Vital Signs Last 24 Hrs  T(C): 36.9 (01 Mar 2022 12:21), Max: 36.9 (28 Feb 2022 21:36)  T(F): 98.4 (01 Mar 2022 12:21), Max: 98.5 (28 Feb 2022 21:36)  HR: 77 (01 Mar 2022 18:00) (59 - 81)  BP: 122/55 (01 Mar 2022 18:00) (100/66 - 122/55)  BP(mean): --  RR: 18 (01 Mar 2022 18:00) (18 - 18)  SpO2: 99% (01 Mar 2022 18:00) (96% - 100%)    PHYSICAL EXAMINATION:  General: Alert and Awake, NAD  HEENT: PERRL, EOMI  Neck: Supple  Cardiac: RRR, No M/R/G  Resp: CTAB, No Wh/Rh/Ra  Abdomen: NBS, NT/ND, No HSM, No rigidity or guarding  MSK: No LE edema. No Calf tenderness  : No dunn  Skin: No rashes or lesions. Skin is warm and dry to the touch.   Neuro: Alert and Awake. CN 2-12 Grossly intact. Moves all four extremities spontaneously.  Psych: Calm, Pleasant, Cooperative                          10.5   17.05 )-----------( 263      ( 01 Mar 2022 16:20 )             34.5       03-01    129<L>  |  89<L>  |  94<H>  ----------------------------<  156<H>  4.0   |  21<L>  |  4.52<H>    Ca    9.4      01 Mar 2022 16:20  Phos  6.5     03-01  Mg     2.7     03-01    TPro  7.2  /  Alb  3.5  /  TBili  0.6  /  DBili  x   /  AST  17  /  ALT  14  /  AlkPhos  165<H>  03-01          MICROBIOLOGY:  Vancomycin Level, Random: 16.1 ug/mL (03-01-22 @ 09:42)  v  Decub Decubitus  02-26-22   Moderate Enterococcus avium  Numerous Streptococcus anginosus Susceptibilites not performed.  --  Enterococcus avium      Clean Catch Clean Catch (Midstream)  02-26-22   <10,000 CFU/mL Normal Urogenital Gia  --  --      .Blood Blood  02-26-22   No growth to date.  --  --          Rapid RVP Result: NotDetec (02-26 @ 13:45)        RADIOLOGY:    <The imaging below has been reviewed and visualized by me independently. Findings as detailed in report below> Follow Up:  Infected wound    Interval History: pain at wound is improving. afebrile.     REVIEW OF SYSTEMS  [  ] ROS unobtainable because:    [ x ] All other systems negative except as noted below    Constitutional:  [ ] fever [ ] chills  [ ] weight loss  [ ] weakness  Skin:  [ ] rash [ ] phlebitis	  Eyes: [ ] icterus [ ] pain  [ ] discharge	  ENMT: [ ] sore throat  [ ] thrush [ ] ulcers [ ] exudates  Respiratory: [ ] dyspnea [ ] hemoptysis [ ] cough [ ] sputum	  Cardiovascular:  [ ] chest pain [ ] palpitations [ ] edema	  Gastrointestinal:  [ ] nausea [ ] vomiting [ ] diarrhea [ ] constipation [ ] pain	  Genitourinary:  [ ] dysuria [ ] frequency [ ] hematuria [ ] discharge [ ] flank pain  [ ] incontinence  Musculoskeletal:  [ ] myalgias [ ] arthralgias [ ] arthritis  [ ] back pain +buttock pain  Neurological:  [ ] headache [ ] seizures  [ ] confusion/altered mental status    Allergies  amoxicillin (Unknown)  Cipro (Unknown)  codeine (Vomiting)  shellfish (Stomach Upset)        ANTIMICROBIALS:  ampicillin/sulbactam  IVPB 3 every 12 hours      OTHER MEDS:  MEDICATIONS  (STANDING):  acetaminophen     Tablet .. 650 every 6 hours PRN  aspirin enteric coated 81 daily  atorvastatin 80 at bedtime  bisacodyl Suppository 10 daily PRN  dextrose 40% Gel 15 once  dextrose 50% Injectable 25 once  dextrose 50% Injectable 12.5 once  dextrose 50% Injectable 25 once  FLUoxetine 40 daily  glucagon  Injectable 1 once  insulin glargine Injectable (LANTUS) 28 at bedtime  insulin lispro (ADMELOG) corrective regimen sliding scale  three times a day before meals  insulin lispro (ADMELOG) corrective regimen sliding scale  at bedtime  insulin lispro Injectable (ADMELOG) 26 three times a day with meals  metoprolol tartrate 100 two times a day  polyethylene glycol 3350 17 two times a day  senna 2 at bedtime      Vital Signs Last 24 Hrs  T(C): 36.9 (01 Mar 2022 12:21), Max: 36.9 (28 Feb 2022 21:36)  T(F): 98.4 (01 Mar 2022 12:21), Max: 98.5 (28 Feb 2022 21:36)  HR: 77 (01 Mar 2022 18:00) (59 - 81)  BP: 122/55 (01 Mar 2022 18:00) (100/66 - 122/55)  BP(mean): --  RR: 18 (01 Mar 2022 18:00) (18 - 18)  SpO2: 99% (01 Mar 2022 18:00) (96% - 100%)    PHYSICAL EXAMINATION:  General: Alert and Awake, NAD  HEENT: PERRL, EOMI  Cardiac: RRR, No M/R/G  Resp: CTAB, No Wh/Rh/Ra  Abdomen: NBS, NT/ND, No HSM, No rigidity or guarding  MSK: No LE edema. No Calf tenderness  : Dressing over gluteal wound  Skin: No rashes or lesions. Skin is warm and dry to the touch.   Neuro: Alert and Awake. CN 2-12 Grossly intact. Moves all four extremities spontaneously.  Psych: Calm, Pleasant, Cooperative                        10.5   17.05 )-----------( 263      ( 01 Mar 2022 16:20 )             34.5       03-01    129<L>  |  89<L>  |  94<H>  ----------------------------<  156<H>  4.0   |  21<L>  |  4.52<H>    Ca    9.4      01 Mar 2022 16:20  Phos  6.5     03-01  Mg     2.7     03-01    TPro  7.2  /  Alb  3.5  /  TBili  0.6  /  DBili  x   /  AST  17  /  ALT  14  /  AlkPhos  165<H>  03-01          MICROBIOLOGY:  Vancomycin Level, Random: 16.1 ug/mL (03-01-22 @ 09:42)  v  Decub Decubitus  02-26-22   Moderate Enterococcus avium  Numerous Streptococcus anginosus Susceptibilites not performed.  --  Enterococcus avium      Clean Catch Clean Catch (Midstream)  02-26-22   <10,000 CFU/mL Normal Urogenital Gia  --  --      .Blood Blood  02-26-22   No growth to date.  --  --          Rapid RVP Result: NotDetec (02-26 @ 13:45)        RADIOLOGY:    <The imaging below has been reviewed and visualized by me independently. Findings as detailed in report below>    < from: US Renal (03.01.22 @ 14:45) >  IMPRESSION:  Increased echogenicity of the bilateral kidneys, consistent with kidney   disease.    < end of copied text >

## 2022-03-01 NOTE — PROGRESS NOTE ADULT - PROBLEM SELECTOR PLAN 1
Fever, leukocytosis, hypotension on admission. Sepsis 2/2 likely sacral wound/cellulitis  - s/p bedside debridement in ED   - BCx 2/26 NGTD  - Wound Cx growing E. avium and strep anginosus   - Zosyn (2/27- ) as per ID recs   - MRSA pcr negative   - daily packing changes. To be done by surgical team  - wound care deferring to surgery team Neuro exam with asterixis today, coinciding with elevation in BUN. Likely 2/2 uremia.   DDx to include hypercarbia given JOSR, as well as oxycodone induced (sister reports hallucination with oxy)     - VBG without significant hypercarbia  - Nephro consulted, appreciate recs. Monitor for need for urgent HD. Xarelto held (last dose 3/1/22)  - Monitor renal function as below   - Non focal exam. no need for CT head at this time. likely metabolic in nature

## 2022-03-01 NOTE — PROGRESS NOTE ADULT - PROBLEM SELECTOR PLAN 3
At home on 2L nasal cannula as needed. On admission hypoxic to 87 % , temporarily required BiPAP. CXR with pulm vascular congestion  - Likely multifactorial in s/o decompensated HF (BNP 15k), and chronically poor respiratory mechanism in s/o JOSR OHS   - TTE in Sept 2021 showed hyperdynamic LV systolic function, EF 75%, and mod pulm HTN  - Low suspicion for PE (on home xarelto for a.fib)  - HR <110, unlikely dysrhythmia driven   - f/u repeat TTE   - ICS  - Trial of IV lasix 40mg EF previously 75% and hyperdynamic LV on prior ECHO. On admission with pulm vasc congestion and possible hypervolemic state, concerning for decompensated HF  - BNP up trended from 7k to 15k then 19k   - HF consulted as volume status is challenging to assess in s/o morbid obesity. Cr continued to rise despite lasix challenge. TTE obtained this admission without significant change when compared to prior. Still mod pulm HTN, mod MR, grade II diastolic dysfunction.   - Trial of lasix 80 IV push x 1  - Monitor I/Os strictly  - Daily weights   - Mg>2 K>4

## 2022-03-01 NOTE — CONSULT NOTE ADULT - PROBLEM SELECTOR RECOMMENDATION 9
-rise in Cr from ~1 to 4.67 today with physical exam findings suggest of uremia ie asterixis and confusions   -after discussion with nephrology, pt may need dialysis   -would give diuretic challenge, dose IV lasix 80 mg x1 and monitor UOP    -hold anticoagulation for potential dialysis catheter placement with IR   -appreciate nephrology input   -HEATHER etiologies are multifactorial: vanc, YING, HF..etc

## 2022-03-01 NOTE — PROGRESS NOTE ADULT - PROBLEM SELECTOR PLAN 6
On metoprolol tartate 100mg bid and Xarelto  - HR <110 at this time   - continue metoprolol tartate 100mg bid, and Xarelto  - replete K >4, Mg >2 Positive UA  - UCx negative   - asx at this time  - on abx for sacral wound/cellulitis as above

## 2022-03-01 NOTE — PROGRESS NOTE ADULT - PROBLEM SELECTOR PROBLEM 3
Chronic respiratory failure with hypoxia Heart failure with preserved left ventricular function (HFpEF)

## 2022-03-01 NOTE — PROVIDER CONTACT NOTE (CHANGE IN STATUS NOTIFICATION) - BACKGROUND
Dx: sepsis 2/2 sacral ulcer wound   65 yo F, PMHx AS s/p TAVR 2015 and 2021, HOCM, pAFib on Xarelto, JOSR (does not tolerate CPAP, on 2L O2 at home qhs), anxiety, morbid obesity,

## 2022-03-01 NOTE — PROGRESS NOTE ADULT - PROBLEM SELECTOR PLAN 5
Likely 2/2 sepsis  - lactate improving  - will continue to trend  - hold off additional IVF for now given hypoxia and possible CHF At home on 2L nasal cannula as needed. On admission hypoxic to 87 % , temporarily required BiPAP. CXR with pulm vascular congestion  - Likely multifactorial in s/o decompensated HF (BNP 15k), and chronically poor respiratory mechanism in s/o JOSR OHS   - TTE in Sept 2021 showed hyperdynamic LV systolic function, EF 75%, and mod pulm HTN  - Low suspicion for PE (on home xarelto for a.fib)  - HR <110, unlikely dysrhythmia driven   - f/u repeat TTE   - ICS  - Trial of IV lasix as above

## 2022-03-01 NOTE — PROGRESS NOTE ADULT - ASSESSMENT
64 y/o lady with PMH type 2 diabetes with H/O gastroparesis, pulmonary embolism and right lower extremity DVT 8/2020, PVD, hypertension, aortic stenosis  S/P TAVR 2015 and redo TAVR in 2021 presented to ED today with fever since 4 days. Pt also endorses acute on chronic shortness of breath. This is worsened w/ laying down and w/ exertion. She could not walk to her mail box without getting short of breath earlier today, prompting her arrival here. She denies associated cough, neck pain, confusion, weakness, hemoptysis, chest pain, abdominal pain, dysuria, vaginal discharge. She has chronic immobility 2/2 to morbid obesity. She denies any recent changes to medications. Pt with leukocytosis to 39 k with fever to 101.5 s/p vanc and cefepime in ER. CTAP with IV con: Multiple foci of subcutaneous gas at the left gluteal fold with mild associated inflammatory changes. Pt with intermittent Boderline SBP in 60s.    WORKUP  BNP: 7759  ESR: 50,   RVP: negative.  MRSA PCR Result.: Jatinder (07-21-21 @ 19:23)  UA: 34 WBC, Large LE, negative nitrates and bactetria  CTAP with IV con: Multiple foci of subcutaneous gas at the left gluteal fold with mild associated inflammatory changes      #Fever, Infected Decubitus Ulcer, Leukocytosis, Hypoxic Respiratory Failure  Discussed case with surgery - less concern for necrotizing fasciitis  Prelim Cultures with Streptococcus anginosis and Enterococcus  MRSA/MSSA Nasal PCR Negative  Tolerated Zosyn and Augmentin in 5/2021 at last admission    --Agree with switch from Zosyn to Unasyn  --Further debridement / wound packing per surgery  --Follow prelim wound cultures  --Follow blood cultures    I will be away tomorrow. Please contact the Infectious Diseases Office to contact the covering Infectious Diseases Attending.     Hao Torres M.D.  [facility] Division of Infectious Disease  8AM-5PM Monday - Friday: Available on [employer] Teams  After Hours and Holidays (or if no response on [employer] Teams): Please contact the Infectious Diseases Office at [phone]     The above assessment and plan were discussed with medicine team

## 2022-03-01 NOTE — CONSULT NOTE ADULT - ASSESSMENT
A/P:    Wound Consult requested to assist w/ management of      Consider MARQUIS/PVR, XRay, Duplex, MRI, CT  BLE elevation & Compression  Abx per Medicine/ ID  Moisturize intact skin w/ SWEEN cream BID  Nutrition Consult for optimization in pt w/ Severe Protein Calorie Malnutrition,        Inadequate PO intake, & Increased nutritional needs            encourage high quality protein, MVI & Vit C to promote wound healing  Hyperglycemia - improving w/ ADA diet and Lantus/ NPH,         FS w/ ISS q6h/ qmeal & qhs, consider Endo Consult, consider HgA1c  Anemia- improving transfusions, Fe studies, protonix  Continue turning and positioning w/ offloading assistive devices as per protocol  Continue w/ Pericare w/ dunn/ primafit care, attends underpads as per protocol  Waffle Cushion to chair when oob to chair  Continue w/ low air loss pressure redistribution bed surface   Care as per medicine, will follow w/ you  Upon discharge f/[facility] at [facility] [address] [place], [place] [phone]  Seen w/ attrobb & RN and D/w team  Thank you for this consult  Cleo Paulino PA-C CWS [id number]  we spent 50minutes face to face w/ this pt of which more than 50% of the time was spent counseling & coordinating care of this pt.

## 2022-03-01 NOTE — PROGRESS NOTE ADULT - SUBJECTIVE AND OBJECTIVE BOX
PROGRESS NOTE:   Authored by Dr. Georgia Paz MD (PGY-2). Available on TEAMS.    Patient is a 66y old  Female who presents with a chief complaint of sepsis 2/2 wound infection (28 Feb 2022 18:04)      SUBJECTIVE / OVERNIGHT EVENTS:  No acute events overnight.     ADDITIONAL REVIEW OF SYSTEMS:  Patient denies fevers, chills, chest pain, shortness of breath, nausea, abdominal pain, diarrhea, constipation, dysuria, leg swelling, headache, light headedness.    MEDICATIONS  (STANDING):  aspirin enteric coated 81 milliGRAM(s) Oral daily  atorvastatin 80 milliGRAM(s) Oral at bedtime  dextrose 40% Gel 15 Gram(s) Oral once  dextrose 5%. 1000 milliLiter(s) (50 mL/Hr) IV Continuous <Continuous>  dextrose 5%. 1000 milliLiter(s) (100 mL/Hr) IV Continuous <Continuous>  dextrose 50% Injectable 25 Gram(s) IV Push once  dextrose 50% Injectable 12.5 Gram(s) IV Push once  dextrose 50% Injectable 25 Gram(s) IV Push once  FLUoxetine 40 milliGRAM(s) Oral daily  glucagon  Injectable 1 milliGRAM(s) IntraMuscular once  insulin glargine Injectable (LANTUS) 28 Unit(s) SubCutaneous at bedtime  insulin lispro (ADMELOG) corrective regimen sliding scale   SubCutaneous three times a day before meals  insulin lispro (ADMELOG) corrective regimen sliding scale   SubCutaneous at bedtime  insulin lispro Injectable (ADMELOG) 26 Unit(s) SubCutaneous three times a day with meals  metoprolol tartrate 100 milliGRAM(s) Oral two times a day  nystatin Powder 1 Application(s) Topical two times a day  piperacillin/tazobactam IVPB.. 3.375 Gram(s) IV Intermittent every 12 hours  rivaroxaban 20 milliGRAM(s) Oral daily  sodium chloride 0.9%. 1000 milliLiter(s) (60 mL/Hr) IV Continuous <Continuous>    MEDICATIONS  (PRN):  acetaminophen     Tablet .. 650 milliGRAM(s) Oral every 6 hours PRN Mild Pain (1 - 3), Moderate Pain (4 - 6)  oxyCODONE    IR 5 milliGRAM(s) Oral daily PRN Dressing change  oxyCODONE    IR 10 milliGRAM(s) Oral every 6 hours PRN Severe Pain (7 - 10)  sodium chloride 0.65% Nasal 1 Spray(s) Both Nostrils every 6 hours PRN Nasal Congestion      CAPILLARY BLOOD GLUCOSE      POCT Blood Glucose.: 107 mg/dL (28 Feb 2022 21:40)  POCT Blood Glucose.: 198 mg/dL (28 Feb 2022 17:33)  POCT Blood Glucose.: 315 mg/dL (28 Feb 2022 12:54)  POCT Blood Glucose.: 306 mg/dL (28 Feb 2022 08:51)    I&O's Summary    27 Feb 2022 07:01  -  28 Feb 2022 07:00  --------------------------------------------------------  IN: 1040 mL / OUT: 0 mL / NET: 1040 mL    28 Feb 2022 07:01  -  01 Mar 2022 06:00  --------------------------------------------------------  IN: 740 mL / OUT: 600 mL / NET: 140 mL        PHYSICAL EXAM:  Vital Signs Last 24 Hrs  T(C): 36.5 (01 Mar 2022 04:41), Max: 36.9 (28 Feb 2022 21:36)  T(F): 97.7 (01 Mar 2022 04:41), Max: 98.5 (28 Feb 2022 21:36)  HR: 70 (01 Mar 2022 04:41) (70 - 81)  BP: 107/68 (01 Mar 2022 04:41) (100/66 - 116/66)  BP(mean): --  RR: 18 (01 Mar 2022 04:41) (18 - 18)  SpO2: 98% (01 Mar 2022 04:41) (94% - 99%)    CONSTITUTIONAL: NAD, well-developed  RESPIRATORY: Normal respiratory effort; lungs are clear to auscultation bilaterally  CARDIOVASCULAR: Regular rate and rhythm, normal S1 and S2, no murmur/rub/gallop; No lower extremity edema; Peripheral pulses are 2+ bilaterally  ABDOMEN: Nontender to palpation, normoactive bowel sounds, no rebound/guarding; No hepatosplenomegaly  MUSCLOSKELETAL: no clubbing or cyanosis of digits; no joint swelling or tenderness to palpation  PSYCH: A+O to person, place, and time; affect appropriate    LABS:                        9.1    22.61 )-----------( 256      ( 28 Feb 2022 06:49 )             29.6     02-28    129<L>  |  91<L>  |  69<H>  ----------------------------<  237<H>  3.9   |  21<L>  |  3.30<H>    Ca    9.3      28 Feb 2022 06:49  Phos  5.4     02-28  Mg     2.4     02-28                Culture - Other (collected 26 Feb 2022 22:04)  Source: Decub Decubitus  Final Report (28 Feb 2022 21:46):    Moderate Enterococcus avium    Numerous Streptococcus anginosus Susceptibilites not performed.  Organism: Enterococcus avium (28 Feb 2022 21:46)  Organism: Enterococcus avium (28 Feb 2022 21:46)    Culture - Urine (collected 26 Feb 2022 18:47)  Source: Clean Catch Clean Catch (Midstream)  Final Report (27 Feb 2022 13:44):    <10,000 CFU/mL Normal Urogenital Gia    Culture - Blood (collected 26 Feb 2022 18:45)  Source: .Blood Blood  Preliminary Report (27 Feb 2022 19:01):    No growth to date.    Culture - Blood (collected 26 Feb 2022 18:45)  Source: .Blood Blood  Preliminary Report (27 Feb 2022 19:01):    No growth to date.        Tele Reviewed:    RADIOLOGY & ADDITIONAL TESTS:  Results Reviewed:   Imaging Personally Reviewed:  Electrocardiogram Personally Reviewed:     PROGRESS NOTE:   Authored by Dr. Georgia Paz MD (PGY-2). Available on TEAMS.    Patient is a 66y old  Female who presents with a chief complaint of sepsis 2/2 wound infection (28 Feb 2022 18:04)      SUBJECTIVE / OVERNIGHT EVENTS:  No acute events overnight.     ADDITIONAL REVIEW OF SYSTEMS:  Patient denies fevers, chills, chest pain, shortness of breath, nausea, abdominal pain, diarrhea, constipation, dysuria, leg swelling, headache, light headedness.    MEDICATIONS  (STANDING):  aspirin enteric coated 81 milliGRAM(s) Oral daily  atorvastatin 80 milliGRAM(s) Oral at bedtime  dextrose 40% Gel 15 Gram(s) Oral once  dextrose 5%. 1000 milliLiter(s) (50 mL/Hr) IV Continuous <Continuous>  dextrose 5%. 1000 milliLiter(s) (100 mL/Hr) IV Continuous <Continuous>  dextrose 50% Injectable 25 Gram(s) IV Push once  dextrose 50% Injectable 12.5 Gram(s) IV Push once  dextrose 50% Injectable 25 Gram(s) IV Push once  FLUoxetine 40 milliGRAM(s) Oral daily  glucagon  Injectable 1 milliGRAM(s) IntraMuscular once  insulin glargine Injectable (LANTUS) 28 Unit(s) SubCutaneous at bedtime  insulin lispro (ADMELOG) corrective regimen sliding scale   SubCutaneous three times a day before meals  insulin lispro (ADMELOG) corrective regimen sliding scale   SubCutaneous at bedtime  insulin lispro Injectable (ADMELOG) 26 Unit(s) SubCutaneous three times a day with meals  metoprolol tartrate 100 milliGRAM(s) Oral two times a day  nystatin Powder 1 Application(s) Topical two times a day  piperacillin/tazobactam IVPB.. 3.375 Gram(s) IV Intermittent every 12 hours  rivaroxaban 20 milliGRAM(s) Oral daily  sodium chloride 0.9%. 1000 milliLiter(s) (60 mL/Hr) IV Continuous <Continuous>    MEDICATIONS  (PRN):  acetaminophen     Tablet .. 650 milliGRAM(s) Oral every 6 hours PRN Mild Pain (1 - 3), Moderate Pain (4 - 6)  oxyCODONE    IR 5 milliGRAM(s) Oral daily PRN Dressing change  oxyCODONE    IR 10 milliGRAM(s) Oral every 6 hours PRN Severe Pain (7 - 10)  sodium chloride 0.65% Nasal 1 Spray(s) Both Nostrils every 6 hours PRN Nasal Congestion      CAPILLARY BLOOD GLUCOSE      POCT Blood Glucose.: 107 mg/dL (28 Feb 2022 21:40)  POCT Blood Glucose.: 198 mg/dL (28 Feb 2022 17:33)  POCT Blood Glucose.: 315 mg/dL (28 Feb 2022 12:54)  POCT Blood Glucose.: 306 mg/dL (28 Feb 2022 08:51)    I&O's Summary    27 Feb 2022 07:01  -  28 Feb 2022 07:00  --------------------------------------------------------  IN: 1040 mL / OUT: 0 mL / NET: 1040 mL    28 Feb 2022 07:01  -  01 Mar 2022 06:00  --------------------------------------------------------  IN: 740 mL / OUT: 600 mL / NET: 140 mL        PHYSICAL EXAM:  Vital Signs Last 24 Hrs  T(C): 36.5 (01 Mar 2022 04:41), Max: 36.9 (28 Feb 2022 21:36)  T(F): 97.7 (01 Mar 2022 04:41), Max: 98.5 (28 Feb 2022 21:36)  HR: 70 (01 Mar 2022 04:41) (70 - 81)  BP: 107/68 (01 Mar 2022 04:41) (100/66 - 116/66)  BP(mean): --  RR: 18 (01 Mar 2022 04:41) (18 - 18)  SpO2: 98% (01 Mar 2022 04:41) (94% - 99%)    CONSTITUTIONAL: NAD, well-developed  RESPIRATORY: Normal respiratory effort; lungs are clear to auscultation bilaterally  CARDIOVASCULAR: Regular rate and rhythm, normal S1 and S2, no murmur/rub/gallop; No lower extremity edema; Peripheral pulses are 2+ bilaterally  ABDOMEN: Nontender to palpation, normoactive bowel sounds, no rebound/guarding; No hepatosplenomegaly  MUSCLOSKELETAL: no clubbing or cyanosis of digits; no joint swelling or tenderness to palpation  PSYCH: A+O to person, place, and time; affect appropriate    LABS:                        9.1    22.61 )-----------( 256      ( 28 Feb 2022 06:49 )             29.6     02-28    129<L>  |  91<L>  |  69<H>  ----------------------------<  237<H>  3.9   |  21<L>  |  3.30<H>    Ca    9.3      28 Feb 2022 06:49  Phos  5.4     02-28  Mg     2.4     02-28                Culture - Other (collected 26 Feb 2022 22:04)  Source: Decub Decubitus  Final Report (28 Feb 2022 21:46):    Moderate Enterococcus avium    Numerous Streptococcus anginosus Susceptibilites not performed.  Organism: Enterococcus avium (28 Feb 2022 21:46)  Organism: Enterococcus avium (28 Feb 2022 21:46)    Culture - Urine (collected 26 Feb 2022 18:47)  Source: Clean Catch Clean Catch (Midstream)  Final Report (27 Feb 2022 13:44):    <10,000 CFU/mL Normal Urogenital Gia    Culture - Blood (collected 26 Feb 2022 18:45)  Source: .Blood Blood  Preliminary Report (27 Feb 2022 19:01):    No growth to date.    Culture - Blood (collected 26 Feb 2022 18:45)  Source: .Blood Blood  Preliminary Report (27 Feb 2022 19:01):    No growth to date.        Tele Reviewed:    RADIOLOGY & ADDITIONAL TESTS:  TTE  1. Mitral annular calcification and calcified mitral leaflets.  Minimal mitral regurgitation. Peak mitral valve gradient equals 16 mm Hg, mean transmitral valve gradient equals 8 mm Hg, consistent with moderate mitral stenosis.  2. Transcatheter aortic valve replacement. Average Peak transaortic valve gradient 44 mm Hg, average mean transaortic valve gradient equals 24 mm Hg, which is probably normal/ in the presence of a transcatheter aortic valve replacement.HR 70s No aortic valve regurgitation seen.  3. Mildly dilated left atrium.  LA volume index = 35 cc/m2.  4. Normal left ventricular internal dimensions and wall thicknesses.  5. Endocardial visualization enhanced with intravenous injection of Ultrasonic Enhancing Agent (Definity). Hyperdynamic left ventricular systolic function.  6. Moderate diastolic dysfunction (Stage II).  7. The right ventricle is not well visualized; grossly reduced  right ventricular systolic function.  8. Normal tricuspid valve. Mild-moderate tricuspid regurgitation.  9. Estimated pulmonary artery systolic pressure equals 52  mm Hg, assuming right atrial pressure equals 15 mm Hg,  consistent with moderate pulmonary pressures.   PROGRESS NOTE:   Authored by Dr. Georgia Paz MD (PGY-2). Available on TEAMS.    Patient is a 66y old  Female who presents with a chief complaint of sepsis 2/2 wound infection (28 Feb 2022 18:04)      SUBJECTIVE / OVERNIGHT EVENTS:  No acute events overnight. Pt seen and examined this morning, she reported feeling sluggish with her thought process and seems to be having difficulties finding the words she is trying to express. She reports ongoing pain in the buttocks.     ADDITIONAL REVIEW OF SYSTEMS:  Patient denies fevers, chills, chest pain, shortness of breath, nausea, abdominal pain, diarrhea, constipation, dysuria, leg swelling, headache, light headedness.    MEDICATIONS  (STANDING):  aspirin enteric coated 81 milliGRAM(s) Oral daily  atorvastatin 80 milliGRAM(s) Oral at bedtime  dextrose 40% Gel 15 Gram(s) Oral once  dextrose 5%. 1000 milliLiter(s) (50 mL/Hr) IV Continuous <Continuous>  dextrose 5%. 1000 milliLiter(s) (100 mL/Hr) IV Continuous <Continuous>  dextrose 50% Injectable 25 Gram(s) IV Push once  dextrose 50% Injectable 12.5 Gram(s) IV Push once  dextrose 50% Injectable 25 Gram(s) IV Push once  FLUoxetine 40 milliGRAM(s) Oral daily  glucagon  Injectable 1 milliGRAM(s) IntraMuscular once  insulin glargine Injectable (LANTUS) 28 Unit(s) SubCutaneous at bedtime  insulin lispro (ADMELOG) corrective regimen sliding scale   SubCutaneous three times a day before meals  insulin lispro (ADMELOG) corrective regimen sliding scale   SubCutaneous at bedtime  insulin lispro Injectable (ADMELOG) 26 Unit(s) SubCutaneous three times a day with meals  metoprolol tartrate 100 milliGRAM(s) Oral two times a day  nystatin Powder 1 Application(s) Topical two times a day  piperacillin/tazobactam IVPB.. 3.375 Gram(s) IV Intermittent every 12 hours  rivaroxaban 20 milliGRAM(s) Oral daily  sodium chloride 0.9%. 1000 milliLiter(s) (60 mL/Hr) IV Continuous <Continuous>    MEDICATIONS  (PRN):  acetaminophen     Tablet .. 650 milliGRAM(s) Oral every 6 hours PRN Mild Pain (1 - 3), Moderate Pain (4 - 6)  oxyCODONE    IR 5 milliGRAM(s) Oral daily PRN Dressing change  oxyCODONE    IR 10 milliGRAM(s) Oral every 6 hours PRN Severe Pain (7 - 10)  sodium chloride 0.65% Nasal 1 Spray(s) Both Nostrils every 6 hours PRN Nasal Congestion      CAPILLARY BLOOD GLUCOSE      POCT Blood Glucose.: 107 mg/dL (28 Feb 2022 21:40)  POCT Blood Glucose.: 198 mg/dL (28 Feb 2022 17:33)  POCT Blood Glucose.: 315 mg/dL (28 Feb 2022 12:54)  POCT Blood Glucose.: 306 mg/dL (28 Feb 2022 08:51)    I&O's Summary    27 Feb 2022 07:01  -  28 Feb 2022 07:00  --------------------------------------------------------  IN: 1040 mL / OUT: 0 mL / NET: 1040 mL    28 Feb 2022 07:01  -  01 Mar 2022 06:00  --------------------------------------------------------  IN: 740 mL / OUT: 600 mL / NET: 140 mL        PHYSICAL EXAM:  Vital Signs Last 24 Hrs  T(C): 36.5 (01 Mar 2022 04:41), Max: 36.9 (28 Feb 2022 21:36)  T(F): 97.7 (01 Mar 2022 04:41), Max: 98.5 (28 Feb 2022 21:36)  HR: 70 (01 Mar 2022 04:41) (70 - 81)  BP: 107/68 (01 Mar 2022 04:41) (100/66 - 116/66)  BP(mean): --  RR: 18 (01 Mar 2022 04:41) (18 - 18)  SpO2: 98% (01 Mar 2022 04:41) (94% - 99%)    CONSTITUTIONAL: NAD, morbidly obese, sitting up on recliner chair  RESPIRATORY: Normal respiratory effort; lungs are clear to auscultation bilaterally, on 4L NC   CARDIOVASCULAR: Regular rate and rhythm, normal S1 and S2, no murmur/rub/gallop; 1+ b/l lower extremity edema; Peripheral pulses are 2+ bilaterally  ABDOMEN: Large panus, Nontender to palpation, normoactive bowel sounds, no rebound/guarding  MUSCLOSKELETAL: no clubbing or cyanosis of digits; no joint swelling or tenderness to palpation  NEURO: +ASTERIXIS, A&O 3, unable to explain fully reason for admission. Was having difficulty expressing thoughts. CN intact. No focal deficits on extremity neuro assessment.   SKIN: sacral wound with packing soaked in dried blood, no drainage. Gluteal folds very tender to palpation and some skin tears noted in the area.   LABS:                        9.1    22.61 )-----------( 256      ( 28 Feb 2022 06:49 )             29.6     02-28    129<L>  |  91<L>  |  69<H>  ----------------------------<  237<H>  3.9   |  21<L>  |  3.30<H>    Ca    9.3      28 Feb 2022 06:49  Phos  5.4     02-28  Mg     2.4     02-28      Culture - Other (collected 26 Feb 2022 22:04)  Source: Decub Decubitus  Final Report (28 Feb 2022 21:46):    Moderate Enterococcus avium    Numerous Streptococcus anginosus Susceptibilites not performed.  Organism: Enterococcus avium (28 Feb 2022 21:46)  Organism: Enterococcus avium (28 Feb 2022 21:46)    Culture - Urine (collected 26 Feb 2022 18:47)  Source: Clean Catch Clean Catch (Midstream)  Final Report (27 Feb 2022 13:44):    <10,000 CFU/mL Normal Urogenital Gia    Culture - Blood (collected 26 Feb 2022 18:45)  Source: .Blood Blood  Preliminary Report (27 Feb 2022 19:01):    No growth to date.    Culture - Blood (collected 26 Feb 2022 18:45)  Source: .Blood Blood  Preliminary Report (27 Feb 2022 19:01):    No growth to date.        Tele Reviewed:    RADIOLOGY & ADDITIONAL TESTS:  TTE  1. Mitral annular calcification and calcified mitral leaflets.  Minimal mitral regurgitation. Peak mitral valve gradient equals 16 mm Hg, mean transmitral valve gradient equals 8 mm Hg, consistent with moderate mitral stenosis.  2. Transcatheter aortic valve replacement. Average Peak transaortic valve gradient 44 mm Hg, average mean transaortic valve gradient equals 24 mm Hg, which is probably normal/ in the presence of a transcatheter aortic valve replacement.HR 70s No aortic valve regurgitation seen.  3. Mildly dilated left atrium.  LA volume index = 35 cc/m2.  4. Normal left ventricular internal dimensions and wall thicknesses.  5. Endocardial visualization enhanced with intravenous injection of Ultrasonic Enhancing Agent (Definity). Hyperdynamic left ventricular systolic function.  6. Moderate diastolic dysfunction (Stage II).  7. The right ventricle is not well visualized; grossly reduced  right ventricular systolic function.  8. Normal tricuspid valve. Mild-moderate tricuspid regurgitation.  9. Estimated pulmonary artery systolic pressure equals 52  mm Hg, assuming right atrial pressure equals 15 mm Hg,  consistent with moderate pulmonary pressures.

## 2022-03-01 NOTE — CONSULT NOTE ADULT - REASON FOR ADMISSION
sepsis 2/2 wound infection

## 2022-03-01 NOTE — DIETITIAN INITIAL EVALUATION ADULT. - PROBLEM SELECTOR PLAN 1
Febrile, tachycardic, hypotensive  UA: sterile pyuria   Soft tissue infection, sacral ulcer ischial wound with associated cellulitis   CXR clear  s/p I&D and Vanco, Cefepime, and GLOVER   BP improved     - follow blood cx, and I&D cx   - Continue  Vanc 1.5 gm Q12 and Cefepime 2 gm q12 and Flagyl   -  check Vancomycin trough before 4th sequential dose. Target trough levels 15-20  - follow MRSA swab  -daily packing changes. To be done by surgical team  -wound care  - 500 cc LR  - ID recs appreciated

## 2022-03-01 NOTE — CONSULT NOTE ADULT - PROBLEM SELECTOR RECOMMENDATION 4
-2/2 AS and HCM   -TTE 2/28 IVC 2.7 cm w/o respiratory variations, consistent with elevated filling pressures   -would do diuretic challenge as above   -may potentially need dialysis for uremia and fluid removal

## 2022-03-01 NOTE — DIETITIAN INITIAL EVALUATION ADULT. - PROBLEM SELECTOR PLAN 4
Hx of AS s/p TAVR 2015 and 2021, HOCM, hx of pAFIb on Xarelto (however patient has not taken it past 2 days) and DVT  Hx of JOSR on 2L NC at home for bedtime   CXR clear   pro-BNP 7000s, was 300-600 in past  Could be decompensated HF, PE given hx and immobility, didn't take xarelto past 2 days  Currently on 3L NC SatO2 97 %   Home meds include Lasix 80mg daily   Given hypoxic and and shortness of breath, possible A fib RVR, could additionally be flash additionally pulm edeam   Lilyl 2/2 flash pulm edema     - would benefit from cards and pulm consult   - PT consult  - Hold diuretic in setting of hypotension  - Would consider CTA chest to rule out PE if not improved   - Hold off TTE for now

## 2022-03-01 NOTE — DIETITIAN INITIAL EVALUATION ADULT. - PERTINENT LABORATORY DATA
Home
03-01 @ 06:38: Na 129<L>, BUN 89<H>, Cr 4.67<H>, <H>, K+ 3.9, Phos 6.3<H>, Mg 2.7<H>, Alk Phos 148<H>, ALT/SGPT 12, AST/SGOT 14,   HbA1c 8.6%

## 2022-03-01 NOTE — PROGRESS NOTE ADULT - PROBLEM SELECTOR PLAN 4
Positive UA  - UCx negative   - asx at this time  - on abx for sacral wound/cellulitis as above Fever, leukocytosis, hypotension on admission. Sepsis 2/2 likely sacral wound/cellulitis  - s/p bedside debridement in ED   - BCx 2/26 NGTD  - Wound Cx growing E. avium and strep anginosus   - Zosyn (2/27- 3/1 )   - CHANGE to Unasyn 3/1 given culture sensitivities and prevent renal injury   - MRSA pcr negative   - daily packing changes. To be done by surgical team  - wound care deferring to surgery team

## 2022-03-01 NOTE — DIETITIAN INITIAL EVALUATION ADULT. - PROBLEM SELECTOR PLAN 6
On metoprolol tartate 100mg bid and Xarelto    - continue metoprolol tartate 100mg bid, and Xarelto  - replete K >4, Mg >2

## 2022-03-01 NOTE — CONSULT NOTE ADULT - ASSESSMENT
A/P:65 yo F, PMHx AS s/p TAVR 2015 and 2021, HOCM, HFpEF, pAFib on Xarelto, JOSR (does not tolerate CPAP, on 2L O2 at home qhs), anxiety, morbid obesity, DM, HTN, asthma (on albuterol), pw sepsis, n/v, likely 2/2 sacral decubitus ulcer/necrotic wound on left ischium with associated cellulitis. Also found to be in acute on chronic hypoxic respiratory failure on admission, with pulmonary vascular congestion on imaging concerning for decompensated HF. Course c/b worsening HEATHER, likely pre-renal in s/o hypervolemia given elevated BNP.     Wound Consult requested to assist w/ management of Labial/ perineal abscess, s/p debridement/ drainage of abscess      Wound- Dakins TID dressings and prn soiling  [place] Advance TIW and        Continue w/ Pericare w/ attends underpads as per protocol  Constipation- pt takes Miralax at home- pt anxious 2/2 wound       consider bowel regimen to decrease wound contamination   Abx per Medicine/ ID  CT noted- pt s/p debridement and drainage of abscess  Moisturize intact skin w/ SWEEN cream BID  Nutritional optimization - RD consult appreciated        encourage high quality protein, MVI & Vit C to promote wound healing  Hyperglycemia - improving w/ ADA diet and Lantus w/FS w/ ISS  and tx of infection  Continue turning and positioning w/ offloading assistive devices as per protocol  Waffle Cushion to chair when oob to chair  Continue w/ low air loss pressure redistribution bed surface   Care as per medicine, will follow w/ you  Upon discharge f/[facility] outpatient at [facility] [address] [place], [place] [phone]  Seen w/ attng & RN and D/w team  Thank you for this consult  Cleo Paulino PA-C CWS [id number]  we spent 50minutes face to face w/ this pt of which more than 50% of the time was spent counseling & coordinating care of this pt.

## 2022-03-01 NOTE — DIETITIAN INITIAL EVALUATION ADULT. - PROBLEM SELECTOR PLAN 2
Hypoxic to 87 % , temporarily required BiPAP   Could be 2/2 flash pulm edema now improved  - continue to monitor   - currently on 3l nc, wean as tolerated

## 2022-03-01 NOTE — PROGRESS NOTE ADULT - PROBLEM SELECTOR PLAN 2
Cr normal on admission, now increasing to 3.3  DDx: contrast induced / vanco both exposures occurred on 2/26. vs cardiorenal given elevated BNP vs less likely post obstructive   - Urine lytes suggestive of pre-renal etiology  - Bladder scan (inaccurate given habitus). Low threshold for dunn catheter   - Trial of lasix 40mg IV ONCE 2/28 to eval for cardiorenal etiology   - Avoid nephrotoxic agents  - Strict I/Os Cr normal on admission, now increasing to 4.67  DDx: contrast induced / vanco both exposures occurred on 2/26. vs cardiorenal given elevated BNP vs less likely post obstructive   - Urine lytes suggestive of pre-renal etiology  - Vanco level 16 on 3/1  - Bladder scan (inaccurate given habitus). Watts placed 3/1/22   - Suboptimal response to lasix 40mg IV on 2/28  - Trial of lasix 80mg IV 3/1 to eval for cardiorenal etiology, as per HF. Appreciate recs   - Avoid nephrotoxic agents  - Strict I/Os  - Nephro following, monitor closely for need of urgent HD

## 2022-03-01 NOTE — CONSULT NOTE ADULT - SUBJECTIVE AND OBJECTIVE BOX
Patient seen and evaluated at bedside    Chief Complaint:    HPI:    66F with PMH of severe AS s/p TAVR'15 (Ramon 23mm) and Suzy TAVR in 2021, HCM (no SHAILESH), pAF on Xarelto, JOSR (does not tolerate CPAP, on home 2L O2), MO s/p gastric band c/b gastric perforation, and below the knee DVT/PE who p/w sepsis 2/2 sacral ulcer requiring debridement in the ED. She was treated with IV antibiotic and Bcx has been negative thus far. HF team consulted for fluid status management.     PMHx:   Diabetes mellitus    Hypertension    Gastroparesis due to DM    Depression    Herniated disc    Spinal stenosis    Cholesterol serum elevated    MVP (mitral valve prolapse)    Hypothyroid    Obesity    IBS (irritable bowel syndrome)    Neuropathy    GERD (gastroesophageal reflux disease)    Murmur, cardiac    Diabetes type 2, uncontrolled    History of diabetic retinopathy    Peripheral neuropathy    HOCM (hypertrophic obstructive cardiomyopathy)    Atrial fibrillation    Aortic stenosis        PSHx:   S/P bariatric surgery    S/P tonsillectomy and adenoidectomy    Cataract    S/P laminectomy    S/P D&amp;C (status post dilation and curettage)    Cervix abnormality    Breast anomaly    right eye torn retina    S/P TAVR (transcatheter aortic valve replacement)    S/P foot surgery        Allergies:  amoxicillin (Unknown)  Cipro (Unknown)  codeine (Vomiting)  fish (Stomach Upset)  shellfish (Stomach Upset)      Home Meds:    Current Medications:   acetaminophen     Tablet .. 650 milliGRAM(s) Oral every 6 hours PRN  ampicillin/sulbactam  IVPB 3 Gram(s) IV Intermittent every 12 hours  aspirin enteric coated 81 milliGRAM(s) Oral daily  atorvastatin 80 milliGRAM(s) Oral at bedtime  bisacodyl Suppository 10 milliGRAM(s) Rectal daily PRN  Dakins Solution - 1/4 Strength 1 Application(s) Topical every 8 hours  dextrose 40% Gel 15 Gram(s) Oral once  dextrose 5%. 1000 milliLiter(s) IV Continuous <Continuous>  dextrose 5%. 1000 milliLiter(s) IV Continuous <Continuous>  dextrose 50% Injectable 25 Gram(s) IV Push once  dextrose 50% Injectable 12.5 Gram(s) IV Push once  dextrose 50% Injectable 25 Gram(s) IV Push once  FLUoxetine 40 milliGRAM(s) Oral daily  furosemide   Injectable 80 milliGRAM(s) IV Push once  glucagon  Injectable 1 milliGRAM(s) IntraMuscular once  insulin glargine Injectable (LANTUS) 28 Unit(s) SubCutaneous at bedtime  insulin lispro (ADMELOG) corrective regimen sliding scale   SubCutaneous three times a day before meals  insulin lispro (ADMELOG) corrective regimen sliding scale   SubCutaneous at bedtime  insulin lispro Injectable (ADMELOG) 26 Unit(s) SubCutaneous three times a day with meals  metoprolol tartrate 100 milliGRAM(s) Oral two times a day  nystatin Powder 1 Application(s) Topical two times a day  polyethylene glycol 3350 17 Gram(s) Oral two times a day  senna 2 Tablet(s) Oral at bedtime  sodium chloride 0.65% Nasal 1 Spray(s) Both Nostrils every 6 hours PRN  sodium chloride 0.9%. 1000 milliLiter(s) IV Continuous <Continuous>      FAMILY HISTORY:  Family history of aortic stenosis  Family hx of structural heart dx    FHx: diabetes mellitus    FH: CAD (coronary artery disease)    Family history of anxiety disorder        Social History: Personally reviewed   No tobacco, EtOH or IVDU     REVIEW OF SYSTEMS:  Constitutional:     [x ] negative [ ] fevers [ ] chills [ ] weight loss [ ] weight gain  HEENT:                  [x ] negative [ ] dry eyes [ ] eye irritation [ ] postnasal drip [ ] nasal congestion  CV:                         [ x] negative  [ ] chest pain [ ] orthopnea [ ] palpitations [ ] murmur  Resp:                     [x ] negative [ ] cough [ ] shortness of breath [ ] dyspnea [ ] wheezing [ ] sputum [ ]hemoptysis  GI:                          [ x] negative [ ] nausea [ ] vomiting [ ] diarrhea [ ] constipation [ ] abd pain [ ] dysphagia   :                        [ x] negative [ ] dysuria [ ] nocturia [ ] hematuria [ ] increased urinary frequency  Musculoskeletal: [x ] negative [ ] back pain [ ] myalgias [ ] arthralgias [ ] fracture  Skin:                       [ x] negative [ ] rash [ ] itch  Neurological:        [ x] negative [ ] headache [ ] dizziness [ ] syncope [ ] weakness [ ] numbness  Psychiatric:           [ x] negative [ ] anxiety [ ] depression  Endocrine:            [ x] negative [ ] diabetes [ ] thyroid problem  Heme/Lymph:      [ x] negative [ ] anemia [ ] bleeding problem  Allergic/Immune: [ x] negative [ ] itchy eyes [ ] nasal discharge [ ] hives [ ] angioedema    [ x] All other systems negative  [ ] Unable to assess ROS due to      Physical Exam:  T(F): 98.4 (03-01), Max: 98.5 (02-28)  HR: 64 (03-01) (59 - 81)  BP: 116/64 (03-01) (100/66 - 118/68)  RR: 18 (03-01)  SpO2: 100% (03-01)  GENERAL: No acute distress, well-developed  HEAD:  Atraumatic, Normocephalic  ENT: EOMI, PERRLA, conjunctiva and sclera clear, Neck supple, No JVD, moist mucosa  CHEST/LUNG: Clear to auscultation bilaterally; No wheeze, equal breath sounds bilaterally   HEART: Regular rate and irregular rhythm; No murmurs, rubs, or gallops  ABDOMEN: Soft, Nontender, Nondistended; Bowel sounds present  EXTREMITIES:  2+ edema b/l   NEUROLOGY: AAOx3, non-focal, cranial nerves intact  SKIN: Normal color, No rashes or lesions      Cardiovascular Diagnostic Testing:      Imaging:      Labs: Personally reviewed                        9.0    19.40 )-----------( 282      ( 01 Mar 2022 06:38 )             29.5     03-01    129<L>  |  90<L>  |  89<H>  ----------------------------<  109<H>  3.9   |  21<L>  |  4.67<H>    Ca    9.1      01 Mar 2022 06:38  Phos  6.3     03-01  Mg     2.7     03-01    TPro  6.7  /  Alb  3.0<L>  /  TBili  0.7  /  DBili  x   /  AST  14  /  ALT  12  /  AlkPhos  148<H>  03-01      Serum Pro-Brain Natriuretic Peptide: 50078 pg/mL (03-01 @ 06:38)  Serum Pro-Brain Natriuretic Peptide: 86247 pg/mL (02-28 @ 06:49)  Serum Pro-Brain Natriuretic Peptide: 7759 pg/mL (02-26 @ 13:46)        Thyroid Stimulating Hormone, Serum: 0.63 uIU/mL (02-26 @ 19:55)

## 2022-03-01 NOTE — CONSULT NOTE ADULT - CONSULT REQUESTED DATE/TIME
26-Feb-2022
01-Mar-2022 15:55
01-Mar-2022 18:38
26-Feb-2022 18:41
01-Mar-2022 11:49
26-Feb-2022 17:18
01-Mar-2022 10:08

## 2022-03-01 NOTE — DIETITIAN INITIAL EVALUATION ADULT. - PROBLEM SELECTOR PLAN 7
on Humalog 30 TID and Tresiba 30 bedtime     - check A1c  - Insulin 24 pre-meal and 24 long-acting bedtime

## 2022-03-02 LAB
ALBUMIN SERPL ELPH-MCNC: 3.5 G/DL — SIGNIFICANT CHANGE UP (ref 3.3–5)
ALP SERPL-CCNC: 167 U/L — HIGH (ref 40–120)
ALT FLD-CCNC: 14 U/L — SIGNIFICANT CHANGE UP (ref 10–45)
ANION GAP SERPL CALC-SCNC: 15 MMOL/L — SIGNIFICANT CHANGE UP (ref 5–17)
APPEARANCE UR: ABNORMAL
AST SERPL-CCNC: 19 U/L — SIGNIFICANT CHANGE UP (ref 10–40)
BACTERIA # UR AUTO: 0 — SIGNIFICANT CHANGE UP
BASE EXCESS BLDV CALC-SCNC: -0.3 MMOL/L — SIGNIFICANT CHANGE UP (ref -2–2)
BASE EXCESS BLDV CALC-SCNC: 2.7 MMOL/L — HIGH (ref -2–2)
BASOPHILS # BLD AUTO: 0.04 K/UL — SIGNIFICANT CHANGE UP (ref 0–0.2)
BASOPHILS # BLD AUTO: 0.04 K/UL — SIGNIFICANT CHANGE UP (ref 0–0.2)
BASOPHILS NFR BLD AUTO: 0.2 % — SIGNIFICANT CHANGE UP (ref 0–2)
BASOPHILS NFR BLD AUTO: 0.2 % — SIGNIFICANT CHANGE UP (ref 0–2)
BILIRUB SERPL-MCNC: 0.6 MG/DL — SIGNIFICANT CHANGE UP (ref 0.2–1.2)
BILIRUB UR-MCNC: NEGATIVE — SIGNIFICANT CHANGE UP
BUN SERPL-MCNC: 92 MG/DL — HIGH (ref 7–23)
CA-I SERPL-SCNC: 1.2 MMOL/L — SIGNIFICANT CHANGE UP (ref 1.15–1.33)
CA-I SERPL-SCNC: 1.21 MMOL/L — SIGNIFICANT CHANGE UP (ref 1.15–1.33)
CALCIUM SERPL-MCNC: 9.6 MG/DL — SIGNIFICANT CHANGE UP (ref 8.4–10.5)
CHLORIDE BLDV-SCNC: 94 MMOL/L — LOW (ref 96–108)
CHLORIDE BLDV-SCNC: 97 MMOL/L — SIGNIFICANT CHANGE UP (ref 96–108)
CHLORIDE SERPL-SCNC: 94 MMOL/L — LOW (ref 96–108)
CO2 BLDV-SCNC: 28 MMOL/L — HIGH (ref 22–26)
CO2 BLDV-SCNC: 30 MMOL/L — HIGH (ref 22–26)
CO2 SERPL-SCNC: 23 MMOL/L — SIGNIFICANT CHANGE UP (ref 22–31)
COLOR SPEC: ABNORMAL
CREAT SERPL-MCNC: 3.32 MG/DL — HIGH (ref 0.5–1.3)
DIFF PNL FLD: ABNORMAL
EGFR: 15 ML/MIN/1.73M2 — LOW
EOSINOPHIL # BLD AUTO: 0.06 K/UL — SIGNIFICANT CHANGE UP (ref 0–0.5)
EOSINOPHIL # BLD AUTO: 0.1 K/UL — SIGNIFICANT CHANGE UP (ref 0–0.5)
EOSINOPHIL NFR BLD AUTO: 0.3 % — SIGNIFICANT CHANGE UP (ref 0–6)
EOSINOPHIL NFR BLD AUTO: 0.5 % — SIGNIFICANT CHANGE UP (ref 0–6)
EPI CELLS # UR: 0 /HPF — SIGNIFICANT CHANGE UP
GAS PNL BLDA: SIGNIFICANT CHANGE UP
GAS PNL BLDV: 132 MMOL/L — LOW (ref 136–145)
GAS PNL BLDV: 134 MMOL/L — LOW (ref 136–145)
GAS PNL BLDV: SIGNIFICANT CHANGE UP
GLUCOSE BLDV-MCNC: 109 MG/DL — HIGH (ref 70–99)
GLUCOSE BLDV-MCNC: 184 MG/DL — HIGH (ref 70–99)
GLUCOSE SERPL-MCNC: 185 MG/DL — HIGH (ref 70–99)
GLUCOSE UR QL: NEGATIVE — SIGNIFICANT CHANGE UP
HCO3 BLDV-SCNC: 26 MMOL/L — SIGNIFICANT CHANGE UP (ref 22–29)
HCO3 BLDV-SCNC: 28 MMOL/L — SIGNIFICANT CHANGE UP (ref 22–29)
HCT VFR BLD CALC: 29 % — LOW (ref 34.5–45)
HCT VFR BLD CALC: 31.3 % — LOW (ref 34.5–45)
HCT VFR BLDA CALC: 29 % — LOW (ref 34.5–46.5)
HCT VFR BLDA CALC: 31 % — LOW (ref 34.5–46.5)
HGB BLD CALC-MCNC: 10.3 G/DL — LOW (ref 11.7–16.1)
HGB BLD CALC-MCNC: 9.7 G/DL — LOW (ref 11.7–16.1)
HGB BLD-MCNC: 9.1 G/DL — LOW (ref 11.5–15.5)
HGB BLD-MCNC: 9.6 G/DL — LOW (ref 11.5–15.5)
HOROWITZ INDEX BLDV+IHG-RTO: 32 — SIGNIFICANT CHANGE UP
HYALINE CASTS # UR AUTO: 4 /LPF — HIGH (ref 0–2)
IMM GRANULOCYTES NFR BLD AUTO: 0.9 % — SIGNIFICANT CHANGE UP (ref 0–1.5)
IMM GRANULOCYTES NFR BLD AUTO: 1.1 % — SIGNIFICANT CHANGE UP (ref 0–1.5)
KETONES UR-MCNC: NEGATIVE — SIGNIFICANT CHANGE UP
LACTATE BLDV-MCNC: 1.5 MMOL/L — SIGNIFICANT CHANGE UP (ref 0.7–2)
LACTATE BLDV-MCNC: 2.1 MMOL/L — HIGH (ref 0.7–2)
LEUKOCYTE ESTERASE UR-ACNC: ABNORMAL
LYMPHOCYTES # BLD AUTO: 0.71 K/UL — LOW (ref 1–3.3)
LYMPHOCYTES # BLD AUTO: 0.95 K/UL — LOW (ref 1–3.3)
LYMPHOCYTES # BLD AUTO: 3.1 % — LOW (ref 13–44)
LYMPHOCYTES # BLD AUTO: 4.5 % — LOW (ref 13–44)
MAGNESIUM SERPL-MCNC: 2.9 MG/DL — HIGH (ref 1.6–2.6)
MCHC RBC-ENTMCNC: 24.6 PG — LOW (ref 27–34)
MCHC RBC-ENTMCNC: 25.1 PG — LOW (ref 27–34)
MCHC RBC-ENTMCNC: 30.7 GM/DL — LOW (ref 32–36)
MCHC RBC-ENTMCNC: 31.4 GM/DL — LOW (ref 32–36)
MCV RBC AUTO: 79.9 FL — LOW (ref 80–100)
MCV RBC AUTO: 80.3 FL — SIGNIFICANT CHANGE UP (ref 80–100)
MONOCYTES # BLD AUTO: 0.83 K/UL — SIGNIFICANT CHANGE UP (ref 0–0.9)
MONOCYTES # BLD AUTO: 1.2 K/UL — HIGH (ref 0–0.9)
MONOCYTES NFR BLD AUTO: 3.7 % — SIGNIFICANT CHANGE UP (ref 2–14)
MONOCYTES NFR BLD AUTO: 5.7 % — SIGNIFICANT CHANGE UP (ref 2–14)
NEUTROPHILS # BLD AUTO: 18.42 K/UL — HIGH (ref 1.8–7.4)
NEUTROPHILS # BLD AUTO: 20.74 K/UL — HIGH (ref 1.8–7.4)
NEUTROPHILS NFR BLD AUTO: 88 % — HIGH (ref 43–77)
NEUTROPHILS NFR BLD AUTO: 91.8 % — HIGH (ref 43–77)
NITRITE UR-MCNC: NEGATIVE — SIGNIFICANT CHANGE UP
NRBC # BLD: 0 /100 WBCS — SIGNIFICANT CHANGE UP (ref 0–0)
NRBC # BLD: 0 /100 WBCS — SIGNIFICANT CHANGE UP (ref 0–0)
NT-PROBNP SERPL-SCNC: HIGH PG/ML (ref 0–300)
PCO2 BLDV: 48 MMHG — HIGH (ref 39–42)
PCO2 BLDV: 51 MMHG — HIGH (ref 39–42)
PH BLDV: 7.32 — SIGNIFICANT CHANGE UP (ref 7.32–7.43)
PH BLDV: 7.38 — SIGNIFICANT CHANGE UP (ref 7.32–7.43)
PH UR: 5.5 — SIGNIFICANT CHANGE UP (ref 5–8)
PHOSPHATE SERPL-MCNC: 5.5 MG/DL — HIGH (ref 2.5–4.5)
PLATELET # BLD AUTO: 325 K/UL — SIGNIFICANT CHANGE UP (ref 150–400)
PLATELET # BLD AUTO: 330 K/UL — SIGNIFICANT CHANGE UP (ref 150–400)
PO2 BLDV: 24 MMHG — LOW (ref 25–45)
PO2 BLDV: 31 MMHG — SIGNIFICANT CHANGE UP (ref 25–45)
POTASSIUM BLDV-SCNC: 3.4 MMOL/L — LOW (ref 3.5–5.1)
POTASSIUM BLDV-SCNC: 3.4 MMOL/L — LOW (ref 3.5–5.1)
POTASSIUM SERPL-MCNC: 3.6 MMOL/L — SIGNIFICANT CHANGE UP (ref 3.5–5.3)
POTASSIUM SERPL-SCNC: 3.6 MMOL/L — SIGNIFICANT CHANGE UP (ref 3.5–5.3)
PROT SERPL-MCNC: 7.5 G/DL — SIGNIFICANT CHANGE UP (ref 6–8.3)
PROT UR-MCNC: 100 — SIGNIFICANT CHANGE UP
RBC # BLD: 3.63 M/UL — LOW (ref 3.8–5.2)
RBC # BLD: 3.9 M/UL — SIGNIFICANT CHANGE UP (ref 3.8–5.2)
RBC # FLD: 15.8 % — HIGH (ref 10.3–14.5)
RBC # FLD: 15.9 % — HIGH (ref 10.3–14.5)
RBC CASTS # UR COMP ASSIST: 34 /HPF — HIGH (ref 0–4)
SAO2 % BLDV: 33.9 % — LOW (ref 67–88)
SAO2 % BLDV: 50.6 % — LOW (ref 67–88)
SODIUM SERPL-SCNC: 132 MMOL/L — LOW (ref 135–145)
SP GR SPEC: 1.02 — SIGNIFICANT CHANGE UP (ref 1.01–1.02)
UROBILINOGEN FLD QL: NEGATIVE — SIGNIFICANT CHANGE UP
WBC # BLD: 20.95 K/UL — HIGH (ref 3.8–10.5)
WBC # BLD: 22.59 K/UL — HIGH (ref 3.8–10.5)
WBC # FLD AUTO: 20.95 K/UL — HIGH (ref 3.8–10.5)
WBC # FLD AUTO: 22.59 K/UL — HIGH (ref 3.8–10.5)
WBC UR QL: 17 /HPF — HIGH (ref 0–5)

## 2022-03-02 PROCEDURE — 99232 SBSQ HOSP IP/OBS MODERATE 35: CPT | Mod: GC

## 2022-03-02 PROCEDURE — 99233 SBSQ HOSP IP/OBS HIGH 50: CPT | Mod: GC

## 2022-03-02 RX ORDER — POTASSIUM CHLORIDE 20 MEQ
40 PACKET (EA) ORAL ONCE
Refills: 0 | Status: COMPLETED | OUTPATIENT
Start: 2022-03-02 | End: 2022-03-02

## 2022-03-02 RX ORDER — CITRIC ACID/SODIUM CITRATE 300-500 MG
30 SOLUTION, ORAL ORAL
Refills: 0 | Status: DISCONTINUED | OUTPATIENT
Start: 2022-03-02 | End: 2022-03-06

## 2022-03-02 RX ORDER — RIVAROXABAN 15 MG-20MG
15 KIT ORAL DAILY
Refills: 0 | Status: DISCONTINUED | OUTPATIENT
Start: 2022-03-02 | End: 2022-03-04

## 2022-03-02 RX ORDER — FUROSEMIDE 40 MG
80 TABLET ORAL DAILY
Refills: 0 | Status: DISCONTINUED | OUTPATIENT
Start: 2022-03-02 | End: 2022-03-03

## 2022-03-02 RX ADMIN — Medication 26 UNIT(S): at 08:28

## 2022-03-02 RX ADMIN — POLYETHYLENE GLYCOL 3350 17 GRAM(S): 17 POWDER, FOR SOLUTION ORAL at 17:39

## 2022-03-02 RX ADMIN — Medication 650 MILLIGRAM(S): at 17:57

## 2022-03-02 RX ADMIN — Medication 100 MILLIGRAM(S): at 17:57

## 2022-03-02 RX ADMIN — RIVAROXABAN 15 MILLIGRAM(S): KIT at 23:03

## 2022-03-02 RX ADMIN — ATORVASTATIN CALCIUM 80 MILLIGRAM(S): 80 TABLET, FILM COATED ORAL at 23:00

## 2022-03-02 RX ADMIN — Medication 2: at 08:28

## 2022-03-02 RX ADMIN — SEVELAMER CARBONATE 800 MILLIGRAM(S): 2400 POWDER, FOR SUSPENSION ORAL at 23:00

## 2022-03-02 RX ADMIN — Medication 30 MILLILITER(S): at 08:05

## 2022-03-02 RX ADMIN — SEVELAMER CARBONATE 800 MILLIGRAM(S): 2400 POWDER, FOR SUSPENSION ORAL at 13:37

## 2022-03-02 RX ADMIN — Medication 40 MILLIEQUIVALENT(S): at 17:38

## 2022-03-02 RX ADMIN — AMPICILLIN SODIUM AND SULBACTAM SODIUM 200 GRAM(S): 250; 125 INJECTION, POWDER, FOR SUSPENSION INTRAMUSCULAR; INTRAVENOUS at 06:26

## 2022-03-02 RX ADMIN — Medication 40 MILLIGRAM(S): at 08:05

## 2022-03-02 RX ADMIN — Medication 80 MILLIGRAM(S): at 14:16

## 2022-03-02 RX ADMIN — Medication 1 APPLICATION(S): at 13:34

## 2022-03-02 RX ADMIN — Medication 2: at 13:34

## 2022-03-02 RX ADMIN — Medication 30 MILLILITER(S): at 17:44

## 2022-03-02 RX ADMIN — Medication 1 APPLICATION(S): at 06:27

## 2022-03-02 RX ADMIN — Medication 26 UNIT(S): at 13:34

## 2022-03-02 RX ADMIN — NYSTATIN CREAM 1 APPLICATION(S): 100000 CREAM TOPICAL at 06:35

## 2022-03-02 RX ADMIN — SEVELAMER CARBONATE 800 MILLIGRAM(S): 2400 POWDER, FOR SUSPENSION ORAL at 06:27

## 2022-03-02 RX ADMIN — SENNA PLUS 2 TABLET(S): 8.6 TABLET ORAL at 23:01

## 2022-03-02 RX ADMIN — Medication 81 MILLIGRAM(S): at 08:05

## 2022-03-02 RX ADMIN — AMPICILLIN SODIUM AND SULBACTAM SODIUM 200 GRAM(S): 250; 125 INJECTION, POWDER, FOR SUSPENSION INTRAMUSCULAR; INTRAVENOUS at 17:38

## 2022-03-02 RX ADMIN — NYSTATIN CREAM 1 APPLICATION(S): 100000 CREAM TOPICAL at 17:39

## 2022-03-02 RX ADMIN — Medication 1 APPLICATION(S): at 23:03

## 2022-03-02 RX ADMIN — Medication 100 MILLIGRAM(S): at 06:26

## 2022-03-02 RX ADMIN — Medication 26 UNIT(S): at 17:40

## 2022-03-02 RX ADMIN — INSULIN GLARGINE 28 UNIT(S): 100 INJECTION, SOLUTION SUBCUTANEOUS at 23:10

## 2022-03-02 RX ADMIN — Medication 1: at 17:40

## 2022-03-02 NOTE — PROGRESS NOTE ADULT - SUBJECTIVE AND OBJECTIVE BOX
INCOMPLETE NOTE    CHIEF COMPLAINT:    Interval Events: Patient seen and examined at bedside this AM. Reports had hallucinations and feeling of "jerkiness" throughout night. Reports poor sleep. Endorses BM this AM. Denies SOB, changes in urination. Yellow urine seen in dunn bag. VBG PM 3/1 demonstrating hypercapnia to 46. Has been off of oxycodone.     REVIEW OF SYSTEMS:  CONSTITUTIONAL: No weakness, fevers or chills  EYES/ENT: No visual changes;  No vertigo or throat pain   NECK: No pain or stiffness  RESPIRATORY: No cough, wheezing, hemoptysis; +shortness of breath  CARDIOVASCULAR: No chest pain or palpitations  GASTROINTESTINAL: No abdominal or epigastric pain. No nausea, vomiting No diarrhea or constipation.  GENITOURINARY: No dysuria, frequency or hematuria  NEUROLOGICAL: No numbness or weakness  SKIN: No itching, burning, rashes, or lesions   All other review of systems is negative unless indicated above.    OBJECTIVE:  ICU Vital Signs Last 24 Hrs  T(C): 36.6 (02 Mar 2022 08:08), Max: 37.1 (01 Mar 2022 21:07)  T(F): 97.8 (02 Mar 2022 08:08), Max: 98.7 (01 Mar 2022 21:07)  HR: 81 (02 Mar 2022 08:08) (59 - 81)  BP: 102/56 (02 Mar 2022 08:08) (102/56 - 133/64)  BP(mean): --  ABP: --  ABP(mean): --  RR: 18 (02 Mar 2022 08:08) (18 - 18)  SpO2: 93% (02 Mar 2022 08:08) (93% - 100%)        03-01 @ 07:01  -  03-02 @ 07:00  --------------------------------------------------------  IN: 960 mL / OUT: 1550 mL / NET: -590 mL      CAPILLARY BLOOD GLUCOSE      POCT Blood Glucose.: 211 mg/dL (02 Mar 2022 08:17)      PHYSICAL EXAM:  General: WN/WD NAD, seen comfortably sitting in bed.  Neurology: A&Ox3, nonfocal, PEARCE x 4  Eyes: PERRLA/ EOMI, Gross vision intact  ENT/Neck: Neck supple, trachea midline, No JVD, Gross hearing intact  Respiratory: CTA B/L in anterior lung fields, No wheezing, rales, rhonchi  CV: RRR, +S1/S2, -S3/S4, no murmurs, rubs or gallops  Abdominal: Soft, NT, ND  MSK: No asterixis in hand or elbow.  Extremities: 1+ edema, 2+ peripheral pulses  Skin: No Rashes, Hematoma, Ecchymosis  Incisions:   Tubes: Dunn tube draining yellow urine.     HOSPITAL MEDICATIONS:  MEDICATIONS  (STANDING):  ampicillin/sulbactam  IVPB 3 Gram(s) IV Intermittent every 12 hours  aspirin enteric coated 81 milliGRAM(s) Oral daily  atorvastatin 80 milliGRAM(s) Oral at bedtime  citric acid/sodium citrate Solution 30 milliLiter(s) Oral two times a day  Dakins Solution - 1/4 Strength 1 Application(s) Topical every 8 hours  dextrose 40% Gel 15 Gram(s) Oral once  dextrose 5%. 1000 milliLiter(s) (50 mL/Hr) IV Continuous <Continuous>  dextrose 5%. 1000 milliLiter(s) (100 mL/Hr) IV Continuous <Continuous>  dextrose 50% Injectable 25 Gram(s) IV Push once  dextrose 50% Injectable 12.5 Gram(s) IV Push once  dextrose 50% Injectable 25 Gram(s) IV Push once  FLUoxetine 40 milliGRAM(s) Oral daily  glucagon  Injectable 1 milliGRAM(s) IntraMuscular once  insulin glargine Injectable (LANTUS) 28 Unit(s) SubCutaneous at bedtime  insulin lispro (ADMELOG) corrective regimen sliding scale   SubCutaneous three times a day before meals  insulin lispro (ADMELOG) corrective regimen sliding scale   SubCutaneous at bedtime  insulin lispro Injectable (ADMELOG) 26 Unit(s) SubCutaneous three times a day with meals  metoprolol tartrate 100 milliGRAM(s) Oral two times a day  nystatin Powder 1 Application(s) Topical two times a day  polyethylene glycol 3350 17 Gram(s) Oral two times a day  senna 2 Tablet(s) Oral at bedtime  sevelamer carbonate 800 milliGRAM(s) Oral every 8 hours  sodium chloride 0.9%. 1000 milliLiter(s) (60 mL/Hr) IV Continuous <Continuous>    MEDICATIONS  (PRN):  acetaminophen     Tablet .. 650 milliGRAM(s) Oral every 6 hours PRN Mild Pain (1 - 3), Moderate Pain (4 - 6)  bisacodyl Suppository 10 milliGRAM(s) Rectal daily PRN Constipation  sodium chloride 0.65% Nasal 1 Spray(s) Both Nostrils every 6 hours PRN Nasal Congestion      LABS:                        10.5   17.05 )-----------( 263      ( 01 Mar 2022 16:20 )             34.5     Hgb Trend: 10.5<--, 9.0<--, 9.1<--, 9.3<--, 7.7<--  03-01    129<L>  |  91<L>  |  93<H>  ----------------------------<  177<H>  4.1   |  20<L>  |  4.29<H>    Ca    9.2      01 Mar 2022 22:53  Phos  6.9     03-01  Mg     2.7     03-01    TPro  7.2  /  Alb  3.5  /  TBili  0.6  /  DBili  x   /  AST  17  /  ALT  14  /  AlkPhos  165<H>  03-01    Creatinine Trend: 4.29<--, 4.52<--, 4.67<--, 3.30<--, 2.13<--, 0.92<--        Venous Blood Gas:  03-01 @ 16:20  7.31/46/45/23/74.1  VBG Lactate: 1.7       CHIEF COMPLAINT:  Interval Events: Patient seen and examined at bedside this AM. Reports had hallucinations and feeling of "jerkiness" throughout night. Reports poor sleep. Endorses BM this AM. Denies SOB, changes in urination. Yellow urine seen in dunn bag. VBG PM 3/1 demonstrating hypercapnia to 46. Has been off of oxycodone.     REVIEW OF SYSTEMS:  CONSTITUTIONAL: No weakness, fevers or chills  EYES/ENT: No visual changes;  No vertigo or throat pain   NECK: No pain or stiffness  RESPIRATORY: No cough, wheezing, hemoptysis; +shortness of breath  CARDIOVASCULAR: No chest pain or palpitations  GASTROINTESTINAL: No abdominal or epigastric pain. No nausea, vomiting No diarrhea or constipation.  GENITOURINARY: No dysuria, frequency or hematuria  NEUROLOGICAL: No numbness or weakness  SKIN: No itching, burning, rashes, or lesions   All other review of systems is negative unless indicated above.    OBJECTIVE:  ICU Vital Signs Last 24 Hrs  T(C): 36.6 (02 Mar 2022 08:08), Max: 37.1 (01 Mar 2022 21:07)  T(F): 97.8 (02 Mar 2022 08:08), Max: 98.7 (01 Mar 2022 21:07)  HR: 81 (02 Mar 2022 08:08) (59 - 81)  BP: 102/56 (02 Mar 2022 08:08) (102/56 - 133/64)  BP(mean): --  ABP: --  ABP(mean): --  RR: 18 (02 Mar 2022 08:08) (18 - 18)  SpO2: 93% (02 Mar 2022 08:08) (93% - 100%)        03-01 @ 07:01  -  03-02 @ 07:00  --------------------------------------------------------  IN: 960 mL / OUT: 1550 mL / NET: -590 mL      CAPILLARY BLOOD GLUCOSE      POCT Blood Glucose.: 211 mg/dL (02 Mar 2022 08:17)      PHYSICAL EXAM:  General: WN/WD NAD, seen comfortably sitting in bed.  Neurology: A&Ox3, nonfocal, PEARCE x 4  Eyes: PERRLA/ EOMI, Gross vision intact  ENT/Neck: Neck supple, trachea midline, No JVD, Gross hearing intact  Respiratory: CTA B/L in anterior lung fields, No wheezing, rales, rhonchi  CV: RRR, +S1/S2, -S3/S4, no murmurs, rubs or gallops  Abdominal: Soft, NT, ND  MSK: No asterixis in hand or elbow.  Extremities: 1+ edema, 2+ peripheral pulses  Skin: No Rashes, Hematoma, Ecchymosis  Incisions:   Tubes: Dunn tube draining yellow urine.     HOSPITAL MEDICATIONS:  MEDICATIONS  (STANDING):  ampicillin/sulbactam  IVPB 3 Gram(s) IV Intermittent every 12 hours  aspirin enteric coated 81 milliGRAM(s) Oral daily  atorvastatin 80 milliGRAM(s) Oral at bedtime  citric acid/sodium citrate Solution 30 milliLiter(s) Oral two times a day  Dakins Solution - 1/4 Strength 1 Application(s) Topical every 8 hours  dextrose 40% Gel 15 Gram(s) Oral once  dextrose 5%. 1000 milliLiter(s) (50 mL/Hr) IV Continuous <Continuous>  dextrose 5%. 1000 milliLiter(s) (100 mL/Hr) IV Continuous <Continuous>  dextrose 50% Injectable 25 Gram(s) IV Push once  dextrose 50% Injectable 12.5 Gram(s) IV Push once  dextrose 50% Injectable 25 Gram(s) IV Push once  FLUoxetine 40 milliGRAM(s) Oral daily  glucagon  Injectable 1 milliGRAM(s) IntraMuscular once  insulin glargine Injectable (LANTUS) 28 Unit(s) SubCutaneous at bedtime  insulin lispro (ADMELOG) corrective regimen sliding scale   SubCutaneous three times a day before meals  insulin lispro (ADMELOG) corrective regimen sliding scale   SubCutaneous at bedtime  insulin lispro Injectable (ADMELOG) 26 Unit(s) SubCutaneous three times a day with meals  metoprolol tartrate 100 milliGRAM(s) Oral two times a day  nystatin Powder 1 Application(s) Topical two times a day  polyethylene glycol 3350 17 Gram(s) Oral two times a day  senna 2 Tablet(s) Oral at bedtime  sevelamer carbonate 800 milliGRAM(s) Oral every 8 hours  sodium chloride 0.9%. 1000 milliLiter(s) (60 mL/Hr) IV Continuous <Continuous>    MEDICATIONS  (PRN):  acetaminophen     Tablet .. 650 milliGRAM(s) Oral every 6 hours PRN Mild Pain (1 - 3), Moderate Pain (4 - 6)  bisacodyl Suppository 10 milliGRAM(s) Rectal daily PRN Constipation  sodium chloride 0.65% Nasal 1 Spray(s) Both Nostrils every 6 hours PRN Nasal Congestion      LABS:                        10.5   17.05 )-----------( 263      ( 01 Mar 2022 16:20 )             34.5     Hgb Trend: 10.5<--, 9.0<--, 9.1<--, 9.3<--, 7.7<--  03-01    129<L>  |  91<L>  |  93<H>  ----------------------------<  177<H>  4.1   |  20<L>  |  4.29<H>    Ca    9.2      01 Mar 2022 22:53  Phos  6.9     03-01  Mg     2.7     03-01    TPro  7.2  /  Alb  3.5  /  TBili  0.6  /  DBili  x   /  AST  17  /  ALT  14  /  AlkPhos  165<H>  03-01    Creatinine Trend: 4.29<--, 4.52<--, 4.67<--, 3.30<--, 2.13<--, 0.92<--        Venous Blood Gas:  03-01 @ 16:20  7.31/46/45/23/74.1  VBG Lactate: 1.7

## 2022-03-02 NOTE — PROGRESS NOTE ADULT - PROBLEM SELECTOR PLAN 6
Positive UA  - UCx negative   - asx at this time  - on abx for sacral wound/cellulitis as above On metoprolol tartate 100mg bid and Xarelto  - HR <110 at this time   - continue metoprolol tartate 100mg bid  - discussed with pharmacy, can restart Xarelto with Crcl on 3/2

## 2022-03-02 NOTE — PROGRESS NOTE ADULT - PROBLEM SELECTOR PLAN 3
EF previously 75% and hyperdynamic LV on prior ECHO. On admission with pulm vasc congestion and possible hypervolemic state, concerning for decompensated HF  - BNP up trended from 7k to 15k then 19k   - HF consulted as volume status is challenging to assess in s/o morbid obesity. Cr continued to rise despite lasix challenge. TTE obtained this admission without significant change when compared to prior. Still mod pulm HTN, mod MR, grade II diastolic dysfunction.   - Trial of lasix 80 IV push x 1  - Monitor I/Os strictly  - Daily weights   - Mg>2 K>4 EF previously 75% and hyperdynamic LV on prior ECHO. On admission with pulm vasc congestion and possible hypervolemic state, concerning for decompensated HF  - BNP up trended from 7k to 15k then 19k   - HF consulted as volume status is challenging to assess in s/o morbid obesity. Cr continued to rise despite lasix challenge. TTE obtained this admission without significant change when compared to prior. Still mod pulm HTN, mod MR, grade II diastolic dysfunction.   - BNP down trending  - Trial of lasix 80 IV push as above  - Monitor I/Os strictly  - Daily weights   - Mg>2 K>4

## 2022-03-02 NOTE — PROGRESS NOTE ADULT - PROBLEM SELECTOR PLAN 4
Fever, leukocytosis, hypotension on admission. Sepsis 2/2 likely sacral wound/cellulitis  - s/p bedside debridement in ED   - BCx 2/26 NGTD  - Wound Cx growing E. avium and strep anginosus   - Zosyn (2/27- 3/1 )   - CHANGE to Unasyn 3/1 given culture sensitivities and prevent renal injury   - MRSA pcr negative   - daily packing changes. To be done by surgical team  - wound care deferring to surgery team

## 2022-03-02 NOTE — PROGRESS NOTE ADULT - ASSESSMENT
67 yo F, PMHx AS s/p TAVR 2015 and 2021, HOCM, HFpEF, pAFib on Xarelto, JOSR (does not tolerate CPAP, on 2L O2 at home qhs), anxiety, morbid obesity, DM, HTN, asthma (on albuterol), pw sepsis, n/v, likely 2/2 sacral decubitus ulcer/necrotic wound on left ischium with associated cellulitis. Also found to be in acute on chronic hypoxic respiratory failure on admission, with pulmonary vascular congestion on imaging concerning for decompensated HF. Course c/b worsening HEATHER and likely uremic encephalopathy.

## 2022-03-02 NOTE — PROGRESS NOTE ADULT - PROBLEM SELECTOR PLAN 9
on Humalog 30 TID and Tresiba 30 bedtime   - A1c 8.6%  - Increase to Insulin 26 pre-meal and 28 long-acting bedtime  - check FSG qAC and qhs  - adjust insulin as needed DVT ppx: as above    Diet: DASH/CC  PT consulted

## 2022-03-02 NOTE — PROGRESS NOTE ADULT - ATTENDING COMMENTS
65 yo F, PMHx AS s/p TAVR 2015 and 2021, HOCM, pAFib on Xarelto, diastolic and valvular CHF, JOSR (does not tolerate CPAP, on 2L O2 at home qhs), anxiety, morbid obesity, DM with neuropathy, HTN, asthma (on albuterol), pw sepsis, n/v, likely 2/2 sacral decubitus ulcer/necrotic wound on left ischium with associated cellulitis.  Patient was seen by the surgery team s/p debridement ,wound with growth of enterococcus avium and strep angisnosus with neg blood CX so far.   SHe was seen by the MICU team due to hypotension which has resolved and was not considered a MICU candidate.  Pt was Cefepime, Vanco , metronidazole which were changed to Zosyn and now on Unasyn.  Patient was noted to have HEATHER with worsening Cr which did not improve with IV Lasix as Acute on chronic Heart failure was tought to be a contributing factor.  Her HEATHER is mulifactorial in nature with Sepsis ( fever , high WBC), IV contrast, Hypotension, acute on chronic heart failure and medications ( Vanco, ZOsyn), so Nephrology and HF consults were requested and rec are appreciated .  Patient received Lasix IV with good urine output and creatinine has improved.    The Zosyn was changed to unasyn.   on 3/2 , she was seen sitting and was more awake and has decreased hand tremor, was able to answer some questions appropriately.     Pt will receive another dose of IV lasix and will monitor urine output.  cont to monitor mentation , WBC , hemodynamics and creatinine.  As reported by the nurse, pt was found on the floor last night with no reported injury and she offered no complaints to me.  cont to closely monitor   -FULL CODE           Milly Michaels   Hospitalist  [phone] .

## 2022-03-02 NOTE — PROGRESS NOTE ADULT - PROBLEM SELECTOR PLAN 5
At home on 2L nasal cannula as needed. On admission hypoxic to 87 % , temporarily required BiPAP. CXR with pulm vascular congestion  - Likely multifactorial in s/o decompensated HF (BNP 15k), and chronically poor respiratory mechanism in s/o JOSR OHS   - TTE in Sept 2021 showed hyperdynamic LV systolic function, EF 75%, and mod pulm HTN  - Low suspicion for PE (on home xarelto for a.fib)  - HR <110, unlikely dysrhythmia driven   - f/u repeat TTE   - ICS  - Trial of IV lasix as above At home on 2L nasal cannula as needed. On admission hypoxic to 87 % , temporarily required BiPAP. CXR with pulm vascular congestion  - Likely multifactorial in s/o decompensated HF (BNP 15k), and chronically poor respiratory mechanism in s/o JOSR OHS   - TTE in Sept 2021 showed hyperdynamic LV systolic function, EF 75%, and mod pulm HTN  - Low suspicion for PE (on home xarelto for a.fib)  - HR <110, unlikely dysrhythmia driven   - Repeat TTE as above   - ICS  - Trial of IV lasix as above

## 2022-03-02 NOTE — PROGRESS NOTE ADULT - ASSESSMENT
Incomplete note to be discussed with attending.    65 yo F, PMHx AS s/p TAVR 2015 and 2021, HOCM, HFpEF, pAFib on Xarelto, JOSR (does not tolerate CPAP, on 2L O2 at home qhs), anxiety, morbid obesity, DM, HTN, asthma (on albuterol), pw sepsis, n/v, likely 2/2 sacral decubitus ulcer/necrotic wound on left ischium with associated cellulitis s/p drainage. Nephrology consulted for worsening HEATHER.   Asterixis improved i/s/o oxycodone d/c. Patient appearing less confused and with quicker response times, however still reporting hallucinations and "jerkiness" of muscles. Creatinine mildly improved 4.67 --> 4.29 and continues to produce urine    [ ]   [ ] 67 yo F, PMHx AS s/p TAVR 2015 and 2021, HOCM, HFpEF, pAFib on Xarelto, JOSR (does not tolerate CPAP, on 2L O2 at home qhs), anxiety, morbid obesity, DM, HTN, asthma (on albuterol), pw sepsis, n/v, likely 2/2 sacral decubitus ulcer/necrotic wound on left ischium with associated cellulitis s/p drainage. Nephrology consulted for worsening HEATHER.   Asterixis improved i/s/o oxycodone d/c. Patient appearing less confused and with quicker response times, however still reporting hallucinations and "jerkiness" of muscles. Creatinine mildly improved 4.67 --> 4.29 --> 3.32  and continues to produce urine.    [ ] Given patient is improving and creatinine is down-trending there is NTD 65 yo F, PMHx AS s/p TAVR 2015 and 2021, HOCM, HFpEF, pAFib on Xarelto, JOSR (does not tolerate CPAP, on 2L O2 at home qhs), anxiety, morbid obesity, DM, HTN, asthma (on albuterol), pw sepsis, n/v, likely 2/2 sacral decubitus ulcer/necrotic wound on left ischium with associated cellulitis s/p drainage. Nephrology consulted for worsening HEATHER.   Asterixis improved i/s/o oxycodone d/c. Patient appearing less confused and with quicker response times, however still reporting hallucinations and "jerkiness" of muscles. Creatinine mildly improved 4.67 --> 4.29 --> 3.32  and continues to produce urine.    [ ] Given patient is improving and creatinine is down-trending there is no indication for dialysis.

## 2022-03-02 NOTE — PROGRESS NOTE ADULT - PROBLEM SELECTOR PLAN 8
On metoprolol tartate 100mg bid and Xarelto  - HR <110 at this time   - continue metoprolol tartate 100mg bid  - hold AC in case of urgent HD on Losartan 25 and Lopressor   - c/w lopressor  - hold Losartan given hypotension from sepsis & HEATHER

## 2022-03-02 NOTE — PROGRESS NOTE ADULT - ASSESSMENT
66F with PMH of severe AS s/p TAVR'15 (Ramon 23mm) and Suzy TAVR in 2021, HCM (no SHAILESH), pAF on Xarelto, JOSR (does not tolerate CPAP, on home 2L O2), MO s/p gastric band c/b gastric perforation, and below the knee DVT/PE who p/w sepsis 2/2 sacral ulcer requiring debridement in the ED. She was treated with IV antibiotic and Bcx has been negative thus far. HF team consulted for fluid status management.

## 2022-03-02 NOTE — PROGRESS NOTE ADULT - PROBLEM SELECTOR PLAN 2
Cr normal on admission, now increasing to 4.67  DDx: contrast induced / vanco both exposures occurred on 2/26. vs cardiorenal given elevated BNP vs less likely post obstructive   - Urine lytes suggestive of pre-renal etiology  - Vanco level 16 on 3/1  - Bladder scan (inaccurate given habitus). Watts placed 3/1/22   - Suboptimal response to lasix 40mg IV on 2/28  - Trial of lasix 80mg IV 3/1 to eval for cardiorenal etiology, as per HF. Appreciate recs   - Avoid nephrotoxic agents  - Strict I/Os  - Nephro following, monitor closely for need of urgent HD Cr normal on admission, now increasing to 4.67  DDx: contrast induced / vanco both exposures occurred on 2/26. vs cardiorenal given elevated BNP vs less likely post obstructive   - Urine lytes suggestive of pre-renal etiology  - Vanco level 16 on 3/1  - Bladder scan (inaccurate given habitus). Watts placed 3/1/22   - Trial of lasix 80mg IV 3/1 with improvement in renal function   - Will continue to give lasix 80mv IV x 1 and monitor Cr I/Os closely   - Avoid nephrotoxic agents  - Strict I/Os  - Nephro following, appreciate recs

## 2022-03-02 NOTE — PROGRESS NOTE ADULT - PROBLEM SELECTOR PLAN 1
Neuro exam with asterixis today, coinciding with elevation in BUN. Likely 2/2 uremia.   DDx to include hypercarbia given JOSR, as well as oxycodone induced (sister reports hallucination with oxy)     - VBG without significant hypercarbia  - Nephro consulted, appreciate recs. Monitor for need for urgent HD. Xarelto held (last dose 3/1/22)  - Monitor renal function as below   - Non focal exam. no need for CT head at this time. likely metabolic in nature Neuro exam with asterixis coinciding with elevation in BUN. Likely 2/2 uremia.   DDx to include hypercarbia given JOSR, as well as oxycodone induced (sister reports hallucination with oxy)   - Improving s/p lasix and BUN Cr down trending. Also s/p d/c of oxycodone  - VBG without significant hypercarbia  - Nephro consulted, appreciate recs. Monitor for need for urgent HD. Xarelto held (last dose 3/1/22)  - Monitor renal function as below   - Non focal exam. no need for CT head at this time. likely metabolic in nature  - Continue to monitor for mental status uremia

## 2022-03-02 NOTE — PROGRESS NOTE ADULT - PROBLEM SELECTOR PLAN 10
on Losartan 25 and Lopressor   - c/w lopressor  - hold Losartan given hypotension from sepsis & HEATHER - reported discussion between pt and surgical team was that pt wanted DNI.   - on further discussion with pt, pt states she requests to be full code, but only wants to be intubated if she is completely sedated.  - pt requests all medical therapy as necessary and indicated  - time spent 20 min

## 2022-03-02 NOTE — PROGRESS NOTE ADULT - PROBLEM SELECTOR PLAN 7
Likely 2/2 sepsis  - lactate improving  - will continue to trend  - hold off additional IVF for now given hypoxia and possible CHF on Humalog 30 TID and Tresiba 30 bedtime   - A1c 8.6%  - Increase to Insulin 26 pre-meal and 28 long-acting bedtime  - check FSG qAC and qhs  - adjust insulin as needed

## 2022-03-02 NOTE — PROGRESS NOTE ADULT - PROBLEM SELECTOR PLAN 1
-Cr improving with IV diuresis and uremic symptoms are improving   -would dose another dose of IV Lasix 80 mg   -AC continued given pt is less likely needing dialysis   -appreciate nephrology input   -HEATHER etiologies are multifactorial: vanc, YING, HF..etc.

## 2022-03-02 NOTE — PROGRESS NOTE ADULT - ATTENDING COMMENTS
Briefly, 66F with h/o severe AS s/p TAVR'15 (Ramon 23mm) and Suzy TAVR in 2021, pAF on Xarelto, JOSR (does not tolerate CPAP, on home 2L O2), morbid obesity s/p gastric band c/b gastric perforation, and DVT/PE who p/w sepsis on 2/26 2/2 sacral ulcer requiring debridement in the ED. She was treated with IV antibiotic and Bcx has been negative thus far. Since admission, had worsening renal function (of note, baseline Cr 0.92 which increased to 4.6; possibly related to CT abdom/pelvis w/ contrast). BNP 19k (up from prior). Received IV lasix with good response. Feels overall better - less shaky. On exam, morbidly obese, JVP approx 14 cm with HJR, crackles b/l, RRR, no m/r/g, nontender abdomen, trace edema b/l.   - would give lasix 80 mg IV daily; renal function improving  - c/w lopressor 100 mg q12  - c/w abx

## 2022-03-02 NOTE — PROGRESS NOTE ADULT - ATTENDING COMMENTS
The patient was seen and examined with the medical student. Sitting in the chair. Mental status much improved, no hallucinations. Good urine output and downtrending serum creatinine.  VBG no hypercapnia. The metabolic encephalopathy was mostly related to opiods.   Seems to be recovering renal function. No indication for dialysis intervention. Will follow.

## 2022-03-02 NOTE — PROGRESS NOTE ADULT - PROBLEM SELECTOR PLAN 4
-2/2 AS and HCM   -TTE 2/28 IVC 2.7 cm w/o respiratory variations, consistent with elevated filling pressures   -would do diuretic challenge as above

## 2022-03-03 LAB
ALBUMIN SERPL ELPH-MCNC: 3.4 G/DL — SIGNIFICANT CHANGE UP (ref 3.3–5)
ALP SERPL-CCNC: 202 U/L — HIGH (ref 40–120)
ALT FLD-CCNC: 15 U/L — SIGNIFICANT CHANGE UP (ref 10–45)
ANION GAP SERPL CALC-SCNC: 17 MMOL/L — SIGNIFICANT CHANGE UP (ref 5–17)
ANION GAP SERPL CALC-SCNC: 20 MMOL/L — HIGH (ref 5–17)
AST SERPL-CCNC: 22 U/L — SIGNIFICANT CHANGE UP (ref 10–40)
BASE EXCESS BLDV CALC-SCNC: 4.7 MMOL/L — HIGH (ref -2–2)
BASOPHILS # BLD AUTO: 0.03 K/UL — SIGNIFICANT CHANGE UP (ref 0–0.2)
BASOPHILS NFR BLD AUTO: 0.2 % — SIGNIFICANT CHANGE UP (ref 0–2)
BILIRUB SERPL-MCNC: 0.5 MG/DL — SIGNIFICANT CHANGE UP (ref 0.2–1.2)
BUN SERPL-MCNC: 76 MG/DL — HIGH (ref 7–23)
BUN SERPL-MCNC: 82 MG/DL — HIGH (ref 7–23)
CA-I SERPL-SCNC: 1.18 MMOL/L — SIGNIFICANT CHANGE UP (ref 1.15–1.33)
CALCIUM SERPL-MCNC: 9.6 MG/DL — SIGNIFICANT CHANGE UP (ref 8.4–10.5)
CALCIUM SERPL-MCNC: 9.7 MG/DL — SIGNIFICANT CHANGE UP (ref 8.4–10.5)
CHLORIDE BLDV-SCNC: 100 MMOL/L — SIGNIFICANT CHANGE UP (ref 96–108)
CHLORIDE SERPL-SCNC: 96 MMOL/L — SIGNIFICANT CHANGE UP (ref 96–108)
CHLORIDE SERPL-SCNC: 96 MMOL/L — SIGNIFICANT CHANGE UP (ref 96–108)
CO2 BLDV-SCNC: 31 MMOL/L — HIGH (ref 22–26)
CO2 SERPL-SCNC: 22 MMOL/L — SIGNIFICANT CHANGE UP (ref 22–31)
CO2 SERPL-SCNC: 24 MMOL/L — SIGNIFICANT CHANGE UP (ref 22–31)
CREAT SERPL-MCNC: 2.22 MG/DL — HIGH (ref 0.5–1.3)
CREAT SERPL-MCNC: 2.54 MG/DL — HIGH (ref 0.5–1.3)
CULTURE RESULTS: SIGNIFICANT CHANGE UP
CULTURE RESULTS: SIGNIFICANT CHANGE UP
EGFR: 20 ML/MIN/1.73M2 — LOW
EGFR: 24 ML/MIN/1.73M2 — LOW
EOSINOPHIL # BLD AUTO: 0.09 K/UL — SIGNIFICANT CHANGE UP (ref 0–0.5)
EOSINOPHIL NFR BLD AUTO: 0.5 % — SIGNIFICANT CHANGE UP (ref 0–6)
GAS PNL BLDV: 135 MMOL/L — LOW (ref 136–145)
GAS PNL BLDV: SIGNIFICANT CHANGE UP
GAS PNL BLDV: SIGNIFICANT CHANGE UP
GLUCOSE BLDV-MCNC: 168 MG/DL — HIGH (ref 70–99)
GLUCOSE SERPL-MCNC: 100 MG/DL — HIGH (ref 70–99)
GLUCOSE SERPL-MCNC: 113 MG/DL — HIGH (ref 70–99)
HCO3 BLDV-SCNC: 30 MMOL/L — HIGH (ref 22–29)
HCT VFR BLD CALC: 34.8 % — SIGNIFICANT CHANGE UP (ref 34.5–45)
HCT VFR BLDA CALC: 29 % — LOW (ref 34.5–46.5)
HGB BLD CALC-MCNC: 9.7 G/DL — LOW (ref 11.7–16.1)
HGB BLD-MCNC: 10.3 G/DL — LOW (ref 11.5–15.5)
IMM GRANULOCYTES NFR BLD AUTO: 1.1 % — SIGNIFICANT CHANGE UP (ref 0–1.5)
LACTATE BLDV-MCNC: 1.4 MMOL/L — SIGNIFICANT CHANGE UP (ref 0.7–2)
LYMPHOCYTES # BLD AUTO: 0.96 K/UL — LOW (ref 1–3.3)
LYMPHOCYTES # BLD AUTO: 4.8 % — LOW (ref 13–44)
MAGNESIUM SERPL-MCNC: 2.9 MG/DL — HIGH (ref 1.6–2.6)
MAGNESIUM SERPL-MCNC: 3 MG/DL — HIGH (ref 1.6–2.6)
MCHC RBC-ENTMCNC: 24.6 PG — LOW (ref 27–34)
MCHC RBC-ENTMCNC: 29.6 GM/DL — LOW (ref 32–36)
MCV RBC AUTO: 83.1 FL — SIGNIFICANT CHANGE UP (ref 80–100)
MONOCYTES # BLD AUTO: 1.2 K/UL — HIGH (ref 0–0.9)
MONOCYTES NFR BLD AUTO: 6 % — SIGNIFICANT CHANGE UP (ref 2–14)
NEUTROPHILS # BLD AUTO: 17.45 K/UL — HIGH (ref 1.8–7.4)
NEUTROPHILS NFR BLD AUTO: 87.4 % — HIGH (ref 43–77)
NRBC # BLD: 0 /100 WBCS — SIGNIFICANT CHANGE UP (ref 0–0)
NT-PROBNP SERPL-SCNC: 6395 PG/ML — HIGH (ref 0–300)
PCO2 BLDV: 46 MMHG — HIGH (ref 39–42)
PH BLDV: 7.42 — SIGNIFICANT CHANGE UP (ref 7.32–7.43)
PHOSPHATE SERPL-MCNC: 4.4 MG/DL — SIGNIFICANT CHANGE UP (ref 2.5–4.5)
PLATELET # BLD AUTO: 345 K/UL — SIGNIFICANT CHANGE UP (ref 150–400)
PO2 BLDV: 57 MMHG — HIGH (ref 25–45)
POTASSIUM BLDV-SCNC: 3.8 MMOL/L — SIGNIFICANT CHANGE UP (ref 3.5–5.1)
POTASSIUM SERPL-MCNC: 3.5 MMOL/L — SIGNIFICANT CHANGE UP (ref 3.5–5.3)
POTASSIUM SERPL-MCNC: 4.1 MMOL/L — SIGNIFICANT CHANGE UP (ref 3.5–5.3)
POTASSIUM SERPL-SCNC: 3.5 MMOL/L — SIGNIFICANT CHANGE UP (ref 3.5–5.3)
POTASSIUM SERPL-SCNC: 4.1 MMOL/L — SIGNIFICANT CHANGE UP (ref 3.5–5.3)
PROT SERPL-MCNC: 7.7 G/DL — SIGNIFICANT CHANGE UP (ref 6–8.3)
RBC # BLD: 4.19 M/UL — SIGNIFICANT CHANGE UP (ref 3.8–5.2)
RBC # FLD: 15.9 % — HIGH (ref 10.3–14.5)
SAO2 % BLDV: 90.2 % — HIGH (ref 67–88)
SODIUM SERPL-SCNC: 137 MMOL/L — SIGNIFICANT CHANGE UP (ref 135–145)
SODIUM SERPL-SCNC: 138 MMOL/L — SIGNIFICANT CHANGE UP (ref 135–145)
SPECIMEN SOURCE: SIGNIFICANT CHANGE UP
SPECIMEN SOURCE: SIGNIFICANT CHANGE UP
WBC # BLD: 19.95 K/UL — HIGH (ref 3.8–10.5)
WBC # FLD AUTO: 19.95 K/UL — HIGH (ref 3.8–10.5)

## 2022-03-03 PROCEDURE — 99233 SBSQ HOSP IP/OBS HIGH 50: CPT | Mod: GC

## 2022-03-03 PROCEDURE — 99232 SBSQ HOSP IP/OBS MODERATE 35: CPT | Mod: GC

## 2022-03-03 PROCEDURE — 93010 ELECTROCARDIOGRAM REPORT: CPT

## 2022-03-03 RX ORDER — TRAMADOL HYDROCHLORIDE 50 MG/1
25 TABLET ORAL ONCE
Refills: 0 | Status: DISCONTINUED | OUTPATIENT
Start: 2022-03-03 | End: 2022-03-03

## 2022-03-03 RX ORDER — OLANZAPINE 15 MG/1
5 TABLET, FILM COATED ORAL ONCE
Refills: 0 | Status: COMPLETED | OUTPATIENT
Start: 2022-03-03 | End: 2022-03-03

## 2022-03-03 RX ORDER — POTASSIUM CHLORIDE 20 MEQ
10 PACKET (EA) ORAL
Refills: 0 | Status: COMPLETED | OUTPATIENT
Start: 2022-03-03 | End: 2022-03-03

## 2022-03-03 RX ORDER — OLANZAPINE 15 MG/1
5 TABLET, FILM COATED ORAL ONCE
Refills: 0 | Status: COMPLETED | OUTPATIENT
Start: 2022-03-03 | End: 2023-01-30

## 2022-03-03 RX ORDER — AMPICILLIN SODIUM AND SULBACTAM SODIUM 250; 125 MG/ML; MG/ML
3 INJECTION, POWDER, FOR SUSPENSION INTRAMUSCULAR; INTRAVENOUS EVERY 8 HOURS
Refills: 0 | Status: DISCONTINUED | OUTPATIENT
Start: 2022-03-03 | End: 2022-03-04

## 2022-03-03 RX ADMIN — NYSTATIN CREAM 1 APPLICATION(S): 100000 CREAM TOPICAL at 05:34

## 2022-03-03 RX ADMIN — NYSTATIN CREAM 1 APPLICATION(S): 100000 CREAM TOPICAL at 19:21

## 2022-03-03 RX ADMIN — TRAMADOL HYDROCHLORIDE 25 MILLIGRAM(S): 50 TABLET ORAL at 15:25

## 2022-03-03 RX ADMIN — Medication 26 UNIT(S): at 09:19

## 2022-03-03 RX ADMIN — Medication 80 MILLIGRAM(S): at 05:35

## 2022-03-03 RX ADMIN — Medication 26 UNIT(S): at 19:21

## 2022-03-03 RX ADMIN — Medication 1: at 13:56

## 2022-03-03 RX ADMIN — Medication 650 MILLIGRAM(S): at 05:56

## 2022-03-03 RX ADMIN — RIVAROXABAN 15 MILLIGRAM(S): KIT at 13:56

## 2022-03-03 RX ADMIN — SEVELAMER CARBONATE 800 MILLIGRAM(S): 2400 POWDER, FOR SUSPENSION ORAL at 13:56

## 2022-03-03 RX ADMIN — OLANZAPINE 5 MILLIGRAM(S): 15 TABLET, FILM COATED ORAL at 22:11

## 2022-03-03 RX ADMIN — Medication 40 MILLIGRAM(S): at 13:56

## 2022-03-03 RX ADMIN — TRAMADOL HYDROCHLORIDE 25 MILLIGRAM(S): 50 TABLET ORAL at 17:05

## 2022-03-03 RX ADMIN — Medication 100 MILLIEQUIVALENT(S): at 09:18

## 2022-03-03 RX ADMIN — Medication 1 APPLICATION(S): at 05:33

## 2022-03-03 RX ADMIN — Medication 1: at 09:18

## 2022-03-03 RX ADMIN — Medication 650 MILLIGRAM(S): at 22:00

## 2022-03-03 RX ADMIN — SEVELAMER CARBONATE 800 MILLIGRAM(S): 2400 POWDER, FOR SUSPENSION ORAL at 05:33

## 2022-03-03 RX ADMIN — AMPICILLIN SODIUM AND SULBACTAM SODIUM 200 GRAM(S): 250; 125 INJECTION, POWDER, FOR SUSPENSION INTRAMUSCULAR; INTRAVENOUS at 20:59

## 2022-03-03 RX ADMIN — Medication 81 MILLIGRAM(S): at 13:55

## 2022-03-03 RX ADMIN — Medication 100 MILLIGRAM(S): at 19:20

## 2022-03-03 RX ADMIN — Medication 650 MILLIGRAM(S): at 07:10

## 2022-03-03 RX ADMIN — Medication 1 APPLICATION(S): at 13:57

## 2022-03-03 RX ADMIN — Medication 30 MILLILITER(S): at 05:34

## 2022-03-03 RX ADMIN — POLYETHYLENE GLYCOL 3350 17 GRAM(S): 17 POWDER, FOR SOLUTION ORAL at 05:34

## 2022-03-03 RX ADMIN — AMPICILLIN SODIUM AND SULBACTAM SODIUM 200 GRAM(S): 250; 125 INJECTION, POWDER, FOR SUSPENSION INTRAMUSCULAR; INTRAVENOUS at 13:58

## 2022-03-03 RX ADMIN — Medication 100 MILLIEQUIVALENT(S): at 03:42

## 2022-03-03 RX ADMIN — Medication 100 MILLIGRAM(S): at 05:34

## 2022-03-03 RX ADMIN — SEVELAMER CARBONATE 800 MILLIGRAM(S): 2400 POWDER, FOR SUSPENSION ORAL at 21:03

## 2022-03-03 RX ADMIN — INSULIN GLARGINE 28 UNIT(S): 100 INJECTION, SOLUTION SUBCUTANEOUS at 21:16

## 2022-03-03 RX ADMIN — Medication 100 MILLIEQUIVALENT(S): at 04:51

## 2022-03-03 RX ADMIN — ATORVASTATIN CALCIUM 80 MILLIGRAM(S): 80 TABLET, FILM COATED ORAL at 21:04

## 2022-03-03 RX ADMIN — AMPICILLIN SODIUM AND SULBACTAM SODIUM 200 GRAM(S): 250; 125 INJECTION, POWDER, FOR SUSPENSION INTRAMUSCULAR; INTRAVENOUS at 05:30

## 2022-03-03 RX ADMIN — Medication 26 UNIT(S): at 13:57

## 2022-03-03 RX ADMIN — Medication 650 MILLIGRAM(S): at 21:00

## 2022-03-03 RX ADMIN — Medication 1 APPLICATION(S): at 21:02

## 2022-03-03 NOTE — PROGRESS NOTE ADULT - ASSESSMENT
65 yo F, PMHx AS s/p TAVR 2015 and 2021, HOCM, HFpEF, pAFib on Xarelto, JOSR (does not tolerate CPAP, on 2L O2 at home qhs), anxiety, morbid obesity, DM, HTN, asthma (on albuterol), pw sepsis, n/v, likely 2/2 sacral decubitus ulcer/necrotic wound on left ischium with associated cellulitis. Also found to be in acute on chronic hypoxic respiratory failure on admission, with pulmonary vascular congestion on imaging concerning for decompensated HF. Course c/b worsening HEATHER and likely uremic encephalopathy.    65 yo F, PMHx AS s/p TAVR 2015 and 2021, HOCM, HFpEF, pAFib on Xarelto, JOSR (does not tolerate CPAP, on 2L O2 at home qhs), anxiety, morbid obesity, DM, HTN, asthma (on albuterol), pw sepsis, n/v, likely 2/2 sacral decubitus ulcer/necrotic wound on left ischium with associated cellulitis. Also found to be in acute on chronic hypoxic respiratory failure on admission, with pulmonary vascular congestion on imaging concerning for decompensated HF. Course c/b HEATHER 2/2 cardiorenal in s/o ADHF and uremic encephalopathy now improving.

## 2022-03-03 NOTE — PROGRESS NOTE ADULT - PROBLEM SELECTOR PLAN 5
At home on 2L nasal cannula as needed. On admission hypoxic to 87 % , temporarily required BiPAP. CXR with pulm vascular congestion  - Likely multifactorial in s/o decompensated HF (BNP 15k), and chronically poor respiratory mechanism in s/o JOSR OHS   - TTE in Sept 2021 showed hyperdynamic LV systolic function, EF 75%, and mod pulm HTN  - Low suspicion for PE (on home xarelto for a.fib)  - HR <110, unlikely dysrhythmia driven   - Repeat TTE as above   - ICS  - Trial of IV lasix as above At home on 2L nasal cannula as needed. On admission hypoxic to 87 % , temporarily required BiPAP. CXR with pulm vascular congestion  - Likely multifactorial in s/o decompensated HF (BNP 15k), and chronically poor respiratory mechanism in s/o JOSR OHS   - TTE in Sept 2021 showed hyperdynamic LV systolic function, EF 75%, and mod pulm HTN  - Low suspicion for PE (on home xarelto for a.fib)  - HR <110, unlikely dysrhythmia driven   - Repeat TTE as above   - ICS  - Lasix as above  - Wean oxygen as tolerated

## 2022-03-03 NOTE — PROGRESS NOTE ADULT - PROBLEM SELECTOR PLAN 2
Cr normal on admission, now increasing to 4.67  DDx: contrast induced / vanco both exposures occurred on 2/26. vs cardiorenal given elevated BNP vs less likely post obstructive   - Urine lytes suggestive of pre-renal etiology  - Vanco level 16 on 3/1  - Bladder scan (inaccurate given habitus). Watts placed 3/1/22   - Trial of lasix 80mg IV 3/1 with improvement in renal function   - Will continue to give lasix 80mv IV x 1 and monitor Cr I/Os closely   - Avoid nephrotoxic agents  - Strict I/Os  - Nephro following, appreciate recs Cr normal on admission, peaked around 4.7 now down trending   Cardiorenal etiology most likely given below:  - Urine lytes suggestive of pre-renal etiology, hypervolemic on exam, BNP 19k   - Trial of lasix 80mg IV 3/1 with improvement in renal function   Plan:  - Lasix 80 IV x 1, c/w as per daily volume status assessment   - Avoid nephrotoxic agents  - Strict I/Os  - HF and nephrology following, appreciate recs

## 2022-03-03 NOTE — PROGRESS NOTE ADULT - PROBLEM SELECTOR PLAN 3
EF previously 75% and hyperdynamic LV on prior ECHO. On admission with pulm vasc congestion and possible hypervolemic state, concerning for decompensated HF  - BNP up trended from 7k to 15k then 19k   - HF consulted as volume status is challenging to assess in s/o morbid obesity. Cr continued to rise despite lasix challenge. TTE obtained this admission without significant change when compared to prior. Still mod pulm HTN, mod MR, grade II diastolic dysfunction.   - BNP down trending  - Trial of lasix 80 IV push as above  - Monitor I/Os strictly  - Daily weights   - Mg>2 K>4

## 2022-03-03 NOTE — PROGRESS NOTE ADULT - PROBLEM SELECTOR PLAN 4
Fever, leukocytosis, hypotension on admission. Sepsis 2/2 likely sacral wound/cellulitis  - s/p bedside debridement in ED   - BCx 2/26 NGTD  - Wound Cx growing E. avium and strep anginosus   - Zosyn (2/27- 3/1 )   - CHANGE to Unasyn 3/1 given culture sensitivities and prevent renal injury   - MRSA pcr negative   - daily packing changes. To be done by surgical team  - wound care deferring to surgery team Fever, leukocytosis, hypotension on admission. Sepsis 2/2 likely sacral wound/cellulitis  - s/p bedside debridement in ED   - BCx 2/26 NGTD  - Wound Cx growing E. avium and strep anginosus   - Zosyn (2/27- 3/1 )   - Unasyn 3/1 given culture sensitivities and prevent renal injury   - MRSA pcr negative   - daily packing changes  - Appreciate ID recs

## 2022-03-03 NOTE — PROGRESS NOTE ADULT - PROBLEM SELECTOR PLAN 7
on Humalog 30 TID and Tresiba 30 bedtime   - A1c 8.6%  - Increase to Insulin 26 pre-meal and 28 long-acting bedtime  - check FSG qAC and qhs  - adjust insulin as needed

## 2022-03-03 NOTE — PROGRESS NOTE ADULT - PROBLEM SELECTOR PLAN 1
Neuro exam with asterixis coinciding with elevation in BUN. Likely 2/2 uremia.   DDx to include hypercarbia given JOSR, as well as oxycodone induced (sister reports hallucination with oxy)   - Improving s/p lasix and BUN Cr down trending. Also s/p d/c of oxycodone  - VBG without significant hypercarbia  - Nephro consulted, appreciate recs. Monitor for need for urgent HD. Xarelto held (last dose 3/1/22)  - Monitor renal function as below   - Non focal exam. no need for CT head at this time. likely metabolic in nature  - Continue to monitor for mental status uremia IMPROVING     Neuro exam with asterixis during course, coinciding with elevation in BUN. Likely 2/2 uremia.   DDx to include hypercarbia given JOSR, as well as oxycodone induced (sister reports hallucination with oxy)   - Improving s/p lasix and BUN Cr down trending. Also s/p d/c of oxycodone  - VBG without significant hypercarbia  - Nephro consulted, appreciate recs. Monitor for need for urgent HD. Xarelto held (last dose 3/1/22)  - Monitor renal function as below   - Non focal exam. no need for CT head at this time. likely metabolic in nature  - Continue to monitor for mental status & uremia

## 2022-03-03 NOTE — PROGRESS NOTE ADULT - ATTENDING COMMENTS
67 yo F, PMHx AS s/p TAVR 2015 and 2021, HOCM, pAFib on Xarelto, diastolic and valvular CHF, JOSR (does not tolerate CPAP, on 2L O2 at home qhs), anxiety, morbid obesity, DM with neuropathy, HTN, asthma (on albuterol), pw sepsis, n/v, likely 2/2 sacral decubitus ulcer/necrotic wound on left ischium with associated cellulitis.  Patient was seen by the surgery team s/p debridement ,wound with growth of enterococcus avium and strep angisnosus with neg blood CX so far.   SHe was seen by the MICU team due to hypotension which has resolved and was not considered a MICU candidate.  Pt was Cefepime, Vanco , metronidazole which were changed to Zosyn and now on Unasyn.  Patient was noted to have HEATHER with worsening Cr which did not improve with IV Lasix as Acute on chronic Heart failure was tought to be a contributing factor.  Her HEATHER is mulifactorial in nature with Sepsis ( fever , high WBC), IV contrast, Hypotension, acute on chronic heart failure and medications ( Vanco, ZOsyn), so Nephrology and HF consults were requested and rec are appreciated .  Patient received Lasix IV with good urine output and creatinine has improved.    The Zosyn was changed to unasyn.   on 3/2 , she was seen sitting and was more awake and has decreased hand tremor, was able to answer some questions appropriately.  will cont IV Lasix and  will monitor urine output.  cont to monitor mentation , WBC , hemodynamics and creatinine.   .  cont to closely monitor   -FULL CODE           Milly Michaels   Hospitalist  [phone] .

## 2022-03-03 NOTE — PROGRESS NOTE ADULT - SUBJECTIVE AND OBJECTIVE BOX
PROGRESS NOTE:   Authored by Dr. Georgia Paz MD (PGY-2). Available on TEAMS.    Patient is a 66y old  Female who presents with a chief complaint of sepsis 2/2 wound infection (02 Mar 2022 15:52)      SUBJECTIVE / OVERNIGHT EVENTS:  No acute events overnight.     ADDITIONAL REVIEW OF SYSTEMS:  Patient denies fevers, chills, chest pain, shortness of breath, nausea, abdominal pain, diarrhea, constipation, dysuria, leg swelling, headache, light headedness.    MEDICATIONS  (STANDING):  ampicillin/sulbactam  IVPB 3 Gram(s) IV Intermittent every 12 hours  aspirin enteric coated 81 milliGRAM(s) Oral daily  atorvastatin 80 milliGRAM(s) Oral at bedtime  citric acid/sodium citrate Solution 30 milliLiter(s) Oral two times a day  Dakins Solution - 1/4 Strength 1 Application(s) Topical every 8 hours  dextrose 40% Gel 15 Gram(s) Oral once  dextrose 5%. 1000 milliLiter(s) (100 mL/Hr) IV Continuous <Continuous>  dextrose 5%. 1000 milliLiter(s) (50 mL/Hr) IV Continuous <Continuous>  dextrose 50% Injectable 25 Gram(s) IV Push once  dextrose 50% Injectable 12.5 Gram(s) IV Push once  dextrose 50% Injectable 25 Gram(s) IV Push once  FLUoxetine 40 milliGRAM(s) Oral daily  furosemide   Injectable 80 milliGRAM(s) IV Push daily  glucagon  Injectable 1 milliGRAM(s) IntraMuscular once  insulin glargine Injectable (LANTUS) 28 Unit(s) SubCutaneous at bedtime  insulin lispro (ADMELOG) corrective regimen sliding scale   SubCutaneous three times a day before meals  insulin lispro (ADMELOG) corrective regimen sliding scale   SubCutaneous at bedtime  insulin lispro Injectable (ADMELOG) 26 Unit(s) SubCutaneous three times a day with meals  metoprolol tartrate 100 milliGRAM(s) Oral two times a day  nystatin Powder 1 Application(s) Topical two times a day  polyethylene glycol 3350 17 Gram(s) Oral two times a day  potassium chloride  10 mEq/100 mL IVPB 10 milliEquivalent(s) IV Intermittent every 1 hour  rivaroxaban 15 milliGRAM(s) Oral daily  senna 2 Tablet(s) Oral at bedtime  sevelamer carbonate 800 milliGRAM(s) Oral every 8 hours  sodium chloride 0.9%. 1000 milliLiter(s) (60 mL/Hr) IV Continuous <Continuous>    MEDICATIONS  (PRN):  acetaminophen     Tablet .. 650 milliGRAM(s) Oral every 6 hours PRN Mild Pain (1 - 3), Moderate Pain (4 - 6)  bisacodyl Suppository 10 milliGRAM(s) Rectal daily PRN Constipation  sodium chloride 0.65% Nasal 1 Spray(s) Both Nostrils every 6 hours PRN Nasal Congestion      CAPILLARY BLOOD GLUCOSE      POCT Blood Glucose.: 110 mg/dL (02 Mar 2022 22:53)  POCT Blood Glucose.: 164 mg/dL (02 Mar 2022 17:39)  POCT Blood Glucose.: 207 mg/dL (02 Mar 2022 13:25)  POCT Blood Glucose.: 211 mg/dL (02 Mar 2022 08:17)    I&O's Summary    01 Mar 2022 07:01  -  02 Mar 2022 07:00  --------------------------------------------------------  IN: 960 mL / OUT: 1550 mL / NET: -590 mL    02 Mar 2022 07:01  -  03 Mar 2022 06:01  --------------------------------------------------------  IN: 0 mL / OUT: 2450 mL / NET: -2450 mL        PHYSICAL EXAM:  Vital Signs Last 24 Hrs  T(C): 36.3 (02 Mar 2022 23:51), Max: 36.6 (02 Mar 2022 08:08)  T(F): 97.4 (02 Mar 2022 23:51), Max: 97.8 (02 Mar 2022 08:08)  HR: 88 (02 Mar 2022 23:51) (64 - 88)  BP: 105/72 (02 Mar 2022 23:51) (102/56 - 147/74)  BP(mean): 75 (02 Mar 2022 12:55) (75 - 75)  RR: 19 (02 Mar 2022 23:51) (18 - 19)  SpO2: 100% (02 Mar 2022 23:51) (93% - 100%)    CONSTITUTIONAL: NAD, well-developed  RESPIRATORY: Normal respiratory effort; lungs are clear to auscultation bilaterally  CARDIOVASCULAR: Regular rate and rhythm, normal S1 and S2, no murmur/rub/gallop; No lower extremity edema; Peripheral pulses are 2+ bilaterally  ABDOMEN: Nontender to palpation, normoactive bowel sounds, no rebound/guarding; No hepatosplenomegaly  MUSCLOSKELETAL: no clubbing or cyanosis of digits; no joint swelling or tenderness to palpation  PSYCH: A+O to person, place, and time; affect appropriate    LABS:                        9.1    20.95 )-----------( 325      ( 02 Mar 2022 17:40 )             29.0     03-03    137  |  96  |  82<H>  ----------------------------<  100<H>  3.5   |  24  |  2.54<H>    Ca    9.6      03 Mar 2022 00:34  Phos  5.5     03-02  Mg     2.9     03-03    TPro  7.5  /  Alb  3.5  /  TBili  0.6  /  DBili  x   /  AST  19  /  ALT  14  /  AlkPhos  167<H>  03-02          Urinalysis Basic - ( 02 Mar 2022 16:34 )    Color: Light Orange / Appearance: Slightly Turbid / S.016 / pH: x  Gluc: x / Ketone: Negative  / Bili: Negative / Urobili: Negative   Blood: x / Protein: 100 / Nitrite: Negative   Leuk Esterase: Moderate / RBC: 34 /hpf / WBC 17 /HPF   Sq Epi: x / Non Sq Epi: 0 /hpf / Bacteria: 0.0          Tele Reviewed:    RADIOLOGY & ADDITIONAL TESTS:  Results Reviewed:   Imaging Personally Reviewed:  Electrocardiogram Personally Reviewed:     PROGRESS NOTE:   Authored by Dr. Gerogia Paz MD (PGY-2). Available on TEAMS.    Patient is a 66y old  Female who presents with a chief complaint of sepsis 2/2 wound infection (02 Mar 2022 15:52)      SUBJECTIVE / OVERNIGHT EVENTS:  No acute events overnight. Pt reports being up all night due to roommate, otherwise reports same pain in sacral wound.     ADDITIONAL REVIEW OF SYSTEMS:  Patient denies fevers, chills, chest pain, shortness of breath, nausea, abdominal pain, diarrhea, constipation, dysuria, leg swelling, headache, light headedness.    MEDICATIONS  (STANDING):  ampicillin/sulbactam  IVPB 3 Gram(s) IV Intermittent every 12 hours  aspirin enteric coated 81 milliGRAM(s) Oral daily  atorvastatin 80 milliGRAM(s) Oral at bedtime  citric acid/sodium citrate Solution 30 milliLiter(s) Oral two times a day  Dakins Solution - 1/4 Strength 1 Application(s) Topical every 8 hours  dextrose 40% Gel 15 Gram(s) Oral once  dextrose 5%. 1000 milliLiter(s) (100 mL/Hr) IV Continuous <Continuous>  dextrose 5%. 1000 milliLiter(s) (50 mL/Hr) IV Continuous <Continuous>  dextrose 50% Injectable 25 Gram(s) IV Push once  dextrose 50% Injectable 12.5 Gram(s) IV Push once  dextrose 50% Injectable 25 Gram(s) IV Push once  FLUoxetine 40 milliGRAM(s) Oral daily  furosemide   Injectable 80 milliGRAM(s) IV Push daily  glucagon  Injectable 1 milliGRAM(s) IntraMuscular once  insulin glargine Injectable (LANTUS) 28 Unit(s) SubCutaneous at bedtime  insulin lispro (ADMELOG) corrective regimen sliding scale   SubCutaneous three times a day before meals  insulin lispro (ADMELOG) corrective regimen sliding scale   SubCutaneous at bedtime  insulin lispro Injectable (ADMELOG) 26 Unit(s) SubCutaneous three times a day with meals  metoprolol tartrate 100 milliGRAM(s) Oral two times a day  nystatin Powder 1 Application(s) Topical two times a day  polyethylene glycol 3350 17 Gram(s) Oral two times a day  potassium chloride  10 mEq/100 mL IVPB 10 milliEquivalent(s) IV Intermittent every 1 hour  rivaroxaban 15 milliGRAM(s) Oral daily  senna 2 Tablet(s) Oral at bedtime  sevelamer carbonate 800 milliGRAM(s) Oral every 8 hours  sodium chloride 0.9%. 1000 milliLiter(s) (60 mL/Hr) IV Continuous <Continuous>    MEDICATIONS  (PRN):  acetaminophen     Tablet .. 650 milliGRAM(s) Oral every 6 hours PRN Mild Pain (1 - 3), Moderate Pain (4 - 6)  bisacodyl Suppository 10 milliGRAM(s) Rectal daily PRN Constipation  sodium chloride 0.65% Nasal 1 Spray(s) Both Nostrils every 6 hours PRN Nasal Congestion      CAPILLARY BLOOD GLUCOSE      POCT Blood Glucose.: 110 mg/dL (02 Mar 2022 22:53)  POCT Blood Glucose.: 164 mg/dL (02 Mar 2022 17:39)  POCT Blood Glucose.: 207 mg/dL (02 Mar 2022 13:25)  POCT Blood Glucose.: 211 mg/dL (02 Mar 2022 08:17)    I&O's Summary    01 Mar 2022 07:01  -  02 Mar 2022 07:00  --------------------------------------------------------  IN: 960 mL / OUT: 1550 mL / NET: -590 mL    02 Mar 2022 07:01  -  03 Mar 2022 06:01  --------------------------------------------------------  IN: 0 mL / OUT: 2450 mL / NET: -2450 mL        PHYSICAL EXAM:  Vital Signs Last 24 Hrs  T(C): 36.3 (02 Mar 2022 23:51), Max: 36.6 (02 Mar 2022 08:08)  T(F): 97.4 (02 Mar 2022 23:51), Max: 97.8 (02 Mar 2022 08:08)  HR: 88 (02 Mar 2022 23:51) (64 - 88)  BP: 105/72 (02 Mar 2022 23:51) (102/56 - 147/74)  BP(mean): 75 (02 Mar 2022 12:55) (75 - 75)  RR: 19 (02 Mar 2022 23:51) (18 - 19)  SpO2: 100% (02 Mar 2022 23:51) (93% - 100%)    CONSTITUTIONAL: NAD, morbidly obese, sitting up on recliner chair  RESPIRATORY: Normal respiratory effort; lungs are clear to auscultation bilaterally, on 4L NC   CARDIOVASCULAR: Regular rate and rhythm, normal S1 and S2, no murmur/rub/gallop; 1+ b/l lower extremity edema; Peripheral pulses are 2+ bilaterally  ABDOMEN: Large panus, Nontender to palpation, normoactive bowel sounds, no rebound/guarding  MUSCLOSKELETAL: no clubbing or cyanosis of digits; no joint swelling or tenderness to palpation  NEURO: no asterixis, A&O 3, lethargic when examined but per RN report was awake and alert all night, ambulated to bathroom. No neurological deficits.     LABS:                        9.1    20.95 )-----------( 325      ( 02 Mar 2022 17:40 )             29.0     03-03    137  |  96  |  82<H>  ----------------------------<  100<H>  3.5   |  24  |  2.54<H>    Ca    9.6      03 Mar 2022 00:34  Phos  5.5     03-02  Mg     2.9     03-03    TPro  7.5  /  Alb  3.5  /  TBili  0.6  /  DBili  x   /  AST  19  /  ALT  14  /  AlkPhos  167<H>  03-02          Urinalysis Basic - ( 02 Mar 2022 16:34 )    Color: Light Orange / Appearance: Slightly Turbid / S.016 / pH: x  Gluc: x / Ketone: Negative  / Bili: Negative / Urobili: Negative   Blood: x / Protein: 100 / Nitrite: Negative   Leuk Esterase: Moderate / RBC: 34 /hpf / WBC 17 /HPF   Sq Epi: x / Non Sq Epi: 0 /hpf / Bacteria: 0.0

## 2022-03-03 NOTE — CHART NOTE - NSCHARTNOTEFT_GEN_A_CORE
Patient's BUN/Cr has improved.  I will increase Unasyn to 3g IV Q8H  Continue to adjust dosing based on CrCl    I will continue to follow. Please feel free to contact me with any further questions.    Hao Torres M.D.  [facility] Division of Infectious Disease  8AM-5PM Monday - Friday: Available on [employer] Teams  After Hours and Holidays (or if no response on [employer] Teams): Please contact the Infectious Diseases Office at [phone]

## 2022-03-03 NOTE — PROGRESS NOTE ADULT - PROBLEM SELECTOR PLAN 8
on Losartan 25 and Lopressor   - c/w lopressor  - hold Losartan given hypotension from sepsis & HEATHER

## 2022-03-04 ENCOUNTER — APPOINTMENT (OUTPATIENT)
Dept: WOUND CARE | Facility: HOSPITAL | Age: 67
End: 2022-03-04

## 2022-03-04 DIAGNOSIS — R31.9 HEMATURIA, UNSPECIFIED: ICD-10-CM

## 2022-03-04 LAB
ALBUMIN SERPL ELPH-MCNC: 3.4 G/DL — SIGNIFICANT CHANGE UP (ref 3.3–5)
ALP SERPL-CCNC: 154 U/L — HIGH (ref 40–120)
ALT FLD-CCNC: 17 U/L — SIGNIFICANT CHANGE UP (ref 10–45)
ANION GAP SERPL CALC-SCNC: 15 MMOL/L — SIGNIFICANT CHANGE UP (ref 5–17)
APPEARANCE UR: ABNORMAL
AST SERPL-CCNC: 30 U/L — SIGNIFICANT CHANGE UP (ref 10–40)
BACTERIA # UR AUTO: NEGATIVE — SIGNIFICANT CHANGE UP
BASOPHILS # BLD AUTO: 0.04 K/UL — SIGNIFICANT CHANGE UP (ref 0–0.2)
BASOPHILS NFR BLD AUTO: 0.2 % — SIGNIFICANT CHANGE UP (ref 0–2)
BILIRUB SERPL-MCNC: 0.4 MG/DL — SIGNIFICANT CHANGE UP (ref 0.2–1.2)
BILIRUB UR-MCNC: NEGATIVE — SIGNIFICANT CHANGE UP
BUN SERPL-MCNC: 50 MG/DL — HIGH (ref 7–23)
CALCIUM SERPL-MCNC: 9.8 MG/DL — SIGNIFICANT CHANGE UP (ref 8.4–10.5)
CHLORIDE SERPL-SCNC: 98 MMOL/L — SIGNIFICANT CHANGE UP (ref 96–108)
CO2 SERPL-SCNC: 27 MMOL/L — SIGNIFICANT CHANGE UP (ref 22–31)
COLOR SPEC: SIGNIFICANT CHANGE UP
CREAT SERPL-MCNC: 1.23 MG/DL — SIGNIFICANT CHANGE UP (ref 0.5–1.3)
DIFF PNL FLD: ABNORMAL
EGFR: 48 ML/MIN/1.73M2 — LOW
EOSINOPHIL # BLD AUTO: 0.21 K/UL — SIGNIFICANT CHANGE UP (ref 0–0.5)
EOSINOPHIL NFR BLD AUTO: 1 % — SIGNIFICANT CHANGE UP (ref 0–6)
EPI CELLS # UR: 9 /HPF — HIGH
GLUCOSE SERPL-MCNC: 84 MG/DL — SIGNIFICANT CHANGE UP (ref 70–99)
GLUCOSE UR QL: NEGATIVE — SIGNIFICANT CHANGE UP
HCT VFR BLD CALC: 31 % — LOW (ref 34.5–45)
HGB BLD-MCNC: 9.4 G/DL — LOW (ref 11.5–15.5)
HYALINE CASTS # UR AUTO: 104 /LPF — HIGH (ref 0–2)
IMM GRANULOCYTES NFR BLD AUTO: 1 % — SIGNIFICANT CHANGE UP (ref 0–1.5)
KETONES UR-MCNC: SIGNIFICANT CHANGE UP
LEUKOCYTE ESTERASE UR-ACNC: ABNORMAL
LYMPHOCYTES # BLD AUTO: 1.8 K/UL — SIGNIFICANT CHANGE UP (ref 1–3.3)
LYMPHOCYTES # BLD AUTO: 8.8 % — LOW (ref 13–44)
MAGNESIUM SERPL-MCNC: 2.7 MG/DL — HIGH (ref 1.6–2.6)
MCHC RBC-ENTMCNC: 24.7 PG — LOW (ref 27–34)
MCHC RBC-ENTMCNC: 30.3 GM/DL — LOW (ref 32–36)
MCV RBC AUTO: 81.4 FL — SIGNIFICANT CHANGE UP (ref 80–100)
MONOCYTES # BLD AUTO: 1.5 K/UL — HIGH (ref 0–0.9)
MONOCYTES NFR BLD AUTO: 7.3 % — SIGNIFICANT CHANGE UP (ref 2–14)
NEUTROPHILS # BLD AUTO: 16.81 K/UL — HIGH (ref 1.8–7.4)
NEUTROPHILS NFR BLD AUTO: 81.7 % — HIGH (ref 43–77)
NITRITE UR-MCNC: NEGATIVE — SIGNIFICANT CHANGE UP
NRBC # BLD: 0 /100 WBCS — SIGNIFICANT CHANGE UP (ref 0–0)
NT-PROBNP SERPL-SCNC: 5058 PG/ML — HIGH (ref 0–300)
PH UR: 6 — SIGNIFICANT CHANGE UP (ref 5–8)
PHOSPHATE SERPL-MCNC: 3.2 MG/DL — SIGNIFICANT CHANGE UP (ref 2.5–4.5)
PLATELET # BLD AUTO: 379 K/UL — SIGNIFICANT CHANGE UP (ref 150–400)
POTASSIUM SERPL-MCNC: 4.3 MMOL/L — SIGNIFICANT CHANGE UP (ref 3.5–5.3)
POTASSIUM SERPL-SCNC: 4.3 MMOL/L — SIGNIFICANT CHANGE UP (ref 3.5–5.3)
PROT SERPL-MCNC: 7.3 G/DL — SIGNIFICANT CHANGE UP (ref 6–8.3)
PROT UR-MCNC: ABNORMAL
RBC # BLD: 3.81 M/UL — SIGNIFICANT CHANGE UP (ref 3.8–5.2)
RBC # FLD: 15.9 % — HIGH (ref 10.3–14.5)
RBC CASTS # UR COMP ASSIST: 6260 /HPF — HIGH (ref 0–4)
SODIUM SERPL-SCNC: 140 MMOL/L — SIGNIFICANT CHANGE UP (ref 135–145)
SP GR SPEC: 1.02 — SIGNIFICANT CHANGE UP (ref 1.01–1.02)
URATE CRY FLD QL MICRO: ABNORMAL
UROBILINOGEN FLD QL: NEGATIVE — SIGNIFICANT CHANGE UP
WBC # BLD: 20.57 K/UL — HIGH (ref 3.8–10.5)
WBC # FLD AUTO: 20.57 K/UL — HIGH (ref 3.8–10.5)
WBC UR QL: 112 /HPF — HIGH (ref 0–5)

## 2022-03-04 PROCEDURE — 99233 SBSQ HOSP IP/OBS HIGH 50: CPT | Mod: GC

## 2022-03-04 PROCEDURE — 99232 SBSQ HOSP IP/OBS MODERATE 35: CPT

## 2022-03-04 PROCEDURE — 74176 CT ABD & PELVIS W/O CONTRAST: CPT | Mod: 26

## 2022-03-04 RX ORDER — RIVAROXABAN 15 MG-20MG
20 KIT ORAL DAILY
Refills: 0 | Status: DISCONTINUED | OUTPATIENT
Start: 2022-03-04 | End: 2022-03-10

## 2022-03-04 RX ORDER — FUROSEMIDE 40 MG
80 TABLET ORAL ONCE
Refills: 0 | Status: COMPLETED | OUTPATIENT
Start: 2022-03-04 | End: 2022-03-04

## 2022-03-04 RX ORDER — AMPICILLIN SODIUM AND SULBACTAM SODIUM 250; 125 MG/ML; MG/ML
3 INJECTION, POWDER, FOR SUSPENSION INTRAMUSCULAR; INTRAVENOUS EVERY 6 HOURS
Refills: 0 | Status: DISCONTINUED | OUTPATIENT
Start: 2022-03-04 | End: 2022-03-10

## 2022-03-04 RX ADMIN — Medication 100 MILLIGRAM(S): at 05:44

## 2022-03-04 RX ADMIN — RIVAROXABAN 20 MILLIGRAM(S): KIT at 18:46

## 2022-03-04 RX ADMIN — ATORVASTATIN CALCIUM 80 MILLIGRAM(S): 80 TABLET, FILM COATED ORAL at 21:02

## 2022-03-04 RX ADMIN — AMPICILLIN SODIUM AND SULBACTAM SODIUM 200 GRAM(S): 250; 125 INJECTION, POWDER, FOR SUSPENSION INTRAMUSCULAR; INTRAVENOUS at 23:20

## 2022-03-04 RX ADMIN — SEVELAMER CARBONATE 800 MILLIGRAM(S): 2400 POWDER, FOR SUSPENSION ORAL at 05:46

## 2022-03-04 RX ADMIN — Medication 26 UNIT(S): at 18:45

## 2022-03-04 RX ADMIN — Medication 26 UNIT(S): at 13:52

## 2022-03-04 RX ADMIN — Medication 100 MILLIGRAM(S): at 18:44

## 2022-03-04 RX ADMIN — AMPICILLIN SODIUM AND SULBACTAM SODIUM 200 GRAM(S): 250; 125 INJECTION, POWDER, FOR SUSPENSION INTRAMUSCULAR; INTRAVENOUS at 05:43

## 2022-03-04 RX ADMIN — Medication 650 MILLIGRAM(S): at 10:00

## 2022-03-04 RX ADMIN — NYSTATIN CREAM 1 APPLICATION(S): 100000 CREAM TOPICAL at 05:44

## 2022-03-04 RX ADMIN — Medication 1 APPLICATION(S): at 13:51

## 2022-03-04 RX ADMIN — Medication 650 MILLIGRAM(S): at 09:20

## 2022-03-04 RX ADMIN — SENNA PLUS 2 TABLET(S): 8.6 TABLET ORAL at 21:02

## 2022-03-04 RX ADMIN — AMPICILLIN SODIUM AND SULBACTAM SODIUM 200 GRAM(S): 250; 125 INJECTION, POWDER, FOR SUSPENSION INTRAMUSCULAR; INTRAVENOUS at 18:44

## 2022-03-04 RX ADMIN — INSULIN GLARGINE 28 UNIT(S): 100 INJECTION, SOLUTION SUBCUTANEOUS at 22:17

## 2022-03-04 RX ADMIN — Medication 81 MILLIGRAM(S): at 13:53

## 2022-03-04 RX ADMIN — Medication 80 MILLIGRAM(S): at 10:32

## 2022-03-04 RX ADMIN — Medication 26 UNIT(S): at 09:19

## 2022-03-04 RX ADMIN — NYSTATIN CREAM 1 APPLICATION(S): 100000 CREAM TOPICAL at 19:02

## 2022-03-04 RX ADMIN — Medication 1 APPLICATION(S): at 05:45

## 2022-03-04 RX ADMIN — AMPICILLIN SODIUM AND SULBACTAM SODIUM 200 GRAM(S): 250; 125 INJECTION, POWDER, FOR SUSPENSION INTRAMUSCULAR; INTRAVENOUS at 13:51

## 2022-03-04 RX ADMIN — Medication 30 MILLILITER(S): at 18:45

## 2022-03-04 RX ADMIN — Medication 30 MILLILITER(S): at 05:46

## 2022-03-04 RX ADMIN — POLYETHYLENE GLYCOL 3350 17 GRAM(S): 17 POWDER, FOR SOLUTION ORAL at 05:43

## 2022-03-04 RX ADMIN — Medication 40 MILLIGRAM(S): at 13:52

## 2022-03-04 RX ADMIN — SEVELAMER CARBONATE 800 MILLIGRAM(S): 2400 POWDER, FOR SUSPENSION ORAL at 13:53

## 2022-03-04 NOTE — PROGRESS NOTE ADULT - PROBLEM SELECTOR PLAN 6
On metoprolol tartate 100mg bid and Xarelto  - HR <110 at this time   - continue metoprolol tartate 100mg bid  - discussed with pharmacy, restart Xarelto with Crcl on 3/2 RESOLVED  Fever, leukocytosis, hypotension on admission. Sepsis 2/2 likely sacral wound/cellulitis  - s/p bedside debridement in ED   - BCx 2/26 NGTD  - Wound Cx growing E. avium and strep anginosus   - Zosyn (2/27- 3/1 ). Unasyn (3/1- ) given culture sensitivities and prevent further renal injury   - MRSA pcr negative   - Daily packing changes  - Appreciate ID recs

## 2022-03-04 NOTE — PROGRESS NOTE ADULT - SUBJECTIVE AND OBJECTIVE BOX
PROGRESS NOTE:   Authored by Dr. Georgia Paz MD (PGY-2). Available on TEAMS.    Patient is a 66y old  Female who presents with a chief complaint of sepsis 2/2 wound infection (03 Mar 2022 06:01)      SUBJECTIVE / OVERNIGHT EVENTS:  No acute events overnight.     ADDITIONAL REVIEW OF SYSTEMS:  Patient denies fevers, chills, chest pain, shortness of breath, nausea, abdominal pain, diarrhea, constipation, dysuria, leg swelling, headache, light headedness.    MEDICATIONS  (STANDING):  ampicillin/sulbactam  IVPB 3 Gram(s) IV Intermittent every 8 hours  aspirin enteric coated 81 milliGRAM(s) Oral daily  atorvastatin 80 milliGRAM(s) Oral at bedtime  citric acid/sodium citrate Solution 30 milliLiter(s) Oral two times a day  Dakins Solution - 1/4 Strength 1 Application(s) Topical every 8 hours  dextrose 40% Gel 15 Gram(s) Oral once  dextrose 5%. 1000 milliLiter(s) (50 mL/Hr) IV Continuous <Continuous>  dextrose 5%. 1000 milliLiter(s) (100 mL/Hr) IV Continuous <Continuous>  dextrose 50% Injectable 25 Gram(s) IV Push once  dextrose 50% Injectable 12.5 Gram(s) IV Push once  dextrose 50% Injectable 25 Gram(s) IV Push once  FLUoxetine 40 milliGRAM(s) Oral daily  glucagon  Injectable 1 milliGRAM(s) IntraMuscular once  insulin glargine Injectable (LANTUS) 28 Unit(s) SubCutaneous at bedtime  insulin lispro (ADMELOG) corrective regimen sliding scale   SubCutaneous three times a day before meals  insulin lispro (ADMELOG) corrective regimen sliding scale   SubCutaneous at bedtime  insulin lispro Injectable (ADMELOG) 26 Unit(s) SubCutaneous three times a day with meals  metoprolol tartrate 100 milliGRAM(s) Oral two times a day  nystatin Powder 1 Application(s) Topical two times a day  polyethylene glycol 3350 17 Gram(s) Oral two times a day  rivaroxaban 15 milliGRAM(s) Oral daily  senna 2 Tablet(s) Oral at bedtime  sevelamer carbonate 800 milliGRAM(s) Oral every 8 hours  sodium chloride 0.9%. 1000 milliLiter(s) (60 mL/Hr) IV Continuous <Continuous>    MEDICATIONS  (PRN):  acetaminophen     Tablet .. 650 milliGRAM(s) Oral every 6 hours PRN Mild Pain (1 - 3), Moderate Pain (4 - 6)  bisacodyl Suppository 10 milliGRAM(s) Rectal daily PRN Constipation  sodium chloride 0.65% Nasal 1 Spray(s) Both Nostrils every 6 hours PRN Nasal Congestion      CAPILLARY BLOOD GLUCOSE      POCT Blood Glucose.: 147 mg/dL (03 Mar 2022 21:07)  POCT Blood Glucose.: 116 mg/dL (03 Mar 2022 17:33)  POCT Blood Glucose.: 169 mg/dL (03 Mar 2022 13:55)  POCT Blood Glucose.: 173 mg/dL (03 Mar 2022 09:11)    I&O's Summary    02 Mar 2022 07:01  -  03 Mar 2022 07:00  --------------------------------------------------------  IN: 0 mL / OUT: 3350 mL / NET: -3350 mL    03 Mar 2022 07:01  -  04 Mar 2022 06:19  --------------------------------------------------------  IN: 980 mL / OUT: 3300 mL / NET: -2320 mL        PHYSICAL EXAM:  Vital Signs Last 24 Hrs  T(C): 36.6 (04 Mar 2022 04:34), Max: 36.7 (03 Mar 2022 08:11)  T(F): 97.8 (04 Mar 2022 04:34), Max: 98.1 (03 Mar 2022 20:31)  HR: 61 (04 Mar 2022 04:34) (61 - 86)  BP: 132/63 (04 Mar 2022 04:34) (115/66 - 146/77)  BP(mean): --  RR: 18 (04 Mar 2022 04:34) (18 - 20)  SpO2: 98% (04 Mar 2022 04:34) (98% - 100%)    CONSTITUTIONAL: NAD, well-developed  RESPIRATORY: Normal respiratory effort; lungs are clear to auscultation bilaterally  CARDIOVASCULAR: Regular rate and rhythm, normal S1 and S2, no murmur/rub/gallop; No lower extremity edema; Peripheral pulses are 2+ bilaterally  ABDOMEN: Nontender to palpation, normoactive bowel sounds, no rebound/guarding; No hepatosplenomegaly  MUSCLOSKELETAL: no clubbing or cyanosis of digits; no joint swelling or tenderness to palpation  PSYCH: A+O to person, place, and time; affect appropriate    LABS:                        10.3   19.95 )-----------( 345      ( 03 Mar 2022 06:59 )             34.8     03-03    138  |  96  |  76<H>  ----------------------------<  113<H>  4.1   |  22  |  2.22<H>    Ca    9.7      03 Mar 2022 06:59  Phos  4.4     03-03  Mg     3.0     -03    TPro  7.7  /  Alb  3.4  /  TBili  0.5  /  DBili  x   /  AST  22  /  ALT  15  /  AlkPhos  202<H>  03-03          Urinalysis Basic - ( 02 Mar 2022 16:34 )    Color: Light Orange / Appearance: Slightly Turbid / S.016 / pH: x  Gluc: x / Ketone: Negative  / Bili: Negative / Urobili: Negative   Blood: x / Protein: 100 / Nitrite: Negative   Leuk Esterase: Moderate / RBC: 34 /hpf / WBC 17 /HPF   Sq Epi: x / Non Sq Epi: 0 /hpf / Bacteria: 0.0          Tele Reviewed:    RADIOLOGY & ADDITIONAL TESTS:  Results Reviewed:   Imaging Personally Reviewed:  Electrocardiogram Personally Reviewed:     PROGRESS NOTE:   Authored by Dr. Georgia Paz MD (PGY-2). Available on TEAMS.    Patient is a 66y old  Female who presents with a chief complaint of sepsis 2/2 wound infection (03 Mar 2022 06:01)      SUBJECTIVE / OVERNIGHT EVENTS:  No acute events overnight. Pt received zyprexa 5mg x 1. Reports could not sleep all night and at her usual state of fatigue.     ADDITIONAL REVIEW OF SYSTEMS:  Patient denies fevers, chills, chest pain, shortness of breath, nausea, abdominal pain, diarrhea, constipation, dysuria, leg swelling, headache, light headedness.    MEDICATIONS  (STANDING):  ampicillin/sulbactam  IVPB 3 Gram(s) IV Intermittent every 8 hours  aspirin enteric coated 81 milliGRAM(s) Oral daily  atorvastatin 80 milliGRAM(s) Oral at bedtime  citric acid/sodium citrate Solution 30 milliLiter(s) Oral two times a day  Dakins Solution - 1/4 Strength 1 Application(s) Topical every 8 hours  dextrose 40% Gel 15 Gram(s) Oral once  dextrose 5%. 1000 milliLiter(s) (50 mL/Hr) IV Continuous <Continuous>  dextrose 5%. 1000 milliLiter(s) (100 mL/Hr) IV Continuous <Continuous>  dextrose 50% Injectable 25 Gram(s) IV Push once  dextrose 50% Injectable 12.5 Gram(s) IV Push once  dextrose 50% Injectable 25 Gram(s) IV Push once  FLUoxetine 40 milliGRAM(s) Oral daily  glucagon  Injectable 1 milliGRAM(s) IntraMuscular once  insulin glargine Injectable (LANTUS) 28 Unit(s) SubCutaneous at bedtime  insulin lispro (ADMELOG) corrective regimen sliding scale   SubCutaneous three times a day before meals  insulin lispro (ADMELOG) corrective regimen sliding scale   SubCutaneous at bedtime  insulin lispro Injectable (ADMELOG) 26 Unit(s) SubCutaneous three times a day with meals  metoprolol tartrate 100 milliGRAM(s) Oral two times a day  nystatin Powder 1 Application(s) Topical two times a day  polyethylene glycol 3350 17 Gram(s) Oral two times a day  rivaroxaban 15 milliGRAM(s) Oral daily  senna 2 Tablet(s) Oral at bedtime  sevelamer carbonate 800 milliGRAM(s) Oral every 8 hours  sodium chloride 0.9%. 1000 milliLiter(s) (60 mL/Hr) IV Continuous <Continuous>    MEDICATIONS  (PRN):  acetaminophen     Tablet .. 650 milliGRAM(s) Oral every 6 hours PRN Mild Pain (1 - 3), Moderate Pain (4 - 6)  bisacodyl Suppository 10 milliGRAM(s) Rectal daily PRN Constipation  sodium chloride 0.65% Nasal 1 Spray(s) Both Nostrils every 6 hours PRN Nasal Congestion      CAPILLARY BLOOD GLUCOSE      POCT Blood Glucose.: 147 mg/dL (03 Mar 2022 21:07)  POCT Blood Glucose.: 116 mg/dL (03 Mar 2022 17:33)  POCT Blood Glucose.: 169 mg/dL (03 Mar 2022 13:55)  POCT Blood Glucose.: 173 mg/dL (03 Mar 2022 09:11)    I&O's Summary    02 Mar 2022 07:01  -  03 Mar 2022 07:00  --------------------------------------------------------  IN: 0 mL / OUT: 3350 mL / NET: -3350 mL    03 Mar 2022 07:01  -  04 Mar 2022 06:19  --------------------------------------------------------  IN: 980 mL / OUT: 3300 mL / NET: -2320 mL        PHYSICAL EXAM:  Vital Signs Last 24 Hrs  T(C): 36.6 (04 Mar 2022 04:34), Max: 36.7 (03 Mar 2022 08:11)  T(F): 97.8 (04 Mar 2022 04:34), Max: 98.1 (03 Mar 2022 20:31)  HR: 61 (04 Mar 2022 04:34) (61 - 86)  BP: 132/63 (04 Mar 2022 04:34) (115/66 - 146/77)  BP(mean): --  RR: 18 (04 Mar 2022 04:34) (18 - 20)  SpO2: 98% (04 Mar 2022 04:34) (98% - 100%)    CONSTITUTIONAL: NAD, morbidly obese, sitting up on recliner chair  RESPIRATORY: Normal respiratory effort; lungs are clear to auscultation bilaterally, on 4L NC   CARDIOVASCULAR: Regular rate and rhythm, normal S1 and S2, no murmur/rub/gallop; 1+ b/l lower extremity edema; Peripheral pulses are 2+ bilaterally  ABDOMEN: Large panus, Nontender to palpation, normoactive bowel sounds, no rebound/guarding  MUSCLOSKELETAL: no clubbing or cyanosis of digits; no joint swelling or tenderness to palpation  NEURO: no asterixis, A&O 3, lethargic when examined but per RN report was awake and alert all night, ambulated to bathroom. No neurological deficits.     LABS:                        10.3   19.95 )-----------( 345      ( 03 Mar 2022 06:59 )             34.8     03-    138  |  96  |  76<H>  ----------------------------<  113<H>  4.1   |  22  |  2.22<H>    Ca    9.7      03 Mar 2022 06:59  Phos  4.4     03-03  Mg     3.0     03-03    TPro  7.7  /  Alb  3.4  /  TBili  0.5  /  DBili  x   /  AST  22  /  ALT  15  /  AlkPhos  202<H>  03-03    Urinalysis Basic - ( 02 Mar 2022 16:34 )    Color: Light Orange / Appearance: Slightly Turbid / S.016 / pH: x  Gluc: x / Ketone: Negative  / Bili: Negative / Urobili: Negative   Blood: x / Protein: 100 / Nitrite: Negative   Leuk Esterase: Moderate / RBC: 34 /hpf / WBC 17 /HPF   Sq Epi: x / Non Sq Epi: 0 /hpf / Bacteria: 0.0       PROGRESS NOTE:   Authored by Dr. Georgia Paz MD (PGY-2). Available on TEAMS.    Patient is a 66y old  Female who presents with a chief complaint of sepsis 2/2 wound infection (03 Mar 2022 06:01)      SUBJECTIVE / OVERNIGHT EVENTS:  No acute events overnight. Pt received zyprexa 5mg x 1. Reports could not sleep all night and at her usual state of fatigue.     ADDITIONAL REVIEW OF SYSTEMS:  Patient denies fevers, chills, chest pain, shortness of breath, nausea, abdominal pain, diarrhea, constipation, dysuria, leg swelling, headache, light headedness.    MEDICATIONS  (STANDING):  ampicillin/sulbactam  IVPB 3 Gram(s) IV Intermittent every 8 hours  aspirin enteric coated 81 milliGRAM(s) Oral daily  atorvastatin 80 milliGRAM(s) Oral at bedtime  citric acid/sodium citrate Solution 30 milliLiter(s) Oral two times a day  Dakins Solution - 1/4 Strength 1 Application(s) Topical every 8 hours  dextrose 40% Gel 15 Gram(s) Oral once  dextrose 5%. 1000 milliLiter(s) (50 mL/Hr) IV Continuous <Continuous>  dextrose 5%. 1000 milliLiter(s) (100 mL/Hr) IV Continuous <Continuous>  dextrose 50% Injectable 25 Gram(s) IV Push once  dextrose 50% Injectable 12.5 Gram(s) IV Push once  dextrose 50% Injectable 25 Gram(s) IV Push once  FLUoxetine 40 milliGRAM(s) Oral daily  glucagon  Injectable 1 milliGRAM(s) IntraMuscular once  insulin glargine Injectable (LANTUS) 28 Unit(s) SubCutaneous at bedtime  insulin lispro (ADMELOG) corrective regimen sliding scale   SubCutaneous three times a day before meals  insulin lispro (ADMELOG) corrective regimen sliding scale   SubCutaneous at bedtime  insulin lispro Injectable (ADMELOG) 26 Unit(s) SubCutaneous three times a day with meals  metoprolol tartrate 100 milliGRAM(s) Oral two times a day  nystatin Powder 1 Application(s) Topical two times a day  polyethylene glycol 3350 17 Gram(s) Oral two times a day  rivaroxaban 15 milliGRAM(s) Oral daily  senna 2 Tablet(s) Oral at bedtime  sevelamer carbonate 800 milliGRAM(s) Oral every 8 hours  sodium chloride 0.9%. 1000 milliLiter(s) (60 mL/Hr) IV Continuous <Continuous>    MEDICATIONS  (PRN):  acetaminophen     Tablet .. 650 milliGRAM(s) Oral every 6 hours PRN Mild Pain (1 - 3), Moderate Pain (4 - 6)  bisacodyl Suppository 10 milliGRAM(s) Rectal daily PRN Constipation  sodium chloride 0.65% Nasal 1 Spray(s) Both Nostrils every 6 hours PRN Nasal Congestion      CAPILLARY BLOOD GLUCOSE      POCT Blood Glucose.: 147 mg/dL (03 Mar 2022 21:07)  POCT Blood Glucose.: 116 mg/dL (03 Mar 2022 17:33)  POCT Blood Glucose.: 169 mg/dL (03 Mar 2022 13:55)  POCT Blood Glucose.: 173 mg/dL (03 Mar 2022 09:11)    I&O's Summary    02 Mar 2022 07:01  -  03 Mar 2022 07:00  --------------------------------------------------------  IN: 0 mL / OUT: 3350 mL / NET: -3350 mL    03 Mar 2022 07:01  -  04 Mar 2022 06:19  --------------------------------------------------------  IN: 980 mL / OUT: 3300 mL / NET: -2320 mL        PHYSICAL EXAM:  Vital Signs Last 24 Hrs  T(C): 36.6 (04 Mar 2022 04:34), Max: 36.7 (03 Mar 2022 08:11)  T(F): 97.8 (04 Mar 2022 04:34), Max: 98.1 (03 Mar 2022 20:31)  HR: 61 (04 Mar 2022 04:34) (61 - 86)  BP: 132/63 (04 Mar 2022 04:34) (115/66 - 146/77)  BP(mean): --  RR: 18 (04 Mar 2022 04:34) (18 - 20)  SpO2: 98% (04 Mar 2022 04:34) (98% - 100%)    CONSTITUTIONAL: NAD, morbidly obese, sitting up on recliner chair  RESPIRATORY: Normal respiratory effort; lungs are clear to auscultation bilaterally, on 2L NC   CARDIOVASCULAR: Regular rate and rhythm, normal S1 and S2, no murmur/rub/gallop; 1+ b/l lower extremity edema; Peripheral pulses are 2+ bilaterally  ABDOMEN: Large panus, Nontender to palpation, normoactive bowel sounds, no rebound/guarding  MUSCLOSKELETAL: no clubbing or cyanosis of digits; no joint swelling or tenderness to palpation  NEURO: no asterixis, A&O 3, lethargic when examined but per RN report was awake and alert all night, ambulated to bathroom. No neurological deficits.     LABS:                        10.3   19.95 )-----------( 345      ( 03 Mar 2022 06:59 )             34.8     03-    138  |  96  |  76<H>  ----------------------------<  113<H>  4.1   |  22  |  2.22<H>    Ca    9.7      03 Mar 2022 06:59  Phos  4.4     03-03  Mg     3.0     03-03    TPro  7.7  /  Alb  3.4  /  TBili  0.5  /  DBili  x   /  AST  22  /  ALT  15  /  AlkPhos  202<H>  03-03    Urinalysis Basic - ( 02 Mar 2022 16:34 )    Color: Light Orange / Appearance: Slightly Turbid / S.016 / pH: x  Gluc: x / Ketone: Negative  / Bili: Negative / Urobili: Negative   Blood: x / Protein: 100 / Nitrite: Negative   Leuk Esterase: Moderate / RBC: 34 /hpf / WBC 17 /HPF   Sq Epi: x / Non Sq Epi: 0 /hpf / Bacteria: 0.0

## 2022-03-04 NOTE — PROGRESS NOTE ADULT - ASSESSMENT
67 yo F, PMHx AS s/p TAVR 2015 and 2021, HOCM, HFpEF, pAFib on Xarelto, JOSR (does not tolerate CPAP, on 2L O2 at home qhs), anxiety, morbid obesity, DM, HTN, asthma (on albuterol), pw sepsis, n/v, likely 2/2 sacral decubitus ulcer/necrotic wound on left ischium with associated cellulitis s/p drainage. Nephrology consulted for worsening HEATHER. Asterixis improved i/s/o oxycodone d/c. Creatinine now normalized.    [ ] No objections to d/c of Watts catheter with continued monitoring of I/Os

## 2022-03-04 NOTE — PROGRESS NOTE ADULT - ASSESSMENT
A/P:67 yo F, PMHx AS s/p TAVR 2015 and 2021, HOCM, HFpEF, pAFib on Xarelto, JOSR (does not tolerate CPAP, on 2L O2 at home qhs), anxiety, morbid obesity, DM, HTN, asthma (on albuterol), pw sepsis, n/v, likely 2/2 sacral decubitus ulcer/necrotic wound on left ischium with associated cellulitis. Also found to be in acute on chronic hypoxic respiratory failure on admission, with pulmonary vascular congestion on imaging concerning for decompensated HF. Course c/b worsening HEATHER, likely pre-renal in s/o hypervolemia given elevated BNP.     Wound Consult requested to assist w/ management of Labial/ perineal abscess, s/p debridement/ drainage of abscess      Wound- improving, continue w/ Dakins TID dressings and prn soiling  CAVILON Advance TIW and        Continue w/ Pericare w/ attends underpads as per protocol  Constipation-improving,  bowel regimen to decrease wound contamination   Abx per Medicine/ ID  CT noted- pt s/p debridement and drainage of abscess  Moisturize intact skin w/ SWEEN cream BID  Nutritional optimization - RD consult appreciated        encourage high quality protein, MVI & Vit C to promote wound healing  Hyperglycemia - improving w/ ADA diet and Lantus w/FS w/ ISS  and tx of infection  Continue turning and positioning w/ offloading assistive devices as per protocol  Waffle Cushion to chair when oob to chair  Continue w/ low air loss pressure redistribution bed surface   Care as per medicine, will follow w/ you  Upon discharge f/u as outpatient at [facility] [address] [facility] [phone]  Seen w/ attrobb & RN and D/w team  Thank you for this consult  Cleo Paulino PA-C CWS [id number]  we spent 50minutes face to face w/ this pt of which more than 50% of the time was spent counseling & coordinating care of this pt.  A/P:67 yo F, PMHx AS s/p TAVR 2015 and 2021, HOCM, HFpEF, pAFib on Xarelto, JOSR (does not tolerate CPAP, on 2L O2 at home qhs), anxiety, morbid obesity, DM, HTN, asthma (on albuterol), pw sepsis, n/v, likely 2/2 sacral decubitus ulcer/necrotic wound on left ischium with associated cellulitis. Also found to be in acute on chronic hypoxic respiratory failure on admission, with pulmonary vascular congestion on imaging concerning for decompensated HF. Course c/b worsening HEATHER, likely pre-renal in s/o hypervolemia given elevated BNP.     Wound Consult requested to assist w/ management of Labial/ perineal abscess, s/p debridement/ drainage of abscess      Wound- improving, continue w/ Dakins TID dressings and prn soiling  CAVILON Advance TIW to periwound and        Continue w/ Pericare w/ attends underpads as per protocol  Constipation-improving,  bowel regimen to decrease wound contamination   Abx per Medicine/ ID  CT noted- pt s/p debridement and drainage of abscess  Moisturize intact skin w/ SWEEN cream BID  Nutritional optimization - RD consult appreciated        encourage high quality protein, MVI & Vit C to promote wound healing  Hyperglycemia - improving w/ ADA diet and Lantus w/FS w/ ISS  and tx of infection  Continue turning and positioning w/ offloading assistive devices as per protocol  Waffle Cushion to chair when oob to chair  Continue w/ low air loss pressure redistribution bed surface   Care as per medicine, will follow w/ you  Upon discharge f/u as outpatient at Wound Center [address] [phone]  Seen w/ attrobb & RN and D/w team  Thank you for this consult  Cleo Paulino PA-C CWS [id number]  we spent 25 minutes face to face w/ this pt of which more than 50% of the time was spent counseling & coordinating care of this pt.

## 2022-03-04 NOTE — PROGRESS NOTE ADULT - PROBLEM SELECTOR PLAN 8
on Losartan 25 and Lopressor   - c/w lopressor  - hold Losartan given hypotension from sepsis & HEATHER on Humalog 30 TID and Tresiba 30 bedtime   - A1c 8.6%  - Increase to Insulin 26 pre-meal and 28 long-acting bedtime  - check FSG qAC and qhs  - adjust insulin as needed

## 2022-03-04 NOTE — PROGRESS NOTE ADULT - PROBLEM SELECTOR PLAN 3
EF previously 75% and hyperdynamic LV on prior ECHO. On admission with pulm vasc congestion and possible hypervolemic state, concerning for decompensated HF  - BNP up trended from 7k to 15k then 19k   - HF consulted as volume status is challenging to assess in s/o morbid obesity. Cr continued to rise despite lasix challenge. TTE obtained this admission without significant change when compared to prior. Still mod pulm HTN, mod MR, grade II diastolic dysfunction.   - BNP down trending  - Trial of lasix 80 IV push as above  - Monitor I/Os strictly  - Daily weights   - Mg>2 K>4 EF previously 75% and hyperdynamic LV on prior ECHO. On admission with pulm vasc congestion and possible hypervolemic state, concerning for decompensated HF  - BNP up trended from 7k to 15k then 19k   - HF consulted as volume status is challenging to assess in s/o morbid obesity. Cr continued to rise despite lasix challenge. TTE obtained this admission without significant change when compared to prior. Still mod pulm HTN, mod MR, grade II diastolic dysfunction.   - BNP down trending  - C/w lasix 80IV qD   - Monitor I/Os strictly  - Daily weights   - Mg>2 K>4 At home on 2L nasal cannula as needed. On admission hypoxic to 87 % , temporarily required BiPAP. CXR with pulm vascular congestion  - Likely multifactorial in s/o decompensated HF (BNP 15k), and chronically poor respiratory mechanism in s/o JOSR OHS   - TTE in Sept 2021 showed hyperdynamic LV systolic function, EF 75%, and mod pulm HTN  - Low suspicion for PE (on home xarelto for a.fib)  - HR <110, unlikely dysrhythmia driven   - Repeat TTE as above   - ICS  - Lasix as above  - Wean oxygen as tolerated

## 2022-03-04 NOTE — PROGRESS NOTE ADULT - ATTENDING COMMENTS
67 yo F, PMHx AS s/p TAVR 2015 and 2021, HOCM, pAFib on Xarelto, diastolic and valvular CHF, JOSR (does not tolerate CPAP, on 2L O2 at home qhs), anxiety, morbid obesity, DM with neuropathy, HTN, asthma (on albuterol), pw sepsis, n/v, likely 2/2 sacral decubitus ulcer/necrotic wound on left ischium with associated cellulitis.  Patient was seen by the surgery team s/p debridement ,wound with growth of enterococcus avium and strep angisnosus with neg blood CX so far.   SHe was seen by the MICU team due to hypotension which has resolved and was not considered a MICU candidate.  Pt was Cefepime, Vanco , metronidazole which were changed to Zosyn and now on Unasyn.  Patient was noted to have HEATHER with worsening Cr which did not improve with IV Lasix as Acute on chronic Heart failure was tought to be a contributing factor.  Her HEATHER is mulifactorial in nature with Sepsis ( fever , high WBC), IV contrast, Hypotension, acute on chronic heart failure and medications ( Vanco, ZOsyn), so Nephrology and HF consults were requested and rec are appreciated .  Patient received Lasix IV with good urine output and creatinine has improved.    The Zosyn was changed to unasyn.   on 3/2 , she was seen sitting and was more awake and has decreased hand tremor, was able to answer some questions appropriately.  will cont IV Lasix and  will monitor urine output.  cont to monitor mentation , WBC , hemodynamics and creatinine.   .  cont to closely monitor / pt has nicely improving .  F/UP WBC   -FULL CODE           Milly Michaels   Hospitalist  [phone] . 65 yo F, PMHx AS s/p TAVR 2015 and 2021, HOCM, pAFib on Xarelto, diastolic and valvular HF, JOSR (does not tolerate CPAP, on 2L O2 at home qhs), anxiety, morbid obesity, DM with neuropathy, HTN, asthma (on albuterol), pw sepsis, n/v, likely 2/2 sacral decubitus ulcer/necrotic wound on left ischium with associated cellulitis.  Patient was seen by the surgery team s/p debridement ,wound with growth of enterococcus avium and strep anginosus with neg blood CX so far.   She was seen by the MICU team due to hypotension which has resolved and was not considered a MICU candidate.  Pt was Cefepime, Vanco , metronidazole which were changed to Zosyn and now on Unasyn.  Patient was noted to have HEATHER with worsening Cr which did not improve with IV Lasix as Acute on chronic Heart failure was tought to be a contributing factor.  Her HEATHER is mulifactorial in nature with Sepsis ( fever , high WBC), IV contrast, Hypotension, acute on chronic heart failure and medications ( Vanco, ZOsyn), so Nephrology and HF consults were requested and recs are appreciated .  Patient received Lasix IV with good urine output and creatinine has improved. Will CW with GDMT for heart failure with goal to change to oral diuretics if she continues to inprove on 3/5.  Pt had metabolic encephalopathy which was considered to be sec to Oxycodone and worsening renal function which has improved with the discontinuation of Oxy and improvement of renal function.   Please monitor WBC has it is fluctuating and not decreasing as expected with proper antibiotics , monitor for fever and monitor the wound for purulence, persistence of nectotic tissue.  CW Unasyn.  Pt had hematuria noted from dunn with no evidence of Stone on CT of abd , and the dunn was removed accidently , the hematuria could most likely from trauma , so monitor bladder scan and Urine outpt and UCX .  Xarelto which was held and is now restarted  -FULL CODE           Milly Michaels   Hospitalist  [phone] .

## 2022-03-04 NOTE — CHART NOTE - NSCHARTNOTEFT_GEN_A_CORE
CT a/p obtained for nephrolithiasis showed abdominal wall thickening and adjacent subcutaneous infiltration c/f cellulitis.     Abdominal exam with moist areas in between folds of panus as well as groin. NO erythema or crepitus or malodorous findings on skin exam. No abdominal tenderness upon palpation.     Georgia Paz MD  PGY-2  Internal Medicine CT a/p obtained for nephrolithiasis showed abdominal wall thickening and adjacent subcutaneous infiltration c/f cellulitis.     Abdominal exam with moist areas in between folds of panus as well as groin. NO erythema or crepitus or malodorous findings on skin exam. No abdominal tenderness upon palpation.     Urine cannister also showed clear dark yellow urine without blood. Pt endorsed "tugging" on dunn, which fell out today. Which is likely cause of hematuria seen in dunn in the morning. H/H stable. Will resume xarelto.     Georgia Paz MD  PGY-2  Internal Medicine

## 2022-03-04 NOTE — PROGRESS NOTE ADULT - SUBJECTIVE AND OBJECTIVE BOX
[facility]-- WOUND TEAM -- FOLLOW UP NOTE  --------------------------------------------------------------------------------    24 hour events/subjective:    tolerating po  ambulating with minimal assist  continent, but has dunn  afebrile      Diet:  Diet, DASH/TLC:   Sodium & Cholesterol Restricted  Consistent Carbohydrate Evening Snack (CSTCHOSN)  No Concentrated Phosphorus  Supplement Feeding Modality:  Oral  Nepro Cans or Servings Per Day:  1       Frequency:  Two Times a day (03-02-22 @ 06:00)      ROS: General/ SKIN see HPI  all other systems negative      ALLERGIES & MEDICATIONS  --------------------------------------------------------------------------------  Allergies  amoxicillin (Unknown)  Cipro (Unknown)  codeine (Vomiting)  shellfish (Stomach Upset)    Intolerances  fish (Stomach Upset)        STANDING INPATIENT MEDICATIONS  ampicillin/sulbactam  IVPB 3 Gram(s) IV Intermittent every 6 hours  aspirin enteric coated 81 milliGRAM(s) Oral daily  atorvastatin 80 milliGRAM(s) Oral at bedtime  citric acid/sodium citrate Solution 30 milliLiter(s) Oral two times a day  Dakins Solution - 1/4 Strength 1 Application(s) Topical every 8 hours  dextrose 40% Gel 15 Gram(s) Oral once  dextrose 5%. 1000 milliLiter(s) IV Continuous <Continuous>  dextrose 5%. 1000 milliLiter(s) IV Continuous <Continuous>  dextrose 50% Injectable 25 Gram(s) IV Push once  dextrose 50% Injectable 12.5 Gram(s) IV Push once  dextrose 50% Injectable 25 Gram(s) IV Push once  FLUoxetine 40 milliGRAM(s) Oral daily  glucagon  Injectable 1 milliGRAM(s) IntraMuscular once  insulin glargine Injectable (LANTUS) 28 Unit(s) SubCutaneous at bedtime  insulin lispro (ADMELOG) corrective regimen sliding scale   SubCutaneous three times a day before meals  insulin lispro (ADMELOG) corrective regimen sliding scale   SubCutaneous at bedtime  insulin lispro Injectable (ADMELOG) 26 Unit(s) SubCutaneous three times a day with meals  metoprolol tartrate 100 milliGRAM(s) Oral two times a day  nystatin Powder 1 Application(s) Topical two times a day  polyethylene glycol 3350 17 Gram(s) Oral two times a day  senna 2 Tablet(s) Oral at bedtime  sevelamer carbonate 800 milliGRAM(s) Oral every 8 hours  sodium chloride 0.9%. 1000 milliLiter(s) IV Continuous <Continuous>      PRN INPATIENT MEDICATION  acetaminophen     Tablet .. 650 milliGRAM(s) Oral every 6 hours PRN  bisacodyl Suppository 10 milliGRAM(s) Rectal daily PRN  sodium chloride 0.65% Nasal 1 Spray(s) Both Nostrils every 6 hours PRN        VITALS/PHYSICAL EXAM  --------------------------------------------------------------------------------  T(C): 36.8 (03-04-22 @ 12:04), Max: 36.8 (03-04-22 @ 12:04)  HR: 72 (03-04-22 @ 12:04) (61 - 86)  BP: 127/72 (03-04-22 @ 12:04) (122/75 - 146/77)  RR: 20 (03-04-22 @ 12:04) (18 - 20)  SpO2: 98% (03-04-22 @ 12:04) (98% - 100%)  Wt(kg): --        03-03-22 @ 07:01  -  03-04-22 @ 07:00  --------------------------------------------------------  IN: 980 mL / OUT: 3300 mL / NET: -2320 mL    03-04-22 @ 07:01  -  03-04-22 @ 13:41  --------------------------------------------------------  IN: 240 mL / OUT: 250 mL / NET: -10 mL      NAD,  A&Ox3, MO,  WD/ WG  Versa Care P500 bed  HEENT:  NC/AT, EOMI, mucosa moist, throat clear, trachea midline, neck supple   Neurology:   strength & sensation grossly intact  Psych: anxious  Musculoskeletal: limited stiff ROM, no deformities/ contractures  Vascular: BLE equally warm,  no cyanosis, clubbing, edema  Skin:  moist w/ good turgor  (+) tattoos      Lt labial/ perineal wound 4.5cm x 4cm x 3cm           w/ 1cm undermining @ 12 o'clock and 5cm at 6o'clock         stringing like slough and eschar on wound edges,           increased moist granular tissue on wound bed    Procedure Note  Using aseptic technique labial/ perineal wound was excisionally debrided using a scissor and forceps through necrotic dermis and nonviable subcutaneous tissue/ slough.  Pt tolerated procedure well.  Hemostasis was maintained throughout.  Debulking debridement of necrotic tissue.   Post measurements 4.5cm x 4cm x 3cm  w/ 1cm undermining @ 12 o'clock and 8cm at 6o'clock      20% nonviable tissue slough 80% granular      serosanguinous drainage      moisture dermatitis in periwound skin  No odor, erythema, increased warmth, tenderness, induration, fluctuance, nor crepitus      LABS/ CULTURES/ RADIOLOGY:              9.4    20.57 >-----------<  379      [03-04-22 @ 07:03]              31.0     140  |  98  |  50  ----------------------------<  84      [03-04-22 @ 07:01]  4.3   |  27  |  1.23        Ca     9.8     [03-04-22 @ 07:01]      Mg     2.7     [03-04-22 @ 07:01]      Phos  3.2     [03-04-22 @ 07:01]    TPro  7.3  /  Alb  3.4  /  TBili  0.4  /  DBili  x   /  AST  30  /  ALT  17  /  AlkPhos  154  [03-04-22 @ 07:01]      CAPILLARY BLOOD GLUCOSE  POCT Blood Glucose.: 115 mg/dL (04 Mar 2022 09:03)  POCT Blood Glucose.: 147 mg/dL (03 Mar 2022 21:07)  POCT Blood Glucose.: 116 mg/dL (03 Mar 2022 17:33)  POCT Blood Glucose.: 169 mg/dL (03 Mar 2022 13:55)    A1C with Estimated Average Glucose Result: 8.6 % (02-27-22 @ 08:18)    Culture - Blood (collected 02-26-22 @ 18:45)  Source: .Blood Blood  Final Report (03-03-22 @ 19:01):    No Growth Final    Culture - Blood (collected 02-26-22 @ 18:45)  Source: .Blood Blood  Final Report (03-03-22 @ 19:01):    No Growth Final [facility]-- WOUND TEAM -- FOLLOW UP NOTE  --------------------------------------------------------------------------------    24 hour events/subjective:    tolerating po  ambulating with minimal assist  continent, but has dunn  afebrile      Diet:  Diet, DASH/TLC:   Sodium & Cholesterol Restricted  Consistent Carbohydrate Evening Snack (CSTCHOSN)  No Concentrated Phosphorus  Supplement Feeding Modality:  Oral  Nepro Cans or Servings Per Day:  1       Frequency:  Two Times a day (03-02-22 @ 06:00)      ROS: General/ SKIN see HPI  all other systems negative      ALLERGIES & MEDICATIONS  --------------------------------------------------------------------------------  Allergies  amoxicillin (Unknown)  Cipro (Unknown)  codeine (Vomiting)  shellfish (Stomach Upset)    Intolerances  fish (Stomach Upset)        STANDING INPATIENT MEDICATIONS  ampicillin/sulbactam  IVPB 3 Gram(s) IV Intermittent every 6 hours  aspirin enteric coated 81 milliGRAM(s) Oral daily  atorvastatin 80 milliGRAM(s) Oral at bedtime  citric acid/sodium citrate Solution 30 milliLiter(s) Oral two times a day  Dakins Solution - 1/4 Strength 1 Application(s) Topical every 8 hours  dextrose 40% Gel 15 Gram(s) Oral once  dextrose 5%. 1000 milliLiter(s) IV Continuous <Continuous>  dextrose 5%. 1000 milliLiter(s) IV Continuous <Continuous>  dextrose 50% Injectable 25 Gram(s) IV Push once  dextrose 50% Injectable 12.5 Gram(s) IV Push once  dextrose 50% Injectable 25 Gram(s) IV Push once  FLUoxetine 40 milliGRAM(s) Oral daily  glucagon  Injectable 1 milliGRAM(s) IntraMuscular once  insulin glargine Injectable (LANTUS) 28 Unit(s) SubCutaneous at bedtime  insulin lispro (ADMELOG) corrective regimen sliding scale   SubCutaneous three times a day before meals  insulin lispro (ADMELOG) corrective regimen sliding scale   SubCutaneous at bedtime  insulin lispro Injectable (ADMELOG) 26 Unit(s) SubCutaneous three times a day with meals  metoprolol tartrate 100 milliGRAM(s) Oral two times a day  nystatin Powder 1 Application(s) Topical two times a day  polyethylene glycol 3350 17 Gram(s) Oral two times a day  senna 2 Tablet(s) Oral at bedtime  sevelamer carbonate 800 milliGRAM(s) Oral every 8 hours  sodium chloride 0.9%. 1000 milliLiter(s) IV Continuous <Continuous>      PRN INPATIENT MEDICATION  acetaminophen     Tablet .. 650 milliGRAM(s) Oral every 6 hours PRN  bisacodyl Suppository 10 milliGRAM(s) Rectal daily PRN  sodium chloride 0.65% Nasal 1 Spray(s) Both Nostrils every 6 hours PRN        VITALS/PHYSICAL EXAM  --------------------------------------------------------------------------------  T(C): 36.8 (03-04-22 @ 12:04), Max: 36.8 (03-04-22 @ 12:04)  HR: 72 (03-04-22 @ 12:04) (61 - 86)  BP: 127/72 (03-04-22 @ 12:04) (122/75 - 146/77)  RR: 20 (03-04-22 @ 12:04) (18 - 20)  SpO2: 98% (03-04-22 @ 12:04) (98% - 100%)  Wt(kg): --        03-03-22 @ 07:01  -  03-04-22 @ 07:00  --------------------------------------------------------  IN: 980 mL / OUT: 3300 mL / NET: -2320 mL    03-04-22 @ 07:01  -  03-04-22 @ 13:41  --------------------------------------------------------  IN: 240 mL / OUT: 250 mL / NET: -10 mL      NAD,  A&Ox3, MO,  WD/ WG  Versa Care P500 bed  HEENT:  NC/AT, EOMI, mucosa moist, throat clear, trachea midline, neck supple   Neurology:   strength & sensation grossly intact  Psych: anxious  Musculoskeletal: limited stiff ROM, no deformities/ contractures  Vascular: BLE equally warm,  no cyanosis, clubbing, edema  Skin:  moist w/ good turgor  (+) tattoos      Lt labial/ perineal wound 4.5cm x 4cm x 3cm           w/ 1cm undermining @ 12 o'clock and 8cm at 6o'clock         stringing like slough and eschar on wound edges,           increased moist granular tissue on wound bed (20% nonviable tissue/slough, 80% granular)         moisture dermatitis in periwound skin         No odor, erythema, increased warmth, tenderness, induration, fluctuance, nor crepitus    Procedure Note  Using aseptic technique labial/ perineal wound was selectively debrided using a scissor and forceps through necrotic epidermis, dermis and nonviable slough.  Pt tolerated procedure well.  Hemostasis was maintained throughout.  Debulking debridement of necrotic tissue.   Post measurements the same.           LABS/ CULTURES/ RADIOLOGY:              9.4    20.57 >-----------<  379      [03-04-22 @ 07:03]              31.0     140  |  98  |  50  ----------------------------<  84      [03-04-22 @ 07:01]  4.3   |  27  |  1.23        Ca     9.8     [03-04-22 @ 07:01]      Mg     2.7     [03-04-22 @ 07:01]      Phos  3.2     [03-04-22 @ 07:01]    TPro  7.3  /  Alb  3.4  /  TBili  0.4  /  DBili  x   /  AST  30  /  ALT  17  /  AlkPhos  154  [03-04-22 @ 07:01]      CAPILLARY BLOOD GLUCOSE  POCT Blood Glucose.: 115 mg/dL (04 Mar 2022 09:03)  POCT Blood Glucose.: 147 mg/dL (03 Mar 2022 21:07)  POCT Blood Glucose.: 116 mg/dL (03 Mar 2022 17:33)  POCT Blood Glucose.: 169 mg/dL (03 Mar 2022 13:55)    A1C with Estimated Average Glucose Result: 8.6 % (02-27-22 @ 08:18)    Culture - Blood (collected 02-26-22 @ 18:45)  Source: .Blood Blood  Final Report (03-03-22 @ 19:01):    No Growth Final    Culture - Blood (collected 02-26-22 @ 18:45)  Source: .Blood Blood  Final Report (03-03-22 @ 19:01):    No Growth Final

## 2022-03-04 NOTE — PROGRESS NOTE ADULT - ASSESSMENT
66F with PMH of severe AS s/p TAVR'15 (Raomn 23mm) and Suzy TAVR in 2021, HCM (no SHAILESH), pAF on Xarelto, JOSR (does not tolerate CPAP, on home 2L O2), MO s/p gastric band c/b gastric perforation, and below the knee DVT/PE who p/w sepsis 2/2 sacral ulcer requiring debridement in the ED. She was treated with IV antibiotic and Bcx has been negative thus far. HF team consulted for fluid status management.

## 2022-03-04 NOTE — PROGRESS NOTE ADULT - PROBLEM SELECTOR PLAN 7
on Humalog 30 TID and Tresiba 30 bedtime   - A1c 8.6%  - Increase to Insulin 26 pre-meal and 28 long-acting bedtime  - check FSG qAC and qhs  - adjust insulin as needed On metoprolol tartate 100mg bid and Xarelto  - HR <110 at this time   - continue metoprolol tartate 100mg bid  - Hold Xarelto in s/o hematuria

## 2022-03-04 NOTE — PROGRESS NOTE ADULT - PROBLEM SELECTOR PLAN 1
-Cr improving with IV diuresis and uremic symptoms are improving, near baseline   -would dose another dose of IV Lasix 80 mg today   -cont AC   -appreciate nephrology input   -HEATHER etiologies are multifactorial: vanc, YING, HF..etc.

## 2022-03-04 NOTE — PROGRESS NOTE ADULT - SUBJECTIVE AND OBJECTIVE BOX
Patient seen and examined at bedside.    Overnight Events:     More interactive and vibrant     REVIEW OF SYSTEMS:  Constitutional:     [x ] negative [ ] fevers [ ] chills [ ] weight loss [ ] weight gain  HEENT:                  [x ] negative [ ] dry eyes [ ] eye irritation [ ] postnasal drip [ ] nasal congestion  CV:                         [ x] negative  [ ] chest pain [ ] orthopnea [ ] palpitations [ ] murmur  Resp:                     [ x] negative [ ] cough [ ] shortness of breath [ ] dyspnea [ ] wheezing [ ] sputum [ ]hemoptysis  GI:                          [ x] negative [ ] nausea [ ] vomiting [ ] diarrhea [ ] constipation [ ] abd pain [ ] dysphagia   :                        [ x] negative [ ] dysuria [ ] nocturia [ ] hematuria [ ] increased urinary frequency  Musculoskeletal: [ x] negative [ ] back pain [ ] myalgias [ ] arthralgias [ ] fracture  Skin:                       [ x] negative [ ] rash [ ] itch  Neurological:        [x ] negative [ ] headache [ ] dizziness [ ] syncope [ ] weakness [ ] numbness  Psychiatric:           [ x] negative [ ] anxiety [ ] depression  Endocrine:            [ x] negative [ ] diabetes [ ] thyroid problem  Heme/Lymph:      [ x] negative [ ] anemia [ ] bleeding problem  Allergic/Immune: [ x] negative [ ] itchy eyes [ ] nasal discharge [ ] hives [ ] angioedema    [ x] All other systems negative  [ ] Unable to assess ROS due to    Current Meds:  acetaminophen     Tablet .. 650 milliGRAM(s) Oral every 6 hours PRN  ampicillin/sulbactam  IVPB 3 Gram(s) IV Intermittent every 6 hours  aspirin enteric coated 81 milliGRAM(s) Oral daily  atorvastatin 80 milliGRAM(s) Oral at bedtime  bisacodyl Suppository 10 milliGRAM(s) Rectal daily PRN  citric acid/sodium citrate Solution 30 milliLiter(s) Oral two times a day  Dakins Solution - 1/4 Strength 1 Application(s) Topical every 8 hours  dextrose 40% Gel 15 Gram(s) Oral once  dextrose 5%. 1000 milliLiter(s) IV Continuous <Continuous>  dextrose 5%. 1000 milliLiter(s) IV Continuous <Continuous>  dextrose 50% Injectable 25 Gram(s) IV Push once  dextrose 50% Injectable 12.5 Gram(s) IV Push once  dextrose 50% Injectable 25 Gram(s) IV Push once  FLUoxetine 40 milliGRAM(s) Oral daily  glucagon  Injectable 1 milliGRAM(s) IntraMuscular once  insulin glargine Injectable (LANTUS) 28 Unit(s) SubCutaneous at bedtime  insulin lispro (ADMELOG) corrective regimen sliding scale   SubCutaneous three times a day before meals  insulin lispro (ADMELOG) corrective regimen sliding scale   SubCutaneous at bedtime  insulin lispro Injectable (ADMELOG) 26 Unit(s) SubCutaneous three times a day with meals  metoprolol tartrate 100 milliGRAM(s) Oral two times a day  nystatin Powder 1 Application(s) Topical two times a day  polyethylene glycol 3350 17 Gram(s) Oral two times a day  senna 2 Tablet(s) Oral at bedtime  sevelamer carbonate 800 milliGRAM(s) Oral every 8 hours  sodium chloride 0.65% Nasal 1 Spray(s) Both Nostrils every 6 hours PRN  sodium chloride 0.9%. 1000 milliLiter(s) IV Continuous <Continuous>      PAST MEDICAL & SURGICAL HISTORY:  Hypertension    Gastroparesis due to DM    Depression    Herniated disc    Spinal stenosis  chronic back pain    Cholesterol serum elevated    MVP (mitral valve prolapse)    Hypothyroid  not on medication    Obesity    IBS (irritable bowel syndrome)    GERD (gastroesophageal reflux disease)    Diabetes type 2, uncontrolled    History of diabetic retinopathy    Peripheral neuropathy    HOCM (hypertrophic obstructive cardiomyopathy)    Atrial fibrillation    Aortic stenosis    S/P bariatric surgery  lap band surgery two times due to erosion, S/P removal    S/P tonsillectomy and adenoidectomy    Cataract  B/L cataracts    S/P D&amp;C (status post dilation and curettage)  D&amp;C when she was 18    Cervix abnormality  Ablation of cervix    Breast anomaly  Biopsy of both breast benign lesions    right eye torn retina    S/P TAVR (transcatheter aortic valve replacement)  2015    S/P foot surgery  May 2021        Vitals:  T(F): 98.3 (03-04), Max: 98.3 (03-04)  HR: 72 (03-04) (61 - 86)  BP: 127/72 (03-04) (122/75 - 146/77)  RR: 20 (03-04)  SpO2: 98% (03-04)  I&O's Summary    03 Mar 2022 07:01  -  04 Mar 2022 07:00  --------------------------------------------------------  IN: 980 mL / OUT: 3300 mL / NET: -2320 mL    04 Mar 2022 07:01  -  04 Mar 2022 16:55  --------------------------------------------------------  IN: 480 mL / OUT: 250 mL / NET: 230 mL        Physical Exam:  GENERAL: No acute distress, well-developed  HEAD:  Atraumatic, Normocephalic  ENT: JVD 10-11 cm   CHEST/LUNG: CTABL  HEART: Regular rate and irregular rhythm; No murmurs, rubs, or gallops  ABDOMEN: Soft, Nontender, Nondistended; Bowel sounds present  EXTREMITIES:  2+ edema b/l   NEUROLOGY: AAOx3, non-focal, cranial nerves intact  SKIN: Normal color, No rashes or lesions                          9.4    20.57 )-----------( 379      ( 04 Mar 2022 07:03 )             31.0     03-04    140  |  98  |  50<H>  ----------------------------<  84  4.3   |  27  |  1.23    Ca    9.8      04 Mar 2022 07:01  Phos  3.2     03-04  Mg     2.7     03-04    TPro  7.3  /  Alb  3.4  /  TBili  0.4  /  DBili  x   /  AST  30  /  ALT  17  /  AlkPhos  154<H>  03-04          Serum Pro-Brain Natriuretic Peptide: 5058 pg/mL (03-04 @ 07:01)  Serum Pro-Brain Natriuretic Peptide: 6395 pg/mL (03-03 @ 06:59)  Serum Pro-Brain Natriuretic Peptide: 33734 pg/mL (03-02 @ 10:45)  Serum Pro-Brain Natriuretic Peptide: 71872 pg/mL (03-01 @ 06:38)  Serum Pro-Brain Natriuretic Peptide: 97520 pg/mL (02-28 @ 06:49)  Serum Pro-Brain Natriuretic Peptide: 7759 pg/mL (02-26 @ 13:46)          Cardiovascular Testings:       Interpretation of Telemetry:

## 2022-03-04 NOTE — PROGRESS NOTE ADULT - ASSESSMENT
64 y/o lady with PMH type 2 diabetes with H/O gastroparesis, pulmonary embolism and right lower extremity DVT 8/2020, PVD, hypertension, aortic stenosis  S/P TAVR 2015 and redo TAVR in 2021 presented to ED today with fever since 4 days. Pt also endorses acute on chronic shortness of breath. This is worsened w/ laying down and w/ exertion. She could not walk to her mail box without getting short of breath earlier today, prompting her arrival here. She denies associated cough, neck pain, confusion, weakness, hemoptysis, chest pain, abdominal pain, dysuria, vaginal discharge. She has chronic immobility 2/2 to morbid obesity. She denies any recent changes to medications. Pt with leukocytosis to 39 k with fever to 101.5 s/p vanc and cefepime in ER. CTAP with IV con: Multiple foci of subcutaneous gas at the left gluteal fold with mild associated inflammatory changes. Pt with intermittent Boderline SBP in 60s.    WORKUP  BNP: 7759  ESR: 50,   RVP: negative.  MRSA PCR Result.: NotDetec (07-21-21 @ 19:23)  UA: 34 WBC, Large LE, negative nitrates and bactetria  CTAP with IV con: Multiple foci of subcutaneous gas at the left gluteal fold with mild associated inflammatory changes      #Fever, Infected Decubitus Ulcer, Leukocytosis (persistent), Hypoxic Respiratory Failure  Wound Cultures with Streptococcus anginosis and Enterococcus  MRSA/MSSA Nasal PCR Negative  Tolerated Zosyn and Augmentin in 5/2021  Tolerating Zosyn this admission  Has persistent leukocytosis  Noting loose stools  CT A/P (3/4) noncontrast but noting abdominal wall infiltration ?unclear significance. Not noting any pelvic collections albeit noncontrast study.     --Will increase Unasyn to 3g IV Q6H  --Recommend C diff toxin assay if diarrhea (noting loose stools today)  --If continued uptrend in WBC would repeat U/A (UCx if pyuria) and BCx and can broaden to Zosyn  --Further debridement / wound packing per wound care  --Continue to follow CBC with diff  --Continue to follow temperature curve  --Follow up on preliminary blood cultures    I will be away over this upcoming weekend. Please contact the Infectious Diseases Office with any further questions or concerns.     Hao Torres M.D.  [facility] Division of Infectious Disease  8AM-5PM Monday - Friday: Available on [employer] Teams  After Hours and Holidays (or if no response on [employer] Teams): Please contact the Infectious Diseases Office at [phone]     The above assessment and plan were discussed with medicine team

## 2022-03-04 NOTE — PROGRESS NOTE ADULT - ATTENDING COMMENTS
Briefly, 66F with h/o severe AS s/p TAVR'15 (Ramon 23mm) and Suzy TAVR in 2021, pAF on Xarelto, JOSR (does not tolerate CPAP, on home 2L O2), morbid obesity s/p gastric band c/b gastric perforation, and DVT/PE who p/w sepsis on 2/26 2/2 sacral ulcer requiring debridement in the ED. She was treated with IV antibiotic and Bcx has been negative thus far. Since admission, had worsening renal function now improving (of note, baseline Cr 0.92 which increased to 4.6; possibly related to CT abdom/pelvis w/ contrast). BNP 19k (up from prior). Received IV lasix with good response. Feels overall better - less shaky. On exam, morbidly obese, JVP approx 14 cm with HJR, crackles b/l, RRR, no m/r/g, nontender abdomen, trace edema b/l.   - c/w lasix 80 mg IV daily; renal function improving  - c/w lopressor 100 mg q12  - c/w abx

## 2022-03-04 NOTE — PROGRESS NOTE ADULT - PROBLEM SELECTOR PLAN 1
IMPROVING     Neuro exam with asterixis during course, coinciding with elevation in BUN. Likely 2/2 uremia.   DDx to include hypercarbia given JOSR, as well as oxycodone induced (sister reports hallucination with oxy)   - Improving s/p lasix and BUN Cr down trending. Also s/p d/c of oxycodone  - VBG without significant hypercarbia  - Nephro consulted, appreciate recs. Monitor for need for urgent HD. Xarelto held (last dose 3/1/22)  - Monitor renal function as below   - Non focal exam. no need for CT head at this time. likely metabolic in nature  - Continue to monitor for mental status & uremia Back to baseline     Neuro exam with asterixis during course, coinciding with elevation in BUN. Likely 2/2 uremia.   DDx to include hypercarbia given JOSR, as well as oxycodone induced (sister reports hallucination with oxy)   - Improving s/p lasix and BUN Cr down trending. Also s/p d/c of oxycodone  - VBG without significant hypercarbia  - Nephro consulted, appreciate recs. Monitor for need for urgent HD. Xarelto held (last dose 3/1/22)  - Monitor renal function as below   - Non focal exam. no need for CT head at this time. likely metabolic in nature  - Continue to monitor for mental status & uremia EF previously 75% and hyperdynamic LV on prior ECHO. On admission with pulm vasc congestion and possible hypervolemic state, concerning for decompensated HF  - BNP up trended from 7k to 15k then 19k   - HF consulted as volume status is challenging to assess in s/o morbid obesity. Cr continued to rise despite lasix challenge. TTE obtained this admission without significant change when compared to prior. Still mod pulm HTN, mod MR, grade II diastolic dysfunction.   - BNP down trending  - C/w lasix 80IV qD, plan to transition to PO 3/5    - Monitor I/Os strictly  - Daily weights   - Mg>2 K>4

## 2022-03-04 NOTE — PROGRESS NOTE ADULT - PROBLEM SELECTOR PLAN 9
DVT ppx: as above    Diet: DASH/CC  PT consulted on Losartan 25 and Lopressor   - c/w lopressor  - hold Losartan given hypotension from sepsis & HEATHER

## 2022-03-04 NOTE — PROGRESS NOTE ADULT - SUBJECTIVE AND OBJECTIVE BOX
INCOMPLETE NOTE    CHIEF COMPLAINT:    Interval Events:    REVIEW OF SYSTEMS:  CONSTITUTIONAL: No weakness, fevers or chills  EYES/ENT: No visual changes;  No vertigo or throat pain   NECK: No pain or stiffness  RESPIRATORY: No cough, wheezing, hemoptysis; No shortness of breath  CARDIOVASCULAR: No chest pain or palpitations  GASTROINTESTINAL: No abdominal or epigastric pain. No nausea, vomiting, or hematemesis; No diarrhea or constipation. No melena or hematochezia.  GENITOURINARY: No dysuria, frequency or hematuria  NEUROLOGICAL: No numbness or weakness  SKIN: No itching, burning, rashes, or lesions   All other review of systems is negative unless indicated above.    OBJECTIVE:  ICU Vital Signs Last 24 Hrs  T(C): 36.6 (04 Mar 2022 04:34), Max: 36.7 (03 Mar 2022 20:31)  T(F): 97.8 (04 Mar 2022 04:34), Max: 98.1 (03 Mar 2022 20:31)  HR: 61 (04 Mar 2022 04:34) (61 - 86)  BP: 132/63 (04 Mar 2022 04:34) (122/75 - 146/77)  BP(mean): --  ABP: --  ABP(mean): --  RR: 18 (04 Mar 2022 04:34) (18 - 20)  SpO2: 98% (04 Mar 2022 04:34) (98% - 100%)         @ 07: @ 07:00  --------------------------------------------------------  IN: 980 mL / OUT: 3300 mL / NET: -2320 mL     @ 07: @ 09:59  --------------------------------------------------------  IN: 0 mL / OUT: 250 mL / NET: -250 mL      CAPILLARY BLOOD GLUCOSE      POCT Blood Glucose.: 115 mg/dL (04 Mar 2022 09:03)      PHYSICAL EXAM:  General: WN/WD NAD  Neurology: A&Ox3, nonfocal, PEARCE x 4  Eyes: PERRLA/ EOMI, Gross vision intact  ENT/Neck: Neck supple, trachea midline, No JVD, Gross hearing intact  Respiratory: CTA B/L, No wheezing, rales, rhonchi  CV: RRR, +S1/S2, -S3/S4, no murmurs, rubs or gallops  Abdominal: Soft, NT, ND +BS, No HSM  MSK: 5/5 strength UE/LE bilaterally  Extremities: No edema, 2+ peripheral pulses  Skin: No Rashes, Hematoma, Ecchymosis  Incisions:   Tubes:    HOSPITAL MEDICATIONS:  MEDICATIONS  (STANDING):  ampicillin/sulbactam  IVPB 3 Gram(s) IV Intermittent every 8 hours  aspirin enteric coated 81 milliGRAM(s) Oral daily  atorvastatin 80 milliGRAM(s) Oral at bedtime  citric acid/sodium citrate Solution 30 milliLiter(s) Oral two times a day  Dakins Solution - 1/4 Strength 1 Application(s) Topical every 8 hours  dextrose 40% Gel 15 Gram(s) Oral once  dextrose 5%. 1000 milliLiter(s) (50 mL/Hr) IV Continuous <Continuous>  dextrose 5%. 1000 milliLiter(s) (100 mL/Hr) IV Continuous <Continuous>  dextrose 50% Injectable 25 Gram(s) IV Push once  dextrose 50% Injectable 12.5 Gram(s) IV Push once  dextrose 50% Injectable 25 Gram(s) IV Push once  FLUoxetine 40 milliGRAM(s) Oral daily  furosemide   Injectable 80 milliGRAM(s) IV Push once  glucagon  Injectable 1 milliGRAM(s) IntraMuscular once  insulin glargine Injectable (LANTUS) 28 Unit(s) SubCutaneous at bedtime  insulin lispro (ADMELOG) corrective regimen sliding scale   SubCutaneous three times a day before meals  insulin lispro (ADMELOG) corrective regimen sliding scale   SubCutaneous at bedtime  insulin lispro Injectable (ADMELOG) 26 Unit(s) SubCutaneous three times a day with meals  metoprolol tartrate 100 milliGRAM(s) Oral two times a day  nystatin Powder 1 Application(s) Topical two times a day  polyethylene glycol 3350 17 Gram(s) Oral two times a day  senna 2 Tablet(s) Oral at bedtime  sevelamer carbonate 800 milliGRAM(s) Oral every 8 hours  sodium chloride 0.9%. 1000 milliLiter(s) (60 mL/Hr) IV Continuous <Continuous>    MEDICATIONS  (PRN):  acetaminophen     Tablet .. 650 milliGRAM(s) Oral every 6 hours PRN Mild Pain (1 - 3), Moderate Pain (4 - 6)  bisacodyl Suppository 10 milliGRAM(s) Rectal daily PRN Constipation  sodium chloride 0.65% Nasal 1 Spray(s) Both Nostrils every 6 hours PRN Nasal Congestion      LABS:                        9.4    20.57 )-----------( 379      ( 04 Mar 2022 07:03 )             31.0     Hgb Trend: 9.4<--, 10.3<--, 9.1<--, 9.6<--, 10.5<--  0304    140  |  98  |  50<H>  ----------------------------<  84  4.3   |  27  |  1.23    Ca    9.8      04 Mar 2022 07:01  Phos  3.2     -04  Mg     2.7     04    TPro  7.3  /  Alb  3.4  /  TBili  0.4  /  DBili  x   /  AST  30  /  ALT  17  /  AlkPhos  154<H>  04    Creatinine Trend: 1.23<--, 2.22<--, 2.54<--, 3.32<--, 4.29<--, 4.52<--    Urinalysis Basic - ( 02 Mar 2022 16:34 )    Color: Light Orange / Appearance: Slightly Turbid / S.016 / pH: x  Gluc: x / Ketone: Negative  / Bili: Negative / Urobili: Negative   Blood: x / Protein: 100 / Nitrite: Negative   Leuk Esterase: Moderate / RBC: 34 /hpf / WBC 17 /HPF   Sq Epi: x / Non Sq Epi: 0 /hpf / Bacteria: 0.0      Arterial Blood Gas:   @ 16:36  7.08/76/45/22/57.3/-8.1  ABG lactate: --    Venous Blood Gas:   @ 08:48  7.42/46/57/30/90.2  VBG Lactate: 1.4  Venous Blood Gas:   @ 17:26  7.38/48/31/28/50.6  VBG Lactate: 2.1  Venous Blood Gas:   @ 10:45  7.32/51/24/26/33.9  VBG Lactate: 1.5      MICROBIOLOGY:       RADIOLOGY:  [ ] Reviewed by me    PULMONARY FUNCTION TESTS:    EKG:   CHIEF COMPLAINT:    Interval Events: NAEON, Patient seen and examind this AS. Reporting improvements in confusion, denies hallucinations, states want Dunn out.    REVIEW OF SYSTEMS:  CONSTITUTIONAL: No weakness, fevers or chills  EYES/ENT: No visual changes;  No vertigo or throat pain   NECK: No pain or stiffness  RESPIRATORY: No cough, wheezing, hemoptysis; No shortness of breath  CARDIOVASCULAR: No chest pain or palpitations  GASTROINTESTINAL: +abdominal or epigastric pain (states from dunn). No nausea, vomiting, or hematemesis  GENITOURINARY: No dysuria, frequency or hematuria  NEUROLOGICAL: No numbness or weakness  SKIN: No itching, burning, rashes, or lesions   All other review of systems is negative unless indicated above.    OBJECTIVE:  ICU Vital Signs Last 24 Hrs  T(C): 36.6 (04 Mar 2022 04:34), Max: 36.7 (03 Mar 2022 20:31)  T(F): 97.8 (04 Mar 2022 04:34), Max: 98.1 (03 Mar 2022 20:31)  HR: 61 (04 Mar 2022 04:34) (61 - 86)  BP: 132/63 (04 Mar 2022 04:34) (122/75 - 146/77)  BP(mean): --  ABP: --  ABP(mean): --  RR: 18 (04 Mar 2022 04:34) (18 - 20)  SpO2: 98% (04 Mar 2022 04:34) (98% - 100%)         @ : @ 07:00  --------------------------------------------------------  IN: 980 mL / OUT: 3300 mL / NET: -2320 mL     @ 07: @ 09:59  --------------------------------------------------------  IN: 0 mL / OUT: 250 mL / NET: -250 mL      CAPILLARY BLOOD GLUCOSE      POCT Blood Glucose.: 115 mg/dL (04 Mar 2022 09:03)      PHYSICAL EXAM:  General: WN/WD NAD  Neurology: A&Ox3, nonfocal, PEARCE x 4  Eyes: PERRLA/ EOMI, Gross vision intact  ENT/Neck: Neck supple, trachea midline, No JVD, Gross hearing intact  Respiratory: CTA B/L in anterior lung fields, No wheezing, rales, rhonchi  CV: RRR, +S1/S2, -S3/S4, no murmurs, rubs or gallops  Abdominal: Soft, NT, ND +BS, No HSM  Extremities: 1+ edema, 2+ peripheral pulses  Skin: No Rashes, Hematoma, Ecchymosis  Incisions:   Tubes:    HOSPITAL MEDICATIONS:  MEDICATIONS  (STANDING):  ampicillin/sulbactam  IVPB 3 Gram(s) IV Intermittent every 8 hours  aspirin enteric coated 81 milliGRAM(s) Oral daily  atorvastatin 80 milliGRAM(s) Oral at bedtime  citric acid/sodium citrate Solution 30 milliLiter(s) Oral two times a day  Dakins Solution - 1/4 Strength 1 Application(s) Topical every 8 hours  dextrose 40% Gel 15 Gram(s) Oral once  dextrose 5%. 1000 milliLiter(s) (50 mL/Hr) IV Continuous <Continuous>  dextrose 5%. 1000 milliLiter(s) (100 mL/Hr) IV Continuous <Continuous>  dextrose 50% Injectable 25 Gram(s) IV Push once  dextrose 50% Injectable 12.5 Gram(s) IV Push once  dextrose 50% Injectable 25 Gram(s) IV Push once  FLUoxetine 40 milliGRAM(s) Oral daily  furosemide   Injectable 80 milliGRAM(s) IV Push once  glucagon  Injectable 1 milliGRAM(s) IntraMuscular once  insulin glargine Injectable (LANTUS) 28 Unit(s) SubCutaneous at bedtime  insulin lispro (ADMELOG) corrective regimen sliding scale   SubCutaneous three times a day before meals  insulin lispro (ADMELOG) corrective regimen sliding scale   SubCutaneous at bedtime  insulin lispro Injectable (ADMELOG) 26 Unit(s) SubCutaneous three times a day with meals  metoprolol tartrate 100 milliGRAM(s) Oral two times a day  nystatin Powder 1 Application(s) Topical two times a day  polyethylene glycol 3350 17 Gram(s) Oral two times a day  senna 2 Tablet(s) Oral at bedtime  sevelamer carbonate 800 milliGRAM(s) Oral every 8 hours  sodium chloride 0.9%. 1000 milliLiter(s) (60 mL/Hr) IV Continuous <Continuous>    MEDICATIONS  (PRN):  acetaminophen     Tablet .. 650 milliGRAM(s) Oral every 6 hours PRN Mild Pain (1 - 3), Moderate Pain (4 - 6)  bisacodyl Suppository 10 milliGRAM(s) Rectal daily PRN Constipation  sodium chloride 0.65% Nasal 1 Spray(s) Both Nostrils every 6 hours PRN Nasal Congestion      LABS:                        9.4    20.57 )-----------( 379      ( 04 Mar 2022 07:03 )             31.0     Hgb Trend: 9.4<--, 10.3<--, 9.1<--, 9.6<--, 10.5<--  03-04    140  |  98  |  50<H>  ----------------------------<  84  4.3   |  27  |  1.23    Ca    9.8      04 Mar 2022 07:01  Phos  3.2     03-04  Mg     2.7     03-04    TPro  7.3  /  Alb  3.4  /  TBili  0.4  /  DBili  x   /  AST  30  /  ALT  17  /  AlkPhos  154<H>  03-04    Creatinine Trend: 1.23<--, 2.22<--, 2.54<--, 3.32<--, 4.29<--, 4.52<--    Urinalysis Basic - ( 02 Mar 2022 16:34 )    Color: Light Orange / Appearance: Slightly Turbid / S.016 / pH: x  Gluc: x / Ketone: Negative  / Bili: Negative / Urobili: Negative   Blood: x / Protein: 100 / Nitrite: Negative   Leuk Esterase: Moderate / RBC: 34 /hpf / WBC 17 /HPF   Sq Epi: x / Non Sq Epi: 0 /hpf / Bacteria: 0.0      Arterial Blood Gas:   @ 16:36  7.08/76/45/22/57.3/-8.1  ABG lactate: --    Venous Blood Gas:   @ 08:48  7.42/46/57/30/90.2  VBG Lactate: 1.4  Venous Blood Gas:   @ 17:26  7.38/48/31/28/50.6  VBG Lactate: 2.1  Venous Blood Gas:   @ 10:45  7.32/51/24/26/33.9  VBG Lactate: 1.5      MICROBIOLOGY:       RADIOLOGY:  [ ] Reviewed by me    PULMONARY FUNCTION TESTS:    EKG:

## 2022-03-04 NOTE — PROGRESS NOTE ADULT - PROBLEM SELECTOR PLAN 4
Fever, leukocytosis, hypotension on admission. Sepsis 2/2 likely sacral wound/cellulitis  - s/p bedside debridement in ED   - BCx 2/26 NGTD  - Wound Cx growing E. avium and strep anginosus   - Zosyn (2/27- 3/1 )   - Unasyn 3/1 given culture sensitivities and prevent renal injury   - MRSA pcr negative   - daily packing changes  - Appreciate ID recs Cr normal on admission, peaked around 4.7 now down trending and improving   Cardiorenal etiology most likely given below:  - Urine lytes suggestive of pre-renal etiology, hypervolemic on exam, BNP 19k   - Trial of lasix 80mg IV 3/1 with improvement in renal function     Plan:  - Lasix 80 IV x 1 today, plan for transition to PO 3/5   - Avoid nephrotoxic agents  - Strict I/Os  - HF and nephrology following, appreciate recs

## 2022-03-04 NOTE — PROGRESS NOTE ADULT - PROBLEM SELECTOR PLAN 5
At home on 2L nasal cannula as needed. On admission hypoxic to 87 % , temporarily required BiPAP. CXR with pulm vascular congestion  - Likely multifactorial in s/o decompensated HF (BNP 15k), and chronically poor respiratory mechanism in s/o JOSR OHS   - TTE in Sept 2021 showed hyperdynamic LV systolic function, EF 75%, and mod pulm HTN  - Low suspicion for PE (on home xarelto for a.fib)  - HR <110, unlikely dysrhythmia driven   - Repeat TTE as above   - ICS  - Lasix as above  - Wean oxygen as tolerated Back to baseline     Neuro exam with asterixis during course, coinciding with elevation in BUN. Likely 2/2 uremia.   DDx to include hypercarbia given JOSR, as well as oxycodone induced (sister reports hallucination with oxy)   - Improving s/p lasix and BUN Cr down trending. Also s/p d/c of oxycodone  - VBG without significant hypercarbia  - Nephro consulted, appreciate recs. Monitor for need for urgent HD. Xarelto held (last dose 3/1/22)  - Monitor renal function as below   - Non focal exam. no need for CT head at this time. likely metabolic in nature  - Continue to monitor for mental status & uremia

## 2022-03-04 NOTE — PROGRESS NOTE ADULT - SUBJECTIVE AND OBJECTIVE BOX
Follow Up:      Interval History:    REVIEW OF SYSTEMS  [  ] ROS unobtainable because:    [  ] All other systems negative except as noted below    Constitutional:  [ ] fever [ ] chills  [ ] weight loss  [ ] weakness  Skin:  [ ] rash [ ] phlebitis	  Eyes: [ ] icterus [ ] pain  [ ] discharge	  ENMT: [ ] sore throat  [ ] thrush [ ] ulcers [ ] exudates  Respiratory: [ ] dyspnea [ ] hemoptysis [ ] cough [ ] sputum	  Cardiovascular:  [ ] chest pain [ ] palpitations [ ] edema	  Gastrointestinal:  [ ] nausea [ ] vomiting [ ] diarrhea [ ] constipation [ ] pain	  Genitourinary:  [ ] dysuria [ ] frequency [ ] hematuria [ ] discharge [ ] flank pain  [ ] incontinence  Musculoskeletal:  [ ] myalgias [ ] arthralgias [ ] arthritis  [ ] back pain  Neurological:  [ ] headache [ ] seizures  [ ] confusion/altered mental status    Allergies  amoxicillin (Unknown)  Cipro (Unknown)  codeine (Vomiting)  shellfish (Stomach Upset)        ANTIMICROBIALS:  ampicillin/sulbactam  IVPB 3 every 6 hours      OTHER MEDS:  MEDICATIONS  (STANDING):  acetaminophen     Tablet .. 650 every 6 hours PRN  aspirin enteric coated 81 daily  atorvastatin 80 at bedtime  bisacodyl Suppository 10 daily PRN  dextrose 40% Gel 15 once  dextrose 50% Injectable 25 once  dextrose 50% Injectable 12.5 once  dextrose 50% Injectable 25 once  FLUoxetine 40 daily  glucagon  Injectable 1 once  insulin glargine Injectable (LANTUS) 28 at bedtime  insulin lispro (ADMELOG) corrective regimen sliding scale  three times a day before meals  insulin lispro (ADMELOG) corrective regimen sliding scale  at bedtime  insulin lispro Injectable (ADMELOG) 26 three times a day with meals  metoprolol tartrate 100 two times a day  polyethylene glycol 3350 17 two times a day  rivaroxaban 20 daily  senna 2 at bedtime      Vital Signs Last 24 Hrs  T(C): 36.8 (04 Mar 2022 12:04), Max: 36.8 (04 Mar 2022 12:04)  T(F): 98.3 (04 Mar 2022 12:04), Max: 98.3 (04 Mar 2022 12:04)  HR: 72 (04 Mar 2022 12:04) (61 - 81)  BP: 127/72 (04 Mar 2022 12:04) (122/75 - 139/77)  BP(mean): --  RR: 20 (04 Mar 2022 12:04) (18 - 20)  SpO2: 98% (04 Mar 2022 12:04) (98% - 99%)    PHYSICAL EXAMINATION:  General: Alert and Awake, NAD  HEENT: PERRL, EOMI  Neck: Supple  Cardiac: RRR, No M/R/G  Resp: CTAB, No Wh/Rh/Ra  Abdomen: NBS, NT/ND, No HSM, No rigidity or guarding  MSK: No LE edema. No Calf tenderness  : No dunn  Skin: No rashes or lesions. Skin is warm and dry to the touch.   Neuro: Alert and Awake. CN 2-12 Grossly intact. Moves all four extremities spontaneously.  Psych: Calm, Pleasant, Cooperative                          9.4    20.57 )-----------( 379      ( 04 Mar 2022 07:03 )             31.0       03-04    140  |  98  |  50<H>  ----------------------------<  84  4.3   |  27  |  1.23    Ca    9.8      04 Mar 2022 07:01  Phos  3.2     03-04  Mg     2.7     03-04    TPro  7.3  /  Alb  3.4  /  TBili  0.4  /  DBili  x   /  AST  30  /  ALT  17  /  AlkPhos  154<H>  03-04      Urinalysis Basic - ( 04 Mar 2022 09:55 )    Color: DARK BROWN / Appearance: Turbid / S.024 / pH: x  Gluc: x / Ketone: Trace  / Bili: Negative / Urobili: Negative   Blood: x / Protein: 100 mg/dL / Nitrite: Negative   Leuk Esterase: Large / RBC: 6260 /hpf /  /HPF   Sq Epi: x / Non Sq Epi: 9 /hpf / Bacteria: Negative        MICROBIOLOGY:  v  Decub Decubitus  22   Moderate Enterococcus avium  Numerous Streptococcus anginosus Susceptibilites not performed.  --  Enterococcus avium      Clean Catch Clean Catch (Midstream)  22   <10,000 CFU/mL Normal Urogenital Iga  --  --      .Blood Blood  22   No Growth Final  --  --          Rapid RVP Result: NotDetec (02-26 @ 13:45)        RADIOLOGY:    <The imaging below has been reviewed and visualized by me independently. Findings as detailed in report below> Follow Up:  infected decubitus    Interval History: denies pain at her wound today. afebrile. notes loose stools.     REVIEW OF SYSTEMS  [  ] ROS unobtainable because:    [x  ] All other systems negative except as noted below    Constitutional:  [ ] fever [ ] chills  [ ] weight loss  [ ] weakness  Skin:  [ ] rash [ ] phlebitis	  Eyes: [ ] icterus [ ] pain  [ ] discharge	  ENMT: [ ] sore throat  [ ] thrush [ ] ulcers [ ] exudates  Respiratory: [ ] dyspnea [ ] hemoptysis [ ] cough [ ] sputum	  Cardiovascular:  [ ] chest pain [ ] palpitations [ ] edema	  Gastrointestinal:  [ ] nausea [ ] vomiting [ ] diarrhea [ ] constipation [ ] pain +loose stools	  Genitourinary:  [ ] dysuria [ ] frequency [ ] hematuria [ ] discharge [ ] flank pain  [ ] incontinence  Musculoskeletal:  [ ] myalgias [ ] arthralgias [ ] arthritis  [ ] back pain  Neurological:  [ ] headache [ ] seizures  [ ] confusion/altered mental status    Allergies  amoxicillin (Unknown)  Cipro (Unknown)  codeine (Vomiting)  shellfish (Stomach Upset)        ANTIMICROBIALS:  ampicillin/sulbactam  IVPB 3 every 6 hours      OTHER MEDS:  MEDICATIONS  (STANDING):  acetaminophen     Tablet .. 650 every 6 hours PRN  aspirin enteric coated 81 daily  atorvastatin 80 at bedtime  bisacodyl Suppository 10 daily PRN  dextrose 40% Gel 15 once  dextrose 50% Injectable 25 once  dextrose 50% Injectable 12.5 once  dextrose 50% Injectable 25 once  FLUoxetine 40 daily  glucagon  Injectable 1 once  insulin glargine Injectable (LANTUS) 28 at bedtime  insulin lispro (ADMELOG) corrective regimen sliding scale  three times a day before meals  insulin lispro (ADMELOG) corrective regimen sliding scale  at bedtime  insulin lispro Injectable (ADMELOG) 26 three times a day with meals  metoprolol tartrate 100 two times a day  polyethylene glycol 3350 17 two times a day  rivaroxaban 20 daily  senna 2 at bedtime      Vital Signs Last 24 Hrs  T(C): 36.8 (04 Mar 2022 12:04), Max: 36.8 (04 Mar 2022 12:04)  T(F): 98.3 (04 Mar 2022 12:04), Max: 98.3 (04 Mar 2022 12:04)  HR: 72 (04 Mar 2022 12:04) (61 - 81)  BP: 127/72 (04 Mar 2022 12:04) (122/75 - 139/77)  BP(mean): --  RR: 20 (04 Mar 2022 12:04) (18 - 20)  SpO2: 98% (04 Mar 2022 12:04) (98% - 99%)    PHYSICAL EXAMINATION:  General: Alert and Awake, NAD  HEENT: PERRL, EOMI  Cardiac: RRR, No M/R/G  Resp: CTAB, No Wh/Rh/Ra  Abdomen: NBS, NT/ND, No HSM, No rigidity or guarding  MSK: No LE edema. No Calf tenderness  : Dressing over gluteal wound  Skin: No rashes or lesions. Skin is warm and dry to the touch.   Neuro: Alert and Awake. CN 2-12 Grossly intact. Moves all four extremities spontaneously.  Psych: Calm, Pleasant, Cooperative                          9.4    20.57 )-----------( 379      ( 04 Mar 2022 07:03 )             31.0       03-04    140  |  98  |  50<H>  ----------------------------<  84  4.3   |  27  |  1.23    Ca    9.8      04 Mar 2022 07:01  Phos  3.2     03-04  Mg     2.7     03-04    TPro  7.3  /  Alb  3.4  /  TBili  0.4  /  DBili  x   /  AST  30  /  ALT  17  /  AlkPhos  154<H>  03-04      Urinalysis Basic - ( 04 Mar 2022 09:55 )    Color: DARK BROWN / Appearance: Turbid / S.024 / pH: x  Gluc: x / Ketone: Trace  / Bili: Negative / Urobili: Negative   Blood: x / Protein: 100 mg/dL / Nitrite: Negative   Leuk Esterase: Large / RBC: 6260 /hpf /  /HPF   Sq Epi: x / Non Sq Epi: 9 /hpf / Bacteria: Negative        MICROBIOLOGY:  v  Decub Decubitus  22   Moderate Enterococcus avium  Numerous Streptococcus anginosus Susceptibilites not performed.  --  Enterococcus avium      Clean Catch Clean Catch (Midstream)  22   <10,000 CFU/mL Normal Urogenital Gia  --  --      .Blood Blood  22   No Growth Final  --  --          Rapid RVP Result: NotDetec ( @ 13:45)        RADIOLOGY:    <The imaging below has been reviewed and visualized by me independently. Findings as detailed in report below>    < from: CT Abdomen and Pelvis No Cont (22 @ 15:00) >  IMPRESSION:  No renal stones or hydronephrosis.    Lower ventral abdominal wall skin thickening and adjacent subcutaneous   infiltration. Correlate for cellulitis.    < end of copied text >

## 2022-03-04 NOTE — PROGRESS NOTE ADULT - PROBLEM SELECTOR PLAN 10
- reported discussion between pt and surgical team was that pt wanted DNI.   - on further discussion with pt, pt states she requests to be full code, but only wants to be intubated if she is completely sedated.  - pt requests all medical therapy as necessary and indicated  - time spent 20 min DVT ppx: AC as above    Diet: DASH/CC  PT consulted

## 2022-03-04 NOTE — PROGRESS NOTE ADULT - PROBLEM SELECTOR PROBLEM 3
Heart failure with preserved left ventricular function (HFpEF) Chronic respiratory failure with hypoxia

## 2022-03-04 NOTE — PROGRESS NOTE ADULT - PROBLEM SELECTOR PLAN 2
Cr normal on admission, peaked around 4.7 now down trending   Cardiorenal etiology most likely given below:  - Urine lytes suggestive of pre-renal etiology, hypervolemic on exam, BNP 19k   - Trial of lasix 80mg IV 3/1 with improvement in renal function   Plan:  - Lasix 80 IV x 1, c/w as per daily volume status assessment   - Avoid nephrotoxic agents  - Strict I/Os  - HF and nephrology following, appreciate recs Watts noted to be bloody, UA with large RBC with uric acid crystals  - Nephrolithiasis??   - Renal u/s without calculi or hydronephrosis   - Will check CT a/p non con for nephrolithiasis   - HOLD Xarelto for now. Resume when hematuria resolves Dunn noted to be bloody, UA with large RBC with uric acid crystals  - Nephrolithiasis?? or pt pulling on dunn??   - Renal u/s without calculi or hydronephrosis   - Will check CT a/p non con for nephrolithiasis   - HOLD Xarelto for now. Resume when hematuria resolves

## 2022-03-05 LAB
ALBUMIN SERPL ELPH-MCNC: 3.2 G/DL — LOW (ref 3.3–5)
ALP SERPL-CCNC: 130 U/L — HIGH (ref 40–120)
ALT FLD-CCNC: 12 U/L — SIGNIFICANT CHANGE UP (ref 10–45)
ANION GAP SERPL CALC-SCNC: 12 MMOL/L — SIGNIFICANT CHANGE UP (ref 5–17)
AST SERPL-CCNC: 19 U/L — SIGNIFICANT CHANGE UP (ref 10–40)
BASOPHILS # BLD AUTO: 0.04 K/UL — SIGNIFICANT CHANGE UP (ref 0–0.2)
BASOPHILS NFR BLD AUTO: 0.3 % — SIGNIFICANT CHANGE UP (ref 0–2)
BILIRUB SERPL-MCNC: 0.4 MG/DL — SIGNIFICANT CHANGE UP (ref 0.2–1.2)
BUN SERPL-MCNC: 34 MG/DL — HIGH (ref 7–23)
CALCIUM SERPL-MCNC: 9.1 MG/DL — SIGNIFICANT CHANGE UP (ref 8.4–10.5)
CHLORIDE SERPL-SCNC: 100 MMOL/L — SIGNIFICANT CHANGE UP (ref 96–108)
CO2 SERPL-SCNC: 31 MMOL/L — SIGNIFICANT CHANGE UP (ref 22–31)
CREAT SERPL-MCNC: 1.01 MG/DL — SIGNIFICANT CHANGE UP (ref 0.5–1.3)
CULTURE RESULTS: SIGNIFICANT CHANGE UP
EGFR: 61 ML/MIN/1.73M2 — SIGNIFICANT CHANGE UP
EOSINOPHIL # BLD AUTO: 0.17 K/UL — SIGNIFICANT CHANGE UP (ref 0–0.5)
EOSINOPHIL NFR BLD AUTO: 1.1 % — SIGNIFICANT CHANGE UP (ref 0–6)
GLUCOSE SERPL-MCNC: 106 MG/DL — HIGH (ref 70–99)
HCT VFR BLD CALC: 31.2 % — LOW (ref 34.5–45)
HGB BLD-MCNC: 9.6 G/DL — LOW (ref 11.5–15.5)
IMM GRANULOCYTES NFR BLD AUTO: 1.4 % — SIGNIFICANT CHANGE UP (ref 0–1.5)
LYMPHOCYTES # BLD AUTO: 1.81 K/UL — SIGNIFICANT CHANGE UP (ref 1–3.3)
LYMPHOCYTES # BLD AUTO: 12.2 % — LOW (ref 13–44)
MAGNESIUM SERPL-MCNC: 2.4 MG/DL — SIGNIFICANT CHANGE UP (ref 1.6–2.6)
MCHC RBC-ENTMCNC: 25.1 PG — LOW (ref 27–34)
MCHC RBC-ENTMCNC: 30.8 GM/DL — LOW (ref 32–36)
MCV RBC AUTO: 81.5 FL — SIGNIFICANT CHANGE UP (ref 80–100)
MONOCYTES # BLD AUTO: 1.24 K/UL — HIGH (ref 0–0.9)
MONOCYTES NFR BLD AUTO: 8.4 % — SIGNIFICANT CHANGE UP (ref 2–14)
NEUTROPHILS # BLD AUTO: 11.37 K/UL — HIGH (ref 1.8–7.4)
NEUTROPHILS NFR BLD AUTO: 76.6 % — SIGNIFICANT CHANGE UP (ref 43–77)
NRBC # BLD: 0 /100 WBCS — SIGNIFICANT CHANGE UP (ref 0–0)
NT-PROBNP SERPL-SCNC: 3411 PG/ML — HIGH (ref 0–300)
PHOSPHATE SERPL-MCNC: 2.7 MG/DL — SIGNIFICANT CHANGE UP (ref 2.5–4.5)
PLATELET # BLD AUTO: 394 K/UL — SIGNIFICANT CHANGE UP (ref 150–400)
POTASSIUM SERPL-MCNC: 3.2 MMOL/L — LOW (ref 3.5–5.3)
POTASSIUM SERPL-SCNC: 3.2 MMOL/L — LOW (ref 3.5–5.3)
PROT SERPL-MCNC: 6.8 G/DL — SIGNIFICANT CHANGE UP (ref 6–8.3)
RBC # BLD: 3.83 M/UL — SIGNIFICANT CHANGE UP (ref 3.8–5.2)
RBC # FLD: 15.9 % — HIGH (ref 10.3–14.5)
SARS-COV-2 RNA SPEC QL NAA+PROBE: SIGNIFICANT CHANGE UP
SODIUM SERPL-SCNC: 143 MMOL/L — SIGNIFICANT CHANGE UP (ref 135–145)
SPECIMEN SOURCE: SIGNIFICANT CHANGE UP
WBC # BLD: 14.84 K/UL — HIGH (ref 3.8–10.5)
WBC # FLD AUTO: 14.84 K/UL — HIGH (ref 3.8–10.5)

## 2022-03-05 PROCEDURE — 99233 SBSQ HOSP IP/OBS HIGH 50: CPT | Mod: GC

## 2022-03-05 RX ORDER — SPIRONOLACTONE 25 MG/1
25 TABLET, FILM COATED ORAL DAILY
Refills: 0 | Status: DISCONTINUED | OUTPATIENT
Start: 2022-03-05 | End: 2022-03-10

## 2022-03-05 RX ORDER — FUROSEMIDE 40 MG
60 TABLET ORAL ONCE
Refills: 0 | Status: COMPLETED | OUTPATIENT
Start: 2022-03-05 | End: 2022-03-05

## 2022-03-05 RX ORDER — POTASSIUM CHLORIDE 20 MEQ
20 PACKET (EA) ORAL
Refills: 0 | Status: DISCONTINUED | OUTPATIENT
Start: 2022-03-05 | End: 2022-03-05

## 2022-03-05 RX ORDER — POTASSIUM CHLORIDE 20 MEQ
40 PACKET (EA) ORAL EVERY 4 HOURS
Refills: 0 | Status: COMPLETED | OUTPATIENT
Start: 2022-03-05 | End: 2022-03-05

## 2022-03-05 RX ADMIN — AMPICILLIN SODIUM AND SULBACTAM SODIUM 200 GRAM(S): 250; 125 INJECTION, POWDER, FOR SUSPENSION INTRAMUSCULAR; INTRAVENOUS at 05:20

## 2022-03-05 RX ADMIN — Medication 40 MILLIEQUIVALENT(S): at 13:28

## 2022-03-05 RX ADMIN — SENNA PLUS 2 TABLET(S): 8.6 TABLET ORAL at 21:13

## 2022-03-05 RX ADMIN — Medication 100 MILLIGRAM(S): at 17:07

## 2022-03-05 RX ADMIN — AMPICILLIN SODIUM AND SULBACTAM SODIUM 200 GRAM(S): 250; 125 INJECTION, POWDER, FOR SUSPENSION INTRAMUSCULAR; INTRAVENOUS at 23:49

## 2022-03-05 RX ADMIN — Medication 30 MILLILITER(S): at 17:08

## 2022-03-05 RX ADMIN — Medication 26 UNIT(S): at 13:20

## 2022-03-05 RX ADMIN — Medication 40 MILLIGRAM(S): at 12:09

## 2022-03-05 RX ADMIN — Medication 1 APPLICATION(S): at 13:53

## 2022-03-05 RX ADMIN — AMPICILLIN SODIUM AND SULBACTAM SODIUM 200 GRAM(S): 250; 125 INJECTION, POWDER, FOR SUSPENSION INTRAMUSCULAR; INTRAVENOUS at 17:09

## 2022-03-05 RX ADMIN — Medication 30 MILLILITER(S): at 05:19

## 2022-03-05 RX ADMIN — Medication 1 APPLICATION(S): at 21:14

## 2022-03-05 RX ADMIN — Medication 81 MILLIGRAM(S): at 12:09

## 2022-03-05 RX ADMIN — NYSTATIN CREAM 1 APPLICATION(S): 100000 CREAM TOPICAL at 17:16

## 2022-03-05 RX ADMIN — Medication 40 MILLIEQUIVALENT(S): at 09:21

## 2022-03-05 RX ADMIN — Medication 26 UNIT(S): at 17:53

## 2022-03-05 RX ADMIN — NYSTATIN CREAM 1 APPLICATION(S): 100000 CREAM TOPICAL at 05:20

## 2022-03-05 RX ADMIN — AMPICILLIN SODIUM AND SULBACTAM SODIUM 200 GRAM(S): 250; 125 INJECTION, POWDER, FOR SUSPENSION INTRAMUSCULAR; INTRAVENOUS at 12:06

## 2022-03-05 RX ADMIN — Medication 60 MILLIGRAM(S): at 12:28

## 2022-03-05 RX ADMIN — ATORVASTATIN CALCIUM 80 MILLIGRAM(S): 80 TABLET, FILM COATED ORAL at 21:13

## 2022-03-05 RX ADMIN — Medication 100 MILLIGRAM(S): at 05:19

## 2022-03-05 RX ADMIN — RIVAROXABAN 20 MILLIGRAM(S): KIT at 12:10

## 2022-03-05 RX ADMIN — INSULIN GLARGINE 28 UNIT(S): 100 INJECTION, SOLUTION SUBCUTANEOUS at 22:11

## 2022-03-05 RX ADMIN — Medication 26 UNIT(S): at 09:08

## 2022-03-05 NOTE — PROGRESS NOTE ADULT - PROBLEM SELECTOR PLAN 3
At home on 2L nasal cannula as needed. On admission hypoxic to 87 % , temporarily required BiPAP. CXR with pulm vascular congestion  - Likely multifactorial in s/o decompensated HF (BNP 15k), and chronically poor respiratory mechanism in s/o JOSR OHS   - TTE in Sept 2021 showed hyperdynamic LV systolic function, EF 75%, and mod pulm HTN  - Low suspicion for PE (on home xarelto for a.fib)  - HR <110, unlikely dysrhythmia driven   - Repeat TTE as above   - ICS  - Lasix as above  - Wean oxygen as tolerated

## 2022-03-05 NOTE — PROGRESS NOTE ADULT - PROBLEM SELECTOR PLAN 4
Cr normal on admission, peaked around 4.7 now down trending and improving   Cardiorenal etiology most likely given below:  - Urine lytes suggestive of pre-renal etiology, hypervolemic on exam, BNP 19k   - Trial of lasix 80mg IV 3/1 with improvement in renal function     Plan:  - Lasix 80 IV x 1 today, plan for transition to PO 3/5   - Avoid nephrotoxic agents  - Strict I/Os  - HF and nephrology following, appreciate recs Cr normal on admission, peaked around 4.7 now down trending and improving   Cardiorenal etiology most likely given below:  - Urine lytes suggestive of pre-renal etiology, hypervolemic on exam, BNP 19k   - Trial of lasix 80mg IV 3/1 with improvement in renal function     Plan:  - F/u HF recs for diuresis  - Avoid nephrotoxic agents  - Strict I/Os  - HF and nephrology following, appreciate recs Cr normal on admission, peaked around 4.7 now down trending and improving   Cardiorenal etiology most likely given below:  - Urine lytes suggestive of pre-renal etiology, hypervolemic on exam, BNP 19k   - Trial of lasix 80mg IV 3/1 with improvement in renal function; lasix 60 IV 3/5    Plan:  - Lasix 60 iv x1 3/5, add spironolactone 25 mg  - Avoid nephrotoxic agents  - Strict I/Os  - HF and nephrology following, appreciate recs

## 2022-03-05 NOTE — PROGRESS NOTE ADULT - SUBJECTIVE AND OBJECTIVE BOX
Terry Hinton MD, PGY-3  Available on [employer] Teams  ---------------------------------------------------------------------------------------------  Patient is a 66y old  Female who presents with a chief complaint of sepsis 2/2 wound infection (04 Mar 2022 17:54)      SUBJECTIVE / OVERNIGHT EVENTS:  ADDITIONAL REVIEW OF SYSTEMS:    MEDICATIONS  (STANDING):  ampicillin/sulbactam  IVPB 3 Gram(s) IV Intermittent every 6 hours  aspirin enteric coated 81 milliGRAM(s) Oral daily  atorvastatin 80 milliGRAM(s) Oral at bedtime  citric acid/sodium citrate Solution 30 milliLiter(s) Oral two times a day  Dakins Solution - 1/4 Strength 1 Application(s) Topical every 8 hours  dextrose 40% Gel 15 Gram(s) Oral once  dextrose 5%. 1000 milliLiter(s) (100 mL/Hr) IV Continuous <Continuous>  dextrose 5%. 1000 milliLiter(s) (50 mL/Hr) IV Continuous <Continuous>  dextrose 50% Injectable 25 Gram(s) IV Push once  dextrose 50% Injectable 12.5 Gram(s) IV Push once  dextrose 50% Injectable 25 Gram(s) IV Push once  FLUoxetine 40 milliGRAM(s) Oral daily  glucagon  Injectable 1 milliGRAM(s) IntraMuscular once  insulin glargine Injectable (LANTUS) 28 Unit(s) SubCutaneous at bedtime  insulin lispro (ADMELOG) corrective regimen sliding scale   SubCutaneous three times a day before meals  insulin lispro (ADMELOG) corrective regimen sliding scale   SubCutaneous at bedtime  insulin lispro Injectable (ADMELOG) 26 Unit(s) SubCutaneous three times a day with meals  metoprolol tartrate 100 milliGRAM(s) Oral two times a day  nystatin Powder 1 Application(s) Topical two times a day  polyethylene glycol 3350 17 Gram(s) Oral two times a day  potassium chloride    Tablet ER 20 milliEquivalent(s) Oral every 2 hours  rivaroxaban 20 milliGRAM(s) Oral daily  senna 2 Tablet(s) Oral at bedtime    MEDICATIONS  (PRN):  acetaminophen     Tablet .. 650 milliGRAM(s) Oral every 6 hours PRN Mild Pain (1 - 3), Moderate Pain (4 - 6)  bisacodyl Suppository 10 milliGRAM(s) Rectal daily PRN Constipation  sodium chloride 0.65% Nasal 1 Spray(s) Both Nostrils every 6 hours PRN Nasal Congestion      CAPILLARY BLOOD GLUCOSE  POCT Blood Glucose.: 93 mg/dL (04 Mar 2022 22:04)  POCT Blood Glucose.: 123 mg/dL (04 Mar 2022 17:53)  POCT Blood Glucose.: 145 mg/dL (04 Mar 2022 13:43)  POCT Blood Glucose.: 115 mg/dL (04 Mar 2022 09:03)    I&O's Summary    04 Mar 2022 07:01  -  05 Mar 2022 07:00  --------------------------------------------------------  IN: 920 mL / OUT: 1400 mL / NET: -480 mL    PHYSICAL EXAM:  Vital Signs Last 24 Hrs  T(C): 36.7 (05 Mar 2022 04:40), Max: 36.8 (04 Mar 2022 12:04)  T(F): 98.1 (05 Mar 2022 04:40), Max: 98.3 (04 Mar 2022 12:04)  HR: 69 (05 Mar 2022 04:40) (69 - 76)  BP: 129/66 (05 Mar 2022 04:40) (126/75 - 129/66)  RR: 19 (05 Mar 2022 04:40) (18 - 20)  SpO2: 94% (05 Mar 2022 04:40) (94% - 98%)    CONSTITUTIONAL: NAD, morbidly obese, sitting up on recliner chair  RESPIRATORY: Normal respiratory effort; lungs are clear to auscultation bilaterally, on 2L NC   CARDIOVASCULAR: Regular rate and rhythm, normal S1 and S2, no murmur/rub/gallop; 1+ b/l lower extremity edema; Peripheral pulses are 2+ bilaterally  ABDOMEN: Large panus, Nontender to palpation, normoactive bowel sounds, no rebound/guarding  MUSCLOSKELETAL: no clubbing or cyanosis of digits; no joint swelling or tenderness to palpation  NEURO: no asterixis, A&O 3, lethargic when examined but per RN report was awake and alert all night, ambulated to bathroom. No neurological deficits.     LABS:                        9.6    14.84 )-----------( 394      ( 05 Mar 2022 06:00 )             31.2     03-05    143  |  100  |  34<H>  ----------------------------<  106<H>  3.2<L>   |  31  |  1.01    Ca    9.1      05 Mar 2022 05:59  Phos  2.7     -  Mg     2.4     -    TPro  6.8  /  Alb  3.2<L>  /  TBili  0.4  /  DBili  x   /  AST  19  /  ALT  12  /  AlkPhos  130<H>  03-05          Urinalysis Basic - ( 04 Mar 2022 09:55 )    Color: DARK BROWN / Appearance: Turbid / S.024 / pH: x  Gluc: x / Ketone: Trace  / Bili: Negative / Urobili: Negative   Blood: x / Protein: 100 mg/dL / Nitrite: Negative   Leuk Esterase: Large / RBC: 6260 /hpf /  /HPF   Sq Epi: x / Non Sq Epi: 9 /hpf / Bacteria: Negative        pH, Venous: 7.42 (22 @ 08:48)  pCO2, Venous: 46 mmHg (22 @ 08:48)  pO2, Venous: 57 mmHg (22 @ 08:48)  HCO3, Venous: 30 mmol/L (22 @ 08:48)        RADIOLOGY & ADDITIONAL TESTS:  Results Reviewed:   Imaging Personally Reviewed:  Electrocardiogram Personally Reviewed:    COORDINATION OF CARE:  Care Discussed with Consultants/Other Providers [Y/N]:  Prior or Outpatient Records Reviewed [Y/N]:     Terry Hinton MD, PGY-3  Available on [employer] Teams  ---------------------------------------------------------------------------------------------  Patient is a 66y old  Female who presents with a chief complaint of sepsis 2/2 wound infection (04 Mar 2022 17:54)    SUBJECTIVE / OVERNIGHT EVENTS: No acute events overnight. Pt seen and examined at bedside. Denies any acute complaints including chest pain, sob.     MEDICATIONS  (STANDING):  ampicillin/sulbactam  IVPB 3 Gram(s) IV Intermittent every 6 hours  aspirin enteric coated 81 milliGRAM(s) Oral daily  atorvastatin 80 milliGRAM(s) Oral at bedtime  citric acid/sodium citrate Solution 30 milliLiter(s) Oral two times a day  Dakins Solution - 1/4 Strength 1 Application(s) Topical every 8 hours  dextrose 40% Gel 15 Gram(s) Oral once  dextrose 5%. 1000 milliLiter(s) (100 mL/Hr) IV Continuous <Continuous>  dextrose 5%. 1000 milliLiter(s) (50 mL/Hr) IV Continuous <Continuous>  dextrose 50% Injectable 25 Gram(s) IV Push once  dextrose 50% Injectable 12.5 Gram(s) IV Push once  dextrose 50% Injectable 25 Gram(s) IV Push once  FLUoxetine 40 milliGRAM(s) Oral daily  glucagon  Injectable 1 milliGRAM(s) IntraMuscular once  insulin glargine Injectable (LANTUS) 28 Unit(s) SubCutaneous at bedtime  insulin lispro (ADMELOG) corrective regimen sliding scale   SubCutaneous three times a day before meals  insulin lispro (ADMELOG) corrective regimen sliding scale   SubCutaneous at bedtime  insulin lispro Injectable (ADMELOG) 26 Unit(s) SubCutaneous three times a day with meals  metoprolol tartrate 100 milliGRAM(s) Oral two times a day  nystatin Powder 1 Application(s) Topical two times a day  polyethylene glycol 3350 17 Gram(s) Oral two times a day  potassium chloride    Tablet ER 20 milliEquivalent(s) Oral every 2 hours  rivaroxaban 20 milliGRAM(s) Oral daily  senna 2 Tablet(s) Oral at bedtime    MEDICATIONS  (PRN):  acetaminophen     Tablet .. 650 milliGRAM(s) Oral every 6 hours PRN Mild Pain (1 - 3), Moderate Pain (4 - 6)  bisacodyl Suppository 10 milliGRAM(s) Rectal daily PRN Constipation  sodium chloride 0.65% Nasal 1 Spray(s) Both Nostrils every 6 hours PRN Nasal Congestion      CAPILLARY BLOOD GLUCOSE  POCT Blood Glucose.: 93 mg/dL (04 Mar 2022 22:04)  POCT Blood Glucose.: 123 mg/dL (04 Mar 2022 17:53)  POCT Blood Glucose.: 145 mg/dL (04 Mar 2022 13:43)  POCT Blood Glucose.: 115 mg/dL (04 Mar 2022 09:03)    I&O's Summary    04 Mar 2022 07:01  -  05 Mar 2022 07:00  --------------------------------------------------------  IN: 920 mL / OUT: 1400 mL / NET: -480 mL    PHYSICAL EXAM:  Vital Signs Last 24 Hrs  T(C): 36.7 (05 Mar 2022 04:40), Max: 36.8 (04 Mar 2022 12:04)  T(F): 98.1 (05 Mar 2022 04:40), Max: 98.3 (04 Mar 2022 12:04)  HR: 69 (05 Mar 2022 04:40) (69 - 76)  BP: 129/66 (05 Mar 2022 04:40) (126/75 - 129/66)  RR: 19 (05 Mar 2022 04:40) (18 - 20)  SpO2: 94% (05 Mar 2022 04:40) (94% - 98%)    CONSTITUTIONAL: NAD, morbidly obese, sitting up on recliner chair  RESPIRATORY: nonlabored breathing, no wheezes, on 2L NC  CARDIOVASCULAR: RRR, s1, s2, 1-2+ b/l lower extremity edema  ABDOMEN: Large panus, Nontender to palpation, normoactive bowel sounds, no rebound/guarding  MUSCLOSKELETAL: no clubbing or cyanosis of digits; no joint swelling or tenderness to palpation  NEURO: AOx3, nonfocal    LABS:                        9.6    14.84 )-----------( 394      ( 05 Mar 2022 06:00 )             31.2         143  |  100  |  34<H>  ----------------------------<  106<H>  3.2<L>   |  31  |  1.01    Ca    9.1      05 Mar 2022 05:59  Phos  2.7       Mg     2.4         TPro  6.8  /  Alb  3.2<L>  /  TBili  0.4  /  DBili  x   /  AST  19  /  ALT  12  /  AlkPhos  130<H>            Urinalysis Basic - ( 04 Mar 2022 09:55 )    Color: DARK BROWN / Appearance: Turbid / S.024 / pH: x  Gluc: x / Ketone: Trace  / Bili: Negative / Urobili: Negative   Blood: x / Protein: 100 mg/dL / Nitrite: Negative   Leuk Esterase: Large / RBC: 6260 /hpf /  /HPF   Sq Epi: x / Non Sq Epi: 9 /hpf / Bacteria: Negative        pH, Venous: 7.42 (22 @ 08:48)  pCO2, Venous: 46 mmHg (22 @ 08:48)  pO2, Venous: 57 mmHg (22 @ 08:48)  HCO3, Venous: 30 mmol/L (22 @ 08:48)        RADIOLOGY & ADDITIONAL TESTS:  Results Reviewed:   Imaging Personally Reviewed:  Electrocardiogram Personally Reviewed:    TELE: afib 60-80s    COORDINATION OF CARE:  Care Discussed with Consultants/Other Providers [Y/N]:  Prior or Outpatient Records Reviewed [Y/N]:

## 2022-03-05 NOTE — PROGRESS NOTE ADULT - PROBLEM SELECTOR PLAN 6
RESOLVED  Fever, leukocytosis, hypotension on admission. Sepsis 2/2 likely sacral wound/cellulitis  - s/p bedside debridement in ED   - BCx 2/26 NGTD  - Wound Cx growing E. avium and strep anginosus   - Zosyn (2/27- 3/1 ). Unasyn (3/1- ) given culture sensitivities and prevent further renal injury   - MRSA pcr negative   - Daily packing changes  - Appreciate ID recs

## 2022-03-05 NOTE — PROGRESS NOTE ADULT - PROBLEM SELECTOR PLAN 7
On metoprolol tartate 100mg bid and Xarelto  - HR <110 at this time   - continue metoprolol tartate 100mg bid  - Hold Xarelto in s/o hematuria

## 2022-03-05 NOTE — PROGRESS NOTE ADULT - ASSESSMENT
67 yo F, PMHx AS s/p TAVR 2015 and 2021, HOCM, HFpEF, pAFib on Xarelto, JOSR (does not tolerate CPAP, on 2L O2 at home qhs), anxiety, morbid obesity, DM, HTN, asthma (on albuterol), pw sepsis, n/v, likely 2/2 sacral decubitus ulcer/necrotic wound on left ischium with associated cellulitis. Also found to be in acute on chronic hypoxic respiratory failure on admission, with pulmonary vascular congestion on imaging concerning for decompensated HF. Course c/b HEATHER 2/2 cardiorenal in s/o ADHF and uremic encephalopathy now improving.

## 2022-03-05 NOTE — PROGRESS NOTE ADULT - ATTENDING COMMENTS
65 yo F, PMHx AS s/p TAVR 2015 and 2021, HOCM, pAFib on Xarelto, diastolic and valvular HF, JOSR (does not tolerate CPAP, on 2L O2 at home qhs), anxiety, morbid obesity, DM with neuropathy, HTN, asthma (on albuterol), pw sepsis, n/v, likely 2/2 sacral decubitus ulcer/necrotic wound on left ischium with associated cellulitis.  Patient seen sitting in a chair, overall feels well.  Currently on unasyn, dose increased by ID with improvement in leukocytosis.   Acute on chronic diastolic HF – diuresing well on IV Lasix, continue current dose. Renal function stable. Wean oxygen as tolerated.   Rest as above.

## 2022-03-05 NOTE — PROGRESS NOTE ADULT - PROBLEM SELECTOR PLAN 2
Dunn noted to be bloody, UA with large RBC with uric acid crystals  - Nephrolithiasis?? or pt pulling on dunn??   - Renal u/s without calculi or hydronephrosis   - Will check CT a/p non con for nephrolithiasis   - HOLD Xarelto for now. Resume when hematuria resolves Dunn noted to be bloody, UA with large RBC with uric acid crystals  - Nephrolithiasis?? or pt pulling on dunn??   - Renal US without calculi or hydronephrosis   - CTAP 3/4 - no stone visualized  - Resumed xarelto

## 2022-03-05 NOTE — PROGRESS NOTE ADULT - PROBLEM SELECTOR PLAN 1
EF previously 75% and hyperdynamic LV on prior ECHO. On admission with pulm vasc congestion and possible hypervolemic state, concerning for decompensated HF  - BNP up trended from 7k to 15k then 19k   - HF consulted as volume status is challenging to assess in s/o morbid obesity. Cr continued to rise despite lasix challenge. TTE obtained this admission without significant change when compared to prior. Still mod pulm HTN, mod MR, grade II diastolic dysfunction.   - BNP down trending  - C/w lasix 80IV qD, plan to transition to PO 3/5    - Monitor I/Os strictly  - Daily weights   - Mg>2 K>4 EF previously 75% and hyperdynamic LV on prior ECHO. On admission with pulm vasc congestion and possible hypervolemic state, concerning for decompensated HF  - BNP up trended from 7k to 15k then 19k   - HF consulted as volume status is challenging to assess in s/o morbid obesity. Cr continued to rise despite lasix challenge. TTE obtained this admission without significant change when compared to prior. Still mod pulm HTN, mod MR, grade II diastolic dysfunction.   - BNP down trending  - C/w lasix 80IV qD, plan to transition to PO 3/5  - Monitor I/Os strictly  - Daily weights   - Mg>2 K>4

## 2022-03-05 NOTE — PROGRESS NOTE ADULT - PROBLEM SELECTOR PLAN 5
Back to baseline     Neuro exam with asterixis during course, coinciding with elevation in BUN. Likely 2/2 uremia.   DDx to include hypercarbia given JOSR, as well as oxycodone induced (sister reports hallucination with oxy)   - Improving s/p lasix and BUN Cr down trending. Also s/p d/c of oxycodone  - VBG without significant hypercarbia  - Nephro consulted, appreciate recs. Monitor for need for urgent HD. Xarelto held (last dose 3/1/22)  - Monitor renal function as below   - Non focal exam. no need for CT head at this time. likely metabolic in nature  - Continue to monitor for mental status & uremia

## 2022-03-06 LAB
ALBUMIN SERPL ELPH-MCNC: 3.3 G/DL — SIGNIFICANT CHANGE UP (ref 3.3–5)
ALP SERPL-CCNC: 128 U/L — HIGH (ref 40–120)
ALT FLD-CCNC: 17 U/L — SIGNIFICANT CHANGE UP (ref 10–45)
ANION GAP SERPL CALC-SCNC: 13 MMOL/L — SIGNIFICANT CHANGE UP (ref 5–17)
AST SERPL-CCNC: 24 U/L — SIGNIFICANT CHANGE UP (ref 10–40)
BASOPHILS # BLD AUTO: 0.04 K/UL — SIGNIFICANT CHANGE UP (ref 0–0.2)
BASOPHILS NFR BLD AUTO: 0.2 % — SIGNIFICANT CHANGE UP (ref 0–2)
BILIRUB SERPL-MCNC: 0.4 MG/DL — SIGNIFICANT CHANGE UP (ref 0.2–1.2)
BUN SERPL-MCNC: 28 MG/DL — HIGH (ref 7–23)
CALCIUM SERPL-MCNC: 9.2 MG/DL — SIGNIFICANT CHANGE UP (ref 8.4–10.5)
CHLORIDE SERPL-SCNC: 100 MMOL/L — SIGNIFICANT CHANGE UP (ref 96–108)
CO2 SERPL-SCNC: 28 MMOL/L — SIGNIFICANT CHANGE UP (ref 22–31)
CREAT SERPL-MCNC: 0.95 MG/DL — SIGNIFICANT CHANGE UP (ref 0.5–1.3)
EGFR: 66 ML/MIN/1.73M2 — SIGNIFICANT CHANGE UP
EOSINOPHIL # BLD AUTO: 0.25 K/UL — SIGNIFICANT CHANGE UP (ref 0–0.5)
EOSINOPHIL NFR BLD AUTO: 1.5 % — SIGNIFICANT CHANGE UP (ref 0–6)
GLUCOSE SERPL-MCNC: 122 MG/DL — HIGH (ref 70–99)
HCT VFR BLD CALC: 31.1 % — LOW (ref 34.5–45)
HGB BLD-MCNC: 9.2 G/DL — LOW (ref 11.5–15.5)
IMM GRANULOCYTES NFR BLD AUTO: 1.4 % — SIGNIFICANT CHANGE UP (ref 0–1.5)
LYMPHOCYTES # BLD AUTO: 12.6 % — LOW (ref 13–44)
LYMPHOCYTES # BLD AUTO: 2.1 K/UL — SIGNIFICANT CHANGE UP (ref 1–3.3)
MAGNESIUM SERPL-MCNC: 2.2 MG/DL — SIGNIFICANT CHANGE UP (ref 1.6–2.6)
MCHC RBC-ENTMCNC: 24.8 PG — LOW (ref 27–34)
MCHC RBC-ENTMCNC: 29.6 GM/DL — LOW (ref 32–36)
MCV RBC AUTO: 83.8 FL — SIGNIFICANT CHANGE UP (ref 80–100)
MONOCYTES # BLD AUTO: 1.26 K/UL — HIGH (ref 0–0.9)
MONOCYTES NFR BLD AUTO: 7.6 % — SIGNIFICANT CHANGE UP (ref 2–14)
NEUTROPHILS # BLD AUTO: 12.77 K/UL — HIGH (ref 1.8–7.4)
NEUTROPHILS NFR BLD AUTO: 76.7 % — SIGNIFICANT CHANGE UP (ref 43–77)
NRBC # BLD: 0 /100 WBCS — SIGNIFICANT CHANGE UP (ref 0–0)
PHOSPHATE SERPL-MCNC: 2.8 MG/DL — SIGNIFICANT CHANGE UP (ref 2.5–4.5)
PLATELET # BLD AUTO: 380 K/UL — SIGNIFICANT CHANGE UP (ref 150–400)
POTASSIUM SERPL-MCNC: 3.8 MMOL/L — SIGNIFICANT CHANGE UP (ref 3.5–5.3)
POTASSIUM SERPL-SCNC: 3.8 MMOL/L — SIGNIFICANT CHANGE UP (ref 3.5–5.3)
PROT SERPL-MCNC: 6.7 G/DL — SIGNIFICANT CHANGE UP (ref 6–8.3)
RBC # BLD: 3.71 M/UL — LOW (ref 3.8–5.2)
RBC # FLD: 16.1 % — HIGH (ref 10.3–14.5)
SODIUM SERPL-SCNC: 141 MMOL/L — SIGNIFICANT CHANGE UP (ref 135–145)
WBC # BLD: 16.65 K/UL — HIGH (ref 3.8–10.5)
WBC # FLD AUTO: 16.65 K/UL — HIGH (ref 3.8–10.5)

## 2022-03-06 PROCEDURE — 99233 SBSQ HOSP IP/OBS HIGH 50: CPT | Mod: GC

## 2022-03-06 PROCEDURE — 99232 SBSQ HOSP IP/OBS MODERATE 35: CPT

## 2022-03-06 RX ORDER — FUROSEMIDE 40 MG
60 TABLET ORAL ONCE
Refills: 0 | Status: COMPLETED | OUTPATIENT
Start: 2022-03-06 | End: 2022-03-06

## 2022-03-06 RX ORDER — POTASSIUM CHLORIDE 20 MEQ
40 PACKET (EA) ORAL ONCE
Refills: 0 | Status: COMPLETED | OUTPATIENT
Start: 2022-03-06 | End: 2022-03-06

## 2022-03-06 RX ORDER — BUMETANIDE 0.25 MG/ML
2 INJECTION INTRAMUSCULAR; INTRAVENOUS EVERY 12 HOURS
Refills: 0 | Status: DISCONTINUED | OUTPATIENT
Start: 2022-03-07 | End: 2022-03-10

## 2022-03-06 RX ADMIN — Medication 40 MILLIGRAM(S): at 11:20

## 2022-03-06 RX ADMIN — Medication 60 MILLIGRAM(S): at 13:21

## 2022-03-06 RX ADMIN — Medication 40 MILLIEQUIVALENT(S): at 09:21

## 2022-03-06 RX ADMIN — AMPICILLIN SODIUM AND SULBACTAM SODIUM 200 GRAM(S): 250; 125 INJECTION, POWDER, FOR SUSPENSION INTRAMUSCULAR; INTRAVENOUS at 11:19

## 2022-03-06 RX ADMIN — AMPICILLIN SODIUM AND SULBACTAM SODIUM 200 GRAM(S): 250; 125 INJECTION, POWDER, FOR SUSPENSION INTRAMUSCULAR; INTRAVENOUS at 16:58

## 2022-03-06 RX ADMIN — AMPICILLIN SODIUM AND SULBACTAM SODIUM 200 GRAM(S): 250; 125 INJECTION, POWDER, FOR SUSPENSION INTRAMUSCULAR; INTRAVENOUS at 23:04

## 2022-03-06 RX ADMIN — Medication 1 APPLICATION(S): at 13:34

## 2022-03-06 RX ADMIN — Medication 1: at 13:37

## 2022-03-06 RX ADMIN — ATORVASTATIN CALCIUM 80 MILLIGRAM(S): 80 TABLET, FILM COATED ORAL at 21:37

## 2022-03-06 RX ADMIN — RIVAROXABAN 20 MILLIGRAM(S): KIT at 11:21

## 2022-03-06 RX ADMIN — Medication 26 UNIT(S): at 18:32

## 2022-03-06 RX ADMIN — Medication 26 UNIT(S): at 09:22

## 2022-03-06 RX ADMIN — NYSTATIN CREAM 1 APPLICATION(S): 100000 CREAM TOPICAL at 17:18

## 2022-03-06 RX ADMIN — Medication 100 MILLIGRAM(S): at 05:30

## 2022-03-06 RX ADMIN — Medication 26 UNIT(S): at 13:37

## 2022-03-06 RX ADMIN — POLYETHYLENE GLYCOL 3350 17 GRAM(S): 17 POWDER, FOR SOLUTION ORAL at 05:32

## 2022-03-06 RX ADMIN — Medication 1 APPLICATION(S): at 05:31

## 2022-03-06 RX ADMIN — INSULIN GLARGINE 28 UNIT(S): 100 INJECTION, SOLUTION SUBCUTANEOUS at 22:46

## 2022-03-06 RX ADMIN — Medication 100 MILLIGRAM(S): at 17:11

## 2022-03-06 RX ADMIN — Medication 81 MILLIGRAM(S): at 11:20

## 2022-03-06 RX ADMIN — AMPICILLIN SODIUM AND SULBACTAM SODIUM 200 GRAM(S): 250; 125 INJECTION, POWDER, FOR SUSPENSION INTRAMUSCULAR; INTRAVENOUS at 05:31

## 2022-03-06 RX ADMIN — Medication 30 MILLILITER(S): at 05:32

## 2022-03-06 RX ADMIN — Medication 1: at 18:32

## 2022-03-06 RX ADMIN — NYSTATIN CREAM 1 APPLICATION(S): 100000 CREAM TOPICAL at 05:32

## 2022-03-06 RX ADMIN — Medication 1 APPLICATION(S): at 21:36

## 2022-03-06 RX ADMIN — SPIRONOLACTONE 25 MILLIGRAM(S): 25 TABLET, FILM COATED ORAL at 05:32

## 2022-03-06 NOTE — PROVIDER CONTACT NOTE (OTHER) - ACTION/TREATMENT ORDERED:
one time tramadol ordered
TOV and strain urine
Give patient juice/crackers and recheck sugar in 30 minutes. Blood sugar was 114mg/dl after given supplements. Okay to give bedtime lantus and check blood sugar in AM.

## 2022-03-06 NOTE — PROGRESS NOTE ADULT - PROBLEM SELECTOR PLAN 10
DVT ppx: AC as above    Diet: DASH/CC  PT consulted DVT ppx: AC as above    Diet: DASH/CC  PT rec JOSE CRUZ

## 2022-03-06 NOTE — PROVIDER CONTACT NOTE (OTHER) - SITUATION
Patient's blood glucose at bedtime measured 83mg/dl
Patient requesting pain meds stronger than tylenol for sacral, perineal pain
Pt. found with dunn catheter on the floor - pt. states it accidentally came out

## 2022-03-06 NOTE — PROVIDER CONTACT NOTE (OTHER) - ASSESSMENT
Patient complaining of pain, states Tylenol not very effective
Pt. stable, denies pain or discomfort
VSS. Denies any CP, SOB, or other sx. Hemodynamically stable.

## 2022-03-06 NOTE — PROGRESS NOTE ADULT - PROBLEM SELECTOR PLAN 9
on Losartan 25 and Lopressor   - c/w lopressor  - hold Losartan given hypotension from sepsis & HEATHER on Losartan 25 and Lopressor   - c/w lopressor  - hold Losartan given hypotension from sepsis & HEATHER   - aldactone 25mg qD

## 2022-03-06 NOTE — PROGRESS NOTE ADULT - PROBLEM SELECTOR PLAN 2
Dunn noted to be bloody, UA with large RBC with uric acid crystals  - Nephrolithiasis?? or pt pulling on dunn??   - Renal US without calculi or hydronephrosis   - CTAP 3/4 - no stone visualized  - Resumed xarelto

## 2022-03-06 NOTE — PROGRESS NOTE ADULT - SUBJECTIVE AND OBJECTIVE BOX
MARIA D ROBLEDO 66y [id number]    Patient is a 66y old  Female who presents with a chief complaint of sepsis 2/2 wound infection (06 Mar 2022 06:21)      Follow Up/CC:  ID following for    Interval History/ROS:    Allergies    amoxicillin (Unknown)  Cipro (Unknown)  codeine (Vomiting)  shellfish (Stomach Upset)    Intolerances    fish (Stomach Upset)      ANTIMICROBIALS:  ampicillin/sulbactam  IVPB 3 every 6 hours      MEDICATIONS  (STANDING):  ampicillin/sulbactam  IVPB 3 Gram(s) IV Intermittent every 6 hours  aspirin enteric coated 81 milliGRAM(s) Oral daily  atorvastatin 80 milliGRAM(s) Oral at bedtime  Dakins Solution - 1/4 Strength 1 Application(s) Topical every 8 hours  dextrose 40% Gel 15 Gram(s) Oral once  dextrose 5%. 1000 milliLiter(s) (100 mL/Hr) IV Continuous <Continuous>  dextrose 5%. 1000 milliLiter(s) (50 mL/Hr) IV Continuous <Continuous>  dextrose 50% Injectable 25 Gram(s) IV Push once  dextrose 50% Injectable 12.5 Gram(s) IV Push once  dextrose 50% Injectable 25 Gram(s) IV Push once  FLUoxetine 40 milliGRAM(s) Oral daily  glucagon  Injectable 1 milliGRAM(s) IntraMuscular once  insulin glargine Injectable (LANTUS) 28 Unit(s) SubCutaneous at bedtime  insulin lispro (ADMELOG) corrective regimen sliding scale   SubCutaneous three times a day before meals  insulin lispro (ADMELOG) corrective regimen sliding scale   SubCutaneous at bedtime  insulin lispro Injectable (ADMELOG) 26 Unit(s) SubCutaneous three times a day with meals  metoprolol tartrate 100 milliGRAM(s) Oral two times a day  nystatin Powder 1 Application(s) Topical two times a day  polyethylene glycol 3350 17 Gram(s) Oral two times a day  potassium chloride    Tablet ER 40 milliEquivalent(s) Oral once  rivaroxaban 20 milliGRAM(s) Oral daily  senna 2 Tablet(s) Oral at bedtime  spironolactone 25 milliGRAM(s) Oral daily    MEDICATIONS  (PRN):  acetaminophen     Tablet .. 650 milliGRAM(s) Oral every 6 hours PRN Mild Pain (1 - 3), Moderate Pain (4 - 6)  bisacodyl Suppository 10 milliGRAM(s) Rectal daily PRN Constipation  sodium chloride 0.65% Nasal 1 Spray(s) Both Nostrils every 6 hours PRN Nasal Congestion        Vital Signs Last 24 Hrs  T(C): 36.4 (06 Mar 2022 04:57), Max: 36.8 (05 Mar 2022 11:56)  T(F): 97.5 (06 Mar 2022 04:57), Max: 98.3 (05 Mar 2022 11:56)  HR: 71 (06 Mar 2022 04:57) (66 - 71)  BP: 131/73 (06 Mar 2022 04:57) (131/73 - 155/84)  BP(mean): --  RR: 20 (06 Mar 2022 04:57) (18 - 20)  SpO2: 100% (06 Mar 2022 04:57) (95% - 100%)    CBC Full  -  ( 06 Mar 2022 05:10 )  WBC Count : 16.65 K/uL  RBC Count : 3.71 M/uL  Hemoglobin : 9.2 g/dL  Hematocrit : 31.1 %  Platelet Count - Automated : 380 K/uL  Mean Cell Volume : 83.8 fl  Mean Cell Hemoglobin : 24.8 pg  Mean Cell Hemoglobin Concentration : 29.6 gm/dL  Auto Neutrophil # : 12.77 K/uL  Auto Lymphocyte # : 2.10 K/uL  Auto Monocyte # : 1.26 K/uL  Auto Eosinophil # : 0.25 K/uL  Auto Basophil # : 0.04 K/uL  Auto Neutrophil % : 76.7 %  Auto Lymphocyte % : 12.6 %  Auto Monocyte % : 7.6 %  Auto Eosinophil % : 1.5 %  Auto Basophil % : 0.2 %    -    141  |  100  |  28<H>  ----------------------------<  122<H>  3.8   |  28  |  0.95    Ca    9.2      06 Mar 2022 05:10  Phos  2.8     03-06  Mg     2.2     03-06    TPro  6.7  /  Alb  3.3  /  TBili  0.4  /  DBili  x   /  AST  24  /  ALT  17  /  AlkPhos  128<H>  03-06    LIVER FUNCTIONS - ( 06 Mar 2022 05:10 )  Alb: 3.3 g/dL / Pro: 6.7 g/dL / ALK PHOS: 128 U/L / ALT: 17 U/L / AST: 24 U/L / GGT: x           Urinalysis Basic - ( 04 Mar 2022 09:55 )    Color: DARK BROWN / Appearance: Turbid / S.024 / pH: x  Gluc: x / Ketone: Trace  / Bili: Negative / Urobili: Negative   Blood: x / Protein: 100 mg/dL / Nitrite: Negative   Leuk Esterase: Large / RBC: 6260 /hpf /  /HPF   Sq Epi: x / Non Sq Epi: 9 /hpf / Bacteria: Negative        MICROBIOLOGY:  Catheterized Catheterized  22   <10,000 CFU/mL Normal Urogenital Gia  --  --      Decub Decubitus  22   Moderate Enterococcus avium  Numerous Streptococcus anginosus Susceptibilites not performed.  --  Enterococcus avium      Clean Catch Clean Catch (Midstream)  22   <10,000 CFU/mL Normal Urogenital Gia  --  --      .Blood Blood  22   No Growth Final  --  --              v            RADIOLOGY     MARIA D ROBLEDO 66y [id number]    Patient is a 66y old  Female who presents with a chief complaint of sepsis 2/2 wound infection (06 Mar 2022 06:21)      Follow Up/CC:  ID following for wound infection    Interval History/ROS: no fever, no acute issues o/n    Allergies    amoxicillin (Unknown)  Cipro (Unknown)  codeine (Vomiting)  shellfish (Stomach Upset)    Intolerances    fish (Stomach Upset)      ANTIMICROBIALS:  ampicillin/sulbactam  IVPB 3 every 6 hours      MEDICATIONS  (STANDING):  ampicillin/sulbactam  IVPB 3 Gram(s) IV Intermittent every 6 hours  aspirin enteric coated 81 milliGRAM(s) Oral daily  atorvastatin 80 milliGRAM(s) Oral at bedtime  Dakins Solution - 1/4 Strength 1 Application(s) Topical every 8 hours  dextrose 40% Gel 15 Gram(s) Oral once  dextrose 5%. 1000 milliLiter(s) (100 mL/Hr) IV Continuous <Continuous>  dextrose 5%. 1000 milliLiter(s) (50 mL/Hr) IV Continuous <Continuous>  dextrose 50% Injectable 25 Gram(s) IV Push once  dextrose 50% Injectable 12.5 Gram(s) IV Push once  dextrose 50% Injectable 25 Gram(s) IV Push once  FLUoxetine 40 milliGRAM(s) Oral daily  glucagon  Injectable 1 milliGRAM(s) IntraMuscular once  insulin glargine Injectable (LANTUS) 28 Unit(s) SubCutaneous at bedtime  insulin lispro (ADMELOG) corrective regimen sliding scale   SubCutaneous three times a day before meals  insulin lispro (ADMELOG) corrective regimen sliding scale   SubCutaneous at bedtime  insulin lispro Injectable (ADMELOG) 26 Unit(s) SubCutaneous three times a day with meals  metoprolol tartrate 100 milliGRAM(s) Oral two times a day  nystatin Powder 1 Application(s) Topical two times a day  polyethylene glycol 3350 17 Gram(s) Oral two times a day  potassium chloride    Tablet ER 40 milliEquivalent(s) Oral once  rivaroxaban 20 milliGRAM(s) Oral daily  senna 2 Tablet(s) Oral at bedtime  spironolactone 25 milliGRAM(s) Oral daily    MEDICATIONS  (PRN):  acetaminophen     Tablet .. 650 milliGRAM(s) Oral every 6 hours PRN Mild Pain (1 - 3), Moderate Pain (4 - 6)  bisacodyl Suppository 10 milliGRAM(s) Rectal daily PRN Constipation  sodium chloride 0.65% Nasal 1 Spray(s) Both Nostrils every 6 hours PRN Nasal Congestion        Vital Signs Last 24 Hrs  T(C): 36.4 (06 Mar 2022 04:57), Max: 36.8 (05 Mar 2022 11:56)  T(F): 97.5 (06 Mar 2022 04:57), Max: 98.3 (05 Mar 2022 11:56)  HR: 71 (06 Mar 2022 04:57) (66 - 71)  BP: 131/73 (06 Mar 2022 04:57) (131/73 - 155/84)  BP(mean): --  RR: 20 (06 Mar 2022 04:57) (18 - 20)  SpO2: 100% (06 Mar 2022 04:57) (95% - 100%)    CBC Full  -  ( 06 Mar 2022 05:10 )  WBC Count : 16.65 K/uL  RBC Count : 3.71 M/uL  Hemoglobin : 9.2 g/dL  Hematocrit : 31.1 %  Platelet Count - Automated : 380 K/uL  Mean Cell Volume : 83.8 fl  Mean Cell Hemoglobin : 24.8 pg  Mean Cell Hemoglobin Concentration : 29.6 gm/dL  Auto Neutrophil # : 12.77 K/uL  Auto Lymphocyte # : 2.10 K/uL  Auto Monocyte # : 1.26 K/uL  Auto Eosinophil # : 0.25 K/uL  Auto Basophil # : 0.04 K/uL  Auto Neutrophil % : 76.7 %  Auto Lymphocyte % : 12.6 %  Auto Monocyte % : 7.6 %  Auto Eosinophil % : 1.5 %  Auto Basophil % : 0.2 %    03-06    141  |  100  |  28<H>  ----------------------------<  122<H>  3.8   |  28  |  0.95    Ca    9.2      06 Mar 2022 05:10  Phos  2.8     03-06  Mg     2.2     03-06    TPro  6.7  /  Alb  3.3  /  TBili  0.4  /  DBili  x   /  AST  24  /  ALT  17  /  AlkPhos  128<H>  03-06    LIVER FUNCTIONS - ( 06 Mar 2022 05:10 )  Alb: 3.3 g/dL / Pro: 6.7 g/dL / ALK PHOS: 128 U/L / ALT: 17 U/L / AST: 24 U/L / GGT: x           Urinalysis Basic - ( 04 Mar 2022 09:55 )    Color: DARK BROWN / Appearance: Turbid / S.024 / pH: x  Gluc: x / Ketone: Trace  / Bili: Negative / Urobili: Negative   Blood: x / Protein: 100 mg/dL / Nitrite: Negative   Leuk Esterase: Large / RBC: 6260 /hpf /  /HPF   Sq Epi: x / Non Sq Epi: 9 /hpf / Bacteria: Negative        MICROBIOLOGY:  Catheterized Catheterized  22   <10,000 CFU/mL Normal Urogenital Gia  --  --      Decub Decubitus  22   Moderate Enterococcus avium  Numerous Streptococcus anginosus Susceptibilites not performed.  --  Enterococcus avium      Clean Catch Clean Catch (Midstream)  22   <10,000 CFU/mL Normal Urogenital Gia  --  --      .Blood Blood  22   No Growth Final  --  --      RADIOLOGY    < from: CT Abdomen and Pelvis No Cont (22 @ 15:00) >  No renal stones or hydronephrosis.    Lower ventral abdominal wall skin thickening and adjacent subcutaneous   infiltration. Correlate for cellulitis.    < end of copied text >

## 2022-03-06 NOTE — PROGRESS NOTE ADULT - PROBLEM SELECTOR PLAN 3
At home on 2L nasal cannula as needed. On admission hypoxic to 87 % , temporarily required BiPAP. CXR with pulm vascular congestion  - Likely multifactorial in s/o decompensated HF (BNP 15k), and chronically poor respiratory mechanism in s/o JOSR OHS   - TTE in Sept 2021 showed hyperdynamic LV systolic function, EF 75%, and mod pulm HTN  - Low suspicion for PE (on home xarelto for a.fib)  - HR <110, unlikely dysrhythmia driven   - Repeat TTE as above   - ICS  - Lasix as above  - Wean oxygen as tolerated At home on 2L nasal cannula as needed. On admission hypoxic to 87 % , temporarily required BiPAP. CXR with pulm vascular congestion  - Likely multifactorial in s/o decompensated HF (BNP 15k), and chronically poor respiratory mechanism in s/o JOSR OHS   - TTE in Sept 2021 showed hyperdynamic LV systolic function, EF 75%, and mod pulm HTN  - Low suspicion for PE (on home xarelto for a.fib)  - HR <110, unlikely dysrhythmia driven   - Repeat TTE as above   - ICS  - Diuresis as above  - Weaned back to home oxygen needs

## 2022-03-06 NOTE — PROVIDER CONTACT NOTE (OTHER) - RECOMMENDATIONS
Give patient juice/crackers and recheck blood sugar in 30 minutes. If >100mg/dl, continue with bedtime lantus.
One time dose of tramadol since oxy caused patient adverse effects
MD notified

## 2022-03-06 NOTE — PROGRESS NOTE ADULT - PROBLEM SELECTOR PLAN 7
On metoprolol tartate 100mg bid and Xarelto  - HR <110 at this time   - continue metoprolol tartate 100mg bid  - Hold Xarelto in s/o hematuria On metoprolol tartate 100mg bid and Xarelto  - HR <110 at this time   - continue metoprolol tartate 100mg bid  - Xarelto 20mg qD

## 2022-03-06 NOTE — PROGRESS NOTE ADULT - ASSESSMENT
66 y/o lady with PMH type 2 diabetes with H/O gastroparesis, pulmonary embolism and right lower extremity DVT 8/2020, PVD, hypertension, aortic stenosis  S/P TAVR 2015 and redo TAVR in 2021 presented to ED today with fever since 4 days. Pt also endorses acute on chronic shortness of breath. This is worsened w/ laying down and w/ exertion. She could not walk to her mail box without getting short of breath earlier today, prompting her arrival here. She denies associated cough, neck pain, confusion, weakness, hemoptysis, chest pain, abdominal pain, dysuria, vaginal discharge. She has chronic immobility 2/2 to morbid obesity. She denies any recent changes to medications. Pt with leukocytosis to 39 k with fever to 101.5 s/p vanc and cefepime in ER. CTAP with IV con: Multiple foci of subcutaneous gas at the left gluteal fold with mild associated inflammatory changes. Pt with intermittent Boderline SBP in 60s.    WORKUP  BNP: 7759  ESR: 50,   RVP: negative.  MRSA PCR Result.: NotDetec (07-21-21 @ 19:23)  UA: 34 WBC, Large LE, negative nitrates and bactetria  CTAP with IV con: Multiple foci of subcutaneous gas at the left gluteal fold with mild associated inflammatory changes      #Fever, Infected Decubitus Ulcer, Leukocytosis (persistent), Hypoxic Respiratory Failure  Wound Cultures with Streptococcus anginosis and Enterococcus  MRSA/MSSA Nasal PCR Negative  Tolerated Zosyn and Augmentin in 5/2021  Tolerating Zosyn this admission  Has persistent leukocytosis  Noting loose stools  CT A/P (3/4) noncontrast but noting abdominal wall infiltration ?unclear significance. Not noting any pelvic collections albeit noncontrast study.     --cont Unasyn to 3g IV Q6H  --check C diff toxin assay if diarrhea   --Further debridement / wound packing per wound care  --Continue to follow CBC with diff  --Continue to follow temperature curve  --blood cultures negative    Marek Hale  Attending Physician   Division of Infectious Disease  Office #[phone]  Available on Microsoft Teams also  After 5pm/weekend or no response, call #[phone]

## 2022-03-06 NOTE — PROGRESS NOTE ADULT - PROBLEM SELECTOR PLAN 4
Cr normal on admission, peaked around 4.7 now down trending and improving   Cardiorenal etiology most likely given below:  - Urine lytes suggestive of pre-renal etiology, hypervolemic on exam, BNP 19k   - Trial of lasix 80mg IV 3/1 with improvement in renal function; lasix 60 IV 3/5    Plan:  - Lasix 60 iv x1 3/5, add spironolactone 25 mg  - Avoid nephrotoxic agents  - Strict I/Os  - HF and nephrology following, appreciate recs Cr normal on admission, peaked around 4.7 now resolved   Cardiorenal etiology most likely given below:  - Urine lytes suggestive of pre-renal etiology, hypervolemic on exam, BNP 19k   - Trial of lasix 80mg IV 3/1 with improvement in renal function; lasix 60 IV 3/5    Plan:  - Cont to monitor renal function   - Avoid nephrotoxic agents  - Strict I/Os  - HF and nephrology following, appreciate recs

## 2022-03-06 NOTE — PROGRESS NOTE ADULT - PROBLEM SELECTOR PLAN 1
EF previously 75% and hyperdynamic LV on prior ECHO. On admission with pulm vasc congestion and possible hypervolemic state, concerning for decompensated HF  - BNP up trended from 7k to 15k then 19k   - HF consulted as volume status is challenging to assess in s/o morbid obesity. Cr continued to rise despite lasix challenge. TTE obtained this admission without significant change when compared to prior. Still mod pulm HTN, mod MR, grade II diastolic dysfunction.   - BNP down trending  - C/w lasix 80IV qD, plan to transition to PO 3/5  - Monitor I/Os strictly  - Daily weights   - Mg>2 K>4 EF previously 75% and hyperdynamic LV on prior ECHO. On admission with pulm vasc congestion and possible hypervolemic state, concerning for decompensated HF  - BNP up trended from 7k to 15k then 19k   - HF consulted as volume status is challenging to assess in s/o morbid obesity. Cr continued to rise despite lasix challenge. TTE obtained this admission without significant change when compared to prior. Still mod pulm HTN, mod MR, grade II diastolic dysfunction.   - BNP down trending, HEATHER resolving, weaned to home oxygen   - Lasix 60mg IV push x 1   - Start bumex 2mg PO BID 3/7   - Aldactone 25mg qD   - Monitor I/Os strictly  - Daily weights   - Mg>2 K>4

## 2022-03-06 NOTE — PROGRESS NOTE ADULT - ATTENDING COMMENTS
67 yo F, PMHx AS s/p TAVR 2015 and 2021, HOCM, pAFib on Xarelto, diastolic and valvular HF, JOSR (does not tolerate CPAP, on 2L O2 at home qhs), anxiety, morbid obesity, DM with neuropathy, HTN, asthma (on albuterol), pw sepsis, n/v, likely 2/2 sacral decubitus ulcer/necrotic wound on left ischium with associated cellulitis.  Patient seen sitting in a chair, no acute complaints.   Leukocytosis mildly increased compared to yesterday but overall down, afebrile. ID f/u appreciated, continue unasyn.  Acute on chronic diastolic HF – Dosed lasix 60mg x1 today. Renal function stable. Wean oxygen as tolerated - only uses at night normally in place of CPAP.  Anticipated dispo: [facility]  Rest as above.

## 2022-03-06 NOTE — PROGRESS NOTE ADULT - SUBJECTIVE AND OBJECTIVE BOX
PROGRESS NOTE:   Authored by Dr. Georgia Paz MD (PGY-2). Available on TEAMS.    Patient is a 66y old  Female who presents with a chief complaint of sepsis 2/2 wound infection (05 Mar 2022 07:20)      SUBJECTIVE / OVERNIGHT EVENTS:  No acute events overnight.     ADDITIONAL REVIEW OF SYSTEMS:  Patient denies fevers, chills, chest pain, shortness of breath, nausea, abdominal pain, diarrhea, constipation, dysuria, leg swelling, headache, light headedness.    MEDICATIONS  (STANDING):  ampicillin/sulbactam  IVPB 3 Gram(s) IV Intermittent every 6 hours  aspirin enteric coated 81 milliGRAM(s) Oral daily  atorvastatin 80 milliGRAM(s) Oral at bedtime  citric acid/sodium citrate Solution 30 milliLiter(s) Oral two times a day  Dakins Solution - 1/4 Strength 1 Application(s) Topical every 8 hours  dextrose 40% Gel 15 Gram(s) Oral once  dextrose 5%. 1000 milliLiter(s) (50 mL/Hr) IV Continuous <Continuous>  dextrose 5%. 1000 milliLiter(s) (100 mL/Hr) IV Continuous <Continuous>  dextrose 50% Injectable 25 Gram(s) IV Push once  dextrose 50% Injectable 12.5 Gram(s) IV Push once  dextrose 50% Injectable 25 Gram(s) IV Push once  FLUoxetine 40 milliGRAM(s) Oral daily  glucagon  Injectable 1 milliGRAM(s) IntraMuscular once  insulin glargine Injectable (LANTUS) 28 Unit(s) SubCutaneous at bedtime  insulin lispro (ADMELOG) corrective regimen sliding scale   SubCutaneous three times a day before meals  insulin lispro (ADMELOG) corrective regimen sliding scale   SubCutaneous at bedtime  insulin lispro Injectable (ADMELOG) 26 Unit(s) SubCutaneous three times a day with meals  metoprolol tartrate 100 milliGRAM(s) Oral two times a day  nystatin Powder 1 Application(s) Topical two times a day  polyethylene glycol 3350 17 Gram(s) Oral two times a day  rivaroxaban 20 milliGRAM(s) Oral daily  senna 2 Tablet(s) Oral at bedtime  spironolactone 25 milliGRAM(s) Oral daily    MEDICATIONS  (PRN):  acetaminophen     Tablet .. 650 milliGRAM(s) Oral every 6 hours PRN Mild Pain (1 - 3), Moderate Pain (4 - 6)  bisacodyl Suppository 10 milliGRAM(s) Rectal daily PRN Constipation  sodium chloride 0.65% Nasal 1 Spray(s) Both Nostrils every 6 hours PRN Nasal Congestion      CAPILLARY BLOOD GLUCOSE      POCT Blood Glucose.: 170 mg/dL (05 Mar 2022 21:49)  POCT Blood Glucose.: 101 mg/dL (05 Mar 2022 17:41)  POCT Blood Glucose.: 90 mg/dL (05 Mar 2022 12:49)  POCT Blood Glucose.: 113 mg/dL (05 Mar 2022 08:38)    I&O's Summary    04 Mar 2022 07:01  -  05 Mar 2022 07:00  --------------------------------------------------------  IN: 920 mL / OUT: 1400 mL / NET: -480 mL    05 Mar 2022 07:01  -  06 Mar 2022 06:22  --------------------------------------------------------  IN: 460 mL / OUT: 2400 mL / NET: -1940 mL        PHYSICAL EXAM:  Vital Signs Last 24 Hrs  T(C): 36.4 (06 Mar 2022 04:57), Max: 36.8 (05 Mar 2022 11:56)  T(F): 97.5 (06 Mar 2022 04:57), Max: 98.3 (05 Mar 2022 11:56)  HR: 71 (06 Mar 2022 04:57) (66 - 71)  BP: 131/73 (06 Mar 2022 04:57) (131/73 - 155/84)  BP(mean): --  RR: 20 (06 Mar 2022 04:57) (18 - 20)  SpO2: 100% (06 Mar 2022 04:57) (95% - 100%)    CONSTITUTIONAL: NAD, well-developed  RESPIRATORY: Normal respiratory effort; lungs are clear to auscultation bilaterally  CARDIOVASCULAR: Regular rate and rhythm, normal S1 and S2, no murmur/rub/gallop; No lower extremity edema; Peripheral pulses are 2+ bilaterally  ABDOMEN: Nontender to palpation, normoactive bowel sounds, no rebound/guarding; No hepatosplenomegaly  MUSCLOSKELETAL: no clubbing or cyanosis of digits; no joint swelling or tenderness to palpation  PSYCH: A+O to person, place, and time; affect appropriate    LABS:                        9.2    16.65 )-----------( 380      ( 06 Mar 2022 05:10 )             31.1     03-06    141  |  100  |  28<H>  ----------------------------<  122<H>  3.8   |  28  |  0.95    Ca    9.2      06 Mar 2022 05:10  Phos  2.8     03-06  Mg     2.2     -06    TPro  6.7  /  Alb  3.3  /  TBili  0.4  /  DBili  x   /  AST  24  /  ALT  17  /  AlkPhos  128<H>  03-06          Urinalysis Basic - ( 04 Mar 2022 09:55 )    Color: DARK BROWN / Appearance: Turbid / S.024 / pH: x  Gluc: x / Ketone: Trace  / Bili: Negative / Urobili: Negative   Blood: x / Protein: 100 mg/dL / Nitrite: Negative   Leuk Esterase: Large / RBC: 6260 /hpf /  /HPF   Sq Epi: x / Non Sq Epi: 9 /hpf / Bacteria: Negative        Culture - Urine (collected 04 Mar 2022 21:58)  Source: Catheterized Catheterized  Final Report (05 Mar 2022 18:37):    <10,000 CFU/mL Normal Urogenital Gia        Tele Reviewed:    RADIOLOGY & ADDITIONAL TESTS:  Results Reviewed:   Imaging Personally Reviewed:  Electrocardiogram Personally Reviewed:     PROGRESS NOTE:   Authored by Dr. Georgia Paz MD (PGY-2). Available on TEAMS.    Patient is a 66y old  Female who presents with a chief complaint of sepsis 2/2 wound infection (05 Mar 2022 07:20)    SUBJECTIVE / OVERNIGHT EVENTS:  No acute events overnight. Pt seen and examined at bedside. She reports hematuria.     ADDITIONAL REVIEW OF SYSTEMS:  Patient denies fevers, chills, chest pain, shortness of breath, nausea, abdominal pain, diarrhea, constipation, dysuria, leg swelling, headache, light headedness.    MEDICATIONS  (STANDING):  ampicillin/sulbactam  IVPB 3 Gram(s) IV Intermittent every 6 hours  aspirin enteric coated 81 milliGRAM(s) Oral daily  atorvastatin 80 milliGRAM(s) Oral at bedtime  citric acid/sodium citrate Solution 30 milliLiter(s) Oral two times a day  Dakins Solution - 1/4 Strength 1 Application(s) Topical every 8 hours  dextrose 40% Gel 15 Gram(s) Oral once  dextrose 5%. 1000 milliLiter(s) (50 mL/Hr) IV Continuous <Continuous>  dextrose 5%. 1000 milliLiter(s) (100 mL/Hr) IV Continuous <Continuous>  dextrose 50% Injectable 25 Gram(s) IV Push once  dextrose 50% Injectable 12.5 Gram(s) IV Push once  dextrose 50% Injectable 25 Gram(s) IV Push once  FLUoxetine 40 milliGRAM(s) Oral daily  glucagon  Injectable 1 milliGRAM(s) IntraMuscular once  insulin glargine Injectable (LANTUS) 28 Unit(s) SubCutaneous at bedtime  insulin lispro (ADMELOG) corrective regimen sliding scale   SubCutaneous three times a day before meals  insulin lispro (ADMELOG) corrective regimen sliding scale   SubCutaneous at bedtime  insulin lispro Injectable (ADMELOG) 26 Unit(s) SubCutaneous three times a day with meals  metoprolol tartrate 100 milliGRAM(s) Oral two times a day  nystatin Powder 1 Application(s) Topical two times a day  polyethylene glycol 3350 17 Gram(s) Oral two times a day  rivaroxaban 20 milliGRAM(s) Oral daily  senna 2 Tablet(s) Oral at bedtime  spironolactone 25 milliGRAM(s) Oral daily    MEDICATIONS  (PRN):  acetaminophen     Tablet .. 650 milliGRAM(s) Oral every 6 hours PRN Mild Pain (1 - 3), Moderate Pain (4 - 6)  bisacodyl Suppository 10 milliGRAM(s) Rectal daily PRN Constipation  sodium chloride 0.65% Nasal 1 Spray(s) Both Nostrils every 6 hours PRN Nasal Congestion      CAPILLARY BLOOD GLUCOSE      POCT Blood Glucose.: 170 mg/dL (05 Mar 2022 21:49)  POCT Blood Glucose.: 101 mg/dL (05 Mar 2022 17:41)  POCT Blood Glucose.: 90 mg/dL (05 Mar 2022 12:49)  POCT Blood Glucose.: 113 mg/dL (05 Mar 2022 08:38)    I&O's Summary    04 Mar 2022 07:01  -  05 Mar 2022 07:00  --------------------------------------------------------  IN: 920 mL / OUT: 1400 mL / NET: -480 mL    05 Mar 2022 07:01  -  06 Mar 2022 06:22  --------------------------------------------------------  IN: 460 mL / OUT: 2400 mL / NET: -1940 mL        PHYSICAL EXAM:  Vital Signs Last 24 Hrs  T(C): 36.4 (06 Mar 2022 04:57), Max: 36.8 (05 Mar 2022 11:56)  T(F): 97.5 (06 Mar 2022 04:57), Max: 98.3 (05 Mar 2022 11:56)  HR: 71 (06 Mar 2022 04:57) (66 - 71)  BP: 131/73 (06 Mar 2022 04:57) (131/73 - 155/84)  BP(mean): --  RR: 20 (06 Mar 2022 04:57) (18 - 20)  SpO2: 100% (06 Mar 2022 04:57) (95% - 100%)    CONSTITUTIONAL: NAD, morbidly obese, sitting up on recliner chair  RESPIRATORY: Normal respiratory effort; lungs are clear to auscultation bilaterally, on 2L NC   CARDIOVASCULAR: Regular rate and rhythm, normal S1 and S2, no murmur/rub/gallop; 1+ b/l lower extremity edema; Peripheral pulses are 2+ bilaterally  ABDOMEN: Large panus, Nontender to palpation, normoactive bowel sounds, no rebound/guarding  MUSCLOSKELETAL: no clubbing or cyanosis of digits; no joint swelling or tenderness to palpation  NEURO: no asterixis, A&O 3, lethargic when examined but per RN report was awake and alert all night, ambulated to bathroom. No neurological deficits.     LABS:                        9.2    16.65 )-----------( 380      ( 06 Mar 2022 05:10 )             31.1     03-06    141  |  100  |  28<H>  ----------------------------<  122<H>  3.8   |  28  |  0.95    Ca    9.2      06 Mar 2022 05:10  Phos  2.8     03-06  Mg     2.2     03-06    TPro  6.7  /  Alb  3.3  /  TBili  0.4  /  DBili  x   /  AST  24  /  ALT  17  /  AlkPhos  128<H>  03-06          Urinalysis Basic - ( 04 Mar 2022 09:55 )    Color: DARK BROWN / Appearance: Turbid / S.024 / pH: x  Gluc: x / Ketone: Trace  / Bili: Negative / Urobili: Negative   Blood: x / Protein: 100 mg/dL / Nitrite: Negative   Leuk Esterase: Large / RBC: 6260 /hpf /  /HPF   Sq Epi: x / Non Sq Epi: 9 /hpf / Bacteria: Negative        Culture - Urine (collected 04 Mar 2022 21:58)  Source: Catheterized Catheterized  Final Report (05 Mar 2022 18:37):    <10,000 CFU/mL Normal Urogenital Gia

## 2022-03-07 LAB
ALBUMIN SERPL ELPH-MCNC: 3 G/DL — LOW (ref 3.3–5)
ALP SERPL-CCNC: 119 U/L — SIGNIFICANT CHANGE UP (ref 40–120)
ALT FLD-CCNC: 18 U/L — SIGNIFICANT CHANGE UP (ref 10–45)
ANION GAP SERPL CALC-SCNC: 11 MMOL/L — SIGNIFICANT CHANGE UP (ref 5–17)
AST SERPL-CCNC: 25 U/L — SIGNIFICANT CHANGE UP (ref 10–40)
BASOPHILS # BLD AUTO: 0.03 K/UL — SIGNIFICANT CHANGE UP (ref 0–0.2)
BASOPHILS NFR BLD AUTO: 0.2 % — SIGNIFICANT CHANGE UP (ref 0–2)
BILIRUB SERPL-MCNC: 0.3 MG/DL — SIGNIFICANT CHANGE UP (ref 0.2–1.2)
BUN SERPL-MCNC: 25 MG/DL — HIGH (ref 7–23)
CALCIUM SERPL-MCNC: 8.9 MG/DL — SIGNIFICANT CHANGE UP (ref 8.4–10.5)
CHLORIDE SERPL-SCNC: 100 MMOL/L — SIGNIFICANT CHANGE UP (ref 96–108)
CO2 SERPL-SCNC: 29 MMOL/L — SIGNIFICANT CHANGE UP (ref 22–31)
CREAT SERPL-MCNC: 0.92 MG/DL — SIGNIFICANT CHANGE UP (ref 0.5–1.3)
EGFR: 68 ML/MIN/1.73M2 — SIGNIFICANT CHANGE UP
EOSINOPHIL # BLD AUTO: 0.19 K/UL — SIGNIFICANT CHANGE UP (ref 0–0.5)
EOSINOPHIL NFR BLD AUTO: 1.3 % — SIGNIFICANT CHANGE UP (ref 0–6)
GLUCOSE BLDC GLUCOMTR-MCNC: 136 MG/DL — HIGH (ref 70–99)
GLUCOSE BLDC GLUCOMTR-MCNC: 182 MG/DL — HIGH (ref 70–99)
GLUCOSE BLDC GLUCOMTR-MCNC: 206 MG/DL — HIGH (ref 70–99)
GLUCOSE SERPL-MCNC: 200 MG/DL — HIGH (ref 70–99)
HCT VFR BLD CALC: 32 % — LOW (ref 34.5–45)
HGB BLD-MCNC: 9.3 G/DL — LOW (ref 11.5–15.5)
IMM GRANULOCYTES NFR BLD AUTO: 1.1 % — SIGNIFICANT CHANGE UP (ref 0–1.5)
LYMPHOCYTES # BLD AUTO: 1.87 K/UL — SIGNIFICANT CHANGE UP (ref 1–3.3)
LYMPHOCYTES # BLD AUTO: 13.1 % — SIGNIFICANT CHANGE UP (ref 13–44)
MAGNESIUM SERPL-MCNC: 2.1 MG/DL — SIGNIFICANT CHANGE UP (ref 1.6–2.6)
MCHC RBC-ENTMCNC: 24.6 PG — LOW (ref 27–34)
MCHC RBC-ENTMCNC: 29.1 GM/DL — LOW (ref 32–36)
MCV RBC AUTO: 84.7 FL — SIGNIFICANT CHANGE UP (ref 80–100)
MONOCYTES # BLD AUTO: 0.88 K/UL — SIGNIFICANT CHANGE UP (ref 0–0.9)
MONOCYTES NFR BLD AUTO: 6.2 % — SIGNIFICANT CHANGE UP (ref 2–14)
NEUTROPHILS # BLD AUTO: 11.14 K/UL — HIGH (ref 1.8–7.4)
NEUTROPHILS NFR BLD AUTO: 78.1 % — HIGH (ref 43–77)
NRBC # BLD: 0 /100 WBCS — SIGNIFICANT CHANGE UP (ref 0–0)
NT-PROBNP SERPL-SCNC: 1938 PG/ML — HIGH (ref 0–300)
PHOSPHATE SERPL-MCNC: 3.1 MG/DL — SIGNIFICANT CHANGE UP (ref 2.5–4.5)
PLATELET # BLD AUTO: 387 K/UL — SIGNIFICANT CHANGE UP (ref 150–400)
POTASSIUM SERPL-MCNC: 3.9 MMOL/L — SIGNIFICANT CHANGE UP (ref 3.5–5.3)
POTASSIUM SERPL-SCNC: 3.9 MMOL/L — SIGNIFICANT CHANGE UP (ref 3.5–5.3)
PROT SERPL-MCNC: 6.3 G/DL — SIGNIFICANT CHANGE UP (ref 6–8.3)
RBC # BLD: 3.78 M/UL — LOW (ref 3.8–5.2)
RBC # FLD: 16.2 % — HIGH (ref 10.3–14.5)
SODIUM SERPL-SCNC: 140 MMOL/L — SIGNIFICANT CHANGE UP (ref 135–145)
WBC # BLD: 14.27 K/UL — HIGH (ref 3.8–10.5)
WBC # FLD AUTO: 14.27 K/UL — HIGH (ref 3.8–10.5)

## 2022-03-07 PROCEDURE — 99232 SBSQ HOSP IP/OBS MODERATE 35: CPT

## 2022-03-07 PROCEDURE — 99232 SBSQ HOSP IP/OBS MODERATE 35: CPT | Mod: GC

## 2022-03-07 PROCEDURE — 99233 SBSQ HOSP IP/OBS HIGH 50: CPT

## 2022-03-07 RX ORDER — POTASSIUM CHLORIDE 20 MEQ
40 PACKET (EA) ORAL ONCE
Refills: 0 | Status: COMPLETED | OUTPATIENT
Start: 2022-03-07 | End: 2022-03-07

## 2022-03-07 RX ORDER — SACUBITRIL AND VALSARTAN 24; 26 MG/1; MG/1
1 TABLET, FILM COATED ORAL
Refills: 0 | Status: DISCONTINUED | OUTPATIENT
Start: 2022-03-07 | End: 2022-03-10

## 2022-03-07 RX ORDER — INSULIN LISPRO 100/ML
22 VIAL (ML) SUBCUTANEOUS
Refills: 0 | Status: DISCONTINUED | OUTPATIENT
Start: 2022-03-07 | End: 2022-03-10

## 2022-03-07 RX ADMIN — Medication 81 MILLIGRAM(S): at 13:20

## 2022-03-07 RX ADMIN — ATORVASTATIN CALCIUM 80 MILLIGRAM(S): 80 TABLET, FILM COATED ORAL at 21:45

## 2022-03-07 RX ADMIN — SPIRONOLACTONE 25 MILLIGRAM(S): 25 TABLET, FILM COATED ORAL at 05:31

## 2022-03-07 RX ADMIN — BUMETANIDE 2 MILLIGRAM(S): 0.25 INJECTION INTRAMUSCULAR; INTRAVENOUS at 05:32

## 2022-03-07 RX ADMIN — SACUBITRIL AND VALSARTAN 1 TABLET(S): 24; 26 TABLET, FILM COATED ORAL at 17:12

## 2022-03-07 RX ADMIN — RIVAROXABAN 20 MILLIGRAM(S): KIT at 13:21

## 2022-03-07 RX ADMIN — Medication 2: at 09:22

## 2022-03-07 RX ADMIN — Medication 2: at 18:01

## 2022-03-07 RX ADMIN — Medication 1 APPLICATION(S): at 05:32

## 2022-03-07 RX ADMIN — AMPICILLIN SODIUM AND SULBACTAM SODIUM 200 GRAM(S): 250; 125 INJECTION, POWDER, FOR SUSPENSION INTRAMUSCULAR; INTRAVENOUS at 17:12

## 2022-03-07 RX ADMIN — Medication 26 UNIT(S): at 09:22

## 2022-03-07 RX ADMIN — Medication 1 APPLICATION(S): at 14:43

## 2022-03-07 RX ADMIN — Medication 650 MILLIGRAM(S): at 22:11

## 2022-03-07 RX ADMIN — AMPICILLIN SODIUM AND SULBACTAM SODIUM 200 GRAM(S): 250; 125 INJECTION, POWDER, FOR SUSPENSION INTRAMUSCULAR; INTRAVENOUS at 05:30

## 2022-03-07 RX ADMIN — AMPICILLIN SODIUM AND SULBACTAM SODIUM 200 GRAM(S): 250; 125 INJECTION, POWDER, FOR SUSPENSION INTRAMUSCULAR; INTRAVENOUS at 23:44

## 2022-03-07 RX ADMIN — Medication 22 UNIT(S): at 13:20

## 2022-03-07 RX ADMIN — Medication 100 MILLIGRAM(S): at 17:12

## 2022-03-07 RX ADMIN — Medication 40 MILLIGRAM(S): at 13:20

## 2022-03-07 RX ADMIN — Medication 40 MILLIEQUIVALENT(S): at 09:22

## 2022-03-07 RX ADMIN — NYSTATIN CREAM 1 APPLICATION(S): 100000 CREAM TOPICAL at 17:12

## 2022-03-07 RX ADMIN — Medication 22 UNIT(S): at 18:00

## 2022-03-07 RX ADMIN — Medication 100 MILLIGRAM(S): at 05:31

## 2022-03-07 RX ADMIN — POLYETHYLENE GLYCOL 3350 17 GRAM(S): 17 POWDER, FOR SOLUTION ORAL at 17:13

## 2022-03-07 RX ADMIN — AMPICILLIN SODIUM AND SULBACTAM SODIUM 200 GRAM(S): 250; 125 INJECTION, POWDER, FOR SUSPENSION INTRAMUSCULAR; INTRAVENOUS at 13:21

## 2022-03-07 RX ADMIN — Medication 650 MILLIGRAM(S): at 20:39

## 2022-03-07 RX ADMIN — INSULIN GLARGINE 28 UNIT(S): 100 INJECTION, SOLUTION SUBCUTANEOUS at 21:46

## 2022-03-07 RX ADMIN — BUMETANIDE 2 MILLIGRAM(S): 0.25 INJECTION INTRAMUSCULAR; INTRAVENOUS at 17:13

## 2022-03-07 NOTE — PROGRESS NOTE ADULT - PROBLEM SELECTOR PLAN 1
EF previously 75% and hyperdynamic LV on prior ECHO. On admission with pulm vasc congestion and possible hypervolemic state, concerning for decompensated HF  - BNP up trended from 7k to 15k then 19k   - HF consulted as volume status is challenging to assess in s/o morbid obesity. Cr continued to rise despite lasix challenge. TTE obtained this admission without significant change when compared to prior. Still mod pulm HTN, mod MR, grade II diastolic dysfunction.   - BNP down trending, HEATHER resolving, weaned to home oxygen   - Lasix 60mg IV push x 1   - Start bumex 2mg PO BID 3/7   - Aldactone 25mg qD   - Monitor I/Os strictly  - Daily weights   - Mg>2 K>4 EF previously 75% and hyperdynamic LV on prior ECHO. On admission with pulm vasc congestion and possible hypervolemic state, concerning for decompensated HF  - BNP up trended from 7k to 15k then 19k   - HF consulted as volume status is challenging to assess in s/o morbid obesity. Cr continued to rise despite lasix challenge. TTE obtained this admission without significant change when compared to prior. Still mod pulm HTN, mod MR, grade II diastolic dysfunction.   - BNP down trending, HEATHER resolving, weaned to home oxygen   - Lasix 60mg IV push x 1   - Start bumex 2mg PO BID and entresto 24/26 BID 3/7   - Aldactone 25mg qD   - Monitor I/Os strictly  - Daily weights   - Mg>2 K>4

## 2022-03-07 NOTE — PROGRESS NOTE ADULT - PROBLEM SELECTOR PLAN 4
Cr normal on admission, peaked around 4.7 now resolved   Cardiorenal etiology most likely given below:  - Urine lytes suggestive of pre-renal etiology, hypervolemic on exam, BNP 19k   - Trial of lasix 80mg IV 3/1 with improvement in renal function; lasix 60 IV 3/5    Plan:  - Cont to monitor renal function   - Avoid nephrotoxic agents  - Strict I/Os  - HF and nephrology following, appreciate recs Cr normal on admission, peaked around 4.7 now resolved   Cardiorenal etiology most likely given below:  - Urine lytes suggestive of pre-renal etiology, hypervolemic on exam, BNP 19k   - Trial of lasix 80mg IV 3/1 with improvement in renal function    Plan:  - Cont to monitor renal function   - Avoid nephrotoxic agents  - Strict I/Os  - HF and nephrology following, appreciate recs

## 2022-03-07 NOTE — PROGRESS NOTE ADULT - PROBLEM SELECTOR PLAN 4
-2/2 AS and HCM   -transitioned to PO diuretic and doing well fluid wise   -would start Entresto 24/26 mg BID   -pt should follow up with her primary cardiologist post discharge  -BNP downtrending

## 2022-03-07 NOTE — PROGRESS NOTE ADULT - PROBLEM SELECTOR PLAN 9
on Losartan 25 and Lopressor   - c/w lopressor  - hold Losartan given hypotension from sepsis & HEATHER   - aldactone 25mg qD on Losartan 25 and Lopressor   - c/w lopressor  - hold Losartan given hypotension from sepsis & HEATHER   - c/w aldactone 25mg qD  - entresto & bumex as above

## 2022-03-07 NOTE — PROGRESS NOTE ADULT - PROBLEM SELECTOR PLAN 2
Dunn noted to be bloody, UA with large RBC with uric acid crystals  - Nephrolithiasis?? or pt pulling on dunn??   - Renal US without calculi or hydronephrosis   - CTAP 3/4 - no stone visualized  - Resumed xarelto Dunn noted to be bloody, UA with large RBC with uric acid crystals  - Nephrolithiasis?? or pt pulling on dunn??   - Renal US without calculi or hydronephrosis   - CTAP 3/4 - no stone visualized  - Resumed xarelto  - Continue to monitor. no need to hold AC as long as H/H stable

## 2022-03-07 NOTE — PROGRESS NOTE ADULT - PROBLEM SELECTOR PLAN 6
RESOLVED  Fever, leukocytosis, hypotension on admission. Sepsis 2/2 likely sacral wound/cellulitis  - s/p bedside debridement in ED   - BCx 2/26 NGTD  - Wound Cx growing E. avium and strep anginosus   - Zosyn (2/27- 3/1 ). Unasyn (3/1- ) given culture sensitivities and prevent further renal injury   - MRSA pcr negative   - Daily packing changes  - Appreciate ID recs RESOLVED  Fever, leukocytosis, hypotension on admission. Sepsis 2/2 likely sacral wound/cellulitis  - s/p bedside debridement in ED   - BCx 2/26 NGTD  - MRSA pcr negative   - Daily packing changes  - Wound Cx growing E. avium and strep anginosus   - Zosyn (2/27- 3/1 ). Unasyn (3/1- ) given culture sensitivities and prevent further renal injury   - Plan for 14 day course of Abx. 3/12 end date. DC on augmentin   - Appreciate ID recs

## 2022-03-07 NOTE — PROGRESS NOTE ADULT - ASSESSMENT
66 y/o lady with PMH type 2 diabetes with H/O gastroparesis, pulmonary embolism and right lower extremity DVT 8/2020, PVD, hypertension, aortic stenosis  S/P TAVR 2015 and redo TAVR in 2021 presented to ED today with fever since 4 days. Pt also endorses acute on chronic shortness of breath. This is worsened w/ laying down and w/ exertion. She could not walk to her mail box without getting short of breath earlier today, prompting her arrival here. She denies associated cough, neck pain, confusion, weakness, hemoptysis, chest pain, abdominal pain, dysuria, vaginal discharge. She has chronic immobility 2/2 to morbid obesity. She denies any recent changes to medications. Pt with leukocytosis to 39 k with fever to 101.5 s/p vanc and cefepime in ER. CTAP with IV con: Multiple foci of subcutaneous gas at the left gluteal fold with mild associated inflammatory changes. Pt with intermittent Boderline SBP in 60s.    WORKUP  BNP: 7759  ESR: 50,   RVP: negative.  MRSA PCR Result.: NotDetec (07-21-21 @ 19:23)  UA: 34 WBC, Large LE, negative nitrates and bactetria  CTAP with IV con: Multiple foci of subcutaneous gas at the left gluteal fold with mild associated inflammatory changes      #Fever, Infected Decubitus Ulcer, Leukocytosis (persistent), Hypoxic Respiratory Failure  Wound Cultures with Streptococcus anginosis and Enterococcus  MRSA/MSSA Nasal PCR Negative  Tolerated Zosyn and Augmentin in 5/2021  Tolerating Zosyn this admission  Has persistent leukocytosis  Noting loose stools  CT A/P (3/4) noncontrast but noting abdominal wall infiltration ?unclear significance. Not noting any pelvic collections albeit noncontrast study.     --Cont Unasyn 3g IV Q6H while admitted  --If continued improvement anticipate discharge on Augmentin to complete total 14 days of antibiotics   --Check C diff toxin assay if diarrhea   --Further debridement / wound packing per wound care  --Continue to follow CBC with diff  --Continue to follow temperature curve  --blood cultures negative    I will continue to follow. Please feel free to contact me with any further questions.    Hao Torres M.D.  [facility] Division of Infectious Disease  8AM-5PM Monday - Friday: Available on [employer] Teams  After Hours and Holidays (or if no response on Microsoft Teams): Please contact the Infectious Diseases Office at [phone]    The above assessment and plan were discussed with medicine team  66 y/o lady with PMH type 2 diabetes with H/O gastroparesis, pulmonary embolism and right lower extremity DVT 8/2020, PVD, hypertension, aortic stenosis  S/P TAVR 2015 and redo TAVR in 2021 presented to ED today with fever since 4 days. Pt also endorses acute on chronic shortness of breath. This is worsened w/ laying down and w/ exertion. She could not walk to her mail box without getting short of breath earlier today, prompting her arrival here. She denies associated cough, neck pain, confusion, weakness, hemoptysis, chest pain, abdominal pain, dysuria, vaginal discharge. She has chronic immobility 2/2 to morbid obesity. She denies any recent changes to medications. Pt with leukocytosis to 39 k with fever to 101.5 s/p vanc and cefepime in ER. CTAP with IV con: Multiple foci of subcutaneous gas at the left gluteal fold with mild associated inflammatory changes. Pt with intermittent Boderline SBP in 60s.    WORKUP  BNP: 7759  ESR: 50,   RVP: negative.  MRSA PCR Result.: NotDetec (07-21-21 @ 19:23)  UA: 34 WBC, Large LE, negative nitrates and bactetria  CTAP with IV con: Multiple foci of subcutaneous gas at the left gluteal fold with mild associated inflammatory changes      #Fever, Infected Decubitus Ulcer, Leukocytosis (persistent), Hypoxic Respiratory Failure  Wound Cultures with Streptococcus anginosis and Enterococcus  MRSA/MSSA Nasal PCR Negative  Tolerated Zosyn and Augmentin in 5/2021  Tolerating Zosyn this admission  Has persistent leukocytosis  Noting loose stools  CT A/P (3/4) noncontrast but noting abdominal wall infiltration ?unclear significance. Not noting any pelvic collections albeit noncontrast study.     --Cont Unasyn 3g IV Q6H while admitted  --If continued improvement anticipate discharge on Augmentin 875 mg PO Q12H to complete total 14 days of antibiotics   --Check C diff toxin assay if diarrhea   --Further debridement / wound packing per wound care  --Continue to follow CBC with diff  --Continue to follow temperature curve  --blood cultures negative    I will continue to follow. Please feel free to contact me with any further questions.    Hao Torres M.D.  [facility] Division of Infectious Disease  8AM-5PM Monday - Friday: Available on [employer] Teams  After Hours and Holidays (or if no response on Microsoft Teams): Please contact the Infectious Diseases Office at [phone]    The above assessment and plan were discussed with medicine team

## 2022-03-07 NOTE — PROGRESS NOTE ADULT - SUBJECTIVE AND OBJECTIVE BOX
PROGRESS NOTE:   Authored by Dr. Georgia Paz MD (PGY-2). Available on TEAMS.    Patient is a 66y old  Female who presents with a chief complaint of sepsis 2/2 wound infection (06 Mar 2022 09:21)      SUBJECTIVE / OVERNIGHT EVENTS:  No acute events overnight.     ADDITIONAL REVIEW OF SYSTEMS:  Patient denies fevers, chills, chest pain, shortness of breath, nausea, abdominal pain, diarrhea, constipation, dysuria, leg swelling, headache, light headedness.    MEDICATIONS  (STANDING):  ampicillin/sulbactam  IVPB 3 Gram(s) IV Intermittent every 6 hours  aspirin enteric coated 81 milliGRAM(s) Oral daily  atorvastatin 80 milliGRAM(s) Oral at bedtime  buMETAnide 2 milliGRAM(s) Oral every 12 hours  Dakins Solution - 1/4 Strength 1 Application(s) Topical every 8 hours  dextrose 40% Gel 15 Gram(s) Oral once  dextrose 5%. 1000 milliLiter(s) (50 mL/Hr) IV Continuous <Continuous>  dextrose 5%. 1000 milliLiter(s) (100 mL/Hr) IV Continuous <Continuous>  dextrose 50% Injectable 12.5 Gram(s) IV Push once  dextrose 50% Injectable 25 Gram(s) IV Push once  dextrose 50% Injectable 25 Gram(s) IV Push once  FLUoxetine 40 milliGRAM(s) Oral daily  glucagon  Injectable 1 milliGRAM(s) IntraMuscular once  insulin glargine Injectable (LANTUS) 28 Unit(s) SubCutaneous at bedtime  insulin lispro (ADMELOG) corrective regimen sliding scale   SubCutaneous three times a day before meals  insulin lispro (ADMELOG) corrective regimen sliding scale   SubCutaneous at bedtime  insulin lispro Injectable (ADMELOG) 26 Unit(s) SubCutaneous three times a day with meals  metoprolol tartrate 100 milliGRAM(s) Oral two times a day  nystatin Powder 1 Application(s) Topical two times a day  polyethylene glycol 3350 17 Gram(s) Oral two times a day  rivaroxaban 20 milliGRAM(s) Oral daily  senna 2 Tablet(s) Oral at bedtime  spironolactone 25 milliGRAM(s) Oral daily    MEDICATIONS  (PRN):  acetaminophen     Tablet .. 650 milliGRAM(s) Oral every 6 hours PRN Mild Pain (1 - 3), Moderate Pain (4 - 6)  bisacodyl Suppository 10 milliGRAM(s) Rectal daily PRN Constipation  sodium chloride 0.65% Nasal 1 Spray(s) Both Nostrils every 6 hours PRN Nasal Congestion      CAPILLARY BLOOD GLUCOSE      POCT Blood Glucose.: 114 mg/dL (06 Mar 2022 22:37)  POCT Blood Glucose.: 88 mg/dL (06 Mar 2022 22:12)  POCT Blood Glucose.: 71 mg/dL (06 Mar 2022 22:09)  POCT Blood Glucose.: 83 mg/dL (06 Mar 2022 21:47)  POCT Blood Glucose.: 158 mg/dL (06 Mar 2022 17:27)  POCT Blood Glucose.: 154 mg/dL (06 Mar 2022 13:32)  POCT Blood Glucose.: 147 mg/dL (06 Mar 2022 08:50)    I&O's Summary    05 Mar 2022 07:01  -  06 Mar 2022 07:00  --------------------------------------------------------  IN: 460 mL / OUT: 2400 mL / NET: -1940 mL    06 Mar 2022 07:01  -  07 Mar 2022 05:58  --------------------------------------------------------  IN: 458 mL / OUT: 2050 mL / NET: -1592 mL        PHYSICAL EXAM:  Vital Signs Last 24 Hrs  T(C): 36.8 (07 Mar 2022 04:09), Max: 36.8 (07 Mar 2022 04:09)  T(F): 98.2 (07 Mar 2022 04:09), Max: 98.2 (07 Mar 2022 04:09)  HR: 71 (07 Mar 2022 04:09) (66 - 77)  BP: 144/64 (07 Mar 2022 04:09) (130/62 - 144/64)  BP(mean): --  RR: 18 (07 Mar 2022 04:09) (18 - 19)  SpO2: 96% (07 Mar 2022 04:09) (96% - 99%)    CONSTITUTIONAL: NAD, well-developed  RESPIRATORY: Normal respiratory effort; lungs are clear to auscultation bilaterally  CARDIOVASCULAR: Regular rate and rhythm, normal S1 and S2, no murmur/rub/gallop; No lower extremity edema; Peripheral pulses are 2+ bilaterally  ABDOMEN: Nontender to palpation, normoactive bowel sounds, no rebound/guarding; No hepatosplenomegaly  MUSCLOSKELETAL: no clubbing or cyanosis of digits; no joint swelling or tenderness to palpation  PSYCH: A+O to person, place, and time; affect appropriate    LABS:                        9.2    16.65 )-----------( 380      ( 06 Mar 2022 05:10 )             31.1     03-06    141  |  100  |  28<H>  ----------------------------<  122<H>  3.8   |  28  |  0.95    Ca    9.2      06 Mar 2022 05:10  Phos  2.8     03-06  Mg     2.2     03-06    TPro  6.7  /  Alb  3.3  /  TBili  0.4  /  DBili  x   /  AST  24  /  ALT  17  /  AlkPhos  128<H>  03-06              Culture - Urine (collected 04 Mar 2022 21:58)  Source: Catheterized Catheterized  Final Report (05 Mar 2022 18:37):    <10,000 CFU/mL Normal Urogenital Gia        Tele Reviewed:    RADIOLOGY & ADDITIONAL TESTS:  Results Reviewed:   Imaging Personally Reviewed:  Electrocardiogram Personally Reviewed:     PROGRESS NOTE:   Authored by Dr. Georgia Paz MD (PGY-2). Available on TEAMS.    Patient is a 66y old  Female who presents with a chief complaint of sepsis 2/2 wound infection (06 Mar 2022 09:21)      SUBJECTIVE / OVERNIGHT EVENTS:  No acute events overnight. Pt reports feeling back to her usual state of health and is inquiring when she will be leaving the hospital. Specifically she inquired whether her breathing will be back to normal when she levaes for rehab. Discussed that respiratory failure is in s/o combined ADHF, OHS, and JOSR. Emphasized need for weight management and CPAP use. Pt expressed understanding.     ADDITIONAL REVIEW OF SYSTEMS:  Patient denies fevers, chills, chest pain, shortness of breath, nausea, abdominal pain, diarrhea, constipation, dysuria, leg swelling, headache, light headedness.    MEDICATIONS  (STANDING):  ampicillin/sulbactam  IVPB 3 Gram(s) IV Intermittent every 6 hours  aspirin enteric coated 81 milliGRAM(s) Oral daily  atorvastatin 80 milliGRAM(s) Oral at bedtime  buMETAnide 2 milliGRAM(s) Oral every 12 hours  Dakins Solution - 1/4 Strength 1 Application(s) Topical every 8 hours  dextrose 40% Gel 15 Gram(s) Oral once  dextrose 5%. 1000 milliLiter(s) (50 mL/Hr) IV Continuous <Continuous>  dextrose 5%. 1000 milliLiter(s) (100 mL/Hr) IV Continuous <Continuous>  dextrose 50% Injectable 12.5 Gram(s) IV Push once  dextrose 50% Injectable 25 Gram(s) IV Push once  dextrose 50% Injectable 25 Gram(s) IV Push once  FLUoxetine 40 milliGRAM(s) Oral daily  glucagon  Injectable 1 milliGRAM(s) IntraMuscular once  insulin glargine Injectable (LANTUS) 28 Unit(s) SubCutaneous at bedtime  insulin lispro (ADMELOG) corrective regimen sliding scale   SubCutaneous three times a day before meals  insulin lispro (ADMELOG) corrective regimen sliding scale   SubCutaneous at bedtime  insulin lispro Injectable (ADMELOG) 26 Unit(s) SubCutaneous three times a day with meals  metoprolol tartrate 100 milliGRAM(s) Oral two times a day  nystatin Powder 1 Application(s) Topical two times a day  polyethylene glycol 3350 17 Gram(s) Oral two times a day  rivaroxaban 20 milliGRAM(s) Oral daily  senna 2 Tablet(s) Oral at bedtime  spironolactone 25 milliGRAM(s) Oral daily    MEDICATIONS  (PRN):  acetaminophen     Tablet .. 650 milliGRAM(s) Oral every 6 hours PRN Mild Pain (1 - 3), Moderate Pain (4 - 6)  bisacodyl Suppository 10 milliGRAM(s) Rectal daily PRN Constipation  sodium chloride 0.65% Nasal 1 Spray(s) Both Nostrils every 6 hours PRN Nasal Congestion      CAPILLARY BLOOD GLUCOSE      POCT Blood Glucose.: 114 mg/dL (06 Mar 2022 22:37)  POCT Blood Glucose.: 88 mg/dL (06 Mar 2022 22:12)  POCT Blood Glucose.: 71 mg/dL (06 Mar 2022 22:09)  POCT Blood Glucose.: 83 mg/dL (06 Mar 2022 21:47)  POCT Blood Glucose.: 158 mg/dL (06 Mar 2022 17:27)  POCT Blood Glucose.: 154 mg/dL (06 Mar 2022 13:32)  POCT Blood Glucose.: 147 mg/dL (06 Mar 2022 08:50)    I&O's Summary    05 Mar 2022 07:01  -  06 Mar 2022 07:00  --------------------------------------------------------  IN: 460 mL / OUT: 2400 mL / NET: -1940 mL    06 Mar 2022 07:01  -  07 Mar 2022 05:58  --------------------------------------------------------  IN: 458 mL / OUT: 2050 mL / NET: -1592 mL        PHYSICAL EXAM:  Vital Signs Last 24 Hrs  T(C): 36.8 (07 Mar 2022 04:09), Max: 36.8 (07 Mar 2022 04:09)  T(F): 98.2 (07 Mar 2022 04:09), Max: 98.2 (07 Mar 2022 04:09)  HR: 71 (07 Mar 2022 04:09) (66 - 77)  BP: 144/64 (07 Mar 2022 04:09) (130/62 - 144/64)  BP(mean): --  RR: 18 (07 Mar 2022 04:09) (18 - 19)  SpO2: 96% (07 Mar 2022 04:09) (96% - 99%)    CONSTITUTIONAL: NAD, morbidly obese, sitting up on recliner chair  RESPIRATORY: Normal respiratory effort; lungs are clear to auscultation bilaterally, on 2L NC   CARDIOVASCULAR: Regular rate and rhythm, normal S1 and S2, no murmur/rub/gallop; 1+ b/l lower extremity edema; Peripheral pulses are 2+ bilaterally  ABDOMEN: Large panus without any areas of purulence/erythema. Nontender to palpation, normoactive bowel sounds, no rebound/guarding  MUSCLOSKELETAL: no clubbing or cyanosis of digits; no joint swelling or tenderness to palpation  NEURO: no asterixis, A&O 3, ambulates to bathroom. No neurological deficits.     LABS:                        9.2    16.65 )-----------( 380      ( 06 Mar 2022 05:10 )             31.1     03-06    141  |  100  |  28<H>  ----------------------------<  122<H>  3.8   |  28  |  0.95    Ca    9.2      06 Mar 2022 05:10  Phos  2.8     03-06  Mg     2.2     03-06    TPro  6.7  /  Alb  3.3  /  TBili  0.4  /  DBili  x   /  AST  24  /  ALT  17  /  AlkPhos  128<H>  03-06        Culture - Urine (collected 04 Mar 2022 21:58)  Source: Catheterized Catheterized  Final Report (05 Mar 2022 18:37):    <10,000 CFU/mL Normal Urogenital Gia

## 2022-03-07 NOTE — PROGRESS NOTE ADULT - ASSESSMENT
65 yo F, PMHx AS s/p TAVR 2015 and 2021, HOCM, HFpEF, pAFib on Xarelto, JOSR (does not tolerate CPAP, on 2L O2 at home qhs), anxiety, morbid obesity, DM, HTN, asthma (on albuterol), pw sepsis, n/v, likely 2/2 sacral decubitus ulcer/necrotic wound on left ischium with associated cellulitis. Also found to be in acute on chronic hypoxic respiratory failure on admission, with pulmonary vascular congestion on imaging concerning for decompensated HF. Course c/b HEATHER 2/2 cardiorenal in s/o ADHF and uremic encephalopathy now improving.    65 yo F, PMHx AS s/p TAVR 2015 and 2021, HOCM, HFpEF, pAFib on Xarelto, JOSR (does not tolerate CPAP, on 2L O2 at home qhs), anxiety, morbid obesity, DM, HTN, asthma (on albuterol), pw sepsis, n/v, likely 2/2 sacral decubitus ulcer/necrotic wound on left ischium with associated cellulitis. Also found to be in acute on chronic hypoxic respiratory failure on admission, with pulmonary vascular congestion on imaging concerning for decompensated HF. Course c/b HEATHER 2/2 cardiorenal in s/o ADHF and uremic encephalopathy now resolved. Respiratory status back to baseline, anticipate medically stable for discharge in 24-48 hrs.

## 2022-03-07 NOTE — PROGRESS NOTE ADULT - PROBLEM SELECTOR PLAN 10
DVT ppx: AC as above    Diet: DASH/CC  PT rec JOSE CRUZ DVT ppx: AC as above    Diet: DASH/CC  PT rec JOSE CRUZ  Discussed with CM 3/7, plan for dc 24-48 hrs

## 2022-03-07 NOTE — PROGRESS NOTE ADULT - PROBLEM SELECTOR PLAN 7
On metoprolol tartate 100mg bid and Xarelto  - HR <110 at this time   - continue metoprolol tartate 100mg bid  - Xarelto 20mg qD

## 2022-03-07 NOTE — PROGRESS NOTE ADULT - SUBJECTIVE AND OBJECTIVE BOX
Follow Up:      Interval History:    REVIEW OF SYSTEMS  [  ] ROS unobtainable because:    [  ] All other systems negative except as noted below    Constitutional:  [ ] fever [ ] chills  [ ] weight loss  [ ] weakness  Skin:  [ ] rash [ ] phlebitis	  Eyes: [ ] icterus [ ] pain  [ ] discharge	  ENMT: [ ] sore throat  [ ] thrush [ ] ulcers [ ] exudates  Respiratory: [ ] dyspnea [ ] hemoptysis [ ] cough [ ] sputum	  Cardiovascular:  [ ] chest pain [ ] palpitations [ ] edema	  Gastrointestinal:  [ ] nausea [ ] vomiting [ ] diarrhea [ ] constipation [ ] pain	  Genitourinary:  [ ] dysuria [ ] frequency [ ] hematuria [ ] discharge [ ] flank pain  [ ] incontinence  Musculoskeletal:  [ ] myalgias [ ] arthralgias [ ] arthritis  [ ] back pain  Neurological:  [ ] headache [ ] seizures  [ ] confusion/altered mental status    Allergies  amoxicillin (Unknown)  Cipro (Unknown)  codeine (Vomiting)  shellfish (Stomach Upset)        ANTIMICROBIALS:  ampicillin/sulbactam  IVPB 3 every 6 hours      OTHER MEDS:  MEDICATIONS  (STANDING):  acetaminophen     Tablet .. 650 every 6 hours PRN  aspirin enteric coated 81 daily  atorvastatin 80 at bedtime  bisacodyl Suppository 10 daily PRN  buMETAnide 2 every 12 hours  dextrose 40% Gel 15 once  dextrose 50% Injectable 25 once  dextrose 50% Injectable 12.5 once  dextrose 50% Injectable 25 once  FLUoxetine 40 daily  glucagon  Injectable 1 once  insulin glargine Injectable (LANTUS) 28 at bedtime  insulin lispro (ADMELOG) corrective regimen sliding scale  three times a day before meals  insulin lispro (ADMELOG) corrective regimen sliding scale  at bedtime  insulin lispro Injectable (ADMELOG) 22 three times a day with meals  metoprolol tartrate 100 two times a day  polyethylene glycol 3350 17 two times a day  rivaroxaban 20 daily  sacubitril 24 mG/valsartan 26 mG 1 two times a day  senna 2 at bedtime  spironolactone 25 daily      Vital Signs Last 24 Hrs  T(C): 36.7 (07 Mar 2022 12:19), Max: 36.8 (07 Mar 2022 04:09)  T(F): 98.1 (07 Mar 2022 12:19), Max: 98.2 (07 Mar 2022 04:09)  HR: 90 (07 Mar 2022 17:21) (66 - 90)  BP: 134/61 (07 Mar 2022 17:21) (119/69 - 144/64)  BP(mean): --  RR: 18 (07 Mar 2022 12:19) (18 - 18)  SpO2: 96% (07 Mar 2022 12:19) (96% - 99%)    PHYSICAL EXAMINATION:  General: Alert and Awake, NAD  HEENT: PERRL, EOMI  Neck: Supple  Cardiac: RRR, No M/R/G  Resp: CTAB, No Wh/Rh/Ra  Abdomen: NBS, NT/ND, No HSM, No rigidity or guarding  MSK: No LE edema. No Calf tenderness  : No dunn  Skin: No rashes or lesions. Skin is warm and dry to the touch.   Neuro: Alert and Awake. CN 2-12 Grossly intact. Moves all four extremities spontaneously.  Psych: Calm, Pleasant, Cooperative                          9.3    14.27 )-----------( 387      ( 07 Mar 2022 07:03 )             32.0       03-07    140  |  100  |  25<H>  ----------------------------<  200<H>  3.9   |  29  |  0.92    Ca    8.9      07 Mar 2022 06:57  Phos  3.1     03-07  Mg     2.1     03-07    TPro  6.3  /  Alb  3.0<L>  /  TBili  0.3  /  DBili  x   /  AST  25  /  ALT  18  /  AlkPhos  119  03-07          MICROBIOLOGY:  v  Catheterized Catheterized  03-04-22   <10,000 CFU/mL Normal Urogenital Gia  --  --      Decub Decubitus  02-26-22   Moderate Enterococcus avium  Numerous Streptococcus anginosus Susceptibilites not performed.  --  Enterococcus avium      Clean Catch Clean Catch (Midstream)  02-26-22   <10,000 CFU/mL Normal Urogenital Gia  --  --      .Blood Blood  02-26-22   No Growth Final  --  --                RADIOLOGY:    <The imaging below has been reviewed and visualized by me independently. Findings as detailed in report below> Follow Up:  Wound Infection    Interval History: afebrile. no acute overnight events.     REVIEW OF SYSTEMS  [  ] ROS unobtainable because:    [ x ] All other systems negative except as noted below    Constitutional:  [ ] fever [ ] chills  [ ] weight loss  [ ] weakness  Skin:  [ ] rash [ ] phlebitis	  Eyes: [ ] icterus [ ] pain  [ ] discharge	  ENMT: [ ] sore throat  [ ] thrush [ ] ulcers [ ] exudates  Respiratory: [ ] dyspnea [ ] hemoptysis [ ] cough [ ] sputum	  Cardiovascular:  [ ] chest pain [ ] palpitations [ ] edema	  Gastrointestinal:  [ ] nausea [ ] vomiting [ ] diarrhea [ ] constipation [ ] pain	  Genitourinary:  [ ] dysuria [ ] frequency [ ] hematuria [ ] discharge [ ] flank pain  [ ] incontinence  Musculoskeletal:  [ ] myalgias [ ] arthralgias [ ] arthritis  [ ] back pain  Neurological:  [ ] headache [ ] seizures  [ ] confusion/altered mental status    Allergies  amoxicillin (Unknown)  Cipro (Unknown)  codeine (Vomiting)  shellfish (Stomach Upset)        ANTIMICROBIALS:  ampicillin/sulbactam  IVPB 3 every 6 hours      OTHER MEDS:  MEDICATIONS  (STANDING):  acetaminophen     Tablet .. 650 every 6 hours PRN  aspirin enteric coated 81 daily  atorvastatin 80 at bedtime  bisacodyl Suppository 10 daily PRN  buMETAnide 2 every 12 hours  dextrose 40% Gel 15 once  dextrose 50% Injectable 25 once  dextrose 50% Injectable 12.5 once  dextrose 50% Injectable 25 once  FLUoxetine 40 daily  glucagon  Injectable 1 once  insulin glargine Injectable (LANTUS) 28 at bedtime  insulin lispro (ADMELOG) corrective regimen sliding scale  three times a day before meals  insulin lispro (ADMELOG) corrective regimen sliding scale  at bedtime  insulin lispro Injectable (ADMELOG) 22 three times a day with meals  metoprolol tartrate 100 two times a day  polyethylene glycol 3350 17 two times a day  rivaroxaban 20 daily  sacubitril 24 mG/valsartan 26 mG 1 two times a day  senna 2 at bedtime  spironolactone 25 daily      Vital Signs Last 24 Hrs  T(C): 36.7 (07 Mar 2022 12:19), Max: 36.8 (07 Mar 2022 04:09)  T(F): 98.1 (07 Mar 2022 12:19), Max: 98.2 (07 Mar 2022 04:09)  HR: 90 (07 Mar 2022 17:21) (66 - 90)  BP: 134/61 (07 Mar 2022 17:21) (119/69 - 144/64)  BP(mean): --  RR: 18 (07 Mar 2022 12:19) (18 - 18)  SpO2: 96% (07 Mar 2022 12:19) (96% - 99%)    PHYSICAL EXAMINATION:  General: Alert and Awake, NAD  Cardiac: RRR, No M/R/G  Resp: CTAB, No Wh/Rh/Ra  Abdomen: NBS, NT/ND, No HSM, No rigidity or guarding  MSK: No LE edema. No Calf tenderness  Skin: No rashes or lesions. Skin is warm and dry to the touch.   Neuro: Alert and Awake. CN 2-12 Grossly intact. Moves all four extremities spontaneously.  Psych: Calm, Pleasant, Cooperative                          9.3    14.27 )-----------( 387      ( 07 Mar 2022 07:03 )             32.0       03-07    140  |  100  |  25<H>  ----------------------------<  200<H>  3.9   |  29  |  0.92    Ca    8.9      07 Mar 2022 06:57  Phos  3.1     03-07  Mg     2.1     03-07    TPro  6.3  /  Alb  3.0<L>  /  TBili  0.3  /  DBili  x   /  AST  25  /  ALT  18  /  AlkPhos  119  03-07          MICROBIOLOGY:    Catheterized Catheterized  03-04-22   <10,000 CFU/mL Normal Urogenital Gia  --  --      Decub Decubitus  02-26-22   Moderate Enterococcus avium  Numerous Streptococcus anginosus Susceptibilites not performed.  --  Enterococcus avium      Clean Catch Clean Catch (Midstream)  02-26-22   <10,000 CFU/mL Normal Urogenital Gia  --  --      .Blood Blood  02-26-22   No Growth Final  --  --      RADIOLOGY:    <The imaging below has been reviewed and visualized by me independently. Findings as detailed in report below>    < from: CT Abdomen and Pelvis No Cont (03.04.22 @ 15:00) >  IMPRESSION:  No renal stones or hydronephrosis.    Lower ventral abdominal wall skin thickening and adjacent subcutaneous   infiltration. Correlate for cellulitis.    < end of copied text >

## 2022-03-07 NOTE — PROGRESS NOTE ADULT - PROBLEM SELECTOR PLAN 3
At home on 2L nasal cannula as needed. On admission hypoxic to 87 % , temporarily required BiPAP. CXR with pulm vascular congestion  - Likely multifactorial in s/o decompensated HF (BNP 15k), and chronically poor respiratory mechanism in s/o JOSR OHS   - TTE in Sept 2021 showed hyperdynamic LV systolic function, EF 75%, and mod pulm HTN  - Low suspicion for PE (on home xarelto for a.fib)  - HR <110, unlikely dysrhythmia driven   - Repeat TTE as above   - ICS  - Diuresis as above  - Weaned back to home oxygen needs

## 2022-03-07 NOTE — PROGRESS NOTE ADULT - ATTENDING COMMENTS
67 yo F, PMHx AS s/p TAVR 2015 and 2021, HOCM, pAFib on Xarelto, diastolic and valvular HF, JOSR (does not tolerate CPAP, on 2L O2 at home qhs), anxiety, morbid obesity, DM with neuropathy, HTN, asthma (on albuterol), pw sepsis, n/v, likely 2/2 sacral decubitus ulcer/necrotic wound on left ischium with associated cellulitis.  Patient seen sitting in a chair, no acute complaints. Feels like breathing has improved.   Acute on chronic diastolic HF – HF recs appreciated, monitor on PO bumex and entreso   Incentive spirometer  ID f/u appreciated - 14 days abx, can switch to augmentin on d/c   Anticipated dispo: [facility] in next 24-48 hours   Rest as above.

## 2022-03-07 NOTE — PROGRESS NOTE ADULT - SUBJECTIVE AND OBJECTIVE BOX
Patient seen and examined at bedside.    Overnight Events:     Doing well   Ambulated with PT       REVIEW OF SYSTEMS:  Constitutional:     [x ] negative [ ] fevers [ ] chills [ ] weight loss [ ] weight gain  HEENT:                  [x ] negative [ ] dry eyes [ ] eye irritation [ ] postnasal drip [ ] nasal congestion  CV:                         [ x] negative  [ ] chest pain [ ] orthopnea [ ] palpitations [ ] murmur  Resp:                     [ x] negative [ ] cough [ ] shortness of breath [ ] dyspnea [ ] wheezing [ ] sputum [ ]hemoptysis  GI:                          [ x] negative [ ] nausea [ ] vomiting [ ] diarrhea [ ] constipation [ ] abd pain [ ] dysphagia   :                        [ x] negative [ ] dysuria [ ] nocturia [ ] hematuria [ ] increased urinary frequency  Musculoskeletal: [ x] negative [ ] back pain [ ] myalgias [ ] arthralgias [ ] fracture  Skin:                       [ x] negative [ ] rash [ ] itch  Neurological:        [x ] negative [ ] headache [ ] dizziness [ ] syncope [ ] weakness [ ] numbness  Psychiatric:           [ x] negative [ ] anxiety [ ] depression  Endocrine:            [ x] negative [ ] diabetes [ ] thyroid problem  Heme/Lymph:      [ x] negative [ ] anemia [ ] bleeding problem  Allergic/Immune: [ x] negative [ ] itchy eyes [ ] nasal discharge [ ] hives [ ] angioedema    [ x] All other systems negative  [ ] Unable to assess ROS due to    Current Meds:  acetaminophen     Tablet .. 650 milliGRAM(s) Oral every 6 hours PRN  ampicillin/sulbactam  IVPB 3 Gram(s) IV Intermittent every 6 hours  aspirin enteric coated 81 milliGRAM(s) Oral daily  atorvastatin 80 milliGRAM(s) Oral at bedtime  bisacodyl Suppository 10 milliGRAM(s) Rectal daily PRN  buMETAnide 2 milliGRAM(s) Oral every 12 hours  Dakins Solution - 1/4 Strength 1 Application(s) Topical every 8 hours  dextrose 40% Gel 15 Gram(s) Oral once  dextrose 5%. 1000 milliLiter(s) IV Continuous <Continuous>  dextrose 5%. 1000 milliLiter(s) IV Continuous <Continuous>  dextrose 50% Injectable 25 Gram(s) IV Push once  dextrose 50% Injectable 12.5 Gram(s) IV Push once  dextrose 50% Injectable 25 Gram(s) IV Push once  FLUoxetine 40 milliGRAM(s) Oral daily  glucagon  Injectable 1 milliGRAM(s) IntraMuscular once  insulin glargine Injectable (LANTUS) 28 Unit(s) SubCutaneous at bedtime  insulin lispro (ADMELOG) corrective regimen sliding scale   SubCutaneous three times a day before meals  insulin lispro (ADMELOG) corrective regimen sliding scale   SubCutaneous at bedtime  insulin lispro Injectable (ADMELOG) 22 Unit(s) SubCutaneous three times a day with meals  metoprolol tartrate 100 milliGRAM(s) Oral two times a day  nystatin Powder 1 Application(s) Topical two times a day  polyethylene glycol 3350 17 Gram(s) Oral two times a day  rivaroxaban 20 milliGRAM(s) Oral daily  sacubitril 24 mG/valsartan 26 mG 1 Tablet(s) Oral two times a day  senna 2 Tablet(s) Oral at bedtime  sodium chloride 0.65% Nasal 1 Spray(s) Both Nostrils every 6 hours PRN  spironolactone 25 milliGRAM(s) Oral daily      PAST MEDICAL & SURGICAL HISTORY:  Hypertension    Gastroparesis due to DM    Depression    Herniated disc    Spinal stenosis  chronic back pain    Cholesterol serum elevated    MVP (mitral valve prolapse)    Hypothyroid  not on medication    Obesity    IBS (irritable bowel syndrome)    GERD (gastroesophageal reflux disease)    Diabetes type 2, uncontrolled    History of diabetic retinopathy    Peripheral neuropathy    HOCM (hypertrophic obstructive cardiomyopathy)    Atrial fibrillation    Aortic stenosis    S/P bariatric surgery  lap band surgery two times due to erosion, S/P removal    S/P tonsillectomy and adenoidectomy    Cataract  B/L cataracts    S/P D&amp;C (status post dilation and curettage)  D&amp;C when she was 18    Cervix abnormality  Ablation of cervix    Breast anomaly  Biopsy of both breast benign lesions    right eye torn retina    S/P TAVR (transcatheter aortic valve replacement)  2015    S/P foot surgery  May 2021        Vitals:  T(F): 98.1 (03-07), Max: 98.2 (03-07)  HR: 74 (03-07) (66 - 74)  BP: 119/69 (03-07) (119/69 - 144/64)  RR: 18 (03-07)  SpO2: 96% (03-07)  I&O's Summary    06 Mar 2022 07:01  -  07 Mar 2022 07:00  --------------------------------------------------------  IN: 458 mL / OUT: 2050 mL / NET: -1592 mL    07 Mar 2022 07:01  -  07 Mar 2022 13:37  --------------------------------------------------------  IN: 180 mL / OUT: 0 mL / NET: 180 mL        Physical Exam:  GENERAL: No acute distress, well-developed  HEAD:  Atraumatic, Normocephalic  ENT: JVD 10 cm   CHEST/LUNG: CTABL  HEART: Regular rate and irregular rhythm; No murmurs, rubs, or gallops  ABDOMEN: Soft, Nontender, Nondistended; Bowel sounds present  EXTREMITIES:  1+ edema b/l   NEUROLOGY: AAOx3, non-focal, cranial nerves intact                          9.3    14.27 )-----------( 387      ( 07 Mar 2022 07:03 )             32.0     03-07    140  |  100  |  25<H>  ----------------------------<  200<H>  3.9   |  29  |  0.92    Ca    8.9      07 Mar 2022 06:57  Phos  3.1     03-07  Mg     2.1     03-07    TPro  6.3  /  Alb  3.0<L>  /  TBili  0.3  /  DBili  x   /  AST  25  /  ALT  18  /  AlkPhos  119  03-07          Serum Pro-Brain Natriuretic Peptide: 1938 pg/mL (03-07 @ 06:57)  Serum Pro-Brain Natriuretic Peptide: 3411 pg/mL (03-05 @ 05:59)  Serum Pro-Brain Natriuretic Peptide: 5058 pg/mL (03-04 @ 07:01)  Serum Pro-Brain Natriuretic Peptide: 6395 pg/mL (03-03 @ 06:59)  Serum Pro-Brain Natriuretic Peptide: 36274 pg/mL (03-02 @ 10:45)  Serum Pro-Brain Natriuretic Peptide: 35339 pg/mL (03-01 @ 06:38)

## 2022-03-07 NOTE — PROGRESS NOTE ADULT - PROBLEM SELECTOR PLAN 8
on Humalog 30 TID and Tresiba 30 bedtime   - A1c 8.6%  - Increase to Insulin 26 pre-meal and 28 long-acting bedtime  - check FSG qAC and qhs  - adjust insulin as needed on Humalog 30 TID and Tresiba 30 bedtime   - A1c 8.6%  - Increase to Insulin 22 pre-meal and 28 long-acting bedtime  - check FSG qAC and qhs  - adjust insulin as needed

## 2022-03-07 NOTE — PROGRESS NOTE ADULT - ATTENDING COMMENTS
Briefly, 66F with h/o severe AS s/p TAVR'15 (Ramon 23mm) and Suzy TAVR in 2021, pAF on Xarelto, JOSR (does not tolerate CPAP, on home 2L O2), morbid obesity s/p gastric band c/b gastric perforation, and DVT/PE who p/w sepsis on 2/26 2/2 sacral ulcer requiring debridement in the ED. She developed ADHF and heart failure was called for diuretic management  - now transitioned to bumex 2mg daily  - start entresto 24/26mg BID  - c/w lopressor 100 mg q12  - c/w abx as per ID  -CHF will signoff, please reconsult as needed

## 2022-03-08 ENCOUNTER — TRANSCRIPTION ENCOUNTER (OUTPATIENT)
Age: 67
End: 2022-03-08

## 2022-03-08 LAB
ALBUMIN SERPL ELPH-MCNC: 3.3 G/DL — SIGNIFICANT CHANGE UP (ref 3.3–5)
ALP SERPL-CCNC: 122 U/L — HIGH (ref 40–120)
ALT FLD-CCNC: 17 U/L — SIGNIFICANT CHANGE UP (ref 10–45)
ANION GAP SERPL CALC-SCNC: 13 MMOL/L — SIGNIFICANT CHANGE UP (ref 5–17)
AST SERPL-CCNC: 24 U/L — SIGNIFICANT CHANGE UP (ref 10–40)
BILIRUB SERPL-MCNC: 0.3 MG/DL — SIGNIFICANT CHANGE UP (ref 0.2–1.2)
BUN SERPL-MCNC: 25 MG/DL — HIGH (ref 7–23)
CALCIUM SERPL-MCNC: 9.1 MG/DL — SIGNIFICANT CHANGE UP (ref 8.4–10.5)
CHLORIDE SERPL-SCNC: 99 MMOL/L — SIGNIFICANT CHANGE UP (ref 96–108)
CO2 SERPL-SCNC: 28 MMOL/L — SIGNIFICANT CHANGE UP (ref 22–31)
CREAT SERPL-MCNC: 0.92 MG/DL — SIGNIFICANT CHANGE UP (ref 0.5–1.3)
EGFR: 68 ML/MIN/1.73M2 — SIGNIFICANT CHANGE UP
GLUCOSE BLDC GLUCOMTR-MCNC: 113 MG/DL — HIGH (ref 70–99)
GLUCOSE BLDC GLUCOMTR-MCNC: 117 MG/DL — HIGH (ref 70–99)
GLUCOSE BLDC GLUCOMTR-MCNC: 132 MG/DL — HIGH (ref 70–99)
GLUCOSE BLDC GLUCOMTR-MCNC: 193 MG/DL — HIGH (ref 70–99)
GLUCOSE BLDC GLUCOMTR-MCNC: 83 MG/DL — SIGNIFICANT CHANGE UP (ref 70–99)
GLUCOSE SERPL-MCNC: 143 MG/DL — HIGH (ref 70–99)
HCT VFR BLD CALC: 31 % — LOW (ref 34.5–45)
HGB BLD-MCNC: 9.2 G/DL — LOW (ref 11.5–15.5)
MAGNESIUM SERPL-MCNC: 1.9 MG/DL — SIGNIFICANT CHANGE UP (ref 1.6–2.6)
MCHC RBC-ENTMCNC: 24.3 PG — LOW (ref 27–34)
MCHC RBC-ENTMCNC: 29.7 GM/DL — LOW (ref 32–36)
MCV RBC AUTO: 82 FL — SIGNIFICANT CHANGE UP (ref 80–100)
NRBC # BLD: 0 /100 WBCS — SIGNIFICANT CHANGE UP (ref 0–0)
PHOSPHATE SERPL-MCNC: 3.4 MG/DL — SIGNIFICANT CHANGE UP (ref 2.5–4.5)
PLATELET # BLD AUTO: 405 K/UL — HIGH (ref 150–400)
POTASSIUM SERPL-MCNC: 4 MMOL/L — SIGNIFICANT CHANGE UP (ref 3.5–5.3)
POTASSIUM SERPL-SCNC: 4 MMOL/L — SIGNIFICANT CHANGE UP (ref 3.5–5.3)
PROT SERPL-MCNC: 6.6 G/DL — SIGNIFICANT CHANGE UP (ref 6–8.3)
RBC # BLD: 3.78 M/UL — LOW (ref 3.8–5.2)
RBC # FLD: 16.1 % — HIGH (ref 10.3–14.5)
SODIUM SERPL-SCNC: 140 MMOL/L — SIGNIFICANT CHANGE UP (ref 135–145)
WBC # BLD: 15.53 K/UL — HIGH (ref 3.8–10.5)
WBC # FLD AUTO: 15.53 K/UL — HIGH (ref 3.8–10.5)

## 2022-03-08 PROCEDURE — 99232 SBSQ HOSP IP/OBS MODERATE 35: CPT

## 2022-03-08 PROCEDURE — 99232 SBSQ HOSP IP/OBS MODERATE 35: CPT | Mod: GC

## 2022-03-08 RX ORDER — SENNA PLUS 8.6 MG/1
2 TABLET ORAL
Qty: 0 | Refills: 0 | DISCHARGE
Start: 2022-03-08

## 2022-03-08 RX ORDER — RIVAROXABAN 15 MG-20MG
1 KIT ORAL
Qty: 0 | Refills: 0 | DISCHARGE

## 2022-03-08 RX ORDER — OMEPRAZOLE 10 MG/1
1 CAPSULE, DELAYED RELEASE ORAL
Qty: 0 | Refills: 0 | DISCHARGE

## 2022-03-08 RX ORDER — BUMETANIDE 0.25 MG/ML
1 INJECTION INTRAMUSCULAR; INTRAVENOUS
Qty: 0 | Refills: 0 | DISCHARGE
Start: 2022-03-08

## 2022-03-08 RX ORDER — MAGNESIUM SULFATE 500 MG/ML
2 VIAL (ML) INJECTION ONCE
Refills: 0 | Status: COMPLETED | OUTPATIENT
Start: 2022-03-08 | End: 2022-03-08

## 2022-03-08 RX ORDER — NYSTATIN CREAM 100000 [USP'U]/G
1 CREAM TOPICAL
Qty: 0 | Refills: 0 | DISCHARGE
Start: 2022-03-08

## 2022-03-08 RX ORDER — ASPIRIN/CALCIUM CARB/MAGNESIUM 324 MG
1 TABLET ORAL
Qty: 0 | Refills: 0 | DISCHARGE

## 2022-03-08 RX ORDER — POLYETHYLENE GLYCOL 3350 17 G/17G
17 POWDER, FOR SOLUTION ORAL
Qty: 0 | Refills: 0 | DISCHARGE
Start: 2022-03-08

## 2022-03-08 RX ORDER — FLUOXETINE HCL 10 MG
1 CAPSULE ORAL
Qty: 0 | Refills: 0 | DISCHARGE

## 2022-03-08 RX ORDER — METOPROLOL TARTRATE 50 MG
1 TABLET ORAL
Qty: 0 | Refills: 0 | DISCHARGE

## 2022-03-08 RX ORDER — LOSARTAN POTASSIUM 100 MG/1
1 TABLET, FILM COATED ORAL
Qty: 0 | Refills: 0 | DISCHARGE

## 2022-03-08 RX ORDER — SPIRONOLACTONE 25 MG/1
1 TABLET, FILM COATED ORAL
Qty: 0 | Refills: 0 | DISCHARGE
Start: 2022-03-08

## 2022-03-08 RX ORDER — FLUOXETINE HCL 10 MG
1 CAPSULE ORAL
Qty: 0 | Refills: 0 | DISCHARGE
Start: 2022-03-08

## 2022-03-08 RX ORDER — LACTOBACILLUS ACIDOPHILUS 100MM CELL
1 CAPSULE ORAL ONCE
Refills: 0 | Status: COMPLETED | OUTPATIENT
Start: 2022-03-08 | End: 2022-03-08

## 2022-03-08 RX ORDER — SACUBITRIL AND VALSARTAN 24; 26 MG/1; MG/1
1 TABLET, FILM COATED ORAL
Qty: 0 | Refills: 0 | DISCHARGE
Start: 2022-03-08

## 2022-03-08 RX ORDER — ATORVASTATIN CALCIUM 80 MG/1
1 TABLET, FILM COATED ORAL
Qty: 0 | Refills: 0 | DISCHARGE
Start: 2022-03-08

## 2022-03-08 RX ORDER — SODIUM HYPOCHLORITE 0.125 %
1 SOLUTION, NON-ORAL MISCELLANEOUS
Qty: 0 | Refills: 0 | DISCHARGE
Start: 2022-03-08

## 2022-03-08 RX ORDER — ASPIRIN/CALCIUM CARB/MAGNESIUM 324 MG
1 TABLET ORAL
Qty: 0 | Refills: 0 | DISCHARGE
Start: 2022-03-08

## 2022-03-08 RX ORDER — INSULIN LISPRO 100/ML
30 VIAL (ML) SUBCUTANEOUS
Qty: 0 | Refills: 0 | DISCHARGE

## 2022-03-08 RX ORDER — METOPROLOL TARTRATE 50 MG
1 TABLET ORAL
Qty: 0 | Refills: 0 | DISCHARGE
Start: 2022-03-08

## 2022-03-08 RX ADMIN — Medication 22 UNIT(S): at 09:33

## 2022-03-08 RX ADMIN — AMPICILLIN SODIUM AND SULBACTAM SODIUM 200 GRAM(S): 250; 125 INJECTION, POWDER, FOR SUSPENSION INTRAMUSCULAR; INTRAVENOUS at 05:18

## 2022-03-08 RX ADMIN — NYSTATIN CREAM 1 APPLICATION(S): 100000 CREAM TOPICAL at 17:39

## 2022-03-08 RX ADMIN — Medication 650 MILLIGRAM(S): at 08:00

## 2022-03-08 RX ADMIN — Medication 100 MILLIGRAM(S): at 19:55

## 2022-03-08 RX ADMIN — Medication 100 MILLIGRAM(S): at 05:19

## 2022-03-08 RX ADMIN — Medication 40 MILLIGRAM(S): at 11:39

## 2022-03-08 RX ADMIN — Medication 22 UNIT(S): at 17:54

## 2022-03-08 RX ADMIN — Medication 1 TABLET(S): at 23:59

## 2022-03-08 RX ADMIN — Medication 22 UNIT(S): at 13:23

## 2022-03-08 RX ADMIN — Medication 81 MILLIGRAM(S): at 11:39

## 2022-03-08 RX ADMIN — BUMETANIDE 2 MILLIGRAM(S): 0.25 INJECTION INTRAMUSCULAR; INTRAVENOUS at 05:18

## 2022-03-08 RX ADMIN — AMPICILLIN SODIUM AND SULBACTAM SODIUM 200 GRAM(S): 250; 125 INJECTION, POWDER, FOR SUSPENSION INTRAMUSCULAR; INTRAVENOUS at 23:07

## 2022-03-08 RX ADMIN — AMPICILLIN SODIUM AND SULBACTAM SODIUM 200 GRAM(S): 250; 125 INJECTION, POWDER, FOR SUSPENSION INTRAMUSCULAR; INTRAVENOUS at 17:34

## 2022-03-08 RX ADMIN — BUMETANIDE 2 MILLIGRAM(S): 0.25 INJECTION INTRAMUSCULAR; INTRAVENOUS at 17:37

## 2022-03-08 RX ADMIN — INSULIN GLARGINE 28 UNIT(S): 100 INJECTION, SOLUTION SUBCUTANEOUS at 22:25

## 2022-03-08 RX ADMIN — Medication 25 GRAM(S): at 09:33

## 2022-03-08 RX ADMIN — Medication 650 MILLIGRAM(S): at 08:30

## 2022-03-08 RX ADMIN — SPIRONOLACTONE 25 MILLIGRAM(S): 25 TABLET, FILM COATED ORAL at 05:18

## 2022-03-08 RX ADMIN — AMPICILLIN SODIUM AND SULBACTAM SODIUM 200 GRAM(S): 250; 125 INJECTION, POWDER, FOR SUSPENSION INTRAMUSCULAR; INTRAVENOUS at 11:38

## 2022-03-08 RX ADMIN — SACUBITRIL AND VALSARTAN 1 TABLET(S): 24; 26 TABLET, FILM COATED ORAL at 05:18

## 2022-03-08 RX ADMIN — RIVAROXABAN 20 MILLIGRAM(S): KIT at 11:39

## 2022-03-08 RX ADMIN — Medication 1: at 09:34

## 2022-03-08 RX ADMIN — ATORVASTATIN CALCIUM 80 MILLIGRAM(S): 80 TABLET, FILM COATED ORAL at 21:32

## 2022-03-08 RX ADMIN — SACUBITRIL AND VALSARTAN 1 TABLET(S): 24; 26 TABLET, FILM COATED ORAL at 17:37

## 2022-03-08 RX ADMIN — Medication 1 APPLICATION(S): at 14:59

## 2022-03-08 RX ADMIN — Medication 1 APPLICATION(S): at 05:19

## 2022-03-08 NOTE — PROGRESS NOTE ADULT - PROBLEM SELECTOR PLAN 8
on Humalog 30 TID and Tresiba 30 bedtime   - A1c 8.6%  - Increase to Insulin 22 pre-meal and 28 long-acting bedtime  - check FSG qAC and qhs  - adjust insulin as needed on Humalog 30 TID and Tresiba 30 bedtime   - A1c 8.6%  - Insulin 22 pre-meal and 28 long-acting bedtime  - check FSG qAC and qhs  - adjust insulin as needed

## 2022-03-08 NOTE — PROGRESS NOTE ADULT - SUBJECTIVE AND OBJECTIVE BOX
PROGRESS NOTE:   Authored by Dr. Georgia Paz MD (PGY-2). Available on TEAMS.    Patient is a 66y old  Female who presents with a chief complaint of sepsis 2/2 wound infection (07 Mar 2022 17:48)      SUBJECTIVE / OVERNIGHT EVENTS:  No acute events overnight.     ADDITIONAL REVIEW OF SYSTEMS:  Patient denies fevers, chills, chest pain, shortness of breath, nausea, abdominal pain, diarrhea, constipation, dysuria, leg swelling, headache, light headedness.    MEDICATIONS  (STANDING):  ampicillin/sulbactam  IVPB 3 Gram(s) IV Intermittent every 6 hours  aspirin enteric coated 81 milliGRAM(s) Oral daily  atorvastatin 80 milliGRAM(s) Oral at bedtime  buMETAnide 2 milliGRAM(s) Oral every 12 hours  Dakins Solution - 1/4 Strength 1 Application(s) Topical every 8 hours  dextrose 40% Gel 15 Gram(s) Oral once  dextrose 5%. 1000 milliLiter(s) (50 mL/Hr) IV Continuous <Continuous>  dextrose 5%. 1000 milliLiter(s) (100 mL/Hr) IV Continuous <Continuous>  dextrose 50% Injectable 25 Gram(s) IV Push once  dextrose 50% Injectable 12.5 Gram(s) IV Push once  dextrose 50% Injectable 25 Gram(s) IV Push once  FLUoxetine 40 milliGRAM(s) Oral daily  glucagon  Injectable 1 milliGRAM(s) IntraMuscular once  insulin glargine Injectable (LANTUS) 28 Unit(s) SubCutaneous at bedtime  insulin lispro (ADMELOG) corrective regimen sliding scale   SubCutaneous three times a day before meals  insulin lispro (ADMELOG) corrective regimen sliding scale   SubCutaneous at bedtime  insulin lispro Injectable (ADMELOG) 22 Unit(s) SubCutaneous three times a day with meals  metoprolol tartrate 100 milliGRAM(s) Oral two times a day  nystatin Powder 1 Application(s) Topical two times a day  polyethylene glycol 3350 17 Gram(s) Oral two times a day  rivaroxaban 20 milliGRAM(s) Oral daily  sacubitril 24 mG/valsartan 26 mG 1 Tablet(s) Oral two times a day  senna 2 Tablet(s) Oral at bedtime  spironolactone 25 milliGRAM(s) Oral daily    MEDICATIONS  (PRN):  acetaminophen     Tablet .. 650 milliGRAM(s) Oral every 6 hours PRN Mild Pain (1 - 3), Moderate Pain (4 - 6)  bisacodyl Suppository 10 milliGRAM(s) Rectal daily PRN Constipation  sodium chloride 0.65% Nasal 1 Spray(s) Both Nostrils every 6 hours PRN Nasal Congestion      CAPILLARY BLOOD GLUCOSE      POCT Blood Glucose.: 182 mg/dL (07 Mar 2022 21:41)  POCT Blood Glucose.: 206 mg/dL (07 Mar 2022 17:22)  POCT Blood Glucose.: 136 mg/dL (07 Mar 2022 13:00)  POCT Blood Glucose.: 205 mg/dL (07 Mar 2022 08:49)    I&O's Summary    06 Mar 2022 07:01  -  07 Mar 2022 07:00  --------------------------------------------------------  IN: 458 mL / OUT: 2050 mL / NET: -1592 mL    07 Mar 2022 07:01  -  08 Mar 2022 05:53  --------------------------------------------------------  IN: 640 mL / OUT: 1300 mL / NET: -660 mL        PHYSICAL EXAM:  Vital Signs Last 24 Hrs  T(C): 36.8 (08 Mar 2022 04:58), Max: 36.8 (07 Mar 2022 20:37)  T(F): 98.3 (08 Mar 2022 04:58), Max: 98.3 (08 Mar 2022 04:58)  HR: 60 (08 Mar 2022 04:58) (60 - 90)  BP: 106/64 (08 Mar 2022 04:58) (106/64 - 134/61)  BP(mean): --  RR: 18 (08 Mar 2022 04:58) (18 - 18)  SpO2: 95% (08 Mar 2022 04:58) (95% - 96%)    CONSTITUTIONAL: NAD, well-developed  RESPIRATORY: Normal respiratory effort; lungs are clear to auscultation bilaterally  CARDIOVASCULAR: Regular rate and rhythm, normal S1 and S2, no murmur/rub/gallop; No lower extremity edema; Peripheral pulses are 2+ bilaterally  ABDOMEN: Nontender to palpation, normoactive bowel sounds, no rebound/guarding; No hepatosplenomegaly  MUSCLOSKELETAL: no clubbing or cyanosis of digits; no joint swelling or tenderness to palpation  PSYCH: A+O to person, place, and time; affect appropriate    LABS:                        9.3    14.27 )-----------( 387      ( 07 Mar 2022 07:03 )             32.0     03-07    140  |  100  |  25<H>  ----------------------------<  200<H>  3.9   |  29  |  0.92    Ca    8.9      07 Mar 2022 06:57  Phos  3.1     03-07  Mg     2.1     03-07    TPro  6.3  /  Alb  3.0<L>  /  TBili  0.3  /  DBili  x   /  AST  25  /  ALT  18  /  AlkPhos  119  03-07                Tele Reviewed:    RADIOLOGY & ADDITIONAL TESTS:  Results Reviewed:   Imaging Personally Reviewed:  Electrocardiogram Personally Reviewed:     PROGRESS NOTE:   Authored by Dr. Georgia Paz MD (PGY-2). Available on TEAMS.    Patient is a 66y old  Female who presents with a chief complaint of sepsis 2/2 wound infection (07 Mar 2022 17:48)      SUBJECTIVE / OVERNIGHT EVENTS:  No acute events overnight. Pt seen and examined at bedside, she states she has made her selections regarding which rehab facility she would like to go to. She requested interviewer to call sister to reassure ongoing improvement in medical conditions. Otherwise she denied new symptoms or concerns.     ADDITIONAL REVIEW OF SYSTEMS:  Patient denies fevers, chills, chest pain, shortness of breath, nausea, abdominal pain, diarrhea, constipation, dysuria, leg swelling, headache, light headedness.    MEDICATIONS  (STANDING):  ampicillin/sulbactam  IVPB 3 Gram(s) IV Intermittent every 6 hours  aspirin enteric coated 81 milliGRAM(s) Oral daily  atorvastatin 80 milliGRAM(s) Oral at bedtime  buMETAnide 2 milliGRAM(s) Oral every 12 hours  Dakins Solution - 1/4 Strength 1 Application(s) Topical every 8 hours  dextrose 40% Gel 15 Gram(s) Oral once  dextrose 5%. 1000 milliLiter(s) (50 mL/Hr) IV Continuous <Continuous>  dextrose 5%. 1000 milliLiter(s) (100 mL/Hr) IV Continuous <Continuous>  dextrose 50% Injectable 25 Gram(s) IV Push once  dextrose 50% Injectable 12.5 Gram(s) IV Push once  dextrose 50% Injectable 25 Gram(s) IV Push once  FLUoxetine 40 milliGRAM(s) Oral daily  glucagon  Injectable 1 milliGRAM(s) IntraMuscular once  insulin glargine Injectable (LANTUS) 28 Unit(s) SubCutaneous at bedtime  insulin lispro (ADMELOG) corrective regimen sliding scale   SubCutaneous three times a day before meals  insulin lispro (ADMELOG) corrective regimen sliding scale   SubCutaneous at bedtime  insulin lispro Injectable (ADMELOG) 22 Unit(s) SubCutaneous three times a day with meals  metoprolol tartrate 100 milliGRAM(s) Oral two times a day  nystatin Powder 1 Application(s) Topical two times a day  polyethylene glycol 3350 17 Gram(s) Oral two times a day  rivaroxaban 20 milliGRAM(s) Oral daily  sacubitril 24 mG/valsartan 26 mG 1 Tablet(s) Oral two times a day  senna 2 Tablet(s) Oral at bedtime  spironolactone 25 milliGRAM(s) Oral daily    MEDICATIONS  (PRN):  acetaminophen     Tablet .. 650 milliGRAM(s) Oral every 6 hours PRN Mild Pain (1 - 3), Moderate Pain (4 - 6)  bisacodyl Suppository 10 milliGRAM(s) Rectal daily PRN Constipation  sodium chloride 0.65% Nasal 1 Spray(s) Both Nostrils every 6 hours PRN Nasal Congestion      CAPILLARY BLOOD GLUCOSE      POCT Blood Glucose.: 182 mg/dL (07 Mar 2022 21:41)  POCT Blood Glucose.: 206 mg/dL (07 Mar 2022 17:22)  POCT Blood Glucose.: 136 mg/dL (07 Mar 2022 13:00)  POCT Blood Glucose.: 205 mg/dL (07 Mar 2022 08:49)    I&O's Summary    06 Mar 2022 07:01  -  07 Mar 2022 07:00  --------------------------------------------------------  IN: 458 mL / OUT: 2050 mL / NET: -1592 mL    07 Mar 2022 07:01  -  08 Mar 2022 05:53  --------------------------------------------------------  IN: 640 mL / OUT: 1300 mL / NET: -660 mL        PHYSICAL EXAM:  Vital Signs Last 24 Hrs  T(C): 36.8 (08 Mar 2022 04:58), Max: 36.8 (07 Mar 2022 20:37)  T(F): 98.3 (08 Mar 2022 04:58), Max: 98.3 (08 Mar 2022 04:58)  HR: 60 (08 Mar 2022 04:58) (60 - 90)  BP: 106/64 (08 Mar 2022 04:58) (106/64 - 134/61)  BP(mean): --  RR: 18 (08 Mar 2022 04:58) (18 - 18)  SpO2: 95% (08 Mar 2022 04:58) (95% - 96%)    CONSTITUTIONAL: NAD, morbidly obese, laying in bed on her right side   RESPIRATORY: Normal respiratory effort; lungs are clear to auscultation bilaterally, on 2L NC   CARDIOVASCULAR: Regular rate and rhythm, normal S1 and S2, no murmur/rub/gallop; 1+ b/l lower extremity edema; Peripheral pulses are 2+ bilaterally  ABDOMEN: Large panus without any areas of purulence/erythema. Nontender to palpation, normoactive bowel sounds, no rebound/guarding  SKIN: Sacral ulcer healing. No drainage/purulence. Mild erythema surrounding area of wound however non tender to palpation and not warm to touch.   MUSCLOSKELETAL: no clubbing or cyanosis of digits; no joint swelling or tenderness to palpation  NEURO: no asterixis, A&O 3, ambulates to bathroom. No neurological deficits.     LABS:                        9.3    14.27 )-----------( 387      ( 07 Mar 2022 07:03 )             32.0     03-07    140  |  100  |  25<H>  ----------------------------<  200<H>  3.9   |  29  |  0.92    Ca    8.9      07 Mar 2022 06:57  Phos  3.1     03-07  Mg     2.1     03-07    TPro  6.3  /  Alb  3.0<L>  /  TBili  0.3  /  DBili  x   /  AST  25  /  ALT  18  /  AlkPhos  119  03-07

## 2022-03-08 NOTE — DISCHARGE NOTE PROVIDER - NSDCCPCAREPLAN_GEN_ALL_CORE_FT
PRINCIPAL DISCHARGE DIAGNOSIS  Diagnosis: Sepsis due to skin infection  Assessment and Plan of Treatment: You came into the hospital with an infection in buttocks. You were seen by surgeons who performed a procedure to remove dead tissue and drain any infected material that was collected. You were also seen by infectious disease specialists and were treated with antibiotics. You will need to continue taking antibiotics until 3/12/22 to complete the full course. Please do not stop antibiotics and take them as prescribed.   Wound care nurses also came to see you and helped change the dressing in your buttock wound. If you notice increase in pain, drainage, bleeding, or pus draining from the wound, please call your doctors immediately.   Please follow up with your PCP within 1-2 weeks of discharge.        SECONDARY DISCHARGE DIAGNOSES  Diagnosis: Heart failure with preserved left ventricular function (HFpEF)  Assessment and Plan of Treatment: You were previously diagnosed with heart failure. When you came to the hospital, you were fighting an infection and this infected state likely triggered an exacerbation of your heart failure. We asked cardiologists to come see you and you were treated with medications that help optimize your heart function. You will be going home/ rehab with a few new medications that are extremely important for your heart health. Please continue to take them as prescribed, and follow up with your cardiologist within 2-3 weeks after discharge.   Certain signs to look out for when one is in an exacerbation of heart failure include: weight gain of more than 3lbs a day, or weight gain of more than 5lbs a week, worsening of shortness of breath, leg swelling, etc. Please call your cardiologist if you begin to notice these symptoms.    Diagnosis: HEATHER (acute kidney injury)  Assessment and Plan of Treatment: Your lab work showed an injury to your kidneys during your hospital stay. We suspect that this was associated with your poor heart function as when heart is unable to deliver adequate volume flow to your kidneys, they can become dehydrated and injured.      We treated your heart failure (as above) with diuretics (aka water pills). As your heart function was improving, your kidneys also improved. Please follow up with your primary care doctor within 1-2 weeks to continue monitoring kidney function.    Diagnosis: Chronic respiratory failure with hypoxia  Assessment and Plan of Treatment: You were previously diagnosed with    Diagnosis: Hematuria  Assessment and Plan of Treatment:      PRINCIPAL DISCHARGE DIAGNOSIS  Diagnosis: Sepsis due to skin infection  Assessment and Plan of Treatment: You came into the hospital with an infection in buttocks. You were seen by surgeons who performed a procedure to remove dead tissue and drain any infected material that was collected. You were also seen by infectious disease specialists and were treated with antibiotics. You will need to continue taking antibiotics until 3/12/22 to complete the full course. Please do not stop antibiotics and take them as prescribed.   Wound care nurses also came to see you and helped change the dressing in your buttock wound. If you notice increase in pain, drainage, bleeding, or pus draining from the wound, please call your doctors immediately.   Please follow up with your PCP within 1-2 weeks of discharge.        SECONDARY DISCHARGE DIAGNOSES  Diagnosis: Heart failure with preserved left ventricular function (HFpEF)  Assessment and Plan of Treatment: You were previously diagnosed with heart failure. When you came to the hospital, you were fighting an infection and this infected state likely triggered an exacerbation of your heart failure. We asked cardiologists to come see you and you were treated with medications that help optimize your heart function. You will be going home/ rehab with a few new medications that are extremely important for your heart health. Please continue to take them as prescribed, and follow up with your cardiologist within 2-3 weeks after discharge.   Certain signs to look out for when one is in an exacerbation of heart failure include: weight gain of more than 3lbs a day, or weight gain of more than 5lbs a week, worsening of shortness of breath, leg swelling, etc. Please call your cardiologist if you begin to notice these symptoms.    Diagnosis: HEATHER (acute kidney injury)  Assessment and Plan of Treatment: Your lab work showed an injury to your kidneys during your hospital stay. We suspect that this was associated with your poor heart function as when heart is unable to deliver adequate volume flow to your kidneys, they can become dehydrated and injured.      We treated your heart failure (as above) with diuretics (aka water pills). As your heart function was improving, your kidneys also improved. Please follow up with your primary care doctor within 1-2 weeks to continue monitoring kidney function.    Diagnosis: Chronic respiratory failure with hypoxia  Assessment and Plan of Treatment: You were previously diagnosed with respiratory failure most likely due to combination of sleep apnea and obesity. The recommended treatment for sleep apnea is CPAP use at night. Unfortunately you reported inability to tolerate CPAP machine.   Please discuss with your primary care doctor and establish care with a weight . Part of the reason why your breathing is impaired is due to the weight that is on your chest, which makes it difficult for your chest to expand and allow your lungs to fill during inspiration.   Additionally, as mentioned above, when your heart function is suboptimal, you may have worsening of your respiratory status especially if fluid is retained in your lungs. Please follow up with your cardiologist closely to make sure your heart function and volume status are monitored.

## 2022-03-08 NOTE — PROGRESS NOTE ADULT - SUBJECTIVE AND OBJECTIVE BOX
Follow Up:      Interval History:    REVIEW OF SYSTEMS  [  ] ROS unobtainable because:    [  ] All other systems negative except as noted below    Constitutional:  [ ] fever [ ] chills  [ ] weight loss  [ ] weakness  Skin:  [ ] rash [ ] phlebitis	  Eyes: [ ] icterus [ ] pain  [ ] discharge	  ENMT: [ ] sore throat  [ ] thrush [ ] ulcers [ ] exudates  Respiratory: [ ] dyspnea [ ] hemoptysis [ ] cough [ ] sputum	  Cardiovascular:  [ ] chest pain [ ] palpitations [ ] edema	  Gastrointestinal:  [ ] nausea [ ] vomiting [ ] diarrhea [ ] constipation [ ] pain	  Genitourinary:  [ ] dysuria [ ] frequency [ ] hematuria [ ] discharge [ ] flank pain  [ ] incontinence  Musculoskeletal:  [ ] myalgias [ ] arthralgias [ ] arthritis  [ ] back pain  Neurological:  [ ] headache [ ] seizures  [ ] confusion/altered mental status    Allergies  amoxicillin (Unknown)  Cipro (Unknown)  codeine (Vomiting)  shellfish (Stomach Upset)        ANTIMICROBIALS:  ampicillin/sulbactam  IVPB 3 every 6 hours      OTHER MEDS:  MEDICATIONS  (STANDING):  acetaminophen     Tablet .. 650 every 6 hours PRN  aspirin enteric coated 81 daily  atorvastatin 80 at bedtime  bisacodyl Suppository 10 daily PRN  buMETAnide 2 every 12 hours  dextrose 40% Gel 15 once  dextrose 50% Injectable 25 once  dextrose 50% Injectable 12.5 once  dextrose 50% Injectable 25 once  FLUoxetine 40 daily  glucagon  Injectable 1 once  insulin glargine Injectable (LANTUS) 28 at bedtime  insulin lispro (ADMELOG) corrective regimen sliding scale  three times a day before meals  insulin lispro (ADMELOG) corrective regimen sliding scale  at bedtime  insulin lispro Injectable (ADMELOG) 22 three times a day with meals  metoprolol tartrate 100 two times a day  polyethylene glycol 3350 17 two times a day  rivaroxaban 20 daily  sacubitril 24 mG/valsartan 26 mG 1 two times a day  senna 2 at bedtime  spironolactone 25 daily      Vital Signs Last 24 Hrs  T(C): 36.7 (08 Mar 2022 12:02), Max: 36.8 (07 Mar 2022 20:37)  T(F): 98.1 (08 Mar 2022 12:02), Max: 98.3 (08 Mar 2022 04:58)  HR: 63 (08 Mar 2022 12:02) (60 - 63)  BP: 113/58 (08 Mar 2022 12:02) (106/64 - 118/75)  BP(mean): --  RR: 18 (08 Mar 2022 12:02) (18 - 18)  SpO2: 99% (08 Mar 2022 12:02) (95% - 99%)    PHYSICAL EXAMINATION:  General: Alert and Awake, NAD  HEENT: PERRL, EOMI  Neck: Supple  Cardiac: RRR, No M/R/G  Resp: CTAB, No Wh/Rh/Ra  Abdomen: NBS, NT/ND, No HSM, No rigidity or guarding  MSK: No LE edema. No Calf tenderness  : No dunn  Skin: No rashes or lesions. Skin is warm and dry to the touch.   Neuro: Alert and Awake. CN 2-12 Grossly intact. Moves all four extremities spontaneously.  Psych: Calm, Pleasant, Cooperative                          9.2    15.53 )-----------( 405      ( 08 Mar 2022 06:49 )             31.0       03-08    140  |  99  |  25<H>  ----------------------------<  143<H>  4.0   |  28  |  0.92    Ca    9.1      08 Mar 2022 06:49  Phos  3.4     03-08  Mg     1.9     03-08    TPro  6.6  /  Alb  3.3  /  TBili  0.3  /  DBili  x   /  AST  24  /  ALT  17  /  AlkPhos  122<H>  03-08          MICROBIOLOGY:  v  Catheterized Catheterized  03-04-22   <10,000 CFU/mL Normal Urogenital Gia  --  --      Decub Decubitus  02-26-22   Moderate Enterococcus avium  Numerous Streptococcus anginosus Susceptibilites not performed.  --  Enterococcus avium      Clean Catch Clean Catch (Midstream)  02-26-22   <10,000 CFU/mL Normal Urogenital Gia  --  --      .Blood Blood  02-26-22   No Growth Final  --  --                RADIOLOGY:    <The imaging below has been reviewed and visualized by me independently. Findings as detailed in report below> Follow Up:  abscess    Interval History: notes some soreness wit gluteal wound due to packing. otherwise denies diarrhea. denies urinary symptoms. denies cough.     REVIEW OF SYSTEMS  [  ] ROS unobtainable because:    [ x ] All other systems negative except as noted below    Constitutional:  [ ] fever [ ] chills  [ ] weight loss  [ ] weakness  Skin:  [ ] rash [ ] phlebitis	  Eyes: [ ] icterus [ ] pain  [ ] discharge	  ENMT: [ ] sore throat  [ ] thrush [ ] ulcers [ ] exudates  Respiratory: [ ] dyspnea [ ] hemoptysis [ ] cough [ ] sputum	  Cardiovascular:  [ ] chest pain [ ] palpitations [ ] edema	  Gastrointestinal:  [ ] nausea [ ] vomiting [ ] diarrhea [ ] constipation [ ] pain	  Genitourinary:  [ ] dysuria [ ] frequency [ ] hematuria [ ] discharge [ ] flank pain  [ ] incontinence  Musculoskeletal:  [ ] myalgias [ ] arthralgias [ ] arthritis  [ ] back pain +buttock pain   Neurological:  [ ] headache [ ] seizures  [ ] confusion/altered mental status    Allergies  amoxicillin (Unknown)  Cipro (Unknown)  codeine (Vomiting)  shellfish (Stomach Upset)        ANTIMICROBIALS:  ampicillin/sulbactam  IVPB 3 every 6 hours      OTHER MEDS:  MEDICATIONS  (STANDING):  acetaminophen     Tablet .. 650 every 6 hours PRN  aspirin enteric coated 81 daily  atorvastatin 80 at bedtime  bisacodyl Suppository 10 daily PRN  buMETAnide 2 every 12 hours  dextrose 40% Gel 15 once  dextrose 50% Injectable 25 once  dextrose 50% Injectable 12.5 once  dextrose 50% Injectable 25 once  FLUoxetine 40 daily  glucagon  Injectable 1 once  insulin glargine Injectable (LANTUS) 28 at bedtime  insulin lispro (ADMELOG) corrective regimen sliding scale  three times a day before meals  insulin lispro (ADMELOG) corrective regimen sliding scale  at bedtime  insulin lispro Injectable (ADMELOG) 22 three times a day with meals  metoprolol tartrate 100 two times a day  polyethylene glycol 3350 17 two times a day  rivaroxaban 20 daily  sacubitril 24 mG/valsartan 26 mG 1 two times a day  senna 2 at bedtime  spironolactone 25 daily      Vital Signs Last 24 Hrs  T(C): 36.7 (08 Mar 2022 12:02), Max: 36.8 (07 Mar 2022 20:37)  T(F): 98.1 (08 Mar 2022 12:02), Max: 98.3 (08 Mar 2022 04:58)  HR: 63 (08 Mar 2022 12:02) (60 - 63)  BP: 113/58 (08 Mar 2022 12:02) (106/64 - 118/75)  BP(mean): --  RR: 18 (08 Mar 2022 12:02) (18 - 18)  SpO2: 99% (08 Mar 2022 12:02) (95% - 99%)    PHYSICAL EXAMINATION:  General: Alert and Awake, NAD  Cardiac: RRR, No M/R/G  Resp: CTAB, No Wh/Rh/Ra  Abdomen: NBS, NT/ND, No HSM, No rigidity or guarding  MSK: L medial gluteal / peroneal wound with no drainage and no surrounding erythema. No LE edema. No Calf tenderness  Skin: Skin is warm and dry to the touch.   Neuro: Alert and Awake. CN 2-12 Grossly intact. Moves all four extremities spontaneously.  Psych: Calm, Pleasant, Cooperative                            9.2    15.53 )-----------( 405      ( 08 Mar 2022 06:49 )             31.0       03-08    140  |  99  |  25<H>  ----------------------------<  143<H>  4.0   |  28  |  0.92    Ca    9.1      08 Mar 2022 06:49  Phos  3.4     03-08  Mg     1.9     03-08    TPro  6.6  /  Alb  3.3  /  TBili  0.3  /  DBili  x   /  AST  24  /  ALT  17  /  AlkPhos  122<H>  03-08          MICROBIOLOGY:  v  Catheterized Catheterized  03-04-22   <10,000 CFU/mL Normal Urogenital Gia  --  --      Decub Decubitus  02-26-22   Moderate Enterococcus avium  Numerous Streptococcus anginosus Susceptibilites not performed.  --  Enterococcus avium      Clean Catch Clean Catch (Midstream)  02-26-22   <10,000 CFU/mL Normal Urogenital Gia  --  --      .Blood Blood  02-26-22   No Growth Final  --  --    RADIOLOGY:    <The imaging below has been reviewed and visualized by me independently. Findings as detailed in report below>    < from: CT Abdomen and Pelvis No Cont (03.04.22 @ 15:00) >  IMPRESSION:  No renal stones or hydronephrosis.    Lower ventral abdominal wall skin thickening and adjacent subcutaneous   infiltration. Correlate for cellulitis.    < end of copied text >

## 2022-03-08 NOTE — PROGRESS NOTE ADULT - ASSESSMENT
64 y/o lady with PMH type 2 diabetes with H/O gastroparesis, pulmonary embolism and right lower extremity DVT 8/2020, PVD, hypertension, aortic stenosis  S/P TAVR 2015 and redo TAVR in 2021 presented to ED today with fever since 4 days. Pt also endorses acute on chronic shortness of breath.     #Fever, Infected Decubitus Ulcer, Leukocytosis (persistent), Hypoxic Respiratory Failure  CTAP with IV con: Multiple foci of subcutaneous gas at the left gluteal fold with mild associated inflammatory changes  Wound Cultures with Streptococcus anginosis and Enterococcus  MRSA/MSSA Nasal PCR Negative  Tolerated Zosyn and Augmentin in 5/2021  Tolerating Zosyn and Unasyn this admission    CT A/P (3/4) noncontrast but noting abdominal wall infiltration ?unclear significance.   Not noting any pelvic collections albeit noncontrast study.     I examined wound today (3/8) surrounding cellulitis is resolved and there is no drainage or malodour; wound is improving.     Unclear why there is persistent leukocytosis but wound appears improving and no other obvious s/s of infection.     --Cont Unasyn 3g IV Q6H while admitted  --If continued improvement anticipate discharge on Augmentin 875 mg PO Q12H to complete total 14 days of antibiotics   --Continue to follow CBC with diff  --Continue to follow temperature curve  --blood cultures negative    I will continue to follow. Please feel free to contact me with any further questions.    Hao Torres M.D.  [facility] Division of Infectious Disease  8AM-5PM Monday - Friday: Available on [employer] Teams  After Hours and Holidays (or if no response on [employer] Teams): Please contact the Infectious Diseases Office at [phone]

## 2022-03-08 NOTE — PROGRESS NOTE ADULT - PROBLEM SELECTOR PLAN 10
DVT ppx: AC as above    Diet: DASH/CC  PT rec JOSE CRUZ  Discussed with CM 3/7, plan for dc 24-48 hrs DVT ppx: AC as above    Diet: DASH/CC  PT rec JOSE CRUZ  Discussed with CM 3/8, plan for dc 24 hrs

## 2022-03-08 NOTE — PROGRESS NOTE ADULT - ATTENDING COMMENTS
67 yo F, PMHx AS s/p TAVR 2015 and 2021, HOCM, pAFib on Xarelto, diastolic and valvular HF, JOSR (does not tolerate CPAP, on 2L O2 at home qhs), anxiety, morbid obesity, DM with neuropathy, HTN, asthma (on albuterol), pw sepsis, n/v, likely 2/2 sacral decubitus ulcer/necrotic wound on left ischium with associated cellulitis.  Feels overall improved, though still SOB with ambulation (not hypoxic). Recognizes that this is related to pulm HTN and will f/u with her cardiologist. Has difficulty with CPAP because she gets anxiety from air flow.   Acute on chronic diastolic HF – stable on PO bumex and entreso   Incentive spirometer  Day 11 antibiotics, total of 14 days. Switch to augmentin on d/c   DCP – JOSE CRUZ when accepted/bed available  Rest as above.

## 2022-03-08 NOTE — DISCHARGE NOTE PROVIDER - DETAILS OF MALNUTRITION DIAGNOSIS/DIAGNOSES
This patient has been assessed with a concern for Malnutrition and was treated during this hospitalization for the following Nutrition diagnosis/diagnoses:     -  03/01/2022: Morbid obesity (BMI > 40)

## 2022-03-08 NOTE — PROGRESS NOTE ADULT - PROBLEM SELECTOR PLAN 1
EF previously 75% and hyperdynamic LV on prior ECHO. On admission with pulm vasc congestion and possible hypervolemic state, concerning for decompensated HF  - BNP up trended from 7k to 15k then 19k   - HF consulted as volume status is challenging to assess in s/o morbid obesity. Cr continued to rise despite lasix challenge. TTE obtained this admission without significant change when compared to prior. Still mod pulm HTN, mod MR, grade II diastolic dysfunction.   - BNP down trending, HEATHER resolving, weaned to home oxygen   - Lasix 60mg IV push x 1   - Start bumex 2mg PO BID and entresto 24/26 BID 3/7   - Aldactone 25mg qD   - Monitor I/Os strictly  - Daily weights   - Mg>2 K>4 EF previously 75% and hyperdynamic LV on prior ECHO. On admission with pulm vasc congestion and possible hypervolemic state, concerning for decompensated HF  - BNP up trended from 7k to 15k then 19k   - Appreciate HF recs   - TTE obtained this admission without significant change when compared to prior. Still mod pulm HTN, mod MR, grade II diastolic dysfunction.   - BNP down trending, HEATHER resolving, weaned to home oxygen   - C/w bumex 2mg PO BID and entresto 24/26 BID, Aldactone 25mg qD   - Monitor I/Os strictly  - Daily weights   - Mg>2 K>4

## 2022-03-08 NOTE — DISCHARGE NOTE PROVIDER - HOSPITAL COURSE
67 yo F, PMHx AS s/p TAVR 2015 and 2021, HOCM, HFpEF, pAFib on Xarelto, JOSR (does not tolerate CPAP, on 2L O2 at home qhs), anxiety, morbid obesity, DM, HTN, asthma, presented with fevers, met sepsis criteria with sacral wound as most likely source, admitted for sepsis 2/2 sacral decub wound infection. Course was c/b acute decompensated heart failure and HEATHER.       Pt underwent debridement in ED by surgery, and was started on zosyn empirically. Blood and urine cultures were negative. Wound culture grew E. avium and strep anginosis     n/v, likely 2/2 sacral decubitus ulcer/necrotic wound on left ischium with associated cellulitis. Also found to be in acute on chronic hypoxic respiratory failure on admission, with pulmonary vascular congestion on imaging concerning for decompensated HF. Course c/b HEATHER 2/2 cardiorenal in s/o ADHF and uremic encephalopathy now resolved. Respiratory status back to baseline, anticipate medically stable for discharge in 24-48 hrs. 67 yo F, PMHx AS s/p TAVR 2015 and 2021, HOCM, HFpEF, pAFib on Xarelto, JOSR (does not tolerate CPAP, on 2L O2 at home qhs), anxiety, morbid obesity, DM, HTN, asthma, presented with fevers, met sepsis criteria with sacral wound as most likely source, admitted for sepsis 2/2 sacral decub wound infection. Course was c/b acute decompensated heart failure and HEATHER.    # Sepsis, sacral wound infection   Pt on admission was found to have a necrotic sacral wound which is most likely source of sepsis. She underwent debridement in ED by surgery, and was started on zosyn empirically. Wound care continued to follow and manage dressing changes daily. Blood and urine cultures were negative. Wound culture grew E. avium and strep anginosis and antibiotic was narrowed to unasyn. Pt will be discharged on oral Augmentin to complete a 14 day course of antibiotics (end date 3/12/22) as per ID.     # Acute on chronic respiratory failure 2/2 acute decompensated HF   CXR demonstrated pulmonary vascular congestion and pt on admission was noted to be hypoxic to 87% briefly requiring BiPAP. VBG demonstrated signs of chronic respiratory hypercarbic failure, likely 2/2 JOSR nonadherent to CPAP, as well as OHS given morbid obesity. Acute hypoxic respiratory failure is likely 2/2 acute decompensated heart failure as pt was also found to have BNP of 19k and grossly hypervolemic. Pt was seen by heart failure and underwent aggressive diuresis. As volume status was optimized, pt was eventually weaned off of oxygen. Pt remained non adherent to CPAP and prefers to use 2L NC oxygen instead. TTE this admission showed EF of 75%, mild MR, mod MS, mildly dilated LA, hyperdynamic LV systolic dysfunction, moderate diastolic dysfunction, reduced RV systolic function, mild-mod TR, and moderately elevated pulmonary pressures. Pt was started on GDMT and will be discharged on the following regimen: Metoprolol tartrate 100mg BID, Entresto 24/26 BID, Aldactone 25mg qD, and Bumex 2mg BID.     # HEATHER  During course, pt developed acute kidney injury most likely 2/2 cardiorenal etiology. Cr improved as volume status was optimized by diuresis. Of note, while Cr peaked at 4.6, pt developed uremic encephalopathy which resolved as HEATHER improved.     Pt was seen by physical therapy and was recommended to transition to rehab for further strength and balance training. Pt will need daily dressing changes while at rehab as well as when transitioned back home.          67 yo F, PMHx AS s/p TAVR 2015 and 2021, HOCM, HFpEF, pAFib on Xarelto, JOSR (does not tolerate CPAP, on 2L O2 at home qhs), anxiety, morbid obesity, DM, HTN, asthma, presented with fevers, met sepsis criteria with sacral wound as most likely source, admitted for sepsis 2/2 sacral decub wound infection. Course was c/b acute decompensated heart failure and HEATHER.    # Sepsis, sacral wound infection   Pt on admission was found to have a necrotic sacral wound which is most likely source of sepsis. She underwent debridement in ED by surgery, and was started on zosyn empirically. Wound care continued to follow and manage dressing changes daily. Blood and urine cultures were negative. Wound culture grew E. avium and strep anginosis and antibiotic was narrowed to unasyn. Pt will be discharged on oral Augmentin to complete a 14 day course of antibiotics (end date 3/12/22) as per ID.     # Acute on chronic respiratory failure 2/2 acute decompensated HF   CXR demonstrated pulmonary vascular congestion and pt on admission was noted to be hypoxic to 87% briefly requiring BiPAP. VBG demonstrated signs of chronic respiratory hypercarbic failure, likely 2/2 JOSR nonadherent to CPAP, as well as OHS given morbid obesity. Acute hypoxic respiratory failure is likely 2/2 acute decompensated heart failure as pt was also found to have BNP of 19k and grossly hypervolemic. Pt was seen by heart failure and underwent aggressive diuresis. As volume status was optimized, pt was eventually weaned off of oxygen. Pt remained non adherent to CPAP and prefers to use 2L NC oxygen instead. TTE this admission showed EF of 75%, mild MR, mod MS, mildly dilated LA, hyperdynamic LV systolic dysfunction, moderate diastolic dysfunction, reduced RV systolic function, mild-mod TR, and moderately elevated pulmonary pressures. Pt was started on GDMT and will be discharged on the following regimen: Metoprolol succinate 200mg qD, Entresto 24/26 BID, Aldactone 25mg qD, and Bumex 2mg BID.     # HEATHER  During course, pt developed acute kidney injury most likely 2/2 cardiorenal etiology. Cr improved as volume status was optimized by diuresis. Of note, while Cr peaked at 4.6, pt developed uremic encephalopathy which resolved as HEATHER improved.     Pt was seen by physical therapy and was recommended to transition to rehab for further strength and balance training. Pt will need daily dressing changes while at rehab as well as when transitioned back home.

## 2022-03-08 NOTE — PROGRESS NOTE ADULT - ASSESSMENT
65 yo F, PMHx AS s/p TAVR 2015 and 2021, HOCM, HFpEF, pAFib on Xarelto, JOSR (does not tolerate CPAP, on 2L O2 at home qhs), anxiety, morbid obesity, DM, HTN, asthma (on albuterol), pw sepsis, n/v, likely 2/2 sacral decubitus ulcer/necrotic wound on left ischium with associated cellulitis. Also found to be in acute on chronic hypoxic respiratory failure on admission, with pulmonary vascular congestion on imaging concerning for decompensated HF. Course c/b HEATHER 2/2 cardiorenal in s/o ADHF and uremic encephalopathy now resolved. Respiratory status back to baseline, anticipate medically stable for discharge in 24-48 hrs.    67 yo F, PMHx AS s/p TAVR 2015 and 2021, HOCM, HFpEF, pAFib on Xarelto, JOSR (does not tolerate CPAP, on 2L O2 at home qhs), anxiety, morbid obesity, DM, HTN, asthma, admitted for sepsis 2/2 sacral wound infection. Course c/b ADHF in s/o sepsis and HEATHER and uremic encephalopathy. Medically stable anticipate dc in 24 hrs.

## 2022-03-08 NOTE — DISCHARGE NOTE PROVIDER - CARE PROVIDERS DIRECT ADDRESSES
,DirectAddress_Unknown ,DirectAddress_Unknown,[email or web address] ,DirectAddress_Unknown,[email or web address]

## 2022-03-08 NOTE — PROGRESS NOTE ADULT - ASSESSMENT
A/P:65 yo F, PMHx AS s/p TAVR 2015 and 2021, HOCM, HFpEF, pAFib on Xarelto, JOSR (does not tolerate CPAP, on 2L O2 at home qhs), anxiety, morbid obesity, DM, HTN, asthma (on albuterol), pw sepsis, n/v, likely 2/2 sacral decubitus ulcer/necrotic wound on left ischium with associated cellulitis. Also found to be in acute on chronic hypoxic respiratory failure on admission, with pulmonary vascular congestion on imaging concerning for decompensated HF. Course c/b worsening HEATHER, likely pre-renal in s/o hypervolemia given elevated BNP.     Wound Consult requested to assist w/ management of Labial/ perineal abscess, s/p debridement/ drainage of abscess      Wound- improving, continue w/ Dakins TID dressings and prn soiling  CAVILON Advance TIW to periwound and        Continue w/ Pericare w/ attends underpads as per protocol  Constipation-improving,  continue bowel regimen to decrease wound contamination   Abx per Medicine/ ID, WBC slowly improving  CT noted- pt s/p debridement and drainage of abscess  Moisturize intact skin w/ SWEEN cream BID  Nutritional optimization - RD consult appreciated        encourage high quality protein, MVI & Vit C to promote wound healing  Hyperglycemia - improving w/ ADA diet and Lantus w/FS w/ ISS  and tx of infection  Continue turning and positioning w/ offloading assistive devices as per protocol  Waffle Cushion to chair when oob to chair  Continue w/ low air loss pressure redistribution bed surface   Care as per medicine, will follow w/ you  Upon discharge f/u as outpatient at [facility] [address] [facility] [phone]  Seen w/ attng and D/w team & RN  Cleo Paulino PA-C [id number]  we spent 25 minutes face to face w/ this pt of which more than 50% of the time was spent counseling & coordinating care of this pt.

## 2022-03-08 NOTE — DISCHARGE NOTE PROVIDER - NSDCFUADDINST_GEN_ALL_CORE_FT
Wound care instructions:   Continue w/ Dakins TID dressings and prn soiling  CAVILON Advance TIW to periwound and   Continue w/ Pericare w/ attends underpads as per protocol  Constipation-improving,  continue bowel regimen to decrease wound contamination   Moisturize intact skin w/ SWEEN cream BID  Nutritional optimization - RD consult appreciated encourage high quality protein, MVI & Vit C to promote wound healing  Continue turning and positioning w/ offloading assistive devices as per protocol  Waffle Cushion to chair when oob to chair  Continue w/ low air loss pressure redistribution bed surface

## 2022-03-08 NOTE — DISCHARGE NOTE PROVIDER - NSDCMRMEDTOKEN_GEN_ALL_CORE_FT
aspirin 81 mg oral delayed release tablet: 1 tab(s) orally once a day  atorvastatin 80 mg oral tablet: 1 tab(s) orally once a day (at bedtime)  Cozaar 25 mg oral tablet: 1 tab(s) orally once a day  FLUoxetine 40 mg oral capsule: 1 cap(s) orally once a day  furosemide 80 mg oral tablet: 1 tab(s) orally once a day  HumaLOG 100 units/mL subcutaneous solution: 30  subcutaneous 3 times a day (before meals)  metFORMIN 500 mg oral tablet: 1 tab(s) orally 2 times a day  Metoprolol Tartrate 100 mg oral tablet: 1 tab(s) orally 2 times a day  omeprazole 40 mg oral delayed release capsule: 1 cap(s) orally 2 times a day  potassium chloride 20 mEq oral tablet, extended release: 1 tab(s) orally 2 times a day   Singulair 10 mg oral tablet: 1 tab(s) orally once a day  Slow Release Iron (as elemental iron) 45 mg oral tablet, extended release: 1 tab(s) orally once a day  Tresiba 100 units/mL subcutaneous solution: 40 unit(s) subcutaneous once a day (at bedtime)  Vitamin D3: 5000 international unit(s) orally once a day  Xarelto 20 mg oral tablet: 1 tab(s) orally once a day (in the evening)  RESUME TOMORROW 7/10/21 IN THE EVENING    aspirin 81 mg oral delayed release tablet: 1 tab(s) orally once a day  atorvastatin 80 mg oral tablet: 1 tab(s) orally once a day (at bedtime)  bisacodyl 10 mg rectal suppository: 1 suppository(ies) rectal once a day, As needed, Constipation  bumetanide 2 mg oral tablet: 1 tab(s) orally every 12 hours  FLUoxetine 40 mg oral capsule: 1 cap(s) orally once a day  metFORMIN 500 mg oral tablet: 1 tab(s) orally 2 times a day  metoprolol tartrate 100 mg oral tablet: 1 tab(s) orally 2 times a day  nystatin 100,000 units/g topical powder: 1 application topically 2 times a day  polyethylene glycol 3350 oral powder for reconstitution: 17 gram(s) orally 2 times a day  sacubitril-valsartan 24 mg-26 mg oral tablet: 1 tab(s) orally 2 times a day  senna oral tablet: 2 tab(s) orally once a day (at bedtime)  Singulair 10 mg oral tablet: 1 tab(s) orally once a day  Slow Release Iron (as elemental iron) 45 mg oral tablet, extended release: 1 tab(s) orally once a day  sodium hypochlorite 0.125% topical solution: 1 application topically every 8 hours  spironolactone 25 mg oral tablet: 1 tab(s) orally once a day  Tresiba 100 units/mL subcutaneous solution: 40 unit(s) subcutaneous once a day (at bedtime)  Vitamin D3: 5000 international unit(s) orally once a day  Xarelto 20 mg oral tablet: 1 tab(s) orally once a day   aspirin 81 mg oral delayed release tablet: 1 tab(s) orally once a day  atorvastatin 80 mg oral tablet: 1 tab(s) orally once a day (at bedtime)  bisacodyl 10 mg rectal suppository: 1 suppository(ies) rectal once a day, As needed, Constipation  bumetanide 2 mg oral tablet: 1 tab(s) orally every 12 hours  FLUoxetine 40 mg oral capsule: 1 cap(s) orally once a day  insulin lispro 100 units/mL injectable solution: 22  injectable 3 times a day (with meals)  metFORMIN 500 mg oral tablet: 1 tab(s) orally 2 times a day  metoprolol succinate 200 mg oral tablet, extended release: 1 tab(s) orally once a day  nystatin 100,000 units/g topical powder: 1 application topically 2 times a day  polyethylene glycol 3350 oral powder for reconstitution: 17 gram(s) orally 2 times a day  sacubitril-valsartan 24 mg-26 mg oral tablet: 1 tab(s) orally 2 times a day  senna oral tablet: 2 tab(s) orally once a day (at bedtime)  Singulair 10 mg oral tablet: 1 tab(s) orally once a day  Slow Release Iron (as elemental iron) 45 mg oral tablet, extended release: 1 tab(s) orally once a day  sodium hypochlorite 0.125% topical solution: 1 application topically every 8 hours  spironolactone 25 mg oral tablet: 1 tab(s) orally once a day  Tresiba 100 units/mL subcutaneous solution: 40 unit(s) subcutaneous once a day (at bedtime)  Vitamin D3: 5000 international unit(s) orally once a day  Xarelto 20 mg oral tablet: 1 tab(s) orally once a day   amoxicillin-clavulanate 875 mg-125 mg oral tablet: 1 tab(s) orally every 12 hours (3/11 and 3/12) only  aspirin 81 mg oral delayed release tablet: 1 tab(s) orally once a day  atorvastatin 80 mg oral tablet: 1 tab(s) orally once a day (at bedtime)  bisacodyl 10 mg rectal suppository: 1 suppository(ies) rectal once a day, As needed, Constipation  bumetanide 2 mg oral tablet: 1 tab(s) orally every 12 hours  FLUoxetine 40 mg oral capsule: 1 cap(s) orally once a day  insulin lispro 100 units/mL injectable solution: 22  injectable 3 times a day (with meals)  metFORMIN 500 mg oral tablet: 1 tab(s) orally 2 times a day  metoprolol succinate 200 mg oral tablet, extended release: 1 tab(s) orally once a day  nystatin 100,000 units/g topical powder: 1 application topically 2 times a day  polyethylene glycol 3350 oral powder for reconstitution: 17 gram(s) orally 2 times a day  sacubitril-valsartan 24 mg-26 mg oral tablet: 1 tab(s) orally 2 times a day  senna oral tablet: 2 tab(s) orally once a day (at bedtime)  Singulair 10 mg oral tablet: 1 tab(s) orally once a day  Slow Release Iron (as elemental iron) 45 mg oral tablet, extended release: 1 tab(s) orally once a day  sodium hypochlorite 0.125% topical solution: 1 application topically every 8 hours  spironolactone 25 mg oral tablet: 1 tab(s) orally once a day  Tresiba 100 units/mL subcutaneous solution: 40 unit(s) subcutaneous once a day (at bedtime)  Vitamin D3: 5000 international unit(s) orally once a day  Xarelto 20 mg oral tablet: 1 tab(s) orally once a day

## 2022-03-08 NOTE — PROGRESS NOTE ADULT - PROBLEM SELECTOR PLAN 3
Preventive Health Recommendations  Female Ages 26 - 39  Yearly exam:   See your health care provider every year in order to    Review health changes.     Discuss preventive care.      Review your medicines if you your doctor has prescribed any.    Until age 30: Get a Pap test every three years (more often if you have had an abnormal result).    After age 30: Talk to your doctor about whether you should have a Pap test every 3 years or have a Pap test with HPV screening every 5 years.   You do not need a Pap test if your uterus was removed (hysterectomy) and you have not had cancer.  You should be tested each year for STDs (sexually transmitted diseases), if you're at risk.   Talk to your provider about how often to have your cholesterol checked.  If you are at risk for diabetes, you should have a diabetes test (fasting glucose).  Shots: Get a flu shot each year. Get a tetanus shot every 10 years.   Nutrition:     Eat at least 5 servings of fruits and vegetables each day.    Eat whole-grain bread, whole-wheat pasta and brown rice instead of white grains and rice.    Talk to your provider about Calcium and Vitamin D.     Lifestyle    Exercise at least 150 minutes a week (30 minutes a day, 5 days of the week). This will help you control your weight and prevent disease.    Limit alcohol to one drink per day.    No smoking.     Wear sunscreen to prevent skin cancer.    See your dentist every six months for an exam and cleaning.     At home on 2L nasal cannula as needed. On admission hypoxic to 87 % , temporarily required BiPAP. CXR with pulm vascular congestion  - Likely multifactorial in s/o decompensated HF (BNP 15k), and chronically poor respiratory mechanism in s/o JOSR OHS   - TTE in Sept 2021 showed hyperdynamic LV systolic function, EF 75%, and mod pulm HTN  - Low suspicion for PE (on home xarelto for a.fib)  - HR <110, unlikely dysrhythmia driven   - Repeat TTE as above   - ICS  - Diuresis as above  - Weaned back to home oxygen needs At home on 2L nasal cannula as needed. On admission hypoxic to 87 % , temporarily required BiPAP. CXR with pulm vascular congestion  - Likely multifactorial in s/o decompensated HF (BNP 15k), and chronically poor respiratory mechanism in s/o JOSR OHS   - TTE in Sept 2021 showed hyperdynamic LV systolic function, EF 75%, and mod pulm HTN  - Low suspicion for PE (on home xarelto for a.fib)  - HR <110, unlikely dysrhythmia driven   - Repeat TTE without significant change as above   - C/w ICS  - Diuresis as above  - Weaned back to home oxygen needs at this time

## 2022-03-08 NOTE — DISCHARGE NOTE PROVIDER - YES NO FOR MLM POSITIVE OR NEGATIVE COVID RESULT
,
Character low suspicion for ACS and no CP/SOB or acute ECG changes or other known risk factors. Character low suspicion for dissection and no murmur or pulse asymmetry. Character low suspicion for PE. Character low suspicion for PTX and no e/o this on exam/CXR. Character of suspicion for overuse/arthritis. Patient is well appearing, NAD, afebrile, hemodynamically stable. Discharged with instructions in further symptomatic care, return precautions, and need for PMD and ortho f/u and given list for this f/u and put in Karo's book.

## 2022-03-08 NOTE — PROGRESS NOTE ADULT - PROBLEM SELECTOR PLAN 4
Cr normal on admission, peaked around 4.7 now resolved   Cardiorenal etiology most likely given below:  - Urine lytes suggestive of pre-renal etiology, hypervolemic on exam, BNP 19k   - Trial of lasix 80mg IV 3/1 with improvement in renal function    Plan:  - Cont to monitor renal function   - Avoid nephrotoxic agents  - Strict I/Os  - HF and nephrology following, appreciate recs Cr normal on admission, peaked around 4.7 now resolved   Cardiorenal etiology most likely given below:  - Urine lytes suggestive of pre-renal etiology, hypervolemic on exam, BNP 19k   - Trial of lasix 80mg IV 3/1 with improvement in renal function    Plan:  - Cont to monitor renal function   - Avoid nephrotoxic agents  - Strict I/Os  - HF and nephrology consulted, appreciate recs

## 2022-03-08 NOTE — PROGRESS NOTE ADULT - SUBJECTIVE AND OBJECTIVE BOX
[facility]-- WOUND TEAM -- FOLLOW UP NOTE  --------------------------------------------------------------------------------    24 hour events/subjective:  afebrile  less anxious  breathing improved- able to rest in bed/ reclined  tolerating po  continent  ambulating with minimal assist  moving around more and feeling better  less pain & drainage, no odor     - RN said she changed dressing yesterday w/o premed or c/o pain from pt    Diet:  Diet, DASH/TLC:   Sodium & Cholesterol Restricted  Consistent Carbohydrate Evening Snack (CSTCHOSN)  Supplement Feeding Modality:  Oral  Glucerna Shake Cans or Servings Per Day:  1       Frequency:  Two Times a day (03-07-22 @ 12:50)      ROS: General/ SKIN see HPI  all other systems negative      ALLERGIES & MEDICATIONS  --------------------------------------------------------------------------------  Allergies  amoxicillin (Unknown)  Cipro (Unknown)  codeine (Vomiting)  shellfish (Stomach Upset)    Intolerances  fish (Stomach Upset)        STANDING INPATIENT MEDICATIONS  ampicillin/sulbactam  IVPB 3 Gram(s) IV Intermittent every 6 hours  aspirin enteric coated 81 milliGRAM(s) Oral daily  atorvastatin 80 milliGRAM(s) Oral at bedtime  buMETAnide 2 milliGRAM(s) Oral every 12 hours  Dakins Solution - 1/4 Strength 1 Application(s) Topical every 8 hours  dextrose 40% Gel 15 Gram(s) Oral once  dextrose 5%. 1000 milliLiter(s) IV Continuous <Continuous>  dextrose 5%. 1000 milliLiter(s) IV Continuous <Continuous>  dextrose 50% Injectable 25 Gram(s) IV Push once  dextrose 50% Injectable 12.5 Gram(s) IV Push once  dextrose 50% Injectable 25 Gram(s) IV Push once  FLUoxetine 40 milliGRAM(s) Oral daily  glucagon  Injectable 1 milliGRAM(s) IntraMuscular once  insulin glargine Injectable (LANTUS) 28 Unit(s) SubCutaneous at bedtime  insulin lispro (ADMELOG) corrective regimen sliding scale   SubCutaneous three times a day before meals  insulin lispro (ADMELOG) corrective regimen sliding scale   SubCutaneous at bedtime  insulin lispro Injectable (ADMELOG) 22 Unit(s) SubCutaneous three times a day with meals  metoprolol tartrate 100 milliGRAM(s) Oral two times a day  nystatin Powder 1 Application(s) Topical two times a day  polyethylene glycol 3350 17 Gram(s) Oral two times a day  rivaroxaban 20 milliGRAM(s) Oral daily  sacubitril 24 mG/valsartan 26 mG 1 Tablet(s) Oral two times a day  senna 2 Tablet(s) Oral at bedtime  spironolactone 25 milliGRAM(s) Oral daily      PRN INPATIENT MEDICATION  acetaminophen     Tablet .. 650 milliGRAM(s) Oral every 6 hours PRN  bisacodyl Suppository 10 milliGRAM(s) Rectal daily PRN  sodium chloride 0.65% Nasal 1 Spray(s) Both Nostrils every 6 hours PRN        VITALS/PHYSICAL EXAM  --------------------------------------------------------------------------------  T(C): 36.8 (03-08-22 @ 04:58), Max: 36.8 (03-07-22 @ 20:37)  HR: 60 (03-08-22 @ 04:58) (60 - 90)  BP: 106/64 (03-08-22 @ 04:58) (106/64 - 134/61)  RR: 18 (03-08-22 @ 04:58) (18 - 18)  SpO2: 95% (03-08-22 @ 04:58) (95% - 96%)  Wt(kg): --      03-07-22 @ 07:01  -  03-08-22 @ 07:00  --------------------------------------------------------  IN: 640 mL / OUT: 1300 mL / NET: -660 mL    03-08-22 @ 07:01  -  03-08-22 @ 12:01  --------------------------------------------------------  IN: 280 mL / OUT: 0 mL / NET: 280 mL    NAD,  A&Ox3, MO,  WD/ WG  Versa Care P500 bed  HEENT:  NC/AT, EOMI, mucosa moist, throat clear, trachea midline, neck supple   Neurology:   strength & sensation grossly intact  Psych: calm  Musculoskeletal: limited stiff ROM, no deformities/ contractures  Vascular: BLE equally warm,  no cyanosis, clubbing, edema  Skin:  moist w/ good turgor  (+) tattoos      Lt labial/ perineal wound 4cm x 4cm x 2.5cm           w/ 1cm undermining @ 12 o'clock and 8cm at 12cm at 6 o'clock         scant thin slough on wound bed         pink moist granular tissue on wound bed          fading moisture dermatitis in periwound skin         No odor, erythema, increased warmth, tenderness, induration, fluctuance, nor crepitus    LABS/ CULTURES/ RADIOLOGY:              9.2    15.53 >-----------<  405      [03-08-22 @ 06:49]              31.0     140  |  99  |  25  ----------------------------<  143      [03-08-22 @ 06:49]  4.0   |  28  |  0.92        Ca     9.1     [03-08-22 @ 06:49]      Mg     1.9     [03-08-22 @ 06:49]      Phos  3.4     [03-08-22 @ 06:49]    TPro  6.6  /  Alb  3.3  /  TBili  0.3  /  DBili  x   /  AST  24  /  ALT  17  /  AlkPhos  122  [03-08-22 @ 06:49]    CAPILLARY BLOOD GLUCOSE  POCT Blood Glucose.: 193 mg/dL (08 Mar 2022 09:28)  POCT Blood Glucose.: 182 mg/dL (07 Mar 2022 21:41)  POCT Blood Glucose.: 206 mg/dL (07 Mar 2022 17:22)  POCT Blood Glucose.: 136 mg/dL (07 Mar 2022 13:00)    A1C with Estimated Average Glucose Result: 8.6 % (02-27-22 @ 08:18)

## 2022-03-08 NOTE — PROGRESS NOTE ADULT - PROBLEM SELECTOR PLAN 6
RESOLVED  Fever, leukocytosis, hypotension on admission. Sepsis 2/2 likely sacral wound/cellulitis  - s/p bedside debridement in ED   - BCx 2/26 NGTD  - MRSA pcr negative   - Daily packing changes  - Wound Cx growing E. avium and strep anginosus   - Zosyn (2/27- 3/1 ). Unasyn (3/1- ) given culture sensitivities and prevent further renal injury   - Plan for 14 day course of Abx. 3/12 end date. DC on augmentin   - Appreciate ID recs

## 2022-03-08 NOTE — PROGRESS NOTE ADULT - PROBLEM SELECTOR PLAN 9
on Losartan 25 and Lopressor   - c/w lopressor  - hold Losartan given hypotension from sepsis & HEATHER   - c/w aldactone 25mg qD  - entresto & bumex as above on Losartan 25 and Lopressor   - c/w lopressor  - d/c home losartan  - c/w aldactone 25mg qD  - entresto & bumex as above

## 2022-03-08 NOTE — PROGRESS NOTE ADULT - PROBLEM SELECTOR PLAN 7
On metoprolol tartate 100mg bid and Xarelto  - HR <110 at this time   - continue metoprolol tartate 100mg bid  - Xarelto 20mg qD On metoprolol tartrate 100mg bid and Xarelto  - HR <110 at this time   - continue metoprolol tartate 100mg bid  - Xarelto 20mg qD

## 2022-03-08 NOTE — DISCHARGE NOTE PROVIDER - CARE PROVIDER_API CALL
Jason Hudson)  Internal Medicine  [address] [place] [address]  Phone: [phone]  Fax: [phone]  Established Patient  Follow Up Time: 2 weeks   Jason Hudson)  Internal Medicine  [address] [place] [address]  Phone: [phone]  Fax: [phone]  Established Patient  Follow Up Time: 2 weeks    Jamal Espinal ()  Cardiovascular Disease; Internal Medicine  [facility], [address] [place] [address], [place] [address]  Phone: [phone]  Fax: [phone]  Established Patient  Follow Up Time: 1 week   Jason Hudson)  Internal Medicine  [address] [place] [address]  Phone: [phone]  Fax: [phone]  Established Patient  Follow Up Time: 2 weeks    Jamal Espinal ()  Cardiovascular Disease; Internal Medicine  [facility], [address] [place] [address], [address] [place] [address]  Phone: [phone]  Fax: [phone]  Established Patient  Follow Up Time: 1 week    Kenton Simpson)  Internal Medicine  [address] [place] [address]  Phone: [phone]  Fax: [phone]  Follow Up Time: 1 month

## 2022-03-08 NOTE — PROGRESS NOTE ADULT - PROBLEM SELECTOR PLAN 2
Dunn noted to be bloody, UA with large RBC with uric acid crystals  - Nephrolithiasis?? or pt pulling on dunn??   - Renal US without calculi or hydronephrosis   - CTAP 3/4 - no stone visualized  - Resumed xarelto  - Continue to monitor. no need to hold AC as long as H/H stable

## 2022-03-08 NOTE — DISCHARGE NOTE PROVIDER - PROVIDER TOKENS
PROVIDER:[[id number]],FOLLOWUP:[2 weeks],ESTABLISHEDPATIENT:[T]] PROVIDER:[[id number]],FOLLOWUP:[2 weeks],ESTABLISHEDPATIENT:[T]],PROVIDER:[[id number]],FOLLOWUP:[1 week],ESTABLISHEDPATIENT:[T]] PROVIDER:[[id number]],FOLLOWUP:[2 weeks],ESTABLISHEDPATIENT:[T]],PROVIDER:[[id number]],FOLLOWUP:[1 week],ESTABLISHEDPATIENT:[T]],PROVIDER:[[id number]],FOLLOWUP:[1 month]]

## 2022-03-09 LAB
ANION GAP SERPL CALC-SCNC: 14 MMOL/L — SIGNIFICANT CHANGE UP (ref 5–17)
BUN SERPL-MCNC: 24 MG/DL — HIGH (ref 7–23)
CALCIUM SERPL-MCNC: 8.9 MG/DL — SIGNIFICANT CHANGE UP (ref 8.4–10.5)
CHLORIDE SERPL-SCNC: 98 MMOL/L — SIGNIFICANT CHANGE UP (ref 96–108)
CO2 SERPL-SCNC: 27 MMOL/L — SIGNIFICANT CHANGE UP (ref 22–31)
CREAT SERPL-MCNC: 1.01 MG/DL — SIGNIFICANT CHANGE UP (ref 0.5–1.3)
EGFR: 61 ML/MIN/1.73M2 — SIGNIFICANT CHANGE UP
GLUCOSE BLDC GLUCOMTR-MCNC: 160 MG/DL — HIGH (ref 70–99)
GLUCOSE BLDC GLUCOMTR-MCNC: 208 MG/DL — HIGH (ref 70–99)
GLUCOSE BLDC GLUCOMTR-MCNC: 215 MG/DL — HIGH (ref 70–99)
GLUCOSE BLDC GLUCOMTR-MCNC: 239 MG/DL — HIGH (ref 70–99)
GLUCOSE BLDC GLUCOMTR-MCNC: 287 MG/DL — HIGH (ref 70–99)
GLUCOSE SERPL-MCNC: 140 MG/DL — HIGH (ref 70–99)
HCT VFR BLD CALC: 32.2 % — LOW (ref 34.5–45)
HGB BLD-MCNC: 9.7 G/DL — LOW (ref 11.5–15.5)
MAGNESIUM SERPL-MCNC: 2 MG/DL — SIGNIFICANT CHANGE UP (ref 1.6–2.6)
MCHC RBC-ENTMCNC: 24.4 PG — LOW (ref 27–34)
MCHC RBC-ENTMCNC: 30.1 GM/DL — LOW (ref 32–36)
MCV RBC AUTO: 80.9 FL — SIGNIFICANT CHANGE UP (ref 80–100)
NRBC # BLD: 0 /100 WBCS — SIGNIFICANT CHANGE UP (ref 0–0)
PHOSPHATE SERPL-MCNC: 3.5 MG/DL — SIGNIFICANT CHANGE UP (ref 2.5–4.5)
PLATELET # BLD AUTO: 467 K/UL — HIGH (ref 150–400)
POTASSIUM SERPL-MCNC: 4 MMOL/L — SIGNIFICANT CHANGE UP (ref 3.5–5.3)
POTASSIUM SERPL-SCNC: 4 MMOL/L — SIGNIFICANT CHANGE UP (ref 3.5–5.3)
RBC # BLD: 3.98 M/UL — SIGNIFICANT CHANGE UP (ref 3.8–5.2)
RBC # FLD: 16.2 % — HIGH (ref 10.3–14.5)
SODIUM SERPL-SCNC: 139 MMOL/L — SIGNIFICANT CHANGE UP (ref 135–145)
WBC # BLD: 14.8 K/UL — HIGH (ref 3.8–10.5)
WBC # FLD AUTO: 14.8 K/UL — HIGH (ref 3.8–10.5)

## 2022-03-09 PROCEDURE — 99232 SBSQ HOSP IP/OBS MODERATE 35: CPT | Mod: GC

## 2022-03-09 RX ORDER — LACTOBACILLUS ACIDOPHILUS 100MM CELL
1 CAPSULE ORAL DAILY
Refills: 0 | Status: DISCONTINUED | OUTPATIENT
Start: 2022-03-09 | End: 2022-03-10

## 2022-03-09 RX ADMIN — Medication 22 UNIT(S): at 19:07

## 2022-03-09 RX ADMIN — Medication 650 MILLIGRAM(S): at 03:30

## 2022-03-09 RX ADMIN — NYSTATIN CREAM 1 APPLICATION(S): 100000 CREAM TOPICAL at 18:03

## 2022-03-09 RX ADMIN — Medication 100 MILLIGRAM(S): at 05:07

## 2022-03-09 RX ADMIN — Medication 1 APPLICATION(S): at 05:07

## 2022-03-09 RX ADMIN — Medication 1: at 09:13

## 2022-03-09 RX ADMIN — Medication 650 MILLIGRAM(S): at 01:48

## 2022-03-09 RX ADMIN — Medication 650 MILLIGRAM(S): at 23:26

## 2022-03-09 RX ADMIN — SPIRONOLACTONE 25 MILLIGRAM(S): 25 TABLET, FILM COATED ORAL at 05:07

## 2022-03-09 RX ADMIN — Medication 650 MILLIGRAM(S): at 23:50

## 2022-03-09 RX ADMIN — INSULIN GLARGINE 28 UNIT(S): 100 INJECTION, SOLUTION SUBCUTANEOUS at 22:01

## 2022-03-09 RX ADMIN — Medication 650 MILLIGRAM(S): at 16:24

## 2022-03-09 RX ADMIN — ATORVASTATIN CALCIUM 80 MILLIGRAM(S): 80 TABLET, FILM COATED ORAL at 22:01

## 2022-03-09 RX ADMIN — SACUBITRIL AND VALSARTAN 1 TABLET(S): 24; 26 TABLET, FILM COATED ORAL at 05:07

## 2022-03-09 RX ADMIN — Medication 40 MILLIGRAM(S): at 12:13

## 2022-03-09 RX ADMIN — Medication 2: at 13:53

## 2022-03-09 RX ADMIN — RIVAROXABAN 20 MILLIGRAM(S): KIT at 12:14

## 2022-03-09 RX ADMIN — Medication 2: at 19:06

## 2022-03-09 RX ADMIN — AMPICILLIN SODIUM AND SULBACTAM SODIUM 200 GRAM(S): 250; 125 INJECTION, POWDER, FOR SUSPENSION INTRAMUSCULAR; INTRAVENOUS at 11:22

## 2022-03-09 RX ADMIN — Medication 22 UNIT(S): at 13:53

## 2022-03-09 RX ADMIN — Medication 1 APPLICATION(S): at 13:22

## 2022-03-09 RX ADMIN — Medication 81 MILLIGRAM(S): at 12:14

## 2022-03-09 RX ADMIN — Medication 22 UNIT(S): at 09:13

## 2022-03-09 RX ADMIN — Medication 1 TABLET(S): at 14:00

## 2022-03-09 RX ADMIN — BUMETANIDE 2 MILLIGRAM(S): 0.25 INJECTION INTRAMUSCULAR; INTRAVENOUS at 05:07

## 2022-03-09 RX ADMIN — AMPICILLIN SODIUM AND SULBACTAM SODIUM 200 GRAM(S): 250; 125 INJECTION, POWDER, FOR SUSPENSION INTRAMUSCULAR; INTRAVENOUS at 18:03

## 2022-03-09 RX ADMIN — AMPICILLIN SODIUM AND SULBACTAM SODIUM 200 GRAM(S): 250; 125 INJECTION, POWDER, FOR SUSPENSION INTRAMUSCULAR; INTRAVENOUS at 05:06

## 2022-03-09 RX ADMIN — Medication 650 MILLIGRAM(S): at 08:48

## 2022-03-09 NOTE — PROGRESS NOTE ADULT - SUBJECTIVE AND OBJECTIVE BOX
PROGRESS NOTE:   Authored by Dr. Georgia Paz MD (PGY-2). Available on TEAMS.    Patient is a 66y old  Female who presents with a chief complaint of sepsis 2/2 wound infection (08 Mar 2022 18:22)      SUBJECTIVE / OVERNIGHT EVENTS:  No acute events overnight.     ADDITIONAL REVIEW OF SYSTEMS:  Patient denies fevers, chills, chest pain, shortness of breath, nausea, abdominal pain, diarrhea, constipation, dysuria, leg swelling, headache, light headedness.    MEDICATIONS  (STANDING):  ampicillin/sulbactam  IVPB 3 Gram(s) IV Intermittent every 6 hours  aspirin enteric coated 81 milliGRAM(s) Oral daily  atorvastatin 80 milliGRAM(s) Oral at bedtime  buMETAnide 2 milliGRAM(s) Oral every 12 hours  Dakins Solution - 1/4 Strength 1 Application(s) Topical every 8 hours  dextrose 40% Gel 15 Gram(s) Oral once  dextrose 5%. 1000 milliLiter(s) (50 mL/Hr) IV Continuous <Continuous>  dextrose 5%. 1000 milliLiter(s) (100 mL/Hr) IV Continuous <Continuous>  dextrose 50% Injectable 25 Gram(s) IV Push once  dextrose 50% Injectable 12.5 Gram(s) IV Push once  dextrose 50% Injectable 25 Gram(s) IV Push once  FLUoxetine 40 milliGRAM(s) Oral daily  glucagon  Injectable 1 milliGRAM(s) IntraMuscular once  insulin glargine Injectable (LANTUS) 28 Unit(s) SubCutaneous at bedtime  insulin lispro (ADMELOG) corrective regimen sliding scale   SubCutaneous three times a day before meals  insulin lispro (ADMELOG) corrective regimen sliding scale   SubCutaneous at bedtime  insulin lispro Injectable (ADMELOG) 22 Unit(s) SubCutaneous three times a day with meals  metoprolol tartrate 100 milliGRAM(s) Oral two times a day  nystatin Powder 1 Application(s) Topical two times a day  polyethylene glycol 3350 17 Gram(s) Oral two times a day  rivaroxaban 20 milliGRAM(s) Oral daily  sacubitril 24 mG/valsartan 26 mG 1 Tablet(s) Oral two times a day  senna 2 Tablet(s) Oral at bedtime  spironolactone 25 milliGRAM(s) Oral daily    MEDICATIONS  (PRN):  acetaminophen     Tablet .. 650 milliGRAM(s) Oral every 6 hours PRN Mild Pain (1 - 3), Moderate Pain (4 - 6)  bisacodyl Suppository 10 milliGRAM(s) Rectal daily PRN Constipation  sodium chloride 0.65% Nasal 1 Spray(s) Both Nostrils every 6 hours PRN Nasal Congestion      CAPILLARY BLOOD GLUCOSE      POCT Blood Glucose.: 132 mg/dL (08 Mar 2022 22:12)  POCT Blood Glucose.: 83 mg/dL (08 Mar 2022 21:38)  POCT Blood Glucose.: 113 mg/dL (08 Mar 2022 17:53)  POCT Blood Glucose.: 117 mg/dL (08 Mar 2022 12:47)  POCT Blood Glucose.: 193 mg/dL (08 Mar 2022 09:28)    I&O's Summary    07 Mar 2022 07:01  -  08 Mar 2022 07:00  --------------------------------------------------------  IN: 640 mL / OUT: 1300 mL / NET: -660 mL    08 Mar 2022 07:01  -  09 Mar 2022 06:11  --------------------------------------------------------  IN: 560 mL / OUT: 800 mL / NET: -240 mL        PHYSICAL EXAM:  Vital Signs Last 24 Hrs  T(C): 36.6 (09 Mar 2022 05:01), Max: 37.2 (08 Mar 2022 21:23)  T(F): 97.9 (09 Mar 2022 05:01), Max: 98.9 (08 Mar 2022 21:23)  HR: 65 (09 Mar 2022 05:01) (63 - 67)  BP: 118/70 (09 Mar 2022 05:01) (104/73 - 118/70)  BP(mean): --  RR: 18 (09 Mar 2022 05:01) (18 - 18)  SpO2: 94% (09 Mar 2022 05:01) (94% - 99%)    CONSTITUTIONAL: NAD, morbidly obese, laying in bed on her right side   RESPIRATORY: Normal respiratory effort; lungs are clear to auscultation bilaterally, on 2L NC   CARDIOVASCULAR: Regular rate and rhythm, normal S1 and S2, no murmur/rub/gallop; 1+ b/l lower extremity edema; Peripheral pulses are 2+ bilaterally  ABDOMEN: Large panus without any areas of purulence/erythema. Nontender to palpation, normoactive bowel sounds, no rebound/guarding  SKIN: Sacral ulcer healing. No drainage/purulence. Mild erythema surrounding area of wound however non tender to palpation and not warm to touch.   MUSCLOSKELETAL: no clubbing or cyanosis of digits; no joint swelling or tenderness to palpation  NEURO: no asterixis, A&O 3, ambulates to bathroom. No neurological deficits.     LABS:                        9.2    15.53 )-----------( 405      ( 08 Mar 2022 06:49 )             31.0     03-08    140  |  99  |  25<H>  ----------------------------<  143<H>  4.0   |  28  |  0.92    Ca    9.1      08 Mar 2022 06:49  Phos  3.4     03-08  Mg     1.9     03-08    TPro  6.6  /  Alb  3.3  /  TBili  0.3  /  DBili  x   /  AST  24  /  ALT  17  /  AlkPhos  122<H>  03-08                Tele Reviewed:    RADIOLOGY & ADDITIONAL TESTS:  Results Reviewed:   Imaging Personally Reviewed:  Electrocardiogram Personally Reviewed:       PROGRESS NOTE:   Authored by Dr. Georgia Paz MD (PGY-2). Available on TEAMS.    Patient is a 66y old  Female who presents with a chief complaint of sepsis 2/2 wound infection (08 Mar 2022 18:22)      SUBJECTIVE / OVERNIGHT EVENTS:  No acute events overnight. Pt seen and examined at bedside, no new symptoms or concerns reported today.     ADDITIONAL REVIEW OF SYSTEMS:  Patient denies fevers, chills, chest pain, shortness of breath, nausea, abdominal pain, diarrhea, constipation, dysuria, leg swelling, headache, light headedness.    MEDICATIONS  (STANDING):  ampicillin/sulbactam  IVPB 3 Gram(s) IV Intermittent every 6 hours  aspirin enteric coated 81 milliGRAM(s) Oral daily  atorvastatin 80 milliGRAM(s) Oral at bedtime  buMETAnide 2 milliGRAM(s) Oral every 12 hours  Dakins Solution - 1/4 Strength 1 Application(s) Topical every 8 hours  dextrose 40% Gel 15 Gram(s) Oral once  dextrose 5%. 1000 milliLiter(s) (50 mL/Hr) IV Continuous <Continuous>  dextrose 5%. 1000 milliLiter(s) (100 mL/Hr) IV Continuous <Continuous>  dextrose 50% Injectable 25 Gram(s) IV Push once  dextrose 50% Injectable 12.5 Gram(s) IV Push once  dextrose 50% Injectable 25 Gram(s) IV Push once  FLUoxetine 40 milliGRAM(s) Oral daily  glucagon  Injectable 1 milliGRAM(s) IntraMuscular once  insulin glargine Injectable (LANTUS) 28 Unit(s) SubCutaneous at bedtime  insulin lispro (ADMELOG) corrective regimen sliding scale   SubCutaneous three times a day before meals  insulin lispro (ADMELOG) corrective regimen sliding scale   SubCutaneous at bedtime  insulin lispro Injectable (ADMELOG) 22 Unit(s) SubCutaneous three times a day with meals  metoprolol tartrate 100 milliGRAM(s) Oral two times a day  nystatin Powder 1 Application(s) Topical two times a day  polyethylene glycol 3350 17 Gram(s) Oral two times a day  rivaroxaban 20 milliGRAM(s) Oral daily  sacubitril 24 mG/valsartan 26 mG 1 Tablet(s) Oral two times a day  senna 2 Tablet(s) Oral at bedtime  spironolactone 25 milliGRAM(s) Oral daily    MEDICATIONS  (PRN):  acetaminophen     Tablet .. 650 milliGRAM(s) Oral every 6 hours PRN Mild Pain (1 - 3), Moderate Pain (4 - 6)  bisacodyl Suppository 10 milliGRAM(s) Rectal daily PRN Constipation  sodium chloride 0.65% Nasal 1 Spray(s) Both Nostrils every 6 hours PRN Nasal Congestion      CAPILLARY BLOOD GLUCOSE      POCT Blood Glucose.: 132 mg/dL (08 Mar 2022 22:12)  POCT Blood Glucose.: 83 mg/dL (08 Mar 2022 21:38)  POCT Blood Glucose.: 113 mg/dL (08 Mar 2022 17:53)  POCT Blood Glucose.: 117 mg/dL (08 Mar 2022 12:47)  POCT Blood Glucose.: 193 mg/dL (08 Mar 2022 09:28)    I&O's Summary    07 Mar 2022 07:01  -  08 Mar 2022 07:00  --------------------------------------------------------  IN: 640 mL / OUT: 1300 mL / NET: -660 mL    08 Mar 2022 07:01  -  09 Mar 2022 06:11  --------------------------------------------------------  IN: 560 mL / OUT: 800 mL / NET: -240 mL        PHYSICAL EXAM:  Vital Signs Last 24 Hrs  T(C): 36.6 (09 Mar 2022 05:01), Max: 37.2 (08 Mar 2022 21:23)  T(F): 97.9 (09 Mar 2022 05:01), Max: 98.9 (08 Mar 2022 21:23)  HR: 65 (09 Mar 2022 05:01) (63 - 67)  BP: 118/70 (09 Mar 2022 05:01) (104/73 - 118/70)  BP(mean): --  RR: 18 (09 Mar 2022 05:01) (18 - 18)  SpO2: 94% (09 Mar 2022 05:01) (94% - 99%)    CONSTITUTIONAL: NAD, morbidly obese, laying in bed on her right side   RESPIRATORY: Normal respiratory effort; lungs are clear to auscultation bilaterally  CARDIOVASCULAR: Regular rate and rhythm, normal S1 and S2, no murmur/rub/gallop; 1+ b/l lower extremity edema; Peripheral pulses are 2+ bilaterally  ABDOMEN: Large panus without any areas of purulence/erythema. Nontender to palpation, normoactive bowel sounds, no rebound/guarding  SKIN: Sacral ulcer healing. No drainage/purulence. Mild erythema surrounding area of wound however non tender to palpation and not warm to touch.   MUSCLOSKELETAL: no clubbing or cyanosis of digits; no joint swelling or tenderness to palpation  NEURO: no asterixis, A&O 3, ambulates to bathroom. No neurological deficits.   SKIN: Sacral wound without purulent drainage or eschar/necrosis. Wound appears to be well healing.     LABS:                        9.2    15.53 )-----------( 405      ( 08 Mar 2022 06:49 )             31.0     03-08    140  |  99  |  25<H>  ----------------------------<  143<H>  4.0   |  28  |  0.92    Ca    9.1      08 Mar 2022 06:49  Phos  3.4     03-08  Mg     1.9     03-08    TPro  6.6  /  Alb  3.3  /  TBili  0.3  /  DBili  x   /  AST  24  /  ALT  17  /  AlkPhos  122<H>  03-08

## 2022-03-09 NOTE — PROGRESS NOTE ADULT - PROBLEM SELECTOR PLAN 7
On metoprolol tartrate 100mg bid and Xarelto  - HR <110 at this time   - continue metoprolol tartate 100mg bid  - Xarelto 20mg qD

## 2022-03-09 NOTE — PROGRESS NOTE ADULT - PROBLEM SELECTOR PLAN 1
EF previously 75% and hyperdynamic LV on prior ECHO. On admission with pulm vasc congestion and possible hypervolemic state, concerning for decompensated HF  - BNP up trended from 7k to 15k then 19k   - Appreciate HF recs   - TTE obtained this admission without significant change when compared to prior. Still mod pulm HTN, mod MR, grade II diastolic dysfunction.   - BNP down trending, HEATHER resolving, weaned to home oxygen   - C/w bumex 2mg PO BID and entresto 24/26 BID, Aldactone 25mg qD   - Monitor I/Os strictly  - Daily weights   - Mg>2 K>4

## 2022-03-09 NOTE — PROGRESS NOTE ADULT - ASSESSMENT
65 yo F, PMHx AS s/p TAVR 2015 and 2021, HOCM, HFpEF, pAFib on Xarelto, JOSR (does not tolerate CPAP, on 2L O2 at home qhs), anxiety, morbid obesity, DM, HTN, asthma, admitted for sepsis 2/2 sacral wound infection. Course c/b ADHF in s/o sepsis and HEATHER and uremic encephalopathy. Medically stable anticipate dc in 24 hrs.

## 2022-03-09 NOTE — PROGRESS NOTE ADULT - PROBLEM SELECTOR PLAN 8
on Humalog 30 TID and Tresiba 30 bedtime   - A1c 8.6%  - Insulin 22 pre-meal and 28 long-acting bedtime  - check FSG qAC and qhs  - adjust insulin as needed

## 2022-03-09 NOTE — PROGRESS NOTE ADULT - PROBLEM SELECTOR PLAN 3
At home on 2L nasal cannula as needed. On admission hypoxic to 87 % , temporarily required BiPAP. CXR with pulm vascular congestion  - Likely multifactorial in s/o decompensated HF (BNP 15k), and chronically poor respiratory mechanism in s/o JOSR OHS   - TTE in Sept 2021 showed hyperdynamic LV systolic function, EF 75%, and mod pulm HTN  - Low suspicion for PE (on home xarelto for a.fib)  - HR <110, unlikely dysrhythmia driven   - Repeat TTE without significant change as above   - C/w ICS  - Diuresis as above  - Weaned back to home oxygen needs at this time

## 2022-03-09 NOTE — PROGRESS NOTE ADULT - PROBLEM SELECTOR PLAN 4
Cr normal on admission, peaked around 4.7 now resolved   Cardiorenal etiology most likely given below:  - Urine lytes suggestive of pre-renal etiology, hypervolemic on exam, BNP 19k   - Trial of lasix 80mg IV 3/1 with improvement in renal function    Plan:  - Cont to monitor renal function   - Avoid nephrotoxic agents  - Strict I/Os  - HF and nephrology consulted, appreciate recs

## 2022-03-09 NOTE — PROGRESS NOTE ADULT - PROBLEM SELECTOR PLAN 9
on Losartan 25 and Lopressor   - c/w lopressor  - d/c home losartan  - c/w aldactone 25mg qD  - entresto & bumex as above

## 2022-03-09 NOTE — PROGRESS NOTE ADULT - ATTENDING COMMENTS
67 yo F, PMHx AS s/p TAVR 2015 and 2021, HOCM, pAFib on Xarelto, diastolic and valvular HF, JOSR (does not tolerate CPAP, on 2L O2 at home qhs), anxiety, morbid obesity, DM with neuropathy, HTN, asthma (on albuterol), pw sepsis, n/v, likely 2/2 sacral decubitus ulcer/necrotic wound on left ischium with associated cellulitis.  Feeling well. Eager to go to rehab.  Acute on chronic diastolic HF – stable on PO bumex and entreso   Incentive spirometer  Switch to augmentin on d/c to finish 3/12  DCP – [facility] when accepted/bed available  Rest as above.

## 2022-03-10 ENCOUNTER — INPATIENT (INPATIENT)
Facility: HOSPITAL | Age: 67
LOS: 0 days | Discharge: NURSING, REHAB OR LONG-TERM CARE | DRG: 951 | End: 2022-03-11
Attending: STUDENT IN AN ORGANIZED HEALTH CARE EDUCATION/TRAINING PROGRAM | Admitting: HOSPITALIST
Payer: MEDICARE

## 2022-03-10 ENCOUNTER — TRANSCRIPTION ENCOUNTER (OUTPATIENT)
Age: 67
End: 2022-03-10

## 2022-03-10 VITALS
HEART RATE: 84 BPM | OXYGEN SATURATION: 100 % | WEIGHT: 293 LBS | HEIGHT: 63 IN | RESPIRATION RATE: 32 BRPM | TEMPERATURE: 98 F

## 2022-03-10 VITALS
TEMPERATURE: 98 F | SYSTOLIC BLOOD PRESSURE: 112 MMHG | OXYGEN SATURATION: 99 % | RESPIRATION RATE: 18 BRPM | DIASTOLIC BLOOD PRESSURE: 70 MMHG | HEART RATE: 80 BPM

## 2022-03-10 DIAGNOSIS — Z95.2 PRESENCE OF PROSTHETIC HEART VALVE: Chronic | ICD-10-CM

## 2022-03-10 DIAGNOSIS — E11.9 TYPE 2 DIABETES MELLITUS WITHOUT COMPLICATIONS: ICD-10-CM

## 2022-03-10 DIAGNOSIS — J96.11 CHRONIC RESPIRATORY FAILURE WITH HYPOXIA: ICD-10-CM

## 2022-03-10 DIAGNOSIS — Z98.890 OTHER SPECIFIED POSTPROCEDURAL STATES: Chronic | ICD-10-CM

## 2022-03-10 DIAGNOSIS — S31.000A UNSPECIFIED OPEN WOUND OF LOWER BACK AND PELVIS WITHOUT PENETRATION INTO RETROPERITONEUM, INITIAL ENCOUNTER: ICD-10-CM

## 2022-03-10 DIAGNOSIS — I48.0 PAROXYSMAL ATRIAL FIBRILLATION: ICD-10-CM

## 2022-03-10 DIAGNOSIS — S31.000D UNSPECIFIED OPEN WOUND OF LOWER BACK AND PELVIS WITHOUT PENETRATION INTO RETROPERITONEUM, SUBSEQUENT ENCOUNTER: ICD-10-CM

## 2022-03-10 DIAGNOSIS — R31.9 HEMATURIA, UNSPECIFIED: ICD-10-CM

## 2022-03-10 DIAGNOSIS — I50.30 UNSPECIFIED DIASTOLIC (CONGESTIVE) HEART FAILURE: ICD-10-CM

## 2022-03-10 DIAGNOSIS — Z29.9 ENCOUNTER FOR PROPHYLACTIC MEASURES, UNSPECIFIED: ICD-10-CM

## 2022-03-10 LAB
ANION GAP SERPL CALC-SCNC: 10 MMOL/L — SIGNIFICANT CHANGE UP (ref 5–17)
BASOPHILS # BLD AUTO: 0.04 K/UL — SIGNIFICANT CHANGE UP (ref 0–0.2)
BASOPHILS NFR BLD AUTO: 0.3 % — SIGNIFICANT CHANGE UP (ref 0–2)
BUN SERPL-MCNC: 23 MG/DL — SIGNIFICANT CHANGE UP (ref 7–23)
CALCIUM SERPL-MCNC: 9.3 MG/DL — SIGNIFICANT CHANGE UP (ref 8.4–10.5)
CHLORIDE SERPL-SCNC: 99 MMOL/L — SIGNIFICANT CHANGE UP (ref 96–108)
CO2 SERPL-SCNC: 29 MMOL/L — SIGNIFICANT CHANGE UP (ref 22–31)
CREAT SERPL-MCNC: 0.98 MG/DL — SIGNIFICANT CHANGE UP (ref 0.5–1.3)
EGFR: 63 ML/MIN/1.73M2 — SIGNIFICANT CHANGE UP
EOSINOPHIL # BLD AUTO: 0.31 K/UL — SIGNIFICANT CHANGE UP (ref 0–0.5)
EOSINOPHIL NFR BLD AUTO: 2.5 % — SIGNIFICANT CHANGE UP (ref 0–6)
GLUCOSE BLDC GLUCOMTR-MCNC: 175 MG/DL — HIGH (ref 70–99)
GLUCOSE BLDC GLUCOMTR-MCNC: 180 MG/DL — HIGH (ref 70–99)
GLUCOSE SERPL-MCNC: 180 MG/DL — HIGH (ref 70–99)
HCT VFR BLD CALC: 34.6 % — SIGNIFICANT CHANGE UP (ref 34.5–45)
HGB BLD-MCNC: 10 G/DL — LOW (ref 11.5–15.5)
IMM GRANULOCYTES NFR BLD AUTO: 0.4 % — SIGNIFICANT CHANGE UP (ref 0–1.5)
LYMPHOCYTES # BLD AUTO: 1.58 K/UL — SIGNIFICANT CHANGE UP (ref 1–3.3)
LYMPHOCYTES # BLD AUTO: 12.8 % — LOW (ref 13–44)
MAGNESIUM SERPL-MCNC: 2 MG/DL — SIGNIFICANT CHANGE UP (ref 1.6–2.6)
MCHC RBC-ENTMCNC: 24.2 PG — LOW (ref 27–34)
MCHC RBC-ENTMCNC: 28.9 GM/DL — LOW (ref 32–36)
MCV RBC AUTO: 83.8 FL — SIGNIFICANT CHANGE UP (ref 80–100)
MONOCYTES # BLD AUTO: 0.9 K/UL — SIGNIFICANT CHANGE UP (ref 0–0.9)
MONOCYTES NFR BLD AUTO: 7.3 % — SIGNIFICANT CHANGE UP (ref 2–14)
NEUTROPHILS # BLD AUTO: 9.44 K/UL — HIGH (ref 1.8–7.4)
NEUTROPHILS NFR BLD AUTO: 76.7 % — SIGNIFICANT CHANGE UP (ref 43–77)
NRBC # BLD: 0 /100 WBCS — SIGNIFICANT CHANGE UP (ref 0–0)
PHOSPHATE SERPL-MCNC: 3.7 MG/DL — SIGNIFICANT CHANGE UP (ref 2.5–4.5)
PLATELET # BLD AUTO: 481 K/UL — HIGH (ref 150–400)
POTASSIUM SERPL-MCNC: 5.1 MMOL/L — SIGNIFICANT CHANGE UP (ref 3.5–5.3)
POTASSIUM SERPL-SCNC: 5.1 MMOL/L — SIGNIFICANT CHANGE UP (ref 3.5–5.3)
RBC # BLD: 4.13 M/UL — SIGNIFICANT CHANGE UP (ref 3.8–5.2)
RBC # FLD: 16.3 % — HIGH (ref 10.3–14.5)
SARS-COV-2 RNA SPEC QL NAA+PROBE: SIGNIFICANT CHANGE UP
SODIUM SERPL-SCNC: 138 MMOL/L — SIGNIFICANT CHANGE UP (ref 135–145)
WBC # BLD: 12.32 K/UL — HIGH (ref 3.8–10.5)
WBC # FLD AUTO: 12.32 K/UL — HIGH (ref 3.8–10.5)

## 2022-03-10 PROCEDURE — 99285 EMERGENCY DEPT VISIT HI MDM: CPT

## 2022-03-10 PROCEDURE — 82803 BLOOD GASES ANY COMBINATION: CPT

## 2022-03-10 PROCEDURE — 87086 URINE CULTURE/COLONY COUNT: CPT

## 2022-03-10 PROCEDURE — 97530 THERAPEUTIC ACTIVITIES: CPT

## 2022-03-10 PROCEDURE — 83935 ASSAY OF URINE OSMOLALITY: CPT

## 2022-03-10 PROCEDURE — 81001 URINALYSIS AUTO W/SCOPE: CPT

## 2022-03-10 PROCEDURE — 87186 SC STD MICRODIL/AGAR DIL: CPT

## 2022-03-10 PROCEDURE — 80048 BASIC METABOLIC PNL TOTAL CA: CPT

## 2022-03-10 PROCEDURE — 82435 ASSAY OF BLOOD CHLORIDE: CPT

## 2022-03-10 PROCEDURE — 85027 COMPLETE CBC AUTOMATED: CPT

## 2022-03-10 PROCEDURE — 82330 ASSAY OF CALCIUM: CPT

## 2022-03-10 PROCEDURE — 84540 ASSAY OF URINE/UREA-N: CPT

## 2022-03-10 PROCEDURE — 85014 HEMATOCRIT: CPT

## 2022-03-10 PROCEDURE — 83735 ASSAY OF MAGNESIUM: CPT

## 2022-03-10 PROCEDURE — 84132 ASSAY OF SERUM POTASSIUM: CPT

## 2022-03-10 PROCEDURE — U0005: CPT

## 2022-03-10 PROCEDURE — 82962 GLUCOSE BLOOD TEST: CPT

## 2022-03-10 PROCEDURE — 84100 ASSAY OF PHOSPHORUS: CPT

## 2022-03-10 PROCEDURE — 84300 ASSAY OF URINE SODIUM: CPT

## 2022-03-10 PROCEDURE — 87070 CULTURE OTHR SPECIMN AEROBIC: CPT

## 2022-03-10 PROCEDURE — 86703 HIV-1/HIV-2 1 RESULT ANTBDY: CPT

## 2022-03-10 PROCEDURE — 84484 ASSAY OF TROPONIN QUANT: CPT

## 2022-03-10 PROCEDURE — 85025 COMPLETE CBC W/AUTO DIFF WBC: CPT

## 2022-03-10 PROCEDURE — 86850 RBC ANTIBODY SCREEN: CPT

## 2022-03-10 PROCEDURE — 99222 1ST HOSP IP/OBS MODERATE 55: CPT

## 2022-03-10 PROCEDURE — 86901 BLOOD TYPING SEROLOGIC RH(D): CPT

## 2022-03-10 PROCEDURE — 85730 THROMBOPLASTIN TIME PARTIAL: CPT

## 2022-03-10 PROCEDURE — 87641 MR-STAPH DNA AMP PROBE: CPT

## 2022-03-10 PROCEDURE — 87389 HIV-1 AG W/HIV-1&-2 AB AG IA: CPT

## 2022-03-10 PROCEDURE — 74176 CT ABD & PELVIS W/O CONTRAST: CPT

## 2022-03-10 PROCEDURE — U0003: CPT

## 2022-03-10 PROCEDURE — 0225U NFCT DS DNA&RNA 21 SARSCOV2: CPT

## 2022-03-10 PROCEDURE — 82570 ASSAY OF URINE CREATININE: CPT

## 2022-03-10 PROCEDURE — 82565 ASSAY OF CREATININE: CPT

## 2022-03-10 PROCEDURE — 93005 ELECTROCARDIOGRAM TRACING: CPT

## 2022-03-10 PROCEDURE — C8929: CPT

## 2022-03-10 PROCEDURE — 83036 HEMOGLOBIN GLYCOSYLATED A1C: CPT

## 2022-03-10 PROCEDURE — 84295 ASSAY OF SERUM SODIUM: CPT

## 2022-03-10 PROCEDURE — 99232 SBSQ HOSP IP/OBS MODERATE 35: CPT | Mod: GC

## 2022-03-10 PROCEDURE — 84443 ASSAY THYROID STIM HORMONE: CPT

## 2022-03-10 PROCEDURE — 97161 PT EVAL LOW COMPLEX 20 MIN: CPT

## 2022-03-10 PROCEDURE — 94660 CPAP INITIATION&MGMT: CPT

## 2022-03-10 PROCEDURE — 82947 ASSAY GLUCOSE BLOOD QUANT: CPT

## 2022-03-10 PROCEDURE — 85610 PROTHROMBIN TIME: CPT

## 2022-03-10 PROCEDURE — 80202 ASSAY OF VANCOMYCIN: CPT

## 2022-03-10 PROCEDURE — 36415 COLL VENOUS BLD VENIPUNCTURE: CPT

## 2022-03-10 PROCEDURE — 83880 ASSAY OF NATRIURETIC PEPTIDE: CPT

## 2022-03-10 PROCEDURE — 87640 STAPH A DNA AMP PROBE: CPT

## 2022-03-10 PROCEDURE — 71045 X-RAY EXAM CHEST 1 VIEW: CPT

## 2022-03-10 PROCEDURE — 74177 CT ABD & PELVIS W/CONTRAST: CPT | Mod: MA

## 2022-03-10 PROCEDURE — 80053 COMPREHEN METABOLIC PANEL: CPT

## 2022-03-10 PROCEDURE — 96374 THER/PROPH/DIAG INJ IV PUSH: CPT | Mod: XU

## 2022-03-10 PROCEDURE — 83605 ASSAY OF LACTIC ACID: CPT

## 2022-03-10 PROCEDURE — 87077 CULTURE AEROBIC IDENTIFY: CPT

## 2022-03-10 PROCEDURE — 85018 HEMOGLOBIN: CPT

## 2022-03-10 PROCEDURE — 99285 EMERGENCY DEPT VISIT HI MDM: CPT | Mod: 25

## 2022-03-10 PROCEDURE — 86900 BLOOD TYPING SEROLOGIC ABO: CPT

## 2022-03-10 PROCEDURE — 87040 BLOOD CULTURE FOR BACTERIA: CPT

## 2022-03-10 PROCEDURE — 97116 GAIT TRAINING THERAPY: CPT

## 2022-03-10 PROCEDURE — 96375 TX/PRO/DX INJ NEW DRUG ADDON: CPT

## 2022-03-10 PROCEDURE — 76775 US EXAM ABDO BACK WALL LIM: CPT

## 2022-03-10 RX ORDER — SACUBITRIL AND VALSARTAN 24; 26 MG/1; MG/1
1 TABLET, FILM COATED ORAL
Refills: 0 | Status: DISCONTINUED | OUTPATIENT
Start: 2022-03-10 | End: 2022-03-11

## 2022-03-10 RX ORDER — FERROUS SULFATE 325(65) MG
1 TABLET ORAL
Qty: 0 | Refills: 0 | DISCHARGE

## 2022-03-10 RX ORDER — ASPIRIN/CALCIUM CARB/MAGNESIUM 324 MG
81 TABLET ORAL DAILY
Refills: 0 | Status: DISCONTINUED | OUTPATIENT
Start: 2022-03-10 | End: 2022-03-11

## 2022-03-10 RX ORDER — INSULIN GLARGINE 100 [IU]/ML
28 INJECTION, SOLUTION SUBCUTANEOUS ONCE
Refills: 0 | Status: COMPLETED | OUTPATIENT
Start: 2022-03-10 | End: 2022-03-10

## 2022-03-10 RX ORDER — INSULIN LISPRO 100/ML
23 VIAL (ML) SUBCUTANEOUS
Refills: 0 | Status: DISCONTINUED | OUTPATIENT
Start: 2022-03-10 | End: 2022-03-10

## 2022-03-10 RX ORDER — INSULIN LISPRO 100/ML
22 VIAL (ML) SUBCUTANEOUS
Refills: 0 | Status: DISCONTINUED | OUTPATIENT
Start: 2022-03-10 | End: 2022-03-11

## 2022-03-10 RX ORDER — BUMETANIDE 0.25 MG/ML
2 INJECTION INTRAMUSCULAR; INTRAVENOUS
Refills: 0 | Status: DISCONTINUED | OUTPATIENT
Start: 2022-03-10 | End: 2022-03-10

## 2022-03-10 RX ORDER — INSULIN DEGLUDEC 100 U/ML
40 INJECTION, SOLUTION SUBCUTANEOUS
Qty: 0 | Refills: 0 | DISCHARGE

## 2022-03-10 RX ORDER — SPIRONOLACTONE 25 MG/1
25 TABLET, FILM COATED ORAL
Refills: 0 | Status: DISCONTINUED | OUTPATIENT
Start: 2022-03-11 | End: 2022-03-10

## 2022-03-10 RX ORDER — FLUOXETINE HCL 10 MG
40 CAPSULE ORAL DAILY
Refills: 0 | Status: DISCONTINUED | OUTPATIENT
Start: 2022-03-10 | End: 2022-03-11

## 2022-03-10 RX ORDER — METOPROLOL TARTRATE 50 MG
100 TABLET ORAL
Refills: 0 | Status: DISCONTINUED | OUTPATIENT
Start: 2022-03-10 | End: 2022-03-10

## 2022-03-10 RX ORDER — INSULIN LISPRO 100/ML
22 VIAL (ML) SUBCUTANEOUS
Qty: 0 | Refills: 0 | DISCHARGE
Start: 2022-03-10

## 2022-03-10 RX ORDER — POLYETHYLENE GLYCOL 3350 17 G/17G
17 POWDER, FOR SOLUTION ORAL
Refills: 0 | Status: DISCONTINUED | OUTPATIENT
Start: 2022-03-10 | End: 2022-03-11

## 2022-03-10 RX ORDER — METOPROLOL TARTRATE 50 MG
200 TABLET ORAL
Refills: 0 | Status: DISCONTINUED | OUTPATIENT
Start: 2022-03-11 | End: 2022-03-10

## 2022-03-10 RX ORDER — DEXTROSE 50 % IN WATER 50 %
12.5 SYRINGE (ML) INTRAVENOUS ONCE
Refills: 0 | Status: DISCONTINUED | OUTPATIENT
Start: 2022-03-10 | End: 2022-03-11

## 2022-03-10 RX ORDER — GLUCAGON INJECTION, SOLUTION 0.5 MG/.1ML
1 INJECTION, SOLUTION SUBCUTANEOUS ONCE
Refills: 0 | Status: DISCONTINUED | OUTPATIENT
Start: 2022-03-10 | End: 2022-03-11

## 2022-03-10 RX ORDER — SPIRONOLACTONE 25 MG/1
25 TABLET, FILM COATED ORAL DAILY
Refills: 0 | Status: DISCONTINUED | OUTPATIENT
Start: 2022-03-10 | End: 2022-03-11

## 2022-03-10 RX ORDER — METFORMIN HYDROCHLORIDE 850 MG/1
1 TABLET ORAL
Qty: 0 | Refills: 0 | DISCHARGE

## 2022-03-10 RX ORDER — METOPROLOL TARTRATE 50 MG
200 TABLET ORAL DAILY
Refills: 0 | Status: DISCONTINUED | OUTPATIENT
Start: 2022-03-10 | End: 2022-03-11

## 2022-03-10 RX ORDER — RIVAROXABAN 15 MG-20MG
20 KIT ORAL DAILY
Refills: 0 | Status: DISCONTINUED | OUTPATIENT
Start: 2022-03-10 | End: 2022-03-11

## 2022-03-10 RX ORDER — MONTELUKAST 4 MG/1
10 TABLET, CHEWABLE ORAL DAILY
Refills: 0 | Status: DISCONTINUED | OUTPATIENT
Start: 2022-03-10 | End: 2022-03-11

## 2022-03-10 RX ORDER — DEXTROSE 50 % IN WATER 50 %
15 SYRINGE (ML) INTRAVENOUS ONCE
Refills: 0 | Status: DISCONTINUED | OUTPATIENT
Start: 2022-03-10 | End: 2022-03-11

## 2022-03-10 RX ORDER — BUMETANIDE 0.25 MG/ML
2 INJECTION INTRAMUSCULAR; INTRAVENOUS EVERY 12 HOURS
Refills: 0 | Status: DISCONTINUED | OUTPATIENT
Start: 2022-03-10 | End: 2022-03-11

## 2022-03-10 RX ORDER — SODIUM CHLORIDE 9 MG/ML
1000 INJECTION, SOLUTION INTRAVENOUS
Refills: 0 | Status: DISCONTINUED | OUTPATIENT
Start: 2022-03-10 | End: 2022-03-11

## 2022-03-10 RX ORDER — METOPROLOL TARTRATE 50 MG
1 TABLET ORAL
Qty: 0 | Refills: 0 | DISCHARGE
Start: 2022-03-10

## 2022-03-10 RX ORDER — DEXTROSE 50 % IN WATER 50 %
25 SYRINGE (ML) INTRAVENOUS ONCE
Refills: 0 | Status: DISCONTINUED | OUTPATIENT
Start: 2022-03-10 | End: 2022-03-11

## 2022-03-10 RX ORDER — ATORVASTATIN CALCIUM 80 MG/1
80 TABLET, FILM COATED ORAL AT BEDTIME
Refills: 0 | Status: DISCONTINUED | OUTPATIENT
Start: 2022-03-10 | End: 2022-03-11

## 2022-03-10 RX ORDER — MONTELUKAST 4 MG/1
1 TABLET, CHEWABLE ORAL
Qty: 0 | Refills: 0 | DISCHARGE

## 2022-03-10 RX ORDER — CHOLECALCIFEROL (VITAMIN D3) 125 MCG
5000 CAPSULE ORAL
Qty: 0 | Refills: 0 | DISCHARGE

## 2022-03-10 RX ORDER — SENNA PLUS 8.6 MG/1
2 TABLET ORAL AT BEDTIME
Refills: 0 | Status: DISCONTINUED | OUTPATIENT
Start: 2022-03-10 | End: 2022-03-11

## 2022-03-10 RX ORDER — INSULIN LISPRO 100/ML
VIAL (ML) SUBCUTANEOUS
Refills: 0 | Status: DISCONTINUED | OUTPATIENT
Start: 2022-03-10 | End: 2022-03-11

## 2022-03-10 RX ORDER — FERROUS SULFATE 325(65) MG
325 TABLET ORAL DAILY
Refills: 0 | Status: DISCONTINUED | OUTPATIENT
Start: 2022-03-10 | End: 2022-03-11

## 2022-03-10 RX ORDER — RIVAROXABAN 15 MG-20MG
1 KIT ORAL
Qty: 0 | Refills: 0 | DISCHARGE

## 2022-03-10 RX ORDER — SACUBITRIL AND VALSARTAN 24; 26 MG/1; MG/1
1 TABLET, FILM COATED ORAL
Refills: 0 | Status: DISCONTINUED | OUTPATIENT
Start: 2022-03-10 | End: 2022-03-10

## 2022-03-10 RX ORDER — INSULIN GLARGINE 100 [IU]/ML
28 INJECTION, SOLUTION SUBCUTANEOUS AT BEDTIME
Refills: 0 | Status: DISCONTINUED | OUTPATIENT
Start: 2022-03-10 | End: 2022-03-11

## 2022-03-10 RX ORDER — INSULIN LISPRO 100/ML
VIAL (ML) SUBCUTANEOUS AT BEDTIME
Refills: 0 | Status: DISCONTINUED | OUTPATIENT
Start: 2022-03-10 | End: 2022-03-11

## 2022-03-10 RX ORDER — CHOLECALCIFEROL (VITAMIN D3) 125 MCG
2000 CAPSULE ORAL DAILY
Refills: 0 | Status: DISCONTINUED | OUTPATIENT
Start: 2022-03-10 | End: 2022-03-11

## 2022-03-10 RX ADMIN — NYSTATIN CREAM 1 APPLICATION(S): 100000 CREAM TOPICAL at 05:56

## 2022-03-10 RX ADMIN — AMPICILLIN SODIUM AND SULBACTAM SODIUM 200 GRAM(S): 250; 125 INJECTION, POWDER, FOR SUSPENSION INTRAMUSCULAR; INTRAVENOUS at 00:09

## 2022-03-10 RX ADMIN — Medication 23 UNIT(S): at 13:07

## 2022-03-10 RX ADMIN — RIVAROXABAN 20 MILLIGRAM(S): KIT at 11:23

## 2022-03-10 RX ADMIN — Medication 650 MILLIGRAM(S): at 12:20

## 2022-03-10 RX ADMIN — Medication 1 TABLET(S): at 11:24

## 2022-03-10 RX ADMIN — AMPICILLIN SODIUM AND SULBACTAM SODIUM 200 GRAM(S): 250; 125 INJECTION, POWDER, FOR SUSPENSION INTRAMUSCULAR; INTRAVENOUS at 06:24

## 2022-03-10 RX ADMIN — Medication 100 MILLIGRAM(S): at 05:55

## 2022-03-10 RX ADMIN — Medication 23 UNIT(S): at 09:34

## 2022-03-10 RX ADMIN — Medication 1: at 09:33

## 2022-03-10 RX ADMIN — BUMETANIDE 2 MILLIGRAM(S): 0.25 INJECTION INTRAMUSCULAR; INTRAVENOUS at 05:55

## 2022-03-10 RX ADMIN — Medication 40 MILLIGRAM(S): at 11:25

## 2022-03-10 RX ADMIN — Medication 81 MILLIGRAM(S): at 11:25

## 2022-03-10 RX ADMIN — AMPICILLIN SODIUM AND SULBACTAM SODIUM 200 GRAM(S): 250; 125 INJECTION, POWDER, FOR SUSPENSION INTRAMUSCULAR; INTRAVENOUS at 11:26

## 2022-03-10 RX ADMIN — Medication 650 MILLIGRAM(S): at 06:34

## 2022-03-10 RX ADMIN — Medication 1: at 13:06

## 2022-03-10 RX ADMIN — SPIRONOLACTONE 25 MILLIGRAM(S): 25 TABLET, FILM COATED ORAL at 05:55

## 2022-03-10 RX ADMIN — Medication 1 APPLICATION(S): at 06:48

## 2022-03-10 RX ADMIN — Medication 650 MILLIGRAM(S): at 15:52

## 2022-03-10 NOTE — H&P ADULT - NSHPPHYSICALEXAM_GEN_ALL_CORE
Vital Signs Last 24 Hrs  T(C): 36.7 (11 Mar 2022 00:20), Max: 36.9 (10 Mar 2022 23:43)  T(F): 98 (11 Mar 2022 00:20), Max: 98.4 (10 Mar 2022 23:43)  HR: 84 (11 Mar 2022 00:20) (65 - 84)  BP: 127/52 (11 Mar 2022 00:20) (103/57 - 130/74)  BP(mean): --  RR: 21 (11 Mar 2022 00:20) (18 - 32)  SpO2: 96% (11 Mar 2022 00:20) (96% - 100%)

## 2022-03-10 NOTE — PROGRESS NOTE ADULT - PROBLEM SELECTOR PLAN 10
DVT ppx: AC as above    Diet: DASH/CC  PT rec [facility]  Discussed with CM 3/8, plan for dc 24 hrs  NO NEED for enhanced supervision while at rehab. Pt previously with encephalopathy and required assistance with meals. Mental status now improved and back to baseline. NO FURTHER NEEDS for Enhanced supervision

## 2022-03-10 NOTE — PROGRESS NOTE ADULT - ASSESSMENT
65 yo F, PMHx AS s/p TAVR 2015 and 2021, HOCM, HFpEF, pAFib on Xarelto, JOSR (does not tolerate CPAP, on 2L O2 at home qhs), anxiety, morbid obesity, DM, HTN, asthma, admitted for sepsis 2/2 sacral wound infection. Course c/b ADHF in s/o sepsis and HEATHER and uremic encephalopathy. Medically stable anticipate dc in 24 hrs.    65 yo F, PMHx AS s/p TAVR 2015 and 2021, HOCM, HFpEF, pAFib on Xarelto, JOSR (does not tolerate CPAP, on 2L O2 at home qhs), anxiety, morbid obesity, DM, HTN, asthma, admitted for sepsis 2/2 sacral wound infection. Course c/b ADHF in s/o sepsis and HEATHER and uremic encephalopathy. Medically stable, pending rehab placement.

## 2022-03-10 NOTE — ED PROVIDER NOTE - PHYSICAL EXAMINATION
PHYSICAL EXAM:  CONSTITUTIONAL: NAD, morbidly obese, anxious   RESPIRATORY: Normal respiratory effort; lungs are clear to auscultation bilaterally  CARDIOVASCULAR: Regular rate and rhythm, normal S1 and S2, no murmur/rub/gallop; Trace b/l lower extremity edema; Peripheral pulses are 2+ bilaterally  ABDOMEN: Large panus without any areas of purulence/erythema. Nontender to palpation, normoactive bowel sounds, no rebound/guarding  SKIN: Sacral ulcer healing. No drainage/purulence. Mild erythema surrounding area of wound however non tender to palpation and not warm to touch.   MUSCLOSKELETAL: no clubbing or cyanosis of digits; no joint swelling or tenderness to palpation  NEURO: no asterixis, A&O 3, ambulates to bathroom. No neurological deficits.   SKIN: 3/8 Sacral wound without purulent drainage or eschar/necrosis. Wound appears to be well healing.

## 2022-03-10 NOTE — PROGRESS NOTE ADULT - ATTENDING COMMENTS
67 yo F, PMHx AS s/p TAVR 2015 and 2021, HOCM, pAFib on Xarelto, diastolic and valvular HF, JOSR (does not tolerate CPAP, on 2L O2 at home qhs), anxiety, morbid obesity, DM with neuropathy, HTN, asthma (on albuterol), pw sepsis, n/v, likely 2/2 sacral decubitus ulcer/necrotic wound on left ischium with associated cellulitis.  Acute on chronic diastolic HF – stable on PO bumex and entreso. Outpatient cardiology f/u   Incentive spirometer  Switch to augmentin on d/c to finish 3/12  DCP – [facility] when accepted/bed available  Time spent on discharge: 37 minutes   Rest as above.

## 2022-03-10 NOTE — DISCHARGE NOTE NURSING/CASE MANAGEMENT/SOCIAL WORK - NSDCPEPTCAREGIVEDUMATLIST _GEN_ALL_CORE
Heart Failure/Diabetes/Influenza Vaccination Heart Failure/Diabetes/Influenza Vaccination/Rivaroxaban/Xarelto

## 2022-03-10 NOTE — PROGRESS NOTE ADULT - PROBLEM SELECTOR PLAN 1
EF previously 75% and hyperdynamic LV on prior ECHO. On admission with pulm vasc congestion and possible hypervolemic state, concerning for decompensated HF  - BNP up trended from 7k to 15k then 19k   - Appreciate HF recs   - TTE obtained this admission without significant change when compared to prior. Still mod pulm HTN, mod MR, grade II diastolic dysfunction.   - BNP down trending, HEATHER resolving, weaned to home oxygen   - C/w bumex 2mg PO BID and entresto 24/26 BID, Aldactone 25mg qD   - Monitor I/Os strictly  - Daily weights   - Mg>2 K>4 EF previously 75% and hyperdynamic LV on prior ECHO. On admission with pulm vasc congestion and possible hypervolemic state, concerning for decompensated HF  - BNP up trended from 7k to 15k then 19k   - Appreciate HF recs   - TTE obtained this admission without significant change when compared to prior. Still mod pulm HTN, mod MR, grade II diastolic dysfunction.   - BNP down trending, HEATHER resolving, weaned to home oxygen   - C/w bumex 2mg PO BID and entresto 24/26 BID, Aldactone 25mg qD (**change parameters to hold SBP<90, pt has remained asymptomatic and will continue to benefit from GDMT**)   - Monitor I/Os strictly  - Daily weights   - Mg>2 K>4

## 2022-03-10 NOTE — DISCHARGE NOTE NURSING/CASE MANAGEMENT/SOCIAL WORK - PATIENT PORTAL LINK FT
You can access the FollowMyHealth Patient Portal offered by [facility] by registering at the following website: [email or web address]. By joining [employer]’s FollowMyHealth portal, you will also be able to view your health information using other applications (apps) compatible with our system.

## 2022-03-10 NOTE — H&P ADULT - PROBLEM SELECTOR PLAN 3
With elevated pulmonary pressures; not in decompensation  - C/w bumex 2mg PO BID and entresto 24/26 BID, Aldactone 25mg qD

## 2022-03-10 NOTE — PROGRESS NOTE ADULT - PROVIDER SPECIALTY LIST ADULT
Infectious Disease
Infectious Disease
Nephrology
Nephrology
Infectious Disease
Internal Medicine
Trauma Surgery
Wound Care
Wound Care
Internal Medicine
Heart Failure
Internal Medicine

## 2022-03-10 NOTE — ED PROVIDER NOTE - OBJECTIVE STATEMENT
67 yo F, PMHx AS s/p TAVR 2015 and 2021, HOCM, HFpEF, pAFib on Xarelto, JOSR (does not tolerate CPAP, on 2L O2 at home qhs), anxiety, morbid obesity, DM, HTN, asthma recent admission for sepsis 2/2 sacral wound infection. Course c/b ADHF in s/o sepsis and HEATHER and uremic encephalopathy presenting from rehab. Patient was discharged to rehab today, very concerned about the care she was getting there. She was on the phone with her sister because she was feeling quite anxious and due to the circumstances her sister called the police and EMS who decided to bring her back to the hospital. She is currently feeling quite anxious because she was worried about her safety at the rehab facility. She has no chest pain, sob, fevers, chills. She feels physically similar to how she was this am upon discharge.

## 2022-03-10 NOTE — ED ADULT TRIAGE NOTE - AS HEIGHT TYPE
Nikkie Gallagher is a 10 year old female presenting with sick visit    Concerns: fever- 3 days between 100-102  Ibuprofen does help but once it wears off it goes back up  Sore throat- painful to eat/drink/talk  cough    Denies known Latex allergy or symptoms of Latex sensitivity.    Medications reviewed and updated.    Social History     Tobacco Use   Smoking Status Never Smoker   Smokeless Tobacco Never Used   Tobacco Comment    No smokers at home       Health Maintenance Summary     HPV (Female) Vaccine (1 - Female 2-dose series)  Overdue since 5/17/2019    Influenza Vaccine (1)  Overdue since 9/1/2019    Meningococcal Vaccine (2 - 2-dose series)  Next due on 5/17/2024    DTaP/Tdap/Td Vaccine (7 - Td)  Next due on 7/30/2029    Hepatitis B Vaccine   Completed    Pneumococcal Vaccine 0-64   Aged Out    IPV Vaccine   Completed    MMR Vaccine   Completed    Varicella Vaccine   Completed    Hepatitis A Vaccine   Completed                 stated

## 2022-03-10 NOTE — H&P ADULT - ASSESSMENT
66 year old female with a PMHx of AS s/p TAVR 2015 and 2021, HOCM, HFpEF, pAFib on Xarelto, JOSR (does not tolerate CPAP, on 2L O2 at home qhs), anxiety, morbid obesity, DM, HTN, asthma recent admission for sepsis 2/2 sacral wound infection. She presents today from her [facility] after discharge from the hospital earlier today due to dissatisfaction with her subacute rehab facility. Will need SW input for placement to a different [facility].

## 2022-03-10 NOTE — PROGRESS NOTE ADULT - SUBJECTIVE AND OBJECTIVE BOX
PROGRESS NOTE:   Authored by Dr. Georgia Paz MD (PGY-2). Available on TEAMS.    Patient is a 66y old  Female who presents with a chief complaint of sepsis 2/2 wound infection (09 Mar 2022 06:11)      SUBJECTIVE / OVERNIGHT EVENTS:  Low BP overnight, holding GDMT meds.     ADDITIONAL REVIEW OF SYSTEMS:  Patient denies fevers, chills, chest pain, shortness of breath, nausea, abdominal pain, diarrhea, constipation, dysuria, leg swelling, headache, light headedness.    MEDICATIONS  (STANDING):  ampicillin/sulbactam  IVPB 3 Gram(s) IV Intermittent every 6 hours  aspirin enteric coated 81 milliGRAM(s) Oral daily  atorvastatin 80 milliGRAM(s) Oral at bedtime  buMETAnide 2 milliGRAM(s) Oral every 12 hours  Dakins Solution - 1/4 Strength 1 Application(s) Topical every 8 hours  dextrose 40% Gel 15 Gram(s) Oral once  dextrose 5%. 1000 milliLiter(s) (50 mL/Hr) IV Continuous <Continuous>  dextrose 5%. 1000 milliLiter(s) (100 mL/Hr) IV Continuous <Continuous>  dextrose 50% Injectable 25 Gram(s) IV Push once  dextrose 50% Injectable 12.5 Gram(s) IV Push once  dextrose 50% Injectable 25 Gram(s) IV Push once  FLUoxetine 40 milliGRAM(s) Oral daily  glucagon  Injectable 1 milliGRAM(s) IntraMuscular once  insulin glargine Injectable (LANTUS) 28 Unit(s) SubCutaneous at bedtime  insulin lispro (ADMELOG) corrective regimen sliding scale   SubCutaneous three times a day before meals  insulin lispro (ADMELOG) corrective regimen sliding scale   SubCutaneous at bedtime  insulin lispro Injectable (ADMELOG) 22 Unit(s) SubCutaneous three times a day with meals  lactobacillus acidophilus 1 Tablet(s) Oral daily  metoprolol tartrate 100 milliGRAM(s) Oral two times a day  nystatin Powder 1 Application(s) Topical two times a day  polyethylene glycol 3350 17 Gram(s) Oral two times a day  rivaroxaban 20 milliGRAM(s) Oral daily  sacubitril 24 mG/valsartan 26 mG 1 Tablet(s) Oral two times a day  senna 2 Tablet(s) Oral at bedtime  spironolactone 25 milliGRAM(s) Oral daily    MEDICATIONS  (PRN):  acetaminophen     Tablet .. 650 milliGRAM(s) Oral every 6 hours PRN Mild Pain (1 - 3), Moderate Pain (4 - 6)  bisacodyl Suppository 10 milliGRAM(s) Rectal daily PRN Constipation  sodium chloride 0.65% Nasal 1 Spray(s) Both Nostrils every 6 hours PRN Nasal Congestion      CAPILLARY BLOOD GLUCOSE      POCT Blood Glucose.: 215 mg/dL (09 Mar 2022 21:54)  POCT Blood Glucose.: 239 mg/dL (09 Mar 2022 18:52)  POCT Blood Glucose.: 287 mg/dL (09 Mar 2022 17:10)  POCT Blood Glucose.: 208 mg/dL (09 Mar 2022 13:08)  POCT Blood Glucose.: 160 mg/dL (09 Mar 2022 08:58)    I&O's Summary    08 Mar 2022 07:01  -  09 Mar 2022 07:00  --------------------------------------------------------  IN: 560 mL / OUT: 800 mL / NET: -240 mL    09 Mar 2022 07:01  -  10 Mar 2022 05:58  --------------------------------------------------------  IN: 760 mL / OUT: 0 mL / NET: 760 mL        PHYSICAL EXAM:  Vital Signs Last 24 Hrs  T(C): 36.8 (10 Mar 2022 04:59), Max: 36.9 (09 Mar 2022 21:30)  T(F): 98.3 (10 Mar 2022 04:59), Max: 98.5 (09 Mar 2022 21:30)  HR: 65 (10 Mar 2022 04:59) (60 - 77)  BP: 130/74 (10 Mar 2022 04:59) (98/54 - 130/74)  BP(mean): --  RR: 18 (10 Mar 2022 04:59) (18 - 20)  SpO2: 99% (10 Mar 2022 04:59) (97% - 100%)    CONSTITUTIONAL: NAD, morbidly obese, laying in bed on her right side   RESPIRATORY: Normal respiratory effort; lungs are clear to auscultation bilaterally  CARDIOVASCULAR: Regular rate and rhythm, normal S1 and S2, no murmur/rub/gallop; 1+ b/l lower extremity edema; Peripheral pulses are 2+ bilaterally  ABDOMEN: Large panus without any areas of purulence/erythema. Nontender to palpation, normoactive bowel sounds, no rebound/guarding  SKIN: Sacral ulcer healing. No drainage/purulence. Mild erythema surrounding area of wound however non tender to palpation and not warm to touch.   MUSCLOSKELETAL: no clubbing or cyanosis of digits; no joint swelling or tenderness to palpation  NEURO: no asterixis, A&O 3, ambulates to bathroom. No neurological deficits.   SKIN: Sacral wound without purulent drainage or eschar/necrosis. Wound appears to be well healing.     LABS:                        9.7    14.80 )-----------( 467      ( 09 Mar 2022 06:49 )             32.2     03-09    139  |  98  |  24<H>  ----------------------------<  140<H>  4.0   |  27  |  1.01    Ca    8.9      09 Mar 2022 06:46  Phos  3.5     03-09  Mg     2.0     03-09    TPro  6.6  /  Alb  3.3  /  TBili  0.3  /  DBili  x   /  AST  24  /  ALT  17  /  AlkPhos  122<H>  03-08                Tele Reviewed:    RADIOLOGY & ADDITIONAL TESTS:  Results Reviewed:   Imaging Personally Reviewed:  Electrocardiogram Personally Reviewed:       PROGRESS NOTE:   Authored by Dr. Georgia Paz MD (PGY-2). Available on TEAMS.    Patient is a 66y old  Female who presents with a chief complaint of sepsis 2/2 wound infection (09 Mar 2022 06:11)      SUBJECTIVE / OVERNIGHT EVENTS:  Low BP overnight, holding GDMT meds. Repeat BP in am improved, received medications.   Pt reports feeling well this morning, although was unable to sleep overnight due to neighbor being "delirious" and machines beeping in the room. She is eager to leave the hospital and transition to rehab.     ADDITIONAL REVIEW OF SYSTEMS:  Patient denies fevers, chills, chest pain, shortness of breath, nausea, abdominal pain, diarrhea, constipation, dysuria, leg swelling, headache, light headedness.    MEDICATIONS  (STANDING):  ampicillin/sulbactam  IVPB 3 Gram(s) IV Intermittent every 6 hours  aspirin enteric coated 81 milliGRAM(s) Oral daily  atorvastatin 80 milliGRAM(s) Oral at bedtime  buMETAnide 2 milliGRAM(s) Oral every 12 hours  Dakins Solution - 1/4 Strength 1 Application(s) Topical every 8 hours  dextrose 40% Gel 15 Gram(s) Oral once  dextrose 5%. 1000 milliLiter(s) (50 mL/Hr) IV Continuous <Continuous>  dextrose 5%. 1000 milliLiter(s) (100 mL/Hr) IV Continuous <Continuous>  dextrose 50% Injectable 25 Gram(s) IV Push once  dextrose 50% Injectable 12.5 Gram(s) IV Push once  dextrose 50% Injectable 25 Gram(s) IV Push once  FLUoxetine 40 milliGRAM(s) Oral daily  glucagon  Injectable 1 milliGRAM(s) IntraMuscular once  insulin glargine Injectable (LANTUS) 28 Unit(s) SubCutaneous at bedtime  insulin lispro (ADMELOG) corrective regimen sliding scale   SubCutaneous three times a day before meals  insulin lispro (ADMELOG) corrective regimen sliding scale   SubCutaneous at bedtime  insulin lispro Injectable (ADMELOG) 22 Unit(s) SubCutaneous three times a day with meals  lactobacillus acidophilus 1 Tablet(s) Oral daily  metoprolol tartrate 100 milliGRAM(s) Oral two times a day  nystatin Powder 1 Application(s) Topical two times a day  polyethylene glycol 3350 17 Gram(s) Oral two times a day  rivaroxaban 20 milliGRAM(s) Oral daily  sacubitril 24 mG/valsartan 26 mG 1 Tablet(s) Oral two times a day  senna 2 Tablet(s) Oral at bedtime  spironolactone 25 milliGRAM(s) Oral daily    MEDICATIONS  (PRN):  acetaminophen     Tablet .. 650 milliGRAM(s) Oral every 6 hours PRN Mild Pain (1 - 3), Moderate Pain (4 - 6)  bisacodyl Suppository 10 milliGRAM(s) Rectal daily PRN Constipation  sodium chloride 0.65% Nasal 1 Spray(s) Both Nostrils every 6 hours PRN Nasal Congestion      CAPILLARY BLOOD GLUCOSE      POCT Blood Glucose.: 215 mg/dL (09 Mar 2022 21:54)  POCT Blood Glucose.: 239 mg/dL (09 Mar 2022 18:52)  POCT Blood Glucose.: 287 mg/dL (09 Mar 2022 17:10)  POCT Blood Glucose.: 208 mg/dL (09 Mar 2022 13:08)  POCT Blood Glucose.: 160 mg/dL (09 Mar 2022 08:58)    I&O's Summary    08 Mar 2022 07:01  -  09 Mar 2022 07:00  --------------------------------------------------------  IN: 560 mL / OUT: 800 mL / NET: -240 mL    09 Mar 2022 07:01  -  10 Mar 2022 05:58  --------------------------------------------------------  IN: 760 mL / OUT: 0 mL / NET: 760 mL        PHYSICAL EXAM:  Vital Signs Last 24 Hrs  T(C): 36.8 (10 Mar 2022 04:59), Max: 36.9 (09 Mar 2022 21:30)  T(F): 98.3 (10 Mar 2022 04:59), Max: 98.5 (09 Mar 2022 21:30)  HR: 65 (10 Mar 2022 04:59) (60 - 77)  BP: 130/74 (10 Mar 2022 04:59) (98/54 - 130/74)  BP(mean): --  RR: 18 (10 Mar 2022 04:59) (18 - 20)  SpO2: 99% (10 Mar 2022 04:59) (97% - 100%)    CONSTITUTIONAL: NAD, morbidly obese, laying in bed on her right side   RESPIRATORY: Normal respiratory effort; lungs are clear to auscultation bilaterally  CARDIOVASCULAR: Regular rate and rhythm, normal S1 and S2, no murmur/rub/gallop; Trace b/l lower extremity edema; Peripheral pulses are 2+ bilaterally  ABDOMEN: Large panus without any areas of purulence/erythema. Nontender to palpation, normoactive bowel sounds, no rebound/guarding  SKIN: Sacral ulcer healing. No drainage/purulence. Mild erythema surrounding area of wound however non tender to palpation and not warm to touch.   MUSCLOSKELETAL: no clubbing or cyanosis of digits; no joint swelling or tenderness to palpation  NEURO: no asterixis, A&O 3, ambulates to bathroom. No neurological deficits.   SKIN: 3/8 Sacral wound without purulent drainage or eschar/necrosis. Wound appears to be well healing.     LABS:                        9.7    14.80 )-----------( 467      ( 09 Mar 2022 06:49 )             32.2     03-09    139  |  98  |  24<H>  ----------------------------<  140<H>  4.0   |  27  |  1.01    Ca    8.9      09 Mar 2022 06:46  Phos  3.5     03-09  Mg     2.0     03-09    TPro  6.6  /  Alb  3.3  /  TBili  0.3  /  DBili  x   /  AST  24  /  ALT  17  /  AlkPhos  122<H>  03-08

## 2022-03-10 NOTE — PROGRESS NOTE ADULT - NUTRITIONAL ASSESSMENT
This patient has been assessed with a concern for Malnutrition and has been determined to have a diagnosis/diagnoses of Morbid obesity (BMI > 40).    This patient is being managed with:   Diet DASH/TLC-  Sodium & Cholesterol Restricted  Consistent Carbohydrate {Evening Snack} (CSTCHOSN)  No Concentrated Phosphorus  Supplement Feeding Modality:  Oral  Nepro Cans or Servings Per Day:  1       Frequency:  Two Times a day  Entered: Mar  2 2022  6:00AM    
This patient has been assessed with a concern for Malnutrition and has been determined to have a diagnosis/diagnoses of Morbid obesity (BMI > 40).    This patient is being managed with:   Diet DASH/TLC-  Sodium & Cholesterol Restricted  Consistent Carbohydrate {Evening Snack} (CSTCHOSN)  Supplement Feeding Modality:  Oral  Glucerna Shake Cans or Servings Per Day:  1       Frequency:  Two Times a day  Entered: Mar  7 2022 12:50PM    
This patient has been assessed with a concern for Malnutrition and has been determined to have a diagnosis/diagnoses of Morbid obesity (BMI > 40).    This patient is being managed with:   Diet DASH/TLC-  Sodium & Cholesterol Restricted  Consistent Carbohydrate {Evening Snack} (CSTCHOSN)  Supplement Feeding Modality:  Oral  Glucerna Shake Cans or Servings Per Day:  1       Frequency:  Two Times a day  Entered: Mar  7 2022 12:50PM    
This patient has been assessed with a concern for Malnutrition and has been determined to have a diagnosis/diagnoses of Morbid obesity (BMI > 40).    This patient is being managed with:   Diet DASH/TLC-  Sodium & Cholesterol Restricted  Consistent Carbohydrate {Evening Snack} (CSTCHOSN)  No Concentrated Phosphorus  Supplement Feeding Modality:  Oral  Nepro Cans or Servings Per Day:  1       Frequency:  Two Times a day  Entered: Mar  2 2022  6:00AM    
This patient has been assessed with a concern for Malnutrition and has been determined to have a diagnosis/diagnoses of Morbid obesity (BMI > 40).    This patient is being managed with:   Diet DASH/TLC-  Sodium & Cholesterol Restricted  Consistent Carbohydrate {Evening Snack} (CSTCHOSN)  No Concentrated Phosphorus  Supplement Feeding Modality:  Oral  Nepro Cans or Servings Per Day:  1       Frequency:  Two Times a day  Entered: Mar  2 2022  6:00AM    
This patient has been assessed with a concern for Malnutrition and has been determined to have a diagnosis/diagnoses of Morbid obesity (BMI > 40).    This patient is being managed with:   Diet DASH/TLC-  Sodium & Cholesterol Restricted  Consistent Carbohydrate {Evening Snack} (CSTCHOSN)  Supplement Feeding Modality:  Oral  Glucerna Shake Cans or Servings Per Day:  1       Frequency:  Two Times a day  Entered: Mar  7 2022 12:50PM    
This patient has been assessed with a concern for Malnutrition and has been determined to have a diagnosis/diagnoses of Morbid obesity (BMI > 40).    This patient is being managed with:   Diet DASH/TLC-  Sodium & Cholesterol Restricted  Consistent Carbohydrate {Evening Snack} (CSTCHOSN)  Supplement Feeding Modality:  Oral  Glucerna Shake Cans or Servings Per Day:  1       Frequency:  Two Times a day  Entered: Feb 28 2022  6:55PM

## 2022-03-10 NOTE — H&P ADULT - PROBLEM SELECTOR PLAN 2
on Humalog 30 TID and Tresiba 30 bedtime   - A1c 8.6%  - Insulin 22 pre-meal and 28 long-acting bedtime  - check FSG qAC and qhs  - adjust insulin as needed.

## 2022-03-10 NOTE — PROGRESS NOTE ADULT - REASON FOR ADMISSION
sepsis 2/2 wound infection

## 2022-03-10 NOTE — H&P ADULT - PROBLEM SELECTOR PLAN 1
recent admission for sepsis from wound infection  - obtain wound care consult  - continue augmentin to complete 14 day course (last day of abx on 3/11/21) recent admission for sepsis from wound infection  - obtain wound care consult  - discharged with augmentin to complete 14 day course (last day of abx on 3/11/21)  - while inpatient, will treat with Unasyn 3 g q6hrs  - of note, the patient has a documented hx of allergy to amoxicillin; however, she tolerated Unasyn well during prior hospitalization

## 2022-03-10 NOTE — PROGRESS NOTE ADULT - ATTENDING SUPERVISION STATEMENT
ACP
ACP
Student
Fellow
Resident

## 2022-03-10 NOTE — H&P ADULT - NSHPLABSRESULTS_GEN_ALL_CORE
LABS:                         10.0   12.32 )-----------( 481      ( 10 Mar 2022 10:25 )             34.6     03-10    138  |  99  |  23  ----------------------------<  180<H>  5.1   |  29  |  0.98    Ca    9.3      10 Mar 2022 10:25  Phos  3.7     03-10  Mg     2.0     03-10                  Records reviewed from prior hospitalization.  Labs reviewed remarkable for -(from prior to discharge today): leukocytosis 12.3. Normocytic anemia. CMP unremarkable besides hyperglycemia.

## 2022-03-10 NOTE — ED ADULT NURSE NOTE - OBJECTIVE STATEMENT
67 y/o female with PMH afib, anxiety, morbid obesity, DM, HTN, asthma recent admission for sepsis 2/2 sacral wound infection. Per EMS, pt was discharged to rehab today, very concerned about the care she was getting there. She was on the phone with her sister because she was feeling quite anxious and due to the circumstances her sister called the police and EMS who decided to bring her back to the hospital. Pt reports that she is feeling extremely anxious at present because of the rehab, refusing adamantly about returning to rehab. Upon exam pt AOx4 breathing even unlabored, tachypneic but maintaining SPO2 of 100%, pt rate of breathing noted to be normal when pt is not being talked to by provider or clinical staff. pt denies chest pain, sob, ha, n/v/d, abdominal pain, f/c, urinary symptoms, hematuria. 20 gauge IV placed to left hand. Pt arrived with sister at bedside, folding cane and glasses, no other belongings provided by EMS. Pt placed on stretcher with airtap, pt reports that she prefers to sit up, placed in pt wheel chair.

## 2022-03-10 NOTE — H&P ADULT - HISTORY OF PRESENT ILLNESS
66 year old female with a PMHx of AS s/p TAVR 2015 and 2021, HOCM, HFpEF, pAFib on Xarelto, JOSR (does not tolerate CPAP, on 2L O2 at home qhs), anxiety, morbid obesity, DM, HTN, asthma recent admission for sepsis 2/2 sacral wound infection. She presents today from her [facility] after discharge from the hospital earlier today where she was admitted with sepsis secondary to wound infection due to dissatisfaction with her subacute rehab facility.     The patient was admitted to [facility] from Feb 26th - Mar 10th with sepsis secondary to sacral wound infection. The patient is functionally bedbound due to her history of morbid obesity. Wound cultures were positive for Streptococcus angiosis and enterococcus ID was consulted and the patient was treated with a course of zosyn and Unasyn and recommended to complete the course of antibiotics upon discharge with Augmentin for a total of 14 days (last day on 3/12).    Vitals: afebrile, HR 84, SpO2 100% on nonrebreather.  Labs (from prior to discharge today): leukocytosis 12.3. Normocytic anemia. CMP unremarkable besides hyperglycemia. 66 year old female with a PMHx of AS s/p TAVR 2015 and 2021, HOCM, HFpEF, pAFib on Xarelto, JOSR (does not tolerate CPAP, on 2L O2 at home qhs), anxiety, morbid obesity, DM, HTN, asthma recent admission for sepsis 2/2 sacral wound infection. She presents today from her [facility] after discharge from the hospital earlier today where she was admitted with sepsis secondary to wound infection due to dissatisfaction with her subacute rehab facility. She states she felt a large amount of anxiety by the way she was treated at the [facility]. She denies any physical abuse from staff but states verbally she experience fear by the way she was spoken to.     The patient was admitted to [facility] from Feb 26th - Mar 10th with sepsis secondary to sacral wound infection. Wound cultures were positive for Streptococcus angiosis and enterococcus ID was consulted and the patient was treated with a course of zosyn and Unasyn and recommended to complete the course of antibiotics upon discharge with Augmentin for a total of 14 days (last day on 3/12).    Vitals: afebrile, HR 84, SpO2 100% on nonrebreather.  Labs (from prior to discharge today): leukocytosis 12.3. Normocytic anemia. CMP unremarkable besides hyperglycemia. 66 year old female with a PMHx of AS s/p TAVR 2015 and 2021, HOCM, HFpEF, pAFib on Xarelto, JOSR (does not tolerate CPAP, on 2L O2 at home qhs), anxiety, morbid obesity, DM, HTN, asthma recent admission for sepsis 2/2 sacral wound infection. She presents today from her [facility] after discharge from the hospital earlier today due to dissatisfaction with her subacute rehab facility. She states she felt a large amount of anxiety by the way she was treated at the [facility]. She denies any physical abuse from staff.    The patient was admitted to [facility] from Feb 26th - Mar 10th with sepsis secondary to sacral wound infection. Wound cultures were positive for Streptococcus angiosis and enterococcus ID was consulted and the patient was treated with a course of zosyn and Unasyn and recommended to complete the course of antibiotics upon discharge with Augmentin for a total of 14 days (last day on 3/12).    Vitals: afebrile, HR 84, SpO2 100% on nonrebreather.  Labs (from prior to discharge today): leukocytosis 12.3. Normocytic anemia. CMP unremarkable besides hyperglycemia.

## 2022-03-10 NOTE — ED PROVIDER NOTE - CLINICAL SUMMARY MEDICAL DECISION MAKING FREE TEXT BOX
7 yo F, PMHx AS s/p TAVR 2015 and 2021, HOCM, HFpEF, pAFib on Xarelto, JOSR (does not tolerate CPAP, on 2L O2 at home qhs), anxiety, morbid obesity, DM, HTN, asthma recent admission for sepsis 2/2 sacral wound infection presenting from rehab after feeling unsafe. 5 yo F, PMHx AS s/p TAVR 2015 and 2021, HOCM, HFpEF, pAFib on Xarelto, JOSR (does not tolerate CPAP, on 2L O2 at home qhs), anxiety, morbid obesity, DM, HTN, asthma recent admission for sepsis 2/2 sacral wound infection presenting from rehab after feeling unsafe.  - admit for rehab evaluation

## 2022-03-10 NOTE — ED PROVIDER NOTE - NS ED ROS FT
Review of symptoms:   General: No recent weight gain or loss, no new fatigue, no difficulty sleeping  Neuro: No new onset weakness, slurred speech, inability to follow commands  Vision: No recent changes in vision  HEENT: No difficulty swallowing  Gastrointestinal: No diarrhea. No abdominal pain. No hematochezia. No melena  : No difficulty urinating, no polyuria, no hematuria  Skin: No new unexplained bruising  Lymph nodes: No new lymphadenopathy  Mental Health: Not depressed. No SI/HI

## 2022-03-10 NOTE — DISCHARGE NOTE NURSING/CASE MANAGEMENT/SOCIAL WORK - NSDCPEFALRISK_GEN_ALL_CORE
For information on Fall & Injury Prevention, visit: [email or web address] OR  [email or web address] OR  https://www.cdc.gov/steadi/patient.html

## 2022-03-10 NOTE — ED PROVIDER NOTE - ATTENDING CONTRIBUTION TO CARE
Patient discharged earlier this afternoon after admission for infected sacral wound.  Went to [facility].  Dissatisfied with [facility] so called EMS to return to hospital.  Requesting alternate placement.  No other acute complaint.  Will readmit to prior medical team for alternative [facility].

## 2022-03-11 ENCOUNTER — TRANSCRIPTION ENCOUNTER (OUTPATIENT)
Age: 67
End: 2022-03-11

## 2022-03-11 VITALS — WEIGHT: 293 LBS

## 2022-03-11 LAB
GLUCOSE BLDC GLUCOMTR-MCNC: 126 MG/DL — HIGH (ref 70–99)
GLUCOSE BLDC GLUCOMTR-MCNC: 139 MG/DL — HIGH (ref 70–99)
GLUCOSE BLDC GLUCOMTR-MCNC: 158 MG/DL — HIGH (ref 70–99)

## 2022-03-11 PROCEDURE — 82962 GLUCOSE BLOOD TEST: CPT

## 2022-03-11 PROCEDURE — 99285 EMERGENCY DEPT VISIT HI MDM: CPT

## 2022-03-11 PROCEDURE — 99222 1ST HOSP IP/OBS MODERATE 55: CPT

## 2022-03-11 PROCEDURE — 99239 HOSP IP/OBS DSCHRG MGMT >30: CPT

## 2022-03-11 RX ORDER — INSULIN LISPRO 100/ML
0 VIAL (ML) SUBCUTANEOUS
Qty: 0 | Refills: 0 | DISCHARGE
Start: 2022-03-11

## 2022-03-11 RX ORDER — INSULIN LISPRO 100/ML
22 VIAL (ML) SUBCUTANEOUS
Qty: 0 | Refills: 0 | DISCHARGE
Start: 2022-03-11

## 2022-03-11 RX ORDER — AMPICILLIN SODIUM AND SULBACTAM SODIUM 250; 125 MG/ML; MG/ML
3 INJECTION, POWDER, FOR SUSPENSION INTRAMUSCULAR; INTRAVENOUS EVERY 6 HOURS
Refills: 0 | Status: DISCONTINUED | OUTPATIENT
Start: 2022-03-11 | End: 2022-03-11

## 2022-03-11 RX ORDER — AMPICILLIN SODIUM AND SULBACTAM SODIUM 250; 125 MG/ML; MG/ML
3 INJECTION, POWDER, FOR SUSPENSION INTRAMUSCULAR; INTRAVENOUS ONCE
Refills: 0 | Status: COMPLETED | OUTPATIENT
Start: 2022-03-11 | End: 2022-03-11

## 2022-03-11 RX ORDER — METOPROLOL TARTRATE 50 MG
1 TABLET ORAL
Qty: 0 | Refills: 0 | DISCHARGE
Start: 2022-03-11

## 2022-03-11 RX ORDER — ACETAMINOPHEN 500 MG
650 TABLET ORAL EVERY 6 HOURS
Refills: 0 | Status: DISCONTINUED | OUTPATIENT
Start: 2022-03-11 | End: 2022-03-11

## 2022-03-11 RX ORDER — AMPICILLIN SODIUM AND SULBACTAM SODIUM 250; 125 MG/ML; MG/ML
INJECTION, POWDER, FOR SUSPENSION INTRAMUSCULAR; INTRAVENOUS
Refills: 0 | Status: DISCONTINUED | OUTPATIENT
Start: 2022-03-11 | End: 2022-03-11

## 2022-03-11 RX ADMIN — Medication 325 MILLIGRAM(S): at 12:45

## 2022-03-11 RX ADMIN — SPIRONOLACTONE 25 MILLIGRAM(S): 25 TABLET, FILM COATED ORAL at 06:35

## 2022-03-11 RX ADMIN — Medication 650 MILLIGRAM(S): at 14:53

## 2022-03-11 RX ADMIN — BUMETANIDE 2 MILLIGRAM(S): 0.25 INJECTION INTRAMUSCULAR; INTRAVENOUS at 06:35

## 2022-03-11 RX ADMIN — AMPICILLIN SODIUM AND SULBACTAM SODIUM 200 GRAM(S): 250; 125 INJECTION, POWDER, FOR SUSPENSION INTRAMUSCULAR; INTRAVENOUS at 06:35

## 2022-03-11 RX ADMIN — Medication 2: at 08:58

## 2022-03-11 RX ADMIN — Medication 22 UNIT(S): at 12:44

## 2022-03-11 RX ADMIN — Medication 22 UNIT(S): at 08:57

## 2022-03-11 RX ADMIN — Medication 2000 UNIT(S): at 12:45

## 2022-03-11 RX ADMIN — MONTELUKAST 10 MILLIGRAM(S): 4 TABLET, CHEWABLE ORAL at 12:48

## 2022-03-11 RX ADMIN — Medication 40 MILLIGRAM(S): at 12:47

## 2022-03-11 RX ADMIN — Medication 81 MILLIGRAM(S): at 12:45

## 2022-03-11 RX ADMIN — Medication 650 MILLIGRAM(S): at 02:15

## 2022-03-11 RX ADMIN — INSULIN GLARGINE 28 UNIT(S): 100 INJECTION, SOLUTION SUBCUTANEOUS at 01:27

## 2022-03-11 RX ADMIN — Medication 650 MILLIGRAM(S): at 01:45

## 2022-03-11 RX ADMIN — SACUBITRIL AND VALSARTAN 1 TABLET(S): 24; 26 TABLET, FILM COATED ORAL at 06:35

## 2022-03-11 RX ADMIN — Medication 200 MILLIGRAM(S): at 06:36

## 2022-03-11 RX ADMIN — AMPICILLIN SODIUM AND SULBACTAM SODIUM 200 GRAM(S): 250; 125 INJECTION, POWDER, FOR SUSPENSION INTRAMUSCULAR; INTRAVENOUS at 12:51

## 2022-03-11 RX ADMIN — RIVAROXABAN 20 MILLIGRAM(S): KIT at 12:45

## 2022-03-11 RX ADMIN — AMPICILLIN SODIUM AND SULBACTAM SODIUM 200 GRAM(S): 250; 125 INJECTION, POWDER, FOR SUSPENSION INTRAMUSCULAR; INTRAVENOUS at 01:45

## 2022-03-11 NOTE — PROGRESS NOTE ADULT - PROBLEM SELECTOR PLAN 3
With elevated pulmonary pressures; not in decompensation  - C/w metoprolol 100mg BID, bumex 2mg PO BID and entresto 24/26 BID, Aldactone 25mg qD  - C/w ASA, atorvastatin 80 With elevated pulmonary pressures; not in decompensation  - C/w metoprolol 200mg qD, bumex 2mg PO BID and entresto 24/26 BID, Aldactone 25mg qD  - C/w ASA, atorvastatin 80

## 2022-03-11 NOTE — DIETITIAN INITIAL EVALUATION ADULT. - REASON INDICATOR FOR ASSESSMENT
Pressure Ulcer > st II. "66 year old female with a PMHx of AS s/p TAVR 2015 and 2021, HOCM, HFpEF, pAFib on Xarelto, JOSR (does not tolerate CPAP, on 2L O2 at home qhs), anxiety, morbid obesity, DM, HTN, asthma recent admission for sepsis 2/2 sacral wound infection."  See complete Initial Dietitian assessment dated  2/26/2022

## 2022-03-11 NOTE — CONSULT NOTE ADULT - ASSESSMENT
A/P:  66 year old female with a PMHx of AS s/p TAVR 2015 and 2021, HOCM, HFpEF, pAFib on Xarelto, JOSR (does not tolerate CPAP, on 2L O2 at home qhs), anxiety, morbid obesity, DM, HTN, asthma recent admission for sepsis 2/2 sacral wound infection. She presents today from her [facility] after discharge from the hospital earlier today due to dissatisfaction with her subacute rehab facility. Will need SW input for placement to a different [facility].    Wound Consult requested to assist w/ management of Labial/ perineal abscess, s/p debridement/ drainage of abscess    Wound- improving, continue w/ Dakins TID dressings and prn soiling  CAVILON Advance TIW to periwound and        Continue w/ Pericare w/ attends underpads as per protocol  Constipation-improving,  continue bowel regimen to decrease wound contamination   Abx per Medicine/ ID, WBC improving  CT noted- pt s/p debridement and drainage of abscess  Moisturize intact skin w/ SWEEN cream BID  Nutritional optimization - RD consult appreciated        encourage high quality protein, MVI & Vit C to promote wound healing  Hyperglycemia - improving w/ ADA diet and Lantus w/FS w/ ISS  and tx of infection  Continue turning and positioning w/ offloading assistive devices as per protocol  Waffle Cushion to chair when oob to chair  Continue w/ low air loss pressure redistribution bed surface   Care as per medicine, will follow w/ you  Upon discharge f/u as outpatient at [facility] [phone]  D/w team & RN  JAYCEE StarkC CWS [id number]  I spent 5 minutes face to face w/ this pt of which more than 50% of the time was spent counseling & coordinating care of this pt.         A/P:  66 year old female with a PMHx of AS s/p TAVR 2015 and 2021, HOCM, HFpEF, pAFib on Xarelto, JOSR (does not tolerate CPAP, on 2L O2 at home qhs), anxiety, morbid obesity, DM, HTN, asthma recent admission for sepsis 2/2 sacral wound infection. She presents today from her [facility] after discharge from the hospital earlier today due to dissatisfaction with her subacute rehab facility. Will need SW input for placement to a different [facility].    Wound Consult requested to assist w/ management of Labial/ perineal abscess, s/p debridement/ drainage of abscess    Wound- improving, continue w/ Dakins TID dressings and prn soiling  CAVILON Advance TIW to periwound and        Continue w/ Pericare w/ attends underpads as per protocol  Constipation-improving,  continue bowel regimen to decrease wound contamination   Abx per Medicine/ ID, WBC improving  CT noted- pt s/p debridement and drainage of abscess  Moisturize intact skin w/ SWEEN cream BID  Nutritional optimization - RD consult appreciated        encourage high quality protein, MVI & Vit C to promote wound healing  Hyperglycemia - improving w/ ADA diet and Lantus w/FS w/ ISS  and tx of infection  Continue turning and positioning w/ offloading assistive devices as per protocol  Waffle Cushion to chair when oob to chair  Continue w/ low air loss pressure redistribution bed surface   Care as per medicine, will follow w/ you  Upon discharge f/u as outpatient at Wound Center [facility] [phone]  D/w team & RN  JAYCEE StarkC CWS [id number]  I spent 50 minutes face to face w/ this pt of which more than 50% of the time was spent counseling & coordinating care of this pt.

## 2022-03-11 NOTE — PROGRESS NOTE ADULT - ATTENDING COMMENTS
67 yo F, PMHx AS s/p TAVR 2015 and 2021, HOCM, pAFib on Xarelto, diastolic and valvular HF, JOSR (does not tolerate CPAP, on 2L O2 at home qhs), anxiety, morbid obesity, DM with neuropathy, HTN, asthma (on albuterol), pw sepsis, n/v, likely 2/2 sacral decubitus ulcer/necrotic wound on left ischium with associated cellulitis.  Re-admitted for alternative [facility] placement.   Acute on chronic diastolic HF – stable on PO bumex and entreso. Outpatient cardiology f/u   Incentive spirometer  Can continue Unasyn inpatient - completing 3/12  DCP – [facility] when accepted/bed available. Patient wants [facility]  Rest as above. 67 yo F, PMHx AS s/p TAVR 2015 and 2021, HOCM, pAFib on Xarelto, diastolic and valvular HF, JOSR (does not tolerate CPAP, on 2L O2 at home qhs), anxiety, morbid obesity, DM with neuropathy, HTN, asthma (on albuterol), pw sepsis, n/v, likely 2/2 sacral decubitus ulcer/necrotic wound on left ischium with associated cellulitis.  Re-admitted for alternative [facility] placement.   Acute on chronic diastolic HF – stable on PO bumex and entreso. Outpatient cardiology f/u   Incentive spirometer  Can continue Unasyn inpatient - completing 3/12  DCP – [facility] when accepted/bed available. Patient wants [facility]  Time spent on discharge: 33 minutes   Rest as above.

## 2022-03-11 NOTE — PATIENT PROFILE ADULT - VISION (WITH CORRECTIVE LENSES IF THE PATIENT USUALLY WEARS THEM):
wears reading glasses distance is fine/Partially impaired: cannot see medication labels or newsprint, but can see obstacles in path, and the surrounding layout; can count fingers at arm's length

## 2022-03-11 NOTE — PATIENT PROFILE ADULT - NSPROHMSYMPCOND_GEN_A_NUR
pt refusing cpap; wears 2LNC to bed; has type 2 diabetes; infected pressure ulcer between rectum and vagina./behavioral health/diabetes/musculoskeletal/respiratory

## 2022-03-11 NOTE — PATIENT PROFILE ADULT - FALL HARM RISK - RISK INTERVENTIONS

## 2022-03-11 NOTE — DISCHARGE NOTE NURSING/CASE MANAGEMENT/SOCIAL WORK - NSDCPEFALRISK_GEN_ALL_CORE
For information on Fall & Injury Prevention, visit: [email or web address] OR  [email or web address] OR  https://www.cdc.gov/steadi/patient.html

## 2022-03-11 NOTE — DISCHARGE NOTE PROVIDER - NSDCMRMEDTOKEN_GEN_ALL_CORE_FT
amoxicillin-clavulanate 875 mg-125 mg oral tablet: 1 tab(s) orally every 12 hours (3/11 and 3/12) only  aspirin 81 mg oral delayed release tablet: 1 tab(s) orally once a day  atorvastatin 80 mg oral tablet: 1 tab(s) orally once a day (at bedtime)  bisacodyl 10 mg rectal suppository: 1 suppository(ies) rectal once a day, As needed, Constipation  bumetanide 2 mg oral tablet: 1 tab(s) orally every 12 hours  FLUoxetine 40 mg oral capsule: 1 cap(s) orally once a day  insulin lispro 100 units/mL injectable solution: 22 unit(s) injectable 3 times a day (with meals)  metFORMIN 500 mg oral tablet: 1 tab(s) orally 2 times a day  metoprolol succinate 200 mg oral tablet, extended release: 1 tab(s) orally once a day  nystatin 100,000 units/g topical powder: 1 application topically 2 times a day  polyethylene glycol 3350 oral powder for reconstitution: 17 gram(s) orally 2 times a day  sacubitril-valsartan 24 mg-26 mg oral tablet: 1 tab(s) orally 2 times a day  senna oral tablet: 2 tab(s) orally once a day (at bedtime)  Singulair 10 mg oral tablet: 1 tab(s) orally once a day  Slow Release Iron (as elemental iron) 45 mg oral tablet, extended release: 1 tab(s) orally once a day  sodium hypochlorite 0.125% topical solution: 1 application topically every 8 hours  spironolactone 25 mg oral tablet: 1 tab(s) orally once a day  Tresiba 100 units/mL subcutaneous solution: 40 unit(s) subcutaneous once a day (at bedtime)  Vitamin D3: 5000 international unit(s) orally once a day  Xarelto 20 mg oral tablet: 1 tab(s) orally once a day   Admelog 100 units/mL injectable solution: injectable once a day (at bedtime)  0 Unit(s) if Glucose 61 - 250  2 Unit(s) if Glucose 251 - 300  4 Unit(s) if Glucose 301 - 350  6 Unit(s) if Glucose 351 - 400  8 Unit(s) if Glucose Greater Than 400  amoxicillin-clavulanate 875 mg-125 mg oral tablet: 1 tab(s) orally every 12 hours (3/11 and 3/12) only  aspirin 81 mg oral delayed release tablet: 1 tab(s) orally once a day  atorvastatin 80 mg oral tablet: 1 tab(s) orally once a day (at bedtime)  bisacodyl 10 mg rectal suppository: 1 suppository(ies) rectal once a day, As needed, Constipation  bumetanide 2 mg oral tablet: 1 tab(s) orally every 12 hours  FLUoxetine 40 mg oral capsule: 1 cap(s) orally once a day  insulin lispro 100 units/mL injectable solution: 22 unit(s) injectable 3 times a day (with meals)  insulin lispro 100 units/mL injectable solution: Please inject as per finger stick glucose  2 Unit(s) if Glucose 151 - 200  4 Unit(s) if Glucose 201 - 250  6 Unit(s) if Glucose 251 - 300  8 Unit(s) if Glucose 301 - 350  10 Unit(s) if Glucose 351 - 400  12 Unit(s) if Glucose Greater Than 400    Please DO NOT administer if skipping meals or NPO    metFORMIN 500 mg oral tablet: 1 tab(s) orally 2 times a day  metoprolol succinate 200 mg oral tablet, extended release: 1 tab(s) orally once a day  nystatin 100,000 units/g topical powder: 1 application topically 2 times a day  polyethylene glycol 3350 oral powder for reconstitution: 17 gram(s) orally 2 times a day  sacubitril-valsartan 24 mg-26 mg oral tablet: 1 tab(s) orally 2 times a day  senna oral tablet: 2 tab(s) orally once a day (at bedtime)  Singulair 10 mg oral tablet: 1 tab(s) orally once a day  Slow Release Iron (as elemental iron) 45 mg oral tablet, extended release: 1 tab(s) orally once a day  sodium hypochlorite 0.125% topical solution: 1 application topically every 8 hours  spironolactone 25 mg oral tablet: 1 tab(s) orally once a day  Tresiba 100 units/mL subcutaneous solution: 40 unit(s) subcutaneous once a day (at bedtime)  Vitamin D3: 5000 international unit(s) orally once a day  Xarelto 20 mg oral tablet: 1 tab(s) orally once a day

## 2022-03-11 NOTE — DISCHARGE NOTE NURSING/CASE MANAGEMENT/SOCIAL WORK - PATIENT PORTAL LINK FT
You can access the FollowMyHealth Patient Portal offered by [facility] by registering at the following website: [email or web address]. By joining [employer]’s FollowMyHealth portal, you will also be able to view your health information using other applications (apps) compatible with our system.

## 2022-03-11 NOTE — DISCHARGE NOTE PROVIDER - HOSPITAL COURSE
66 year old female with a PMHx of AS s/p TAVR 2015 and 2021, HOCM, HFpEF, pAFib on Xarelto, JOSR (does not tolerate CPAP, on 2L O2 at home qhs), anxiety, morbid obesity, DM, HTN, asthma recent admission for sepsis 2/2 sacral wound infection. She presents today from her [facility] after discharge from the hospital earlier today due to dissatisfaction with her subacute rehab facility. Pt was seen by ; an alternative rehab facility of pt and family's choice was set up for discharge.

## 2022-03-11 NOTE — DISCHARGE NOTE PROVIDER - CARE PROVIDERS DIRECT ADDRESSES
,DirectAddress_Unknown,[email or web address]

## 2022-03-11 NOTE — DISCHARGE NOTE PROVIDER - CARE PROVIDER_API CALL
Jason Hudson)  Internal Medicine  [address] [place] [address]  Phone: [phone]  Fax: [phone]  Established Patient  Follow Up Time: 2 weeks    Jamal Espinal ()  Cardiovascular Disease; Internal Medicine  [facility] [place], [address] [place] [address], [place] [address]  Phone: [phone]  Fax: [phone]  Established Patient  Follow Up Time: 1 week

## 2022-03-11 NOTE — PATIENT PROFILE ADULT - NSPROHMDIABETMGMTSTRAT_GEN_A_NUR
activity/blood glucose testing/diet modification/insulin therapy/medication therapy/weight management

## 2022-03-11 NOTE — DIETITIAN INITIAL EVALUATION ADULT. - PROBLEM SELECTOR PLAN 1
recent admission for sepsis from wound infection  - obtain wound care consult  - discharged with augmentin to complete 14 day course (last day of abx on 3/11/21)  - while inpatient, will treat with Unasyn 3 g q6hrs  - of note, the patient has a documented hx of allergy to amoxicillin; however, she tolerated Unasyn well during prior hospitalization

## 2022-03-11 NOTE — DIETITIAN INITIAL EVALUATION ADULT. - PERTINENT MEDS FT
MEDICATIONS  (STANDING):  ampicillin/sulbactam  IVPB 3 Gram(s) IV Intermittent every 6 hours  ampicillin/sulbactam  IVPB      aspirin enteric coated 81 milliGRAM(s) Oral daily  atorvastatin 80 milliGRAM(s) Oral at bedtime  buMETAnide 2 milliGRAM(s) Oral every 12 hours  cholecalciferol 2000 Unit(s) Oral daily  dextrose 40% Gel 15 Gram(s) Oral once  dextrose 5%. 1000 milliLiter(s) (50 mL/Hr) IV Continuous <Continuous>  dextrose 5%. 1000 milliLiter(s) (100 mL/Hr) IV Continuous <Continuous>  dextrose 50% Injectable 25 Gram(s) IV Push once  dextrose 50% Injectable 12.5 Gram(s) IV Push once  dextrose 50% Injectable 25 Gram(s) IV Push once  ferrous    sulfate 325 milliGRAM(s) Oral daily  FLUoxetine 40 milliGRAM(s) Oral daily  glucagon  Injectable 1 milliGRAM(s) IntraMuscular once  insulin glargine Injectable (LANTUS) 28 Unit(s) SubCutaneous at bedtime  insulin lispro (ADMELOG) corrective regimen sliding scale   SubCutaneous three times a day before meals  insulin lispro (ADMELOG) corrective regimen sliding scale   SubCutaneous at bedtime  insulin lispro Injectable (ADMELOG) 22 Unit(s) SubCutaneous three times a day before meals  metoprolol succinate  milliGRAM(s) Oral daily  montelukast 10 milliGRAM(s) Oral daily  polyethylene glycol 3350 17 Gram(s) Oral two times a day  rivaroxaban 20 milliGRAM(s) Oral daily  sacubitril 24 mG/valsartan 26 mG 1 Tablet(s) Oral two times a day  senna 2 Tablet(s) Oral at bedtime  spironolactone 25 milliGRAM(s) Oral daily

## 2022-03-11 NOTE — DIETITIAN INITIAL EVALUATION ADULT. - ADD RECOMMEND
Add aziza x2 daily; add ensure max x2 daily; add MVI with minerals, add Vitamin C' Reinforce diet adherence/diet education, glucose monitoring

## 2022-03-11 NOTE — PROGRESS NOTE ADULT - ASSESSMENT
66 year old female with a PMHx of AS s/p TAVR 2015 and 2021, HOCM, HFpEF, pAFib on Xarelto, JOSR (does not tolerate CPAP, on 2L O2 at home qhs), anxiety, morbid obesity, DM, HTN, asthma recent admission for sepsis 2/2 sacral wound infection. She presents today from her [facility] after discharge from the hospital earlier today due to dissatisfaction with her subacute rehab facility. Will need SW input for placement to a different [facility]. 66 year old female with a PMHx of AS s/p TAVR 2015 and 2021, HOCM, HFpEF, pAFib on Xarelto, JOSR (does not tolerate CPAP, on 2L O2 at home qhs), anxiety, morbid obesity, DM, HTN, asthma recent admission for sepsis 2/2 sacral wound infection. She presents today from her [facility] after discharge from the hospital earlier today due to dissatisfaction with her subacute rehab facility, accepted to [facility] pending discharge.

## 2022-03-11 NOTE — DISCHARGE NOTE PROVIDER - PROVIDER TOKENS
PROVIDER:[TOKEN:[[id number]],FOLLOWUP:[2 weeks],ESTABLISHEDPATIENT:[T]],PROVIDER:[TOKEN:[[id number]],FOLLOWUP:[1 week],ESTABLISHEDPATIENT:[T]]

## 2022-03-11 NOTE — DIETITIAN INITIAL EVALUATION ADULT. - ORAL INTAKE PTA/DIET HISTORY
patient readmitted after recent discharge . Noted " (Patient) She presents today, 3/10/2022, from her [facility] after discharge from the hospital earlier today due to dissatisfaction with her subacute rehab facility. Will need SW input for placement to a different [facility].

## 2022-03-11 NOTE — CONSULT NOTE ADULT - SUBJECTIVE AND OBJECTIVE BOX
Wound SURGERY CONSULT NOTE    HPI:  66 year old female with a PMHx of AS s/p TAVR 2015 and 2021, HOCM, HFpEF, pAFib on Xarelto, JOSR (does not tolerate CPAP, on 2L O2 at home qhs), anxiety, morbid obesity, DM, HTN, asthma recent admission for sepsis 2/2 sacral wound infection. She presents today from her [facility] after discharge from the hospital earlier today due to dissatisfaction with her subacute rehab facility. She states she felt a large amount of anxiety by the way she was treated at the [facility]. She denies any physical abuse from staff.    The patient was admitted to [facility] from Feb 26th - Mar 10th with sepsis secondary to sacral wound infection. Wound cultures were positive for Streptococcus angiosis and enterococcus ID was consulted and the patient was treated with a course of zosyn and Unasyn and recommended to complete the course of antibiotics upon discharge with Augmentin for a total of 14 days (last day on 3/12).    Vitals: afebrile, HR 84, SpO2 100% on nonrebreather.  Labs (from prior to discharge today): leukocytosis 12.3. Normocytic anemia. CMP unremarkable besides hyperglycemia. (10 Mar 2022 22:49)    Pt currently w/o N/V/D, no BM, palp/ sob/dyspnea/ cp, nor F/C/S.  Wound consult requested by team to assist w/ management of wound.  Pt's sister at bedside, helpful to sister, encouraged wound eval/ wound care.  Pt very anxious 2/2 pain.  Pain medication given.  Pt also requires miralax at home for BM. Pt c/o pain, when cleaned.  drainage and odor improving since procedure.  swelling in area also improving.  Offloading and pericare initiated. Pt seen by Wound team last year for leg wounds- now healed.  Dakins dressings initiated by surgery after debridement.  Appetite improving.    `    N/V/D,  BM/ Flatus,   NGT,     palp/ sob/dyspnea/ cp,       F/C/S  Wound consult requested by team to assist w/ management of      wound/ pressure injury.   Pt (unable to)  c/o pain, drainage, odor, color change,  worsening swelling. Offloading and pericare initiated Increasingly sedentary 2/2 to illness. Pt is Incontinent of urine & stool. (+)dunn/ ostomy.   H/o falls, trauma.  (Pt seen by Wound RN and )TRIAD/ Esequielell/ Allevyn/ medihoney/ dakins/ Adaptic/ DSD used at home/ while awaiting consult.  Appetite good/ decreased.  weight loss.  S&S / RD consult appreciated All questions asked and answered to pt's and family's satisfaction.    Current Diet: Diet, Regular:   Consistent Carbohydrate No Snacks (CSTCHO)  DASH/TLC Sodium & Cholesterol Restricted (DASH) (03-10-22 @ 23:43)      PAST MEDICAL & SURGICAL HISTORY:  Hypertension    Gastroparesis due to DM    Depression    Herniated disc    Spinal stenosis, chronic back pain    Cholesterol serum elevated    MVP (mitral valve prolapse)    Hypothyroid    Obesity    IBS (irritable bowel syndrome)    GERD (gastroesophageal reflux disease)    Diabetes type 2, uncontrolled    diabetic retinopathy    Peripheral neuropathy    HOCM (hypertrophic obstructive cardiomyopathy)    Atrial fibrillation    Aortic stenosis    S/P bariatric surgery  lap band surgery two times due to erosion, S/P removal    S/P tonsillectomy and adenoidectomy    Cataract, B/L cataracts    S/P D&C     Cervix abnormality, Ablation of cervix    s/p Biopsy of both breast, benign lesions    right eye torn retina    S/P TAVR (transcatheter aortic valve replacement)    S/P foot surgery      REVIEW OF SYSTEMS: General/ Skin: see HPI  All other systems negative    MEDICATIONS  (STANDING):  ampicillin/sulbactam  IVPB 3 Gram(s) IV Intermittent every 6 hours      aspirin enteric coated 81 milliGRAM(s) Oral daily  atorvastatin 80 milliGRAM(s) Oral at bedtime  buMETAnide 2 milliGRAM(s) Oral every 12 hours  cholecalciferol 2000 Unit(s) Oral daily  dextrose 40% Gel 15 Gram(s) Oral once  dextrose 5%. 1000 milliLiter(s) (50 mL/Hr) IV Continuous <Continuous>  dextrose 5%. 1000 milliLiter(s) (100 mL/Hr) IV Continuous <Continuous>  dextrose 50% Injectable 25 Gram(s) IV Push once  dextrose 50% Injectable 12.5 Gram(s) IV Push once  dextrose 50% Injectable 25 Gram(s) IV Push once  ferrous    sulfate 325 milliGRAM(s) Oral daily  FLUoxetine 40 milliGRAM(s) Oral daily  glucagon  Injectable 1 milliGRAM(s) IntraMuscular once  insulin glargine Injectable (LANTUS) 28 Unit(s) SubCutaneous at bedtime  insulin lispro (ADMELOG) corrective regimen sliding scale   SubCutaneous three times a day before meals  insulin lispro (ADMELOG) corrective regimen sliding scale   SubCutaneous at bedtime  insulin lispro Injectable (ADMELOG) 22 Unit(s) SubCutaneous three times a day before meals  metoprolol succinate  milliGRAM(s) Oral daily  montelukast 10 milliGRAM(s) Oral daily  polyethylene glycol 3350 17 Gram(s) Oral two times a day  rivaroxaban 20 milliGRAM(s) Oral daily  sacubitril 24 mG/valsartan 26 mG 1 Tablet(s) Oral two times a day  senna 2 Tablet(s) Oral at bedtime  spironolactone 25 milliGRAM(s) Oral daily    MEDICATIONS  (PRN):  acetaminophen     Tablet .. 650 milliGRAM(s) Oral every 6 hours PRN Temp greater or equal to 38C (100.4F), Moderate Pain (4 - 6)  bisacodyl Suppository 10 milliGRAM(s) Rectal daily PRN Constipation      Allergies  amoxicillin (Unknown)  Cipro (Unknown)  codeine (Vomiting)  shellfish (Stomach Upset)    Intolerances  fish (Stomach Upset)      SOCIAL HISTORY: single;  sara w/ family; Former smoker, No/ Denies smoking, ETOH, drugs    FAMILY HISTORY: no h/o PVD or wound healing or skin problems     (+) Family history of aortic stenosis, structural heart dx, diabetes mellitus, CAD, anxiety disorder    PHYSICAL EXAM:  Vital Signs Last 24 Hrs  T(C): 36.4 (11 Mar 2022 12:12), Max: 36.9 (10 Mar 2022 23:43)  T(F): 97.5 (11 Mar 2022 12:12), Max: 98.4 (10 Mar 2022 23:43)  HR: 64 (11 Mar 2022 12:12) (64 - 84)  BP: 107/54 (11 Mar 2022 12:12) (107/54 - 127/52)  BP(mean): --  RR: 20 (11 Mar 2022 12:12) (20 - 32)  SpO2: 99% (11 Mar 2022 12:12) (96% - 100%)    NAD,  A&Ox3, MO  Versa Care P500 bed  HEENT:  NC/AT, EOMI, mucosa moist, throat clear, trachea midline, neck supple  Cardiovascular: RRR   Respiratory: CTA  Gastrointestinal soft NT/ND (+)BS    Neurology: strength & sensation grossly intact  Psych: calm/ appropriate  Musculoskeletal: FROM, no deformities/ contractures  Vascular: BLE equally warm,  no cyanosis, clubbing, edema  Skin:  moist w/ good turgor       Lt labial/ perineal wound 4.5cm x 4cm x 3cm         w/ 1cm undermining @ 12 o'clock and 8cm at 12cm at 6 o'clock                pink moist granular tissue on wound bed          moisture dermatitis in periwound skin resolved  No odor, erythema, increased warmth, tenderness, induration, fluctuance, nor crepitus    LABS/ CULTURES/ RADIOLOGY:                        10.0   12.32 )-----------( 481      ( 10 Mar 2022 10:25 )             34.6       138  |  99  |  23  ----------------------------<  180      [03-10-22 @ 10:25]  5.1   |  29  |  0.98        Ca     9.3     [03-10-22 @ 10:25]      Mg     2.0     [03-10-22 @ 10:25]      Phos  3.7     [03-10-22 @ 10:25]   Wound SURGERY CONSULT NOTE    HPI:  66 year old female with a PMHx of AS s/p TAVR 2015 and 2021, HOCM, HFpEF, pAFib on Xarelto, JOSR (does not tolerate CPAP, on 2L O2 at home qhs), anxiety, morbid obesity, DM, HTN, asthma recent admission for sepsis 2/2 sacral wound infection. She presents today from her [facility] after discharge from the hospital earlier today due to dissatisfaction with her subacute rehab facility. She states she felt a large amount of anxiety by the way she was treated at the [facility]. She denies any physical abuse from staff.    The patient was admitted to [facility] from Feb 26th - Mar 10th with sepsis secondary to sacral wound infection. Wound cultures were positive for Streptococcus angiosis and enterococcus ID was consulted and the patient was treated with a course of zosyn and Unasyn and recommended to complete the course of antibiotics upon discharge with Augmentin for a total of 14 days (last day on 3/12).    Vitals: afebrile, HR 84, SpO2 100% on nonrebreather.  Labs (from prior to discharge today): leukocytosis 12.3. Normocytic anemia. CMP unremarkable besides hyperglycemia.    Pt currently w/o N/V/D, no BM, palp/ sob/dyspnea/ cp, nor F/C/S.  Wound consult requested by team to assist w/ management of wound.  Pt less anxious.  Pain medication is no longer necessary.  Pt w/o c/o pain, when cleaned.  Pt noted drainage maintained by dressing,  no odor.  swelling in area also improving.  Offloading and pericare initiated. Pt seen by Wound team last admission. Dakins dressings initiated by surgery after debridement.  Appetite improving.      Current Diet: Diet, Regular:   Consistent Carbohydrate No Snacks (CSTCHO)  DASH/TLC Sodium & Cholesterol Restricted (DASH) (03-10-22 @ 23:43)      PAST MEDICAL & SURGICAL HISTORY:  Hypertension    Gastroparesis due to DM    Depression    Herniated disc    Spinal stenosis, chronic back pain    Cholesterol serum elevated    MVP (mitral valve prolapse)    Hypothyroid    Obesity    IBS (irritable bowel syndrome)    GERD (gastroesophageal reflux disease)    Diabetes type 2, uncontrolled    diabetic retinopathy    Peripheral neuropathy    HOCM (hypertrophic obstructive cardiomyopathy)    Atrial fibrillation    Aortic stenosis    S/P bariatric surgery  lap band surgery two times due to erosion, S/P removal    S/P tonsillectomy and adenoidectomy    Cataract, B/L cataracts    S/P D&C     Cervix abnormality, Ablation of cervix    s/p Biopsy of both breast, benign lesions    right eye torn retina    S/P TAVR (transcatheter aortic valve replacement)    S/P foot surgery      REVIEW OF SYSTEMS: General/ Skin: see HPI  All other systems negative    MEDICATIONS  (STANDING):  ampicillin/sulbactam  IVPB 3 Gram(s) IV Intermittent every 6 hours      aspirin enteric coated 81 milliGRAM(s) Oral daily  atorvastatin 80 milliGRAM(s) Oral at bedtime  buMETAnide 2 milliGRAM(s) Oral every 12 hours  cholecalciferol 2000 Unit(s) Oral daily  dextrose 40% Gel 15 Gram(s) Oral once  dextrose 5%. 1000 milliLiter(s) (50 mL/Hr) IV Continuous <Continuous>  dextrose 5%. 1000 milliLiter(s) (100 mL/Hr) IV Continuous <Continuous>  dextrose 50% Injectable 25 Gram(s) IV Push once  dextrose 50% Injectable 12.5 Gram(s) IV Push once  dextrose 50% Injectable 25 Gram(s) IV Push once  ferrous    sulfate 325 milliGRAM(s) Oral daily  FLUoxetine 40 milliGRAM(s) Oral daily  glucagon  Injectable 1 milliGRAM(s) IntraMuscular once  insulin glargine Injectable (LANTUS) 28 Unit(s) SubCutaneous at bedtime  insulin lispro (ADMELOG) corrective regimen sliding scale   SubCutaneous three times a day before meals  insulin lispro (ADMELOG) corrective regimen sliding scale   SubCutaneous at bedtime  insulin lispro Injectable (ADMELOG) 22 Unit(s) SubCutaneous three times a day before meals  metoprolol succinate  milliGRAM(s) Oral daily  montelukast 10 milliGRAM(s) Oral daily  polyethylene glycol 3350 17 Gram(s) Oral two times a day  rivaroxaban 20 milliGRAM(s) Oral daily  sacubitril 24 mG/valsartan 26 mG 1 Tablet(s) Oral two times a day  senna 2 Tablet(s) Oral at bedtime  spironolactone 25 milliGRAM(s) Oral daily    MEDICATIONS  (PRN):  acetaminophen     Tablet .. 650 milliGRAM(s) Oral every 6 hours PRN Temp greater or equal to 38C (100.4F), Moderate Pain (4 - 6)  bisacodyl Suppository 10 milliGRAM(s) Rectal daily PRN Constipation      Allergies  amoxicillin (Unknown)  Cipro (Unknown)  codeine (Vomiting)  shellfish (Stomach Upset)    Intolerances  fish (Stomach Upset)      SOCIAL HISTORY: single; lives w/ family; Former smoker, No current smoking, ETOH, drugs    FAMILY HISTORY: no h/o PVD or wound healing or skin problems     (+) Family history of aortic stenosis, structural heart dx, diabetes mellitus, CAD, anxiety disorder    PHYSICAL EXAM:  Vital Signs Last 24 Hrs  T(C): 36.4 (11 Mar 2022 12:12), Max: 36.9 (10 Mar 2022 23:43)  T(F): 97.5 (11 Mar 2022 12:12), Max: 98.4 (10 Mar 2022 23:43)  HR: 64 (11 Mar 2022 12:12) (64 - 84)  BP: 107/54 (11 Mar 2022 12:12) (107/54 - 127/52)  BP(mean): --  RR: 20 (11 Mar 2022 12:12) (20 - 32)  SpO2: 99% (11 Mar 2022 12:12) (96% - 100%)    NAD,  A&Ox3, MO  Versa Care P500 bed  HEENT:  NC/AT, EOMI, mucosa moist, throat clear, trachea midline, neck supple  Cardiovascular: RRR   Respiratory: CTA  Gastrointestinal soft NT/ND (+)BS    Neurology: strength & sensation grossly intact  Psych: calm/ appropriate  Musculoskeletal: FROM, no deformities/ contractures  Vascular: BLE equally warm,  no cyanosis, clubbing, edema  Skin:  moist w/ good turgor, multiple tattoos noted       Lt labial/ perineal wound 4.5cm x 4cm x 3cm         w/ 1cm undermining @ 12 o'clock and 8cm at 12cm at 6 o'clock                pink moist granular tissue on wound bed          moisture dermatitis in periwound skin resolved  No odor, erythema, increased warmth, tenderness, induration, fluctuance, nor crepitus    LABS/ CULTURES/ RADIOLOGY:                        10.0   12.32 )-----------( 481      ( 10 Mar 2022 10:25 )             34.6       138  |  99  |  23  ----------------------------<  180      [03-10-22 @ 10:25]  5.1   |  29  |  0.98        Ca     9.3     [03-10-22 @ 10:25]      Mg     2.0     [03-10-22 @ 10:25]      Phos  3.7     [03-10-22 @ 10:25]

## 2022-03-11 NOTE — PATIENT PROFILE ADULT - STATED REASON FOR ADMISSION
Anxiety and shortness of breath; discharged today from [facility] then went to rehab- [facility] had an anxiety attack then was brought back to hospital in ambulance

## 2022-03-11 NOTE — DIETITIAN INITIAL EVALUATION ADULT. - NSPROEDALEARNPREF_GEN_A_NUR
Called Dr. Frances's office and spoke w/ Nuha. She said they had the referral and info. We sent over on 2/26 and the pt. Is scheduled for an appt. On 3/12 at 10am. Called pt and advised.   Reinforcement of diet adherence, glucose monitoring; quality of diet , avoiding excess and refined carbohydrates/diet education./verbal instruction

## 2022-03-11 NOTE — PROGRESS NOTE ADULT - PROBLEM SELECTOR PLAN 1
Recent admission 2/26-3/10 for sepsis from sacral wound infection  - f/u wound care consult  - discharged with augmentin to complete 14 day course (last day of abx on 3/12/21)  - while inpatient, will treat with Unasyn 3 g q6hrs  - of note, the patient has a documented hx of allergy to amoxicillin; however, she tolerated Unasyn well during prior hospitalization

## 2022-03-11 NOTE — PATIENT PROFILE ADULT - DOMESTIC TRAVEL HIGH RISK QUESTION
Problem: Self Care Deficits Care Plan (Adult) Goal: *Acute Goals and Plan of Care (Insert Text) Description: Occupational Therapy Goals Initiated 3/31/2020 within 7 day(s). 1.  Patient will perform grooming standing for 4-7 minutes with modified independence and F+ balance. 2.  Patient will perform upper body dressing with modified independence using compensatory techniques. 3.  Patient will perform lower body dressing with supervision/set-up seated and standing. 4.  Patient will perform toilet transfers with supervision/set-up. 5.  Patient will perform all aspects of toileting with supervision/set-up. 6.  Patient will participate in upper extremity therapeutic exercise/activities with supervision/set-up for 5-8 minutes to increase ROM/strength for selfcare. 7.  Patient will utilize energy conservation techniques during functional activities with verbal cues. Prior Level of Function: Patient was independent with self-care and used a cane and RW for functional mobility PTA. Patient reports also having a Shower chair and BSC. Outcome: Progressing Towards Goal 
  
OCCUPATIONAL THERAPY EVALUATION Patient: Guerrero Solares (89 y.o. female) Date: 3/31/2020 Primary Diagnosis: Ischemic stroke ([facility] [address] [I63.9] Slurred speech [R47.81] Left-sided weakness [R53.1] Hypokalemia [E87.6] Uncontrolled hypertension [I10] Precautions:   Fall ASSESSMENT : 
Patient cleared to participate in OT evaluation by RN. Upon entering the room, patient was supine in bed, alert, and agreeable to participate in OT evaluation. During the evaluation, the patient presented with limited LUE AROM and decreased LUE strength, however BUEs present to be still functional. Patient demonstrated fair/occasional dynamic standing balance and verbalized understanding of not standing without assistance and a RW for safety, which was used during the evaluation.  Patient (+) posterior lean this session during functional mobility and required min assist for correction. Patient contact guard assist for bed mobility, min assist for lower body dressing, and minimum assistance for functional transfers. Based on the objective data described below, the patient presented with decreased independence, decreased strength, decreased ROM, decreased functional balance, and decreased functional mobility, which impede pt's function with basic self-care/ADL tasks. Patient will benefit from skilled intervention to address the above impairments. Patient's rehabilitation potential is considered to be Good Factors which may influence rehabilitation potential include:  
[]             None noted [x]             Mental ability/status [x]             Medical condition [x]             Home/family situation and support systems [x]             Safety awareness []             Pain tolerance/management 
[]             Other: PLAN : 
Recommendations and Planned Interventions:  
[x]               Self Care Training                  [x]      Therapeutic Activities [x]               Functional Mobility Training   []      Cognitive Retraining 
[x]               Therapeutic Exercises           [x]      Endurance Activities [x]               Balance Training                    [x]      Neuromuscular Re-Education []               Visual/Perceptual Training     [x]      Home Safety Training 
[x]               Patient Education                   []      Family Training/Education []               Other (comment): Frequency/Duration: Patient will be followed by occupational therapy 1-2 times per day/4-7 days per week to address goals. Discharge Recommendations: Inpatient Rehab Further Equipment Recommendations for Discharge: N/A  
 
SUBJECTIVE:  
Patient stated Craig Dahl had a stroke before so im familiar with OT OBJECTIVE DATA SUMMARY:  
 
Past Medical History:  
Diagnosis Date  Anemia  Arthritis  Cardiomyopathy due to hypertension, with [facility] failure [facility] 5/19/2017  Cataract  Chronic combined systolic and diastolic congestive heart failure ([facility] 5/3/2018  Chronic lung disease  Chronic obstructive pulmonary disease ([facility]  Chronic systolic congestive heart failure ([facility] 2/3/2014  Diabetes mellitus ([facility]  Difficulty swallowing Both food and liquid  Dysphagia following cerebral infarction 8/17/2017  History of echocardiogram 12/29/2009 EF 50%. Mild-mod conc LVH. Mod DDfx. LAE. Mild MR.    
 Hypercholesterolemia  Hypertension  Hypertensive heart disease  Infarction of right thalamus ([facility] 9/17/2017 Lacunar infarct 2017  Joint pain  Joint swelling  Normal nuclear stress test 09/08/2000 No ischemia or prior infarction. Neg EKG on submax EST. Ex time 15:02.  Recurrent boils  Rheumatism  Shortness of breath  Sinusitis with nasal polyps 8/18/2019 Past Surgical History:  
Procedure Laterality Date  CARDIAC CATHETERIZATION  5/10/2018  CORONARY ARTERY ANGIOGRAM  5/10/2018  HX CHOLECYSTECTOMY  [facility]  MODERATE SEDATION  5/10/2018 Barriers to Learning/Limitations: None Compensate with: visual, verbal, tactile, kinesthetic cues/model Home Situation:  
Home Situation Home Environment: Private residence # Steps to Enter: 4 Rails to Enter: Yes Hand Rails : Bilateral 
One/Two Story Residence: One story Living Alone: No 
Support Systems: Child(masha) Patient Expects to be Discharged to[de-identified] Private residence Current DME Used/Available at Home: Walker, rolling, Cane, straight, Wheelchair, power, Glucometer, Blood pressure cuff, Commode, bedside, Oxygen, portable, Shower chair Tub or Shower Type: Tub/Shower combination 
[x]  Right hand dominant   []  Left hand dominant Cognitive/Behavioral Status: 
Neurologic State: Alert Orientation Level: Oriented X4 
 Cognition: Follows commands Safety/Judgement: Fall prevention Skin: Intact Edema: None noted Vision/Perceptual:   
  
Acuity: Within Defined Limits;Able to read employee name badge without difficulty Coordination: BUE Fine Motor Skills-Upper: Left Intact; Right Intact Gross Motor Skills-Upper: Left Intact; Right Intact Balance: 
Sitting: Impaired; Without support Sitting - Static: Good (unsupported) Sitting - Dynamic: Fair (occasional) Standing: Impaired; With support Standing - Static: Fair Standing - Dynamic : Fair;Occasional 
 
Strength: BUE Strength: Generally decreased, functional(LUE 4/5, RUE 5/5) Tone & Sensation: BUE Tone: Normal 
Sensation: Intact(able to identify LT correctly in 5/5 trials) Range of Motion: BUE 
AROM: Generally decreased, functional(LUE) Functional Mobility and Transfers for ADLs: 
Bed Mobility: 
Supine to Sit: Contact guard assistance Sit to Supine: Contact guard assistance Scooting: Contact guard assistance Transfers: 
Sit to Stand: Minimum assistance Stand to Sit: Minimum assistance ADL Assessment:  
Feeding: Stand-by assistance Oral Facial Hygiene/Grooming: Stand-by assistance Bathing: Minimum assistance Upper Body Dressing: Stand-by assistance Lower Body Dressing: Minimum assistance Toileting: Minimum assistance ADL Intervention: 
Grooming Grooming Assistance: Stand-by assistance Brushing/Combing Hair: Stand-by assistance(additional time needed with LUE) Lower Body Dressing Assistance Dressing Assistance: Minimum assistance Socks: Minimum assistance(donning) Leg Crossed Method Used: Yes(CGA for balance) Position Performed: Seated edge of bed Cognitive Retraining Safety/Judgement: Fall prevention Pain: 
Pain level pre-treatment: 0/10 Pain level post-treatment: 0/10 Pain Intervention(s): Medication (see MAR); Response to intervention: Nurse notified, See doc flow Activity Tolerance:  
Fair Please refer to the flowsheet for vital signs taken during this treatment. After treatment:  
[] Patient left in no apparent distress sitting up in chair 
[x] Patient left in no apparent distress in bed 
[x] Call bell left within reach [x] Nursing notified 
[] Caregiver present [x] Bed alarm activated COMMUNICATION/EDUCATION:  
[x] Role of Occupational Therapy in the acute care setting 
[x] Home safety education was provided and the patient/caregiver indicated understanding. [x] Patient/family have participated as able in goal setting and plan of care. [x] Patient/family agree to work toward stated goals and plan of care. [] Patient understands intent and goals of therapy, but is neutral about his/her participation. [] Patient is unable to participate in goal setting and plan of care. Thank you for this referral. 
Ephraim Ramirez, OTR/L Time Calculation: 23 mins Eval Complexity: History: MEDIUM Complexity : Expanded review of history including physical, cognitive and psychosocial  history ; Examination: MEDIUM Complexity : 3-5 performance deficits relating to physical, cognitive , or psychosocial skils that result in activity limitations and / or participation restrictions; Decision Making:MEDIUM Complexity : Patient may present with comorbidities that affect occupational performnce. Miniml to moderate modification of tasks or assistance (eg, physical or verbal ) with assesment(s) is necessary to enable patient to complete evaluation No

## 2022-03-11 NOTE — PROGRESS NOTE ADULT - SUBJECTIVE AND OBJECTIVE BOX
PROGRESS NOTE:   Authored by Dr. Georgia Paz MD (PGY-2). Available on TEAMS.    Patient is a 66y old  Female who presents with a chief complaint of social work evaluation for [facility] placement (10 Mar 2022 22:49)      SUBJECTIVE / OVERNIGHT EVENTS:  No acute events overnight.     ADDITIONAL REVIEW OF SYSTEMS:  Patient denies fevers, chills, chest pain, shortness of breath, nausea, abdominal pain, diarrhea, constipation, dysuria, leg swelling, headache, light headedness.    MEDICATIONS  (STANDING):  ampicillin/sulbactam  IVPB 3 Gram(s) IV Intermittent every 6 hours  ampicillin/sulbactam  IVPB      aspirin enteric coated 81 milliGRAM(s) Oral daily  atorvastatin 80 milliGRAM(s) Oral at bedtime  buMETAnide 2 milliGRAM(s) Oral every 12 hours  cholecalciferol 2000 Unit(s) Oral daily  dextrose 40% Gel 15 Gram(s) Oral once  dextrose 5%. 1000 milliLiter(s) (50 mL/Hr) IV Continuous <Continuous>  dextrose 5%. 1000 milliLiter(s) (100 mL/Hr) IV Continuous <Continuous>  dextrose 50% Injectable 25 Gram(s) IV Push once  dextrose 50% Injectable 12.5 Gram(s) IV Push once  dextrose 50% Injectable 25 Gram(s) IV Push once  ferrous    sulfate 325 milliGRAM(s) Oral daily  FLUoxetine 40 milliGRAM(s) Oral daily  glucagon  Injectable 1 milliGRAM(s) IntraMuscular once  insulin glargine Injectable (LANTUS) 28 Unit(s) SubCutaneous at bedtime  insulin lispro (ADMELOG) corrective regimen sliding scale   SubCutaneous three times a day before meals  insulin lispro (ADMELOG) corrective regimen sliding scale   SubCutaneous at bedtime  insulin lispro Injectable (ADMELOG) 22 Unit(s) SubCutaneous three times a day before meals  metoprolol succinate  milliGRAM(s) Oral daily  montelukast 10 milliGRAM(s) Oral daily  polyethylene glycol 3350 17 Gram(s) Oral two times a day  rivaroxaban 20 milliGRAM(s) Oral daily  sacubitril 24 mG/valsartan 26 mG 1 Tablet(s) Oral two times a day  senna 2 Tablet(s) Oral at bedtime  spironolactone 25 milliGRAM(s) Oral daily    MEDICATIONS  (PRN):  acetaminophen     Tablet .. 650 milliGRAM(s) Oral every 6 hours PRN Temp greater or equal to 38C (100.4F), Moderate Pain (4 - 6)  bisacodyl Suppository 10 milliGRAM(s) Rectal daily PRN Constipation      CAPILLARY BLOOD GLUCOSE      POCT Blood Glucose.: 139 mg/dL (11 Mar 2022 00:36)  POCT Blood Glucose.: 175 mg/dL (10 Mar 2022 12:53)  POCT Blood Glucose.: 180 mg/dL (10 Mar 2022 09:31)    I&O's Summary      PHYSICAL EXAM:  Vital Signs Last 24 Hrs  T(C): 36.9 (11 Mar 2022 05:39), Max: 36.9 (10 Mar 2022 23:43)  T(F): 98.4 (11 Mar 2022 05:39), Max: 98.4 (10 Mar 2022 23:43)  HR: 67 (11 Mar 2022 05:39) (67 - 84)  BP: 121/75 (11 Mar 2022 05:39) (103/57 - 127/52)  BP(mean): --  RR: 20 (11 Mar 2022 05:39) (18 - 32)  SpO2: 98% (11 Mar 2022 05:39) (96% - 100%)    CONSTITUTIONAL: NAD, well-developed  RESPIRATORY: Normal respiratory effort; lungs are clear to auscultation bilaterally  CARDIOVASCULAR: Regular rate and rhythm, normal S1 and S2, no murmur/rub/gallop; No lower extremity edema; Peripheral pulses are 2+ bilaterally  ABDOMEN: Nontender to palpation, normoactive bowel sounds, no rebound/guarding; No hepatosplenomegaly  MUSCLOSKELETAL: no clubbing or cyanosis of digits; no joint swelling or tenderness to palpation  PSYCH: A+O to person, place, and time; affect appropriate    LABS:                        10.0   12.32 )-----------( 481      ( 10 Mar 2022 10:25 )             34.6     03-10    138  |  99  |  23  ----------------------------<  180<H>  5.1   |  29  |  0.98    Ca    9.3      10 Mar 2022 10:25  Phos  3.7     03-10  Mg     2.0     03-10                  Tele Reviewed:    RADIOLOGY & ADDITIONAL TESTS:  Results Reviewed:   Imaging Personally Reviewed:  Electrocardiogram Personally Reviewed:     PROGRESS NOTE:   Authored by Dr. Georgia Paz MD (PGY-2). Available on TEAMS.    Patient is a 66y old  Female who presents with a chief complaint of social work evaluation for [facility] placement (10 Mar 2022 22:49)      SUBJECTIVE / OVERNIGHT EVENTS:  No acute events overnight. Pt seen and examined at bedside. She described horrible experience while at rehab yesterday after discharge, provoking a panic attack.      ADDITIONAL REVIEW OF SYSTEMS:  Patient denies fevers, chills, chest pain, shortness of breath, nausea, abdominal pain, diarrhea, constipation, dysuria, leg swelling, headache, light headedness.    MEDICATIONS  (STANDING):  ampicillin/sulbactam  IVPB 3 Gram(s) IV Intermittent every 6 hours  ampicillin/sulbactam  IVPB      aspirin enteric coated 81 milliGRAM(s) Oral daily  atorvastatin 80 milliGRAM(s) Oral at bedtime  buMETAnide 2 milliGRAM(s) Oral every 12 hours  cholecalciferol 2000 Unit(s) Oral daily  dextrose 40% Gel 15 Gram(s) Oral once  dextrose 5%. 1000 milliLiter(s) (50 mL/Hr) IV Continuous <Continuous>  dextrose 5%. 1000 milliLiter(s) (100 mL/Hr) IV Continuous <Continuous>  dextrose 50% Injectable 25 Gram(s) IV Push once  dextrose 50% Injectable 12.5 Gram(s) IV Push once  dextrose 50% Injectable 25 Gram(s) IV Push once  ferrous    sulfate 325 milliGRAM(s) Oral daily  FLUoxetine 40 milliGRAM(s) Oral daily  glucagon  Injectable 1 milliGRAM(s) IntraMuscular once  insulin glargine Injectable (LANTUS) 28 Unit(s) SubCutaneous at bedtime  insulin lispro (ADMELOG) corrective regimen sliding scale   SubCutaneous three times a day before meals  insulin lispro (ADMELOG) corrective regimen sliding scale   SubCutaneous at bedtime  insulin lispro Injectable (ADMELOG) 22 Unit(s) SubCutaneous three times a day before meals  metoprolol succinate  milliGRAM(s) Oral daily  montelukast 10 milliGRAM(s) Oral daily  polyethylene glycol 3350 17 Gram(s) Oral two times a day  rivaroxaban 20 milliGRAM(s) Oral daily  sacubitril 24 mG/valsartan 26 mG 1 Tablet(s) Oral two times a day  senna 2 Tablet(s) Oral at bedtime  spironolactone 25 milliGRAM(s) Oral daily    MEDICATIONS  (PRN):  acetaminophen     Tablet .. 650 milliGRAM(s) Oral every 6 hours PRN Temp greater or equal to 38C (100.4F), Moderate Pain (4 - 6)  bisacodyl Suppository 10 milliGRAM(s) Rectal daily PRN Constipation      CAPILLARY BLOOD GLUCOSE      POCT Blood Glucose.: 139 mg/dL (11 Mar 2022 00:36)  POCT Blood Glucose.: 175 mg/dL (10 Mar 2022 12:53)  POCT Blood Glucose.: 180 mg/dL (10 Mar 2022 09:31)    I&O's Summary      PHYSICAL EXAM:  Vital Signs Last 24 Hrs  T(C): 36.9 (11 Mar 2022 05:39), Max: 36.9 (10 Mar 2022 23:43)  T(F): 98.4 (11 Mar 2022 05:39), Max: 98.4 (10 Mar 2022 23:43)  HR: 67 (11 Mar 2022 05:39) (67 - 84)  BP: 121/75 (11 Mar 2022 05:39) (103/57 - 127/52)  BP(mean): --  RR: 20 (11 Mar 2022 05:39) (18 - 32)  SpO2: 98% (11 Mar 2022 05:39) (96% - 100%)    CONSTITUTIONAL: NAD, morbidly obese, laying in bed on her right side   RESPIRATORY: Normal respiratory effort; lungs are clear to auscultation bilaterally  CARDIOVASCULAR: Regular rate and rhythm, normal S1 and S2, no murmur/rub/gallop; Trace b/l lower extremity edema; Peripheral pulses are 2+ bilaterally  ABDOMEN: Large panus without any areas of purulence/erythema. Nontender to palpation, normoactive bowel sounds, no rebound/guarding  MUSCLOSKELETAL: no clubbing or cyanosis of digits; no joint swelling or tenderness to palpation  NEURO: no asterixis, A&O 3, ambulates to bathroom. No neurological deficits.   SKIN: 3/8 Sacral wound without purulent drainage or eschar/necrosis. Wound appears to be well healing.     LABS:                        10.0   12.32 )-----------( 481      ( 10 Mar 2022 10:25 )             34.6     03-10    138  |  99  |  23  ----------------------------<  180<H>  5.1   |  29  |  0.98    Ca    9.3      10 Mar 2022 10:25  Phos  3.7     03-10  Mg     2.0     03-10

## 2022-04-06 ENCOUNTER — APPOINTMENT (OUTPATIENT)
Dept: WOUND CARE | Facility: CLINIC | Age: 67
End: 2022-04-06
Payer: MEDICARE

## 2022-04-06 VITALS
SYSTOLIC BLOOD PRESSURE: 118 MMHG | WEIGHT: 283.2 LBS | DIASTOLIC BLOOD PRESSURE: 72 MMHG | HEIGHT: 63 IN | HEART RATE: 65 BPM | TEMPERATURE: 97.3 F | RESPIRATION RATE: 16 BRPM | BODY MASS INDEX: 50.18 KG/M2

## 2022-04-06 DIAGNOSIS — E11.622 TYPE 2 DIABETES MELLITUS WITH OTHER SKIN ULCER: ICD-10-CM

## 2022-04-06 DIAGNOSIS — S31.819A UNSPECIFIED OPEN WOUND OF RIGHT BUTTOCK, INITIAL ENCOUNTER: ICD-10-CM

## 2022-04-06 PROCEDURE — 99214 OFFICE O/P EST MOD 30 MIN: CPT

## 2022-04-13 ENCOUNTER — APPOINTMENT (OUTPATIENT)
Dept: WOUND CARE | Facility: CLINIC | Age: 67
End: 2022-04-13
Payer: MEDICARE

## 2022-04-13 DIAGNOSIS — R23.9 UNSPECIFIED SKIN CHANGES: ICD-10-CM

## 2022-04-13 DIAGNOSIS — L89.312 PRESSURE ULCER OF RIGHT BUTTOCK, STAGE 2: ICD-10-CM

## 2022-04-13 PROCEDURE — 99442: CPT | Mod: 95

## 2022-04-15 PROBLEM — L89.312 PRESSURE INJURY OF RIGHT BUTTOCK, STAGE 2: Status: ACTIVE | Noted: 2022-04-15

## 2022-04-15 NOTE — PHYSICAL EXAM
[JVD] : no jugular venous distention  [Abdomen Tenderness] : ~T ~M No abdominal tenderness [de-identified] : NAD, ambulatory [de-identified] : AT [de-identified] : supple [de-identified] : soft, morbidly obese

## 2022-04-15 NOTE — REVIEW OF SYSTEMS
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

## 2022-04-15 NOTE — ASSESSMENT
[FreeTextEntry1] : Ms. MARIA D ROBLEDO   presents to the office with a wound for -weeks duration.  The wound is located on  the right buttock .  The patient has complaints of nonhealing wound.   The patient has been dressing the wound with foam  The patient denies fevers or chills.  The patient has localized pain to the wound upon dressing changes.  The patient has no other complaints or associated symptoms.  HbA1c is 8.2\par \par Excessive moisture\par No clinical sign of infection\par \par 4/13/22\par Right buttock wound is pink and clean\par glucose still running high, discussed strict dietary adherence to sugars < 130 to promote wound healing\par pt. sits most of day, minimally ambulatory, wound is combination of moisture and pressure\par \par \par \par

## 2022-04-15 NOTE — HISTORY OF PRESENT ILLNESS
[Home] : at home, [unfilled] , at the time of the visit. [Medical Office: ([facility])___] : at the medical office located in  [Verbal consent obtained from patient] : the patient, [unfilled] [FreeTextEntry4] : Agustin NP [FreeTextEntry1] : Ms. MARIA D ROBLEDO  s/p lap band  presents to the office with a wound for -weeks duration.  The wound is located on  the right buttock .  The patient has complaints of nonhealing wound.   The patient has been dressing the wound with foam  The patient denies fevers or chills.  The patient has localized pain to the wound upon dressing changes.  The patient has no other complaints or associated symptoms.  HbA1c is 8.2

## 2022-04-20 ENCOUNTER — APPOINTMENT (OUTPATIENT)
Dept: WOUND CARE | Facility: CLINIC | Age: 67
End: 2022-04-20
Payer: MEDICARE

## 2022-04-20 DIAGNOSIS — S31.819D UNSPECIFIED OPEN WOUND OF RIGHT BUTTOCK, SUBSEQUENT ENCOUNTER: ICD-10-CM

## 2022-04-20 PROCEDURE — 99212 OFFICE O/P EST SF 10 MIN: CPT | Mod: 95

## 2022-04-21 PROBLEM — S31.819D WOUND OF BUTTOCK, RIGHT, SUBSEQUENT ENCOUNTER: Status: ACTIVE | Noted: 2022-04-21

## 2022-04-21 NOTE — ASSESSMENT
[FreeTextEntry1] : Ms. MARIA D ROBLEDO   presents to the office with a wound for -weeks duration.  The wound is located on  the right buttock .  The patient has complaints of nonhealing wound.   The patient has been dressing the wound with foam  The patient denies fevers or chills.  The patient has localized pain to the wound upon dressing changes.  The patient has no other complaints or associated symptoms.  HbA1c is 8.2\par \par Excessive moisture\par No clinical sign of infection\par \par 4/13/22\par Right buttock wound is pink and clean\par glucose still running high, discussed strict dietary adherence to sugars < 130 to promote wound healing\par pt. sits most of day, minimally ambulatory, wound is combination of moisture and pressure\par \par 4/20/22\par right buttock wound pink and clean, using calcium alginate, pt. unable to do, and it is not adhering, dressing coming off once uses bathroom\par glucose control discussed, impact on wound healing\par pt. wants nursing daily visits \par \par \par \par

## 2022-04-21 NOTE — PLAN
[FreeTextEntry1] : 4/20/22\par Plan - offload to area\par weight reduction\par dietary adherence\par alginate and foam to area\par will s/w nurse, pt. has to look for number, will try to have daily visits - explained medicare typically does not allow \par advised pt. to see if someone can help with dressing change\par shower wound, prior to dressing changes\par follow up TEB\par

## 2022-04-21 NOTE — HISTORY OF PRESENT ILLNESS
[Home] : at home, [unfilled] , at the time of the visit. [Medical Office: ([facility])___] : at the medical office located in  [Verbal consent obtained from patient] : the patient, [unfilled] [FreeTextEntry1] : Ms. MARIA D ROBLEDO  s/p lap band  presents to the office with a wound for -weeks duration.  The wound is located on  the right buttock .  The patient has complaints of nonhealing wound.   The patient has been dressing the wound with foam  The patient denies fevers or chills.  The patient has localized pain to the wound upon dressing changes.  The patient has no other complaints or associated symptoms.  HbA1c is 8.2 [FreeTextEntry4] : Agustin NP

## 2022-04-21 NOTE — PHYSICAL EXAM
[Normal Breath Sounds] : Normal breath sounds [Skin Ulcer] : ulcer [Alert] : alert [Oriented to Person] : oriented to person [Oriented to Place] : oriented to place [Oriented to Time] : oriented to time [Calm] : calm [Please See PDF for [employer]] : Please See PDF for [employer]. [JVD] : no jugular venous distention  [Abdomen Tenderness] : ~T ~M No abdominal tenderness [de-identified] : NAD, ambulatory [de-identified] : AT [de-identified] : supple [de-identified] : soft, morbidly obese

## 2022-04-21 NOTE — REVIEW OF SYSTEMS
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

## 2022-05-04 ENCOUNTER — APPOINTMENT (OUTPATIENT)
Dept: WOUND CARE | Facility: CLINIC | Age: 67
End: 2022-05-04

## 2022-05-27 NOTE — CONSULT NOTE ADULT - ASSESSMENT
64yo woman with PMHx AS s/p TAVR 2015, HOCM, pAFib, JOSR (does not tolerate CPAP), morbid obesity who presents with 1 month of progressively worsening RASMUSSEN.    #AS s/p TAVR 2015  -Most recent echo with Dr. Chow suggestive of prosthesis stenosis, with peak gradient of 3.8mm/s through AV; physical exam also suspicious with AS murmur.  -Could be etiology for SOB. Continue Lasix 120mg PO daily.  -F/u formal complete echo for review; if confirmed, will need to involve structural cardiology for SYLVIA TAVR evaluation.     #HOCM  -Echo 2/2020 with ?apical HCM with TRACEE, SHAILESH and rest LVOT gradient 14mmHg and provoked LVOT gradient 34mmHg.  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   -Continue verapamil 240mg PO daily and Coreg 25mg PO BID.  -F/u echo    #HTN  -Verapamil and Coreg.  -Continue losartan 25mg PO daily.    #pAFib  -Has JOSR.  -Continue Coreg for rate control strategy.  -NOAC for stroke PPx.  -Continue telemetry monitoring.    #ASCVD risk reduction  -Continue ASA 81mg PO daily.  -Continue Lipitor 80mg PO daily.    Case discussed with Dr. Owens.    Carla Perry MD PGY-5  Cardiology Fellow  All Cardiology service information can be found 24/7 on [email or web address], password: [email or web address]  Note is preliminary until signed by the attending.

## 2022-07-11 ENCOUNTER — APPOINTMENT (OUTPATIENT)
Dept: ORTHOPEDIC SURGERY | Facility: CLINIC | Age: 67
End: 2022-07-11

## 2022-07-11 VITALS — WEIGHT: 275 LBS | BODY MASS INDEX: 48.73 KG/M2 | HEIGHT: 63 IN

## 2022-07-11 DIAGNOSIS — E66.01 MORBID (SEVERE) OBESITY DUE TO EXCESS CALORIES: ICD-10-CM

## 2022-07-11 DIAGNOSIS — E11.9 TYPE 2 DIABETES MELLITUS W/OUT COMPLICATIONS: ICD-10-CM

## 2022-07-11 PROCEDURE — 20610 DRAIN/INJ JOINT/BURSA W/O US: CPT | Mod: 50

## 2022-07-11 PROCEDURE — 99203 OFFICE O/P NEW LOW 30 MIN: CPT | Mod: 25

## 2022-07-11 PROCEDURE — 73564 X-RAY EXAM KNEE 4 OR MORE: CPT | Mod: 50

## 2022-07-11 NOTE — IMAGING
[de-identified] : Bilat Varus deformity\par Mild effusion, no warmth, no ecchymosis\par Medial joint line tenderness to palpation \par Range of motion 5-110 with associated crepitus\par 5/5 quadriceps and hamstring strength\par Ligamentously stable\par Motor and sensory intact distally\par Mildly antalgic gait\par Negative Stan\par  [Bilateral] : knee bilaterally [All Views] : anteroposterior, lateral, skyline, and anteroposterior standing [Degenerative change] : Degenerative change [Advanced patellofemoral OA] : Advanced patellofemoral OA

## 2022-07-11 NOTE — PROCEDURE
[FreeTextEntry3] : Large joint injection  was performed of the BILAT  knee. \par The indication for this procedure was pain, inflammation and x-ray evidence of Osteoarthritis on this or prior visit. The site was prepped with alcohol, betadine, ethyl chloride sprayed topically and sterile technique used. \par An injection of EUFLEXXA  series; # 1  was used.  \par Patient was advised to call if redness, pain or fever occur, apply ice for 15 minutes out of every hour for the next 12-24 hours as tolerated and patient was advised to rest the joint(s) for 2 days. Patient has tried OTC's including aspirin, Ibuprofen, Aleve, etc or prescription NSAIDS, and/or exercises at home and/or physical therapy without satisfactory response, patient had decreased mobility in the joint and the risks benefits, and alternatives have been discussed, and verbal consent was obtained. \par \par

## 2022-07-11 NOTE — ASSESSMENT
[FreeTextEntry1] : LONG STANDING ADVANCED PF OA, FELL RECENTLY WHILE CLIMBING OUT OF POOL AND INJURED LEFT KNEE\par THAT FEELS BETTER BUT NOW RIGHT KNEE BOTHERSOME\par NOTHING ACUTE ON XRAYS; ABLE TO AMBULATE\par REASONABLE TO PROCEED WITH EUFLEXXA ASSUMING THIS IS OA EXAECERBATION\par WOULD AVOID CSI IN THIS SOMETIMES POORLY CONTROLLED DIABETIC\par F/U 1 WEEK TO CONTINUE SERIES\par WEIGHT LOSS, PAIN REGIMEN PER CARDIOLOGY WITH MOTRIN AND TYLENOL

## 2022-07-11 NOTE — HISTORY OF PRESENT ILLNESS
[Left Leg] : left leg [Right Leg] : right leg [Gradual] : gradual [7] : 7 [6] : 6 [Localized] : localized [Tightness] : tightness [Household chores] : household chores [Nothing helps with pain getting better] : Nothing helps with pain getting better [Standing] : standing [Walking] : walking [Stairs] : stairs [de-identified] : pt presents here today with bilateral knees  for years\par pt has try gel injections in the past with no relief\par h/o falling about 2 weeks at home \par right knee pain is worse than left knee  [] : no [FreeTextEntry1] : knees [FreeTextEntry5] : no injury  [de-identified] : Dr Dougie clark [de-identified] : gel injections

## 2022-07-18 ENCOUNTER — APPOINTMENT (OUTPATIENT)
Dept: ORTHOPEDIC SURGERY | Facility: CLINIC | Age: 67
End: 2022-07-18

## 2022-07-30 ENCOUNTER — EMERGENCY (EMERGENCY)
Facility: HOSPITAL | Age: 67
LOS: 1 days | Discharge: HOME | End: 2022-07-30
Attending: EMERGENCY MEDICINE | Admitting: EMERGENCY MEDICINE
Payer: MEDICARE

## 2022-07-30 VITALS
RESPIRATION RATE: 18 BRPM | HEART RATE: 68 BPM | TEMPERATURE: 98 F | WEIGHT: 279.11 LBS | OXYGEN SATURATION: 95 % | HEIGHT: 63 IN | DIASTOLIC BLOOD PRESSURE: 66 MMHG | SYSTOLIC BLOOD PRESSURE: 146 MMHG

## 2022-07-30 VITALS
OXYGEN SATURATION: 98 % | HEART RATE: 65 BPM | SYSTOLIC BLOOD PRESSURE: 137 MMHG | DIASTOLIC BLOOD PRESSURE: 78 MMHG | TEMPERATURE: 98 F | RESPIRATION RATE: 18 BRPM

## 2022-07-30 DIAGNOSIS — Z95.2 PRESENCE OF PROSTHETIC HEART VALVE: Chronic | ICD-10-CM

## 2022-07-30 DIAGNOSIS — Z98.890 OTHER SPECIFIED POSTPROCEDURAL STATES: Chronic | ICD-10-CM

## 2022-07-30 PROCEDURE — 72100 X-RAY EXAM L-S SPINE 2/3 VWS: CPT | Mod: 26

## 2022-07-30 PROCEDURE — 99284 EMERGENCY DEPT VISIT MOD MDM: CPT | Mod: FS

## 2022-07-30 PROCEDURE — 72100 X-RAY EXAM L-S SPINE 2/3 VWS: CPT

## 2022-07-30 PROCEDURE — 99284 EMERGENCY DEPT VISIT MOD MDM: CPT | Mod: 25

## 2022-07-30 PROCEDURE — 73630 X-RAY EXAM OF FOOT: CPT

## 2022-07-30 PROCEDURE — 73502 X-RAY EXAM HIP UNI 2-3 VIEWS: CPT | Mod: 26,RT

## 2022-07-30 PROCEDURE — 70450 CT HEAD/BRAIN W/O DYE: CPT | Mod: MA

## 2022-07-30 PROCEDURE — 70450 CT HEAD/BRAIN W/O DYE: CPT | Mod: 26,MA

## 2022-07-30 PROCEDURE — 73630 X-RAY EXAM OF FOOT: CPT | Mod: 26,RT

## 2022-07-30 PROCEDURE — 73502 X-RAY EXAM HIP UNI 2-3 VIEWS: CPT

## 2022-07-30 PROCEDURE — 82962 GLUCOSE BLOOD TEST: CPT

## 2022-07-30 RX ORDER — ACETAMINOPHEN 500 MG
650 TABLET ORAL ONCE
Refills: 0 | Status: COMPLETED | OUTPATIENT
Start: 2022-07-30 | End: 2022-07-30

## 2022-07-30 RX ADMIN — Medication 650 MILLIGRAM(S): at 12:51

## 2022-07-30 NOTE — ED PROVIDER NOTE - CLINICAL SUMMARY MEDICAL DECISION MAKING FREE TEXT BOX
Older female fell on boat few days ago here now c/o headache, low back pain and right heel bruise. This case will require evaluation as well as imaging.

## 2022-07-30 NOTE — ED PROVIDER NOTE - NSFOLLOWUPINSTRUCTIONS_ED_ALL_ED_FT
1) Follow-up with Orthopedics/Wound care/Podiatry, See referred doctor. Call today/next business day for close, prompt follow-up.  2) Return to Emergency room for any worsening or persistent pain, weakness, numbness, fever, color change to extremity, or any other concerning symptoms.  3) Take Tylenol as needed for pain   4) You can consider discussing with your doctor if physical therapy or further imaging as an MRI may be beneficial.         Head Injury    WHAT YOU NEED TO KNOW:    A head injury can include your scalp, face, skull, or brain and range from mild to severe. Effects can appear immediately after the injury or develop later. The effects may last a short time or be permanent. Healthcare providers may want to check your recovery over time. Treatment may change as you recover or develop new health problems from the head injury.    DISCHARGE INSTRUCTIONS:    Call your local emergency number (911 in the ), or have someone else call if:   •You cannot be woken.      •You have a seizure.      •You stop responding to others or you faint.      •You have blurry or double vision.      •Your speech becomes slurred or confused.      •You have arm or leg weakness, loss of feeling, or new problems with coordination.      •Your pupils are larger than usual, or one pupil is a different size than the other.      •You have blood or clear fluid coming out of your ears or nose.      Seek care immediately if:   •You have repeated or forceful vomiting.      •You feel confused.      •Your headache gets worse or becomes severe.      •You or someone caring for you notices that you are harder to wake than usual.      Call your doctor if:   •Your symptoms last longer than 6 weeks after the injury.      •You have questions or concerns about your condition or care.      Medicines:   •Acetaminophen decreases pain and fever. It is available without a doctor's order. Ask how much to take and how often to take it. Follow directions. Read the labels of all other medicines you are using to see if they also contain acetaminophen, or ask your doctor or pharmacist. Acetaminophen can cause liver damage if not taken correctly.      •Take your medicine as directed. Contact your healthcare provider if you think your medicine is not helping or if you have side effects. Tell him or her if you are allergic to any medicine. Keep a list of the medicines, vitamins, and herbs you take. Include the amounts, and when and why you take them. Bring the list or the pill bottles to follow-up visits. Carry your medicine list with you in case of an emergency.      Self-care:   •Rest or do quiet activities. Limit your time watching TV, using the computer, or doing tasks that require a lot of thinking. Slowly return to your normal activities as directed. Do not play sports or do activities that may cause you to get hit in the head. Ask your healthcare provider when you can return to sports.      •Apply ice on your head for 15 to 20 minutes every hour or as directed. Use an ice pack, or put crushed ice in a plastic bag. Cover it with a towel before you apply it to your skin. Ice helps prevent tissue damage and decreases swelling and pain.      •Have someone stay with you for 24 hours , or as directed. This person can monitor you for problems and call for help if needed. When you are awake, the person should ask you a few questions every few hours to see if you are thinking clearly. An example is to ask your name or address.      Prevent another head injury:   •Wear a helmet that fits properly. Do this when you play sports, or ride a bike, scooter, or skateboard. Helmets help decrease your risk for a serious head injury. Talk to your healthcare provider about other ways you can protect yourself if you play sports.      •Wear your seatbelt every time you are in a car. This helps lower your risk for a head injury if you are in a car accident.      Follow up with your doctor as directed: Write down your questions so you remember to ask them during your visits.
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
## 2022-07-30 NOTE — ED PROVIDER NOTE - PATIENT PORTAL LINK FT
You can access the FollowMyHealth Patient Portal offered by [facility] by registering at the following website: [email or web address]. By joining [employer]’s FollowMyHealth portal, you will also be able to view your health information using other applications (apps) compatible with our system.

## 2022-07-30 NOTE — ED ADULT NURSE NOTE - ABDOMEN
pt with rlq pain - labs/ct/ivf pt with rlq pain - labs/ct/ivf Rx Macrobid, Percocet as directed soft

## 2022-07-30 NOTE — ED PROVIDER NOTE - PHYSICAL EXAMINATION
Constitutional: Awake, Alert, non-toxic. NAD.   HEAD: Normocephalic, atraumatic.   EYES: PERRL, EOM intact, conjunctiva and sclera are clear bilaterally. No raccoon eyes.   ENT: No lawson sign. No rhinorrhea, normal pharynx, patent, no tonsillar exudate or enlargement, mucous membranes pink/moist, no erythema, no drooling or stridor.   NECK: Supple, non-tender; no cervical LAD, no JVD, no goiter.  BACK: (+) right lower lateral lumbar soft tissue TTP, No midline or paraspinal TTP of cervical/thoracic/lumbar spine, FROM. No ecchymosis or hematomas. no rib TTP.   CARDIOVASCULAR: Normal S1, S2; regular rate and rhythm.  RESPIRATORY: Normal respiratory effort; breath sounds CTAB, no wheezes, rhonchi, or rales. Speaking in full sentences. No accessory muscle use.   ABDOMEN: Soft; non-tender, non-distended.  EXTREMITIES: Full passive and active ROM in all extremities; (+) right posterior heel hematoma, no foot TTP, hips and knees non-tender to palpation; distal pulses palpable and symmetric, no edema, no crepitus or step off  SKIN: Warm, dry; good skin turgor, no apparent lesions or rashes, no ecchymosis, brisk capillary refill.  NEURO: A&O x3. Sensory and motor functions are grossly intact. Speech is normal. Appearance and judgement seem appropriate for gender and age. No neurological deficits. Neurovascular sensation intact motor function 5/5 of upper and lower extremities, CN II-XII grossly intact, no ataxia, absent romberg and pronator drift, intact cerebellar function. Speech clear, without articulation or word-finding difficulties. Eyes- PERRL bilaterally. EOMs in tact. No nystagmus. No facial droop.

## 2022-07-30 NOTE — ED ADULT NURSE NOTE - NSFALLRSKASSESASSIST_ED_ALL_ED
Detail Level: Zone
Discontinue Regimen: Doxy 100\\nDoxy 50 mg QD in reserve for flares
Initiate Treatment: Tret .1% topical cream pea size amount QD PM
yes
Render In Strict Bullet Format?: No
Continue Regimen: Clindamycin 1% topical lotion
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
## 2022-07-30 NOTE — ED ADULT NURSE NOTE - NSIMPLEMENTINTERV_GEN_ALL_ED
Implemented All Fall with Harm Risk Interventions:  [place] to call system. Call bell, personal items and telephone within reach. Instruct patient to call for assistance. Room bathroom lighting operational. Non-slip footwear when patient is off stretcher. Physically safe environment: no spills, clutter or unnecessary equipment. Stretcher in lowest position, wheels locked, appropriate side rails in place. Provide visual cue, wrist band, yellow gown, etc. Monitor gait and stability. Monitor for mental status changes and reorient to person, place, and time. Review medications for side effects contributing to fall risk. Reinforce activity limits and safety measures with patient and family. Provide visual clues: red socks. Implemented All Fall Risk Interventions:  [place] to call system. Call bell, personal items and telephone within reach. Instruct patient to call for assistance. Room bathroom lighting operational. Non-slip footwear when patient is off stretcher. Physically safe environment: no spills, clutter or unnecessary equipment. Stretcher in lowest position, wheels locked, appropriate side rails in place. Provide visual cue, wrist band, yellow gown, etc. Monitor gait and stability. Monitor for mental status changes and reorient to person, place, and time. Review medications for side effects contributing to fall risk. Reinforce activity limits and safety measures with patient and family.
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
## 2022-07-30 NOTE — ED PROVIDER NOTE - NS ED ATTENDING STATEMENT MOD
This was a shared visit with the CAITLYN. I reviewed and verified the documentation and independently performed the documented:

## 2022-07-30 NOTE — ED PROVIDER NOTE - CARE PLAN
1 Principal Discharge DX:	Fall  Secondary Diagnosis:	Head injury   Principal Discharge DX:	Fall  Secondary Diagnosis:	Head injury  Secondary Diagnosis:	Foot joint pain

## 2022-07-30 NOTE — ED PROVIDER NOTE - CARE PROVIDER_API CALL
Anibal Lomeli)  Orthopaedic Surgery  [address] [place] [address]  Phone: [phone]  Fax: [phone]  Follow Up Time: 1-3 Days   Anibal Lomeli)  Orthopaedic Surgery  [address] [place] [address]  Phone: [phone]  Fax: [phone]  Follow Up Time: 1-3 Days    Nicholas Morrison (KAYLIN)  Podiatric Medicine and Surgery  [address] [place] [address]  Phone: [phone]  Fax: [phone]  Follow Up Time: 1-3 Days

## 2022-07-30 NOTE — ED PROVIDER NOTE - PROVIDER TOKENS
PROVIDER:[TOKEN:[[id number]],FOLLOWUP:[1-3 Days]] PROVIDER:[TOKEN:[[id number]],FOLLOWUP:[1-3 Days]],PROVIDER:[TOKEN:[[id number]],FOLLOWUP:[1-3 Days]]

## 2022-07-30 NOTE — ED PROVIDER NOTE - NSFOLLOWUPCLINICS_GEN_ALL_ED_FT
[facility]  W[facility] Care  [address] [place] [address]  Phone: [phone]  Fax:   Follow Up Time: 1-3 Days

## 2022-07-30 NOTE — ED PROVIDER NOTE - CROS ED ROS STATEMENT
all other ROS negative except as per HPI
Plan: Follow up one month
Continue Regimen: Accutane 40mg twice daily.
Detail Level: Zone
Render In Strict Bullet Format?: No

## 2022-07-30 NOTE — ED PROVIDER NOTE - ATTENDING APP SHARED VISIT CONTRIBUTION OF CARE
Exam revealed older female in NAD with soft tissue occipital scalp swelling, mild tenderness to palpation lower lumbar area and small soft enclosed intact hemorrhagic blister right heel. I agree with plan and management outlined by PA

## 2022-08-01 ENCOUNTER — APPOINTMENT (OUTPATIENT)
Dept: ORTHOPEDIC SURGERY | Facility: CLINIC | Age: 67
End: 2022-08-01

## 2022-08-15 ENCOUNTER — APPOINTMENT (OUTPATIENT)
Dept: ORTHOPEDIC SURGERY | Facility: CLINIC | Age: 67
End: 2022-08-15

## 2022-08-15 DIAGNOSIS — M17.11 UNILATERAL PRIMARY OSTEOARTHRITIS, RIGHT KNEE: ICD-10-CM

## 2022-08-15 DIAGNOSIS — M17.12 UNILATERAL PRIMARY OSTEOARTHRITIS, LEFT KNEE: ICD-10-CM

## 2022-08-15 PROCEDURE — 99213 OFFICE O/P EST LOW 20 MIN: CPT | Mod: 25

## 2022-08-15 PROCEDURE — 99024 POSTOP FOLLOW-UP VISIT: CPT

## 2022-08-15 PROCEDURE — 20610 DRAIN/INJ JOINT/BURSA W/O US: CPT | Mod: 50

## 2022-08-15 RX ORDER — AMOXICILLIN AND CLAVULANATE POTASSIUM 875; 125 MG/1; MG/1
875-125 TABLET, COATED ORAL
Qty: 20 | Refills: 0 | Status: COMPLETED | COMMUNITY
Start: 2022-08-15 | End: 2022-08-25

## 2022-08-15 NOTE — ASSESSMENT
[FreeTextEntry1] : EUFLEXXA #2 B/L KNEES, TOLERATED WELL\par RTO 1 WEEK TO CONTINUE\par NEW ONSET LEFT ELBOW CELLULITIS/OLECRANON BURSITIS AFTER FALL 3 WEEKS AGO, BEGIN AUGMENTIN\par SHE REPORTS ITCHING FROM PCN, NOT TRUE ALLERGY, HAS BEEN ON IT BEFORE\par

## 2022-08-15 NOTE — IMAGING
[de-identified] : LEFT ELBOW\par 1. MILD SWELLING OVER OLECRANON BURSA WITH SURROUNDING CELLULITIS\par 2. No tenderness to palpation over elbow\par 3. Full elbow flexion, extension, supination, pronation\par 4. 5/5 strength in flexion, extension, supination, pronation\par 5. No VarUs or Valgus instability\par 6. Motor and sensory intact distally\par
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
## 2022-08-15 NOTE — PROCEDURE
[FreeTextEntry3] : Large joint injection  was performed of the BILAT  knee. \par The indication for this procedure was pain, inflammation and x-ray evidence of Osteoarthritis on this or prior visit. The site was prepped with alcohol, betadine, ethyl chloride sprayed topically and sterile technique used. \par An injection of EUFLEXXA  series; # 2  was used.  \par Patient was advised to call if redness, pain or fever occur, apply ice for 15 minutes out of every hour for the next 12-24 hours as tolerated and patient was advised to rest the joint(s) for 2 days. Patient has tried OTC's including aspirin, Ibuprofen, Aleve, etc or prescription NSAIDS, and/or exercises at home and/or physical therapy without satisfactory response, patient had decreased mobility in the joint and the risks benefits, and alternatives have been discussed, and verbal consent was obtained. \par \par

## 2022-08-15 NOTE — IMAGING
[de-identified] : LEFT ELBOW\par 1. MILD SWELLING OVER OLECRANON BURSA WITH SURROUNDING CELLULITIS\par 2. No tenderness to palpation over elbow\par 3. Full elbow flexion, extension, supination, pronation\par 4. 5/5 strength in flexion, extension, supination, pronation\par 5. No VarUs or Valgus instability\par 6. Motor and sensory intact distally\par

## 2022-08-17 ENCOUNTER — OUTPATIENT (OUTPATIENT)
Dept: OUTPATIENT SERVICES | Facility: HOSPITAL | Age: 67
LOS: 1 days | Discharge: HOME | End: 2022-08-17
Payer: MEDICARE

## 2022-08-17 ENCOUNTER — APPOINTMENT (OUTPATIENT)
Dept: WOUND CARE | Facility: HOSPITAL | Age: 67
End: 2022-08-17

## 2022-08-17 VITALS
SYSTOLIC BLOOD PRESSURE: 106 MMHG | RESPIRATION RATE: 20 BRPM | TEMPERATURE: 98.3 F | BODY MASS INDEX: 48.73 KG/M2 | HEIGHT: 63 IN | HEART RATE: 69 BPM | OXYGEN SATURATION: 93 % | WEIGHT: 275 LBS | DIASTOLIC BLOOD PRESSURE: 60 MMHG

## 2022-08-17 DIAGNOSIS — S91.111D: ICD-10-CM

## 2022-08-17 DIAGNOSIS — Z98.890 OTHER SPECIFIED POSTPROCEDURAL STATES: Chronic | ICD-10-CM

## 2022-08-17 DIAGNOSIS — S90.821A BLISTER (NONTHERMAL), RIGHT FOOT, INITIAL ENCOUNTER: ICD-10-CM

## 2022-08-17 DIAGNOSIS — Z95.2 PRESENCE OF PROSTHETIC HEART VALVE: Chronic | ICD-10-CM

## 2022-08-17 PROCEDURE — 99214 OFFICE O/P EST MOD 30 MIN: CPT

## 2022-08-17 PROCEDURE — G0463: CPT
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
## 2022-08-17 NOTE — PLAN
[FreeTextEntry1] : Patient examined and evaluated at this time.\par Continue local wound care and offloading.\par Patient has multiple co-morbidities remains at risk for infection, limb loss, sepsis, and death. Encouraged leg elevation as much as possible.\par Spent 30 minutes for patient care and medical decision making.\par

## 2022-08-17 NOTE — PHYSICAL EXAM
[0] : left 0 [Ankle Swelling (On Exam)] : present [Ankle Swelling Bilaterally] : bilaterally  [Varicose Veins Of Lower Extremities] : bilaterally [] : bilaterally [Ankle Swelling On The Left] : moderate [de-identified] : A&Ox3, NAD [de-identified] : 4/5 strength in all quadrants bilaterally [de-identified] : right heel hematoma, healed. right medial hallux laceration down to skin. [FreeTextEntry1] : Right heel - dry eschar  [FreeTextEntry2] : 2.5 [FreeTextEntry3] : 3 [FreeTextEntry4] : 0.1 [de-identified] : Mechanically cleansed with sterile gauze and normal saline.\par No other treatment  [FreeTextEntry7] : Right medial hallux  [FreeTextEntry8] : 0.3 [FreeTextEntry9] : 0.3 [de-identified] : 0.1 [de-identified] : Scant Serous/sanguinous [de-identified] : Band - Aid  [de-identified] : Mechanically cleansed with sterile gauze and normal saline.\par No other treatment  [de-identified] : Dorsalis Pedis:  +1 Bilateral \par Posterior Tibialis:  +1 Bilateral \par Doppler pulses: N/A\par Extremity color: Normal \par Extremity temperature: warm \par Capillary refill: < 3 sec\par \par  [TWNoteComboBox4] : None [de-identified] : Normal [de-identified] : None [de-identified] : None [de-identified] : No [de-identified] : Normal [de-identified] : None [de-identified] : None [de-identified] : 100% [de-identified] : No [de-identified] : Daily [de-identified] : Secondary Dressing

## 2022-08-17 NOTE — ASSESSMENT
[Verbal] : Verbal [Written] : Written [Demo] : Demo [Patient] : Patient [Good - alert, interested, motivated] : Good - alert, interested, motivated [Needs reinforcement] : needs reinforcement [Foot Care] : foot care [Skin Care] : skin care [Signs and symptoms of infection] : sign and symptoms of infection [Nutrition] : nutrition [How and When to Call] : how and when to call [Pain Management] : pain management [Off-loading] : off-loading [Patient responsibility to plan of care] : patient responsibility to plan of care [] : Yes [Stable] : stable [Home] : Home [Cane] : Cane [Not Applicable - Long Term Care/Home Health Agency] : Long Term Care/Home Health Agency: Not Applicable [FreeTextEntry2] : Infection prevention\par Localized wound care \par Goal remaining pain free regarding wounds\par Weight loss  [FreeTextEntry4] : No vascular necessary's per DPM. Traumatic ideology with healing wound. \par Follow up in 8 weeks

## 2022-08-18 DIAGNOSIS — S91.111D LACERATION WITHOUT FOREIGN BODY OF RIGHT GREAT TOE WITHOUT DAMAGE TO NAIL, SUBSEQUENT ENCOUNTER: ICD-10-CM

## 2022-08-18 DIAGNOSIS — Z87.891 PERSONAL HISTORY OF NICOTINE DEPENDENCE: ICD-10-CM

## 2022-08-18 DIAGNOSIS — I50.9 HEART FAILURE, UNSPECIFIED: ICD-10-CM

## 2022-08-18 DIAGNOSIS — M47.817 SPONDYLOSIS WITHOUT MYELOPATHY OR RADICULOPATHY, LUMBOSACRAL REGION: ICD-10-CM

## 2022-08-18 DIAGNOSIS — Z79.4 LONG TERM (CURRENT) USE OF INSULIN: ICD-10-CM

## 2022-08-18 DIAGNOSIS — Z95.4 PRESENCE OF OTHER HEART-VALVE REPLACEMENT: ICD-10-CM

## 2022-08-18 DIAGNOSIS — E05.90 THYROTOXICOSIS, UNSPECIFIED WITHOUT THYROTOXIC CRISIS OR STORM: ICD-10-CM

## 2022-08-18 DIAGNOSIS — I11.0 HYPERTENSIVE HEART DISEASE WITH HEART FAILURE: ICD-10-CM

## 2022-08-18 DIAGNOSIS — G47.10 HYPERSOMNIA, UNSPECIFIED: ICD-10-CM

## 2022-08-18 DIAGNOSIS — M43.16 SPONDYLOLISTHESIS, LUMBAR REGION: ICD-10-CM

## 2022-08-18 DIAGNOSIS — E11.51 TYPE 2 DIABETES MELLITUS WITH DIABETIC PERIPHERAL ANGIOPATHY WITHOUT GANGRENE: ICD-10-CM

## 2022-08-18 DIAGNOSIS — I83.893 VARICOSE VEINS OF BILATERAL LOWER EXTREMITIES WITH OTHER COMPLICATIONS: ICD-10-CM

## 2022-08-18 DIAGNOSIS — Z82.49 FAMILY HISTORY OF ISCHEMIC HEART DISEASE AND OTHER DISEASES OF THE CIRCULATORY SYSTEM: ICD-10-CM

## 2022-08-18 DIAGNOSIS — Z79.82 LONG TERM (CURRENT) USE OF ASPIRIN: ICD-10-CM
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
## 2022-08-18 DIAGNOSIS — E66.01 MORBID (SEVERE) OBESITY DUE TO EXCESS CALORIES: ICD-10-CM

## 2022-08-18 DIAGNOSIS — G47.33 OBSTRUCTIVE SLEEP APNEA (ADULT) (PEDIATRIC): ICD-10-CM

## 2022-08-18 DIAGNOSIS — I34.1 NONRHEUMATIC MITRAL (VALVE) PROLAPSE: ICD-10-CM

## 2022-08-18 DIAGNOSIS — Z83.49 FAMILY HISTORY OF OTHER ENDOCRINE, NUTRITIONAL AND METABOLIC DISEASES: ICD-10-CM
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
## 2022-08-18 DIAGNOSIS — Z87.01 PERSONAL HISTORY OF PNEUMONIA (RECURRENT): ICD-10-CM
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
## 2022-08-18 DIAGNOSIS — Z87.81 PERSONAL HISTORY OF (HEALED) TRAUMATIC FRACTURE: ICD-10-CM

## 2022-08-18 DIAGNOSIS — M48.07 SPINAL STENOSIS, LUMBOSACRAL REGION: ICD-10-CM

## 2022-08-18 DIAGNOSIS — Z88.5 ALLERGY STATUS TO NARCOTIC AGENT: ICD-10-CM

## 2022-08-18 DIAGNOSIS — E11.40 TYPE 2 DIABETES MELLITUS WITH DIABETIC NEUROPATHY, UNSPECIFIED: ICD-10-CM

## 2022-08-18 DIAGNOSIS — Z88.0 ALLERGY STATUS TO PENICILLIN: ICD-10-CM

## 2022-08-18 DIAGNOSIS — Z98.84 BARIATRIC SURGERY STATUS: ICD-10-CM

## 2022-08-18 DIAGNOSIS — I47.1 SUPRAVENTRICULAR TACHYCARDIA: ICD-10-CM
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
## 2022-08-18 DIAGNOSIS — E55.9 VITAMIN D DEFICIENCY, UNSPECIFIED: ICD-10-CM

## 2022-08-22 ENCOUNTER — APPOINTMENT (OUTPATIENT)
Dept: ORTHOPEDIC SURGERY | Facility: CLINIC | Age: 67
End: 2022-08-22

## 2022-08-22 DIAGNOSIS — M17.0 BILATERAL PRIMARY OSTEOARTHRITIS OF KNEE: ICD-10-CM

## 2022-08-22 PROCEDURE — 99214 OFFICE O/P EST MOD 30 MIN: CPT | Mod: 25

## 2022-08-22 PROCEDURE — 20610 DRAIN/INJ JOINT/BURSA W/O US: CPT | Mod: 50
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
## 2022-08-22 NOTE — ASSESSMENT
[FreeTextEntry1] : EUFLEXXA #3  B/L KNEES, TOLERATED WELL\par \par  LEFT ELBOW CELLULITIS/OLECRANON BURSITIS AFTER FALL 3 WEEKS AGO, CONTINUE AUGMENTIN\par SHE REPORTS ITCHING FROM PCN, NOT TRUE ALLERGY, HAS BEEN ON IT BEFORE\par \par WILL F/U 1 WEEK FOR CLINICAL CHECK ON THE CELLULITIS\par \par ALSO SHE HAS MULTIPLE TRIGGER FINGERS BUT DUE TO DM HISTORY CAN NOT HAVE CSI WILL F/U WITH [facility] FOR RELEASE \par

## 2022-08-22 NOTE — IMAGING
[de-identified] : LEFT ELBOW\par 1. MILD SWELLING OVER OLECRANON BURSA WITH SURROUNDING CELLULITIS-IMPROVED FROM PRIOR \par 2. No tenderness to palpation over elbow\par 3. Full elbow flexion, extension, supination, pronation\par 4. 5/5 strength in flexion, extension, supination, pronation\par 5. No VarUs or Valgus instability\par 6. Motor and sensory intact distally\par

## 2022-09-02 ENCOUNTER — APPOINTMENT (OUTPATIENT)
Dept: ORTHOPEDIC SURGERY | Facility: CLINIC | Age: 67
End: 2022-09-02

## 2022-09-09 ENCOUNTER — APPOINTMENT (OUTPATIENT)
Dept: ORTHOPEDIC SURGERY | Facility: CLINIC | Age: 67
End: 2022-09-09

## 2022-09-09 VITALS — WEIGHT: 275 LBS | HEIGHT: 63 IN | BODY MASS INDEX: 48.73 KG/M2

## 2022-09-09 DIAGNOSIS — L03.114 CELLULITIS OF LEFT UPPER LIMB: ICD-10-CM

## 2022-09-09 PROCEDURE — 99213 OFFICE O/P EST LOW 20 MIN: CPT

## 2022-09-09 RX ORDER — SULFAMETHOXAZOLE AND TRIMETHOPRIM 800; 160 MG/1; MG/1
800-160 TABLET ORAL TWICE DAILY
Qty: 20 | Refills: 0 | Status: ACTIVE | COMMUNITY
Start: 2022-09-09 | End: 1900-01-01

## 2022-09-09 RX ORDER — AMOXICILLIN AND CLAVULANATE POTASSIUM 875; 125 MG/1; MG/1
875-125 TABLET, COATED ORAL
Qty: 20 | Refills: 0 | Status: COMPLETED | COMMUNITY
Start: 2022-09-09 | End: 2022-09-19
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
## 2022-09-09 NOTE — ASSESSMENT
[FreeTextEntry1] : IMPROVING BUT WOULD BE CONSERVATIVE AND ADD ANOTHER 10 DAYS OF PO ABX\par IF PERSISTS AFTER 10 DAYS ADVISED TO PROCEED TO ER FOR ADMISSION AND IV ABX\par
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
## 2022-09-21 ENCOUNTER — APPOINTMENT (OUTPATIENT)
Dept: ORTHOPEDIC SURGERY | Facility: CLINIC | Age: 67
End: 2022-09-21

## 2022-09-21 VITALS — BODY MASS INDEX: 48.73 KG/M2 | HEIGHT: 63 IN | WEIGHT: 275 LBS

## 2022-09-21 DIAGNOSIS — M65.322 TRIGGER FINGER, LEFT INDEX FINGER: ICD-10-CM

## 2022-09-21 DIAGNOSIS — M65.341 TRIGGER FINGER, RIGHT RING FINGER: ICD-10-CM

## 2022-09-21 DIAGNOSIS — M70.22 OLECRANON BURSITIS, LEFT ELBOW: ICD-10-CM

## 2022-09-21 PROCEDURE — 73080 X-RAY EXAM OF ELBOW: CPT | Mod: LT

## 2022-09-21 PROCEDURE — 99214 OFFICE O/P EST MOD 30 MIN: CPT

## 2022-09-21 PROCEDURE — 73130 X-RAY EXAM OF HAND: CPT | Mod: 50

## 2022-09-21 NOTE — IMAGING
[de-identified] : right ring finger TTP a1 pulley\par +triggering\par otherwise farom\par neurovascular intact distally \par left index finger TTP a1 pulley\par +triggering\par otherwise farom\par NVID\par \par left elbow, small bursal effusion, palpable bursal thickening\par farom\par no erythema [Left] : left elbow [Bilateral] : hand bilaterally [There are no fractures, subluxations or dislocations. No significant abnormalities are seen] : There are no fractures, subluxations or dislocations. No significant abnormalities are seen

## 2022-09-21 NOTE — ASSESSMENT
[FreeTextEntry1] : The patient was advised of the diagnosis. The natural history of the pathology was explained in full to the patient in layman's terms. All questions were answered. The risks and benefits of surgical and non-surgical treatment alternatives were explained in full to the patient.\par \par Pt does not want CSI because of her diabetes and will have surgery if she gets her sugars are under control.  Local anesthetic only.
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
## 2022-09-27 RX ORDER — TRIAMCINOLONE ACETONIDE 1 MG/G
0.1 CREAM TOPICAL
Qty: 1 | Refills: 1 | Status: ACTIVE | COMMUNITY
Start: 2021-09-17 | End: 1900-01-01

## 2022-11-03 ENCOUNTER — APPOINTMENT (OUTPATIENT)
Dept: WOUND CARE | Facility: HOSPITAL | Age: 67
End: 2022-11-03

## 2022-11-03 ENCOUNTER — OUTPATIENT (OUTPATIENT)
Dept: OUTPATIENT SERVICES | Facility: HOSPITAL | Age: 67
LOS: 1 days | Discharge: HOME | End: 2022-11-03
Payer: MEDICARE

## 2022-11-03 VITALS
OXYGEN SATURATION: 95 % | BODY MASS INDEX: 48.73 KG/M2 | TEMPERATURE: 98.8 F | HEART RATE: 81 BPM | WEIGHT: 275 LBS | RESPIRATION RATE: 20 BRPM | SYSTOLIC BLOOD PRESSURE: 110 MMHG | HEIGHT: 63 IN | DIASTOLIC BLOOD PRESSURE: 71 MMHG

## 2022-11-03 DIAGNOSIS — Z98.890 OTHER SPECIFIED POSTPROCEDURAL STATES: Chronic | ICD-10-CM

## 2022-11-03 DIAGNOSIS — S91.111D LACERATION WITHOUT FOREIGN BODY OF RIGHT GREAT TOE WITHOUT DAMAGE TO NAIL, SUBSEQUENT ENCOUNTER: ICD-10-CM

## 2022-11-03 DIAGNOSIS — S30.871D: ICD-10-CM

## 2022-11-03 DIAGNOSIS — Z95.2 PRESENCE OF PROSTHETIC HEART VALVE: Chronic | ICD-10-CM

## 2022-11-03 PROCEDURE — 99213 OFFICE O/P EST LOW 20 MIN: CPT

## 2022-11-03 PROCEDURE — G0463: CPT

## 2022-11-03 PROCEDURE — 99214 OFFICE O/P EST MOD 30 MIN: CPT

## 2022-11-03 NOTE — ASSESSMENT
[Verbal] : Verbal [Written] : Written [Demo] : Demo [Patient] : Patient [Good - alert, interested, motivated] : Good - alert, interested, motivated [Dressing changes] : dressing changes [Foot Care] : foot care [Skin Care] : skin care [Pressure relief] : pressure relief [Signs and symptoms of infection] : sign and symptoms of infection [Nutrition] : nutrition [How and When to Call] : how and when to call [Pain Management] : pain management [Off-loading] : off-loading [Home Health] : home health [Patient responsibility to plan of care] : patient responsibility to plan of care [Glycemic Control] : glycemic control [] : Yes

## 2022-11-04 DIAGNOSIS — I50.9 HEART FAILURE, UNSPECIFIED: ICD-10-CM

## 2022-11-04 DIAGNOSIS — Z87.891 PERSONAL HISTORY OF NICOTINE DEPENDENCE: ICD-10-CM

## 2022-11-04 DIAGNOSIS — M47.817 SPONDYLOSIS WITHOUT MYELOPATHY OR RADICULOPATHY, LUMBOSACRAL REGION: ICD-10-CM

## 2022-11-04 DIAGNOSIS — Z98.84 BARIATRIC SURGERY STATUS: ICD-10-CM

## 2022-11-04 DIAGNOSIS — Z79.82 LONG TERM (CURRENT) USE OF ASPIRIN: ICD-10-CM
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
## 2022-11-04 DIAGNOSIS — S30.871D: ICD-10-CM

## 2022-11-04 DIAGNOSIS — Z82.49 FAMILY HISTORY OF ISCHEMIC HEART DISEASE AND OTHER DISEASES OF THE CIRCULATORY SYSTEM: ICD-10-CM

## 2022-11-04 DIAGNOSIS — E66.01 MORBID (SEVERE) OBESITY DUE TO EXCESS CALORIES: ICD-10-CM

## 2022-11-04 DIAGNOSIS — M43.16 SPONDYLOLISTHESIS, LUMBAR REGION: ICD-10-CM

## 2022-11-04 DIAGNOSIS — M48.07 SPINAL STENOSIS, LUMBOSACRAL REGION: ICD-10-CM

## 2022-11-04 DIAGNOSIS — E11.51 TYPE 2 DIABETES MELLITUS WITH DIABETIC PERIPHERAL ANGIOPATHY WITHOUT GANGRENE: ICD-10-CM

## 2022-11-04 DIAGNOSIS — S91.012D LACERATION WITHOUT FOREIGN BODY, LEFT ANKLE, SUBSEQUENT ENCOUNTER: ICD-10-CM

## 2022-11-04 DIAGNOSIS — I34.1 NONRHEUMATIC MITRAL (VALVE) PROLAPSE: ICD-10-CM

## 2022-11-04 DIAGNOSIS — Z87.01 PERSONAL HISTORY OF PNEUMONIA (RECURRENT): ICD-10-CM

## 2022-11-04 DIAGNOSIS — E55.9 VITAMIN D DEFICIENCY, UNSPECIFIED: ICD-10-CM
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
## 2022-11-04 DIAGNOSIS — G47.33 OBSTRUCTIVE SLEEP APNEA (ADULT) (PEDIATRIC): ICD-10-CM
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
## 2022-11-04 DIAGNOSIS — I11.0 HYPERTENSIVE HEART DISEASE WITH HEART FAILURE: ICD-10-CM

## 2022-11-04 DIAGNOSIS — Z88.0 ALLERGY STATUS TO PENICILLIN: ICD-10-CM
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
## 2022-11-04 DIAGNOSIS — E11.40 TYPE 2 DIABETES MELLITUS WITH DIABETIC NEUROPATHY, UNSPECIFIED: ICD-10-CM

## 2022-11-04 DIAGNOSIS — Z87.81 PERSONAL HISTORY OF (HEALED) TRAUMATIC FRACTURE: ICD-10-CM

## 2022-11-04 DIAGNOSIS — I47.1 SUPRAVENTRICULAR TACHYCARDIA: ICD-10-CM

## 2022-11-04 DIAGNOSIS — Z79.4 LONG TERM (CURRENT) USE OF INSULIN: ICD-10-CM
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
## 2022-11-04 DIAGNOSIS — Z83.49 FAMILY HISTORY OF OTHER ENDOCRINE, NUTRITIONAL AND METABOLIC DISEASES: ICD-10-CM

## 2022-11-04 DIAGNOSIS — Z95.4 PRESENCE OF OTHER HEART-VALVE REPLACEMENT: ICD-10-CM

## 2022-11-04 DIAGNOSIS — G47.10 HYPERSOMNIA, UNSPECIFIED: ICD-10-CM

## 2022-11-04 DIAGNOSIS — I83.893 VARICOSE VEINS OF BILATERAL LOWER EXTREMITIES WITH OTHER COMPLICATIONS: ICD-10-CM

## 2022-11-04 DIAGNOSIS — Z95.2 PRESENCE OF PROSTHETIC HEART VALVE: ICD-10-CM

## 2022-11-04 DIAGNOSIS — Z88.5 ALLERGY STATUS TO NARCOTIC AGENT: ICD-10-CM

## 2022-11-04 DIAGNOSIS — E05.90 THYROTOXICOSIS, UNSPECIFIED WITHOUT THYROTOXIC CRISIS OR STORM: ICD-10-CM

## 2022-11-04 NOTE — REVIEW OF SYSTEMS
[Skin Wound] : skin wound [Fever] : no fever [Eye Pain] : no eye pain [Earache] : no earache [Chest Pain] : no chest pain [Shortness Of Breath] : no shortness of breath [Abdominal Pain] : no abdominal pain [de-identified] : Left lateral ankle ulceration down to level of skin subcutaneous tissue and fat [de-identified] : diabetic neuropathy [de-identified] : type 2 diabetes

## 2022-11-04 NOTE — PHYSICAL EXAM
[4 x 4] : 4 x 4  [0] : left 0 [Ankle Swelling (On Exam)] : present [Ankle Swelling Bilaterally] : bilaterally  [Varicose Veins Of Lower Extremities] : bilaterally [] : bilaterally [Ankle Swelling On The Left] : moderate [de-identified] : A&Ox3, NAD [de-identified] : 4/5 strength in all quadrants bilaterally [de-identified] : Left lateral ankle ulceration down to level of skin subcutaneous tissue and fat [FreeTextEntry1] : Left Lateral Ankle- NEW [FreeTextEntry2] : 1.1 [FreeTextEntry3] : 1.5 [FreeTextEntry4] : 0.1 [de-identified] : serosanguineous  [de-identified] : silver alginate [de-identified] : Mechanically cleansed with Sterile gauze and 0.9% Normal Saline\par  [TWNoteComboBox4] : Small [TWNoteComboBox5] : No [de-identified] : No [de-identified] : Normal [de-identified] : None [de-identified] : None [de-identified] : 100% [de-identified] : No [de-identified] : Every other day [de-identified] : Primary Dressing

## 2022-11-04 NOTE — PLAN
[FreeTextEntry1] : Patient examined and evaluated at this time.\par Continue local wound care and offloading.\par Patient has multiple co-morbidities remains at risk for infection, limb loss, sepsis, and death. Encouraged leg elevation as much as possible.  Patient advised regarding need to follow-up with podiatrist or a wound care specialist upon moving to [place], [place].  Patient verbalized understanding at this time.\par Spent 30 minutes for patient care and medical decision making.\par

## 2022-11-07 ENCOUNTER — OUTPATIENT (OUTPATIENT)
Dept: OUTPATIENT SERVICES | Facility: HOSPITAL | Age: 67
LOS: 1 days | Discharge: HOME | End: 2022-11-07
Payer: MEDICARE

## 2022-11-07 ENCOUNTER — APPOINTMENT (OUTPATIENT)
Dept: WOUND CARE | Facility: HOSPITAL | Age: 67
End: 2022-11-07

## 2022-11-07 VITALS
BODY MASS INDEX: 48.73 KG/M2 | HEART RATE: 79 BPM | WEIGHT: 275 LBS | HEIGHT: 63 IN | SYSTOLIC BLOOD PRESSURE: 109 MMHG | OXYGEN SATURATION: 99 % | RESPIRATION RATE: 20 BRPM | TEMPERATURE: 97.8 F | DIASTOLIC BLOOD PRESSURE: 60 MMHG

## 2022-11-07 DIAGNOSIS — Z98.890 OTHER SPECIFIED POSTPROCEDURAL STATES: Chronic | ICD-10-CM

## 2022-11-07 DIAGNOSIS — Z95.2 PRESENCE OF PROSTHETIC HEART VALVE: Chronic | ICD-10-CM

## 2022-11-07 DIAGNOSIS — E11.40 TYPE 2 DIABETES MELLITUS WITH DIABETIC NEUROPATHY, UNSPECIFIED: ICD-10-CM

## 2022-11-07 DIAGNOSIS — R23.8 OTHER SKIN CHANGES: ICD-10-CM

## 2022-11-07 DIAGNOSIS — S31.119D: ICD-10-CM

## 2022-11-07 DIAGNOSIS — S30.871D: ICD-10-CM

## 2022-11-07 DIAGNOSIS — S91.012D LACERATION W/OUT FOREIGN BODY, LEFT ANKLE, SUBSEQUENT ENCOUNTER: ICD-10-CM

## 2022-11-07 PROCEDURE — G0463: CPT

## 2022-11-07 PROCEDURE — 99213 OFFICE O/P EST LOW 20 MIN: CPT

## 2022-11-07 RX ORDER — MUPIROCIN 20 MG/G
2 OINTMENT TOPICAL 3 TIMES DAILY
Qty: 1 | Refills: 0 | Status: ACTIVE | COMMUNITY
Start: 2022-11-07 | End: 1900-01-01

## 2022-11-08 ENCOUNTER — APPOINTMENT (OUTPATIENT)
Dept: WOUND CARE | Facility: HOSPITAL | Age: 67
End: 2022-11-08

## 2022-11-08 DIAGNOSIS — Z88.5 ALLERGY STATUS TO NARCOTIC AGENT: ICD-10-CM

## 2022-11-08 DIAGNOSIS — Z88.0 ALLERGY STATUS TO PENICILLIN: ICD-10-CM

## 2022-11-08 DIAGNOSIS — Z83.49 FAMILY HISTORY OF OTHER ENDOCRINE, NUTRITIONAL AND METABOLIC DISEASES: ICD-10-CM

## 2022-11-08 DIAGNOSIS — Z87.81 PERSONAL HISTORY OF (HEALED) TRAUMATIC FRACTURE: ICD-10-CM

## 2022-11-08 DIAGNOSIS — E11.40 TYPE 2 DIABETES MELLITUS WITH DIABETIC NEUROPATHY, UNSPECIFIED: ICD-10-CM

## 2022-11-08 DIAGNOSIS — Z79.4 LONG TERM (CURRENT) USE OF INSULIN: ICD-10-CM

## 2022-11-08 DIAGNOSIS — E05.90 THYROTOXICOSIS, UNSPECIFIED WITHOUT THYROTOXIC CRISIS OR STORM: ICD-10-CM

## 2022-11-08 DIAGNOSIS — S91.012D LACERATION WITHOUT FOREIGN BODY, LEFT ANKLE, SUBSEQUENT ENCOUNTER: ICD-10-CM
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
## 2022-11-08 DIAGNOSIS — G47.10 HYPERSOMNIA, UNSPECIFIED: ICD-10-CM
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
## 2022-11-08 DIAGNOSIS — Z79.82 LONG TERM (CURRENT) USE OF ASPIRIN: ICD-10-CM

## 2022-11-08 DIAGNOSIS — I50.9 HEART FAILURE, UNSPECIFIED: ICD-10-CM

## 2022-11-08 DIAGNOSIS — M48.07 SPINAL STENOSIS, LUMBOSACRAL REGION: ICD-10-CM

## 2022-11-08 DIAGNOSIS — Z95.2 PRESENCE OF PROSTHETIC HEART VALVE: ICD-10-CM

## 2022-11-08 DIAGNOSIS — Z87.01 PERSONAL HISTORY OF PNEUMONIA (RECURRENT): ICD-10-CM
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
## 2022-11-08 DIAGNOSIS — Z82.49 FAMILY HISTORY OF ISCHEMIC HEART DISEASE AND OTHER DISEASES OF THE CIRCULATORY SYSTEM: ICD-10-CM

## 2022-11-08 DIAGNOSIS — I47.1 SUPRAVENTRICULAR TACHYCARDIA: ICD-10-CM

## 2022-11-08 DIAGNOSIS — E66.01 MORBID (SEVERE) OBESITY DUE TO EXCESS CALORIES: ICD-10-CM

## 2022-11-08 DIAGNOSIS — Z87.891 PERSONAL HISTORY OF NICOTINE DEPENDENCE: ICD-10-CM

## 2022-11-08 DIAGNOSIS — Z95.4 PRESENCE OF OTHER HEART-VALVE REPLACEMENT: ICD-10-CM

## 2022-11-08 DIAGNOSIS — I83.893 VARICOSE VEINS OF BILATERAL LOWER EXTREMITIES WITH OTHER COMPLICATIONS: ICD-10-CM

## 2022-11-08 DIAGNOSIS — Z98.84 BARIATRIC SURGERY STATUS: ICD-10-CM

## 2022-11-08 DIAGNOSIS — S31.119D LACERATION WITHOUT FOREIGN BODY OF ABDOMINAL WALL, UNSPECIFIED QUADRANT WITHOUT PENETRATION INTO PERITONEAL CAVITY, SUBSEQUENT ENCOUNTER: ICD-10-CM

## 2022-11-08 DIAGNOSIS — I11.0 HYPERTENSIVE HEART DISEASE WITH HEART FAILURE: ICD-10-CM

## 2022-11-08 DIAGNOSIS — E55.9 VITAMIN D DEFICIENCY, UNSPECIFIED: ICD-10-CM

## 2022-11-08 DIAGNOSIS — I34.1 NONRHEUMATIC MITRAL (VALVE) PROLAPSE: ICD-10-CM

## 2022-11-08 DIAGNOSIS — M43.16 SPONDYLOLISTHESIS, LUMBAR REGION: ICD-10-CM

## 2022-11-08 DIAGNOSIS — M47.817 SPONDYLOSIS WITHOUT MYELOPATHY OR RADICULOPATHY, LUMBOSACRAL REGION: ICD-10-CM

## 2022-11-08 DIAGNOSIS — G47.33 OBSTRUCTIVE SLEEP APNEA (ADULT) (PEDIATRIC): ICD-10-CM

## 2022-11-08 DIAGNOSIS — E11.51 TYPE 2 DIABETES MELLITUS WITH DIABETIC PERIPHERAL ANGIOPATHY WITHOUT GANGRENE: ICD-10-CM
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
## 2022-11-08 PROBLEM — S30.871D: Status: ACTIVE | Noted: 2022-11-08

## 2022-11-08 NOTE — PHYSICAL EXAM
[Normal Thyroid] : the thyroid was normal [Normal Breath Sounds] : Normal breath sounds [Normal Heart Sounds] : normal heart sounds [Normal Rate and Rhythm] : normal rate and rhythm [Alert] : alert [Oriented to Person] : oriented to person [Oriented to Place] : oriented to place [Oriented to Time] : oriented to time [Calm] : calm [4 x 4] : 4 x 4  [JVD] : no jugular venous distention  [Abdomen Masses] : No abdominal massess [Abdomen Tenderness] : ~T ~M No abdominal tenderness [Tender] : nontender [Enlarged] : not enlarged [de-identified] : adult WF, NAD, alert, Ox3 [FreeTextEntry1] : right lower abdomen- New [FreeTextEntry2] : 0.7 [FreeTextEntry3] : 1.2 [FreeTextEntry4] : 0.2 [de-identified] : scant serosanguineous  [de-identified] : 1-5% [de-identified] : petra and calcium alginate [de-identified] : Mechanically cleansed with Sterile gauze and 0.9% Normal Saline\par  [de-identified] : No [de-identified] : Normal [de-identified] : None [de-identified] : None [de-identified] : >75% [de-identified] : Yes [de-identified] : Every other day [de-identified] : Primary Dressing

## 2022-11-08 NOTE — HISTORY OF PRESENT ILLNESS
[FreeTextEntry1] : 68 yo WF, seen today for a foot problem by podiatrist, pt also stated she has had an abdominal wound from her dog scratching her about a month ago which has not healed. Pt seen by her PCP who referred here to [facility].\par    Pt states she will be moving to [place] next wk.\par 11/7/22 abdominal wound clean with no signs of infection

## 2022-11-08 NOTE — PHYSICAL EXAM
[4 x 4] : 4 x 4  [0] : left 0 [Ankle Swelling (On Exam)] : present [Ankle Swelling Bilaterally] : bilaterally  [Varicose Veins Of Lower Extremities] : present [] : bilaterally [Ankle Swelling On The Left] : moderate [de-identified] : A&Ox3, NAD [de-identified] : 4/5 strength in all quadrants bilaterally [de-identified] : Left lateral ankle ulceration down to level of skin subcutaneous tissue and fat [de-identified] : mupirocin on left anterior ankle/leg excoriations, daily [FreeTextEntry1] : Left Lateral Ankle [FreeTextEntry2] : 1.1 [FreeTextEntry3] : 1.5 [FreeTextEntry4] : 0.1 [de-identified] : serosanguineous  [de-identified] : macerated [de-identified] : silver alginate [de-identified] : Mechanically cleansed with Sterile gauze and 0.9% Normal Saline\par  [FreeTextEntry7] : Right Plantar Foot 1st met- NEW [FreeTextEntry8] : 2.5 [FreeTextEntry9] : 1.8 [de-identified] : 0.1 [de-identified] : sanguineous  [de-identified] : silver alginate [de-identified] : Mechanically cleansed with Sterile gauze and 0.9% Normal Saline\par \par tegaderm [TWNoteComboBox4] : Moderate [TWNoteComboBox5] : No [de-identified] : No [de-identified] : other [de-identified] : None [de-identified] : None [de-identified] : 100% [de-identified] : No [de-identified] : 3x Weekly [de-identified] : Primary Dressing [de-identified] : Small [de-identified] : No [de-identified] : No [de-identified] : Normal [de-identified] : None [de-identified] : None [de-identified] : 100% [de-identified] : No [de-identified] : 3x Weekly [de-identified] : Primary Dressing [de-identified] : False [de-identified] : No [de-identified] : No [de-identified] : other [de-identified] : None [de-identified] : None

## 2022-11-08 NOTE — REVIEW OF SYSTEMS
[Skin Wound] : skin wound [Fever] : no fever [Eye Pain] : no eye pain [Earache] : no earache [Chest Pain] : no chest pain [Shortness Of Breath] : no shortness of breath [Abdominal Pain] : no abdominal pain [de-identified] : hyperkeratotic lesions plantar right and left halluxes. right 5th digit diabetic ulcer down to skin, subcutaneous tissue, and fat, epithelialized. left and right lower leg ulcer down to skin, subcutaneous tissue, and fat. [de-identified] : diabetic neuropathy [de-identified] : type 2 diabetes

## 2022-11-08 NOTE — ASSESSMENT
[Off-loading] : off-loading [Verbal] : Verbal [Written] : Written [Demo] : Demo [Patient] : Patient [Needs reinforcement] : needs reinforcement [Fair - mild discomfort, physical impairment, low acceptance] : Fair - mild discomfort, physical impairment, low acceptance [Dressing changes] : dressing changes [Foot Care] : foot care [Skin Care] : skin care [Pressure relief] : pressure relief [Signs and symptoms of infection] : sign and symptoms of infection [Nutrition] : nutrition [How and When to Call] : how and when to call [Pain Management] : pain management [Home Health] : home health [Patient responsibility to plan of care] : patient responsibility to plan of care [Glycemic Control] : glycemic control [Stable] : stable [Home] : Home [Wheelchair] : Wheelchair [Not Applicable - Long Term Care/Home Health Agency] : Long Term Care/Home Health Agency: Not Applicable [] : No [FreeTextEntry2] : infection prevention\par promote skin integrity\par encourage glycemic control\par weight loss\par check feet daily for changes to skin\par pressure relief [FreeTextEntry3] : new wounds [FreeTextEntry4] : Pt discharged due to moving to [place], [place] this week\par Rx for mupirocin sent to pharmacy\par Pt advised to follow up with wound care in [place], pt stated she had contact info to a center, MD and DPM aware.

## 2022-11-08 NOTE — ASSESSMENT
[Stable] : stable [Home] : Home [Wheelchair] : Wheelchair [Not Applicable - Long Term Care/Home Health Agency] : Long Term Care/Home Health Agency: Not Applicable [] : No [FreeTextEntry2] : infection prevention\par promote skin integrity\par weight reduction\par encourage glycemic control\par demonstrates use of both pharmacological and non pharmacological pain management interventions\par maintain acceptable levels of pain\par   [FreeTextEntry3] : new wound [FreeTextEntry4] : Pt to move to [place] on 11/10/22\par F/U 11/8/22 for assessment \par  [FreeTextEntry1] : pt provided with instructions.

## 2022-11-08 NOTE — REVIEW OF SYSTEMS
[Skin Wound] : skin wound [Fever] : no fever [Eye Pain] : no eye pain [Earache] : no earache [Chest Pain] : no chest pain [Shortness Of Breath] : no shortness of breath [Abdominal Pain] : no abdominal pain [de-identified] : hyperkeratotic lesions plantar right and left halluxes. right 5th digit diabetic ulcer down to skin, subcutaneous tissue, and fat, epithelialized. left and right lower leg ulcer down to skin, subcutaneous tissue, and fat. [de-identified] : diabetic neuropathy [de-identified] : type 2 diabetes

## 2022-11-08 NOTE — REVIEW OF SYSTEMS
[Fever] : no fever [Eye Pain] : no eye pain [Earache] : no earache [Loss Of Hearing] : no hearing loss [Chest Pain] : no chest pain [Shortness Of Breath] : no shortness of breath [Abdominal Pain] : no abdominal pain [Skin Wound] : skin wound [Anxiety] : no anxiety [FreeTextEntry5] : HTN, HLD , diabetic cardio vascular disease  [FreeTextEntry7] : Morbid obesity  [de-identified] : Left lateral ankle ulceration down to level of skin subcutaneous tissue and fat , blister plantar right 1st metatarsal superficial and no soi  [de-identified] : diabetic neuropathy , IDDM [de-identified] : type 2 diabetes , IDDM

## 2022-11-08 NOTE — ASSESSMENT
[Fair - mild discomfort, physical impairment, low acceptance] : Fair - mild discomfort, physical impairment, low acceptance [Needs reinforcement] : needs reinforcement [] : No [FreeTextEntry2] : infection prevention\par promote skin integrity\par encourage glycemic control\par weight loss\par check feet daily for changes to skin\par pressure relief [FreeTextEntry3] : new wounds (right plantar foot) [FreeTextEntry4] : Pt discharged due to moving to [place], [place] this week\par Rx for mupirocin sent to pharmacy\par Pt advised to follow up with wound care in [place], pt stated she had contact info to a center, MD and DPM aware. [FreeTextEntry1] : pt provided with instructions.

## 2022-11-08 NOTE — HISTORY OF PRESENT ILLNESS
[FreeTextEntry1] : 68 yo WF, seen today for a foot problem by podiatrist, pt also stated she has had an abdominal wound from her dog scratching her about a month ago which has not healed. Pt seen by her PCP who referred here to [facility].\par    Pt states she will be moving to [place] next wk.

## 2022-11-08 NOTE — PLAN
[FreeTextEntry1] : Patient moving to [place] , patient to seek wound center in her area . Continue off loading and wound care

## 2022-11-08 NOTE — PLAN
[FreeTextEntry1] : DAVID lambert, tegaderm\par no follow up patient moving to [place]\par \par \par time spent-20 mins.

## 2022-11-08 NOTE — PHYSICAL EXAM
[Normal Thyroid] : the thyroid was normal [Normal Breath Sounds] : Normal breath sounds [Normal Heart Sounds] : normal heart sounds [Normal Rate and Rhythm] : normal rate and rhythm [Alert] : alert [Oriented to Person] : oriented to person [Oriented to Place] : oriented to place [Oriented to Time] : oriented to time [Calm] : calm [JVD] : no jugular venous distention  [Abdomen Masses] : No abdominal massess [Abdomen Tenderness] : ~T ~M No abdominal tenderness [Tender] : nontender [Enlarged] : not enlarged [de-identified] : adult WF, NAD, alert, Ox3 [FreeTextEntry1] : right lower abdomen [FreeTextEntry2] : 1.0 [FreeTextEntry3] : 1.0 [FreeTextEntry4] : 0.2 [de-identified] : small serosanguineous  [de-identified] : petra  [de-identified] : Mechanically cleansed with Sterile gauze and 0.9% Normal Saline\par  [de-identified] : No [de-identified] : Normal [de-identified] : None [de-identified] : None [de-identified] : 100% [de-identified] : No [de-identified] : 3x Weekly [de-identified] : Primary Dressing

## 2022-12-11 NOTE — H&P ADULT - PROBLEM SELECTOR PLAN 1
New acute R sub 4th met head ulcer, sent by Dr. Palencia from wound care  -Patient with leukocytosis, ESR 50, afebrile on admission  -Admit to general medical floor  -F/u x-ray R foot  -F/u MRI R foot and arterial studies RLE per podiatry  -s/p IV Vanco and Zosyn in ED, continue  -F/u wound culture, MRSA PCR  -F/u Blood cultures, **of note patient received IV Zosyn, Vanco in ED prior to cultures being drawn  -Continue routine dressing with Calcium Alginate and DSD per podiatry  -Podiatry procedure not booked yet, pending vascular studies/MRI  -Podiatry Dr. Palencia following  -Will need cardiac clearance, [facility] consulted New acute R sub 4th met head ulcer, sent by Dr. Palencia from wound care  -Patient with leukocytosis, ESR 50, afebrile on admission  -Admit to general medical floor  -F/u x-ray R foot  -F/u MRI R foot and arterial studies RLE per podiatry  -s/p IV Vanco and Zosyn in ED, continue Vanco 2g q12h, Zosyn 3.375g q8h, f/u Vanco trough  -F/u wound culture, MRSA PCR  -F/u Blood cultures, **of note patient received IV Zosyn, Vanco in ED prior to cultures being drawn  -Continue routine dressing with Calcium Alginate and DSD per podiatry  -Podiatry procedure not booked yet, pending vascular studies/MRI  -Podiatry Dr. Palencia following  -ID Dr. Alexis consulted  -Will need cardiac clearance, [facility] consulted New acute R sub 4th met head ulcer, sent by Dr. Palencia from wound care  -Patient with leukocytosis, ESR 50, afebrile on admission  -Admit to general medical floor  -F/u x-ray R foot  -F/u MRI R foot and arterial studies RLE per podiatry  -s/p IV Vanco and Zosyn in ED, continue Vanco 2g q12h, Zosyn 3.375g q8h, f/u Vanco trough  -F/u wound culture, MRSA PCR  -F/u Blood cultures, **of note patient received IV Zosyn, Vanco in ED prior to cultures being drawn  -Continue routine dressing with Calcium Alginate and DSD per podiatry  -Hold off pain regimen as patient with chronic neuropathy  -Podiatry procedure not booked yet, pending vascular studies/MRI  -Podiatry Dr. Palencia following  -ID Dr. Alexis consulted  -Will need cardiac clearance, [facility] consulted Strong peripheral pulses

## 2022-12-21 PROBLEM — Z79.4 TYPE 2 DIABETES MELLITUS WITH DIABETIC POLYNEUROPATHY, WITH LONG-TERM CURRENT USE OF INSULIN (HCC): Status: ACTIVE | Noted: 2022-12-21

## 2022-12-21 PROBLEM — I27.20 PULMONARY HYPERTENSION (HCC): Chronic | Status: ACTIVE | Noted: 2022-12-21

## 2022-12-21 PROBLEM — L97.509 TYPE 2 DIABETES WITH SKIN ULCER OF FOOT (HCC): Status: ACTIVE | Noted: 2022-12-21

## 2022-12-21 PROBLEM — M53.9 MULTILEVEL DEGENERATIVE DISC DISEASE: Chronic | Status: ACTIVE | Noted: 2022-12-21

## 2022-12-21 PROBLEM — E11.621 TYPE 2 DIABETES WITH SKIN ULCER OF FOOT (HCC): Chronic | Status: ACTIVE | Noted: 2022-12-21

## 2022-12-21 PROBLEM — E66.01 MORBID OBESITY WITH BMI OF 45.0-49.9, ADULT (HCC): Status: ACTIVE | Noted: 2022-12-21

## 2022-12-21 PROBLEM — E11.42 TYPE 2 DIABETES MELLITUS WITH DIABETIC POLYNEUROPATHY, WITH LONG-TERM CURRENT USE OF INSULIN (HCC): Chronic | Status: ACTIVE | Noted: 2022-12-21

## 2022-12-21 PROBLEM — I50.32 CHRONIC DIASTOLIC HEART FAILURE (HCC): Status: ACTIVE | Noted: 2022-12-21

## 2022-12-21 PROBLEM — J45.20 MILD INTERMITTENT ASTHMA: Status: ACTIVE | Noted: 2022-12-21

## 2022-12-21 PROBLEM — M53.9 MULTILEVEL DEGENERATIVE DISC DISEASE: Status: ACTIVE | Noted: 2022-12-21

## 2022-12-21 PROBLEM — E78.5 DYSLIPIDEMIA ASSOCIATED WITH TYPE 2 DIABETES MELLITUS (HCC): Status: ACTIVE | Noted: 2022-12-21

## 2022-12-21 PROBLEM — L97.509 TYPE 2 DIABETES WITH SKIN ULCER OF FOOT (HCC): Chronic | Status: ACTIVE | Noted: 2022-12-21

## 2022-12-21 PROBLEM — J96.11 CHRONIC RESPIRATORY FAILURE WITH HYPOXIA (HCC): Chronic | Status: ACTIVE | Noted: 2022-12-21

## 2022-12-21 PROBLEM — F33.41 RECURRENT MAJOR DEPRESSIVE DISORDER, IN PARTIAL REMISSION (HCC): Status: ACTIVE | Noted: 2022-12-21

## 2022-12-21 PROBLEM — I27.20 PULMONARY HYPERTENSION (HCC): Status: ACTIVE | Noted: 2022-12-21

## 2022-12-21 PROBLEM — I50.32 CHRONIC DIASTOLIC HEART FAILURE (HCC): Chronic | Status: ACTIVE | Noted: 2022-12-21

## 2022-12-21 PROBLEM — I48.0 PAROXYSMAL ATRIAL FIBRILLATION (HCC): Chronic | Status: ACTIVE | Noted: 2022-12-21

## 2022-12-21 PROBLEM — E11.42 TYPE 2 DIABETES MELLITUS WITH DIABETIC POLYNEUROPATHY, WITH LONG-TERM CURRENT USE OF INSULIN (HCC): Status: ACTIVE | Noted: 2022-12-21

## 2022-12-21 PROBLEM — F33.41 RECURRENT MAJOR DEPRESSIVE DISORDER, IN PARTIAL REMISSION (HCC): Chronic | Status: ACTIVE | Noted: 2022-12-21

## 2022-12-21 PROBLEM — I48.0 PAROXYSMAL ATRIAL FIBRILLATION (HCC): Status: ACTIVE | Noted: 2022-12-21

## 2022-12-21 PROBLEM — Z79.4 TYPE 2 DIABETES MELLITUS WITH DIABETIC POLYNEUROPATHY, WITH LONG-TERM CURRENT USE OF INSULIN (HCC): Chronic | Status: ACTIVE | Noted: 2022-12-21

## 2022-12-21 PROBLEM — K21.9 GASTROESOPHAGEAL REFLUX DISEASE WITHOUT ESOPHAGITIS: Chronic | Status: ACTIVE | Noted: 2022-12-21

## 2022-12-21 PROBLEM — J96.11 CHRONIC RESPIRATORY FAILURE WITH HYPOXIA (HCC): Status: ACTIVE | Noted: 2022-12-21

## 2022-12-21 PROBLEM — I42.1 HYPERTROPHIC OBSTRUCTIVE CARDIOMYOPATHY (HCC): Chronic | Status: ACTIVE | Noted: 2022-12-21

## 2022-12-21 PROBLEM — E11.621 TYPE 2 DIABETES WITH SKIN ULCER OF FOOT (HCC): Status: ACTIVE | Noted: 2022-12-21

## 2022-12-21 PROBLEM — E11.69 DYSLIPIDEMIA ASSOCIATED WITH TYPE 2 DIABETES MELLITUS (HCC): Status: ACTIVE | Noted: 2022-12-21

## 2022-12-21 PROBLEM — E66.01 MORBID OBESITY WITH BMI OF 45.0-49.9, ADULT (HCC): Chronic | Status: ACTIVE | Noted: 2022-12-21

## 2022-12-21 PROBLEM — K21.9 GASTROESOPHAGEAL REFLUX DISEASE WITHOUT ESOPHAGITIS: Status: ACTIVE | Noted: 2022-12-21

## 2022-12-21 PROBLEM — E11.69 DYSLIPIDEMIA ASSOCIATED WITH TYPE 2 DIABETES MELLITUS (HCC): Chronic | Status: ACTIVE | Noted: 2022-12-21

## 2022-12-21 PROBLEM — Z95.2 HISTORY OF TRANSCATHETER AORTIC VALVE REPLACEMENT (TAVR): Status: ACTIVE | Noted: 2022-12-21

## 2022-12-21 PROBLEM — E78.5 DYSLIPIDEMIA ASSOCIATED WITH TYPE 2 DIABETES MELLITUS (HCC): Chronic | Status: ACTIVE | Noted: 2022-12-21

## 2022-12-21 PROBLEM — I42.1 HYPERTROPHIC OBSTRUCTIVE CARDIOMYOPATHY (HCC): Status: ACTIVE | Noted: 2022-12-21

## 2023-01-13 ENCOUNTER — HOSPITAL ENCOUNTER (OUTPATIENT)
Facility: MEDICAL CENTER | Age: 68
End: 2023-01-13
Attending: NURSE PRACTITIONER
Payer: MEDICARE

## 2023-01-13 LAB
25(OH)D3 SERPL-MCNC: 76 NG/ML (ref 30–100)
ALBUMIN SERPL BCP-MCNC: 3.9 G/DL (ref 3.2–4.9)
ALBUMIN/GLOB SERPL: 1.2 G/DL
ALP SERPL-CCNC: 129 U/L (ref 30–99)
ALT SERPL-CCNC: 13 U/L (ref 2–50)
ANION GAP SERPL CALC-SCNC: 13 MMOL/L (ref 7–16)
AST SERPL-CCNC: 16 U/L (ref 12–45)
BASOPHILS # BLD AUTO: 0.6 % (ref 0–1.8)
BASOPHILS # BLD: 0.04 K/UL (ref 0–0.12)
BILIRUB SERPL-MCNC: 0.5 MG/DL (ref 0.1–1.5)
BUN SERPL-MCNC: 24 MG/DL (ref 8–22)
CALCIUM ALBUM COR SERPL-MCNC: 9.8 MG/DL (ref 8.5–10.5)
CALCIUM SERPL-MCNC: 9.7 MG/DL (ref 8.5–10.5)
CHLORIDE SERPL-SCNC: 98 MMOL/L (ref 96–112)
CHOLEST SERPL-MCNC: 234 MG/DL (ref 100–199)
CO2 SERPL-SCNC: 25 MMOL/L (ref 20–33)
CREAT SERPL-MCNC: 1.06 MG/DL (ref 0.5–1.4)
EOSINOPHIL # BLD AUTO: 0.19 K/UL (ref 0–0.51)
EOSINOPHIL NFR BLD: 3 % (ref 0–6.9)
ERYTHROCYTE [DISTWIDTH] IN BLOOD BY AUTOMATED COUNT: 46.8 FL (ref 35.9–50)
EST. AVERAGE GLUCOSE BLD GHB EST-MCNC: 260 MG/DL
FASTING STATUS PATIENT QL REPORTED: NORMAL
FOLATE SERPL-MCNC: 9 NG/ML
GFR SERPLBLD CREATININE-BSD FMLA CKD-EPI: 57 ML/MIN/1.73 M 2
GLOBULIN SER CALC-MCNC: 3.2 G/DL (ref 1.9–3.5)
GLUCOSE SERPL-MCNC: 344 MG/DL (ref 65–99)
HBA1C MFR BLD: 10.7 % (ref 4–5.6)
HCT VFR BLD AUTO: 34.5 % (ref 37–47)
HDLC SERPL-MCNC: 37 MG/DL
HGB BLD-MCNC: 11 G/DL (ref 12–16)
IMM GRANULOCYTES # BLD AUTO: 0.02 K/UL (ref 0–0.11)
IMM GRANULOCYTES NFR BLD AUTO: 0.3 % (ref 0–0.9)
LDLC SERPL CALC-MCNC: 146 MG/DL
LYMPHOCYTES # BLD AUTO: 1.08 K/UL (ref 1–4.8)
LYMPHOCYTES NFR BLD: 17.1 % (ref 22–41)
MCH RBC QN AUTO: 28.1 PG (ref 27–33)
MCHC RBC AUTO-ENTMCNC: 31.9 G/DL (ref 33.6–35)
MCV RBC AUTO: 88.2 FL (ref 81.4–97.8)
MONOCYTES # BLD AUTO: 0.36 K/UL (ref 0–0.85)
MONOCYTES NFR BLD AUTO: 5.7 % (ref 0–13.4)
NEUTROPHILS # BLD AUTO: 4.64 K/UL (ref 2–7.15)
NEUTROPHILS NFR BLD: 73.3 % (ref 44–72)
NRBC # BLD AUTO: 0 K/UL
NRBC BLD-RTO: 0 /100 WBC
NT-PROBNP SERPL IA-MCNC: 967 PG/ML (ref 0–125)
PLATELET # BLD AUTO: 273 K/UL (ref 164–446)
PMV BLD AUTO: 11.6 FL (ref 9–12.9)
POTASSIUM SERPL-SCNC: 4.8 MMOL/L (ref 3.6–5.5)
PROT SERPL-MCNC: 7.1 G/DL (ref 6–8.2)
RBC # BLD AUTO: 3.91 M/UL (ref 4.2–5.4)
SODIUM SERPL-SCNC: 136 MMOL/L (ref 135–145)
T4 FREE SERPL-MCNC: 1.25 NG/DL (ref 0.93–1.7)
TRIGL SERPL-MCNC: 257 MG/DL (ref 0–149)
TSH SERPL DL<=0.005 MIU/L-ACNC: 0.13 UIU/ML (ref 0.38–5.33)
VIT B12 SERPL-MCNC: 410 PG/ML (ref 211–911)
WBC # BLD AUTO: 6.3 K/UL (ref 4.8–10.8)

## 2023-01-13 PROCEDURE — 80061 LIPID PANEL: CPT

## 2023-01-13 PROCEDURE — 84443 ASSAY THYROID STIM HORMONE: CPT

## 2023-01-13 PROCEDURE — 84439 ASSAY OF FREE THYROXINE: CPT

## 2023-01-13 PROCEDURE — 85025 COMPLETE CBC W/AUTO DIFF WBC: CPT

## 2023-01-13 PROCEDURE — 83036 HEMOGLOBIN GLYCOSYLATED A1C: CPT | Mod: GA

## 2023-01-13 PROCEDURE — 80053 COMPREHEN METABOLIC PANEL: CPT

## 2023-01-13 PROCEDURE — 82746 ASSAY OF FOLIC ACID SERUM: CPT

## 2023-01-13 PROCEDURE — 82306 VITAMIN D 25 HYDROXY: CPT | Mod: GA

## 2023-01-13 PROCEDURE — 82607 VITAMIN B-12: CPT

## 2023-01-13 PROCEDURE — 83880 ASSAY OF NATRIURETIC PEPTIDE: CPT

## 2023-01-25 ENCOUNTER — HOSPITAL ENCOUNTER (OUTPATIENT)
Facility: MEDICAL CENTER | Age: 68
End: 2023-01-25
Attending: NURSE PRACTITIONER
Payer: COMMERCIAL

## 2023-01-25 LAB
APPEARANCE UR: CLEAR
BACTERIA #/AREA URNS HPF: NEGATIVE /HPF
BILIRUB UR QL STRIP.AUTO: NEGATIVE
COLOR UR: YELLOW
EPI CELLS #/AREA URNS HPF: NEGATIVE /HPF
GLUCOSE UR STRIP.AUTO-MCNC: >=1000 MG/DL
HYALINE CASTS #/AREA URNS LPF: ABNORMAL /LPF
KETONES UR STRIP.AUTO-MCNC: NEGATIVE MG/DL
LEUKOCYTE ESTERASE UR QL STRIP.AUTO: ABNORMAL
MICRO URNS: ABNORMAL
NITRITE UR QL STRIP.AUTO: NEGATIVE
PH UR STRIP.AUTO: 5.5 [PH] (ref 5–8)
PROT UR QL STRIP: NEGATIVE MG/DL
RBC # URNS HPF: ABNORMAL /HPF
RBC UR QL AUTO: NEGATIVE
SP GR UR STRIP.AUTO: 1.02
UROBILINOGEN UR STRIP.AUTO-MCNC: 0.2 MG/DL
WBC #/AREA URNS HPF: ABNORMAL /HPF

## 2023-01-25 PROCEDURE — 81001 URINALYSIS AUTO W/SCOPE: CPT

## 2023-01-25 PROCEDURE — 87086 URINE CULTURE/COLONY COUNT: CPT

## 2023-01-27 LAB
BACTERIA UR CULT: NORMAL
SIGNIFICANT IND 70042: NORMAL
SITE SITE: NORMAL
SOURCE SOURCE: NORMAL

## 2023-02-19 PROBLEM — Z91.199 NONCOMPLIANCE WITH DIET AND MEDICATION REGIMEN: Status: ACTIVE | Noted: 2023-01-25

## 2023-02-19 PROBLEM — R79.89 LOW TSH LEVEL: Status: ACTIVE | Noted: 2023-01-25

## 2023-02-19 PROBLEM — Z91.199 NONCOMPLIANCE WITH DIET AND MEDICATION REGIMEN: Chronic | Status: ACTIVE | Noted: 2023-01-25

## 2023-02-19 PROBLEM — Z91.148 NONCOMPLIANCE WITH DIET AND MEDICATION REGIMEN: Chronic | Status: ACTIVE | Noted: 2023-01-25

## 2023-02-19 PROBLEM — R79.89 LOW TSH LEVEL: Chronic | Status: ACTIVE | Noted: 2023-01-25

## 2023-02-19 PROBLEM — Z78.9 FULL CODE STATUS: Chronic | Status: ACTIVE | Noted: 2023-01-25

## 2023-02-19 PROBLEM — Z91.148 NONCOMPLIANCE WITH DIET AND MEDICATION REGIMEN: Status: ACTIVE | Noted: 2023-01-25

## 2023-02-19 PROBLEM — E11.8 DM (DIABETES MELLITUS), TYPE 2 WITH COMPLICATIONS (HCC): Status: ACTIVE | Noted: 2022-12-21

## 2023-02-19 PROBLEM — Z78.9 FULL CODE STATUS: Status: ACTIVE | Noted: 2023-01-25

## 2023-03-08 NOTE — PATIENT PROFILE ADULT - SURGICAL SITE INCISION
--Avoid strenuous activity or overhead motion with the right arm. Rest, but I encourage gentle range of motion of the shoulder as tolerated as discussed.   --Ice the shoulder 3-4 times a day for 15 minutes.   --Continue with ibuprofen 600mg every 6 hours for pain/inflammation.   --Follow up with PCP for shoulder injury, and for elevated blood pressure.   --If any new or worsening symptomsn(numbness, weakness, tingling in right arm/hand etc), seek immediate reevaluation.   
no

## 2023-03-28 NOTE — PACU DISCHARGE NOTE - NS MD DISCHARGE NOTE DISCHARGE
Home
disability    -Rehab Potential: Good  -Requires OT Follow Up: Yes    Time In: 9:00 am            Time Out: 10:00 am             Visit #: 13     *Codes below are the only approved codes-64 units, through 5/25/23*    CODE   Minutes  Units   66566 Fluidotherapy-       73024 Manual-     81792 Therapeutic Ex- 30 2   01576 Therapeutic Activity- 30 2   75386 Neuromuscular Re-Ed-       92980 Paraffin-       49008 Iontophoresis-     75290 Ultrasound-             60 4       -Response to Treatment: Good      Plan:   [x]  Continues Plan of care: Treatment covered based on POC and graduated to patient's progress. Pt education continues at each visit to obtain maximum benefits from skilled OT intervention.   []  Alter Plan of care:   []  Discharge:    Blair REYNOLDS/DIANA, [id number]

## 2023-06-14 NOTE — REASON FOR VISIT
Detail Level: Zone Plan: Use Sunscreen SPF 35 or greater to sun exposed areas daily. [Follow-Up: _____] : a [unfilled] follow-up visit [Other: _____] : [unfilled]

## 2023-09-07 NOTE — DIETITIAN INITIAL EVALUATION ADULT. - CALCULATED TO (G/KG)
117.91 Unna Boot Text: An Unna boot was placed to help immobilize the limb and facilitate more rapid healing.

## 2023-12-19 NOTE — PHYSICAL THERAPY INITIAL EVALUATION ADULT - IMPAIRED TRANSFERS: SIT/STAND, REHAB EVAL
1. Do you have an Epinephrine auto injector with you? Yes    2. What antihistamine did you take today (Examples: Zyrtec/Cetirizine, Claritin, Xyzal, Benadryl, Allegra)? Allegra    3. If you have asthma: Have you had any shortness of breath, wheezing or cough within the last 48 hours? No    4. Since your last injection, have you been seen in the Emergency Department or Urgent Care Clinic for your breathing? No    5. Are you taking any Beta Blocker Medications (examples: Metoprolol, Atenolol)?  No    6. Women of Childbearing Age: Since your last allergy injection, have you become pregnant or do you think that you are pregnant?  NA    7. Have you been prescribed any antibiotics in the last 5 days? No    8. Have you had a reaction to your last allergy injection? If yes, what was your reaction? No   > Any symptoms other than at the site of injection (Generalized itching, breathing difficulty, redness, hives, swelling, etc.) No     decreased strength/impaired balance

## 2024-01-24 NOTE — DISCHARGE NOTE NURSING/CASE MANAGEMENT/SOCIAL WORK - NSDCPEXARELTOCOMP_GEN_ALL_CORE
Rivaroxaban/Xarelto is used to treat and prevent blood clots.  If you are not able to swallow the tablets whole, you may crush the tablets and mix them with a small amount of applesauce and promptly take within four hours. Eat some food right after you swallow the mixture. Take 2.5mg or 10mg tablets of Rivaroxaban/Xarelto with or without food. Take 15mg or 20mg tablets of Rivaroxaban/Xarelto with food. Never skip a dose of Rivaroxaban/Xarelto. If you take Rivaroxaban/Xarelto once a day and you forget to take your dose, take a dose as soon as you remember on the same day. If you take Rivaroxaban/Xarelto 2.5 mg twice a day and you forget to take your dose, skip the missed dose and take your next dose at your usual time. DO NOT take an extra pill to ‘catch up’. If you take Rivaroxaban/Xarelto 15 mg twice a day and you forget to take your dose, take a dose as soon as you remember on the same day. You may take 2 doses at the same time to make up for missed dose ONLY if you take a total of 30mg per day. Notify your doctor that you missed a dose. Take Rivaroxaban/Xarelto at the same time each morning and evening. Rivaroxaban/Xarelto may be taken with other medication or food. penile pain/discharge

## 2024-02-05 NOTE — REVIEW OF SYSTEMS
Subjective   Patient ID: Emily is a 60 year old female.    Chief Complaint   Patient presents with    Office Visit    Physical     Pt presents for PE. Has not been seen in a few years. States she is not currently on her meds. Has a h/o CVA about two years and needs to see neurology. Has restarted smoking. Also has ah/o breast cancer for which she has not seen anybody.         Past Medical History:   Diagnosis Date    Breast cancer (CMD)     Diabetes mellitus (CMD)     High cholesterol     Malignant neoplasm (CMD)        Patient Active Problem List   Diagnosis    Primary malignant neoplasm of female breast (CMD)    Anxiety    Essential (primary) hypertension    Encounter for general adult medical examination w/o abnormal findings    Type 2 diabetes mellitus without complication, without long-term current use of insulin (CMD)    History of stroke       Past Surgical History:   Procedure Laterality Date    Breast lumpectomy      left side with lymph node removal    Breast reconstruction      Removal gallbladder         History reviewed. No pertinent family history.    Social History     Tobacco Use    Smoking status: Every Day     Current packs/day: 1.00     Average packs/day: 1 pack/day for 44.2 years (44.2 ttl pk-yrs)     Types: Cigarettes     Start date: 12/9/1979    Smokeless tobacco: Never    Tobacco comments:     Patient states has cut back on cigarettes 4/11/2022   Vaping Use    Vaping Use: never used   Substance Use Topics    Alcohol use: Not Currently    Drug use: Never       Current Outpatient Medications   Medication Sig Dispense Refill    metFORMIN (GLUCOPHAGE) 500 MG tablet Take 1 tablet by mouth in the morning and 1 tablet in the evening. Take with meals. 180 tablet 1    atenolol (TENORMIN) 50 MG tablet Take 1 tablet by mouth daily. 90 tablet 1    atorvastatin (LIPITOR) 80 MG tablet Take 1 tablet by mouth nightly. 90 tablet 1    fenofibrate (TRICOR) 145 MG tablet TAKE 1 TABLET BY MOUTH EVERY EVENING 90  tablet 0    clopidogrel (PLAVIX) 75 MG tablet Take 1 tablet by mouth daily. 90 tablet 1    aspirin 81 MG chewable tablet Chew 1 tablet by mouth daily. Do not start before April 7, 2022. 30 tablet 0    meclizine (ANTIVERT) 25 MG tablet Take 1-2 tablets by mouth 3 times daily as needed for Dizziness. 30 tablet 1    anastrozole (ARIMIDEX) 1 MG tablet Take 1 tablet by mouth daily. 0 30 tablet 11     No current facility-administered medications for this visit.       ALLERGIES:   Allergen Reactions    Keflex HIVES         Review of Systems   Constitutional: Negative.    HENT: Negative.     Respiratory: Negative.     Cardiovascular: Negative.    Gastrointestinal: Negative.    Genitourinary: Negative.    Musculoskeletal: Negative.    Hematological: Negative.        Objective   Physical Exam  Vitals and nursing note reviewed.   Constitutional:       Appearance: Normal appearance.   HENT:      Right Ear: Tympanic membrane normal.      Left Ear: Tympanic membrane normal.      Mouth/Throat:      Mouth: Mucous membranes are moist.   Eyes:      General: Visual field deficit present.      Extraocular Movements: Extraocular movements intact.      Conjunctiva/sclera: Conjunctivae normal.   Cardiovascular:      Rate and Rhythm: Normal rate and regular rhythm.      Pulses: Normal pulses.      Heart sounds: Normal heart sounds.   Pulmonary:      Effort: Pulmonary effort is normal.      Breath sounds: Normal breath sounds.   Abdominal:      Palpations: Abdomen is soft.   Neurological:      Mental Status: She is alert and oriented to person, place, and time.   Psychiatric:         Mood and Affect: Mood normal.           ASSESSMENT AND PLAN  Primary malignant neoplasm of female breast (CMS/HCC)  Has not been following with any monitoring or care over last year  Will start with b/l diagnostic mammogram and US  Pt was advised to f/u with breast surgeon Dr. Rushing who she has seen in the past.    Type 2 diabetes mellitus without complication,  without long-term current use of insulin (CMS/[facility])  Labs ordered  A1c goal < 7.0  Monofilament exam up to date  Feet self assessment  Eye exam recommended yearly  Discussed healthy diabetic diet  Had stopped her meds by self  Will see results before any new recommendations, will re-start metformin for now    Essential (primary) hypertension  BP is elevated  Restart atenolol  Start home monitoring  BP goal < 130/80    Encounter for general adult medical examination w/o abnormal findings  PE as above  Histories reviewed  Labs ordered  Discussed exercise on a regular basis 3-5 times weekly  Recommend healthy diet that is low on carbs  Colonoscopy was done 2019 (Dr. Tran) Pt will  contact GI to see when she is due for her next colonoscopy    History of stroke  Referral given for neurology     [Skin Wound] : skin wound [Fever] : no fever [Eye Pain] : no eye pain [Earache] : no earache [Chest Pain] : no chest pain [Shortness Of Breath] : no shortness of breath [Abdominal Pain] : no abdominal pain [de-identified] : Right posterior ankle ulceration down to skin, subcutaneous tissue, and fat. venous stasis wound on right lower leg down to skin and subcutaneous tissue and fat epithelialized. venous stasis wounds on left lower leg down to skin and subcutaneous tissue and fat epithelialized. bilateral stasis dermatitis.

## 2024-05-15 NOTE — DIETITIAN INITIAL EVALUATION ADULT. - PROBLEM SELECTOR PLAN 10
- reported discussion between pt and surgical team was that pt wanted DNI.   - on further discussion with pt, pt states she requests to be full code, but only wants to be intubated if she is completely sedated.  - pt requests all medical therapy as necessary and indicated  - time spent 20 min no

## 2024-07-17 NOTE — VITALS
Patient needs an appointment, not seen since hospital discharge.   [] : No [FreeTextEntry5] : Xarelto 15mg [de-identified] : Pain is 0/10. Patient denies any pain or discomfort at this time.

## 2024-08-26 NOTE — PROGRESS NOTE ADULT - NUTRITIONAL ASSESSMENT
Prior Authorization Retail Medication Request    Medication/Dose: Semaglutide-Weight Management (WEGOVY) 1 MG/0.5ML pen   Diagnosis and ICD code (if different than what is on RX):    Class 3 severe obesity with serious comorbidity and body mass index (BMI) of 40.0 to 44.9 in adult, unspecified obesity type (H) [E66.01, Z68.41]  - Primary        Insurance   Primary: [facility]   Insurance ID:  [id number]     Pharmacy Information (if different than what is on RX)  Name:  [facility]  Phone:  [phone]  Fax: [phone]     
This patient has been assessed with a concern for Malnutrition and has been determined to have a diagnosis/diagnoses of Morbid obesity/BMI > 40.    This patient is being managed with:   Diet DASH/TLC-  Sodium & Cholesterol Restricted  Consistent Carbohydrate {Evening Snack} (CSTCHOSN)  Entered: Aug 27 2020 11:37PM
This patient has been assessed with a concern for Malnutrition and has been determined to have a diagnosis/diagnoses of Morbid obesity/BMI > 40.    This patient is being managed with:   Diet DASH/TLC-  Sodium & Cholesterol Restricted  Consistent Carbohydrate {Evening Snack} (CSTCHOSN)  Entered: Aug 27 2020 11:37PM

## 2024-11-30 NOTE — PATIENT PROFILE ADULT - SAFE PLACE TO LIVE
Progress Note    Patient: Carol Arambula Date: 7/15/2018   female, 54 year old  Admit Date: 7/13/2018   Attending: Oracio Tan MD      Subjective:  Carol Arambula is a 54 year old female who is being seen in follow up for Acute ischemic stroke (CMS/HCC) in left basal ganglia with seizure episode, possible proximal basilar artery dissection and acute UTI  Patient admitted with slurred speech and expressive aphasia followed by confusion   Patient has history of seizure disorder and stopped taking her Keppra medication for the last 7-8 months   She has h/o prior ischemic strokes last year with residual impairment in the fine movements of both hands   Found to have acute left basal ganglia stroke in the MRI   Found also to have proximal basilar artery dissection in the CTA  Neurology suspect that confusion was secondary to seizure episode     Today  No more confusion   Still with some slurred speech   Has chronic bilateral difficulties in fine movements in both hands   No fever or chills   Denies abdominal pain, chest pain or SOB   No cough            Medications: personally reviewed today in this patient's active orders section of epic  Allergies:   Allergies as of 07/13/2018   • (No Known Allergies)       PHYSICAL EXAM:  Visit Vitals  BP (!) 174/98 (BP Location: [facility], Patient Position: Semi-Alanis's)   Pulse 72   Temp 98.5 °F (36.9 °C) (Oral)   Resp 18   Ht 5' 8\" (1.727 m)   Wt 90.5 kg   LMP  (LMP Unknown)   SpO2 95%   BMI 30.34 kg/m²     General: A & O X 3 in no acute distress, normal cephalic/atraumatic, Mood and Affect appropriate  Lungs: Clear to auscultation bilaterally  Heart:  Regular rate and rhythm and S1, S2 present  Abdomen: NT/ND;  + B.S.; No guarding or rebound; No peritoneal signs  Extremities: cyanosis absent; no obvious lesions or wounds, no edema   Neurologic: mild dysarthria and minimal facial droop in the right side, other CN 2-12 Grossly intact as best as patient can cooperate with 
exam    Her strength is bilaterally intact in all 4 extremities, however had impairment in the fine movements of hands bilateral, sensation is grossly intact throughout    Labs:    Recent Labs  Lab 07/15/18  0618 07/14/18  0350 07/13/18  1100   WBC 9.6 11.5* 10.9   RBC 4.91 5.00 5.25*   HGB 14.2 14.6 15.6*   HCT 42.6 43.3 44.7    245 235   SEG 62 64 60       Recent Labs  Lab 07/15/18  0618 07/14/18  0350 07/13/18  1100   SODIUM 138 139 134*   POTASSIUM 4.1 3.6 3.7   CHLORIDE 105 104 100   CO2 25 25 23   BUN 7 7 11   CREATININE 0.71 0.71 0.81   GFRNA >90 >90 53   GLUCOSE 113* 120* 143*   CALCIUM 8.7 8.5 8.8   ALBUMIN 3.2*  --  3.5*   AST 9  --  12   GPT 12  --  15   BILIRUBIN 0.6  --  0.7   ALKPT 77  --  81   INR  --   --  1.1       Assessment & Plan:     · Acute left basal ganglia ischemic stroke with dysarthria and mild facial droop - MRI brain 7/13 revealed left basal ganglia ischemic stroke with microvascular leukoencephalomalacia and chronic ischemic changes  · CTA 7/13 revealed proximal basilar artery dissection   · Echo with EF 65%, Negative bubble study and normal valves   · Telemetry negative for arrhythmia to date  · Continue on aspirin 325 mg daily and Statin  · Follow with Neurology and Interventional Neurology     · Left basilar artery dissection  - discussed with Dr Layton and planning for Cerebral angiogram in am     · H/O old ischemic strokes with residual impairment in the fine bilateral hands movements  - PT/OT     · Confusion on admission likely secondary to seizure episode in patient with known seizure disorder and not compliant to her medication - continue on Keppra dose increased to 1000 mg BID as per Neurology recommendations, EEG no significant findings     · Acute UTI present on admission will start on IV Rocephin follow urine culture result     · Hypertension uncontrolled allow for permissive hypertension restarted on Amlodipine but continue to hold Lisinopril for now     · DM 
type II with Neuropathy hold OHA and continue on SSI     · DVT Prophylaxis- Heparin       Code status: Full Resuscitation    Disposition: TBD     Oracio Tan MD  Hospitalist  7/15/2018  2:49 PM              
Patient
no

## 2025-01-14 NOTE — HISTORY OF PRESENT ILLNESS
Central scheduling   [phone]     Miguel Lozada MD  Obstetrician-gynecologist in [place], [place]  Phone: [phone]   
[FreeTextEntry1] : Patient seen for left foot laceration down to skin, subcutaneous tissue which has healed. Pt also seen for venous stasis wounds to right and left lower leg down to skin and subcutaneous tissue and fat with stasis dermatitis which have healed. Pt also seen for right posterior ankle diabetic ulcer down to skin, subcutaneous tissue, and fat, epithelialized at this time.

## 2025-05-20 NOTE — PACU DISCHARGE NOTE - MENTAL STATUS: PATIENT PARTICIPATION
What Type Of Note Output Would You Prefer (Optional)?: Standard Output
How Severe Is Your Skin Lesion?: moderate
Has Your Skin Lesion Been Treated?: not been treated
Is This A New Presentation, Or A Follow-Up?: Skin Lesions
Awake

## 2025-07-02 NOTE — ED ADULT NURSE NOTE - NSFALLRSKASSESSTYPE_ED_ALL_ED
Warts are common growths that can appear anywhere on the body and are due to the HPV virus. There are many treatment options available, however if the lesions are asymptomatic they do not have to be treated.  Treatments include liquid nitrogen (LN&2), electrodesiccation & curettage (ED&C), laser, topical prescription or over-the counter products. Regardless of treatment chosen, warts often require multiple treatments and may recur after therapy.    Discussed risks and benefits of LN2, ED&C, candida injection, laser treatment, Over-the-counter treatments and observation.     Patient elected for LN2, The risks and benefits of LN2 were reviewed including incomplete removal, crusting, blister hypo and/or hyperpigmentation, scarring and recurrence.  .    Initial (On Arrival)